# Patient Record
Sex: MALE | Race: WHITE | NOT HISPANIC OR LATINO | Employment: UNEMPLOYED | URBAN - NONMETROPOLITAN AREA
[De-identification: names, ages, dates, MRNs, and addresses within clinical notes are randomized per-mention and may not be internally consistent; named-entity substitution may affect disease eponyms.]

---

## 2017-02-16 ENCOUNTER — HOSPITAL ENCOUNTER (OUTPATIENT)
Dept: RADIOLOGY | Facility: HOSPITAL | Age: 52
Discharge: HOME | End: 2017-02-16
Attending: INTERNAL MEDICINE
Payer: COMMERCIAL

## 2017-02-16 ENCOUNTER — TRANSCRIBE ORDERS (OUTPATIENT)
Dept: ADMINISTRATIVE | Facility: HOSPITAL | Age: 52
End: 2017-02-16

## 2017-02-16 DIAGNOSIS — Q63.8: Primary | ICD-10-CM

## 2017-02-16 DIAGNOSIS — Q63.8: ICD-10-CM

## 2017-02-16 PROCEDURE — 74000 HB X-RAY EXAM OF ABDOMEN (SINGLE ANTEROPOSTERIOR VIEW): CPT

## 2017-03-05 ENCOUNTER — GENERIC CONVERSION - ENCOUNTER (OUTPATIENT)
Dept: OTHER | Facility: OTHER | Age: 52
End: 2017-03-05

## 2017-04-05 ENCOUNTER — GENERIC CONVERSION - ENCOUNTER (OUTPATIENT)
Dept: INTERNAL MEDICINE CLINIC | Facility: CLINIC | Age: 52
End: 2017-04-05

## 2017-04-05 ENCOUNTER — GENERIC CONVERSION - ENCOUNTER (OUTPATIENT)
Dept: OTHER | Facility: OTHER | Age: 52
End: 2017-04-05

## 2017-04-05 DIAGNOSIS — Z12.5 ENCOUNTER FOR SCREENING FOR MALIGNANT NEOPLASM OF PROSTATE: ICD-10-CM

## 2017-04-05 DIAGNOSIS — E55.9 VITAMIN D DEFICIENCY: ICD-10-CM

## 2017-04-05 DIAGNOSIS — E78.2 MIXED HYPERLIPIDEMIA: ICD-10-CM

## 2017-04-05 DIAGNOSIS — I10 ESSENTIAL (PRIMARY) HYPERTENSION: ICD-10-CM

## 2017-07-21 ENCOUNTER — OFFICE VISIT (OUTPATIENT)
Dept: URGENT CARE | Facility: CLINIC | Age: 52
End: 2017-07-21
Payer: COMMERCIAL

## 2017-07-21 PROCEDURE — 99203 OFFICE O/P NEW LOW 30 MIN: CPT

## 2017-07-24 ENCOUNTER — OFFICE VISIT (OUTPATIENT)
Dept: URGENT CARE | Facility: CLINIC | Age: 52
End: 2017-07-24
Payer: COMMERCIAL

## 2017-07-24 ENCOUNTER — TRANSCRIBE ORDERS (OUTPATIENT)
Dept: ADMINISTRATIVE | Facility: HOSPITAL | Age: 52
End: 2017-07-24

## 2017-07-24 DIAGNOSIS — R52 PAIN: Primary | ICD-10-CM

## 2017-07-24 PROCEDURE — 99213 OFFICE O/P EST LOW 20 MIN: CPT

## 2017-07-25 ENCOUNTER — TRANSCRIBE ORDERS (OUTPATIENT)
Dept: LAB | Facility: MEDICAL CENTER | Age: 52
End: 2017-07-25

## 2017-07-25 ENCOUNTER — APPOINTMENT (OUTPATIENT)
Dept: LAB | Facility: MEDICAL CENTER | Age: 52
End: 2017-07-25
Payer: COMMERCIAL

## 2017-07-25 DIAGNOSIS — Z87.442 PERSONAL HISTORY OF URINARY CALCULI: Primary | ICD-10-CM

## 2017-07-25 DIAGNOSIS — Z87.442 PERSONAL HISTORY OF URINARY CALCULI: ICD-10-CM

## 2017-07-25 LAB
BUN SERPL-MCNC: 14 MG/DL (ref 5–25)
CREAT SERPL-MCNC: 1.21 MG/DL (ref 0.6–1.3)
GFR SERPL CREATININE-BSD FRML MDRD: 68 ML/MIN/1.73SQ M

## 2017-07-25 PROCEDURE — 36415 COLL VENOUS BLD VENIPUNCTURE: CPT

## 2017-07-25 PROCEDURE — 84520 ASSAY OF UREA NITROGEN: CPT

## 2017-07-25 PROCEDURE — 82565 ASSAY OF CREATININE: CPT

## 2017-07-29 ENCOUNTER — OFFICE VISIT (OUTPATIENT)
Dept: URGENT CARE | Facility: CLINIC | Age: 52
End: 2017-07-29
Payer: COMMERCIAL

## 2017-07-29 PROCEDURE — 99213 OFFICE O/P EST LOW 20 MIN: CPT

## 2017-07-31 ENCOUNTER — HOSPITAL ENCOUNTER (OUTPATIENT)
Dept: MRI IMAGING | Facility: HOSPITAL | Age: 52
Discharge: HOME | End: 2017-07-31
Attending: PREVENTIVE MEDICINE
Payer: COMMERCIAL

## 2017-07-31 DIAGNOSIS — M62.830 MUSCLE SPASM OF BACK: ICD-10-CM

## 2017-07-31 DIAGNOSIS — R52 PAIN: ICD-10-CM

## 2017-07-31 PROCEDURE — 72148 MRI LUMBAR SPINE W/O DYE: CPT

## 2017-08-02 ENCOUNTER — ALLSCRIPTS OFFICE VISIT (OUTPATIENT)
Dept: OTHER | Facility: OTHER | Age: 52
End: 2017-08-02

## 2017-08-05 ENCOUNTER — GENERIC CONVERSION - ENCOUNTER (OUTPATIENT)
Dept: OTHER | Facility: OTHER | Age: 52
End: 2017-08-05

## 2017-08-14 ENCOUNTER — TELEPHONE (OUTPATIENT)
Dept: RADIOLOGY | Facility: HOSPITAL | Age: 52
End: 2017-08-14

## 2017-08-14 RX ORDER — SODIUM CHLORIDE 9 MG/ML
75 INJECTION, SOLUTION INTRAVENOUS CONTINUOUS
Status: CANCELLED | OUTPATIENT
Start: 2017-08-14

## 2017-08-15 ENCOUNTER — TELEPHONE (OUTPATIENT)
Dept: INPATIENT UNIT | Facility: HOSPITAL | Age: 52
End: 2017-08-15

## 2017-08-16 ENCOUNTER — HOSPITAL ENCOUNTER (OUTPATIENT)
Dept: RADIOLOGY | Facility: HOSPITAL | Age: 52
Discharge: HOME | End: 2017-08-16
Attending: RADIOLOGY | Admitting: RADIOLOGY
Payer: COMMERCIAL

## 2017-08-16 VITALS
HEIGHT: 66 IN | WEIGHT: 165 LBS | OXYGEN SATURATION: 94 % | SYSTOLIC BLOOD PRESSURE: 143 MMHG | BODY MASS INDEX: 26.52 KG/M2 | TEMPERATURE: 97.7 F | RESPIRATION RATE: 16 BRPM | HEART RATE: 67 BPM | DIASTOLIC BLOOD PRESSURE: 91 MMHG

## 2017-08-16 DIAGNOSIS — S32.000A CLOSED COMPRESSION FRACTURE OF LUMBAR VERTEBRA, INITIAL ENCOUNTER (HCC): ICD-10-CM

## 2017-08-16 LAB
ERYTHROCYTE [DISTWIDTH] IN BLOOD BY AUTOMATED COUNT: 13.5 % (ref 11.6–15.1)
HCT VFR BLD AUTO: 51.7 % (ref 36.5–49.3)
HGB BLD-MCNC: 18.6 G/DL (ref 12–17)
MCH RBC QN AUTO: 33.2 PG (ref 26.8–34.3)
MCHC RBC AUTO-ENTMCNC: 36 G/DL (ref 31.4–37.4)
MCV RBC AUTO: 92 FL (ref 82–98)
PLATELET # BLD AUTO: 142 THOUSANDS/UL (ref 149–390)
PMV BLD AUTO: 9.7 FL (ref 8.9–12.7)
RBC # BLD AUTO: 5.6 MILLION/UL (ref 3.88–5.62)
WBC # BLD AUTO: 8.95 THOUSAND/UL (ref 4.31–10.16)

## 2017-08-16 PROCEDURE — 85027 COMPLETE CBC AUTOMATED: CPT | Performed by: RADIOLOGY

## 2017-08-16 PROCEDURE — 99152 MOD SED SAME PHYS/QHP 5/>YRS: CPT

## 2017-08-16 PROCEDURE — 99153 MOD SED SAME PHYS/QHP EA: CPT

## 2017-08-16 PROCEDURE — 22514 PERQ VERTEBRAL AUGMENTATION: CPT

## 2017-08-16 RX ORDER — SODIUM CHLORIDE 9 MG/ML
75 INJECTION, SOLUTION INTRAVENOUS CONTINUOUS
Status: DISCONTINUED | OUTPATIENT
Start: 2017-08-16 | End: 2017-08-16 | Stop reason: HOSPADM

## 2017-08-16 RX ORDER — BACLOFEN 10 MG/1
10 TABLET ORAL 3 TIMES DAILY PRN
COMMUNITY
End: 2020-07-14 | Stop reason: SDUPTHER

## 2017-08-16 RX ORDER — FENTANYL CITRATE 50 UG/ML
INJECTION, SOLUTION INTRAMUSCULAR; INTRAVENOUS CODE/TRAUMA/SEDATION MEDICATION
Status: COMPLETED | OUTPATIENT
Start: 2017-08-16 | End: 2017-08-16

## 2017-08-16 RX ORDER — MORPHINE SULFATE 4 MG/ML
4 INJECTION, SOLUTION INTRAMUSCULAR; INTRAVENOUS EVERY 4 HOURS PRN
Status: DISCONTINUED | OUTPATIENT
Start: 2017-08-16 | End: 2017-08-16 | Stop reason: HOSPADM

## 2017-08-16 RX ORDER — MIDAZOLAM HYDROCHLORIDE 1 MG/ML
INJECTION INTRAMUSCULAR; INTRAVENOUS CODE/TRAUMA/SEDATION MEDICATION
Status: COMPLETED | OUTPATIENT
Start: 2017-08-16 | End: 2017-08-16

## 2017-08-16 RX ORDER — CYCLOBENZAPRINE HCL 10 MG
10 TABLET ORAL 3 TIMES DAILY
COMMUNITY
End: 2020-07-14

## 2017-08-16 RX ORDER — HYDROMORPHONE HYDROCHLORIDE 4 MG/ML
INJECTION, SOLUTION INTRAMUSCULAR; INTRAVENOUS; SUBCUTANEOUS CODE/TRAUMA/SEDATION MEDICATION
Status: COMPLETED | OUTPATIENT
Start: 2017-08-16 | End: 2017-08-16

## 2017-08-16 RX ORDER — CEFAZOLIN SODIUM 1 G/3ML
INJECTION, POWDER, FOR SOLUTION INTRAMUSCULAR; INTRAVENOUS CODE/TRAUMA/SEDATION MEDICATION
Status: COMPLETED | OUTPATIENT
Start: 2017-08-16 | End: 2017-08-16

## 2017-08-16 RX ORDER — OXYCODONE HYDROCHLORIDE AND ACETAMINOPHEN 5; 325 MG/1; MG/1
2 TABLET ORAL EVERY 4 HOURS PRN
Status: DISCONTINUED | OUTPATIENT
Start: 2017-08-16 | End: 2017-08-16 | Stop reason: HOSPADM

## 2017-08-16 RX ADMIN — MIDAZOLAM 2 MG: 1 INJECTION INTRAMUSCULAR; INTRAVENOUS at 13:15

## 2017-08-16 RX ADMIN — FENTANYL CITRATE 50 MCG: 50 INJECTION, SOLUTION INTRAMUSCULAR; INTRAVENOUS at 13:44

## 2017-08-16 RX ADMIN — HYDROMORPHONE HYDROCHLORIDE 1 MG: 4 INJECTION, SOLUTION INTRAMUSCULAR; INTRAVENOUS; SUBCUTANEOUS at 13:31

## 2017-08-16 RX ADMIN — MIDAZOLAM 1 MG: 1 INJECTION INTRAMUSCULAR; INTRAVENOUS at 13:48

## 2017-08-16 RX ADMIN — FENTANYL CITRATE 50 MCG: 50 INJECTION, SOLUTION INTRAMUSCULAR; INTRAVENOUS at 13:15

## 2017-08-16 RX ADMIN — SODIUM CHLORIDE 75 ML/HR: 0.9 INJECTION, SOLUTION INTRAVENOUS at 12:10

## 2017-08-16 RX ADMIN — HYDROMORPHONE HYDROCHLORIDE 1 MG: 4 INJECTION, SOLUTION INTRAMUSCULAR; INTRAVENOUS; SUBCUTANEOUS at 13:23

## 2017-08-16 RX ADMIN — MIDAZOLAM 1 MG: 1 INJECTION INTRAMUSCULAR; INTRAVENOUS at 13:27

## 2017-08-16 RX ADMIN — CEFAZOLIN 1000 MG: 1 INJECTION, POWDER, FOR SOLUTION INTRAVENOUS at 12:56

## 2018-01-10 NOTE — PROGRESS NOTES
Chief Complaint  Pt returns to office for right stent removal with string  Active Problems    1  Abdominal pain, suprapubic (789 09) (R10 2)   2  Anxiety (300 00) (F41 9)   3  Back muscle spasm (724 8) (M62 830)   4  Benign essential hypertension (401 1) (I10)   5  Bladder pain (788 99) (R39 89)   6  Diverticulitis of colon (562 11) (K57 32)   7  Dysuria (788 1) (R30 0)   8  Epididymitis (604 90) (N45 1)   9  Hepatomegaly (789 1) (R16 0)   10  Irritable bowel syndrome (564 1) (K58 9)   11  Microscopic hematuria (599 72) (R31 2)   12  Mixed hyperlipidemia (272 2) (E78 2)   13  Nephrolithiasis (592 0) (N20 0)   14  Penile pain (607 9) (N48 89)   15  Precordial pain (786 51) (R07 2)   16  Rotator cuff tendinitis, left (726 10) (M75 82)   17  Shoulder pain (719 41) (M25 519)   18  Shoulder stiffness (719 51) (M25 619)   19  Testicular pain (608 9) (N50 8)   20  Thoracic back pain, unspecified back pain laterality   21  Tobacco use (305 1) (Z72 0)   22  Upper back pain (724 5) (M54 9)   23  Ureteral Stricture (593 3)   24  Urolithiasis (592 9) (N20 9)    Current Meds   1  ALPRAZolam 0 5 MG Oral Tablet; TAKE 1 TABLET Twice daily PRN; Therapy: 74XIV9973 to (Evaluate:12Jun2016); Last Rx:12May2016 Ordered   2  AmLODIPine Besylate 5 MG Oral Tablet; Take 1 daily; Therapy: 99ZXR3278 to (Evaluate:45Ipt3862)  Requested for: 85DLA6565; Last   Rx:30Mar2016 Ordered   3  CloNIDine HCl - 0 1 MG Oral Tablet (Catapres); TAKE 1 TABLET TWICE DAILY; Therapy: 34PBO0976 to (Evaluate:76Xkh8805)  Requested for: 47ODC5099; Last   Rx:30Mar2016 Ordered   4  Oxycodone-Acetaminophen 7 5-325 MG Oral Tablet; TAKE 1 Q 6 HOURS PRN; Therapy: 19Apr2016 to (Evaluate:08May2016); Last FW:80AVO5189 Ordered   5  Pantoprazole Sodium 40 MG Oral Tablet Delayed Release; Take 1 tablet daily; Therapy: 11Apr2016 to (Evaluate:11May2016)  Requested for: 11Apr2016 Recorded   6   Tobramycin 0 3 % Ophthalmic Solution; INSTILL 1 DROP IN THE AFFECTED EYE(S)   EVERY 2 HOURS WHILE AWAKE FOR 2 DAYS, THEN 1 DROP 4 TIMES DAILY; Therapy: 17Foe7352 to (Last Rx:12Fij1612)  Requested for: 02Azd8003 Ordered    Allergies    1  methylPREDNISolone   2  MetroNIDAZOLE CAPS   3  Penicillins    4  Seasonal    Vitals  Signs [Data Includes: Current Encounter]    Heart Rate: 68  Respiration: 16  Systolic: 891  Diastolic: 78  Height: 5 ft 7 in  Weight: 168 lb   BMI Calculated: 26 31  BSA Calculated: 1 88    Procedure    Procedure: Stent Removal   Pt's right stent removed without difficulty  Stent intact  Pt tolerated procedure well  Assessment    1  Nephrolithiasis (592 0) (N20 0)    Plan  Nephrolithiasis    · Follow-up Visit in 4 Weeks Evaluation and Treatment  Follow-up in 4 weeks with K[facility]  PTV  Status: Hold For - Scheduling,Retrospective By Protocol Authorization  Requested  for: 65YJY3462   Ordered; For: Nephrolithiasis;  Ordered Wojciech Villa Performed:  Due: 43IJE2719    Signatures   Electronically signed by : Vicenta Lugo, ; May 18 2016 12:21PM EST                       (Author)    Electronically signed by : KEZIA Tello ; May 24 2016  6:56PM EST

## 2018-01-12 NOTE — PROGRESS NOTES
Chief Complaint  Pt here for stent pull      Active Problems    1  Abdominal pain, suprapubic (789 09) (R10 2)   2  Anxiety (300 00) (F41 9)   3  Back muscle spasm (724 8) (M62 830)   4  Benign essential hypertension (401 1) (I10)   5  Bladder pain (788 99) (R39 89)   6  Diverticulitis of colon (562 11) (K57 32)   7  Dysuria (788 1) (R30 0)   8  Epididymitis (604 90) (N45 1)   9  Hepatomegaly (789 1) (R16 0)   10  Hydronephrosis, right (591) (N13 30)   11  Irritable bowel syndrome (564 1) (K58 9)   12  Microscopic hematuria (599 72) (R31 2)   13  Mixed hyperlipidemia (272 2) (E78 2)   14  Nephrolithiasis (592 0) (N20 0)   15  Penile pain (607 9) (N48 89)   16  Precordial pain (786 51) (R07 2)   17  Rotator cuff tendinitis, left (726 10) (M75 82)   18  Shoulder pain (719 41) (M25 519)   19  Shoulder stiffness (719 51) (M25 619)   20  Testicular pain (608 9) (N50 8)   21  Thoracic back pain, unspecified back pain laterality   22  Tobacco use (305 1) (Z72 0)   23  Upper back pain (724 5) (M54 9)   24  Ureteral Stricture (593 3)   25  Urolithiasis (592 9) (N20 9)    Current Meds   1  ALPRAZolam 0 5 MG Oral Tablet; TAKE 1 TABLET Twice daily PRN; Therapy: 46FFM3684 to (Evaluate:12Pkz0458); Last Rx:80Hec5414 Ordered   2  AmLODIPine Besylate 5 MG Oral Tablet; Take 1 daily; Therapy: 54VHK0544 to (Evaluate:23Vkr5576)  Requested for: 70EAD7615; Last   Rx:30Mar2016 Ordered   3  CloNIDine HCl - 0 1 MG Oral Tablet; TAKE 1 TABLET TWICE DAILY; Therapy: 50AQC6973 to (Evaluate:99Xod0279)  Requested for: 32XED5508; Last   Rx:30Mar2016 Ordered   4  Ditropan XL 5 MG Oral Tablet Extended Release 24 Hour; Therapy: (Recorded:20Rhm2128) to Recorded    Allergies    1  methylPREDNISolone   2  MetroNIDAZOLE CAPS   3  Penicillins    4  Seasonal    Vitals  Signs    Systolic: 849  Diastolic: 80  Heart Rate: 64  Weight: 170 lb   BMI Calculated: 26 63  BSA Calculated: 1 89    Procedure    Procedure: Stent Removal   w/o difficulty   Pt to have a 24 hr urine and appt after this is completed      Plan  Balanitis    · Clotrimazole-Betamethasone 1-0 05 % External Cream; APPLY  AND RUB  IN A  THIN FILM TO AFFECTED AREAS TWICE DAILY AS NEEDED FOR RASH   Rx By: Justino Connors; Dispense: 0 Days ; #:1 X 15 GM Tube; Refill: 0; For: Balanitis; WU = N; Sent To: [facility]; Last Updated By: Alex Barreto; 7/18/2016 10:23:52 AM  Nephrolithiasis    · (Q) STONERISK(R) DIAGNOSTIC PROFILE; Status:Active - Retrospective By Protocol  Authorization; Requested for:47Ypf6992;    Perform:[facility]; Due:23Lqe2343; Last Updated Alex Plants; 7/18/2016 9:28:27 AM;Ordered;  For:Nephrolithiasis; Ordered By:Gus Contreras Neighbor;    Education  PT has a hx of numerous stones  He knows and admits to knowing he needs to drink lots of water and take Ibuprofen       Signatures   Electronically signed by : Aisha Berry, ; Jul 18 2016 10:27AM EST                       (Author)    Electronically signed by : KEZIA Pienda ; Jul 19 2016  1:37PM EST

## 2018-01-13 VITALS
SYSTOLIC BLOOD PRESSURE: 134 MMHG | WEIGHT: 171.25 LBS | BODY MASS INDEX: 27.52 KG/M2 | DIASTOLIC BLOOD PRESSURE: 74 MMHG | HEIGHT: 66 IN | HEART RATE: 84 BPM | OXYGEN SATURATION: 94 % | TEMPERATURE: 97.2 F

## 2018-01-13 NOTE — PROGRESS NOTES
Preliminary Nursing Report                Patient Information    Initial Encounter Entry Date:   2016 3:37 PM EST (Automated Transmission Automated Transmission)       Last Modified:   {Shama Davila}              Legal Name: Manan Marte Number:        YOB: 1965        Age (years): 45        Gender: M        Body Mass Index (BMI): 26 kg/m2        Height: 67 in  Weight: 168 lbs (76 kgs)           Address:   [address] [place] [address] [place]              Phone: [phone]   (consent to leave messages)        Email:        Ethnicity: Decline to State        Oriental orthodox:        Marital Status:        Preferred Language: English        Race: Other Race                    Patient Insurance Information        Primary Insurance Information Carrier Name: {Primary  CarrierName}           Carrier Address:   {Primary  CarrierAddress}              Carrier Phone: {Primary  CarrierPhone}          Group Number: {Primary  GroupNumber}          Policy Number: {Primary  PolicyNumber}          Insured Name: {Primary  InsuredName}          Insured : {Primary  InsuredDOB}          Relationship to Insured: {Primary  RelationshiptoInsured}           Secondary Insurance Information Carrier Name: {Secondary  CarrierName}           Carrier Address:   {Secondary  CarrierAddress}              Carrier Phone: {Secondary  CarrierPhone}          Group Number: {Secondary  GroupNumber}          Policy Number: {Secondary  PolicyNumber}          Insured Name: {Secondary  InsuredName}          Insured : {Secondary  InsuredDOB}          Relationship to Insured: {Secondary  RelationshiptoInsured}                       Health Profile   Booking #:   Dashawn Jose #: [id number]               DOS: 2016    Surgery : LITHOTRIPSY, EXTRACORPOREAL SHOCK WAVE    Add'l Procedures/Notes:     Surgery Risk: Intermediate          Precautions          Allergies    methylPREDNISolone       MetroNIDAZOLE CAPS       Clinical Comments: Reaction Type: , Reaction: , Severity:        Penicillins       Seasonal       Clinical Comments: Reaction Type: , Reaction: , Severity:              Medications    ALPRAZolam 0 5 MG Oral Tablet       AmLODIPine Besylate 5 MG Oral Tablet       CloNIDine HCl - 0 1 MG Oral Tablet       Oxycodone-Acetaminophen 7 5-325 MG Oral Tablet       Pantoprazole Sodium 40 MG Oral Tablet Delayed Release       Tobramycin 0 3 % Ophthalmic Solution               Conditions    Abdominal pain, suprapubic       Anxiety       Back muscle spasm       Benign essential hypertension       Bladder pain       Diverticulitis of colon       Dysuria       Epididymitis       Hepatomegaly       Irritable bowel syndrome       Microscopic hematuria       Mixed hyperlipidemia       Nephrolithiasis       Penile pain       Precordial pain       Rotator cuff tendinitis, left       Shoulder pain       Shoulder stiffness       Testicular pain       Thoracic back pain, unspecified back pain laterality       Tobacco use       Upper back pain       Ureteral Stricture       Urolithiasis               Family History    None             Surgical History    None             Social History    Caffeine Use       Current Every Day Smoker       Single       Tobacco use       Uses Safety Equipment - Seatbelts                               Patient Instructions       ? NPO Instructions   The day before surgery it is recommended to have a light dinner at your usual time and you are allowed a light snack early in the evening  Do not eat anything heavy or eat a big meal after 7pm  Do not eat or drink anything after midnight prior to your surgery  If you are supposed to take any of your medications, do so with a sip of water  Failure to follow these instructions can lead to an increased risk of lung complications and may result in a delay or cancellation of your procedure   If you have any questions, contact your institution for further instructions  No candy, no gum, no mints, no chewing tobacco   Triggered by: Medical Procedure Risk         ? Alpha 2 Agonist (Blood Pressure Medication) 2, 4, 6, 3, 5  Please continue to take this medication on your normal schedule  If this is an oral medication and you take in the morning, you may do so with a sip of water  Triggered by: CloNIDine HCl - 0 1 MG Oral Tablet         ? Antacids 34, 35  Please continue to take this medication on your normal schedule  If this is an oral medication and you take in the morning, you may do so with a sip of water  Triggered by: Pantoprazole Sodium 40 MG Oral Tablet Delayed Release         ? Benzodiazepine 15  Please continue the following medications, if needed, up to and including the day of surgery (with a sip of water)  Triggered by: ALPRAZolam 0 5 MG Oral Tablet         ? Calcium Blocker (Blood Pressure Medication) 3, 4, 6, 5, 2  Please continue to take this medication on your normal schedule  If this is an oral medication and you take in the morning, you may do so with a sip of water  Triggered by: AmLODIPine Besylate 5 MG Oral Tablet         ? Opioids (Pain Medication) 62  Please continue the following medications, if needed, up to and including the day of surgery (with a sip of water)  Triggered by: Oxycodone-Acetaminophen 7 5-325 MG Oral Tablet         ? Smoking Cessation   Smoking before and after surgery can lead to complications  Patients who quit smoking at least eight weeks before surgery have complication rates almost as low as non-smokers  Smokers who can stop smoking 24 or 48 hours before surgery may also benefit from decreased amounts of nicotine and carbon monoxide in the body  For help quitting smoking, speak with your physician or contact the [facility] [place] or [employer]  Please visit the following web address for assistance with quitting  [facility] be  [email or web address]  aspx  Triggered by: Tobacco use               Testing Considerations       ? Coagulation Tests (PT/PTT/INR) t  Triggered by: Hepatomegaly         ? Complete Blood Count (CBC) t  If test was completed and normal within last six months, repeat test is not necessary  Triggered by: Hepatomegaly         ? Comprehensive Metabolic Panel (CMP) t  If test was completed and normal within last six months, repeat test is not necessary  Triggered by: Hepatomegaly         ? Electrocardiogram (ECG) t  Patient does not need new test if normal ECG is present within the last six months and no change in clinical condition  Triggered by: Benign essential hypertension         ? Urinalysis t  Urinalysis may be appropriate if recent urinary symptoms or implants are being placed in surgical procedure  Triggered by: Dysuria, Urolithiasis               Consultations       ? Primary Care Physician Evaluation   Primary care physician may need to evaluate patient prior to surgery  This is likely NOT necessary if the listed conditions are chronic and stable  Triggered by: Dysuria, Microscopic hematuria, Hepatomegaly, Nephrolithiasis               Miscellaneous Questions         Question: Are you able to walk up a flight of stairs, walk up a hill or do heavy housework WITHOUT having chest pain or shortness of breath? Answer: YES                   Allergies/Conditions/Medications Not Found        The following were not recognized by our system when generating the recommendations  Please consider if this would impact any preoperative protocols  ? Caffeine Use       ? Mixed hyperlipidemia       ? Single       ? Thoracic back pain, unspecified back pain laterality       ? Ureteral Stricture       ?  Uses Safety Equipment - Seatbelts                  Appointment Information         Date:    06/17/2016        Location:    [facility]        Address: Directions:                      Footnotes revision 14      ?? Denotes a free-text entry  Legal Disclaimer: Any and all recommendations and services provided herein are designed to assist in the preoperative care of the patient  Nothing contained herein is designed to replace, eliminate or alleviate the responsibility of the attending physician to supervise and determine the patient?s preoperative care and course of treatment  Failure to provide complete, accurate information may negatively impact the system?s ability to recommend the proper preoperative protocol  THE ATTENDING PHYSICIAN IS RESPONSIBLE TO REVIEW THE SUGGESTED PREOPERATIVE PROTOCOLS/COURSE OF TREATMENT AND PRESCRIBE THE FINAL COURSE OF PREOPERATIVE TREATMENT IN CONSULTATION WITH THE PATIENT  THE ePREOP SYSTEM AND ITS MATERIALS ARE PROVIDED ? AS IS? WITHOUT WARRANTY OF ANY KIND, EXPRESS OR IMPLIED, INCLUDING, BUT NOT LIMITED TO, WARRANTIES OF PERFORMANCE OR MERCHANTABILITY OR FITNESS FOR A PARTICULAR PURPOSE  PATIENT AND PHYSICIANS HEREBY AGREE THAT THEIR USE OF THE MATERIALS AND RESOURCES ACT AS A CONSENT TO RELEASE AND WAIVE ePREOP FROM ANY AND ALL CLAIMS OF WARRANTY, TORT OR CONTRACT LAW OF ANY KIND  Electronically signed Ollie FERGUSON    Jun 8 2016 10:55PM EST

## 2018-01-13 NOTE — RESULT NOTES
Verified Results  * MRI LUMBAR SPINE WO CONTRAST [id number] 01:29PM Syeda Rome     Test Name Result Flag Reference   MRI LUMBAR SPINE [address] (Report)     This is a summary report  The complete report is available in the patient's medical record  If you cannot access the medical record, please contact the sending organization for a detailed fax or copy  MRI LUMBAR SPINE WITHOUT CONTRAST     INDICATION: Severe mid to low back pain radiating into left leg     COMPARISON: X-ray 6/20/2013     TECHNIQUE: Sagittal T1, sagittal T2, sagittal inversion recovery, axial T1 and axial T2, coronal T2       IMAGE QUALITY: Diagnostic     FINDINGS:     ALIGNMENT: Minor straightening of normal lumbar lordosis  MARROW SIGNAL: Marrow edema present in the anterior upper half of the L4 vertebral body where 20% reduction in vertebral body height is noted  Findings consistent with recent compression deformity  DISTAL CORD AND CONUS: Normal size and signal within the distal cord and conus  The conus ends at the L1-L2 level  PARASPINAL SOFT TISSUES: Paraspinal soft tissues are unremarkable  Minor deformity of the upper pole renal calyx on the right, related to previous staghorn calculus  SACRUM: Normal signal within the sacrum  No evidence of insufficiency or stress fracture  LOWER THORACIC DISC SPACES: Normal disc height and signal  No disc herniation, canal stenosis or foraminal narrowing  LUMBAR DISC SPACES:        L1-L2: Minor bulge     L2-L3: Minor circumferential bulging of the discs  L3-L4: Minor bulge  New L4 superior endplate deformity     Y1-I2: Moderate facet arthrosis  L5-S1: Right greater than left facet arthrosis  Extraspinal right synovial facet cyst        IMPRESSION:     Recent anterior superior L4 endplate deformity, estimated 20% loss of vertebral body height  Minor degenerative changes not clearly compressive  Workstation performed: [id number]     Signed by:    [address] Russell Oliva MD   8/1/17

## 2018-01-13 NOTE — PROGRESS NOTES
Plan  Health Maintenance    · Urine Dip Non-Automated- POC; Status:Complete - Retrospective Authorization;   Done:  78CDF6606 11:25AM    Chief Complaint  PT HERE FOR CD[facility] PHYS  VISION AND URINE DONE  Active Problems    1  Abdominal pain, suprapubic (789 09) (R10 2)   2  Acute maxillary sinusitis (461 0) (J01 00)   3  Ankle pain (719 47) (M25 579)   4  Anxiety (300 00) (F41 9)   5  Back muscle spasm (724 8) (M62 830)   6  Benign essential hypertension (401 1) (I10)   7  Bladder pain (788 99) (R39 89)   8  Bright red rectal bleeding (569 3) (K62 5)   9  Diverticulitis of colon (562 11) (K57 32)   10  Dysuria (788 1) (R30 0)   11  Epididymitis (604 90) (N45 1)   12  Hepatomegaly (789 1) (R16 0)   13  Irritable bowel syndrome (564 1) (K58 9)   14  Microscopic hematuria (599 72) (R31 2)   15  Mixed hyperlipidemia (272 2) (E78 2)   16  Neck muscle spasm (728 85) (M62 838)   17  Neck pain (723 1) (M54 2)   18  Nephrolithiasis (592 0) (N20 0)   19  Penile pain (607 9) (N48 89)   20  Periorbital edema (782 3) (R60 0)   21  Precordial pain (786 51) (R07 2)   22  Right rotator cuff tendonitis (726 10) (M75 81)   23  Rotator cuff tendinitis, left (726 10) (M75 82)   24  Shoulder pain (719 41) (M25 519)   25  Shoulder stiffness (719 51) (M25 619)   26  Skin tag of anus (455 9) (K64 4)   27  Stye (373 11) (H00 019)   28  Testicular pain (608 9) (N50 8)   29  Thoracic back pain, unspecified back pain laterality   30  Tobacco use (305 1) (Z72 0)   31  Trigger point of thoracic region (724 5) (M54 6)   32  Upper back pain (724 5) (M54 9)   33  Ureteral Stricture (593 3)   34   Urolithiasis (592 9) (N20 9)    Past Medical History    · History of Acute sinusitis (461 9) (J01 90)   · History of Bronchitis (490) (J40)   · History of Cough (786 2) (R05)   · History of hypertension (V12 59) (Z86 79)   · History of Need for influenza vaccination (V04 81) (Z23)   · History of Need for pneumococcal vaccination (V03 82) (Z23)   · History of Sinusitis (473 9) (J32 9)   · History of Urinary Tract Infection   · History of UTI (urinary tract infection) (599 0) (N39 0)    Surgical History    · History of Cystoscopy With Insertion Of Ureteral Stent Right   · History of Cystoscopy With Removal Of Object   · History of Cystoscopy With Ureteroscopy Right   · History of Cystoscopy With Ureteroscopy With Removal Of Calculus   · History of Hemorrhoidectomy   · History of Lithotripsy - Whole Body (Extracorporeal Shock Wave)   · History of Percutaneous Lithotomy   · History of Percutaneous Lithotomy   · History of Umbilical Hernia Repair   · History of Vesicoureteral Reimplantation    Family History    · Family history of Chronic Obstructive Pulmonary Disease   · Family history of Hypertension (V17 49)    · Family history of Acute Myocardial Infarction (V17 3)    · Denied: FH: colon cancer    Social History    · Caffeine Use   · Current Every Day Smoker (305 1)   · Single   · Tobacco use (305 1) (Z72 0)   · Uses Safety Equipment - Seatbelts    Current Meds   1  ALPRAZolam 0 5 MG Oral Tablet; TAKE 1 TABLET Twice daily PRN; Therapy: 35DRZ7265 to (Evaluate:16Apr2016); Last Rx:17Mar2016 Ordered   2  AmLODIPine Besylate 5 MG Oral Tablet; Take 1 daily; Therapy: 22PGH8232 to (Jose Antonio Martin)  Requested for: 76IHI3939; Last   Rx:01Oct2015 Ordered   3  CloNIDine HCl - 0 1 MG Oral Tablet; TAKE 1 TABLET TWICE DAILY; Therapy: 33ZXK6022 to (Jose Antonio Martin)  Requested for: 48QCT4030; Last   Rx:01Oct2015 Ordered   4  Myrbetriq 25 MG Oral Tablet Extended Release 24 Hour; Take 1 tablet daily; Therapy: 11FWX7547 to (Evaluate:65Lld7388)  Requested for: 85LYS3586; Last   Rx:04Jan2016 Ordered   5  Tamsulosin HCl - 0 4 MG Oral Capsule; TAKE 1 CAPSULE Bedtime; Therapy: 52NFL7935 to (Last Rx:99Dln9015)  Requested for: 57XHD1525 Ordered   6  Voltaren 1 % Transdermal Gel; apply 2 grams to affected area twice daily PRN pain;    Therapy: 27AZQ8826 to (Last Rx:67Rtj2786) Requested for: 99TAP5545 Ordered    Allergies    1  methylPREDNISolone   2  MetroNIDAZOLE CAPS   3  Penicillins    4  Seasonal    Vitals   Recorded: 28Mar2016 11:23AM   Heart Rate 76   Systolic 730, RUE, Sitting   Diastolic 84, RUE, Sitting   Height 5 ft 7 in   Weight 175 lb 8 oz   BMI Calculated 27 49   BSA Calculated 1 91     Results/Data  Urine Dip Non-Automated- POC 22USB0381 11:25AM Lynn Pressman     Test Name Result Flag Reference   Leukocytes NEG     Nitrite NEG     Blood NEG     Bilirubin NEG     Urobilinogen NEG     Protein NEG     Specific Gravity 1 000     Ketone NEG     Glucose NEG         Procedure    Procedure: Visual Acuity Test    Indication: routine screening  Inforrmation supplied by a Snellen chart  Results: 20/20 in both eyes with corrective device, 20/20 in the right eye with corrective device, 20/20 in the left eye with corrective device      Health Management  Health Maintenance   COLONOSCOPY (GI, SURG); every 5 years; Last 28Jul2015; Next Due: 86RJA5512;   Active    Signatures   Electronically signed by : Ignacio Husain DO; Mar 28 2016 11:49AM EST                       (Author)

## 2018-01-22 VITALS — DIASTOLIC BLOOD PRESSURE: 80 MMHG | SYSTOLIC BLOOD PRESSURE: 138 MMHG

## 2018-01-22 VITALS
HEART RATE: 84 BPM | OXYGEN SATURATION: 95 % | TEMPERATURE: 97.1 F | HEIGHT: 67 IN | WEIGHT: 169.5 LBS | BODY MASS INDEX: 26.6 KG/M2

## 2018-12-06 RX ORDER — TOBRAMYCIN 3 MG/ML
SOLUTION/ DROPS OPHTHALMIC
Qty: 5 ML | Refills: 0 | OUTPATIENT
Start: 2018-12-06

## 2020-07-14 ENCOUNTER — OFFICE VISIT (OUTPATIENT)
Dept: FAMILY MEDICINE CLINIC | Facility: CLINIC | Age: 55
End: 2020-07-14
Payer: COMMERCIAL

## 2020-07-14 VITALS
DIASTOLIC BLOOD PRESSURE: 84 MMHG | SYSTOLIC BLOOD PRESSURE: 126 MMHG | HEART RATE: 84 BPM | BODY MASS INDEX: 26.16 KG/M2 | WEIGHT: 157 LBS | HEIGHT: 65 IN

## 2020-07-14 DIAGNOSIS — N52.9 ERECTILE DYSFUNCTION, UNSPECIFIED ERECTILE DYSFUNCTION TYPE: ICD-10-CM

## 2020-07-14 DIAGNOSIS — M54.50 CHRONIC BILATERAL LOW BACK PAIN WITHOUT SCIATICA: ICD-10-CM

## 2020-07-14 DIAGNOSIS — F41.9 ANXIETY: Primary | ICD-10-CM

## 2020-07-14 DIAGNOSIS — I10 ESSENTIAL HYPERTENSION: ICD-10-CM

## 2020-07-14 DIAGNOSIS — Z11.3 SCREEN FOR SEXUALLY TRANSMITTED DISEASES: ICD-10-CM

## 2020-07-14 DIAGNOSIS — G89.29 CHRONIC BILATERAL LOW BACK PAIN WITHOUT SCIATICA: ICD-10-CM

## 2020-07-14 DIAGNOSIS — F17.200 TOBACCO USE DISORDER: ICD-10-CM

## 2020-07-14 PROCEDURE — 3079F DIAST BP 80-89 MM HG: CPT | Performed by: FAMILY MEDICINE

## 2020-07-14 PROCEDURE — 3074F SYST BP LT 130 MM HG: CPT | Performed by: FAMILY MEDICINE

## 2020-07-14 PROCEDURE — 3008F BODY MASS INDEX DOCD: CPT | Performed by: FAMILY MEDICINE

## 2020-07-14 PROCEDURE — 99214 OFFICE O/P EST MOD 30 MIN: CPT | Performed by: FAMILY MEDICINE

## 2020-07-14 PROCEDURE — 99406 BEHAV CHNG SMOKING 3-10 MIN: CPT | Performed by: FAMILY MEDICINE

## 2020-07-14 RX ORDER — AMITRIPTYLINE HYDROCHLORIDE 25 MG/1
25 TABLET, FILM COATED ORAL
Qty: 90 TABLET | Refills: 1 | Status: SHIPPED | OUTPATIENT
Start: 2020-07-14

## 2020-07-14 RX ORDER — ALPRAZOLAM 0.5 MG/1
0.5 TABLET ORAL 3 TIMES DAILY PRN
Qty: 180 TABLET | Refills: 0 | Status: SHIPPED | OUTPATIENT
Start: 2020-07-14 | End: 2020-09-14 | Stop reason: ALTCHOICE

## 2020-07-14 RX ORDER — SILDENAFIL CITRATE 20 MG/1
20 TABLET ORAL DAILY
COMMUNITY
End: 2020-07-14 | Stop reason: SDUPTHER

## 2020-07-14 RX ORDER — AMLODIPINE BESYLATE 5 MG/1
5 TABLET ORAL DAILY
Qty: 90 TABLET | Refills: 1 | Status: SHIPPED | OUTPATIENT
Start: 2020-07-14 | End: 2020-10-28 | Stop reason: SDUPTHER

## 2020-07-14 RX ORDER — BACLOFEN 10 MG/1
10 TABLET ORAL 3 TIMES DAILY PRN
Qty: 90 TABLET | Refills: 2 | Status: SHIPPED | OUTPATIENT
Start: 2020-07-14 | End: 2021-04-14

## 2020-07-14 RX ORDER — SILDENAFIL CITRATE 20 MG/1
20 TABLET ORAL DAILY
Qty: 30 TABLET | Refills: 2 | Status: SHIPPED | OUTPATIENT
Start: 2020-07-14 | End: 2021-01-04 | Stop reason: SDUPTHER

## 2020-07-14 NOTE — PROGRESS NOTES
Assessment/Plan:  Chronic low back pain 5 years post kyphoplasty her fracture of L1  Will be adding amitriptyline 25 mg for the back pain the patient has failed with pain management has failed Cymbalta and failed with injecting therapy  Hypertension controlled with mold pain erectile dysfunction her treated with generic Viagra chronic anxiety treated with Xanax there has been no sign of divergence her abuse PDMP has been tobacco use disorder was discussed at length we spent approximately 3 minutes discussing tobacco use and possible therapy on treating the tobacco use however the patient is not interested cyst reviewed  Laboratory will be ordered  Problem List Items Addressed This Visit     None      Visit Diagnoses     Anxiety    -  Primary    Relevant Medications    ALPRAZolam (XANAX) 0 5 mg tablet    Essential hypertension        Relevant Medications    amLODIPine (NORVASC) 5 mg tablet    Erectile dysfunction, unspecified erectile dysfunction type        Relevant Medications    sildenafil (REVATIO) 20 mg tablet    Chronic bilateral low back pain without sciatica        Relevant Medications    baclofen 10 mg tablet           Diagnoses and all orders for this visit:    Anxiety  -     ALPRAZolam (XANAX) 0 5 mg tablet; Take 1 tablet (0 5 mg total) by mouth 3 (three) times a day as needed for anxiety    Essential hypertension  -     amLODIPine (NORVASC) 5 mg tablet; Take 1 tablet (5 mg total) by mouth daily AmLODIPine Besylate 5 MG Oral Tablet Take 1 daily  Quantity: 30;  Refills: 5    Natalie Laila VENTURA;  Started 23-Oct-2013 Active    Erectile dysfunction, unspecified erectile dysfunction type  -     sildenafil (REVATIO) 20 mg tablet; Take 1 tablet (20 mg total) by mouth daily    Chronic bilateral low back pain without sciatica  -     baclofen 10 mg tablet;  Take 1 tablet (10 mg total) by mouth 3 (three) times a day as needed for muscle spasms    Other orders  -     Discontinue: sildenafil (REVATIO) 20 mg tablet; Take 20 mg by mouth daily        No problem-specific Assessment & Plan notes found for this encounter  PHQ-9 Depression Screening    PHQ-9:    Frequency of the following problems over the past two weeks:       Little interest or pleasure in doing things:  0 - not at all  Feeling down, depressed, or hopeless:  0 - not at all  PHQ-2 Score:  0          Body mass index is 26 13 kg/m²  BMI Counseling: Body mass index is 26 13 kg/m²  The BMI     Subjective:      Patient ID: Maude Pinto is a 53 y o  male  Patient presents for routine checkup      The following portions of the patient's history were reviewed and updated as appropriate:   He has a past medical history of Anxiety, Bright red rectal bleeding, Hypertension, Kidney stones, Periorbital edema, Skin tag of anus, and Trigger point of thoracic region  ,  does not have a problem list on file  ,   has a past surgical history that includes Kidney surgery; Transurethral resection of bladder; Hernia repair (03/11/2013); Hemorrhoid surgery; Lithotripsy; pr cysto/uretero w/lithotripsy &indwell stent insrt (Right, 7/13/2016); pr fragment kidney stone/ eswl (Right, 6/17/2016); pr cysto/uretero w/lithotripsy &indwell stent insrt (Right, 5/9/2016); Cystoscopy w/ ureteral stent placement (03/11/2013); Cystoscopy w/ ureteral stent placement (06/18/2014); Cystoscopy w/ ureteral stent placement (07/16/2014); Cystoscopy w/ ureteral stent removal (04/11/2013); Cystoscopy w/ ureteral stent removal (Right, 08/26/2014); Cystoscopy w/ ureteroscopy w/ lithotripsy (02/26/2013); Cystoscopy w/ ureteroscopy w/ lithotripsy (03/11/2014); Cystoscopy w/ ureteroscopy w/ lithotripsy (Right, 08/04/2014); Cystoscopy w/ ureteroscopy w/ lithotripsy (Right, 05/09/2016); and Ureteral reimplantion (03/11/2013)  ,  family history includes COPD in his mother; Heart attack in his father; Hypertension in his mother  ,   reports that he has been smoking   He has a 70 00 pack-year smoking history  He has never used smokeless tobacco  He reports that he does not drink alcohol or use drugs  ,  is allergic to metronidazole; nsaids; penicillin g; penicillins; pollen extract; and sulfa antibiotics     Current Outpatient Medications   Medication Sig Dispense Refill    ALPRAZolam (XANAX) 0 5 mg tablet Take 1 tablet (0 5 mg total) by mouth 3 (three) times a day as needed for anxiety 180 tablet 0    amLODIPine (NORVASC) 5 mg tablet Take 1 tablet (5 mg total) by mouth daily AmLODIPine Besylate 5 MG Oral Tablet Take 1 daily  Quantity: 30;  Refills: 5    Milagro Claros DO;  Started 23-Oct-2013 Active 90 tablet 1    baclofen 10 mg tablet Take 1 tablet (10 mg total) by mouth 3 (three) times a day as needed for muscle spasms 90 tablet 2    sildenafil (REVATIO) 20 mg tablet Take 1 tablet (20 mg total) by mouth daily 30 tablet 2     No current facility-administered medications for this visit  Review of Systems   Constitutional: Negative  HENT: Negative  Eyes: Negative  Respiratory: Negative  Cardiovascular: Negative  Gastrointestinal: Negative  Endocrine: Negative  Genitourinary: Negative  Musculoskeletal: Positive for back pain  Skin: Negative  Allergic/Immunologic: Negative  Neurological: Negative  Hematological: Negative  Psychiatric/Behavioral: Negative  Objective:    /84   Pulse 84   Ht 5' 5" (1 651 m)   Wt 71 2 kg (157 lb)   BMI 26 13 kg/m²   Body mass index is 26 13 kg/m²  Physical Exam   Constitutional: He is oriented to person, place, and time  He appears well-developed and well-nourished  HENT:   Head: Normocephalic  Eyes: Pupils are equal, round, and reactive to light  Neck: Normal range of motion  Cardiovascular: Normal rate, regular rhythm and normal heart sounds  Pulmonary/Chest: Effort normal and breath sounds normal    Abdominal: Soft  Bowel sounds are normal  There is no tenderness     Musculoskeletal:   There is a kyphoscoliosis   Neurological: He is alert and oriented to person, place, and time  Skin: Skin is warm  Psychiatric: He has a normal mood and affect

## 2020-08-12 ENCOUNTER — HOSPITAL ENCOUNTER (EMERGENCY)
Facility: HOSPITAL | Age: 55
Discharge: HOME | End: 2020-08-12
Attending: EMERGENCY MEDICINE
Payer: COMMERCIAL

## 2020-08-12 VITALS
SYSTOLIC BLOOD PRESSURE: 190 MMHG | WEIGHT: 157 LBS | OXYGEN SATURATION: 97 % | HEART RATE: 85 BPM | RESPIRATION RATE: 19 BRPM | DIASTOLIC BLOOD PRESSURE: 98 MMHG | BODY MASS INDEX: 26.13 KG/M2 | TEMPERATURE: 98 F

## 2020-08-12 DIAGNOSIS — F22 EKBOM'S DELUSIONAL PARASITOSIS (HCC): Primary | ICD-10-CM

## 2020-08-12 PROCEDURE — 99282 EMERGENCY DEPT VISIT SF MDM: CPT | Performed by: EMERGENCY MEDICINE

## 2020-08-12 PROCEDURE — 99282 EMERGENCY DEPT VISIT SF MDM: CPT

## 2020-08-12 NOTE — DISCHARGE INSTRUCTIONS
Please stop picking at your skin  Apply Benadryl cream or use Benadryl tablets for itching  You can also try topical hydrocortisone  If you develop spreading redness around the skin excoriations, fever, chills, or pus draining from the excoriations, return to the ER  There is no evidence of bugs under your skin

## 2020-08-12 NOTE — ED PROVIDER NOTES
History  Chief Complaint   Patient presents with    Wound Check     Patient has had 6 different types of bugs climbing out of skin from arms and legs for the past 4 weeks, patient has a bag of bugs in his truck he would like to have identified  26-year-old male with no pertinent past medical history who is presenting due to concern for bugs crawling out of his skin  He states this has been ongoing for the past several weeks  He reports 6 different species of bugs that have been coming out of his skin  Patient has been picking at his skin and squeezing his skin in order to remove the bugs  He has been applying antibiotic ointment as well  Patient denies any fever chills  No pus draining from the skin excoriations  No other complaints on review of systems  Patient denies any illicit drug use  Prior to Admission Medications   Prescriptions Last Dose Informant Patient Reported? Taking?    ALPRAZolam (XANAX) 0 5 mg tablet   No No   Sig: Take 1 tablet (0 5 mg total) by mouth 3 (three) times a day as needed for anxiety   amLODIPine (NORVASC) 5 mg tablet   No No   Sig: Take 1 tablet (5 mg total) by mouth daily AmLODIPine Besylate 5 MG Oral Tablet Take 1 daily  Quantity: 30;  Refills: 5    Janettee Herminiak DO;  Started 23-Oct-2013 Active   amitriptyline (ELAVIL) 25 mg tablet   No No   Sig: Take 1 tablet (25 mg total) by mouth daily at bedtime   baclofen 10 mg tablet   No No   Sig: Take 1 tablet (10 mg total) by mouth 3 (three) times a day as needed for muscle spasms   sildenafil (REVATIO) 20 mg tablet   No No   Sig: Take 1 tablet (20 mg total) by mouth daily      Facility-Administered Medications: None       Past Medical History:   Diagnosis Date    Anxiety     Bright red rectal bleeding     Last Assessed: 9/9/2015     Hypertension     Last Assessed: 7/9/2014     Kidney stones     Last Assessed: 11/17/2016     Periorbital edema     Last Assessed: 9/4/2015    Skin tag of anus     Last Assessed: 9/9/2015     Trigger point of thoracic region     Last Assessed: 11/6/2015        Past Surgical History:   Procedure Laterality Date    CYSTOSCOPY W/ URETERAL STENT PLACEMENT  03/11/2013    CYSTOSCOPY W/ URETERAL STENT PLACEMENT  06/18/2014    EXTRACORPOREAL SHOCK WAVE LITHOTRIPSY     CYSTOSCOPY W/ URETERAL STENT PLACEMENT  07/16/2014    EXTRACORPOREAL SHOCK WAVE LITHOTRIPSY     CYSTOSCOPY W/ URETERAL STENT REMOVAL  04/11/2013    CYSTOSCOPY W/ URETERAL STENT REMOVAL Right 08/26/2014    CYSTOSCOPY W/ URETEROSCOPY W/ LITHOTRIPSY  02/26/2013    Percutaneous lithotomy With Uretal Stent Plcement     CYSTOSCOPY W/ URETEROSCOPY W/ LITHOTRIPSY  03/11/2014    CYSTOSCOPY W/ URETEROSCOPY W/ LITHOTRIPSY Right 08/04/2014    With Uretal Stent Placement     CYSTOSCOPY W/ URETEROSCOPY W/ LITHOTRIPSY Right 05/09/2016    HEMORRHOID SURGERY      HERNIA REPAIR  03/11/2013    KIDNEY SURGERY      LITHOTRIPSY      TN CYSTO/URETERO W/LITHOTRIPSY &INDWELL STENT INSRT Right 7/13/2016    Procedure: CYSTOSCOPY; URETEROSCOPY WITH HOLMIUM LASER STONE EXTRACTION; RETROGRADE PYELOGRAM; URETERAL STENT INSERTION ;  Surgeon: Bird Harrison MD;  [facility];  Service: Urology    TN CYSTO/URETERO W/LITHOTRIPSY &INDWELL STENT INSRT Right 5/9/2016    Procedure: CYSTOSCOPY,  URETEROSCOPY,  WITH LITHOTRIPSY HOLMIUM LASER, STONE EXTRACTION, AND INSERTION STENT URETERAL;  Surgeon: Bird Harrison MD;  [facility];  Service: Urology    [facility] ESWL Right 6/17/2016    Procedure: Destinee aMhan SHOCKWAVE (ESWL); Surgeon: Bird Harrison MD;  [facility];  Service: Urology    TRANSURETHRAL RESECTION OF BLADDER      URETERAL Jeremy Butt  03/11/2013       Family History   Problem Relation Age of Onset    COPD Mother     Hypertension Mother     Heart attack Father      I have reviewed and agree with the history as documented      E-Cigarette/Vaping    E-Cigarette Use Never User E-Cigarette/Vaping Substances     Social History     Tobacco Use    Smoking status: Current Every Day Smoker     Packs/day: 2 00     Years: 35 00     Pack years: 70 00    Smokeless tobacco: Never Used   Substance Use Topics    Alcohol use: No    Drug use: No       Review of Systems   Constitutional: Negative for diaphoresis, fever and unexpected weight change  HENT: Negative for congestion, rhinorrhea and sore throat  Eyes: Negative for pain, discharge and visual disturbance  Respiratory: Negative for cough, shortness of breath and wheezing  Cardiovascular: Negative for chest pain, palpitations and leg swelling  Gastrointestinal: Negative for abdominal pain, blood in stool, constipation, diarrhea, nausea and vomiting  Genitourinary: Negative for dysuria, flank pain and hematuria  Musculoskeletal: Negative for arthralgias and myalgias  Skin: Positive for wound (multiple skin excoriations)  Negative for rash  Allergic/Immunologic: Negative for environmental allergies and food allergies  Neurological: Negative for dizziness, seizures, weakness and numbness  Hematological: Negative for adenopathy  Psychiatric/Behavioral: Negative for confusion and hallucinations  Physical Exam  Physical Exam  Vitals signs and nursing note reviewed  Constitutional:       Appearance: He is well-developed  He is not diaphoretic  HENT:      Head: Normocephalic and atraumatic  Right Ear: External ear normal       Left Ear: External ear normal       Nose: Nose normal    Eyes:      Pupils: Pupils are equal, round, and reactive to light  Neck:      Musculoskeletal: Normal range of motion and neck supple  Pulmonary:      Effort: Pulmonary effort is normal  No respiratory distress  Musculoskeletal: Normal range of motion  General: No deformity  Skin:     General: Skin is warm and dry  Findings: Erythema present        Comments: Multiple skin excoriations along the bilateral forearms secondary to picking from delusional parasitosis  No evidence of cellulitis  No purulent drainage  Neurological:      Mental Status: He is alert and oriented to person, place, and time  Psychiatric:         Mood and Affect: Mood normal       Comments: Patient repeatedly picking skin on forearms, attempting to show me the bugs that are on his skin  Patient is convinced that there are bugs despite objective evidence to the contrary  Suspect delusional parasitosis  Vital Signs  ED Triage Vitals [08/12/20 1820]   Temperature Pulse Respirations Blood Pressure SpO2   98 °F (36 7 °C) 85 19 (!) 190/98 97 %      Temp Source Heart Rate Source Patient Position - Orthostatic VS BP Location FiO2 (%)   Temporal Monitor Lying Right arm --      Pain Score       8           Vitals:    08/12/20 1820   BP: (!) 190/98   Pulse: 85   Patient Position - Orthostatic VS: Lying         Visual Acuity      ED Medications  Medications - No data to display    Diagnostic Studies  Results Reviewed     None                 No orders to display              Procedures  Procedures         ED Course       US AUDIT      Most Recent Value   Initial Alcohol Screen: US AUDIT-C    1  How often do you have a drink containing alcohol?  0 Filed at: 08/12/2020 1821   2  How many drinks containing alcohol do you have on a typical day you are drinking? 0 Filed at: 08/12/2020 1821   3a  Male UNDER 65: How often do you have five or more drinks on one occasion? 0 Filed at: 08/12/2020 1821   Audit-C Score  0 Filed at: 08/12/2020 1821                  AMANDA/DAST-10      Most Recent Value   How many times in the past year have you    Used an illegal drug or used a prescription medication for non-medical reasons?   Never Filed at: 08/12/2020 1821                                MDM  Number of Diagnoses or Management Options  Colby's delusional parasitosis ([facility] [address] ): new and does not require workup  Diagnosis management comments:     Multiple skin excoriations secondary to delusional parasitosis  Patient convinced that there are bugs coming out of his skin  There was no evidence of any insects on physical examination  No evidence of superinfection of the skin excoriations  I did tell the patient that there was no evidence of insect infestation and that he should refrain from picking at his skin  Recommended that he try Benadryl as needed for itching  Also recommended that he wash the excoriations with soap and water  The patient remained convinced that he had bugs infesting his skin  He repeatedly showed me small pieces of grass, small piece of fabric, and the cream on his arms, claiming that these objects were bugs  Amount and/or Complexity of Data Reviewed  Decide to obtain previous medical records or to obtain history from someone other than the patient: yes  Review and summarize past medical records: yes    Risk of Complications, Morbidity, and/or Mortality  Presenting problems: low  Diagnostic procedures: minimal  Management options: minimal    Patient Progress  Patient progress: stable        Disposition  Final diagnoses:   Ekbom's delusional parasitosis ([facility] [address] )     Time reflects when diagnosis was documented in both MDM as applicable and the Disposition within this note     Time User Action Codes Description Comment    8/12/2020  6:41 PM Nandini Will Add [F22] Ekbom's delusional parasitosis [facility])       ED Disposition     ED Disposition Condition Date/Time Comment    Discharge Good Wed Aug 12, 2020  6:41 PM Eboni Meeks discharge to home/self care  Follow-up Information     Follow up With Specialties Details Why Contact Info Fransico Cortez [address], DO Family Medicine Call in 1 day Please follow-up with your PCP within 1 week for a recheck  [address] [facility] Emergency Department Emergency Medicine Go to  If symptoms worsen  [place] [id number]  [phone] [place] [facility], [address], [place], [place], [address]          Discharge Medication List as of 8/12/2020  6:43 PM      CONTINUE these medications which have NOT CHANGED    Details   ALPRAZolam (XANAX) 0 5 mg tablet Take 1 tablet (0 5 mg total) by mouth 3 (three) times a day as needed for anxiety, Starting Tue 7/14/2020, Normal      amitriptyline (ELAVIL) 25 mg tablet Take 1 tablet (25 mg total) by mouth daily at bedtime, Starting Tue 7/14/2020, Normal      amLODIPine (NORVASC) 5 mg tablet Take 1 tablet (5 mg total) by mouth daily AmLODIPine Besylate 5 MG Oral Tablet Take 1 daily  Quantity: 30;  Refills: 5    Rebekah Boles DO;  Started 23-Oct-2013 Active, Starting Tue 7/14/2020, Normal      baclofen 10 mg tablet Take 1 tablet (10 mg total) by mouth 3 (three) times a day as needed for muscle spasms, Starting Tue 7/14/2020, Normal      sildenafil (REVATIO) 20 mg tablet Take 1 tablet (20 mg total) by mouth daily, Starting Tue 7/14/2020, Normal           No discharge procedures on file      PDMP Review       Value Time User    PDMP Reviewed  Yes 7/14/2020  3:25 PM Jo Ann Squires DO          ED Provider  Electronically Signed by           Coleen Duncan MD  08/12/20 1888

## 2020-08-13 ENCOUNTER — OFFICE VISIT (OUTPATIENT)
Dept: FAMILY MEDICINE CLINIC | Facility: CLINIC | Age: 55
End: 2020-08-13
Payer: COMMERCIAL

## 2020-08-13 VITALS
BODY MASS INDEX: 25.96 KG/M2 | TEMPERATURE: 98.5 F | DIASTOLIC BLOOD PRESSURE: 84 MMHG | WEIGHT: 156 LBS | HEART RATE: 84 BPM | RESPIRATION RATE: 20 BRPM | SYSTOLIC BLOOD PRESSURE: 138 MMHG

## 2020-08-13 DIAGNOSIS — F41.9 ANXIETY: ICD-10-CM

## 2020-08-13 DIAGNOSIS — I10 ESSENTIAL HYPERTENSION: ICD-10-CM

## 2020-08-13 DIAGNOSIS — F22 DELUSIONAL DISORDER (HCC): Primary | ICD-10-CM

## 2020-08-13 PROCEDURE — 3079F DIAST BP 80-89 MM HG: CPT | Performed by: FAMILY MEDICINE

## 2020-08-13 PROCEDURE — 4004F PT TOBACCO SCREEN RCVD TLK: CPT | Performed by: FAMILY MEDICINE

## 2020-08-13 PROCEDURE — 3075F SYST BP GE 130 - 139MM HG: CPT | Performed by: FAMILY MEDICINE

## 2020-08-13 PROCEDURE — 99213 OFFICE O/P EST LOW 20 MIN: CPT | Performed by: FAMILY MEDICINE

## 2020-08-13 RX ORDER — RISPERIDONE 1 MG/1
1 TABLET, FILM COATED ORAL 2 TIMES DAILY
Qty: 60 TABLET | Refills: 1 | Status: SHIPPED | OUTPATIENT
Start: 2020-08-13

## 2020-08-13 NOTE — PROGRESS NOTES
Assessment/Plan:  PRESENTS STATING THAT THERE ARE WARM IS COMING OUT OF HIS SKIN HAS DELUSIONAL PARASITOSIS WILL TREAT WITH RISPERDAL 1 MG B I D  Problem List Items Addressed This Visit     None      Visit Diagnoses     Delusional disorder ([address] )    -  Primary    Relevant Medications    risperiDONE (RisperDAL) 1 mg tablet           Diagnoses and all orders for this visit:    Delusional disorder (HCC)  -     risperiDONE (RisperDAL) 1 mg tablet; Take 1 tablet (1 mg total) by mouth 2 (two) times a day        No problem-specific Assessment & Plan notes found for this encounter  PHQ-9 Depression Screening    PHQ-9:    Frequency of the following problems over the past two weeks: Body mass index is 25 96 kg/m²  BMI Counseling: Body mass index is 25 96 kg/m²  The BMI   Subjective:      Patient ID: Merlene Burton is a 53 y o  male  WAS SEEN IN THE EMERGENCY ROOM YESTERDAY IN OUR OFFICE TODAY WITH A COMPLAINT OF WORMS COMING OUT OF HIS SKIN  The following portions of the patient's history were reviewed and updated as appropriate:   He has a past medical history of Anxiety, Bright red rectal bleeding, Hypertension, Kidney stones, Periorbital edema, Skin tag of anus, and Trigger point of thoracic region  ,  does not have a problem list on file  ,   has a past surgical history that includes Kidney surgery; Transurethral resection of bladder; Hernia repair (03/11/2013); Hemorrhoid surgery; Lithotripsy; pr cysto/uretero w/lithotripsy &indwell stent insrt (Right, 7/13/2016); pr fragment kidney stone/ eswl (Right, 6/17/2016); pr cysto/uretero w/lithotripsy &indwell stent insrt (Right, 5/9/2016); Cystoscopy w/ ureteral stent placement (03/11/2013); Cystoscopy w/ ureteral stent placement (06/18/2014); Cystoscopy w/ ureteral stent placement (07/16/2014); Cystoscopy w/ ureteral stent removal (04/11/2013); Cystoscopy w/ ureteral stent removal (Right, 08/26/2014);  Cystoscopy w/ ureteroscopy w/ lithotripsy (02/26/2013); Cystoscopy w/ ureteroscopy w/ lithotripsy (03/11/2014); Cystoscopy w/ ureteroscopy w/ lithotripsy (Right, 08/04/2014); Cystoscopy w/ ureteroscopy w/ lithotripsy (Right, 05/09/2016); and Ureteral reimplantion (03/11/2013)  ,  family history includes COPD in his mother; Heart attack in his father; Hypertension in his mother  ,   reports that he has been smoking  He has a 70 00 pack-year smoking history  He has never used smokeless tobacco  He reports that he does not drink alcohol or use drugs  ,  is allergic to metronidazole; nsaids; penicillin g; penicillins; pollen extract; and sulfa antibiotics     Current Outpatient Medications   Medication Sig Dispense Refill    ALPRAZolam (XANAX) 0 5 mg tablet Take 1 tablet (0 5 mg total) by mouth 3 (three) times a day as needed for anxiety 180 tablet 0    amitriptyline (ELAVIL) 25 mg tablet Take 1 tablet (25 mg total) by mouth daily at bedtime 90 tablet 1    amLODIPine (NORVASC) 5 mg tablet Take 1 tablet (5 mg total) by mouth daily AmLODIPine Besylate 5 MG Oral Tablet Take 1 daily  Quantity: 30;  Refills: 5    Apollo Hernandez DO;  Started 23-Oct-2013 Active 90 tablet 1    baclofen 10 mg tablet Take 1 tablet (10 mg total) by mouth 3 (three) times a day as needed for muscle spasms 90 tablet 2    risperiDONE (RisperDAL) 1 mg tablet Take 1 tablet (1 mg total) by mouth 2 (two) times a day 60 tablet 1    sildenafil (REVATIO) 20 mg tablet Take 1 tablet (20 mg total) by mouth daily 30 tablet 2     No current facility-administered medications for this visit  Review of Systems   Constitutional: Negative  HENT: Negative  Eyes: Negative  Respiratory: Negative  Cardiovascular: Negative  Gastrointestinal: Negative  Endocrine: Negative  Genitourinary: Negative  Musculoskeletal: Negative  Skin: Negative  Allergic/Immunologic: Negative  Neurological: Negative  Hematological: Negative      Psychiatric/Behavioral: Positive for behavioral problems and hallucinations  Objective:    /84   Pulse 84   Temp 98 5 °F (36 9 °C)   Resp 20   Wt 70 8 kg (156 lb)   BMI 25 96 kg/m²   Body mass index is 25 96 kg/m²  Physical Exam  Constitutional:       Appearance: He is well-developed  HENT:      Head: Normocephalic  Eyes:      Pupils: Pupils are equal, round, and reactive to light  Neck:      Musculoskeletal: Normal range of motion  Cardiovascular:      Rate and Rhythm: Normal rate and regular rhythm  Heart sounds: Normal heart sounds  Pulmonary:      Effort: Pulmonary effort is normal       Breath sounds: Normal breath sounds  Abdominal:      General: Bowel sounds are normal       Palpations: Abdomen is soft  Tenderness: There is no abdominal tenderness  Skin:     General: Skin is warm  Comments: MULTIPLE SELF-INFLICTED PICK MALIK ON BOTH ARMS   Neurological:      Mental Status: He is alert and oriented to person, place, and time

## 2020-09-10 ENCOUNTER — TELEPHONE (OUTPATIENT)
Dept: OTHER | Facility: OTHER | Age: 55
End: 2020-09-10

## 2020-09-10 NOTE — TELEPHONE ENCOUNTER
Patient's mother called to cancel appointment scheduled for today 9/10/2020 at 10:15 AM with Dr Elli Leslie DO due to he was up all night vomiting

## 2020-09-14 ENCOUNTER — OFFICE VISIT (OUTPATIENT)
Dept: FAMILY MEDICINE CLINIC | Facility: CLINIC | Age: 55
End: 2020-09-14
Payer: COMMERCIAL

## 2020-09-14 VITALS
TEMPERATURE: 95.9 F | SYSTOLIC BLOOD PRESSURE: 126 MMHG | RESPIRATION RATE: 20 BRPM | DIASTOLIC BLOOD PRESSURE: 74 MMHG | HEART RATE: 84 BPM | BODY MASS INDEX: 23.8 KG/M2 | WEIGHT: 143 LBS

## 2020-09-14 DIAGNOSIS — F22 DELUSIONAL DISORDER (HCC): Primary | ICD-10-CM

## 2020-09-14 DIAGNOSIS — F17.200 TOBACCO USE DISORDER: ICD-10-CM

## 2020-09-14 DIAGNOSIS — I10 ESSENTIAL HYPERTENSION: ICD-10-CM

## 2020-09-14 DIAGNOSIS — F41.9 ANXIETY: ICD-10-CM

## 2020-09-14 DIAGNOSIS — B82.9 PARASITES IN STOOL: ICD-10-CM

## 2020-09-14 PROCEDURE — 99213 OFFICE O/P EST LOW 20 MIN: CPT | Performed by: FAMILY MEDICINE

## 2020-09-14 RX ORDER — ALPRAZOLAM 0.5 MG/1
0.5 TABLET ORAL 3 TIMES DAILY PRN
Qty: 90 TABLET | Refills: 0 | Status: SHIPPED | OUTPATIENT
Start: 2020-09-14 | End: 2020-10-19

## 2020-09-14 NOTE — PROGRESS NOTES
Assessment/Plan:  Delusional disorder patient states the bugs are coming out of his skin and mouth also states that there have been warm send his stool  Offered to refer her to [facility] patient deferred  Once a stool sample done for ova and parasites  Problem List Items Addressed This Visit     None      Visit Diagnoses     Delusional disorder ([address] )    -  Primary    Relevant Medications    ALPRAZolam (XANAX) 0 5 mg tablet    Anxiety        Relevant Medications    ALPRAZolam (XANAX) 0 5 mg tablet    Tobacco use disorder        Essential hypertension        Parasites in stool        Relevant Orders    Ova and Parasites, Conc/Perm Smear, 3 Spec           Diagnoses and all orders for this visit:    Delusional disorder (HCC)    Anxiety  -     ALPRAZolam (XANAX) 0 5 mg tablet; Take 1 tablet (0 5 mg total) by mouth 3 (three) times a day as needed for anxiety    Tobacco use disorder    Essential hypertension    Parasites in stool  -     Ova and Parasites, Conc/Perm Smear, 3 Spec; Future        No problem-specific Assessment & Plan notes found for this encounter  PHQ-9 Depression Screening    PHQ-9:    Frequency of the following problems over the past two weeks: Body mass index is 23 8 kg/m²  BMI Counseling: Body mass index is 23 8 kg/m²  The BMI     Subjective:      Patient ID: Unique Franklin is a 63 y o  male  Bouts coming out of skin      The following portions of the patient's history were reviewed and updated as appropriate:   He has a past medical history of Anxiety, Bright red rectal bleeding, Hypertension, Kidney stones, Periorbital edema, Skin tag of anus, and Trigger point of thoracic region  ,  does not have a problem list on file  ,   has a past surgical history that includes Kidney surgery; Transurethral resection of bladder; Hernia repair (03/11/2013); Hemorrhoid surgery;  Lithotripsy; pr cysto/uretero w/lithotripsy &indwell stent insrt (Right, 7/13/2016); pr fragment kidney stone/ eswl (Right, 6/17/2016); pr cysto/uretero w/lithotripsy &indwell stent insrt (Right, 5/9/2016); Cystoscopy w/ ureteral stent placement (03/11/2013); Cystoscopy w/ ureteral stent placement (06/18/2014); Cystoscopy w/ ureteral stent placement (07/16/2014); Cystoscopy w/ ureteral stent removal (04/11/2013); Cystoscopy w/ ureteral stent removal (Right, 08/26/2014); Cystoscopy w/ ureteroscopy w/ lithotripsy (02/26/2013); Cystoscopy w/ ureteroscopy w/ lithotripsy (03/11/2014); Cystoscopy w/ ureteroscopy w/ lithotripsy (Right, 08/04/2014); Cystoscopy w/ ureteroscopy w/ lithotripsy (Right, 05/09/2016); and Ureteral reimplantion (03/11/2013)  ,  family history includes COPD in his mother; Heart attack in his father; Hypertension in his mother  ,   reports that he has been smoking  He has a 70 00 pack-year smoking history  He has never used smokeless tobacco  He reports that he does not drink alcohol or use drugs  ,  is allergic to metronidazole; nsaids; penicillin g; penicillins; pollen extract; and sulfa antibiotics     Current Outpatient Medications   Medication Sig Dispense Refill    ALPRAZolam (XANAX) 0 5 mg tablet Take 1 tablet (0 5 mg total) by mouth 3 (three) times a day as needed for anxiety 90 tablet 0    amitriptyline (ELAVIL) 25 mg tablet Take 1 tablet (25 mg total) by mouth daily at bedtime 90 tablet 1    amLODIPine (NORVASC) 5 mg tablet Take 1 tablet (5 mg total) by mouth daily AmLODIPine Besylate 5 MG Oral Tablet Take 1 daily  Quantity: 30;  Refills: 5    [place] Vo DO;  Started 23-Oct-2013 Active 90 tablet 1    baclofen 10 mg tablet Take 1 tablet (10 mg total) by mouth 3 (three) times a day as needed for muscle spasms 90 tablet 2    risperiDONE (RisperDAL) 1 mg tablet Take 1 tablet (1 mg total) by mouth 2 (two) times a day 60 tablet 1    sildenafil (REVATIO) 20 mg tablet Take 1 tablet (20 mg total) by mouth daily 30 tablet 2     No current facility-administered medications for this visit  Review of Systems   Constitutional: Negative  HENT: Negative  Eyes: Negative  Respiratory: Negative  Cardiovascular: Negative  Gastrointestinal: Negative  Endocrine: Negative  Genitourinary: Negative  Musculoskeletal: Negative  Skin: Negative  Allergic/Immunologic: Negative  Neurological: Negative  Hematological: Negative  Psychiatric/Behavioral: Positive for confusion  The patient is nervous/anxious  Delusional about parasites         Objective:    /74   Pulse 84   Temp (!) 95 9 °F (35 5 °C)   Resp 20   Wt 64 9 kg (143 lb)   BMI 23 80 kg/m²   Body mass index is 23 8 kg/m²       Physical Exam  Skin:     Comments: Multiple pick marks of skin

## 2020-09-15 ENCOUNTER — APPOINTMENT (OUTPATIENT)
Dept: LAB | Facility: HOSPITAL | Age: 55
End: 2020-09-15
Attending: FAMILY MEDICINE
Payer: COMMERCIAL

## 2020-09-15 DIAGNOSIS — B82.9 PARASITES IN STOOL: ICD-10-CM

## 2020-09-15 LAB
HEMOCCULT STL QL: NEGATIVE
HEMOCCULT STL QL: NEGATIVE
HEMOCCULT STL QL: POSITIVE

## 2020-09-15 PROCEDURE — 82272 OCCULT BLD FECES 1-3 TESTS: CPT

## 2020-09-15 PROCEDURE — 87209 SMEAR COMPLEX STAIN: CPT

## 2020-09-15 PROCEDURE — 87177 OVA AND PARASITES SMEARS: CPT

## 2020-09-16 ENCOUNTER — TELEPHONE (OUTPATIENT)
Dept: FAMILY MEDICINE CLINIC | Facility: CLINIC | Age: 55
End: 2020-09-16

## 2020-09-18 ENCOUNTER — OFFICE VISIT (OUTPATIENT)
Dept: FAMILY MEDICINE CLINIC | Facility: CLINIC | Age: 55
End: 2020-09-18
Payer: COMMERCIAL

## 2020-09-18 VITALS
DIASTOLIC BLOOD PRESSURE: 74 MMHG | RESPIRATION RATE: 20 BRPM | WEIGHT: 147 LBS | BODY MASS INDEX: 24.46 KG/M2 | HEART RATE: 68 BPM | SYSTOLIC BLOOD PRESSURE: 140 MMHG

## 2020-09-18 DIAGNOSIS — R19.5 HEME POSITIVE STOOL: Primary | ICD-10-CM

## 2020-09-18 DIAGNOSIS — I10 ESSENTIAL HYPERTENSION: ICD-10-CM

## 2020-09-18 DIAGNOSIS — F22 DELUSIONAL DISORDER (HCC): ICD-10-CM

## 2020-09-18 PROCEDURE — 99213 OFFICE O/P EST LOW 20 MIN: CPT | Performed by: FAMILY MEDICINE

## 2020-09-18 PROCEDURE — 3078F DIAST BP <80 MM HG: CPT | Performed by: FAMILY MEDICINE

## 2020-09-18 PROCEDURE — 4004F PT TOBACCO SCREEN RCVD TLK: CPT | Performed by: FAMILY MEDICINE

## 2020-09-18 NOTE — PROGRESS NOTES
Assessment/Plan:  Heme-positive stool suggested that the patient that he undergo colonoscopy are these Cologuard he is not interested in either at this time  The patient is delusional about parasites offered referral to [facility] he deferred prescribed antipsychotic that he is not taking    Problem List Items Addressed This Visit     None           There are no diagnoses linked to this encounter  No problem-specific Assessment & Plan notes found for this encounter  PHQ-9 Depression Screening    PHQ-9:    Frequency of the following problems over the past two weeks: Body mass index is 24 46 kg/m²  BMI Counseling: Body mass index is 24 46 kg/m²  The BMI     Subjective:      Patient ID: Santa Hoskins is a 63 y o  male  Press since to go over laboratory the patient had heme-positive stool      The following portions of the patient's history were reviewed and updated as appropriate:   He has a past medical history of Anxiety, Bright red rectal bleeding, Hypertension, Kidney stones, Periorbital edema, Skin tag of anus, and Trigger point of thoracic region  ,  does not have a problem list on file  ,   has a past surgical history that includes Kidney surgery; Transurethral resection of bladder; Hernia repair (03/11/2013); Hemorrhoid surgery; Lithotripsy; pr cysto/uretero w/lithotripsy &indwell stent insrt (Right, 7/13/2016); pr fragment kidney stone/ eswl (Right, 6/17/2016); pr cysto/uretero w/lithotripsy &indwell stent insrt (Right, 5/9/2016); Cystoscopy w/ ureteral stent placement (03/11/2013); Cystoscopy w/ ureteral stent placement (06/18/2014); Cystoscopy w/ ureteral stent placement (07/16/2014); Cystoscopy w/ ureteral stent removal (04/11/2013); Cystoscopy w/ ureteral stent removal (Right, 08/26/2014); Cystoscopy w/ ureteroscopy w/ lithotripsy (02/26/2013); Cystoscopy w/ ureteroscopy w/ lithotripsy (03/11/2014);  Cystoscopy w/ ureteroscopy w/ lithotripsy (Right, 08/04/2014); Cystoscopy w/ ureteroscopy w/ lithotripsy (Right, 05/09/2016); and Ureteral reimplantion (03/11/2013)  ,  family history includes COPD in his mother; Heart attack in his father; Hypertension in his mother  ,   reports that he has been smoking  He has a 70 00 pack-year smoking history  He has never used smokeless tobacco  He reports that he does not drink alcohol or use drugs  ,  is allergic to metronidazole; nsaids; penicillin g; penicillins; pollen extract; and sulfa antibiotics     Current Outpatient Medications   Medication Sig Dispense Refill    ALPRAZolam (XANAX) 0 5 mg tablet Take 1 tablet (0 5 mg total) by mouth 3 (three) times a day as needed for anxiety 90 tablet 0    amitriptyline (ELAVIL) 25 mg tablet Take 1 tablet (25 mg total) by mouth daily at bedtime 90 tablet 1    amLODIPine (NORVASC) 5 mg tablet Take 1 tablet (5 mg total) by mouth daily AmLODIPine Besylate 5 MG Oral Tablet Take 1 daily  Quantity: 30;  Refills: 5    Namrata Borjas DO;  Started 23-Oct-2013 Active 90 tablet 1    baclofen 10 mg tablet Take 1 tablet (10 mg total) by mouth 3 (three) times a day as needed for muscle spasms 90 tablet 2    risperiDONE (RisperDAL) 1 mg tablet Take 1 tablet (1 mg total) by mouth 2 (two) times a day 60 tablet 1    sildenafil (REVATIO) 20 mg tablet Take 1 tablet (20 mg total) by mouth daily 30 tablet 2     No current facility-administered medications for this visit  Review of Systems   Constitutional: Negative  HENT: Negative  Eyes: Negative  Respiratory: Negative  Cardiovascular: Negative  Gastrointestinal: Negative  Endocrine: Negative  Genitourinary: Negative  Musculoskeletal: Negative  Skin: Negative  Allergic/Immunologic: Negative  Neurological: Negative  Hematological: Negative  Psychiatric/Behavioral: Positive for hallucinations          Patient is delusional about parasitosis states that large parasites are coming out of his skin and also large parasites and stool stated that 1 of the parasites was approximately 3 cm long         Objective:    /74   Pulse 68   Resp 20   Wt 66 7 kg (147 lb)   BMI 24 46 kg/m²   Body mass index is 24 46 kg/m²  Physical Exam  Constitutional:       Appearance: He is well-developed  HENT:      Head: Normocephalic  Eyes:      Pupils: Pupils are equal, round, and reactive to light  Neck:      Musculoskeletal: Normal range of motion  Cardiovascular:      Rate and Rhythm: Normal rate and regular rhythm  Heart sounds: Normal heart sounds  Pulmonary:      Effort: Pulmonary effort is normal       Breath sounds: Normal breath sounds  Abdominal:      General: Bowel sounds are normal       Palpations: Abdomen is soft  Tenderness: There is no abdominal tenderness  Skin:     General: Skin is warm  Comments: Multiple self-inflicted pick marks on his arms and legs   Neurological:      Mental Status: He is alert and oriented to person, place, and time

## 2020-09-21 LAB — O+P STL CONC: NORMAL

## 2020-10-19 DIAGNOSIS — F41.9 ANXIETY: ICD-10-CM

## 2020-10-19 RX ORDER — ALPRAZOLAM 0.5 MG/1
TABLET ORAL
Qty: 90 TABLET | Refills: 0 | Status: SHIPPED | OUTPATIENT
Start: 2020-10-19 | End: 2020-10-28 | Stop reason: SDUPTHER

## 2020-10-28 ENCOUNTER — OFFICE VISIT (OUTPATIENT)
Dept: FAMILY MEDICINE CLINIC | Facility: CLINIC | Age: 55
End: 2020-10-28
Payer: COMMERCIAL

## 2020-10-28 VITALS
RESPIRATION RATE: 20 BRPM | TEMPERATURE: 96.4 F | BODY MASS INDEX: 26.12 KG/M2 | HEIGHT: 64 IN | WEIGHT: 153 LBS | HEART RATE: 68 BPM | DIASTOLIC BLOOD PRESSURE: 84 MMHG | SYSTOLIC BLOOD PRESSURE: 126 MMHG

## 2020-10-28 DIAGNOSIS — F22 DELUSIONAL DISORDER (HCC): ICD-10-CM

## 2020-10-28 DIAGNOSIS — F41.9 ANXIETY: ICD-10-CM

## 2020-10-28 DIAGNOSIS — F17.200 TOBACCO USE DISORDER: Primary | ICD-10-CM

## 2020-10-28 DIAGNOSIS — I10 ESSENTIAL HYPERTENSION: ICD-10-CM

## 2020-10-28 PROCEDURE — 3079F DIAST BP 80-89 MM HG: CPT | Performed by: FAMILY MEDICINE

## 2020-10-28 PROCEDURE — 99213 OFFICE O/P EST LOW 20 MIN: CPT | Performed by: FAMILY MEDICINE

## 2020-10-28 PROCEDURE — 3074F SYST BP LT 130 MM HG: CPT | Performed by: FAMILY MEDICINE

## 2020-10-28 PROCEDURE — 3008F BODY MASS INDEX DOCD: CPT | Performed by: FAMILY MEDICINE

## 2020-10-28 RX ORDER — ALPRAZOLAM 0.5 MG/1
0.5 TABLET ORAL 3 TIMES DAILY PRN
Qty: 90 TABLET | Refills: 2 | Status: SHIPPED | OUTPATIENT
Start: 2020-10-28 | End: 2020-12-30 | Stop reason: SDUPTHER

## 2020-10-28 RX ORDER — AMLODIPINE BESYLATE 5 MG/1
5 TABLET ORAL DAILY
Qty: 90 TABLET | Refills: 1 | Status: SHIPPED | OUTPATIENT
Start: 2020-10-28 | End: 2021-04-14

## 2020-12-15 ENCOUNTER — APPOINTMENT (RX ONLY)
Dept: URBAN - NONMETROPOLITAN AREA CLINIC 4 | Facility: CLINIC | Age: 55
Setting detail: DERMATOLOGY
End: 2020-12-15

## 2020-12-15 DIAGNOSIS — I87.2 VENOUS INSUFFICIENCY (CHRONIC) (PERIPHERAL): ICD-10-CM

## 2020-12-15 DIAGNOSIS — L28.1 PRURIGO NODULARIS: ICD-10-CM

## 2020-12-15 PROCEDURE — ? COUNSELING

## 2020-12-15 PROCEDURE — 99202 OFFICE O/P NEW SF 15 MIN: CPT

## 2020-12-15 PROCEDURE — ? PRESCRIPTION

## 2020-12-15 PROCEDURE — ? TREATMENT REGIMEN

## 2020-12-15 PROCEDURE — ? PRESCRIPTION MEDICATION MANAGEMENT

## 2020-12-15 RX ORDER — CLOBETASOL PROPIONATE 0.5 MG/G
0.05% OINTMENT TOPICAL BID
Qty: 1 | Refills: 3 | Status: ERX | COMMUNITY
Start: 2020-12-15

## 2020-12-15 RX ADMIN — CLOBETASOL PROPIONATE 0.05%: 0.5 OINTMENT TOPICAL at 00:00

## 2020-12-15 ASSESSMENT — LOCATION SIMPLE DESCRIPTION DERM
LOCATION SIMPLE: LEFT PRETIBIAL REGION
LOCATION SIMPLE: LEFT SHOULDER
LOCATION SIMPLE: LEFT FOREARM
LOCATION SIMPLE: RIGHT FOREARM
LOCATION SIMPLE: RIGHT PRETIBIAL REGION
LOCATION SIMPLE: LEFT UPPER BACK
LOCATION SIMPLE: RIGHT SHOULDER

## 2020-12-15 ASSESSMENT — LOCATION DETAILED DESCRIPTION DERM
LOCATION DETAILED: LEFT MEDIAL UPPER BACK
LOCATION DETAILED: LEFT PROXIMAL DORSAL FOREARM
LOCATION DETAILED: LEFT POSTERIOR SHOULDER
LOCATION DETAILED: RIGHT POSTERIOR SHOULDER
LOCATION DETAILED: RIGHT PROXIMAL DORSAL FOREARM
LOCATION DETAILED: RIGHT DISTAL PRETIBIAL REGION
LOCATION DETAILED: LEFT DISTAL PRETIBIAL REGION

## 2020-12-15 ASSESSMENT — LOCATION ZONE DERM
LOCATION ZONE: LEG
LOCATION ZONE: TRUNK
LOCATION ZONE: ARM

## 2020-12-15 NOTE — PROCEDURE: TREATMENT REGIMEN
Initiate Treatment: Clobetasol ointment BID x 1 month
Detail Level: Zone
Plan: If no improvement in 1 month discussed possibility of a biopsy.
Modify Regimen: Do not pick or scratch at scabbing.

## 2020-12-15 NOTE — PROCEDURE: PRESCRIPTION MEDICATION MANAGEMENT
Initiate Treatment: Clobetasol ointment BID x 1 month
Render In Strict Bullet Format?: No
Detail Level: Zone

## 2020-12-15 NOTE — HPI: SKIN LESIONS
How Severe Is Your Skin Lesion?: moderate
Have Your Skin Lesions Been Treated?: not been treated
Is This A New Presentation, Or A Follow-Up?: Skin Lesions
Additional History: Patient states that it started by looking like bugs on his skin but now it feels like hard bumps under the skin.  He scratches and picks and they bleed.  He states that they ooze clear fluid which burns.  He states that rubbing alcohol is the only thing that helps.  He tried peroxide and several over the counter products and used a scabies treatment which did not help.

## 2020-12-28 ENCOUNTER — TELEPHONE (OUTPATIENT)
Dept: OTHER | Facility: OTHER | Age: 55
End: 2020-12-28

## 2020-12-28 DIAGNOSIS — R05.9 COUGH: Primary | ICD-10-CM

## 2020-12-28 DIAGNOSIS — R68.83 CHILLS: ICD-10-CM

## 2020-12-28 DIAGNOSIS — R53.83 FATIGUE, UNSPECIFIED TYPE: ICD-10-CM

## 2020-12-28 DIAGNOSIS — R50.9 FEVER IN ADULT: ICD-10-CM

## 2020-12-29 DIAGNOSIS — R68.83 CHILLS: ICD-10-CM

## 2020-12-29 DIAGNOSIS — R50.9 FEVER IN ADULT: ICD-10-CM

## 2020-12-29 DIAGNOSIS — R05.9 COUGH: ICD-10-CM

## 2020-12-29 DIAGNOSIS — R53.83 FATIGUE, UNSPECIFIED TYPE: ICD-10-CM

## 2020-12-29 PROCEDURE — U0003 INFECTIOUS AGENT DETECTION BY NUCLEIC ACID (DNA OR RNA); SEVERE ACUTE RESPIRATORY SYNDROME CORONAVIRUS 2 (SARS-COV-2) (CORONAVIRUS DISEASE [COVID-19]), AMPLIFIED PROBE TECHNIQUE, MAKING USE OF HIGH THROUGHPUT TECHNOLOGIES AS DESCRIBED BY CMS-2020-01-R: HCPCS | Performed by: FAMILY MEDICINE

## 2020-12-30 DIAGNOSIS — F41.9 ANXIETY: ICD-10-CM

## 2020-12-30 LAB — SARS-COV-2 RNA SPEC QL NAA+PROBE: NOT DETECTED

## 2020-12-30 RX ORDER — ALPRAZOLAM 0.5 MG/1
0.5 TABLET ORAL 3 TIMES DAILY PRN
Qty: 21 TABLET | Refills: 0 | Status: SHIPPED | OUTPATIENT
Start: 2020-12-30 | End: 2021-01-04 | Stop reason: SDUPTHER

## 2021-01-04 ENCOUNTER — OFFICE VISIT (OUTPATIENT)
Dept: FAMILY MEDICINE CLINIC | Facility: CLINIC | Age: 56
End: 2021-01-04
Payer: COMMERCIAL

## 2021-01-04 VITALS
BODY MASS INDEX: 27.49 KG/M2 | TEMPERATURE: 95.9 F | WEIGHT: 161 LBS | DIASTOLIC BLOOD PRESSURE: 88 MMHG | HEIGHT: 64 IN | RESPIRATION RATE: 20 BRPM | HEART RATE: 80 BPM | SYSTOLIC BLOOD PRESSURE: 132 MMHG

## 2021-01-04 DIAGNOSIS — M25.512 ACUTE PAIN OF LEFT SHOULDER: ICD-10-CM

## 2021-01-04 DIAGNOSIS — F17.200 TOBACCO USE DISORDER: ICD-10-CM

## 2021-01-04 DIAGNOSIS — I10 ESSENTIAL HYPERTENSION: Primary | ICD-10-CM

## 2021-01-04 DIAGNOSIS — F41.9 ANXIETY: ICD-10-CM

## 2021-01-04 DIAGNOSIS — N52.9 ERECTILE DYSFUNCTION, UNSPECIFIED ERECTILE DYSFUNCTION TYPE: ICD-10-CM

## 2021-01-04 PROCEDURE — 3008F BODY MASS INDEX DOCD: CPT | Performed by: FAMILY MEDICINE

## 2021-01-04 PROCEDURE — 3075F SYST BP GE 130 - 139MM HG: CPT | Performed by: FAMILY MEDICINE

## 2021-01-04 PROCEDURE — 3079F DIAST BP 80-89 MM HG: CPT | Performed by: FAMILY MEDICINE

## 2021-01-04 PROCEDURE — 99213 OFFICE O/P EST LOW 20 MIN: CPT | Performed by: FAMILY MEDICINE

## 2021-01-04 RX ORDER — METHYLPREDNISOLONE 4 MG/1
TABLET ORAL
Qty: 21 EACH | Refills: 0 | Status: SHIPPED | OUTPATIENT
Start: 2021-01-04 | End: 2021-04-14 | Stop reason: ALTCHOICE

## 2021-01-04 RX ORDER — METHOCARBAMOL 750 MG/1
750 TABLET, FILM COATED ORAL EVERY 8 HOURS SCHEDULED
Qty: 90 TABLET | Refills: 0 | Status: SHIPPED | OUTPATIENT
Start: 2021-01-04

## 2021-01-04 RX ORDER — SILDENAFIL CITRATE 20 MG/1
20 TABLET ORAL DAILY
Qty: 30 TABLET | Refills: 2 | Status: SHIPPED | OUTPATIENT
Start: 2021-01-04

## 2021-01-04 RX ORDER — LIDOCAINE 50 MG/G
1 PATCH TOPICAL DAILY
Qty: 30 PATCH | Refills: 0 | Status: SHIPPED | OUTPATIENT
Start: 2021-01-04 | End: 2021-01-05 | Stop reason: CLARIF

## 2021-01-04 RX ORDER — ALPRAZOLAM 0.5 MG/1
0.5 TABLET ORAL 3 TIMES DAILY PRN
Qty: 90 TABLET | Refills: 2 | Status: SHIPPED | OUTPATIENT
Start: 2021-01-04 | End: 2021-04-14

## 2021-01-04 NOTE — PROGRESS NOTES
Assessment/Plan:  Pain in the left neck and shoulder rated 7/10 a 4 days duration no precipitating event noted  No fall no overuse no shoveling  Plan is a Medrol Dosepak along with Robaxin Lidoderm patch  Patient was cautioned to watch for the rash of shingles  Hypertensive cardiovascular disease with blood pressure well controlled on current regimen  Anxiety Xanax affective PDMP reviewed no issues  Erectile dysfunction sildenafil effective    Tobacco use disorder the patient states that he is not ready to stop smoking due to his anxiety  Problem List Items Addressed This Visit     None      Visit Diagnoses     Anxiety        Erectile dysfunction, unspecified erectile dysfunction type               Diagnoses and all orders for this visit:    Anxiety  -     ALPRAZolam (XANAX) 0 5 mg tablet; Take 1 tablet (0 5 mg total) by mouth 3 (three) times a day as needed for anxiety    Erectile dysfunction, unspecified erectile dysfunction type        No problem-specific Assessment & Plan notes found for this encounter  PHQ-9 Depression Screening    PHQ-9:   Frequency of the following problems over the past two weeks: Body mass index is 27 64 kg/m²  BMI Counseling: Body mass index is 27 64 kg/m²  The BMI     Subjective:      Patient ID: Janette Cogan is a 63 y o  male  Patient's presents for his chronic anxiety disorder and also has a complaint of severe left shoulder pain      The following portions of the patient's history were reviewed and updated as appropriate:   He has a past medical history of Anxiety, Bright red rectal bleeding, Hypertension, Kidney stones, Periorbital edema, Skin tag of anus, and Trigger point of thoracic region  ,  does not have a problem list on file  ,   has a past surgical history that includes Kidney surgery; Transurethral resection of bladder; Hernia repair (03/11/2013); Hemorrhoid surgery;  Lithotripsy; pr cysto/uretero w/lithotripsy &indwell stent insrt (Right, 7/13/2016); pr fragment kidney stone/ eswl (Right, 6/17/2016); pr cysto/uretero w/lithotripsy &indwell stent insrt (Right, 5/9/2016); Cystoscopy w/ ureteral stent placement (03/11/2013); Cystoscopy w/ ureteral stent placement (06/18/2014); Cystoscopy w/ ureteral stent placement (07/16/2014); Cystoscopy w/ ureteral stent removal (04/11/2013); Cystoscopy w/ ureteral stent removal (Right, 08/26/2014); Cystoscopy w/ ureteroscopy w/ lithotripsy (02/26/2013); Cystoscopy w/ ureteroscopy w/ lithotripsy (03/11/2014); Cystoscopy w/ ureteroscopy w/ lithotripsy (Right, 08/04/2014); Cystoscopy w/ ureteroscopy w/ lithotripsy (Right, 05/09/2016); and Ureteral reimplantion (03/11/2013)  ,  family history includes COPD in his mother; Heart attack in his father; Hypertension in his mother  ,   reports that he has been smoking  He has a 70 00 pack-year smoking history  He has never used smokeless tobacco  He reports that he does not drink alcohol or use drugs  ,  is allergic to metronidazole; nsaids; penicillin g; penicillins; pollen extract; and sulfa antibiotics     Current Outpatient Medications   Medication Sig Dispense Refill    ALPRAZolam (XANAX) 0 5 mg tablet Take 1 tablet (0 5 mg total) by mouth 3 (three) times a day as needed for anxiety 21 tablet 0    amitriptyline (ELAVIL) 25 mg tablet Take 1 tablet (25 mg total) by mouth daily at bedtime 90 tablet 1    amLODIPine (NORVASC) 5 mg tablet Take 1 tablet (5 mg total) by mouth daily 90 tablet 1    baclofen 10 mg tablet Take 1 tablet (10 mg total) by mouth 3 (three) times a day as needed for muscle spasms 90 tablet 2    risperiDONE (RisperDAL) 1 mg tablet Take 1 tablet (1 mg total) by mouth 2 (two) times a day 60 tablet 1    sildenafil (REVATIO) 20 mg tablet Take 1 tablet (20 mg total) by mouth daily 30 tablet 2     No current facility-administered medications for this visit  Review of Systems   Constitutional: Negative  HENT: Negative  Eyes: Negative  Respiratory: Negative  Cardiovascular: Negative  Gastrointestinal: Negative  Endocrine: Negative  Genitourinary: Negative  Musculoskeletal: Negative  Severe left shoulder pain   Skin: Negative  Allergic/Immunologic: Negative  Neurological: Negative  Hematological: Negative  Psychiatric/Behavioral: The patient is nervous/anxious  Objective:    /88   Pulse 80   Temp (!) 95 9 °F (35 5 °C)   Resp 20   Ht 5' 4" (1 626 m)   Wt 73 kg (161 lb)   BMI 27 64 kg/m²   Body mass index is 27 64 kg/m²  Physical Exam  Constitutional:       Appearance: He is well-developed  HENT:      Head: Normocephalic  Eyes:      Pupils: Pupils are equal, round, and reactive to light  Neck:      Musculoskeletal: Normal range of motion  Cardiovascular:      Rate and Rhythm: Normal rate and regular rhythm  Heart sounds: Normal heart sounds  Pulmonary:      Effort: Pulmonary effort is normal       Breath sounds: Normal breath sounds  Abdominal:      General: Bowel sounds are normal       Palpations: Abdomen is soft  Tenderness: There is no abdominal tenderness  Musculoskeletal:      Comments: Muscular spasm of the supraspinatus muscle of the left shoulder   Skin:     General: Skin is warm  Neurological:      Mental Status: He is alert and oriented to person, place, and time

## 2021-01-05 ENCOUNTER — TELEPHONE (OUTPATIENT)
Dept: FAMILY MEDICINE CLINIC | Facility: CLINIC | Age: 56
End: 2021-01-05

## 2021-01-05 NOTE — TELEPHONE ENCOUNTER
lidoderm 5% patch needs PA - per [facility] change med to 4% patch - unable to put through system - so called OTC patch to [facility]

## 2021-01-28 ENCOUNTER — APPOINTMENT (RX ONLY)
Dept: URBAN - NONMETROPOLITAN AREA CLINIC 4 | Facility: CLINIC | Age: 56
Setting detail: DERMATOLOGY
End: 2021-01-28

## 2021-01-28 DIAGNOSIS — L28.1 PRURIGO NODULARIS: ICD-10-CM

## 2021-01-28 PROBLEM — L30.9 DERMATITIS, UNSPECIFIED: Status: ACTIVE | Noted: 2021-01-28

## 2021-01-28 PROCEDURE — 11104 PUNCH BX SKIN SINGLE LESION: CPT

## 2021-01-28 PROCEDURE — 11105 PUNCH BX SKIN EA SEP/ADDL: CPT

## 2021-01-28 PROCEDURE — ? BIOPSY BY PUNCH METHOD

## 2021-01-28 PROCEDURE — ? COUNSELING

## 2021-01-28 ASSESSMENT — LOCATION SIMPLE DESCRIPTION DERM
LOCATION SIMPLE: LEFT FOREARM
LOCATION SIMPLE: RIGHT FOREARM
LOCATION SIMPLE: RIGHT HAND

## 2021-01-28 ASSESSMENT — LOCATION ZONE DERM
LOCATION ZONE: ARM
LOCATION ZONE: HAND

## 2021-01-28 ASSESSMENT — LOCATION DETAILED DESCRIPTION DERM
LOCATION DETAILED: RIGHT DISTAL DORSAL FOREARM
LOCATION DETAILED: LEFT DISTAL DORSAL FOREARM
LOCATION DETAILED: RIGHT ULNAR DORSAL HAND

## 2021-02-11 ENCOUNTER — APPOINTMENT (RX ONLY)
Dept: URBAN - NONMETROPOLITAN AREA CLINIC 4 | Facility: CLINIC | Age: 56
Setting detail: DERMATOLOGY
End: 2021-02-11

## 2021-02-11 ENCOUNTER — RX ONLY (OUTPATIENT)
Age: 56
Setting detail: RX ONLY
End: 2021-02-11

## 2021-02-11 DIAGNOSIS — Z48.817 ENCOUNTER FOR SURGICAL AFTERCARE FOLLOWING SURGERY ON THE SKIN AND SUBCUTANEOUS TISSUE: ICD-10-CM

## 2021-02-11 PROCEDURE — ? SUTURE REMOVAL (GLOBAL PERIOD)

## 2021-02-11 PROCEDURE — 99024 POSTOP FOLLOW-UP VISIT: CPT

## 2021-02-11 RX ORDER — CLOBETASOL PROPIONATE 0.5 MG/G
0.05% OINTMENT TOPICAL BID
Qty: 1 | Refills: 3 | Status: ERX

## 2021-02-11 ASSESSMENT — LOCATION SIMPLE DESCRIPTION DERM
LOCATION SIMPLE: LEFT FOREARM
LOCATION SIMPLE: RIGHT HAND

## 2021-02-11 ASSESSMENT — LOCATION DETAILED DESCRIPTION DERM
LOCATION DETAILED: RIGHT RADIAL DORSAL HAND
LOCATION DETAILED: LEFT DISTAL DORSAL FOREARM

## 2021-02-11 ASSESSMENT — LOCATION ZONE DERM
LOCATION ZONE: ARM
LOCATION ZONE: HAND

## 2021-02-11 NOTE — PROCEDURE: SUTURE REMOVAL (GLOBAL PERIOD)
Detail Level: Simple
Add 06013 Cpt? (Important Note: In 2017 The Use Of 06492 Is Being Tracked By Cms To Determine Future Global Period Reimbursement For Global Periods): yes

## 2021-03-24 ENCOUNTER — IMMUNIZATIONS (OUTPATIENT)
Dept: FAMILY MEDICINE CLINIC | Facility: HOSPITAL | Age: 56
End: 2021-03-24

## 2021-03-24 DIAGNOSIS — Z23 ENCOUNTER FOR IMMUNIZATION: Primary | ICD-10-CM

## 2021-03-24 PROCEDURE — 91301 SARS-COV-2 / COVID-19 MRNA VACCINE (MODERNA) 100 MCG: CPT

## 2021-03-24 PROCEDURE — 0011A SARS-COV-2 / COVID-19 MRNA VACCINE (MODERNA) 100 MCG: CPT

## 2021-04-05 ENCOUNTER — TELEPHONE (OUTPATIENT)
Dept: FAMILY MEDICINE CLINIC | Facility: CLINIC | Age: 56
End: 2021-04-05

## 2021-04-05 NOTE — TELEPHONE ENCOUNTER
LM with mother to have patient to call for annual adult well visit - we have [employer] paper work to fill out

## 2021-04-14 ENCOUNTER — OFFICE VISIT (OUTPATIENT)
Dept: FAMILY MEDICINE CLINIC | Facility: CLINIC | Age: 56
End: 2021-04-14
Payer: COMMERCIAL

## 2021-04-14 VITALS
SYSTOLIC BLOOD PRESSURE: 160 MMHG | HEART RATE: 88 BPM | DIASTOLIC BLOOD PRESSURE: 92 MMHG | WEIGHT: 162 LBS | HEIGHT: 64 IN | RESPIRATION RATE: 20 BRPM | BODY MASS INDEX: 27.66 KG/M2 | TEMPERATURE: 96.2 F

## 2021-04-14 DIAGNOSIS — M25.512 ACUTE PAIN OF LEFT SHOULDER: ICD-10-CM

## 2021-04-14 DIAGNOSIS — I10 ESSENTIAL HYPERTENSION: Primary | ICD-10-CM

## 2021-04-14 DIAGNOSIS — F22 DELUSIONAL DISORDER (HCC): ICD-10-CM

## 2021-04-14 DIAGNOSIS — F41.9 ANXIETY: ICD-10-CM

## 2021-04-14 DIAGNOSIS — F19.10 DRUG ABUSE (HCC): ICD-10-CM

## 2021-04-14 DIAGNOSIS — L03.115 CELLULITIS OF RIGHT THIGH: ICD-10-CM

## 2021-04-14 DIAGNOSIS — N52.9 ERECTILE DYSFUNCTION, UNSPECIFIED ERECTILE DYSFUNCTION TYPE: ICD-10-CM

## 2021-04-14 DIAGNOSIS — F17.200 TOBACCO USE DISORDER: ICD-10-CM

## 2021-04-14 PROCEDURE — 4004F PT TOBACCO SCREEN RCVD TLK: CPT | Performed by: FAMILY MEDICINE

## 2021-04-14 PROCEDURE — 3008F BODY MASS INDEX DOCD: CPT | Performed by: FAMILY MEDICINE

## 2021-04-14 PROCEDURE — 3080F DIAST BP >= 90 MM HG: CPT | Performed by: FAMILY MEDICINE

## 2021-04-14 PROCEDURE — 3077F SYST BP >= 140 MM HG: CPT | Performed by: FAMILY MEDICINE

## 2021-04-14 PROCEDURE — 99213 OFFICE O/P EST LOW 20 MIN: CPT | Performed by: FAMILY MEDICINE

## 2021-04-14 RX ORDER — AMLODIPINE BESYLATE 10 MG/1
10 TABLET ORAL DAILY
Qty: 90 TABLET | Refills: 3 | Status: SHIPPED | OUTPATIENT
Start: 2021-04-14

## 2021-04-14 RX ORDER — CLINDAMYCIN HYDROCHLORIDE 300 MG/1
300 CAPSULE ORAL 4 TIMES DAILY
Qty: 40 CAPSULE | Refills: 0 | Status: SHIPPED | OUTPATIENT
Start: 2021-04-14 | End: 2021-04-24

## 2021-04-14 RX ORDER — SILDENAFIL CITRATE 20 MG/1
20 TABLET ORAL DAILY
Qty: 30 TABLET | Refills: 2 | Status: CANCELLED | OUTPATIENT
Start: 2021-04-14

## 2021-04-14 RX ORDER — METHOCARBAMOL 750 MG/1
750 TABLET, FILM COATED ORAL EVERY 8 HOURS SCHEDULED
Qty: 90 TABLET | Refills: 0 | Status: CANCELLED | OUTPATIENT
Start: 2021-04-14

## 2021-04-14 RX ORDER — ALPRAZOLAM 0.5 MG/1
0.5 TABLET ORAL 3 TIMES DAILY PRN
Qty: 90 TABLET | Refills: 2 | Status: CANCELLED | OUTPATIENT
Start: 2021-04-14

## 2021-04-14 NOTE — PROGRESS NOTES
Assessment/Plan: delusional parasitosis    Cellulitis of right thigh will prescribed Cleocin 300 q i d  Drug abuse will discontinue Xanax and Robaxin    Hypertension with a blood pressure 160/92 patient seems agitated was agitated when confronted with the urine drug test   Patient w was toldl would not be prescribing Xanax any longer  Will increase Norvasc to 10 mg    Tobacco use disorder the patient is not interested in stopping at this time        Problem List Items Addressed This Visit     None      Visit Diagnoses     Cellulitis of right thigh    -  Primary    Relevant Medications    clindamycin (CLEOCIN) 300 MG capsule    Anxiety        Erectile dysfunction, unspecified erectile dysfunction type        Acute pain of left shoulder               Diagnoses and all orders for this visit:    Cellulitis of right thigh  -     clindamycin (CLEOCIN) 300 MG capsule; Take 1 capsule (300 mg total) by mouth 4 (four) times a day for 10 days    Anxiety    Erectile dysfunction, unspecified erectile dysfunction type    Acute pain of left shoulder    Other orders  -     Cancel: ALPRAZolam (XANAX) 0 5 mg tablet; Take 1 tablet (0 5 mg total) by mouth 3 (three) times a day as needed for anxiety  -     Cancel: sildenafil (REVATIO) 20 mg tablet; Take 1 tablet (20 mg total) by mouth daily  -     Cancel: methocarbamol (ROBAXIN) 750 mg tablet; Take 1 tablet (750 mg total) by mouth every 8 (eight) hours        No problem-specific Assessment & Plan notes found for this encounter  PHQ-9 Depression Screening    PHQ-9:   Frequency of the following problems over the past two weeks: Body mass index is 27 81 kg/m²  BMI Counseling: Body mass index is 27 81 kg/m²  The BMI     Subjective:      Patient ID: Santa Hoskins is a 63 y o  male  Patient presents with a chief complaint of cellulitis of his right thigh  Had been seen at [facility] 9th with excoriation of indio wound skin    Dermatology has described him as having at [place] delusional parasitosis  And was found to have positive morphine on urine testing not prescribed      The following portions of the patient's history were reviewed and updated as appropriate:   He has a past medical history of Anxiety, Bright red rectal bleeding, Hypertension, Kidney stones, Periorbital edema, Skin tag of anus, and Trigger point of thoracic region  ,  does not have a problem list on file  ,   has a past surgical history that includes Kidney surgery; Transurethral resection of bladder; Hernia repair (03/11/2013); Hemorrhoid surgery; Lithotripsy; pr cysto/uretero w/lithotripsy &indwell stent insrt (Right, 7/13/2016); pr fragment kidney stone/ eswl (Right, 6/17/2016); pr cysto/uretero w/lithotripsy &indwell stent insrt (Right, 5/9/2016); Cystoscopy w/ ureteral stent placement (03/11/2013); Cystoscopy w/ ureteral stent placement (06/18/2014); Cystoscopy w/ ureteral stent placement (07/16/2014); Cystoscopy w/ ureteral stent removal (04/11/2013); Cystoscopy w/ ureteral stent removal (Right, 08/26/2014); Cystoscopy w/ ureteroscopy w/ lithotripsy (02/26/2013); Cystoscopy w/ ureteroscopy w/ lithotripsy (03/11/2014); Cystoscopy w/ ureteroscopy w/ lithotripsy (Right, 08/04/2014); Cystoscopy w/ ureteroscopy w/ lithotripsy (Right, 05/09/2016); and Ureteral reimplantion (03/11/2013)  ,  family history includes COPD in his mother; Heart attack in his father; Hypertension in his mother  ,   reports that he has been smoking  He has a 70 00 pack-year smoking history  He has never used smokeless tobacco  He reports current drug use  Drugs: Methamphetamines and Benzodiazepines  He reports that he does not drink alcohol ,  is allergic to metronidazole; nsaids; penicillin g; penicillins; pollen extract; and sulfa antibiotics     Current Outpatient Medications   Medication Sig Dispense Refill    ALPRAZolam (XANAX) 0 5 mg tablet Take 1 tablet (0 5 mg total) by mouth 3 (three) times a day as needed for anxiety 90 tablet 2    amitriptyline (ELAVIL) 25 mg tablet Take 1 tablet (25 mg total) by mouth daily at bedtime 90 tablet 1    amLODIPine (NORVASC) 5 mg tablet Take 1 tablet (5 mg total) by mouth daily 90 tablet 1    baclofen 10 mg tablet Take 1 tablet (10 mg total) by mouth 3 (three) times a day as needed for muscle spasms 90 tablet 2    clindamycin (CLEOCIN) 300 MG capsule Take 1 capsule (300 mg total) by mouth 4 (four) times a day for 10 days 40 capsule 0    methocarbamol (ROBAXIN) 750 mg tablet Take 1 tablet (750 mg total) by mouth every 8 (eight) hours 90 tablet 0    risperiDONE (RisperDAL) 1 mg tablet Take 1 tablet (1 mg total) by mouth 2 (two) times a day 60 tablet 1    sildenafil (REVATIO) 20 mg tablet Take 1 tablet (20 mg total) by mouth daily 30 tablet 2     No current facility-administered medications for this visit  Review of Systems   Constitutional: Negative for chills and fever  HENT: Negative for ear pain and sore throat  Eyes: Negative for pain and visual disturbance  Respiratory: Negative for cough and shortness of breath  Cardiovascular: Negative for chest pain and palpitations  Gastrointestinal: Negative for abdominal pain and vomiting  Genitourinary: Negative for dysuria and hematuria  Musculoskeletal: Negative for arthralgias and back pain  Skin: Positive for rash and wound  Negative for color change  Neurological: Negative for seizures and syncope  Psychiatric/Behavioral: The patient is nervous/anxious  All other systems reviewed and are negative  Objective:    /92   Pulse 88   Temp (!) 96 2 °F (35 7 °C)   Resp 20   Ht 5' 4" (1 626 m)   Wt 73 5 kg (162 lb)   BMI 27 81 kg/m²   Body mass index is 27 81 kg/m²  Physical Exam  Constitutional:       Appearance: He is well-developed  HENT:      Head: Normocephalic  Eyes:      Pupils: Pupils are equal, round, and reactive to light     Neck:      Musculoskeletal: Normal range of motion  Cardiovascular:      Rate and Rhythm: Normal rate and regular rhythm  Heart sounds: Normal heart sounds  Pulmonary:      Effort: Pulmonary effort is normal       Breath sounds: Normal breath sounds  Abdominal:      General: Bowel sounds are normal       Palpations: Abdomen is soft  Tenderness: There is no abdominal tenderness  Skin:     General: Skin is warm  Comments: Multiple excoriated wound right thigh has wound with surrounding erythema of 2 cm   Neurological:      Mental Status: He is alert and oriented to person, place, and time

## 2021-04-22 ENCOUNTER — IMMUNIZATIONS (OUTPATIENT)
Dept: FAMILY MEDICINE CLINIC | Facility: HOSPITAL | Age: 56
End: 2021-04-22

## 2021-04-22 DIAGNOSIS — Z23 ENCOUNTER FOR IMMUNIZATION: Primary | ICD-10-CM

## 2021-04-22 PROCEDURE — 0012A SARS-COV-2 / COVID-19 MRNA VACCINE (MODERNA) 100 MCG: CPT

## 2021-04-22 PROCEDURE — 91301 SARS-COV-2 / COVID-19 MRNA VACCINE (MODERNA) 100 MCG: CPT

## 2021-06-02 ENCOUNTER — HOSPITAL ENCOUNTER (EMERGENCY)
Facility: HOSPITAL | Age: 56
Discharge: LEFT BEFORE DISCHARGE | End: 2021-06-03
Attending: EMERGENCY MEDICINE | Admitting: EMERGENCY MEDICINE
Payer: COMMERCIAL

## 2021-06-02 DIAGNOSIS — L03.90 CELLULITIS: Primary | ICD-10-CM

## 2021-06-02 LAB
ALBUMIN SERPL BCP-MCNC: 3.8 G/DL (ref 3.5–5)
ALP SERPL-CCNC: 118 U/L (ref 46–116)
ALT SERPL W P-5'-P-CCNC: 18 U/L (ref 12–78)
ANION GAP SERPL CALCULATED.3IONS-SCNC: 8 MMOL/L (ref 4–13)
APTT PPP: 35 SECONDS (ref 23–37)
AST SERPL W P-5'-P-CCNC: 22 U/L (ref 5–45)
BASOPHILS # BLD AUTO: 0.03 THOUSANDS/ΜL (ref 0–0.1)
BASOPHILS NFR BLD AUTO: 1 % (ref 0–1)
BILIRUB SERPL-MCNC: 0.73 MG/DL (ref 0.2–1)
BUN SERPL-MCNC: 11 MG/DL (ref 5–25)
CALCIUM SERPL-MCNC: 9.3 MG/DL (ref 8.3–10.1)
CHLORIDE SERPL-SCNC: 105 MMOL/L (ref 100–108)
CO2 SERPL-SCNC: 24 MMOL/L (ref 21–32)
CREAT SERPL-MCNC: 1 MG/DL (ref 0.6–1.3)
EOSINOPHIL # BLD AUTO: 0.28 THOUSAND/ΜL (ref 0–0.61)
EOSINOPHIL NFR BLD AUTO: 5 % (ref 0–6)
ERYTHROCYTE [DISTWIDTH] IN BLOOD BY AUTOMATED COUNT: 14 % (ref 11.6–15.1)
GFR SERPL CREATININE-BSD FRML MDRD: 84 ML/MIN/1.73SQ M
GLUCOSE SERPL-MCNC: 105 MG/DL (ref 65–140)
HCT VFR BLD AUTO: 46.6 % (ref 36.5–49.3)
HGB BLD-MCNC: 15.8 G/DL (ref 12–17)
IMM GRANULOCYTES # BLD AUTO: 0.03 THOUSAND/UL (ref 0–0.2)
IMM GRANULOCYTES NFR BLD AUTO: 1 % (ref 0–2)
INR PPP: 1.17 (ref 0.84–1.19)
LACTATE SERPL-SCNC: 0.6 MMOL/L (ref 0.5–2)
LYMPHOCYTES # BLD AUTO: 1.13 THOUSANDS/ΜL (ref 0.6–4.47)
LYMPHOCYTES NFR BLD AUTO: 20 % (ref 14–44)
MCH RBC QN AUTO: 29.2 PG (ref 26.8–34.3)
MCHC RBC AUTO-ENTMCNC: 33.9 G/DL (ref 31.4–37.4)
MCV RBC AUTO: 86 FL (ref 82–98)
MONOCYTES # BLD AUTO: 0.56 THOUSAND/ΜL (ref 0.17–1.22)
MONOCYTES NFR BLD AUTO: 10 % (ref 4–12)
NEUTROPHILS # BLD AUTO: 3.68 THOUSANDS/ΜL (ref 1.85–7.62)
NEUTS SEG NFR BLD AUTO: 63 % (ref 43–75)
NRBC BLD AUTO-RTO: 0 /100 WBCS
PLATELET # BLD AUTO: 144 THOUSANDS/UL (ref 149–390)
PMV BLD AUTO: 10.2 FL (ref 8.9–12.7)
POTASSIUM SERPL-SCNC: 4.3 MMOL/L (ref 3.5–5.3)
PROT SERPL-MCNC: 7.9 G/DL (ref 6.4–8.2)
PROTHROMBIN TIME: 14.6 SECONDS (ref 11.6–14.5)
RBC # BLD AUTO: 5.41 MILLION/UL (ref 3.88–5.62)
SODIUM SERPL-SCNC: 137 MMOL/L (ref 136–145)
TROPONIN I SERPL-MCNC: <0.02 NG/ML
WBC # BLD AUTO: 5.71 THOUSAND/UL (ref 4.31–10.16)

## 2021-06-02 PROCEDURE — 84145 PROCALCITONIN (PCT): CPT | Performed by: EMERGENCY MEDICINE

## 2021-06-02 PROCEDURE — 36415 COLL VENOUS BLD VENIPUNCTURE: CPT | Performed by: EMERGENCY MEDICINE

## 2021-06-02 PROCEDURE — 87040 BLOOD CULTURE FOR BACTERIA: CPT | Performed by: EMERGENCY MEDICINE

## 2021-06-02 PROCEDURE — 84484 ASSAY OF TROPONIN QUANT: CPT | Performed by: EMERGENCY MEDICINE

## 2021-06-02 PROCEDURE — 96365 THER/PROPH/DIAG IV INF INIT: CPT

## 2021-06-02 PROCEDURE — 99284 EMERGENCY DEPT VISIT MOD MDM: CPT | Performed by: EMERGENCY MEDICINE

## 2021-06-02 PROCEDURE — 83605 ASSAY OF LACTIC ACID: CPT | Performed by: EMERGENCY MEDICINE

## 2021-06-02 PROCEDURE — 99283 EMERGENCY DEPT VISIT LOW MDM: CPT

## 2021-06-02 PROCEDURE — 85730 THROMBOPLASTIN TIME PARTIAL: CPT | Performed by: EMERGENCY MEDICINE

## 2021-06-02 PROCEDURE — 80053 COMPREHEN METABOLIC PANEL: CPT | Performed by: EMERGENCY MEDICINE

## 2021-06-02 PROCEDURE — 85610 PROTHROMBIN TIME: CPT | Performed by: EMERGENCY MEDICINE

## 2021-06-02 PROCEDURE — 93005 ELECTROCARDIOGRAM TRACING: CPT

## 2021-06-02 PROCEDURE — 85025 COMPLETE CBC W/AUTO DIFF WBC: CPT | Performed by: EMERGENCY MEDICINE

## 2021-06-02 RX ORDER — CEFEPIME HYDROCHLORIDE 2 G/50ML
2000 INJECTION, SOLUTION INTRAVENOUS ONCE
Status: COMPLETED | OUTPATIENT
Start: 2021-06-02 | End: 2021-06-03

## 2021-06-02 RX ADMIN — CEFEPIME HYDROCHLORIDE 2000 MG: 2 INJECTION, SOLUTION INTRAVENOUS at 23:18

## 2021-06-03 VITALS
WEIGHT: 157.85 LBS | RESPIRATION RATE: 18 BRPM | TEMPERATURE: 97.3 F | DIASTOLIC BLOOD PRESSURE: 88 MMHG | HEIGHT: 65 IN | BODY MASS INDEX: 26.3 KG/M2 | HEART RATE: 97 BPM | SYSTOLIC BLOOD PRESSURE: 148 MMHG | OXYGEN SATURATION: 98 %

## 2021-06-03 LAB
ATRIAL RATE: 107 BPM
ATRIAL RATE: 111 BPM
P AXIS: 51 DEGREES
P AXIS: 57 DEGREES
PR INTERVAL: 122 MS
PR INTERVAL: 124 MS
PROCALCITONIN SERPL-MCNC: <0.05 NG/ML
QRS AXIS: 37 DEGREES
QRS AXIS: 40 DEGREES
QRSD INTERVAL: 132 MS
QRSD INTERVAL: 132 MS
QT INTERVAL: 374 MS
QT INTERVAL: 376 MS
QTC INTERVAL: 501 MS
QTC INTERVAL: 508 MS
T WAVE AXIS: 51 DEGREES
T WAVE AXIS: 60 DEGREES
VENTRICULAR RATE: 107 BPM
VENTRICULAR RATE: 111 BPM

## 2021-06-03 PROCEDURE — 96375 TX/PRO/DX INJ NEW DRUG ADDON: CPT

## 2021-06-03 PROCEDURE — 93010 ELECTROCARDIOGRAM REPORT: CPT | Performed by: INTERNAL MEDICINE

## 2021-06-03 RX ORDER — DOXYCYCLINE HYCLATE 100 MG/1
100 CAPSULE ORAL 2 TIMES DAILY
Qty: 14 CAPSULE | Refills: 0 | Status: SHIPPED | OUTPATIENT
Start: 2021-06-03 | End: 2021-06-10

## 2021-06-03 RX ADMIN — Medication 1000 MG: at 00:35

## 2021-06-03 NOTE — ED NOTES
ER physician spoke with patient and explained how to take his antibiotic medication  Again told him why he should not sign out AMA  The patient is against staying and left the ER without any further questions or concerns         Víctor Kiran RN  06/03/21 [id number]

## 2021-06-03 NOTE — ED PROVIDER NOTES
History  Chief Complaint   Patient presents with    Wound Infection     Pt injecting 20-30 bags of heroin a day, numerous necrotic use sites w/ streaking on b/l UE  HPI  53-year-old man with history of IVDU, using multiple bags of heroin per day, comes in with left arm redness and swelling, multiple areas of black scabs verses eschar to his bilateral arms  Patient states that does use intravenously, but states that the areas better black were from missed shots  He has had some chills, he has pain in the left arm pain movement of the left arm  Denies any chest pain shortness of breath, denies any abdominal pain nausea vomiting    Prior to Admission Medications   Prescriptions Last Dose Informant Patient Reported? Taking?    amLODIPine (NORVASC) 10 mg tablet   No No   Sig: Take 1 tablet (10 mg total) by mouth daily   amitriptyline (ELAVIL) 25 mg tablet   No No   Sig: Take 1 tablet (25 mg total) by mouth daily at bedtime   methocarbamol (ROBAXIN) 750 mg tablet   No No   Sig: Take 1 tablet (750 mg total) by mouth every 8 (eight) hours   risperiDONE (RisperDAL) 1 mg tablet   No No   Sig: Take 1 tablet (1 mg total) by mouth 2 (two) times a day   sildenafil (REVATIO) 20 mg tablet   No No   Sig: Take 1 tablet (20 mg total) by mouth daily      Facility-Administered Medications: None       Past Medical History:   Diagnosis Date    Anxiety     Bright red rectal bleeding     Last Assessed: 9/9/2015     Hypertension     Last Assessed: 7/9/2014     Kidney stones     Last Assessed: 11/17/2016     Periorbital edema     Last Assessed: 9/4/2015    Skin tag of anus     Last Assessed: 9/9/2015     Trigger point of thoracic region     Last Assessed: 11/6/2015        Past Surgical History:   Procedure Laterality Date    CYSTOSCOPY W/ URETERAL STENT PLACEMENT  03/11/2013    CYSTOSCOPY W/ URETERAL STENT PLACEMENT  06/18/2014    EXTRACORPOREAL SHOCK WAVE LITHOTRIPSY     CYSTOSCOPY W/ URETERAL STENT PLACEMENT  07/16/2014 EXTRACORPOREAL SHOCK WAVE LITHOTRIPSY     CYSTOSCOPY W/ URETERAL STENT REMOVAL  04/11/2013    CYSTOSCOPY W/ URETERAL STENT REMOVAL Right 08/26/2014    CYSTOSCOPY W/ URETEROSCOPY W/ LITHOTRIPSY  02/26/2013    Percutaneous lithotomy With Uretal Stent Plcement     CYSTOSCOPY W/ URETEROSCOPY W/ LITHOTRIPSY  03/11/2014    CYSTOSCOPY W/ URETEROSCOPY W/ LITHOTRIPSY Right 08/04/2014    With Uretal Stent Placement     CYSTOSCOPY W/ URETEROSCOPY W/ LITHOTRIPSY Right 05/09/2016    HEMORRHOID SURGERY      HERNIA REPAIR  03/11/2013    KIDNEY SURGERY      LITHOTRIPSY      IA CYSTO/URETERO W/LITHOTRIPSY &INDWELL STENT INSRT Right 7/13/2016    Procedure: CYSTOSCOPY; URETEROSCOPY WITH HOLMIUM LASER STONE EXTRACTION; RETROGRADE PYELOGRAM; URETERAL STENT INSERTION ;  Surgeon: Huan Johnson MD;  [facility];  Service: Urology    IA CYSTO/URETERO W/LITHOTRIPSY &INDWELL STENT INSRT Right 5/9/2016    Procedure: CYSTOSCOPY,  URETEROSCOPY,  WITH LITHOTRIPSY HOLMIUM LASER, STONE EXTRACTION, AND INSERTION STENT URETERAL;  Surgeon: Huan Johnson MD;  Location[facility];  Service: Urology    [facility] Right 6/17/2016    Procedure: Ericka Mendez SHOCKWAVE (ESWL); Surgeon: Huan Johnson MD;  [facility];  Service: Urology    TRANSURETHRAL RESECTION OF BLADDER      URETERAL [place]  03/11/2013       Family History   Problem Relation Age of Onset    COPD Mother     Hypertension Mother     Heart attack Father      I have reviewed and agree with the history as documented      E-Cigarette/Vaping    E-Cigarette Use Never User      E-Cigarette/Vaping Substances     Social History     Tobacco Use    Smoking status: Current Every Day Smoker     Packs/day: 2 00     Years: 35 00     Pack years: 70 00    Smokeless tobacco: Never Used   Substance Use Topics    Alcohol use: No    Drug use: Yes     Types: Heroin     Comment: 20-30 bags of heroin daily       Review of Systems   Constitutional: Negative  Negative for chills and fever  HENT: Negative  Negative for ear pain and sore throat  Eyes: Negative  Negative for pain and discharge  Respiratory: Negative  Negative for chest tightness and shortness of breath  Cardiovascular: Negative  Negative for chest pain and palpitations  Gastrointestinal: Negative  Negative for abdominal pain, nausea and vomiting  Endocrine: Negative  Negative for polyphagia and polyuria  Genitourinary: Negative  Negative for dysuria and flank pain  Musculoskeletal: Negative  Negative for arthralgias and back pain  Skin: Positive for color change and wound  Allergic/Immunologic: Negative  Negative for food allergies and immunocompromised state  Neurological: Negative  Negative for weakness and headaches  Hematological: Negative  Negative for adenopathy  Does not bruise/bleed easily  Psychiatric/Behavioral: Negative  Negative for suicidal ideas  The patient is not nervous/anxious  Physical Exam  Physical Exam  Vitals signs and nursing note reviewed  Constitutional:       General: He is not in acute distress  Appearance: Normal appearance  He is not diaphoretic  HENT:      Head: Normocephalic and atraumatic  Right Ear: External ear normal       Left Ear: External ear normal       Nose: Nose normal  No congestion or rhinorrhea  Mouth/Throat:      Mouth: Mucous membranes are moist    Eyes:      General: No scleral icterus  Right eye: No discharge  Left eye: No discharge  Conjunctiva/sclera: Conjunctivae normal       Pupils: Pupils are equal, round, and reactive to light  Neck:      Musculoskeletal: Normal range of motion and neck supple  No neck rigidity  Cardiovascular:      Rate and Rhythm: Regular rhythm  Pulses: Normal pulses  Heart sounds: Normal heart sounds  Pulmonary:      Effort: Pulmonary effort is normal  No respiratory distress        Breath sounds: Normal breath sounds  No stridor  No wheezing, rhonchi or rales  Abdominal:      General: Abdomen is flat  There is no distension  Palpations: Abdomen is soft  Tenderness: There is no abdominal tenderness  There is no guarding  Musculoskeletal: Normal range of motion  General: No swelling, tenderness or deformity  Skin:     General: Skin is warm and dry  Capillary Refill: Capillary refill takes less than 2 seconds  Findings: No lesion or rash  Comments: Patient has multiple areas of black pockets on his skin  His left arm is swollen and red  Neurological:      General: No focal deficit present  Mental Status: He is alert and oriented to person, place, and time  Cranial Nerves: No cranial nerve deficit  Sensory: No sensory deficit  Psychiatric:         Mood and Affect: Mood normal          Behavior: Behavior normal          Thought Content:  Thought content normal                  Vital Signs  ED Triage Vitals [06/02/21 2232]   Temperature Pulse Respirations Blood Pressure SpO2   (!) 97 3 °F (36 3 °C) (!) 108 20 (!) 194/105 99 %      Temp Source Heart Rate Source Patient Position - Orthostatic VS BP Location FiO2 (%)   Temporal Monitor Lying Right arm --      Pain Score       --           Vitals:    06/02/21 2232 06/03/21 0039   BP: (!) 194/105 148/88   Pulse: (!) 108 97   Patient Position - Orthostatic VS: Lying Lying         Visual Acuity      ED Medications  Medications   cefepime (MAXIPIME) IVPB (premix in dextrose) 2,000 mg 50 mL (0 mg Intravenous Stopped 6/3/21 0002)   vancomycin (VANCOCIN) 1000 mg in sodium chloride 0 9% 250 mL IVPB (1,000 mg Intravenous Given 6/3/21 0035)       Diagnostic Studies  Results Reviewed     Procedure Component Value Units Date/Time    Troponin I [[id number]]  (Normal) Resulted: 06/02/21 2336    Lab Status: Final result Specimen: Blood Updated: 06/02/21 2336     Troponin I <0 02 ng/mL     Lactic acid [[id number]]  (Normal) Collected: 06/02/21 2305    Lab Status: Final result Specimen: Blood from Arm, Right Updated: 06/02/21 2334     LACTIC ACID 0 6 mmol/L     Narrative:      Result may be elevated if tourniquet was used during collection      Comprehensive metabolic panel [[id number]]  (Abnormal) Collected: 06/02/21 2305    Lab Status: Final result Specimen: Blood from Arm, Right Updated: 06/02/21 2331     Sodium 137 mmol/L      Potassium 4 3 mmol/L      Chloride 105 mmol/L      CO2 24 mmol/L      ANION GAP 8 mmol/L      BUN 11 mg/dL      Creatinine 1 00 mg/dL      Glucose 105 mg/dL      Calcium 9 3 mg/dL      AST 22 U/L      ALT 18 U/L      Alkaline Phosphatase 118 U/L      Total Protein 7 9 g/dL      Albumin 3 8 g/dL      Total Bilirubin 0 73 mg/dL      eGFR 84 ml/min/1 73sq m     Narrative:      [place] guidelines for Chronic Kidney Disease (CKD):     Stage 1 with normal or high GFR (GFR > 90 mL/min/1 73 square meters)    Stage 2 Mild CKD (GFR = 60-89 mL/min/1 73 square meters)    Stage 3A Moderate CKD (GFR = 45-59 mL/min/1 73 square meters)    Stage 3B Moderate CKD (GFR = 30-44 mL/min/1 73 square meters)    Stage 4 Severe CKD (GFR = 15-29 mL/min/1 73 square meters)    Stage 5 End Stage CKD (GFR <15 mL/min/1 73 square meters)  Note: GFR calculation is accurate only with a steady state creatinine    Protime-INR [[id number]]  (Abnormal) Collected: 06/02/21 2305    Lab Status: Final result Specimen: Blood from Arm, Right Updated: 06/02/21 2326     Protime 14 6 seconds      INR 1 17    APTT [[id number]]  (Normal) Collected: 06/02/21 2305    Lab Status: Final result Specimen: Blood from Arm, Right Updated: 06/02/21 2326     PTT 35 seconds     CBC and differential [[id number]]  (Abnormal) Collected: 06/02/21 2305    Lab Status: Final result Specimen: Blood from Arm, Right Updated: 06/02/21 2320     WBC 5 71 Thousand/uL      RBC 5 41 Million/uL      Hemoglobin 15 8 g/dL      Hematocrit 46 6 %      MCV 86 fL MCH 29 2 pg      MCHC 33 9 g/dL      RDW 14 0 %      MPV 10 2 fL      Platelets 012 Thousands/uL      nRBC 0 /100 WBCs      Neutrophils Relative 63 %      Immat GRANS % 1 %      Lymphocytes Relative 20 %      Monocytes Relative 10 %      Eosinophils Relative 5 %      Basophils Relative 1 %      Neutrophils Absolute 3 68 Thousands/µL      Immature Grans Absolute 0 03 Thousand/uL      Lymphocytes Absolute 1 13 Thousands/µL      Monocytes Absolute 0 56 Thousand/µL      Eosinophils Absolute 0 28 Thousand/µL      Basophils Absolute 0 03 Thousands/µL     Blood culture #1 [[id number]] Collected: 06/02/21 2305    Lab Status: In process Specimen: Blood from Arm, Right Updated: 06/02/21 2312    Blood culture #2 [[id number]] Collected: 06/02/21 2305    Lab Status: In process Specimen: Blood from Arm, Left Updated: 06/02/21 2312    Procalcitonin with AM Reflex [[id number]] Collected: 06/02/21 2305    Lab Status: In process Specimen: Blood from Arm, Right Updated: 06/02/21 2312                 No orders to display              Procedures  Procedures         ED Course  ED Course as of Jun 03 0610   Thu Jun 03, 2021   0131 Went to revaluate the patient  He states that he would like to go home  I did tell him that his entire arm is swollen secondary to cellulitis, and that this could get worse before it gets better  My recommendation was admission hospital   Patient states he does not want to stay  He states that he will come back if he gets worse  Given the multiple infected wounds on his bilateral arms, and how swollen his left arm is, he needs to be admitted  Because of that, will have him sign out against medical advice  MDM  Number of Diagnoses or Management Options  Cellulitis:   Diagnosis management comments: This 58-year-old male comes in with cellulitis secondary to IVDU    Given how extensive the redness is, the multiple areas of eschar, will do full septic evaluation, likely admission hospital       Disposition  Final diagnoses:   Cellulitis     Time reflects when diagnosis was documented in both MDM as applicable and the Disposition within this note     Time User Action Codes Description Comment    6/3/2021  2:05 AM Andrew Mary Add [L03 [id number]] Cellulitis       ED Disposition     ED Disposition Condition Date/Time Comment    LINDA Mendez Douglas 3, 2021  2:05 AM Date: 6/3/2021  Patient: Karen Gill  Admitted: 6/2/2021 10:27 PM  Attending Provider: Lin Boucher MD    Karen Gill or his authorized caregiver has made the decision for the patient to leave the emergency department against the advic e of his attending physician  He or his authorized caregiver has been informed and understands the inherent risks, including death, worsening illness, loss of limb  He or his authorized caregiver has decided to accept the responsibility for this dec damien Gill and all necessary parties have been advised that he may return for further evaluation or treatment  His condition at time of discharge was stable    Karen Gill had current vital signs as follows:  /88 (BP Location : Right arm)   Pulse 97   Temp (!) 97 3 °F (36 3 °C) (Temporal)   Resp 18   Ht 5' 5" (1 651 m)   Wt 71 6 kg (157 lb 13 6 oz)         Follow-up Information     Follow up With Specialties Details Why Contact Info Additional Beckie Cortez 89, DO Family Medicine Call in 1 day follow up being seen in the emergency department [address] [facility] [address]  [place] [address] [address] Emergency Department Emergency Medicine Go to  As needed, If symptoms worsen [address] [phone]  [address] [place] [address] Emergency Department, [address], [facility][note 3]          Discharge Medication List as of 6/3/2021  2:06 AM      START taking these medications    Details   doxycycline hyclate (VIBRAMYCIN) 100 mg capsule Take 1 capsule (100 mg total) by mouth 2 (two) times a day for 7 days, Starting Thu 6/3/2021, Until Thu 6/10/2021, Normal         CONTINUE these medications which have NOT CHANGED    Details   amitriptyline (ELAVIL) 25 mg tablet Take 1 tablet (25 mg total) by mouth daily at bedtime, Starting Tue 7/14/2020, Normal      amLODIPine (NORVASC) 10 mg tablet Take 1 tablet (10 mg total) by mouth daily, Starting Wed 4/14/2021, Normal      methocarbamol (ROBAXIN) 750 mg tablet Take 1 tablet (750 mg total) by mouth every 8 (eight) hours, Starting Mon 1/4/2021, Normal      risperiDONE (RisperDAL) 1 mg tablet Take 1 tablet (1 mg total) by mouth 2 (two) times a day, Starting Thu 8/13/2020, Normal      sildenafil (REVATIO) 20 mg tablet Take 1 tablet (20 mg total) by mouth daily, Starting Mon 1/4/2021, Normal           No discharge procedures on file      PDMP Review       Value Time User    PDMP Reviewed  Yes 1/4/2021  4:12 PM Swetha Gomes DO          ED Provider  Electronically Signed by           Terri Purcell MD  06/03/21 [id number]

## 2021-06-03 NOTE — ED NOTES
Patient also stating that he needs to go [facility] because he will be going through withdrawls soon     Jennifer Paige RN  06/03/21 0131

## 2021-06-03 NOTE — ED NOTES
PATIENT STATED TO THE RN THAT HE WANTED TO SIGN OUT AMA WHEN HE FIRST ARRIVED AT THE ED  STATED THAT HE ONLY WANTS THE IV ANTIBIOTICS AND THEN WILL AMA;   ER PHYSICIAN EXPLAINED THE CONSEQUENCES OF SIGNING OUT  THE PATIENT INSISTED  THE PATIENT WAS ADVISED TO STAY AND CONTINUE WITH THE IV ANTIBIOTIC TREATMENT, HOWEVER THE PATIENT WILL BE SIGNING OUT AMA        Timothy Warren RN  06/03/21 [id number]

## 2021-06-08 LAB
BACTERIA BLD CULT: NORMAL
BACTERIA BLD CULT: NORMAL

## 2021-10-19 ENCOUNTER — OFFICE VISIT (OUTPATIENT)
Dept: URGENT CARE | Facility: CLINIC | Age: 56
End: 2021-10-19
Payer: COMMERCIAL

## 2021-10-19 VITALS
TEMPERATURE: 96.8 F | SYSTOLIC BLOOD PRESSURE: 169 MMHG | HEART RATE: 62 BPM | OXYGEN SATURATION: 99 % | DIASTOLIC BLOOD PRESSURE: 80 MMHG

## 2021-10-19 DIAGNOSIS — F19.20 DRUG DEPENDENCE (HCC): ICD-10-CM

## 2021-10-19 DIAGNOSIS — L08.9 LOCAL INFECTION OF THE SKIN AND SUBCUTANEOUS TISSUE, UNSPECIFIED: ICD-10-CM

## 2021-10-19 DIAGNOSIS — L03.90 CELLULITIS, UNSPECIFIED CELLULITIS SITE: Primary | ICD-10-CM

## 2021-10-19 DIAGNOSIS — F42.4 COMPULSIVE SKIN PICKING: ICD-10-CM

## 2021-10-19 PROCEDURE — S9083 URGENT CARE CENTER GLOBAL: HCPCS | Performed by: PHYSICIAN ASSISTANT

## 2021-10-19 PROCEDURE — G0382 LEV 3 HOSP TYPE B ED VISIT: HCPCS | Performed by: PHYSICIAN ASSISTANT

## 2021-10-19 RX ORDER — CLINDAMYCIN HYDROCHLORIDE 300 MG/1
300 CAPSULE ORAL 3 TIMES DAILY
Qty: 21 CAPSULE | Refills: 0 | Status: SHIPPED | OUTPATIENT
Start: 2021-10-19 | End: 2021-10-26

## 2021-10-20 ENCOUNTER — APPOINTMENT (OUTPATIENT)
Dept: LAB | Facility: MEDICAL CENTER | Age: 56
End: 2021-10-20
Payer: COMMERCIAL

## 2021-10-20 DIAGNOSIS — F11.20 OPIOID TYPE DEPENDENCE, CONTINUOUS (HCC): ICD-10-CM

## 2021-10-20 PROBLEM — F42.4 COMPULSIVE SKIN PICKING: Status: ACTIVE | Noted: 2021-10-20

## 2021-10-20 PROBLEM — F19.20 DRUG DEPENDENCE (HCC): Status: ACTIVE | Noted: 2021-10-20

## 2021-10-20 PROCEDURE — 36415 COLL VENOUS BLD VENIPUNCTURE: CPT

## 2021-10-20 PROCEDURE — 86592 SYPHILIS TEST NON-TREP QUAL: CPT

## 2021-10-21 LAB — RPR SER QL: NORMAL

## 2022-03-04 ENCOUNTER — APPOINTMENT (RX ONLY)
Dept: URBAN - NONMETROPOLITAN AREA CLINIC 4 | Facility: CLINIC | Age: 57
Setting detail: DERMATOLOGY
End: 2022-03-04

## 2022-03-04 DIAGNOSIS — L28.1 PRURIGO NODULARIS: ICD-10-CM

## 2022-03-04 PROBLEM — C44.629 SQUAMOUS CELL CARCINOMA OF SKIN OF LEFT UPPER LIMB, INCLUDING SHOULDER: Status: ACTIVE | Noted: 2022-03-04

## 2022-03-04 PROCEDURE — ? TREATMENT REGIMEN

## 2022-03-04 PROCEDURE — ? DEFER

## 2022-03-04 PROCEDURE — ? PRESCRIPTION

## 2022-03-04 PROCEDURE — ? MEDICATION COUNSELING

## 2022-03-04 PROCEDURE — ? COUNSELING

## 2022-03-04 PROCEDURE — 99213 OFFICE O/P EST LOW 20 MIN: CPT

## 2022-03-04 RX ORDER — CLOBETASOL PROPIONATE 0.5 MG/G
0.05% OINTMENT TOPICAL BID
Qty: 1 | Refills: 3 | Status: ERX | COMMUNITY
Start: 2022-03-04

## 2022-03-04 RX ORDER — DOXYCYCLINE HYCLATE 100 MG/1
100 MG CAPSULE, GELATIN COATED ORAL BID
Qty: 60 | Refills: 2 | Status: ERX | COMMUNITY
Start: 2022-03-04

## 2022-03-04 RX ADMIN — DOXYCYCLINE HYCLATE 100 MG: 100 CAPSULE, GELATIN COATED ORAL at 00:00

## 2022-03-04 RX ADMIN — CLOBETASOL PROPIONATE 0.05%: 0.5 OINTMENT TOPICAL at 00:00

## 2022-03-04 ASSESSMENT — LOCATION DETAILED DESCRIPTION DERM
LOCATION DETAILED: RIGHT PROXIMAL PRETIBIAL REGION
LOCATION DETAILED: RIGHT ULNAR DORSAL HAND
LOCATION DETAILED: LEFT RADIAL DORSAL HAND
LOCATION DETAILED: LEFT PROXIMAL PRETIBIAL REGION

## 2022-03-04 ASSESSMENT — LOCATION SIMPLE DESCRIPTION DERM
LOCATION SIMPLE: LEFT PRETIBIAL REGION
LOCATION SIMPLE: RIGHT PRETIBIAL REGION
LOCATION SIMPLE: LEFT HAND
LOCATION SIMPLE: RIGHT HAND

## 2022-03-04 ASSESSMENT — LOCATION ZONE DERM
LOCATION ZONE: HAND
LOCATION ZONE: LEG

## 2022-03-04 NOTE — PROCEDURE: MEDICATION COUNSELING
Xelquyenz Pregnancy And Lactation Text: This medication is Pregnancy Category D and is not considered safe during pregnancy.  The risk during breast feeding is also uncertain.

## 2022-03-04 NOTE — PROCEDURE: DEFER
Introduction Text (Please End With A Colon): The following procedure was deferred:excision
Procedure To Be Performed At Next Visit: Excision
Detail Level: Zone

## 2022-03-17 ENCOUNTER — APPOINTMENT (RX ONLY)
Dept: URBAN - NONMETROPOLITAN AREA CLINIC 4 | Facility: CLINIC | Age: 57
Setting detail: DERMATOLOGY
End: 2022-03-17

## 2022-03-17 DIAGNOSIS — Z85.828 PERSONAL HISTORY OF OTHER MALIGNANT NEOPLASM OF SKIN: ICD-10-CM

## 2022-03-17 DIAGNOSIS — L20.89 OTHER ATOPIC DERMATITIS: ICD-10-CM | Status: INADEQUATELY CONTROLLED

## 2022-03-17 PROCEDURE — ? TREATMENT REGIMEN

## 2022-03-17 PROCEDURE — ? DUPIXENT INITIATION

## 2022-03-17 PROCEDURE — 99214 OFFICE O/P EST MOD 30 MIN: CPT

## 2022-03-17 PROCEDURE — ? COUNSELING

## 2022-03-17 ASSESSMENT — LOCATION DETAILED DESCRIPTION DERM
LOCATION DETAILED: RIGHT VENTRAL PROXIMAL FOREARM
LOCATION DETAILED: LEFT PROXIMAL DORSAL FOREARM
LOCATION DETAILED: RIGHT PROXIMAL PRETIBIAL REGION
LOCATION DETAILED: LEFT ANTERIOR DISTAL UPPER ARM
LOCATION DETAILED: LEFT PROXIMAL PRETIBIAL REGION
LOCATION DETAILED: RIGHT ANTERIOR DISTAL UPPER ARM
LOCATION DETAILED: RIGHT DISTAL POSTERIOR UPPER ARM
LOCATION DETAILED: RIGHT DISTAL PRETIBIAL REGION
LOCATION DETAILED: LEFT VENTRAL PROXIMAL FOREARM
LOCATION DETAILED: LEFT DISTAL PRETIBIAL REGION
LOCATION DETAILED: RIGHT PROXIMAL RADIAL DORSAL FOREARM
LOCATION DETAILED: LEFT PROXIMAL POSTERIOR UPPER ARM
LOCATION DETAILED: LEFT DISTAL DORSAL FOREARM

## 2022-03-17 ASSESSMENT — LOCATION SIMPLE DESCRIPTION DERM
LOCATION SIMPLE: LEFT UPPER ARM
LOCATION SIMPLE: RIGHT UPPER ARM
LOCATION SIMPLE: RIGHT PRETIBIAL REGION
LOCATION SIMPLE: LEFT FOREARM
LOCATION SIMPLE: LEFT PRETIBIAL REGION
LOCATION SIMPLE: RIGHT FOREARM

## 2022-03-17 ASSESSMENT — SEVERITY ASSESSMENT 2020: SEVERITY 2020: SEVERE

## 2022-03-17 ASSESSMENT — LOCATION ZONE DERM
LOCATION ZONE: LEG
LOCATION ZONE: ARM

## 2022-03-17 ASSESSMENT — BSA ECZEMA: % BODY COVERED IN ECZEMA: 15

## 2022-03-17 NOTE — PROCEDURE: TREATMENT REGIMEN
Initiate Treatment: Resolved, no further tx neccessary per HM.  Daily sunscreen SPF 30+ Neutrogena
Detail Level: Zone
Continue Regimen: Clobetasol ointment and Doxycycline.
Plan: Information on Dupixent given to patient as well as the co-pay card.  Patient signed application.  Process will be started to get prior authorization for Dupixent.

## 2022-03-17 NOTE — PROCEDURE: COUNSELING
Patient Specific Counseling (Will Not Stick From Patient To Patient): Do Not Pick or Scratch!!
Detail Level: Zone

## 2022-03-17 NOTE — PROCEDURE: DUPIXENT INITIATION
Is Methotrexate Contraindicated?: Yes
Dupixent Dosing: 600 mg SC day 0 then 300 mg SC every other week
Diagnosis (Required): Atopic Dermatitis/Eczematous Dermatitis
Detail Level: Zone
Is Phototherapy Contraindicated?: No
Pregnancy And Lactation Warning Text: There have not been adverse fetal risks in women taking Dupixent while pregnant. It is unknown if this medication is excreted in breast milk.
Dupixent Monitoring Guidelines: There is no laboratory monitoring requirement with Dupixent.

## 2022-04-28 ENCOUNTER — APPOINTMENT (RX ONLY)
Dept: URBAN - NONMETROPOLITAN AREA CLINIC 4 | Facility: CLINIC | Age: 57
Setting detail: DERMATOLOGY
End: 2022-04-28

## 2022-04-28 ENCOUNTER — RX ONLY (OUTPATIENT)
Age: 57
Setting detail: RX ONLY
End: 2022-04-28

## 2022-04-28 DIAGNOSIS — L20.89 OTHER ATOPIC DERMATITIS: ICD-10-CM

## 2022-04-28 PROCEDURE — ? DUPIXENT INJECTION

## 2022-04-28 PROCEDURE — 96372 THER/PROPH/DIAG INJ SC/IM: CPT

## 2022-04-28 RX ORDER — CLOBETASOL PROPIONATE 0.5 MG/G
0.05% OINTMENT TOPICAL BID
Qty: 1 | Refills: 3 | Status: ERX

## 2022-04-28 ASSESSMENT — LOCATION DETAILED DESCRIPTION DERM
LOCATION DETAILED: LEFT ANTERIOR PROXIMAL THIGH
LOCATION DETAILED: RIGHT ANTERIOR PROXIMAL THIGH

## 2022-04-28 ASSESSMENT — LOCATION SIMPLE DESCRIPTION DERM
LOCATION SIMPLE: RIGHT THIGH
LOCATION SIMPLE: LEFT THIGH

## 2022-04-28 ASSESSMENT — SEVERITY ASSESSMENT 2020: SEVERITY 2020: MILD

## 2022-04-28 ASSESSMENT — LOCATION ZONE DERM: LOCATION ZONE: LEG

## 2022-05-14 ENCOUNTER — HOSPITAL ENCOUNTER (EMERGENCY)
Facility: HOSPITAL | Age: 57
Discharge: HOME | End: 2022-05-14
Attending: EMERGENCY MEDICINE | Admitting: EMERGENCY MEDICINE
Payer: COMMERCIAL

## 2022-05-14 ENCOUNTER — APPOINTMENT (EMERGENCY)
Dept: RADIOLOGY | Facility: HOSPITAL | Age: 57
End: 2022-05-14
Payer: COMMERCIAL

## 2022-05-14 VITALS
RESPIRATION RATE: 26 BRPM | BODY MASS INDEX: 26.3 KG/M2 | TEMPERATURE: 97.5 F | OXYGEN SATURATION: 96 % | SYSTOLIC BLOOD PRESSURE: 182 MMHG | HEIGHT: 65 IN | WEIGHT: 157.85 LBS | HEART RATE: 70 BPM | DIASTOLIC BLOOD PRESSURE: 95 MMHG

## 2022-05-14 DIAGNOSIS — R11.10 VOMITING: ICD-10-CM

## 2022-05-14 DIAGNOSIS — Z53.29 LEFT AGAINST MEDICAL ADVICE: Primary | ICD-10-CM

## 2022-05-14 LAB
FLUAV RNA RESP QL NAA+PROBE: NEGATIVE
FLUBV RNA RESP QL NAA+PROBE: NEGATIVE
RSV RNA RESP QL NAA+PROBE: NEGATIVE
SARS-COV-2 RNA RESP QL NAA+PROBE: NEGATIVE

## 2022-05-14 PROCEDURE — 93005 ELECTROCARDIOGRAM TRACING: CPT

## 2022-05-14 PROCEDURE — 99285 EMERGENCY DEPT VISIT HI MDM: CPT | Performed by: PHYSICIAN ASSISTANT

## 2022-05-14 PROCEDURE — 99284 EMERGENCY DEPT VISIT MOD MDM: CPT

## 2022-05-14 PROCEDURE — 71045 X-RAY EXAM CHEST 1 VIEW: CPT

## 2022-05-14 PROCEDURE — 0241U HB NFCT DS VIR RESP RNA 4 TRGT: CPT | Performed by: PHYSICIAN ASSISTANT

## 2022-05-14 NOTE — ED PROVIDER NOTES
History  Chief Complaint   Patient presents with    Dehydration     Pt not eating or drinking  Initially refusing treatment  After mom came into room, pt agreeable to treatment     Patient presents to the emergency department today for evaluation of vomiting will weakness dehydration  Patient presents via emergency medical services with a initially saw the patient at bedside he was stating he feels sick but was refusing any treatment  Did not want to further discuss his symptomatology but denied homicidal or suicidal ideations he simply wanted to be discharged home  Thankfully patient is 65-year-old mother arrive day and was able to provide more history  She states the patient lives at home with her  History of kidney stones and renal disease in the past that has resolved  He has history of hypertension that has resolved  Over last few days he has had nausea vomiting and she feels though he is dehydrated she is requesting fluids which is reasonable  Patient is reluctant on having a full workup completed at this point but he is agreeable to some basic labs and fluids  Prior to Admission Medications   Prescriptions Last Dose Informant Patient Reported? Taking?    amLODIPine (NORVASC) 10 mg tablet   No No   Sig: Take 1 tablet (10 mg total) by mouth daily   amitriptyline (ELAVIL) 25 mg tablet   No No   Sig: Take 1 tablet (25 mg total) by mouth daily at bedtime   methocarbamol (ROBAXIN) 750 mg tablet   No No   Sig: Take 1 tablet (750 mg total) by mouth every 8 (eight) hours   risperiDONE (RisperDAL) 1 mg tablet   No No   Sig: Take 1 tablet (1 mg total) by mouth 2 (two) times a day   sildenafil (REVATIO) 20 mg tablet   No No   Sig: Take 1 tablet (20 mg total) by mouth daily      Facility-Administered Medications: None       Past Medical History:   Diagnosis Date    Anxiety     Bright red rectal bleeding     Last Assessed: 9/9/2015     Hypertension     Last Assessed: 7/9/2014     Kidney stones Last Assessed: 11/17/2016     Periorbital edema     Last Assessed: 9/4/2015    Skin tag of anus     Last Assessed: 9/9/2015     Trigger point of thoracic region     Last Assessed: 11/6/2015        Past Surgical History:   Procedure Laterality Date    CYSTOSCOPY W/ URETERAL STENT PLACEMENT  03/11/2013    CYSTOSCOPY W/ URETERAL STENT PLACEMENT  06/18/2014    EXTRACORPOREAL SHOCK WAVE LITHOTRIPSY     CYSTOSCOPY W/ URETERAL STENT PLACEMENT  07/16/2014    EXTRACORPOREAL SHOCK WAVE LITHOTRIPSY     CYSTOSCOPY W/ URETERAL STENT REMOVAL  04/11/2013    CYSTOSCOPY W/ URETERAL STENT REMOVAL Right 08/26/2014    CYSTOSCOPY W/ URETEROSCOPY W/ LITHOTRIPSY  02/26/2013    Percutaneous lithotomy With Uretal Stent Plcement     CYSTOSCOPY W/ URETEROSCOPY W/ LITHOTRIPSY  03/11/2014    CYSTOSCOPY W/ URETEROSCOPY W/ LITHOTRIPSY Right 08/04/2014    With Uretal Stent Placement     CYSTOSCOPY W/ URETEROSCOPY W/ LITHOTRIPSY Right 05/09/2016    HEMORRHOID SURGERY      HERNIA REPAIR  03/11/2013    KIDNEY SURGERY      LITHOTRIPSY      AL CYSTO/URETERO W/LITHOTRIPSY &INDWELL STENT INSRT Right 7/13/2016    Procedure: CYSTOSCOPY; URETEROSCOPY WITH HOLMIUM LASER STONE EXTRACTION; RETROGRADE PYELOGRAM; URETERAL STENT INSERTION ;  Surgeon: Jeffrey Mello MD;  [facility];  Service: Urology    AL CYSTO/URETERO W/LITHOTRIPSY &INDWELL STENT INSRT Right 5/9/2016    Procedure: CYSTOSCOPY,  URETEROSCOPY,  WITH LITHOTRIPSY HOLMIUM LASER, STONE EXTRACTION, AND INSERTION STENT URETERAL;  Surgeon: Jeffrey Mello MD;  Location[facility];  Service: Urology    Bhavik ESWL Right 6/17/2016    Procedure: Marcelo Webster SHOCKWAVE (ESWL);   Surgeon: Jeffrey Mello MD;  Location: [facility];  Service: Urology    TRANSURETHRAL RESECTION OF BLADDER      URETERAL Eli Harder  03/11/2013       Family History   Problem Relation Age of Onset    COPD Mother     Hypertension Mother     Heart attack Father I have reviewed and agree with the history as documented  E-Cigarette/Vaping    E-Cigarette Use Never User      E-Cigarette/Vaping Substances     Social History     Tobacco Use    Smoking status: Current Every Day Smoker     Packs/day: 2 00     Years: 35 00     Pack years: 70 00    Smokeless tobacco: Never Used   Vaping Use    Vaping Use: Never used   Substance Use Topics    Alcohol use: No    Drug use: Yes     Types: Heroin     Comment: 20-30 bags of heroin daily       Review of Systems   Unable to perform ROS: Other (Patient not cooperative)   Gastrointestinal: Positive for nausea and vomiting  Physical Exam  Physical Exam  Vitals reviewed  Constitutional:       General: He is not in acute distress  Appearance: Normal appearance  He is not ill-appearing, toxic-appearing or diaphoretic  Comments: Dry mucous membranes   HENT:      Head: Normocephalic and atraumatic  Right Ear: External ear normal       Left Ear: External ear normal    Eyes:      General: No scleral icterus  Right eye: No discharge  Left eye: No discharge  Extraocular Movements: Extraocular movements intact  Conjunctiva/sclera: Conjunctivae normal       Comments: Right scleral injection noted   Cardiovascular:      Rate and Rhythm: Normal rate  Pulses: Normal pulses  Pulmonary:      Effort: Pulmonary effort is normal  No respiratory distress  Breath sounds: No stridor  Musculoskeletal:         General: No deformity or signs of injury  Cervical back: Normal range of motion  No rigidity  Skin:     General: Skin is warm  Coloration: Skin is not jaundiced  Findings: No lesion or rash  Neurological:      General: No focal deficit present  Mental Status: He is alert and oriented to person, place, and time  Mental status is at baseline  Gait: Gait normal    Psychiatric:         Mood and Affect: Mood normal          Thought Content:  Thought content normal  Judgment: Judgment normal          Vital Signs  ED Triage Vitals [05/14/22 1945]   Temperature Pulse Respirations Blood Pressure SpO2   97 5 °F (36 4 °C) 70 (!) 26 (!) 182/95 96 %      Temp Source Heart Rate Source Patient Position - Orthostatic VS BP Location FiO2 (%)   Temporal -- -- -- --      Pain Score       --           Vitals:    05/14/22 1945   BP: (!) 182/95   Pulse: 70         Visual Acuity      ED Medications  Medications - No data to display    Diagnostic Studies  Results Reviewed     Procedure Component Value Units Date/Time    COVID/FLU/RSV - 2 hour TAT [[id number]] Collected: 05/14/22 1946    Lab Status: In process Specimen: Nares from Nose Updated: 05/14/22 1950                 XR chest 1 view portable   ED Interpretation by Arjun De La Rosa PA-C (05/14 1933)   No acute cardiopulmonary process noted  Procedures  ECG 12 Lead Documentation Only    Date/Time: 5/14/2022 7:56 PM  Performed by: Arjun De La Rosa PA-C  Authorized by: Arjun De La Rosa PA-C     Indications / Diagnosis:  Weakness vomiting  ECG reviewed by me, the ED Provider: yes    Patient location:  ED  Previous ECG:     Previous ECG:  Compared to current    Comparison ECG info:  No change from 2021    Similarity:  No change    Comparison to cardiac monitor: Yes    Interpretation:     Interpretation: non-specific    Rate:     ECG rate:  62    ECG rate assessment: normal    Rhythm:     Rhythm: sinus rhythm    Ectopy:     Ectopy: none    Conduction:     Conduction: abnormal      Abnormal conduction: complete RBBB    ST segments:     ST segments:  Non-specific  T waves:     T waves: inverted      Inverted:  V3             ED Course  ED Course as of 05/14/22 2004   Sat May 14, 2022   1953 Blood Pressure(!): 182/95   1953 Temperature: 97 5 °F (36 4 °C)   1953 Pulse: 70   1953 Respirations(!): 26   1953 SpO2: 96 %   1957 Patient now refusing any intravenous lab work or fluid resuscitation     2002 I had extensive conversation with the patient as well as the patient's mother  Myself and the patient's mother in agreement he should have some basic lab work and fluids  Patient is adamantly refusing  He is alert orient x4 he is not homicidal suicidal or experiencing any delusions therefore I cannot proceed without his consent  He will be discharged against medical advice  MDM    Disposition  Final diagnoses:   Left against medical advice   Vomiting     Time reflects when diagnosis was documented in both MDM as applicable and the Disposition within this note     Time User Action Codes Description Comment    5/14/2022  8:04 PM Debbie Canid Add [Z53 29] Left against medical advice     5/14/2022  8:04 PM Debbie Connors Add [R11 10] Vomiting       ED Disposition     ED Disposition   AMA    Condition   --    Date/Time   Sat May 14, 2022  8:04 PM    Comment   Date: 5/14/2022  Patient: Shaila Rodriguez  Admitted: 5/14/2022  6:46 PM  Attending Provider: Laurita Homans, MD    Shaila Rodriguez or his authorized caregiver has made the decision for the patient to leave the emergency department against  the advice of the emergency department staff  He or his authorized caregiver has been informed and understands the inherent risks, includin death, dehydration, electrolyte abnormalities resulting in cardiac arrhythmia is seizure, coma, death     He or  his authorized caregiver has decided to accept the responsibility for this decision  Shaila Rodriguez and all necessary parties have been advised that he may return for further evaluation or treatment  His condition at time of discharge was stabl e    Shaila Rodriguez had current vital signs as follows:  BP (!) 182/95   Pulse 70   Temp 97 5 °F (36 4 °C) (Temporal)   Resp (!) 26   Ht 5' 5" (1 651 m)   Wt 71 6 kg (157 lb 13 6 oz)            Follow-up Information    None         Patient's Medications   Discharge Prescriptions No medications on file       No discharge procedures on file      PDMP Review       Value Time User    PDMP Reviewed  Yes 1/4/2021  4:12 PM Earnest Haile DO          ED Provider  Electronically Signed by           Shlomo Anaya PA-C  05/14/22 2004

## 2022-05-14 NOTE — ED NOTES
Upon registration entering room and asking for verbal consent for treatment, patient said "no, I don't want to be treated, I Kierraon Lamar go home"   Patient alert and oriented upon arrival       Johny Mejia RN  05/14/22 [id number]

## 2022-05-14 NOTE — ED NOTES
While attempting to start an IV on the patient, patient pulled arm away and attempted to push my hand away  Patient instructed to keep his arm still for an IV  Patient also instructed by his mother to not move and get the IV  Patient continues to refuse  Patient remains A&Ox3  Provider aware that patient refused lab work and an IV        Abe Drummond RN  05/14/22 1956

## 2022-05-15 NOTE — ED NOTES
Patient signed AMA form  Mother at the bedside to take patient home  Patient and mother educated by provider of the risks of signing out against medical advice  Patient A&O x3        Laura Drummond RN  05/14/22 2013

## 2022-05-16 LAB
ATRIAL RATE: 62 BPM
P AXIS: 63 DEGREES
PR INTERVAL: 112 MS
QRS AXIS: 8 DEGREES
QRSD INTERVAL: 148 MS
QT INTERVAL: 454 MS
QTC INTERVAL: 460 MS
T WAVE AXIS: 63 DEGREES
VENTRICULAR RATE: 62 BPM

## 2022-05-16 PROCEDURE — 93010 ELECTROCARDIOGRAM REPORT: CPT | Performed by: INTERNAL MEDICINE

## 2022-07-31 ENCOUNTER — APPOINTMENT (EMERGENCY)
Dept: CT IMAGING | Facility: HOSPITAL | Age: 57
End: 2022-07-31
Payer: COMMERCIAL

## 2022-07-31 ENCOUNTER — HOSPITAL ENCOUNTER (EMERGENCY)
Facility: HOSPITAL | Age: 57
Discharge: HOME | End: 2022-07-31
Attending: EMERGENCY MEDICINE
Payer: COMMERCIAL

## 2022-07-31 ENCOUNTER — APPOINTMENT (EMERGENCY)
Dept: RADIOLOGY | Facility: HOSPITAL | Age: 57
End: 2022-07-31
Payer: COMMERCIAL

## 2022-07-31 VITALS
TEMPERATURE: 98.2 F | SYSTOLIC BLOOD PRESSURE: 143 MMHG | RESPIRATION RATE: 19 BRPM | OXYGEN SATURATION: 97 % | DIASTOLIC BLOOD PRESSURE: 65 MMHG | HEART RATE: 78 BPM

## 2022-07-31 DIAGNOSIS — L98.499: ICD-10-CM

## 2022-07-31 DIAGNOSIS — R41.82 ALTERED MENTAL STATUS: Primary | ICD-10-CM

## 2022-07-31 LAB
2HR DELTA HS TROPONIN: 0 NG/L
ALBUMIN SERPL BCP-MCNC: 3.5 G/DL (ref 3.5–5)
ALP SERPL-CCNC: 105 U/L (ref 46–116)
ALT SERPL W P-5'-P-CCNC: 22 U/L (ref 12–78)
AMPHETAMINES SERPL QL SCN: POSITIVE
ANION GAP SERPL CALCULATED.3IONS-SCNC: 9 MMOL/L (ref 4–13)
APAP SERPL-MCNC: <2 UG/ML (ref 10–20)
AST SERPL W P-5'-P-CCNC: 40 U/L (ref 5–45)
BACTERIA UR QL AUTO: ABNORMAL /HPF
BARBITURATES UR QL: NEGATIVE
BASOPHILS # BLD AUTO: 0.06 THOUSANDS/ΜL (ref 0–0.1)
BASOPHILS NFR BLD AUTO: 1 % (ref 0–1)
BENZODIAZ UR QL: POSITIVE
BILIRUB SERPL-MCNC: 0.55 MG/DL (ref 0.2–1)
BILIRUB UR QL STRIP: NEGATIVE
BUN SERPL-MCNC: 7 MG/DL (ref 5–25)
CALCIUM SERPL-MCNC: 9.7 MG/DL (ref 8.3–10.1)
CARDIAC TROPONIN I PNL SERPL HS: 9 NG/L
CARDIAC TROPONIN I PNL SERPL HS: 9 NG/L
CHLORIDE SERPL-SCNC: 102 MMOL/L (ref 96–108)
CLARITY UR: ABNORMAL
CO2 SERPL-SCNC: 30 MMOL/L (ref 21–32)
COCAINE UR QL: NEGATIVE
COLOR UR: YELLOW
CREAT SERPL-MCNC: 0.82 MG/DL (ref 0.6–1.3)
CRP SERPL QL: 11.8 MG/L
EOSINOPHIL # BLD AUTO: 0.22 THOUSAND/ΜL (ref 0–0.61)
EOSINOPHIL NFR BLD AUTO: 2 % (ref 0–6)
ERYTHROCYTE [DISTWIDTH] IN BLOOD BY AUTOMATED COUNT: 14 % (ref 11.6–15.1)
ERYTHROCYTE [SEDIMENTATION RATE] IN BLOOD: 34 MM/HOUR (ref 0–19)
ETHANOL SERPL-MCNC: <3 MG/DL (ref 0–3)
FLUAV RNA RESP QL NAA+PROBE: NEGATIVE
FLUBV RNA RESP QL NAA+PROBE: NEGATIVE
GFR SERPL CREATININE-BSD FRML MDRD: 98 ML/MIN/1.73SQ M
GLUCOSE SERPL-MCNC: 100 MG/DL (ref 65–140)
GLUCOSE UR STRIP-MCNC: NEGATIVE MG/DL
HCT VFR BLD AUTO: 44.2 % (ref 36.5–49.3)
HGB BLD-MCNC: 14.4 G/DL (ref 12–17)
HGB UR QL STRIP.AUTO: NEGATIVE
IMM GRANULOCYTES # BLD AUTO: 0.14 THOUSAND/UL (ref 0–0.2)
IMM GRANULOCYTES NFR BLD AUTO: 1 % (ref 0–2)
KETONES UR STRIP-MCNC: NEGATIVE MG/DL
LEUKOCYTE ESTERASE UR QL STRIP: ABNORMAL
LYMPHOCYTES # BLD AUTO: 1.33 THOUSANDS/ΜL (ref 0.6–4.47)
LYMPHOCYTES NFR BLD AUTO: 13 % (ref 14–44)
MCH RBC QN AUTO: 28.9 PG (ref 26.8–34.3)
MCHC RBC AUTO-ENTMCNC: 32.6 G/DL (ref 31.4–37.4)
MCV RBC AUTO: 89 FL (ref 82–98)
METHADONE UR QL: NEGATIVE
MONOCYTES # BLD AUTO: 0.57 THOUSAND/ΜL (ref 0.17–1.22)
MONOCYTES NFR BLD AUTO: 6 % (ref 4–12)
NEUTROPHILS # BLD AUTO: 7.85 THOUSANDS/ΜL (ref 1.85–7.62)
NEUTS SEG NFR BLD AUTO: 77 % (ref 43–75)
NITRITE UR QL STRIP: NEGATIVE
NON-SQ EPI CELLS URNS QL MICRO: ABNORMAL /HPF
NRBC BLD AUTO-RTO: 0 /100 WBCS
OPIATES UR QL SCN: NEGATIVE
OXYCODONE+OXYMORPHONE UR QL SCN: NEGATIVE
PCP UR QL: NEGATIVE
PH UR STRIP.AUTO: 7.5 [PH]
PLATELET # BLD AUTO: 270 THOUSANDS/UL (ref 149–390)
PMV BLD AUTO: 9.7 FL (ref 8.9–12.7)
POTASSIUM SERPL-SCNC: 4.7 MMOL/L (ref 3.5–5.3)
PROT SERPL-MCNC: 7.9 G/DL (ref 6.4–8.4)
PROT UR STRIP-MCNC: NEGATIVE MG/DL
RBC # BLD AUTO: 4.99 MILLION/UL (ref 3.88–5.62)
RBC #/AREA URNS AUTO: ABNORMAL /HPF
RSV RNA RESP QL NAA+PROBE: NEGATIVE
SALICYLATES SERPL-MCNC: <3 MG/DL (ref 3–20)
SARS-COV-2 RNA RESP QL NAA+PROBE: NEGATIVE
SODIUM SERPL-SCNC: 141 MMOL/L (ref 135–147)
SP GR UR STRIP.AUTO: 1.01 (ref 1–1.03)
THC UR QL: NEGATIVE
TSH SERPL DL<=0.05 MIU/L-ACNC: 0.49 UIU/ML (ref 0.45–4.5)
UROBILINOGEN UR QL STRIP.AUTO: 0.2 E.U./DL
WBC # BLD AUTO: 10.17 THOUSAND/UL (ref 4.31–10.16)
WBC #/AREA URNS AUTO: ABNORMAL /HPF

## 2022-07-31 PROCEDURE — 80179 DRUG ASSAY SALICYLATE: CPT | Performed by: EMERGENCY MEDICINE

## 2022-07-31 PROCEDURE — 73130 X-RAY EXAM OF HAND: CPT

## 2022-07-31 PROCEDURE — 84484 ASSAY OF TROPONIN QUANT: CPT | Performed by: EMERGENCY MEDICINE

## 2022-07-31 PROCEDURE — 80307 DRUG TEST PRSMV CHEM ANLYZR: CPT | Performed by: EMERGENCY MEDICINE

## 2022-07-31 PROCEDURE — 81001 URINALYSIS AUTO W/SCOPE: CPT | Performed by: EMERGENCY MEDICINE

## 2022-07-31 PROCEDURE — 70450 CT HEAD/BRAIN W/O DYE: CPT

## 2022-07-31 PROCEDURE — 86140 C-REACTIVE PROTEIN: CPT | Performed by: EMERGENCY MEDICINE

## 2022-07-31 PROCEDURE — 85652 RBC SED RATE AUTOMATED: CPT | Performed by: EMERGENCY MEDICINE

## 2022-07-31 PROCEDURE — 99285 EMERGENCY DEPT VISIT HI MDM: CPT | Performed by: EMERGENCY MEDICINE

## 2022-07-31 PROCEDURE — 80053 COMPREHEN METABOLIC PANEL: CPT | Performed by: EMERGENCY MEDICINE

## 2022-07-31 PROCEDURE — G1004 CDSM NDSC: HCPCS

## 2022-07-31 PROCEDURE — 99285 EMERGENCY DEPT VISIT HI MDM: CPT

## 2022-07-31 PROCEDURE — 82077 ASSAY SPEC XCP UR&BREATH IA: CPT | Performed by: EMERGENCY MEDICINE

## 2022-07-31 PROCEDURE — 0241U HB NFCT DS VIR RESP RNA 4 TRGT: CPT | Performed by: EMERGENCY MEDICINE

## 2022-07-31 PROCEDURE — 84443 ASSAY THYROID STIM HORMONE: CPT | Performed by: EMERGENCY MEDICINE

## 2022-07-31 PROCEDURE — 36415 COLL VENOUS BLD VENIPUNCTURE: CPT | Performed by: EMERGENCY MEDICINE

## 2022-07-31 PROCEDURE — 80143 DRUG ASSAY ACETAMINOPHEN: CPT | Performed by: EMERGENCY MEDICINE

## 2022-07-31 PROCEDURE — 71045 X-RAY EXAM CHEST 1 VIEW: CPT

## 2022-07-31 PROCEDURE — 96360 HYDRATION IV INFUSION INIT: CPT

## 2022-07-31 PROCEDURE — 85025 COMPLETE CBC W/AUTO DIFF WBC: CPT | Performed by: EMERGENCY MEDICINE

## 2022-07-31 PROCEDURE — 96361 HYDRATE IV INFUSION ADD-ON: CPT

## 2022-07-31 RX ORDER — CEPHALEXIN 250 MG/1
500 CAPSULE ORAL ONCE
Status: COMPLETED | OUTPATIENT
Start: 2022-07-31 | End: 2022-07-31

## 2022-07-31 RX ORDER — DOXYCYCLINE HYCLATE 100 MG/1
100 CAPSULE ORAL ONCE
Status: COMPLETED | OUTPATIENT
Start: 2022-07-31 | End: 2022-07-31

## 2022-07-31 RX ORDER — DOXYCYCLINE HYCLATE 100 MG/1
100 CAPSULE ORAL 2 TIMES DAILY
Qty: 14 CAPSULE | Refills: 0 | Status: SHIPPED | OUTPATIENT
Start: 2022-07-31 | End: 2022-08-07

## 2022-07-31 RX ORDER — CEPHALEXIN 500 MG/1
500 CAPSULE ORAL EVERY 6 HOURS SCHEDULED
Qty: 28 CAPSULE | Refills: 0 | Status: SHIPPED | OUTPATIENT
Start: 2022-07-31 | End: 2022-08-07

## 2022-07-31 RX ADMIN — CEPHALEXIN 500 MG: 250 CAPSULE ORAL at 16:43

## 2022-07-31 RX ADMIN — DOXYCYCLINE HYCLATE 100 MG: 100 CAPSULE ORAL at 16:43

## 2022-07-31 RX ADMIN — SODIUM CHLORIDE 1000 ML: 0.9 INJECTION, SOLUTION INTRAVENOUS at 12:58

## 2022-07-31 NOTE — ED NOTES
Pt requests for his mother to be called because he is "leaving whether you like it or not "     Juan Cochran  07/31/22 [id number]
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
## 2022-07-31 NOTE — DISCHARGE INSTRUCTIONS
Please follow up with wound care for your chronic skin ulcers  I did not identify osteomyelitis on your X-rays, I did offer you IV antibiotics for your wounds and admission  At this time you have declined  Return if your symptoms change or worsen  If you have any thoughts of hurting yourself or anyone else, please return immediately  Thank you

## 2022-08-11 ENCOUNTER — OFFICE VISIT (OUTPATIENT)
Dept: URGENT CARE | Facility: MEDICAL CENTER | Age: 57
End: 2022-08-11
Payer: COMMERCIAL

## 2022-08-11 VITALS
OXYGEN SATURATION: 99 % | RESPIRATION RATE: 18 BRPM | TEMPERATURE: 98.3 F | HEART RATE: 89 BPM | BODY MASS INDEX: 26.16 KG/M2 | HEIGHT: 65 IN | WEIGHT: 157 LBS

## 2022-08-11 DIAGNOSIS — L98.9 SKIN LESIONS: Primary | ICD-10-CM

## 2022-08-11 PROCEDURE — 99214 OFFICE O/P EST MOD 30 MIN: CPT | Performed by: PHYSICIAN ASSISTANT

## 2022-08-11 RX ORDER — CLINDAMYCIN HYDROCHLORIDE 300 MG/1
300 CAPSULE ORAL 3 TIMES DAILY
Qty: 21 CAPSULE | Refills: 0 | Status: SHIPPED | OUTPATIENT
Start: 2022-08-11 | End: 2022-08-18

## 2022-08-11 NOTE — PATIENT INSTRUCTIONS
Clindamycin as prescribed  Keep clean with soap and water, apply topical antibiotic ointment and change bandage twice per day  Follow up with PCP in 3-5 days  Proceed to  ER if symptoms worsen  Eat yogurt with live and active cultures and/or take a probiotic at least 3 hours before or after antibiotic dose  Monitor stool for diarrhea and/or blood  If this occurs, contact primary care doctor ASAP

## 2022-08-11 NOTE — PROGRESS NOTES
[address] [facility]        NAME: Sabina Stafford is a 61 y o  male  : 1965    MRN: [id number]  DATE: 2022  TIME: 3:17 PM    Assessment and Plan   Skin lesions [L98 9]  1  Skin lesions  Ambulatory Referral to Dermatology    clindamycin (CLEOCIN) 300 MG capsule         Patient Instructions     Clindamycin as prescribed  Keep clean with soap and water, apply topical antibiotic ointment and change bandage twice per day  Follow up with PCP in 3-5 days  Proceed to  ER if symptoms worsen  Eat yogurt with live and active cultures and/or take a probiotic at least 3 hours before or after antibiotic dose  Monitor stool for diarrhea and/or blood  If this occurs, contact primary care doctor ASAP  Chief Complaint     Chief Complaint   Patient presents with    wound on hands      To b/l hands has had them on his hands x 7months  and kept picking at them , was put on cephalexin 500mg  on  and doxycycline on          History of Present Illness       Patient has never seen a dermatologist      Rash  This is a chronic problem  The current episode started more than 1 year ago  The problem has been waxing and waning since onset  Location: UE  The rash is characterized by blistering and redness  It is unknown if there was an exposure to a precipitant  Pertinent negatives include no fever  Treatments tried: Keflex and doxycycline on ; abd ointment, bandage  Review of Systems   Review of Systems   Constitutional: Negative for chills and fever  Skin: Positive for color change           Current Medications       Current Outpatient Medications:     clindamycin (CLEOCIN) 300 MG capsule, Take 1 capsule (300 mg total) by mouth 3 (three) times a day for 7 days, Disp: 21 capsule, Rfl: 0    amitriptyline (ELAVIL) 25 mg tablet, Take 1 tablet (25 mg total) by mouth daily at bedtime (Patient not taking: Reported on 2022), Disp: 90 tablet, Rfl: 1    amLODIPine (NORVASC) 10 mg tablet, Take 1 tablet (10 mg total) by mouth daily (Patient not taking: Reported on 8/11/2022), Disp: 90 tablet, Rfl: 3    methocarbamol (ROBAXIN) 750 mg tablet, Take 1 tablet (750 mg total) by mouth every 8 (eight) hours (Patient not taking: Reported on 8/11/2022), Disp: 90 tablet, Rfl: 0    risperiDONE (RisperDAL) 1 mg tablet, Take 1 tablet (1 mg total) by mouth 2 (two) times a day (Patient not taking: Reported on 8/11/2022), Disp: 60 tablet, Rfl: 1    sildenafil (REVATIO) 20 mg tablet, Take 1 tablet (20 mg total) by mouth daily (Patient not taking: Reported on 8/11/2022), Disp: 30 tablet, Rfl: 2    Current Allergies     Allergies as of 08/11/2022 - Reviewed 08/11/2022   Allergen Reaction Noted    Metronidazole Itching and Swelling 04/02/2016    Nsaids GI Intolerance 04/02/2016    Penicillin g  07/14/2020    Penicillins GI Intolerance 04/02/2016    Pollen extract  07/24/2013    Sulfa antibiotics  07/14/2020            The following portions of the patient's history were reviewed and updated as appropriate: allergies, current medications, past family history, past medical history, past social history, past surgical history and problem list      Past Medical History:   Diagnosis Date    Anxiety     Bright red rectal bleeding     Last Assessed: 9/9/2015     Hypertension     Last Assessed: 7/9/2014     Kidney stones     Last Assessed: 11/17/2016     Periorbital edema     Last Assessed: 9/4/2015    Skin tag of anus     Last Assessed: 9/9/2015     Trigger point of thoracic region     Last Assessed: 11/6/2015        Past Surgical History:   Procedure Laterality Date    CYSTOSCOPY W/ URETERAL STENT PLACEMENT  03/11/2013    CYSTOSCOPY W/ URETERAL STENT PLACEMENT  06/18/2014    EXTRACORPOREAL SHOCK WAVE LITHOTRIPSY     CYSTOSCOPY W/ URETERAL STENT PLACEMENT  07/16/2014    EXTRACORPOREAL SHOCK WAVE LITHOTRIPSY     CYSTOSCOPY W/ URETERAL STENT REMOVAL  04/11/2013    CYSTOSCOPY W/ URETERAL STENT REMOVAL Right 08/26/2014  CYSTOSCOPY W/ URETEROSCOPY W/ LITHOTRIPSY  02/26/2013    Percutaneous lithotomy With Uretal Stent Plcement     CYSTOSCOPY W/ URETEROSCOPY W/ LITHOTRIPSY  03/11/2014    CYSTOSCOPY W/ URETEROSCOPY W/ LITHOTRIPSY Right 08/04/2014    With Uretal Stent Placement     CYSTOSCOPY W/ URETEROSCOPY W/ LITHOTRIPSY Right 05/09/2016    HEMORRHOID SURGERY      HERNIA REPAIR  03/11/2013    KIDNEY SURGERY      LITHOTRIPSY      ME CYSTO/URETERO W/LITHOTRIPSY &INDWELL STENT INSRT Right 7/13/2016    Procedure: CYSTOSCOPY; URETEROSCOPY WITH HOLMIUM LASER STONE EXTRACTION; RETROGRADE PYELOGRAM; URETERAL STENT INSERTION ;  Surgeon: Joshua Hernandez MD;  [facility];  Service: Urology    ME CYSTO/URETERO W/LITHOTRIPSY &INDWELL STENT INSRT Right 5/9/2016    Procedure: CYSTOSCOPY,  URETEROSCOPY,  WITH LITHOTRIPSY HOLMIUM LASER, STONE EXTRACTION, AND INSERTION STENT URETERAL;  Surgeon: Joshua Hernandez MD;  [facility];  Service: Urology    [place] ESWL Right 6/17/2016    Procedure: Christiano Farleyas SHOCKWAVE (ESWL); Surgeon: Joshua Hernandez MD;  [facility];  Service: Urology    TRANSURETHRAL RESECTION OF BLADDER      URETERAL [place] [facility]  03/11/2013       Family History   Problem Relation Age of Onset    COPD Mother     Hypertension Mother     Heart attack Father          Medications have been verified  Objective   Pulse 89   Temp 98 3 °F (36 8 °C)   Resp 18   Ht 5' 5" (1 651 m)   Wt 71 2 kg (157 lb)   SpO2 99%   BMI 26 13 kg/m²   No LMP for male patient  Physical Exam     Physical Exam  Constitutional:       General: He is not in acute distress  Appearance: He is well-developed  He is not diaphoretic  Cardiovascular:      Rate and Rhythm: Normal rate and regular rhythm  Heart sounds: Normal heart sounds  No murmur heard  No friction rub  No gallop  Pulmonary:      Effort: Pulmonary effort is normal  No respiratory distress  Breath sounds: Normal breath sounds  No wheezing or rales  Chest:      Chest wall: No tenderness  Lymphadenopathy:      Cervical: No cervical adenopathy  Skin:     General: Skin is warm  Findings: Erythema present  Neurological:      Mental Status: He is alert

## 2022-08-20 ENCOUNTER — APPOINTMENT (EMERGENCY)
Dept: CT IMAGING | Facility: HOSPITAL | Age: 57
End: 2022-08-20
Payer: COMMERCIAL

## 2022-08-20 ENCOUNTER — APPOINTMENT (OUTPATIENT)
Dept: RADIOLOGY | Facility: HOSPITAL | Age: 57
End: 2022-08-20
Payer: COMMERCIAL

## 2022-08-20 ENCOUNTER — HOSPITAL ENCOUNTER (INPATIENT)
Facility: HOSPITAL | Age: 57
LOS: 5 days | Discharge: HOME HEALTH | DRG: 871 | End: 2022-08-25
Attending: INTERNAL MEDICINE | Admitting: INTERNAL MEDICINE
Payer: COMMERCIAL

## 2022-08-20 ENCOUNTER — APPOINTMENT (OUTPATIENT)
Dept: RADIOLOGY | Facility: HOSPITAL | Age: 57
DRG: 871 | End: 2022-08-20
Payer: COMMERCIAL

## 2022-08-20 ENCOUNTER — APPOINTMENT (OUTPATIENT)
Dept: RADIOLOGY | Facility: HOSPITAL | Age: 57
DRG: 871 | End: 2022-08-20
Attending: RADIOLOGY
Payer: COMMERCIAL

## 2022-08-20 ENCOUNTER — ANESTHESIA EVENT (INPATIENT)
Dept: RADIOLOGY | Facility: HOSPITAL | Age: 57
DRG: 871 | End: 2022-08-20
Payer: COMMERCIAL

## 2022-08-20 ENCOUNTER — ANESTHESIA (INPATIENT)
Dept: RADIOLOGY | Facility: HOSPITAL | Age: 57
DRG: 871 | End: 2022-08-20
Payer: COMMERCIAL

## 2022-08-20 ENCOUNTER — HOSPITAL ENCOUNTER (EMERGENCY)
Facility: HOSPITAL | Age: 57
End: 2022-08-20
Attending: EMERGENCY MEDICINE
Payer: COMMERCIAL

## 2022-08-20 VITALS
TEMPERATURE: 97.4 F | RESPIRATION RATE: 14 BRPM | SYSTOLIC BLOOD PRESSURE: 139 MMHG | OXYGEN SATURATION: 97 % | HEART RATE: 102 BPM | DIASTOLIC BLOOD PRESSURE: 62 MMHG

## 2022-08-20 DIAGNOSIS — M62.82 RHABDOMYOLYSIS: ICD-10-CM

## 2022-08-20 DIAGNOSIS — J96.02 ACUTE RESPIRATORY FAILURE WITH HYPOXIA AND HYPERCAPNIA (HCC): Primary | ICD-10-CM

## 2022-08-20 DIAGNOSIS — N17.9 AKI (ACUTE KIDNEY INJURY) (HCC): ICD-10-CM

## 2022-08-20 DIAGNOSIS — N20.0 KIDNEY STONES: ICD-10-CM

## 2022-08-20 DIAGNOSIS — Q62.11 HYDRONEPHROSIS WITH URETEROPELVIC JUNCTION (UPJ) OBSTRUCTION: ICD-10-CM

## 2022-08-20 DIAGNOSIS — F41.9 ANXIETY: ICD-10-CM

## 2022-08-20 DIAGNOSIS — N17.9 ACUTE RENAL FAILURE (ARF) (HCC): ICD-10-CM

## 2022-08-20 DIAGNOSIS — J96.01 ACUTE RESPIRATORY FAILURE WITH HYPOXIA AND HYPERCAPNIA (HCC): Primary | ICD-10-CM

## 2022-08-20 DIAGNOSIS — R65.21 SEPTIC SHOCK (HCC): ICD-10-CM

## 2022-08-20 DIAGNOSIS — N13.30 HYDRONEPHROSIS: Primary | ICD-10-CM

## 2022-08-20 DIAGNOSIS — G92.8 TOXIC METABOLIC ENCEPHALOPATHY: ICD-10-CM

## 2022-08-20 DIAGNOSIS — G93.41 ACUTE METABOLIC ENCEPHALOPATHY: ICD-10-CM

## 2022-08-20 DIAGNOSIS — J96.01 ACUTE RESPIRATORY FAILURE WITH HYPOXIA (HCC): ICD-10-CM

## 2022-08-20 DIAGNOSIS — A41.9 SEPTIC SHOCK (HCC): ICD-10-CM

## 2022-08-20 PROBLEM — E87.29 INCREASED ANION GAP METABOLIC ACIDOSIS: Status: ACTIVE | Noted: 2022-08-20

## 2022-08-20 PROBLEM — R77.8 ELEVATED TROPONIN: Status: ACTIVE | Noted: 2022-08-20

## 2022-08-20 PROBLEM — R79.89 ELEVATED BRAIN NATRIURETIC PEPTIDE (BNP) LEVEL: Status: ACTIVE | Noted: 2022-08-20

## 2022-08-20 PROBLEM — R79.89 ELEVATED TROPONIN: Status: ACTIVE | Noted: 2022-08-20

## 2022-08-20 PROBLEM — R74.01 TRANSAMINITIS: Status: ACTIVE | Noted: 2022-08-20

## 2022-08-20 PROBLEM — R74.8 ELEVATED LIPASE: Status: ACTIVE | Noted: 2022-08-20

## 2022-08-20 PROBLEM — R79.89 ELEVATED D-DIMER: Status: ACTIVE | Noted: 2022-08-20

## 2022-08-20 PROBLEM — I10 ESSENTIAL HYPERTENSION: Status: ACTIVE | Noted: 2022-08-20

## 2022-08-20 PROBLEM — E87.0 HYPERNATREMIA: Status: ACTIVE | Noted: 2022-08-20

## 2022-08-20 PROBLEM — E87.2 INCREASED ANION GAP METABOLIC ACIDOSIS: Status: ACTIVE | Noted: 2022-08-20

## 2022-08-20 LAB
2HR DELTA HS TROPONIN: -47 NG/L
4HR DELTA HS TROPONIN: -99 NG/L
ALBUMIN SERPL BCP-MCNC: 2.7 G/DL (ref 3.5–5)
ALBUMIN SERPL BCP-MCNC: 4.3 G/DL (ref 3.5–5)
ALP SERPL-CCNC: 60 U/L (ref 46–116)
ALP SERPL-CCNC: 76 U/L (ref 46–116)
ALT SERPL W P-5'-P-CCNC: 43 U/L (ref 12–78)
ALT SERPL W P-5'-P-CCNC: 66 U/L (ref 12–78)
AMPHETAMINES SERPL QL SCN: NEGATIVE
ANION GAP SERPL CALCULATED.3IONS-SCNC: 26 MMOL/L (ref 4–13)
ANION GAP SERPL CALCULATED.3IONS-SCNC: 7 MMOL/L (ref 4–13)
APAP SERPL-MCNC: <2 UG/ML (ref 10–20)
APTT PPP: 34 SECONDS (ref 23–37)
ARTERIAL PATENCY WRIST A: YES
ARTERIAL PATENCY WRIST A: YES
AST SERPL W P-5'-P-CCNC: 132 U/L (ref 5–45)
AST SERPL W P-5'-P-CCNC: 78 U/L (ref 5–45)
BACTERIA UR QL AUTO: ABNORMAL /HPF
BARBITURATES UR QL: NEGATIVE
BASE EX.OXY STD BLDV CALC-SCNC: 59.1 % (ref 60–80)
BASE EXCESS BLDA CALC-SCNC: -7 MMOL/L
BASE EXCESS BLDA CALC-SCNC: -8.3 MMOL/L
BASE EXCESS BLDV CALC-SCNC: -5 MMOL/L
BASOPHILS # BLD AUTO: 0.04 THOUSANDS/ΜL (ref 0–0.1)
BASOPHILS # BLD AUTO: 0.1 THOUSANDS/ÂΜL (ref 0–0.1)
BASOPHILS NFR BLD AUTO: 0 % (ref 0–1)
BASOPHILS NFR BLD AUTO: 0 % (ref 0–1)
BENZODIAZ UR QL: NEGATIVE
BILIRUB SERPL-MCNC: 0.83 MG/DL (ref 0.2–1)
BILIRUB SERPL-MCNC: 1.93 MG/DL (ref 0.2–1)
BILIRUB UR QL STRIP: ABNORMAL
BUN SERPL-MCNC: 119 MG/DL (ref 5–25)
BUN SERPL-MCNC: 121 MG/DL (ref 5–25)
CA-I BLD-SCNC: 1.14 MMOL/L (ref 1.12–1.32)
CALCIUM ALBUM COR SERPL-MCNC: 9.4 MG/DL (ref 8.3–10.1)
CALCIUM SERPL-MCNC: 10.2 MG/DL (ref 8.3–10.1)
CALCIUM SERPL-MCNC: 8.4 MG/DL (ref 8.3–10.1)
CARDIAC TROPONIN I PNL SERPL HS: 448 NG/L
CARDIAC TROPONIN I PNL SERPL HS: 500 NG/L
CARDIAC TROPONIN I PNL SERPL HS: 547 NG/L
CHLORIDE SERPL-SCNC: 112 MMOL/L (ref 96–108)
CHLORIDE SERPL-SCNC: 127 MMOL/L (ref 96–108)
CK MB SERPL-MCNC: 13.2 NG/ML (ref 0–5)
CK MB SERPL-MCNC: <1 % (ref 0–2.5)
CK SERPL-CCNC: 3236 U/L (ref 39–308)
CLARITY UR: ABNORMAL
CO2 SERPL-SCNC: 22 MMOL/L (ref 21–32)
CO2 SERPL-SCNC: 22 MMOL/L (ref 21–32)
COCAINE UR QL: NEGATIVE
COLOR UR: ABNORMAL
CREAT SERPL-MCNC: 4.48 MG/DL (ref 0.6–1.3)
CREAT SERPL-MCNC: 6.02 MG/DL (ref 0.6–1.3)
D DIMER PPP FEU-MCNC: 1.72 UG/ML FEU
EOSINOPHIL # BLD AUTO: 0 THOUSAND/ΜL (ref 0–0.61)
EOSINOPHIL # BLD AUTO: 0.05 THOUSAND/ÂΜL (ref 0–0.61)
EOSINOPHIL NFR BLD AUTO: 0 % (ref 0–6)
EOSINOPHIL NFR BLD AUTO: 0 % (ref 0–6)
ERYTHROCYTE [DISTWIDTH] IN BLOOD BY AUTOMATED COUNT: 14.9 % (ref 11.6–15.1)
ERYTHROCYTE [DISTWIDTH] IN BLOOD BY AUTOMATED COUNT: 15.7 % (ref 11.6–15.1)
ETHANOL SERPL-MCNC: <3 MG/DL (ref 0–3)
FLUAV RNA RESP QL NAA+PROBE: NEGATIVE
FLUBV RNA RESP QL NAA+PROBE: NEGATIVE
GFR SERPL CREATININE-BSD FRML MDRD: 13 ML/MIN/1.73SQ M
GFR SERPL CREATININE-BSD FRML MDRD: 9 ML/MIN/1.73SQ M
GLUCOSE SERPL-MCNC: 137 MG/DL (ref 65–140)
GLUCOSE SERPL-MCNC: 139 MG/DL (ref 65–140)
GLUCOSE UR STRIP-MCNC: NEGATIVE MG/DL
HCO3 BLDA-SCNC: 13.2 MMOL/L (ref 22–28)
HCO3 BLDA-SCNC: 17.6 MMOL/L (ref 22–28)
HCO3 BLDV-SCNC: 21.6 MMOL/L (ref 24–30)
HCT VFR BLD AUTO: 50 % (ref 36.5–49.3)
HCT VFR BLD AUTO: 61.9 % (ref 36.5–49.3)
HGB BLD-MCNC: 16.2 G/DL (ref 12–17)
HGB BLD-MCNC: 20.3 G/DL (ref 12–17)
HGB UR QL STRIP.AUTO: ABNORMAL
HOROWITZ INDEX BLDA+IHG-RTO: 70 MM[HG]
IMM GRANULOCYTES # BLD AUTO: 0.21 THOUSAND/UL (ref 0–0.2)
IMM GRANULOCYTES # BLD AUTO: 0.23 THOUSAND/UL (ref 0–0.2)
IMM GRANULOCYTES NFR BLD AUTO: 1 % (ref 0–2)
IMM GRANULOCYTES NFR BLD AUTO: 1 % (ref 0–2)
INR PPP: 1.62 (ref 0.84–1.19)
IPAP: 12
KETONES UR STRIP-MCNC: ABNORMAL MG/DL
LACTATE SERPL-SCNC: 2 MMOL/L (ref 0.5–2)
LACTATE SERPL-SCNC: 2.4 MMOL/L (ref 0.5–2)
LACTATE SERPL-SCNC: 4.8 MMOL/L (ref 0.5–2)
LEUKOCYTE ESTERASE UR QL STRIP: ABNORMAL
LIPASE SERPL-CCNC: 1015 U/L (ref 73–393)
LYMPHOCYTES # BLD AUTO: 1.26 THOUSANDS/ΜL (ref 0.6–4.47)
LYMPHOCYTES # BLD AUTO: 1.46 THOUSANDS/ÂΜL (ref 0.6–4.47)
LYMPHOCYTES NFR BLD AUTO: 5 % (ref 14–44)
LYMPHOCYTES NFR BLD AUTO: 5 % (ref 14–44)
MAGNESIUM SERPL-MCNC: 2.8 MG/DL (ref 1.6–2.6)
MAGNESIUM SERPL-MCNC: 3.5 MG/DL (ref 1.6–2.6)
MCH RBC QN AUTO: 29.7 PG (ref 26.8–34.3)
MCH RBC QN AUTO: 29.7 PG (ref 26.8–34.3)
MCHC RBC AUTO-ENTMCNC: 32.4 G/DL (ref 31.4–37.4)
MCHC RBC AUTO-ENTMCNC: 32.8 G/DL (ref 31.4–37.4)
MCV RBC AUTO: 91 FL (ref 82–98)
MCV RBC AUTO: 92 FL (ref 82–98)
METHADONE UR QL: NEGATIVE
MONOCYTES # BLD AUTO: 1.45 THOUSAND/ΜL (ref 0.17–1.22)
MONOCYTES # BLD AUTO: 2.68 THOUSAND/ÂΜL (ref 0.17–1.22)
MONOCYTES NFR BLD AUTO: 10 % (ref 4–12)
MONOCYTES NFR BLD AUTO: 6 % (ref 4–12)
NEUTROPHILS # BLD AUTO: 22.01 THOUSANDS/ΜL (ref 1.85–7.62)
NEUTROPHILS # BLD AUTO: 23.79 THOUSANDS/ÂΜL (ref 1.85–7.62)
NEUTS SEG NFR BLD AUTO: 84 % (ref 43–75)
NEUTS SEG NFR BLD AUTO: 88 % (ref 43–75)
NITRITE UR QL STRIP: NEGATIVE
NON VENT- BIPAP: ABNORMAL
NON-SQ EPI CELLS URNS QL MICRO: ABNORMAL /HPF
NRBC BLD AUTO-RTO: 0 /100 WBCS
NRBC BLD AUTO-RTO: 0 /100 WBCS
NT-PROBNP SERPL-MCNC: ABNORMAL PG/ML
O2 CT BLDA-SCNC: 20.8 ML/DL (ref 16–23)
O2 CT BLDA-SCNC: 29.6 ML/DL (ref 16–23)
O2 CT BLDV-SCNC: 14 ML/DL
OPIATES UR QL SCN: NEGATIVE
OXYCODONE+OXYMORPHONE UR QL SCN: NEGATIVE
OXYHGB MFR BLDA: 90.9 % (ref 94–97)
OXYHGB MFR BLDA: 98.4 % (ref 94–97)
PCO2 BLDA: 22.1 MM HG (ref 36–44)
PCO2 BLDA: 33.3 MM HG (ref 36–44)
PCO2 BLDV: 45.7 MM HG (ref 42–50)
PCP UR QL: NEGATIVE
PEEP MAX SETTING VENT: 7 CM[H2O]
PEEP RESPIRATORY: 8 CM[H2O]
PH BLDA: 7.34 [PH] (ref 7.35–7.45)
PH BLDA: 7.39 [PH] (ref 7.35–7.45)
PH BLDV: 7.29 [PH] (ref 7.3–7.4)
PH UR STRIP.AUTO: 5.5 [PH]
PHOSPHATE SERPL-MCNC: 5.2 MG/DL (ref 2.7–4.5)
PLATELET # BLD AUTO: 166 THOUSANDS/UL (ref 149–390)
PLATELET # BLD AUTO: 225 THOUSANDS/UL (ref 149–390)
PMV BLD AUTO: 11.7 FL (ref 8.9–12.7)
PMV BLD AUTO: 12 FL (ref 8.9–12.7)
PO2 BLDA: 178.6 MM HG (ref 75–129)
PO2 BLDA: 65.3 MM HG (ref 75–129)
PO2 BLDV: 33.4 MM HG (ref 35–45)
POTASSIUM SERPL-SCNC: 3.7 MMOL/L (ref 3.5–5.3)
POTASSIUM SERPL-SCNC: 3.7 MMOL/L (ref 3.5–5.3)
PROCALCITONIN SERPL-MCNC: 5.49 NG/ML
PROT SERPL-MCNC: 6.3 G/DL (ref 6.4–8.4)
PROT SERPL-MCNC: 8.7 G/DL (ref 6.4–8.4)
PROT UR STRIP-MCNC: ABNORMAL MG/DL
PROTHROMBIN TIME: 19.5 SECONDS (ref 11.6–14.5)
RBC # BLD AUTO: 5.45 MILLION/UL (ref 3.88–5.62)
RBC # BLD AUTO: 6.84 MILLION/UL (ref 3.88–5.62)
RBC #/AREA URNS AUTO: ABNORMAL /HPF
RSV RNA RESP QL NAA+PROBE: NEGATIVE
SALICYLATES SERPL-MCNC: 3 MG/DL (ref 3–20)
SARS-COV-2 RNA RESP QL NAA+PROBE: NEGATIVE
SODIUM SERPL-SCNC: 156 MMOL/L (ref 135–147)
SODIUM SERPL-SCNC: 160 MMOL/L (ref 135–147)
SP GR UR STRIP.AUTO: 1.02 (ref 1–1.03)
SPECIMEN SOURCE: ABNORMAL
SPECIMEN SOURCE: ABNORMAL
THC UR QL: NEGATIVE
UROBILINOGEN UR QL STRIP.AUTO: 1 E.U./DL
VENT AC: 14
VENT BIPAP FIO2: 100 %
VENT- AC: AC
VT SETTING VENT: 420 ML
WBC # BLD AUTO: 24.99 THOUSAND/UL (ref 4.31–10.16)
WBC # BLD AUTO: 28.29 THOUSAND/UL (ref 4.31–10.16)
WBC #/AREA URNS AUTO: ABNORMAL /HPF

## 2022-08-20 PROCEDURE — 85379 FIBRIN DEGRADATION QUANT: CPT | Performed by: EMERGENCY MEDICINE

## 2022-08-20 PROCEDURE — 83735 ASSAY OF MAGNESIUM: CPT | Performed by: EMERGENCY MEDICINE

## 2022-08-20 PROCEDURE — 71045 X-RAY EXAM CHEST 1 VIEW: CPT

## 2022-08-20 PROCEDURE — C1894 INTRO/SHEATH, NON-LASER: HCPCS

## 2022-08-20 PROCEDURE — 31500 INSERT EMERGENCY AIRWAY: CPT | Performed by: EMERGENCY MEDICINE

## 2022-08-20 PROCEDURE — 84484 ASSAY OF TROPONIN QUANT: CPT | Performed by: EMERGENCY MEDICINE

## 2022-08-20 PROCEDURE — 36415 COLL VENOUS BLD VENIPUNCTURE: CPT | Performed by: EMERGENCY MEDICINE

## 2022-08-20 PROCEDURE — 82947 ASSAY GLUCOSE BLOOD QUANT: CPT

## 2022-08-20 PROCEDURE — 83690 ASSAY OF LIPASE: CPT | Performed by: EMERGENCY MEDICINE

## 2022-08-20 PROCEDURE — 80307 DRUG TEST PRSMV CHEM ANLYZR: CPT | Performed by: EMERGENCY MEDICINE

## 2022-08-20 PROCEDURE — 80143 DRUG ASSAY ACETAMINOPHEN: CPT | Performed by: EMERGENCY MEDICINE

## 2022-08-20 PROCEDURE — 80053 COMPREHEN METABOLIC PANEL: CPT

## 2022-08-20 PROCEDURE — 3E043XZ INTRODUCTION OF VASOPRESSOR INTO CENTRAL VEIN, PERCUTANEOUS APPROACH: ICD-10-PCS | Performed by: STUDENT IN AN ORGANIZED HEALTH CARE EDUCATION/TRAINING PROGRAM

## 2022-08-20 PROCEDURE — 0T9330Z DRAINAGE OF RIGHT KIDNEY PELVIS WITH DRAINAGE DEVICE, PERCUTANEOUS APPROACH: ICD-10-PCS | Performed by: RADIOLOGY

## 2022-08-20 PROCEDURE — 84295 ASSAY OF SERUM SODIUM: CPT

## 2022-08-20 PROCEDURE — 81001 URINALYSIS AUTO W/SCOPE: CPT | Performed by: EMERGENCY MEDICINE

## 2022-08-20 PROCEDURE — 82330 ASSAY OF CALCIUM: CPT

## 2022-08-20 PROCEDURE — 96365 THER/PROPH/DIAG IV INF INIT: CPT

## 2022-08-20 PROCEDURE — C1729 CATH, DRAINAGE: HCPCS

## 2022-08-20 PROCEDURE — 50432 PLMT NEPHROSTOMY CATHETER: CPT | Performed by: RADIOLOGY

## 2022-08-20 PROCEDURE — 50432 PLMT NEPHROSTOMY CATHETER: CPT

## 2022-08-20 PROCEDURE — 82553 CREATINE MB FRACTION: CPT | Performed by: EMERGENCY MEDICINE

## 2022-08-20 PROCEDURE — 99291 CRITICAL CARE FIRST HOUR: CPT | Performed by: STUDENT IN AN ORGANIZED HEALTH CARE EDUCATION/TRAINING PROGRAM

## 2022-08-20 PROCEDURE — 85610 PROTHROMBIN TIME: CPT | Performed by: EMERGENCY MEDICINE

## 2022-08-20 PROCEDURE — 99291 CRITICAL CARE FIRST HOUR: CPT | Performed by: EMERGENCY MEDICINE

## 2022-08-20 PROCEDURE — 87086 URINE CULTURE/COLONY COUNT: CPT | Performed by: EMERGENCY MEDICINE

## 2022-08-20 PROCEDURE — 84100 ASSAY OF PHOSPHORUS: CPT

## 2022-08-20 PROCEDURE — 82805 BLOOD GASES W/O2 SATURATION: CPT | Performed by: EMERGENCY MEDICINE

## 2022-08-20 PROCEDURE — 82077 ASSAY SPEC XCP UR&BREATH IA: CPT | Performed by: EMERGENCY MEDICINE

## 2022-08-20 PROCEDURE — NC001 PR NO CHARGE: Performed by: RADIOLOGY

## 2022-08-20 PROCEDURE — 94760 N-INVAS EAR/PLS OXIMETRY 1: CPT

## 2022-08-20 PROCEDURE — 96366 THER/PROPH/DIAG IV INF ADDON: CPT

## 2022-08-20 PROCEDURE — 99291 CRITICAL CARE FIRST HOUR: CPT

## 2022-08-20 PROCEDURE — 0241U HB NFCT DS VIR RESP RNA 4 TRGT: CPT | Performed by: EMERGENCY MEDICINE

## 2022-08-20 PROCEDURE — 87040 BLOOD CULTURE FOR BACTERIA: CPT | Performed by: EMERGENCY MEDICINE

## 2022-08-20 PROCEDURE — G1004 CDSM NDSC: HCPCS

## 2022-08-20 PROCEDURE — 85730 THROMBOPLASTIN TIME PARTIAL: CPT | Performed by: EMERGENCY MEDICINE

## 2022-08-20 PROCEDURE — 82805 BLOOD GASES W/O2 SATURATION: CPT

## 2022-08-20 PROCEDURE — 96374 THER/PROPH/DIAG INJ IV PUSH: CPT

## 2022-08-20 PROCEDURE — 96361 HYDRATE IV INFUSION ADD-ON: CPT

## 2022-08-20 PROCEDURE — 82803 BLOOD GASES ANY COMBINATION: CPT

## 2022-08-20 PROCEDURE — 87086 URINE CULTURE/COLONY COUNT: CPT | Performed by: RADIOLOGY

## 2022-08-20 PROCEDURE — 87040 BLOOD CULTURE FOR BACTERIA: CPT

## 2022-08-20 PROCEDURE — 84132 ASSAY OF SERUM POTASSIUM: CPT

## 2022-08-20 PROCEDURE — 82948 REAGENT STRIP/BLOOD GLUCOSE: CPT

## 2022-08-20 PROCEDURE — 96367 TX/PROPH/DG ADDL SEQ IV INF: CPT

## 2022-08-20 PROCEDURE — 96360 HYDRATION IV INFUSION INIT: CPT

## 2022-08-20 PROCEDURE — 85025 COMPLETE CBC W/AUTO DIFF WBC: CPT | Performed by: EMERGENCY MEDICINE

## 2022-08-20 PROCEDURE — 94002 VENT MGMT INPAT INIT DAY: CPT

## 2022-08-20 PROCEDURE — 85014 HEMATOCRIT: CPT

## 2022-08-20 PROCEDURE — 36556 INSERT NON-TUNNEL CV CATH: CPT | Performed by: EMERGENCY MEDICINE

## 2022-08-20 PROCEDURE — 83605 ASSAY OF LACTIC ACID: CPT

## 2022-08-20 PROCEDURE — 80053 COMPREHEN METABOLIC PANEL: CPT | Performed by: EMERGENCY MEDICINE

## 2022-08-20 PROCEDURE — 83605 ASSAY OF LACTIC ACID: CPT | Performed by: EMERGENCY MEDICINE

## 2022-08-20 PROCEDURE — 71250 CT THORAX DX C-: CPT

## 2022-08-20 PROCEDURE — 83880 ASSAY OF NATRIURETIC PEPTIDE: CPT | Performed by: EMERGENCY MEDICINE

## 2022-08-20 PROCEDURE — 93005 ELECTROCARDIOGRAM TRACING: CPT

## 2022-08-20 PROCEDURE — 5A1945Z RESPIRATORY VENTILATION, 24-96 CONSECUTIVE HOURS: ICD-10-PCS | Performed by: STUDENT IN AN ORGANIZED HEALTH CARE EDUCATION/TRAINING PROGRAM

## 2022-08-20 PROCEDURE — 82550 ASSAY OF CK (CPK): CPT | Performed by: EMERGENCY MEDICINE

## 2022-08-20 PROCEDURE — 36600 WITHDRAWAL OF ARTERIAL BLOOD: CPT

## 2022-08-20 PROCEDURE — 80179 DRUG ASSAY SALICYLATE: CPT | Performed by: EMERGENCY MEDICINE

## 2022-08-20 PROCEDURE — 70450 CT HEAD/BRAIN W/O DYE: CPT

## 2022-08-20 PROCEDURE — 83735 ASSAY OF MAGNESIUM: CPT

## 2022-08-20 PROCEDURE — 74176 CT ABD & PELVIS W/O CONTRAST: CPT

## 2022-08-20 PROCEDURE — 84145 PROCALCITONIN (PCT): CPT | Performed by: EMERGENCY MEDICINE

## 2022-08-20 PROCEDURE — 85025 COMPLETE CBC W/AUTO DIFF WBC: CPT

## 2022-08-20 RX ORDER — PROPOFOL 10 MG/ML
5-50 INJECTION, EMULSION INTRAVENOUS
Status: DISCONTINUED | OUTPATIENT
Start: 2022-08-20 | End: 2022-08-20

## 2022-08-20 RX ORDER — CEFEPIME HYDROCHLORIDE 2 G/50ML
2000 INJECTION, SOLUTION INTRAVENOUS ONCE
Status: COMPLETED | OUTPATIENT
Start: 2022-08-20 | End: 2022-08-20

## 2022-08-20 RX ORDER — KETAMINE HYDROCHLORIDE 50 MG/ML
150 INJECTION, SOLUTION, CONCENTRATE INTRAMUSCULAR; INTRAVENOUS ONCE
Status: COMPLETED | OUTPATIENT
Start: 2022-08-20 | End: 2022-08-20

## 2022-08-20 RX ORDER — ROCURONIUM BROMIDE 10 MG/ML
1 INJECTION, SOLUTION INTRAVENOUS ONCE
Status: COMPLETED | OUTPATIENT
Start: 2022-08-20 | End: 2022-08-20

## 2022-08-20 RX ORDER — PROPOFOL 10 MG/ML
50 INJECTION, EMULSION INTRAVENOUS ONCE
Status: COMPLETED | OUTPATIENT
Start: 2022-08-20 | End: 2022-08-20

## 2022-08-20 RX ORDER — HEPARIN SODIUM 5000 [USP'U]/ML
5000 INJECTION, SOLUTION INTRAVENOUS; SUBCUTANEOUS EVERY 8 HOURS SCHEDULED
Status: DISCONTINUED | OUTPATIENT
Start: 2022-08-21 | End: 2022-08-25 | Stop reason: HOSPADM

## 2022-08-20 RX ORDER — SODIUM CHLORIDE, SODIUM GLUCONATE, SODIUM ACETATE, POTASSIUM CHLORIDE, MAGNESIUM CHLORIDE, SODIUM PHOSPHATE, DIBASIC, AND POTASSIUM PHOSPHATE .53; .5; .37; .037; .03; .012; .00082 G/100ML; G/100ML; G/100ML; G/100ML; G/100ML; G/100ML; G/100ML
125 INJECTION, SOLUTION INTRAVENOUS CONTINUOUS
Status: DISCONTINUED | OUTPATIENT
Start: 2022-08-20 | End: 2022-08-20 | Stop reason: HOSPADM

## 2022-08-20 RX ORDER — KETAMINE HYDROCHLORIDE 50 MG/ML
100 INJECTION, SOLUTION, CONCENTRATE INTRAMUSCULAR; INTRAVENOUS ONCE
Status: COMPLETED | OUTPATIENT
Start: 2022-08-20 | End: 2022-08-20

## 2022-08-20 RX ORDER — PROPOFOL 10 MG/ML
5-50 INJECTION, EMULSION INTRAVENOUS
Status: DISCONTINUED | OUTPATIENT
Start: 2022-08-20 | End: 2022-08-20 | Stop reason: HOSPADM

## 2022-08-20 RX ORDER — LIDOCAINE HYDROCHLORIDE 10 MG/ML
INJECTION, SOLUTION EPIDURAL; INFILTRATION; INTRACAUDAL; PERINEURAL CODE/TRAUMA/SEDATION MEDICATION
Status: COMPLETED | OUTPATIENT
Start: 2022-08-20 | End: 2022-08-20

## 2022-08-20 RX ORDER — PHENYLEPHRINE HYDROCHLORIDE 10 MG/ML
INJECTION INTRAVENOUS AS NEEDED
Status: DISCONTINUED | OUTPATIENT
Start: 2022-08-20 | End: 2022-08-20

## 2022-08-20 RX ORDER — VANCOMYCIN HYDROCHLORIDE 1 G/200ML
15 INJECTION, SOLUTION INTRAVENOUS EVERY 12 HOURS
Status: CANCELLED | OUTPATIENT
Start: 2022-08-20

## 2022-08-20 RX ORDER — VANCOMYCIN HYDROCHLORIDE 1 G/200ML
15 INJECTION, SOLUTION INTRAVENOUS DAILY PRN
Status: DISCONTINUED | OUTPATIENT
Start: 2022-08-21 | End: 2022-08-21

## 2022-08-20 RX ORDER — ROCURONIUM BROMIDE 10 MG/ML
INJECTION, SOLUTION INTRAVENOUS AS NEEDED
Status: DISCONTINUED | OUTPATIENT
Start: 2022-08-20 | End: 2022-08-20

## 2022-08-20 RX ORDER — HEPARIN SODIUM 5000 [USP'U]/ML
5000 INJECTION, SOLUTION INTRAVENOUS; SUBCUTANEOUS EVERY 8 HOURS SCHEDULED
Status: DISCONTINUED | OUTPATIENT
Start: 2022-08-20 | End: 2022-08-20

## 2022-08-20 RX ORDER — PROPOFOL 10 MG/ML
5-50 INJECTION, EMULSION INTRAVENOUS
Status: DISCONTINUED | OUTPATIENT
Start: 2022-08-20 | End: 2022-08-21

## 2022-08-20 RX ORDER — FENTANYL CITRATE 50 UG/ML
50 INJECTION, SOLUTION INTRAMUSCULAR; INTRAVENOUS EVERY 2 HOUR PRN
Status: DISCONTINUED | OUTPATIENT
Start: 2022-08-20 | End: 2022-08-21

## 2022-08-20 RX ORDER — SODIUM CHLORIDE, SODIUM GLUCONATE, SODIUM ACETATE, POTASSIUM CHLORIDE, MAGNESIUM CHLORIDE, SODIUM PHOSPHATE, DIBASIC, AND POTASSIUM PHOSPHATE .53; .5; .37; .037; .03; .012; .00082 G/100ML; G/100ML; G/100ML; G/100ML; G/100ML; G/100ML; G/100ML
100 INJECTION, SOLUTION INTRAVENOUS CONTINUOUS
Status: DISCONTINUED | OUTPATIENT
Start: 2022-08-20 | End: 2022-08-21

## 2022-08-20 RX ORDER — LORAZEPAM 2 MG/ML
1 INJECTION INTRAMUSCULAR ONCE
Status: COMPLETED | OUTPATIENT
Start: 2022-08-20 | End: 2022-08-20

## 2022-08-20 RX ORDER — SODIUM CHLORIDE, SODIUM LACTATE, POTASSIUM CHLORIDE, CALCIUM CHLORIDE 600; 310; 30; 20 MG/100ML; MG/100ML; MG/100ML; MG/100ML
INJECTION, SOLUTION INTRAVENOUS CONTINUOUS PRN
Status: DISCONTINUED | OUTPATIENT
Start: 2022-08-20 | End: 2022-08-20

## 2022-08-20 RX ORDER — CHLORHEXIDINE GLUCONATE 0.12 MG/ML
15 RINSE ORAL EVERY 12 HOURS SCHEDULED
Status: DISCONTINUED | OUTPATIENT
Start: 2022-08-20 | End: 2022-08-25 | Stop reason: HOSPADM

## 2022-08-20 RX ORDER — SODIUM CHLORIDE, SODIUM LACTATE, POTASSIUM CHLORIDE, CALCIUM CHLORIDE 600; 310; 30; 20 MG/100ML; MG/100ML; MG/100ML; MG/100ML
250 INJECTION, SOLUTION INTRAVENOUS CONTINUOUS
Status: DISCONTINUED | OUTPATIENT
Start: 2022-08-20 | End: 2022-08-20

## 2022-08-20 RX ADMIN — PHENYLEPHRINE HYDROCHLORIDE 200 MCG: 10 INJECTION INTRAVENOUS at 21:19

## 2022-08-20 RX ADMIN — CEFEPIME HYDROCHLORIDE 2000 MG: 2 INJECTION, SOLUTION INTRAVENOUS at 12:11

## 2022-08-20 RX ADMIN — SODIUM CHLORIDE 1000 ML: 0.9 INJECTION, SOLUTION INTRAVENOUS at 12:08

## 2022-08-20 RX ADMIN — ROCURONIUM BROMIDE 71 MG: 10 INJECTION, SOLUTION INTRAVENOUS at 14:18

## 2022-08-20 RX ADMIN — LORAZEPAM 1 MG: 2 INJECTION, SOLUTION INTRAMUSCULAR; INTRAVENOUS at 13:08

## 2022-08-20 RX ADMIN — IOHEXOL 15 ML: 300 INJECTION, SOLUTION INTRAVENOUS at 22:27

## 2022-08-20 RX ADMIN — SODIUM CHLORIDE, SODIUM GLUCONATE, SODIUM ACETATE, POTASSIUM CHLORIDE, MAGNESIUM CHLORIDE, SODIUM PHOSPHATE, DIBASIC, AND POTASSIUM PHOSPHATE 125 ML/HR: .53; .5; .37; .037; .03; .012; .00082 INJECTION, SOLUTION INTRAVENOUS at 18:26

## 2022-08-20 RX ADMIN — SODIUM CHLORIDE, SODIUM LACTATE, POTASSIUM CHLORIDE, AND CALCIUM CHLORIDE: .6; .31; .03; .02 INJECTION, SOLUTION INTRAVENOUS at 22:13

## 2022-08-20 RX ADMIN — KETAMINE HYDROCHLORIDE 150 MG: 50 INJECTION INTRAMUSCULAR; INTRAVENOUS at 14:16

## 2022-08-20 RX ADMIN — PROPOFOL 5 MCG/KG/MIN: 10 INJECTION, EMULSION INTRAVENOUS at 17:07

## 2022-08-20 RX ADMIN — SODIUM CHLORIDE, SODIUM LACTATE, POTASSIUM CHLORIDE, AND CALCIUM CHLORIDE 2000 ML: .6; .31; .03; .02 INJECTION, SOLUTION INTRAVENOUS at 15:22

## 2022-08-20 RX ADMIN — SODIUM CHLORIDE, SODIUM GLUCONATE, SODIUM ACETATE, POTASSIUM CHLORIDE, MAGNESIUM CHLORIDE, SODIUM PHOSPHATE, DIBASIC, AND POTASSIUM PHOSPHATE 100 ML/HR: .53; .5; .37; .037; .03; .012; .00082 INJECTION, SOLUTION INTRAVENOUS at 20:55

## 2022-08-20 RX ADMIN — ROCURONIUM BROMIDE 50 MG: 50 INJECTION, SOLUTION INTRAVENOUS at 21:14

## 2022-08-20 RX ADMIN — SODIUM CHLORIDE 1000 ML: 0.9 INJECTION, SOLUTION INTRAVENOUS at 12:04

## 2022-08-20 RX ADMIN — KETAMINE HYDROCHLORIDE 100 MG: 50 INJECTION INTRAMUSCULAR; INTRAVENOUS at 16:14

## 2022-08-20 RX ADMIN — PHENYLEPHRINE HYDROCHLORIDE 200 MCG: 10 INJECTION INTRAVENOUS at 21:35

## 2022-08-20 RX ADMIN — PROPOFOL 25 MCG/KG/MIN: 10 INJECTION, EMULSION INTRAVENOUS at 23:24

## 2022-08-20 RX ADMIN — PHENYLEPHRINE HYDROCHLORIDE 200 MCG: 10 INJECTION INTRAVENOUS at 21:14

## 2022-08-20 RX ADMIN — VANCOMYCIN HYDROCHLORIDE 1000 MG: 5 INJECTION, POWDER, LYOPHILIZED, FOR SOLUTION INTRAVENOUS at 13:04

## 2022-08-20 RX ADMIN — PROPOFOL 50 MG: 10 INJECTION, EMULSION INTRAVENOUS at 17:06

## 2022-08-20 RX ADMIN — LIDOCAINE HYDROCHLORIDE 5 ML: 10 INJECTION, SOLUTION EPIDURAL; INFILTRATION; INTRACAUDAL; PERINEURAL at 22:11

## 2022-08-20 RX ADMIN — PROPOFOL 25 MCG/KG/MIN: 10 INJECTION, EMULSION INTRAVENOUS at 20:56

## 2022-08-20 RX ADMIN — PROPOFOL 50 MG: 10 INJECTION, EMULSION INTRAVENOUS at 17:49

## 2022-08-20 NOTE — RESPIRATORY THERAPY NOTE
Pt was transported to CT scan on portable transport vent, pulse ox remained 14%, no complications  Pt placed back on 840 vent upon arrival back in room trauma 5

## 2022-08-20 NOTE — RESPIRATORY THERAPY NOTE
08/20/22 1430   Respiratory Assessment   General Appearance Sedated   Respiratory Pattern Assisted   Bilateral Breath Sounds Diminished  (equal)   Resp Comments pt intubated and placed on vent after central line inserted   Vent Information   Vent type     Vent Mode AC/VC   $ Vent Charge-INITIAL Yes   Ventilator Start Yes   $ Pulse Oximetry Spot Check Charge Completed   SpO2 96 %   AC/VC Settings   Resp Rate (BPM) 14 BPM   Vt (mL) 420 mL   FIO2 (%) 70 %   PEEP (cmH2O) 8 cmH2O   Flow Pattern (LPM) 40 L/min   Trigger Sensitivity Flow (lpm) 3 %   Humidification Heat and moisture exchanger   AC/VC Actuals   Resp Rate (BPM) 14 BPM   VT (mL) 401   MV 5 5   MAP (cmH2O) 12 cmH2O   Peak Pressure (cmH2O) 20 cmH2O   I/E Ratio (Obs) 1:2 8   ETCO2 (mmHg) 41 mmHg   AC/VC Alarms   High Peak Pressure (cmH2O) 40   High Resp Rate (BPM) 45 BPM   High MV (L/min) 20 L/min   Low MV (L/min) 3 5 L/min   Vt High (mL) 650 mL   Vt Low (mL) 255 mL   AC/VC Apnea Settings   Resp Rate (BPM) 14 BPM   VT (mL) 420 mL   FIO2 (%) 100 %   Apnea Time (s) 20 S   Apnea Flow (L/min) 40 L/min   Maintenance   Resuscitation bag with peep valve at bedside Yes   IHI Ventilator Associated Pneumonia Bundle   Daily Assessment of Readiness to Extubate Not applicable (Comment)   Head of Bed Elevated HOB 30   Oral Care   (mouth wiped of white film)   ETT  7 5 mm   Placement Date/Time: 08/20/22 1423   Tube Size: 7 5 mm  Insertion attempts: 1  Placement Verification: Auscultation  Secured at (cm): 25 cm at the lip  Placed By: Physician  Placed by (Name): Kevin Goode DO   Secured at (cm) 25   Measured from [address] by [employer] tube [employer]   Site Condition Dry   Cuff Pressure (cm H2O)   (MLT)   HI-LO Suction  Intermittent suction   HI-LO Secretions Other (Comment)  (none)   HI-LO Intervention Patent

## 2022-08-20 NOTE — EMTALA/ACUTE CARE TRANSFER
[facility] EMERGENCY DEPARTMENT  [address] [place] [id number]  Dept: [phone]      EMTALA TRANSFER CONSENT    NAME Payton Oliveira                                         1965                              MRN [id number]    I have been informed of my rights regarding examination, treatment, and transfer   by Dr Sherita Valadez DO    Benefits:   benefits of interventions and treatments not available at this facility    Risks:   risks of injury during transport delays in care due to transport times decompensated illness during transport  Consent for Transfer:  I acknowledge that my medical condition has been evaluated and explained to me by the emergency department physician or other qualified medical person and/or my attending physician, who has recommended that I be transferred to the service of    at    The above potential benefits of such transfer, the potential risks associated with such transfer, and the probable risks of not being transferred have been explained to me, and I fully understand them  The doctor has explained that, in my case, the benefits of transfer outweigh the risks  I agree to be transferred  I authorize the performance of emergency medical procedures and treatments upon me in both transit and upon arrival at the receiving facility  Additionally, I authorize the release of any and all medical records to the receiving facility and request they be transported with me, if possible  I understand that the safest mode of transportation during a medical emergency is an ambulance and that the Hospital advocates the use of this mode of transport  Risks of traveling to the receiving facility by car, including absence of medical control, life sustaining equipment, such as oxygen, and medical personnel has been explained to me and I fully understand them      (RAMANDEEP CORRECT BOX BELOW)  [x  ]  I consent to the stated transfer and to be transported by ambulance/helicopter  [  ]  I consent to the stated transfer, but refuse transportation by ambulance and accept full responsibility for my transportation by car  I understand the risks of non-ambulance transfers and I exonerate the Hospital and its staff from any deterioration in my condition that results from this refusal     X___________________________________________    DATE  22  TIME________  Signature of patient or legally responsible individual signing on patient behalf           RELATIONSHIP TO PATIENT_________________________          Provider Certification    NAME Nohemy Girard                                         1965                              MRN [id number]    A medical screening exam was performed on the above named patient  Based on the examination:    Condition Necessitating Transfer The primary encounter diagnosis was Acute respiratory failure with hypoxia and hypercapnia ([address] )  Diagnoses of Acute renal failure (ARF) (HCC), Rhabdomyolysis, Hydronephrosis with ureteropelvic junction (UPJ) obstruction, and Toxic metabolic encephalopathy were also pertinent to this visit  Patient Condition:   stable    Reason for Transfer:   acute renal failure, acute encephalopathy, rhabdomyolysis, right UPJ kidney stone with moderate right hydronephrosis, intubation requiring mechanical ventilation  Needs to go to IR for percutaneous nephrostomy, urology consultation, Nephrology consultation, ICU admission      Transfer Requirements: Facility     · Space available and qualified personnel available for treatment as acknowledged by   PACS  · Agreed to accept transfer and to provide appropriate medical treatment as acknowledged [facility] [address] [facility] [place]          · Appropriate medical records of the examination and treatment of the patient are provided at the time of transfer   [address], [address] _______  · Transfer will be performed by qualified personnel from    and appropriate transfer equipment as required, including the use of necessary and appropriate life support measures  Provider Certification: I have examined the patient and explained the following risks and benefits of being transferred/refusing transfer to the patient/family:         Based on these reasonable risks and benefits to the patient and/or the unborn child(david), and based upon the information available at the time of the patients examination, I certify that the medical benefits reasonably to be expected from the provision of appropriate medical treatments at another medical facility outweigh the increasing risks, if any, to the individuals medical condition, and in the case of labor to the unborn child, from effecting the transfer      X____________________________________________ DATE 08/20/22        TIME_______      ORIGINAL - SEND TO MEDICAL RECORDS   COPY - SEND WITH PATIENT DURING TRANSFER

## 2022-08-20 NOTE — RESPIRATORY THERAPY NOTE
Latest Reference Range & Units 08/20/22 18:12   SANDRA TEST  Yes   pH, Arterial 7 350 - 7 450  7 340 (L)   pCO2, Arterial 36 0 - 44 0 mm Hg 33 3 (L)   pO2, Arterial 75 0 - 129 0 mm Hg 65 3 (L)   HCO3, Arterial 22 0 - 28 0 mmol/L 17 6 (L)   Base Excess, Arterial mmol/L -7 0   O2 Content, Arterial 16 0 - 23 0 mL/dL 20 8   O2 HGB,Arterial 94 0 - 97 0 % 90 9 (L)   ABG SOURCE  Radial, Left   AC Rate  14   Tidal Volume ml 420   Inspired Air (FIO2)  70   PEEP  8   Vent Type- AC  AC   (L): Data is abnormally low

## 2022-08-20 NOTE — QUICK NOTE
I was asked to review this patient's films  He has multiple ongoing issues  His urologic issues include a right ureteral reimplantation and a large stone burden with possible concomitant complicated UTI  In terms of renal unit decompression right nephrostomy versus percutaneous nephroureteral catheter placement is recommended  Should the patient require a higher level of care this would be best achieved via transfer to the [place] [address]

## 2022-08-20 NOTE — RESPIRATORY THERAPY NOTE
Latest Reference Range & Units 08/20/22 12:43   SANDRA TEST  Yes   pH, Arterial 7 350 - 7 450  7 394   pCO2, Arterial 36 0 - 44 0 mm Hg 22 1 (LL)   pO2, Arterial 75 0 - 129 0 mm Hg 178 6 (H)   HCO3, Arterial 22 0 - 28 0 mmol/L 13 2 (L)   Base Excess, Arterial mmol/L -8 3   O2 Content, Arterial 16 0 - 23 0 mL/dL 29 6 (H)   O2 HGB,Arterial 94 0 - 97 0 % 98 4 (H)   ABG SOURCE  Radial, Left   Non Vent type BIPAP  BIPAP   IPAP  12   EPAP  7   BIPAP fio2 % 100   (H): Data is abnormally high  (L): Data is abnormally low  (LL): Data is critically low

## 2022-08-20 NOTE — ED NOTES
Dr Jose Ro made aware of sepsis time zero     Aric Salmeron, RN  08/20/22 100[address], RN  08/20/22 3623

## 2022-08-20 NOTE — EMTALA/ACUTE CARE TRANSFER
[facility] EMERGENCY DEPARTMENT  [address] [place] [id number]  Dept: [phone]      EMTALA TRANSFER CONSENT    NAME Reyes Conception DOB 1965                              MRN [id number]    I have been informed of my rights regarding examination, treatment, and transfer   by Dr Juan Roque DO    Benefits:   benefits of treatment interventions not available at this facility (critical care, ICU bed, nephrology/availability of CRRT)    Risks:   risks of decompensated illness in route, injury sustained during transport, delays in care due to transport times  Consent for Transfer:  I acknowledge that my medical condition has been evaluated and explained to me by the emergency department physician or other qualified medical person and/or my attending physician, who has recommended that I be transferred to the service of    at    The above potential benefits of such transfer, the potential risks associated with such transfer, and the probable risks of not being transferred have been explained to me, and I fully understand them  The doctor has explained that, in my case, the benefits of transfer outweigh the risks  I agree to be transferred  I authorize the performance of emergency medical procedures and treatments upon me in both transit and upon arrival at the receiving facility  Additionally, I authorize the release of any and all medical records to the receiving facility and request they be transported with me, if possible  I understand that the safest mode of transportation during a medical emergency is an ambulance and that the Hospital advocates the use of this mode of transport  Risks of traveling to the receiving facility by car, including absence of medical control, life sustaining equipment, such as oxygen, and medical personnel has been explained to me and I fully understand them      (RAMANDEEP CORRECT BOX BELOW)  [ x ] I consent to the stated transfer and to be transported by ambulance/helicopter  [  ]  I consent to the stated transfer, but refuse transportation by ambulance and accept full responsibility for my transportation by car  I understand the risks of non-ambulance transfers and I exonerate the Hospital and its staff from any deterioration in my condition that results from this refusal     X___________________________________________    DATE  22  TIME________  Signature of patient or legally responsible individual signing on patient behalf           RELATIONSHIP TO PATIENT_________________________          Provider Certification    NAME Radha Wilkins                                         1965                              MRN [id number]    A medical screening exam was performed on the above named patient  Based on the examination:    Condition Necessitating Transfer The primary encounter diagnosis was Altered mental status  Diagnoses of Acute respiratory failure with hypoxia and hypercapnia ([address] ), Acute renal failure (ARF) ([address] ), and Rhabdomyolysis were also pertinent to this visit  Patient Condition:   stable    Reason for Transfer:   acute renal failure, acute encephalopathy, rhabdomyolysis, acute respiratory failure, intubation requiring mechanical ventilation,    Transfer Requirements: [address] [place] [address]  · Space available and qualified personnel available for treatment as acknowledged by  PACS   · Agreed to accept transfer and to provide appropriate medical treatment as acknowledged by DR CHRISTOPHER          · Appropriate medical records of the examination and treatment of the patient are provided at the time of transfer   [address] [facility], [address] _______  · Transfer will be performed by qualified personnel from    and appropriate transfer equipment as required, including the use of necessary and appropriate life support measures      Provider Certification: I have examined the patient and explained the following risks and benefits of being transferred/refusing transfer to the patient/family:         Based on these reasonable risks and benefits to the patient and/or the unborn child(david), and based upon the information available at the time of the patients examination, I certify that the medical benefits reasonably to be expected from the provision of appropriate medical treatments at another medical facility outweigh the increasing risks, if any, to the individuals medical condition, and in the case of labor to the unborn child, from effecting the transfer      X____________________________________________ DATE 08/20/22        TIME_______      ORIGINAL - SEND TO MEDICAL RECORDS   COPY - SEND WITH PATIENT DURING TRANSFER

## 2022-08-20 NOTE — SEPSIS NOTE
Sepsis Note   Myra Sandoval 61 y o  male MRN: [id number]  Unit/Bed#: TR05 Encounter: [id number]       qSOFA     [address] Name 08/20/22 1415 08/20/22 1330 08/20/22 1315 08/20/22 1215 08/20/22 1205    Altered mental status GCS < 15 -- -- -- -- --    Respiratory Rate > / =22 1 1 1 1 --    Systolic BP < / =514 0 0 0 0 0    Q Sofa Score 2 2 2 2 2    Row Name 08/20/22 1202 08/20/22 1158             Altered mental status GCS < 15 1 --       Respiratory Rate > / =22 -- 1       Systolic BP < / =699 -- --       Q Sofa Score -- --                  Initial Sepsis Screening     Row Name 08/20/22 1440                Is the patient's history suggestive of a new or worsening infection? Yes (Proceed)  -DT        Suspected source of infection suspect infection, source unknown  -DT        Are two or more of the following signs & symptoms of infection both present and new to the patient? Yes (Proceed)  -DT        Indicate SIRS criteria Tachycardia > 90 bpm;Leukocytosis (WBC > 37056 IJL)  -DT        If the answer is yes to both questions, suspicion of sepsis is present --        If severe sepsis is present AND tissue hypoperfusion perists in the hour after fluid resuscitation or lactate > 4, the patient meets criteria for SEPTIC SHOCK --        Are any of the following organ dysfunction criteria present within 6 hours of suspected infection and SIRS criteria that are NOT considered to be chronic conditions? Yes  -DT        Organ dysfunction Acute respiratory failure (new need for invasive or non-invasive mechanical ventilation); Urine output < 0 5 mL/kg/hour for 2 hours; Lactate > 2 0 mmol/L;Lactate >/equal 4 0 mmol/L (MEETS CRITERIA FOR SEPTIC SHOCK); Creatinine > 2 0 mg/dL; Creatinine > 2 0 mg/dl AND > 0 5 mg/dl above baseline  -DT        Date of presentation of severe sepsis 08/20/22  -DT        Time of presentation of severe sepsis 1240  -DT        Tissue hypoperfusion persists in the hour after crystalloid fluid administration, evidenced, by either: --        Was hypotension present within one hour of the conclusion of crystalloid fluid administration?  No  -DT        Date of presentation of septic shock --        Time of presentation of septic shock --              User Key  (r) = Recorded By, (t) = Taken By, (c) = Cosigned By    Initials Name Provider Type    DT Jimmie Haney DO Physician                  Default Flowsheet Data (last 720 hours)     Sepsis Reassess     Row Name 08/20/22 1441                   Repeat Volume Status and Tissue Perfusion Assessment Performed    Repeat Volume Status and Tissue Perfusion Assessment Performed Yes  -DT                  Volume Status and Tissue Perfusion Post Fluid Resuscitation * Must Document All *    Vital Signs Reviewed (HR, RR, BP, T) Yes  -DT        Shock Index Reviewed Yes  -DT        Arterial Oxygen Saturation Reviewed (POx, SaO2 or SpO2) Yes (comment %)  -DT        Cardio Tachycardia  -DT        Pulmonary Respiratory distress  -DT        Capillary Refill Brisk  -DT        Peripheral Pulses Radial  -DT        Peripheral Pulse +1  -DT        Skin Dry  -DT        Urine output assessed None  -DT                  *OR*   Intensive Monitoring- Must Document One of the Following Four *:    Vital Signs Reviewed --        * Central Venous Pressure (CVP or RAP) --        * Central Venous Oxygen (SVO2, ScvO2 or Oxygen saturation via central catheter) --        * Bedside Cardiovascular US in IVC diameter and % collapse --        * Passive Leg Raise OR Crystalloid Challenge --              User Key  (r) = Recorded By, (t) = Taken By, (c) = Cosigned By    Initials Name Provider Type    DT Jimmie Haney DO Physician

## 2022-08-20 NOTE — RESPIRATORY THERAPY NOTE
1220 placed pt on bipap for resp distress in ER 5    08/20/22 1308   Respiratory Assessment   General Appearance Eyes open/responds to stimulus   Respiratory Pattern Tachypneic;Symmetrical;Assisted   Resp Comments pt placed on bipap for resp distress   Non-Invasive Information   O2 Interface Device Full face mask   Non-Invasive Ventilation Mode BiPAP   $ Continous NIV Initial   $ Pulse Oximetry Spot Check Charge Completed   Non-Invasive Settings   IPAP (cm) 12 cm   EPAP (cm) 7 cm   Rate (Set) 10   FiO2 (%) 100   Pressure Support (cm H2O) 5   Rise Time 2   Inspiratory Time (Set) 1 1   Non-Invasive Readings   Skin Intervention Skin intact   Total Rate 50   MV (Mech) 57 9   Spontaneous Vt (mL) 1126   I/E Ratio (Obs) 1:4 5   Leak (lpm) 42   Non-Invasive Alarms   Insp Pressure High (cm H20) 26   Insp Pressure Low (cm H20) 4   Low Insp Pressure Time (sec) 20 sec   MV Low (L/min) 4   Vt High (mL) 1300   Vt Low (mL) 250   High Resp Rate (BPM) 65 BPM   Low Resp Rate (BPM) 8 BPM   Apnea Interval (sec) 20  (alarm volume level 10)   Apnea Rate 8

## 2022-08-20 NOTE — ED PROVIDER NOTES
History  Chief Complaint   Patient presents with    Altered Mental Status     Patient's family called EMS due to finding patient unresponsive  EMS stated unknown downtime  Drugs/needles/syringes were found next to patient/ He denies using or knows when the last time he did use  Refusing to say what drugs he takes  Patient awake at this time  History provided by:  EMS personnel  History limited by:  Acuity of condition, mental status change and patient unresponsive  Altered Mental Status  Presenting symptoms: combativeness and unresponsiveness    Severity:  Severe  Most recent episode:  More than 2 days ago  Episode history:  Continuous  Timing:  Constant  Progression:  Unchanged  Chronicity:  New  Context: drug use and recent infection    Context: not head injury and not recent illness      This is a 79-year-old male with a past medical history significant for polysubstance abuse, hypertension, tobacco use, psychiatric disorder on Risperdal, history of kidney stones who presents to the emergency department via EMS for unresponsive/found down  Unclear history of preceding events  Patient was seen in the emergency department here on 07/31/2022  He was seen subsequently on 08/11/2022  In prescribed clindamycin for on a skin infection reportedly  He was last seen by family members present at the house 6 days ago  He was found by family today when they came by to check on him he was found down on the floor there was no obvious evidence of trauma or incontinence  No seizure-like activity was noted  The patient was in that state when found by EMS he is minimally responsive found to be hypoxic in the low 80s placed on non-rebreather blood sugar 115 multiple substances suspicious for illicit drugs found in the home around the patient with concerns for polysubstance/drug overdose  Patient unable to provide meaningful history at this time      Family per EMS did mention a history of seizures there is no seizure history documented in the chart and no antiepileptic medications in his med list   Prior to Admission Medications   Prescriptions Last Dose Informant Patient Reported? Taking?    amLODIPine (NORVASC) 10 mg tablet   No No   Sig: Take 1 tablet (10 mg total) by mouth daily   Patient not taking: Reported on 8/11/2022   amitriptyline (ELAVIL) 25 mg tablet   No No   Sig: Take 1 tablet (25 mg total) by mouth daily at bedtime   Patient not taking: Reported on 8/11/2022   methocarbamol (ROBAXIN) 750 mg tablet   No No   Sig: Take 1 tablet (750 mg total) by mouth every 8 (eight) hours   Patient not taking: Reported on 8/11/2022   risperiDONE (RisperDAL) 1 mg tablet   No No   Sig: Take 1 tablet (1 mg total) by mouth 2 (two) times a day   Patient not taking: Reported on 8/11/2022   sildenafil (REVATIO) 20 mg tablet   No No   Sig: Take 1 tablet (20 mg total) by mouth daily   Patient not taking: Reported on 8/11/2022      Facility-Administered Medications: None       Past Medical History:   Diagnosis Date    Anxiety     Bright red rectal bleeding     Last Assessed: 9/9/2015     Hypertension     Last Assessed: 7/9/2014     Kidney stones     Last Assessed: 11/17/2016     Periorbital edema     Last Assessed: 9/4/2015    Skin tag of anus     Last Assessed: 9/9/2015     Trigger point of thoracic region     Last Assessed: 11/6/2015        Past Surgical History:   Procedure Laterality Date    CYSTOSCOPY W/ URETERAL STENT PLACEMENT  03/11/2013    CYSTOSCOPY W/ URETERAL STENT PLACEMENT  06/18/2014    EXTRACORPOREAL SHOCK WAVE LITHOTRIPSY     CYSTOSCOPY W/ URETERAL STENT PLACEMENT  07/16/2014    EXTRACORPOREAL SHOCK WAVE LITHOTRIPSY     CYSTOSCOPY W/ URETERAL STENT REMOVAL  04/11/2013    CYSTOSCOPY W/ URETERAL STENT REMOVAL Right 08/26/2014    CYSTOSCOPY W/ URETEROSCOPY W/ LITHOTRIPSY  02/26/2013    Percutaneous lithotomy With Uretal Stent Plcement     CYSTOSCOPY W/ URETEROSCOPY W/ LITHOTRIPSY  03/11/2014    CYSTOSCOPY W/ URETEROSCOPY W/ LITHOTRIPSY Right 08/04/2014    With Uretal Stent Placement     CYSTOSCOPY W/ URETEROSCOPY W/ LITHOTRIPSY Right 05/09/2016    HEMORRHOID SURGERY      HERNIA REPAIR  03/11/2013    KIDNEY SURGERY      LITHOTRIPSY      DC CYSTO/URETERO W/LITHOTRIPSY &INDWELL STENT INSRT Right 7/13/2016    Procedure: CYSTOSCOPY; URETEROSCOPY WITH HOLMIUM LASER STONE EXTRACTION; RETROGRADE PYELOGRAM; URETERAL STENT INSERTION ;  Surgeon: Lissette James MD;  Location[facility];  Service: Urology    DC CYSTO/URETERO W/LITHOTRIPSY &INDWELL STENT INSRT Right 5/9/2016    Procedure: CYSTOSCOPY,  URETEROSCOPY,  WITH LITHOTRIPSY HOLMIUM LASER, STONE EXTRACTION, AND INSERTION STENT URETERAL;  Surgeon: Lissette James MD;  Location: [facility];  Service: Urology    Bhavik ESWL Right 6/17/2016    Procedure: Tony Maynard SHOCKWAVE (ESWL); Surgeon: Lissette James MD;  Location: [facility];  Service: Urology    TRANSURETHRAL RESECTION OF BLADDER      URETERAL Stephane Diss  03/11/2013       Family History   Problem Relation Age of Onset    COPD Mother     Hypertension Mother     Heart attack Father      I have reviewed and agree with the history as documented  E-Cigarette/Vaping    E-Cigarette Use Never User      E-Cigarette/Vaping Substances     Social History     Tobacco Use    Smoking status: Current Every Day Smoker     Packs/day: 2 00     Years: 35 00     Pack years: 70 00    Smokeless tobacco: Never Used   Vaping Use    Vaping Use: Never used   Substance Use Topics    Alcohol use: No    Drug use: Yes     Types: Heroin, Marijuana, Methamphetamines, Fentanyl     Comment: 20-30 bags of heroin daily       Review of Systems   Unable to perform ROS: Patient unresponsive       Physical Exam  Physical Exam  Vitals and nursing note reviewed  Constitutional:       General: He is in acute distress  Appearance: He is ill-appearing and toxic-appearing   He is not diaphoretic  Comments: Awake, not alert   HENT:      Head: Normocephalic and atraumatic  Comments: No external evidence of head trauma     Mouth/Throat:      Comments: Clear, dry, no evidence of tongue laceration  Eyes:      General: No scleral icterus  Pupils: Pupils are unequal       Comments: Pupils round reactive to light  Extraocular movements intact  The right pupils 2 mm the left pupil is 3mm  Cardiovascular:      Rate and Rhythm: Regular rhythm  Tachycardia present  Heart sounds: No murmur heard  Pulmonary:      Effort: Respiratory distress present  Breath sounds: Rhonchi and rales present  Abdominal:      General: There is no distension  Palpations: Abdomen is soft  Tenderness: There is no abdominal tenderness  Musculoskeletal:         General: No swelling  Normal range of motion  Cervical back: Neck supple  No rigidity  Skin:     General: Skin is warm and dry  Coloration: Skin is pale  Comments: Patient has innumerable scattered sores across his upper and lower extremities bilaterally  Unclear etiology  Most pronounced over 1st and 2nd digits and dorsal aspect of the hands bilaterally  No surrounding cellulitic changes or drainage  No involvement of the oral mucosa torso the head or neck or the groin  Neurological:      Mental Status: He is lethargic and disoriented  GCS: GCS eye subscore is 4  GCS verbal subscore is 2  GCS motor subscore is 5           Vital Signs  ED Triage Vitals   Temperature Pulse Respirations Blood Pressure SpO2   08/20/22 1158 08/20/22 1158 08/20/22 1158 08/20/22 1205 08/20/22 1158   (!) 97 4 °F (36 3 °C) (!) 130 (!) 24 115/71 (!) 84 %      Temp Source Heart Rate Source Patient Position - Orthostatic VS BP Location FiO2 (%)   08/20/22 1158 08/20/22 1158 08/20/22 1158 08/20/22 1158 --   Temporal Monitor Lying Left arm       Pain Score       --                  Vitals:    08/20/22 1545 08/20/22 1600 08/20/22 1615 08/20/22 1630   BP: (!) 179/75 (!) 193/79 (!) 186/81 155/57   Pulse: (!) 116 (!) 116 (!) 116 (!) 110   Patient Position - Orthostatic VS: Lying Lying Lying Lying         Visual Acuity  Visual Acuity    Flowsheet Row Most Recent Value   L Pupil Size (mm) 3   R Pupil Size (mm) 2          ED Medications  Medications   multi-electrolyte (PLASMALYTE-A/ISOLYTE-S PH 7 4) IV solution (has no administration in time range)   propofol (DIPRIVAN) 1000 mg in 100 mL infusion (premix) (10 mcg/kg/min × 71 2 kg Intravenous Rate/Dose Change 8/20/22 1725)   sodium chloride 0 9 % bolus 1,000 mL (0 mL Intravenous Stopped 8/20/22 1523)     Followed by   sodium chloride 0 9 % bolus 1,000 mL (0 mL Intravenous Stopped 8/20/22 1523)   cefepime (MAXIPIME) IVPB (premix in dextrose) 2,000 mg 50 mL (0 mg Intravenous Stopped 8/20/22 1235)   vancomycin (VANCOCIN) 1000 mg in sodium chloride 0 9% 250 mL IVPB (1,000 mg Intravenous Given 8/20/22 1304)   LORazepam (ATIVAN) injection 1 mg (1 mg Intravenous Given 8/20/22 1308)   ROCuronium (ZEMURON) injection 71 mg (71 mg Intravenous Given 8/20/22 1418)   ketamine (KETALAR) 150 mg (150 mg Intravenous Given 8/20/22 1416)   lactated ringers bolus 2,000 mL (2,000 mL Intravenous New Bag 8/20/22 1522)   ketamine (KETALAR) 100 mg (100 mg Intravenous Given 8/20/22 1614)   propofol (DIPRIVAN) 200 MG/20ML bolus injection 50 mg (50 mg Intravenous Given by Other 8/20/22 1706)       Diagnostic Studies  Results Reviewed     Procedure Component Value Units Date/Time    HS Troponin I 4hr [[id number]]  (Abnormal) Collected: 08/20/22 1637    Lab Status: Final result Specimen: Blood from Central Venous Line Updated: 08/20/22 1704     hs TnI 4hr 448 ng/L      Delta 4hr hsTnI -99 ng/L     Lactic acid 2 Hours [[id number]]  (Abnormal) Collected: 08/20/22 1436    Lab Status: Final result Specimen: Blood from Central Venous Line Updated: 08/20/22 1509     LACTIC ACID 2 4 mmol/L     Narrative:      Result may be elevated if tourniquet was used during collection  HS Troponin I 2hr [[id number]]  (Abnormal) Collected: 08/20/22 1436    Lab Status: Final result Specimen: Blood from Central Venous Line Updated: 08/20/22 1504     hs TnI 2hr 500 ng/L      Delta 2hr hsTnI -47 ng/L     Rapid drug screen, urine [[id number]]  (Normal) Collected: 08/20/22 1307    Lab Status: Final result Specimen: Urine, Catheter Updated: 08/20/22 1330     Amph/Meth UR Negative     Barbiturate Ur Negative     Benzodiazepine Urine Negative     Cocaine Urine Negative     Methadone Urine Negative     Opiate Urine Negative     PCP Ur Negative     THC Urine Negative     Oxycodone Urine Negative    Narrative:      FOR MEDICAL PURPOSES ONLY  IF CONFIRMATION NEEDED PLEASE CONTACT THE LAB WITHIN 5 DAYS  Drug Screen Cutoff Levels:  AMPHETAMINE/METHAMPHETAMINES  1000 ng/mL  BARBITURATES     200 ng/mL  BENZODIAZEPINES     200 ng/mL  COCAINE      300 ng/mL  METHADONE      300 ng/mL  OPIATES      300 ng/mL  PHENCYCLIDINE     25 ng/mL  THC       50 ng/mL  OXYCODONE      100 ng/mL    Urine Microscopic [[id number]]  (Abnormal) Collected: 08/20/22 1307    Lab Status: Final result Specimen: Urine Updated: 08/20/22 1326     RBC, UA 10-20 /hpf      WBC, UA 10-20 /hpf      Epithelial Cells None Seen /hpf      Bacteria, UA Occasional /hpf     Urine culture [[id number]] Collected: 08/20/22 1307    Lab Status:  In process Specimen: Urine Updated: 08/20/22 1326    UA w Reflex to Microscopic w Reflex to Culture [[id number]]  (Abnormal) Collected: 08/20/22 1307    Lab Status: Final result Specimen: Urine Updated: 08/20/22 1319     Color, UA Elayne     Clarity, UA Slightly Cloudy     Specific Gravity, UA 1 025     pH, UA 5 5     Leukocytes, UA Small     Nitrite, UA Negative     Protein,  (2+) mg/dl      Glucose, UA Negative mg/dl      Ketones, UA Trace mg/dl      Urobilinogen, UA 1 0 E U /dl      Bilirubin, UA Interference- unable to analyze     Occult Blood, UA Large    CKMB [[id number]]  (Abnormal) Collected: 08/20/22 1210    Lab Status: Final result Specimen: Blood from Arm, Right Updated: 08/20/22 1319     CK-MB Index <1 0 %      CK-MB 13 2 ng/mL     D-dimer, quantitative [[id number]]  (Abnormal) Collected: 08/20/22 1210    Lab Status: Final result Specimen: Blood from Arm, Right Updated: 08/20/22 1319     D-Dimer, Quant 1 72 ug/ml FEU     Narrative: In the evaluation for possible pulmonary embolism, in the appropriate (Well's Score of 4 or less) patient, the age adjusted d-dimer cutoff for this patient can be calculated as:    Age x 0 01 (in ug/mL) for Age-adjusted D-dimer exclusion threshold for a patient over 50 years      Comprehensive metabolic panel [[id number]]  (Abnormal) Collected: 08/20/22 1210    Lab Status: Final result Specimen: Blood from Arm, Right Updated: 08/20/22 1309     Sodium 160 mmol/L      Potassium 3 7 mmol/L      Chloride 112 mmol/L      CO2 22 mmol/L      ANION GAP 26 mmol/L       mg/dL      Creatinine 6 02 mg/dL      Glucose 139 mg/dL      Calcium 10 2 mg/dL       U/L      ALT 66 U/L      Alkaline Phosphatase 76 U/L      Total Protein 8 7 g/dL      Albumin 4 3 g/dL      Total Bilirubin 1 93 mg/dL      eGFR 9 ml/min/1 73sq m     Narrative:      [place] guidelines for Chronic Kidney Disease (CKD):     Stage 1 with normal or high GFR (GFR > 90 mL/min/1 73 square meters)    Stage 2 Mild CKD (GFR = 60-89 mL/min/1 73 square meters)    Stage 3A Moderate CKD (GFR = 45-59 mL/min/1 73 square meters)    Stage 3B Moderate CKD (GFR = 30-44 mL/min/1 73 square meters)    Stage 4 Severe CKD (GFR = 15-29 mL/min/1 73 square meters)    Stage 5 End Stage CKD (GFR <15 mL/min/1 73 square meters)  Note: GFR calculation is accurate only with a steady state creatinine    NT-BNP PRO [[id number]]  (Abnormal) Collected: 08/20/22 1210    Lab Status: Final result Specimen: Blood from Arm, Right Updated: 08/20/22 1309     NT-proBNP 11,339 pg/mL     Lipase [[id number]]  (Abnormal) Collected: 08/20/22 1210    Lab Status: Final result Specimen: Blood from Arm, Right Updated: 08/20/22 1309     Lipase 1,015 u/L     Magnesium [[id number]]  (Abnormal) Collected: 08/20/22 1210    Lab Status: Final result Specimen: Blood from Arm, Right Updated: 08/20/22 1309     Magnesium 3 5 mg/dL     CK Total with Reflex CKMB [[id number]]  (Abnormal) Collected: 08/20/22 1210    Lab Status: Final result Specimen: Blood from Arm, Right Updated: 08/20/22 1309     Total CK 0,677 U/L     Salicylate level [[id number]]  (Normal) Collected: 08/20/22 1210    Lab Status: Final result Specimen: Blood from Arm, Right Updated: 39/36/32 4203     Salicylate Lvl 3 mg/dL     Acetaminophen level-If concentration is detectable, please discuss with medical  on call  [[id number]]  (Abnormal) Collected: 08/20/22 1210    Lab Status: Final result Specimen: Blood from Arm, Right Updated: 08/20/22 1309     Acetaminophen Level <2 0 ug/mL     Ethanol [[id number]]  (Normal) Collected: 08/20/22 1210    Lab Status: Final result Specimen: Blood from Arm, Right Updated: 08/20/22 1259     Ethanol Lvl <3 mg/dL     FLU/RSV/COVID - if FLU/RSV clinically relevant [[id number]]  (Normal) Collected: 08/20/22 1210    Lab Status: Final result Specimen: Nasopharyngeal Swab Updated: 08/20/22 1259     SARS-CoV-2 Negative     INFLUENZA A PCR Negative     INFLUENZA B PCR Negative     RSV PCR Negative    Narrative:      FOR PEDIATRIC PATIENTS - copy/paste COVID Guidelines URL to browser: [email or web address]    SARS-CoV-2 assay is a Nucleic Acid Amplification assay intended for the  qualitative detection of nucleic acid from SARS-CoV-2 in nasopharyngeal  swabs  Results are for the presumptive identification of SARS-CoV-2 RNA      Positive results are indicative of infection with SARS-CoV-2, the virus  causing COVID-19, but do not rule out bacterial infection or co-infection  with other viruses  Laboratories within the [place] and its  territories are required to report all positive results to the appropriate  public health authorities  Negative results do not preclude SARS-CoV-2  infection and should not be used as the sole basis for treatment or other  patient management decisions  Negative results must be combined with  clinical observations, patient history, and epidemiological information  This test has not been FDA cleared or approved  This test has been authorized by FDA under an Emergency Use Authorization  (EUA)  This test is only authorized for the duration of time the  declaration that circumstances exist justifying the authorization of the  emergency use of an in vitro diagnostic tests for detection of SARS-CoV-2  virus and/or diagnosis of COVID-19 infection under section 564(b)(1) of  the Act, 21 U  S C  086BTE-8(O)(9), unless the authorization is terminated  or revoked sooner  The test has been validated but independent review by FDA  and CLIA is pending  Test performed using [employer] GeneXpert: This RT-PCR assay targets N2,  a region unique to SARS-CoV-2  A conserved region in the E-gene was chosen  for pan-Sarbecovirus detection which includes SARS-CoV-2  Lactic acid [[id number]]  (Abnormal) Collected: 08/20/22 1210    Lab Status: Final result Specimen: Blood from Arm, Right Updated: 08/20/22 1258     LACTIC ACID 4 8 mmol/L     Narrative:      Result may be elevated if tourniquet was used during collection      Blood gas, Arterial [[id number]]  (Abnormal) Collected: 08/20/22 1243    Lab Status: Final result Specimen: Blood, Arterial from Radial, Left Updated: 08/20/22 1257     pH, Arterial 7 394     pCO2, Arterial 22 1 mm Hg      pO2, Arterial 178 6 mm Hg      HCO3, Arterial 13 2 mmol/L      Base Excess, Arterial -8 3 mmol/L      O2 Content, Arterial 29 6 mL/dL      O2 HGB,Arterial  98 4 %      SOURCE Radial, Left     SANDRA TEST Yes     Non Vent type BIPAP BIPAP     IPAP 12     EPAP 7     BIPAP fio2 100 %     CBC and differential [[id number]]  (Abnormal) Collected: 08/20/22 1210    Lab Status: Final result Specimen: Blood from Arm, Right Updated: 08/20/22 1257     WBC 28 29 Thousand/uL      RBC 6 84 Million/uL      Hemoglobin 20 3 g/dL      Hematocrit 61 9 %      MCV 91 fL      MCH 29 7 pg      MCHC 32 8 g/dL      RDW 15 7 %      MPV 12 0 fL      Platelets 007 Thousands/uL      nRBC 0 /100 WBCs      Neutrophils Relative 84 %      Immat GRANS % 1 %      Lymphocytes Relative 5 %      Monocytes Relative 10 %      Eosinophils Relative 0 %      Basophils Relative 0 %      Neutrophils Absolute 23 79 Thousands/µL      Immature Grans Absolute 0 21 Thousand/uL      Lymphocytes Absolute 1 46 Thousands/µL      Monocytes Absolute 2 68 Thousand/µL      Eosinophils Absolute 0 05 Thousand/µL      Basophils Absolute 0 10 Thousands/µL     Procalcitonin [[id number]]  (Abnormal) Collected: 08/20/22 1210    Lab Status: Final result Specimen: Blood from Arm, Right Updated: 08/20/22 1250     Procalcitonin 5 49 ng/ml     HS Troponin 0hr (reflex protocol) [[id number]]  (Abnormal) Collected: 08/20/22 1210    Lab Status: Final result Specimen: Blood from Arm, Right Updated: 08/20/22 1247     hs TnI 0hr 547 ng/L     Protime-INR [[id number]]  (Abnormal) Collected: 08/20/22 1210    Lab Status: Final result Specimen: Blood from Arm, Right Updated: 08/20/22 1242     Protime 19 5 seconds      INR 1 62    APTT [[id number]]  (Normal) Collected: 08/20/22 1210    Lab Status: Final result Specimen: Blood from Arm, Right Updated: 08/20/22 1242     PTT 34 seconds     Blood culture #2 [[id number]] Collected: 08/20/22 1210    Lab Status: In process Specimen: Blood from Arm, Right Updated: 08/20/22 1217    Blood culture #1 [[id number]] Collected: 08/20/22 1210    Lab Status:  In process Specimen: Blood from Arm, Right Updated: 08/20/22 1217                 CT chest abdomen pelvis wo contrast   Final Result by Kishan Carter DO (08/20 1626)      1  Obscuration of the left lower lobe bronchus with associated moderate left lower lobe atelectasis  Cannot exclude component of aspiration pneumonia  Although this could be secondary to secretions, bronchoscopy is recommended to exclude underlying mass  Assessment limited without IV contrast         2  Tree-in-bud nodular infiltrates right upper, middle and lower lobes, suspicious for aspiration or other infectious/inflammatory bronchiolitis  Some additional atelectatic change or consolidation posterior right lower lobe with probable mild mucus    plugging  ET tube above the cholo  3   Liver configuration suspicious for cirrhosis  No ascites  4  Moderate right hydronephrosis secondary to a 1 3 x 2 7 cm UPJ calculus  Right mid to upper pole staghorn calculus measures 3 8 x 1 7 cm       5   Cholelithiasis  6  Sigmoid diverticulosis  Limited study without IV or oral contrast       Workstation performed: [id number]         CT head without contrast   Final Result by Kishan Carter DO (08/20 1600)      No acute intracranial abnormality  Stable left middle cranial fossa arachnoid cyst             Workstation performed: [id number]         XR chest 1 view portable   ED Interpretation by Marguerite Rose DO (08/20 1447)   One-view x-ray chest reviewed by myself pending official Radiology report  Status post intubation ET tube appears in adequate position above the cholo no pneumothorax  XR chest 1 view portable   ED Interpretation by Marguerite Rose DO (08/20 1446)   One-view x-ray of the chest reviewed by myself pending official Radiology report  Interval placement of a central venous catheter terminating near the right atrium  Catheter appears in adequate position  No pneumothorax        XR chest portable - 1 view   ED Interpretation by Marguerite Rose DO (08/20 1228)   No acute cardiopulmonary process when compared to prior x-ray from 07/31/2022  Procedures  Intubation    Date/Time: 8/20/2022 1:55 PM  Performed by: Edu Mendez DO  Authorized by: Edu Mendez DO     Patient location:  ED  Other Assisting Provider: No    Consent:     Consent obtained:  Emergent situation  Universal protocol:     Patient identity confirmed: Anonymous protocol, patient vented/unresponsive  Pre-procedure details:     Patient status:  Altered mental status    Pretreatment medications:  Ketamine    Paralytics:  Rocuronium  Indications:     Indications for intubation: airway protection    Procedure details:     Preoxygenation:  BiPAP    CPR in progress: no      Intubation method:  Oral    Oral intubation technique: Video-assisted laryngoscope (Salhe)    Laryngoscope size: saleh Mac 4  Tube size (mm):  7 5    Tube type:  Cuffed    Number of attempts:  1    Ventilation between attempts: no      Cricoid pressure: no      Tube visualized through cords: yes    Placement assessment:     ETT to lip:  25cm    Tube secured with:  ETT solis    Breath sounds:  Equal    Placement verification: chest rise, condensation, colorimetric ETCO2 device, CXR verification, direct visualization, equal breath sounds and ETCO2 detector      CXR findings:  ETT in proper place  Post-procedure details:     Patient tolerance of procedure: Tolerated well, no immediate complications  Central Line    Date/Time: 8/20/2022 2:47 PM  Performed by: Edu Mendez DO  Authorized by: Edu Mendez DO     Patient location:  ED  Consent:     Consent obtained:  Emergent situation    Alternatives discussed:  No treatment  Universal protocol:     Patient identity confirmed: Anonymous protocol, patient vented/unresponsive  Pre-procedure details:     Hand hygiene: Hand hygiene performed prior to insertion      Sterile barrier technique:  All elements of maximal sterile technique followed      Skin preparation:  2% chlorhexidine    Skin preparation agent: Skin preparation agent completely dried prior to procedure    Indications:     Central line indications: no peripheral vascular access    Anesthesia (see MAR for exact dosages): Anesthesia method:  Local infiltration    Local anesthetic:  Bupivacaine 0 25% w/o epi  Procedure details:     Location:  Right internal jugular    Vessel type: vein      Laterality:  Right    Approach: percutaneous technique used      Patient position:  Flat    Catheter type:  Triple lumen    Catheter size:  7 Fr    Landmarks identified: yes      Ultrasound guidance: yes      Ultrasound image availability:  Not obtained due to urgency    Sterile ultrasound techniques: Sterile gel and sterile probe covers were used      Number of attempts:  1    Successful placement: yes    Post-procedure details:     Post-procedure:  Dressing applied    Assessment:  Blood return through all ports, free fluid flow, no pneumothorax on x-ray and placement verified by x-ray    Post-procedure complications: none      Patient tolerance of procedure: Tolerated well, no immediate complications  Comments:      4cm of exposed catheter due to 20cm CVC available only and RIJ site  Line sutures, cleaned and dressed    CriticalCare Time  Performed by: Bailee Fernandez DO  Authorized by: Bailee Fernandez DO     Critical care provider statement:     Critical care time (minutes):  60    Critical care time was exclusive of:  Separately billable procedures and treating other patients and teaching time    Critical care was necessary to treat or prevent imminent or life-threatening deterioration of the following conditions:  Renal failure, respiratory failure, sepsis, metabolic crisis, dehydration, CNS failure or compromise and circulatory failure    Critical care was time spent personally by me on the following activities:  Blood draw for specimens, obtaining history from patient or surrogate, development of treatment plan with patient or surrogate, discussions with consultants, discussions with primary provider, evaluation of patient's response to treatment, examination of patient, ventilator management, review of old charts, re-evaluation of patient's condition, ordering and review of radiographic studies, ordering and review of laboratory studies, ordering and performing treatments and interventions and interpretation of cardiac output measurements    I assumed direction of critical care for this patient from another provider in my specialty: no               ED Course  ED Course as of 08/20/22 1733   Sat Aug 20, 2022   1228 X-ray does not reveal large infiltrate or effusion  Appears grossly unremarkable from previous  1228 Patient with persistent hypoxia despite non-rebreather will place on BiPAP and obtain ABG  1228 EKG interpreted by myself  EKG dated 08/20/2022 at 12:02 p m  Demonstrates sinus tachycardia 133 beats per minute, normal DC interval, normal QRS interval, prolonged QTC interval   Incomplete right bundle-branch block pattern  Nonspecific ST and T-wave abnormality  1251 hs TnI 0hr(!): 547   1251 Procalcitonin(!): 5 49   1251 Mutiple infiltrated IVS, continue to attempt peripheral sticks  I was able to place a 20 gauge peripheral IV in the left antecubital fossa  Will continue fluid resuscitation through this line   0430 [id number] Patient has had a variable pulse oximetry reading throughout ED stay up until this time  Multiple attempts to replace the pulse ox lead in different locations  We really do it a good pleth with sats in the high 90s while on non-rebreather  Does seem to drift into the 80s and 70s with inaccurate pulse ox he does seem to have some peripheral vaso constriction    In light of no change in his clinical status with waxing and waning pulse ox while on BiPAP we were able to obtain an ABG I will await the results of this to inform whether not we need escalator respiratory therapy see still remains with significant work of breathing he may need to be intubated  1259 O2 HGB,Arterial(!): 98 4   1259 Labs demonstrate significant hemoconcentration  He is not significantly acidotic he has a respiratory alkalosis unclear if this is from the patient hyperventilating or some other primary cause  He does not appear to be hypoxic on the ABG   1301 Soft limb restraints due to patient interfere with medical treatment  He appears clinically stable on BiPAP  He was unable urinate we did straight cath him for a small amount of very dark urine given the cluster labs clinical hypovolemia tachycardia air suspect profound dehydration and hypovolemia he should continue to improve with IV fluid resuscitation additionally have a 18 gauge in the left upper arm which we are giving fluids through as well    1308 Attempted beside echo  Very limited at this time  Interrupting additional treatments will reassess as situation allows  Could not definitively rule in or rule out pericardial effusion, right heart strain/PE    1309 BUN(!): 121   1309 Creatinine(!): 6 02   1357 Right IJ central line placed at this time  I did discuss the case with Nephrology they think given the amount of fluid the patient will need for resuscitation his critical illness his degree of renal failure and my concerns about patient potentially needing intubation for combativeness during transportation and the need to proceed with CT scans he is recommending a proceed with intubation  I agree with sent imminent we did displace central line we will administer IV fluids in the prepare for intubation  1400 Intubation performed successfully  Will obtain CXR   1433 Dr Marissa Mishra, nephro updating  Will go to CT next, assuming transfer to [facility] [place] will confirm  Transfer center notified  [phone] Patient can go anywhere with critical care and CRRT capabilities  Emery, terrell Lopez  Pacs notified     6719 Will place KIMMY rodriguez   151 NT-BNP PRO(!)  Spoke with  Juliana, CC at [facility], accepts for transfer  Wants 2L LR or plasmalyte additionally for total of 4L prior to transport  Per PACS will fly  1524 I do not appreciate intracranial hemorrhage or large territory infarction or any new peritoneum or acute abdominal processes on CTs by my review  1525 In error, CT cervical spine was not ordered with Pan-scan  In retrospect, not likely to be of utility will not take back to CT at this time  Patient was encephalopathic but no evidence of trauma was able to move his neck spontaneously and was not in any apparent pain  1555 I did have the opportunity to speak with the patient's mother in detail at this time several hours after patient arrived here  It would appear that the patient lives in the mother's home  She has 2 homes that her attach she stays in 1 he states in the other  She describes a story of progressive mental status changes/encephalopathy with decreased p o  Intake decreased activity decreased ambulation not getting out the house progressing from Tuesday through Wednesday through Thursday  She thinks he had several small falls here off the couch or out of a chair but did not seem to sustain any injuries  At times he was able to get up on his own at other times he did did not seem to respond much  There was no obvious seizure reported  I updated her on the patient's situation the plan for transfer anticipate course  She is doubtful that he was actively using drugs in the context of this current episode actually thinks he might of been trying to quit may be that cause symptoms withdrawal that induced vomiting/BRAULIO  1638   IMPRESSION:     1  Obscuration of the left lower lobe bronchus with associated moderate left lower lobe atelectasis  Cannot exclude component of aspiration pneumonia  Although this could be secondary to secretions, bronchoscopy is recommended to exclude underlying mass  Assessment limited without IV contrast        2  Tree-in-bud nodular infiltrates right upper, middle and lower lobes, suspicious for aspiration or other infectious/inflammatory bronchiolitis  Some additional atelectatic change or consolidation posterior right lower lobe with probable mild mucus   plugging  ET tube above the cholo      3   Liver configuration suspicious for cirrhosis  No ascites      4  Moderate right hydronephrosis secondary to a 1 3 x 2 7 cm UPJ calculus  Right mid to upper pole staghorn calculus measures 3 8 x 1 7 cm      5  Cholelithiasis      6  Sigmoid diverticulosis  1638 I updated critical care on the finding of right hydronephrosis and right UPJ stone  Unclear chronicity  This may change disposition we will discuss with Urology in conference  1646 Cancelled transfer at this time pending urology input   1710 Patient becoming more awake, will start propofol infusion  50mg pushed IV by myself at bedside  0 Spoke with urology, needs to go to [facility] for IR PCN  Urology will contact IR  PACS will notify CC at [facility]  Will d/w them   [phone] I updated the patient;s Mother, Raul العراقي, by phone on the change in plan, disposition  Initial Sepsis Screening     Row Name 08/20/22 1440                Is the patient's history suggestive of a new or worsening infection? Yes (Proceed)  -DT        Suspected source of infection suspect infection, source unknown  -DT        Are two or more of the following signs & symptoms of infection both present and new to the patient?  Yes (Proceed)  -DT        Indicate SIRS criteria Tachycardia > 90 bpm;Leukocytosis (WBC > 08042 IJL)  -DT        If the answer is yes to both questions, suspicion of sepsis is present --        If severe sepsis is present AND tissue hypoperfusion perists in the hour after fluid resuscitation or lactate > 4, the patient meets criteria for SEPTIC SHOCK --        Are any of the following organ dysfunction criteria present within 6 hours of suspected infection and SIRS criteria that are NOT considered to be chronic conditions? Yes  -DT        Organ dysfunction Acute respiratory failure (new need for invasive or non-invasive mechanical ventilation); Urine output < 0 5 mL/kg/hour for 2 hours; Lactate > 2 0 mmol/L;Lactate >/equal 4 0 mmol/L (MEETS CRITERIA FOR SEPTIC SHOCK); Creatinine > 2 0 mg/dL; Creatinine > 2 0 mg/dl AND > 0 5 mg/dl above baseline  -DT        Date of presentation of severe sepsis 08/20/22  -DT        Time of presentation of severe sepsis 1240  -DT        Tissue hypoperfusion persists in the hour after crystalloid fluid administration, evidenced, by either: --        Was hypotension present within one hour of the conclusion of crystalloid fluid administration?  No  -DT        Date of presentation of septic shock --        Time of presentation of septic shock --              User Key  (r) = Recorded By, (t) = Taken By, (c) = Cosigned By    Initials Name Provider Type    DT Aisha Rollins DO Physician              Default Flowsheet Data (last 720 hours)     Sepsis Reassess     Row Name 08/20/22 1441                   Repeat Volume Status and Tissue Perfusion Assessment Performed    Repeat Volume Status and Tissue Perfusion Assessment Performed Yes  -DT                  Volume Status and Tissue Perfusion Post Fluid Resuscitation * Must Document All *    Vital Signs Reviewed (HR, RR, BP, T) Yes  -DT        Shock Index Reviewed Yes  -DT        Arterial Oxygen Saturation Reviewed (POx, SaO2 or SpO2) Yes (comment %)  -DT        Cardio Tachycardia  -DT        Pulmonary Respiratory distress  -DT        Capillary Refill Brisk  -DT        Peripheral Pulses Radial  -DT        Peripheral Pulse +1  -DT        Skin Dry  -DT        Urine output assessed None  -DT                  *OR*   Intensive Monitoring- Must Document One of the Following Four *:    Vital Signs Reviewed --        * Central Venous Pressure (CVP or RAP) --        * Central Venous Oxygen (SVO2, ScvO2 or Oxygen saturation via central catheter) --        * Bedside Cardiovascular US in IVC diameter and % collapse --        * Passive Leg Raise OR Crystalloid Challenge --              User Key  (r) = Recorded By, (t) = Taken By, (c) = Cosigned By    Initials Name Provider Type    KIMBERLY Leonard , DO Physician                            MDM  Number of Diagnoses or Management Options  Acute renal failure (ARF) ([facility] [address] ): new and requires workup  Acute respiratory failure with hypoxia and hypercapnia ([facility] [address] ): new and requires workup  Altered mental status: new and requires workup  Rhabdomyolysis: new and requires workup     Amount and/or Complexity of Data Reviewed  Clinical lab tests: ordered and reviewed  Tests in the radiology section of CPT®: ordered and reviewed  Tests in the medicine section of CPT®: reviewed and ordered  Discussion of test results with the performing providers: yes  Decide to obtain previous medical records or to obtain history from someone other than the patient: yes  Obtain history from someone other than the patient: yes  Review and summarize past medical records: yes  Discuss the patient with other providers: yes  Independent visualization of images, tracings, or specimens: yes    Risk of Complications, Morbidity, and/or Mortality  Presenting problems: high  Diagnostic procedures: high  Management options: high    Patient Progress  Patient progress: stable  50-year-old male who presented as unresponsive episode found down at home last seen 6 days ago with a history of drug abuse  Apparently [facility] appointment on 08/11/2022  For skin lesions  Was on clindamycin at that time  Very little information on arrival the clinical picture would suggest the patient had some sort of collapse partially due to drug use or possibly a seizure or some other unknown causes less likely felt to recent traumatic injury    At that point he likely developed metabolic derangements including rhabdomyolysis acute renal failure profound dehydration hypovolemia  We will proceed with resuscitation the patient had significant respiratory distress on non-rebreather with tachypnea however was not found to be significantly hypoxic on ABG  However despite being placed on BiPAP for work of breathing the decision was made to intubate him in conjunction with Nephrology as this patient is going to require a significant amount of fluid resuscitation, is combative interrupting further treatment including CT scans and transportation to another facility which is required as he needs to go somewhere with critical care capabilities and the ability to perform CRRT if needed  IV access was also difficulty the patient had at least half a dozen peripherally placed ultrasound of guidelines infiltrated  Decision was made to proceed with central line this was performed prior to the intubation in order to ensure adequate meds in fluid resuscitation  Central line placement and intubation proceeded uneventfully  The case was again discussed with Nephrology, Dr Lane Madden, who agreed with the plan for transfer  Pacs was notified      Disposition  Final diagnoses:   Acute respiratory failure with hypoxia and hypercapnia (HCC)   Acute renal failure (ARF) (HCC)   Rhabdomyolysis   Hydronephrosis with ureteropelvic junction (UPJ) obstruction   Toxic metabolic encephalopathy     Time reflects when diagnosis was documented in both MDM as applicable and the Disposition within this note     Time User Action Codes Description Comment    8/20/2022 12:24 PM Cheri Lewis Add [R41 82] Altered mental status     8/20/2022 12:24 PM Cheri Lewis Add [R55] Syncope and collapse determined by examination     8/20/2022 12:24 PM Cheri Lewis Remove [R55] Syncope and collapse determined by examination     8/20/2022 12:25 PM Willma Batters [J96 01,  J96 02] Acute respiratory failure with hypoxia and hypercapnia ([address] )     8/20/2022  2:31 PM [place] Add [N17 9] Acute renal failure (ARF) ([facility] [address] )     8/20/2022  2:31 PM Nathalia Breeding Add [M62 82] Rhabdomyolysis     8/20/2022  5:20 PM Nathalia Breeding Add [Q62 11] Hydronephrosis with ureteropelvic junction (UPJ) obstruction     8/20/2022  5:21 PM [facility] [J96 01,  J96 02] Acute respiratory failure with hypoxia and hypercapnia ([facility] [address] )     8/20/2022  5:21 PM Nathalia Breeding Remove [R41 82] Altered mental status     8/20/2022  5:21 PM [place] Add [Z83 4] Toxic metabolic encephalopathy       ED Disposition     ED Disposition   Transfer to Another Facility-In Network    Condition   --    Date/Time   Sat Aug 20, 2022  3:00 PM    Comment   Sabina Stafford should be transferred out to [facility]           Follow-up Information    None         Patient's Medications   Discharge Prescriptions    No medications on file       No discharge procedures on file      PDMP Review       Value Time User    PDMP Reviewed  Yes 1/4/2021  4:12 PM Renea Watson DO          ED Provider  Electronically Signed by    Bailee Fernandez DO  08/20/22 [address] [facility],[address], DO  08/20/22 3482

## 2022-08-21 ENCOUNTER — APPOINTMENT (OUTPATIENT)
Dept: NEUROLOGY | Facility: CLINIC | Age: 57
DRG: 871 | End: 2022-08-21
Payer: COMMERCIAL

## 2022-08-21 ENCOUNTER — APPOINTMENT (OUTPATIENT)
Dept: RADIOLOGY | Facility: HOSPITAL | Age: 57
DRG: 871 | End: 2022-08-21
Payer: COMMERCIAL

## 2022-08-21 ENCOUNTER — APPOINTMENT (INPATIENT)
Dept: NON INVASIVE DIAGNOSTICS | Facility: HOSPITAL | Age: 57
DRG: 871 | End: 2022-08-21
Payer: COMMERCIAL

## 2022-08-21 ENCOUNTER — APPOINTMENT (INPATIENT)
Dept: RADIOLOGY | Facility: HOSPITAL | Age: 57
DRG: 871 | End: 2022-08-21
Payer: COMMERCIAL

## 2022-08-21 ENCOUNTER — APPOINTMENT (OUTPATIENT)
Dept: GASTROENTEROLOGY | Facility: HOSPITAL | Age: 57
DRG: 871 | End: 2022-08-21
Payer: COMMERCIAL

## 2022-08-21 LAB
AMMONIA PLAS-SCNC: 42 UMOL/L (ref 11–35)
ANION GAP SERPL CALCULATED.3IONS-SCNC: 5 MMOL/L (ref 4–13)
ANION GAP SERPL CALCULATED.3IONS-SCNC: 6 MMOL/L (ref 4–13)
ANION GAP SERPL CALCULATED.3IONS-SCNC: 7 MMOL/L (ref 4–13)
ANION GAP SERPL CALCULATED.3IONS-SCNC: 9 MMOL/L (ref 4–13)
AORTIC ROOT: 3.8 CM
AORTIC VALVE MEAN VELOCITY: 9.1 M/S
APICAL FOUR CHAMBER EJECTION FRACTION: 64 %
AV LVOT MEAN GRADIENT: 3 MMHG
AV LVOT PEAK GRADIENT: 7 MMHG
AV MEAN GRADIENT: 4 MMHG
AV PEAK GRADIENT: 9 MMHG
AV VELOCITY RATIO: 0.85
BASE EXCESS BLDA CALC-SCNC: -4.2 MMOL/L
BASE EXCESS BLDA CALC-SCNC: -6 MMOL/L (ref -2–3)
BASOPHILS # BLD AUTO: 0.09 THOUSANDS/ΜL (ref 0–0.1)
BASOPHILS NFR BLD AUTO: 0 % (ref 0–1)
BUN SERPL-MCNC: 73 MG/DL (ref 5–25)
BUN SERPL-MCNC: 80 MG/DL (ref 5–25)
BUN SERPL-MCNC: 94 MG/DL (ref 5–25)
BUN SERPL-MCNC: 98 MG/DL (ref 5–25)
CA-I BLD-SCNC: 1.13 MMOL/L (ref 1.12–1.32)
CALCIUM SERPL-MCNC: 7.7 MG/DL (ref 8.3–10.1)
CALCIUM SERPL-MCNC: 7.9 MG/DL (ref 8.3–10.1)
CALCIUM SERPL-MCNC: 8.3 MG/DL (ref 8.3–10.1)
CALCIUM SERPL-MCNC: 8.3 MG/DL (ref 8.3–10.1)
CHLORIDE SERPL-SCNC: 128 MMOL/L (ref 96–108)
CHLORIDE SERPL-SCNC: 128 MMOL/L (ref 96–108)
CHLORIDE SERPL-SCNC: 131 MMOL/L (ref 96–108)
CHLORIDE SERPL-SCNC: 134 MMOL/L (ref 96–108)
CO2 SERPL-SCNC: 21 MMOL/L (ref 21–32)
CO2 SERPL-SCNC: 21 MMOL/L (ref 21–32)
CO2 SERPL-SCNC: 22 MMOL/L (ref 21–32)
CO2 SERPL-SCNC: 22 MMOL/L (ref 21–32)
CREAT SERPL-MCNC: 2.22 MG/DL (ref 0.6–1.3)
CREAT SERPL-MCNC: 2.6 MG/DL (ref 0.6–1.3)
CREAT SERPL-MCNC: 3.18 MG/DL (ref 0.6–1.3)
CREAT SERPL-MCNC: 3.38 MG/DL (ref 0.6–1.3)
DOP CALC AO PEAK VEL: 1.5 M/S
DOP CALC AO VTI: 27.86 CM
DOP CALC LVOT PEAK VEL VTI: 29.83 CM
DOP CALC LVOT PEAK VEL: 1.28 M/S
E WAVE DECELERATION TIME: 266 MS
EOSINOPHIL # BLD AUTO: 0.18 THOUSAND/ΜL (ref 0–0.61)
EOSINOPHIL NFR BLD AUTO: 1 % (ref 0–6)
ERYTHROCYTE [DISTWIDTH] IN BLOOD BY AUTOMATED COUNT: 14.9 % (ref 11.6–15.1)
FIO2 GAS DIL.REBREATH: 100 L
FRACTIONAL SHORTENING: 25 (ref 28–44)
GFR SERPL CREATININE-BSD FRML MDRD: 18 ML/MIN/1.73SQ M
GFR SERPL CREATININE-BSD FRML MDRD: 20 ML/MIN/1.73SQ M
GFR SERPL CREATININE-BSD FRML MDRD: 26 ML/MIN/1.73SQ M
GFR SERPL CREATININE-BSD FRML MDRD: 31 ML/MIN/1.73SQ M
GLUCOSE SERPL-MCNC: 121 MG/DL (ref 65–140)
GLUCOSE SERPL-MCNC: 123 MG/DL (ref 65–140)
GLUCOSE SERPL-MCNC: 138 MG/DL (ref 65–140)
GLUCOSE SERPL-MCNC: 148 MG/DL (ref 65–140)
GLUCOSE SERPL-MCNC: 162 MG/DL (ref 65–140)
HCO3 BLDA-SCNC: 20.6 MMOL/L (ref 22–28)
HCO3 BLDA-SCNC: 22 MMOL/L (ref 22–28)
HCT VFR BLD AUTO: 46.3 % (ref 36.5–49.3)
HCT VFR BLD CALC: 41 % (ref 36.5–49.3)
HGB BLD-MCNC: 14.7 G/DL (ref 12–17)
HGB BLDA-MCNC: 13.9 G/DL (ref 12–17)
HOROWITZ INDEX BLDA+IHG-RTO: 80 MM[HG]
IMM GRANULOCYTES # BLD AUTO: 0.48 THOUSAND/UL (ref 0–0.2)
IMM GRANULOCYTES NFR BLD AUTO: 2 % (ref 0–2)
INTERVENTRICULAR SEPTUM IN DIASTOLE (PARASTERNAL SHORT AXIS VIEW): 1.2 CM
INTERVENTRICULAR SEPTUM: 1.2 CM (ref 0.6–1.1)
LAAS-AP4: 12.8 CM2
LEFT ATRIUM SIZE: 3 CM
LEFT INTERNAL DIMENSION IN SYSTOLE: 2.4 CM (ref 2.1–4)
LEFT VENTRICULAR INTERNAL DIMENSION IN DIASTOLE: 3.2 CM (ref 3.5–6)
LEFT VENTRICULAR POSTERIOR WALL IN END DIASTOLE: 0.9 CM
LEFT VENTRICULAR STROKE VOLUME: 20 ML
LVSV (TEICH): 20 ML
LYMPHOCYTES # BLD AUTO: 0.66 THOUSANDS/ΜL (ref 0.6–4.47)
LYMPHOCYTES NFR BLD AUTO: 3 % (ref 14–44)
MAGNESIUM SERPL-MCNC: 3 MG/DL (ref 1.6–2.6)
MCH RBC QN AUTO: 29.3 PG (ref 26.8–34.3)
MCHC RBC AUTO-ENTMCNC: 31.7 G/DL (ref 31.4–37.4)
MCV RBC AUTO: 92 FL (ref 82–98)
MONOCYTES # BLD AUTO: 1.44 THOUSAND/ΜL (ref 0.17–1.22)
MONOCYTES NFR BLD AUTO: 6 % (ref 4–12)
MV E'TISSUE VEL-LAT: 10 CM/S
MV PEAK A VEL: 0.55 M/S
MV PEAK E VEL: 65 CM/S
MV STENOSIS PRESSURE HALF TIME: 77 MS
MV VALVE AREA P 1/2 METHOD: 2.86
NEUTROPHILS # BLD AUTO: 20.83 THOUSANDS/ΜL (ref 1.85–7.62)
NEUTS SEG NFR BLD AUTO: 88 % (ref 43–75)
NRBC BLD AUTO-RTO: 0 /100 WBCS
O2 CT BLDA-SCNC: 20.8 ML/DL (ref 16–23)
OXYHGB MFR BLDA: 95.2 % (ref 94–97)
PCO2 BLD: 24 MMOL/L (ref 21–32)
PCO2 BLD: 53 MM HG (ref 36–44)
PCO2 BLDA: 37 MM HG (ref 36–44)
PEEP RESPIRATORY: 8 CM[H2O]
PH BLD: 7.23 [PH] (ref 7.35–7.45)
PH BLDA: 7.36 [PH] (ref 7.35–7.45)
PHOSPHATE SERPL-MCNC: 4.5 MG/DL (ref 2.7–4.5)
PLATELET # BLD AUTO: 186 THOUSANDS/UL (ref 149–390)
PMV BLD AUTO: 11.9 FL (ref 8.9–12.7)
PO2 BLD: 191 MM HG (ref 75–129)
PO2 BLDA: 83.5 MM HG (ref 75–129)
POTASSIUM BLD-SCNC: 3.4 MMOL/L (ref 3.5–5.3)
POTASSIUM SERPL-SCNC: 3.6 MMOL/L (ref 3.5–5.3)
POTASSIUM SERPL-SCNC: 4.1 MMOL/L (ref 3.5–5.3)
RBC # BLD AUTO: 5.01 MILLION/UL (ref 3.88–5.62)
RIGHT ATRIAL 2D VOLUME: 20 ML
RIGHT ATRIUM AREA SYSTOLE A4C: 10.4 CM2
RIGHT VENTRICLE ID DIMENSION: 4 CM
SAO2 % BLD FROM PO2: 99 % (ref 60–85)
SL CV LV EF: 55
SL CV PED ECHO LEFT VENTRICLE DIASTOLIC VOLUME (MOD BIPLANE) 2D: 40 ML
SL CV PED ECHO LEFT VENTRICLE SYSTOLIC VOLUME (MOD BIPLANE) 2D: 20 ML
SODIUM BLD-SCNC: 156 MMOL/L (ref 136–145)
SODIUM SERPL-SCNC: 157 MMOL/L (ref 135–147)
SODIUM SERPL-SCNC: 158 MMOL/L (ref 135–147)
SODIUM SERPL-SCNC: 158 MMOL/L (ref 135–147)
SODIUM SERPL-SCNC: 161 MMOL/L (ref 135–147)
SPECIMEN SOURCE: ABNORMAL
SPECIMEN SOURCE: ABNORMAL
TR MAX PG: 20 MMHG
TR PEAK VELOCITY: 2.2 M/S
TRICUSPID VALVE PEAK REGURGITATION VELOCITY: 2.22 M/S
VENT AC: 14
VENT- AC: AC
VT SETTING VENT: 420 ML
WBC # BLD AUTO: 23.68 THOUSAND/UL (ref 4.31–10.16)

## 2022-08-21 PROCEDURE — 94760 N-INVAS EAR/PLS OXIMETRY 1: CPT

## 2022-08-21 PROCEDURE — 87081 CULTURE SCREEN ONLY: CPT | Performed by: STUDENT IN AN ORGANIZED HEALTH CARE EDUCATION/TRAINING PROGRAM

## 2022-08-21 PROCEDURE — 31622 DX BRONCHOSCOPE/WASH: CPT | Performed by: INTERNAL MEDICINE

## 2022-08-21 PROCEDURE — 85025 COMPLETE CBC W/AUTO DIFF WBC: CPT | Performed by: STUDENT IN AN ORGANIZED HEALTH CARE EDUCATION/TRAINING PROGRAM

## 2022-08-21 PROCEDURE — 0BC48ZZ EXTIRPATION OF MATTER FROM RIGHT UPPER LOBE BRONCHUS, VIA NATURAL OR ARTIFICIAL OPENING ENDOSCOPIC: ICD-10-PCS | Performed by: INTERNAL MEDICINE

## 2022-08-21 PROCEDURE — 87185 SC STD ENZYME DETCJ PER NZM: CPT | Performed by: STUDENT IN AN ORGANIZED HEALTH CARE EDUCATION/TRAINING PROGRAM

## 2022-08-21 PROCEDURE — 0BC38ZZ EXTIRPATION OF MATTER FROM RIGHT MAIN BRONCHUS, VIA NATURAL OR ARTIFICIAL OPENING ENDOSCOPIC: ICD-10-PCS | Performed by: INTERNAL MEDICINE

## 2022-08-21 PROCEDURE — 93308 TTE F-UP OR LMTD: CPT | Performed by: STUDENT IN AN ORGANIZED HEALTH CARE EDUCATION/TRAINING PROGRAM

## 2022-08-21 PROCEDURE — 82805 BLOOD GASES W/O2 SATURATION: CPT | Performed by: STUDENT IN AN ORGANIZED HEALTH CARE EDUCATION/TRAINING PROGRAM

## 2022-08-21 PROCEDURE — 87077 CULTURE AEROBIC IDENTIFY: CPT | Performed by: STUDENT IN AN ORGANIZED HEALTH CARE EDUCATION/TRAINING PROGRAM

## 2022-08-21 PROCEDURE — G1004 CDSM NDSC: HCPCS

## 2022-08-21 PROCEDURE — 93005 ELECTROCARDIOGRAM TRACING: CPT

## 2022-08-21 PROCEDURE — 0BC68ZZ EXTIRPATION OF MATTER FROM RIGHT LOWER LOBE BRONCHUS, VIA NATURAL OR ARTIFICIAL OPENING ENDOSCOPIC: ICD-10-PCS | Performed by: INTERNAL MEDICINE

## 2022-08-21 PROCEDURE — 94003 VENT MGMT INPAT SUBQ DAY: CPT

## 2022-08-21 PROCEDURE — 80048 BASIC METABOLIC PNL TOTAL CA: CPT

## 2022-08-21 PROCEDURE — 71045 X-RAY EXAM CHEST 1 VIEW: CPT

## 2022-08-21 PROCEDURE — 99255 IP/OBS CONSLTJ NEW/EST HI 80: CPT | Performed by: INTERNAL MEDICINE

## 2022-08-21 PROCEDURE — 87184 SC STD DISK METHOD PER PLATE: CPT | Performed by: STUDENT IN AN ORGANIZED HEALTH CARE EDUCATION/TRAINING PROGRAM

## 2022-08-21 PROCEDURE — 84100 ASSAY OF PHOSPHORUS: CPT | Performed by: STUDENT IN AN ORGANIZED HEALTH CARE EDUCATION/TRAINING PROGRAM

## 2022-08-21 PROCEDURE — 83735 ASSAY OF MAGNESIUM: CPT | Performed by: STUDENT IN AN ORGANIZED HEALTH CARE EDUCATION/TRAINING PROGRAM

## 2022-08-21 PROCEDURE — 87070 CULTURE OTHR SPECIMN AEROBIC: CPT | Performed by: STUDENT IN AN ORGANIZED HEALTH CARE EDUCATION/TRAINING PROGRAM

## 2022-08-21 PROCEDURE — 93306 TTE W/DOPPLER COMPLETE: CPT | Performed by: INTERNAL MEDICINE

## 2022-08-21 PROCEDURE — 70450 CT HEAD/BRAIN W/O DYE: CPT

## 2022-08-21 PROCEDURE — 82140 ASSAY OF AMMONIA: CPT

## 2022-08-21 PROCEDURE — 95816 EEG AWAKE AND DROWSY: CPT

## 2022-08-21 PROCEDURE — 95819 EEG AWAKE AND ASLEEP: CPT | Performed by: PSYCHIATRY & NEUROLOGY

## 2022-08-21 PROCEDURE — 0B9M8ZZ DRAINAGE OF BILATERAL LUNGS, VIA NATURAL OR ARTIFICIAL OPENING ENDOSCOPIC: ICD-10-PCS | Performed by: INTERNAL MEDICINE

## 2022-08-21 PROCEDURE — 0BC78ZZ EXTIRPATION OF MATTER FROM LEFT MAIN BRONCHUS, VIA NATURAL OR ARTIFICIAL OPENING ENDOSCOPIC: ICD-10-PCS | Performed by: INTERNAL MEDICINE

## 2022-08-21 PROCEDURE — 87106 FUNGI IDENTIFICATION YEAST: CPT | Performed by: STUDENT IN AN ORGANIZED HEALTH CARE EDUCATION/TRAINING PROGRAM

## 2022-08-21 PROCEDURE — 99291 CRITICAL CARE FIRST HOUR: CPT | Performed by: INTERNAL MEDICINE

## 2022-08-21 PROCEDURE — 93306 TTE W/DOPPLER COMPLETE: CPT

## 2022-08-21 RX ORDER — VANCOMYCIN HYDROCHLORIDE 1 G/200ML
15 INJECTION, SOLUTION INTRAVENOUS DAILY PRN
Status: DISCONTINUED | OUTPATIENT
Start: 2022-08-22 | End: 2022-08-22

## 2022-08-21 RX ORDER — DIAZEPAM 5 MG/ML
10 INJECTION, SOLUTION INTRAMUSCULAR; INTRAVENOUS ONCE
Status: COMPLETED | OUTPATIENT
Start: 2022-08-21 | End: 2022-08-21

## 2022-08-21 RX ORDER — NOREPINEPHRINE BITARTRATE 1 MG/ML
INJECTION, SOLUTION INTRAVENOUS
Status: COMPLETED
Start: 2022-08-21 | End: 2022-08-21

## 2022-08-21 RX ORDER — POTASSIUM CHLORIDE 20MEQ/15ML
40 LIQUID (ML) ORAL ONCE
Status: COMPLETED | OUTPATIENT
Start: 2022-08-21 | End: 2022-08-21

## 2022-08-21 RX ORDER — POTASSIUM CHLORIDE 29.8 MG/ML
40 INJECTION INTRAVENOUS ONCE
Status: DISCONTINUED | OUTPATIENT
Start: 2022-08-21 | End: 2022-08-21

## 2022-08-21 RX ORDER — SODIUM CHLORIDE, SODIUM GLUCONATE, SODIUM ACETATE, POTASSIUM CHLORIDE, MAGNESIUM CHLORIDE, SODIUM PHOSPHATE, DIBASIC, AND POTASSIUM PHOSPHATE .53; .5; .37; .037; .03; .012; .00082 G/100ML; G/100ML; G/100ML; G/100ML; G/100ML; G/100ML; G/100ML
100 INJECTION, SOLUTION INTRAVENOUS CONTINUOUS
Status: DISCONTINUED | OUTPATIENT
Start: 2022-08-21 | End: 2022-08-21

## 2022-08-21 RX ORDER — DEXTROSE MONOHYDRATE 50 MG/ML
50 INJECTION, SOLUTION INTRAVENOUS CONTINUOUS
Status: DISCONTINUED | OUTPATIENT
Start: 2022-08-21 | End: 2022-08-22

## 2022-08-21 RX ORDER — DIAZEPAM 5 MG/ML
INJECTION, SOLUTION INTRAMUSCULAR; INTRAVENOUS
Status: COMPLETED
Start: 2022-08-21 | End: 2022-08-21

## 2022-08-21 RX ORDER — RISPERIDONE 1 MG/1
1 TABLET, FILM COATED ORAL 2 TIMES DAILY
Status: DISCONTINUED | OUTPATIENT
Start: 2022-08-21 | End: 2022-08-25 | Stop reason: HOSPADM

## 2022-08-21 RX ORDER — LANOLIN ALCOHOL/MO/W.PET/CERES
6 CREAM (GRAM) TOPICAL
Status: DISCONTINUED | OUTPATIENT
Start: 2022-08-21 | End: 2022-08-23

## 2022-08-21 RX ORDER — FENTANYL CITRATE 50 UG/ML
100 INJECTION, SOLUTION INTRAMUSCULAR; INTRAVENOUS ONCE
Status: DISCONTINUED | OUTPATIENT
Start: 2022-08-21 | End: 2022-08-21

## 2022-08-21 RX ORDER — SODIUM CHLORIDE, SODIUM GLUCONATE, SODIUM ACETATE, POTASSIUM CHLORIDE, MAGNESIUM CHLORIDE, SODIUM PHOSPHATE, DIBASIC, AND POTASSIUM PHOSPHATE .53; .5; .37; .037; .03; .012; .00082 G/100ML; G/100ML; G/100ML; G/100ML; G/100ML; G/100ML; G/100ML
1000 INJECTION, SOLUTION INTRAVENOUS ONCE
Status: COMPLETED | OUTPATIENT
Start: 2022-08-21 | End: 2022-08-21

## 2022-08-21 RX ORDER — DIAZEPAM 5 MG/ML
5 INJECTION, SOLUTION INTRAMUSCULAR; INTRAVENOUS EVERY 2 HOUR PRN
Status: COMPLETED | OUTPATIENT
Start: 2022-08-21 | End: 2022-08-21

## 2022-08-21 RX ORDER — MIDAZOLAM HYDROCHLORIDE 2 MG/2ML
2 INJECTION, SOLUTION INTRAMUSCULAR; INTRAVENOUS ONCE
Status: COMPLETED | OUTPATIENT
Start: 2022-08-21 | End: 2022-08-21

## 2022-08-21 RX ORDER — DEXMEDETOMIDINE HYDROCHLORIDE 4 UG/ML
.1-.7 INJECTION, SOLUTION INTRAVENOUS
Status: DISCONTINUED | OUTPATIENT
Start: 2022-08-21 | End: 2022-08-22

## 2022-08-21 RX ORDER — LIDOCAINE HYDROCHLORIDE 10 MG/ML
6 INJECTION, SOLUTION EPIDURAL; INFILTRATION; INTRACAUDAL; PERINEURAL ONCE
Status: COMPLETED | OUTPATIENT
Start: 2022-08-21 | End: 2022-08-21

## 2022-08-21 RX ORDER — RISPERIDONE 1 MG/1
1 TABLET, ORALLY DISINTEGRATING ORAL 2 TIMES DAILY
Status: DISCONTINUED | OUTPATIENT
Start: 2022-08-21 | End: 2022-08-21

## 2022-08-21 RX ORDER — MIDAZOLAM HYDROCHLORIDE 2 MG/2ML
2 INJECTION, SOLUTION INTRAMUSCULAR; INTRAVENOUS ONCE
Status: DISCONTINUED | OUTPATIENT
Start: 2022-08-21 | End: 2022-08-21

## 2022-08-21 RX ORDER — LIDOCAINE HYDROCHLORIDE 10 MG/ML
INJECTION, SOLUTION EPIDURAL; INFILTRATION; INTRACAUDAL; PERINEURAL
Status: COMPLETED
Start: 2022-08-21 | End: 2022-08-21

## 2022-08-21 RX ADMIN — RISPERIDONE 1 MG: 1 TABLET ORAL at 23:43

## 2022-08-21 RX ADMIN — FENTANYL CITRATE 50 MCG: 50 INJECTION INTRAMUSCULAR; INTRAVENOUS at 14:31

## 2022-08-21 RX ADMIN — HEPARIN SODIUM 5000 UNITS: 5000 INJECTION INTRAVENOUS; SUBCUTANEOUS at 05:26

## 2022-08-21 RX ADMIN — DIAZEPAM 5 MG: 5 INJECTION, SOLUTION INTRAMUSCULAR; INTRAVENOUS at 07:06

## 2022-08-21 RX ADMIN — Medication 6 MG: at 22:40

## 2022-08-21 RX ADMIN — CHLORHEXIDINE GLUCONATE 15 ML: 1.2 SOLUTION ORAL at 09:22

## 2022-08-21 RX ADMIN — CHLORHEXIDINE GLUCONATE 15 ML: 1.2 SOLUTION ORAL at 00:00

## 2022-08-21 RX ADMIN — DEXMEDETOMIDINE HYDROCHLORIDE 0.3 MCG/KG/HR: 400 INJECTION INTRAVENOUS at 01:18

## 2022-08-21 RX ADMIN — DEXMEDETOMIDINE HYDROCHLORIDE 0.7 MCG/KG/HR: 400 INJECTION INTRAVENOUS at 14:30

## 2022-08-21 RX ADMIN — NOREPINEPHRINE BITARTRATE 3 MCG/MIN: 1 INJECTION, SOLUTION, CONCENTRATE INTRAVENOUS at 20:34

## 2022-08-21 RX ADMIN — HEPARIN SODIUM 5000 UNITS: 5000 INJECTION INTRAVENOUS; SUBCUTANEOUS at 13:54

## 2022-08-21 RX ADMIN — CHLORHEXIDINE GLUCONATE 15 ML: 1.2 SOLUTION ORAL at 21:55

## 2022-08-21 RX ADMIN — POTASSIUM CHLORIDE 40 MEQ: 20 SOLUTION ORAL at 09:35

## 2022-08-21 RX ADMIN — VANCOMYCIN HYDROCHLORIDE 1500 MG: 10 INJECTION, POWDER, LYOPHILIZED, FOR SOLUTION INTRAVENOUS at 07:41

## 2022-08-21 RX ADMIN — NOREPINEPHRINE BITARTRATE: 1 INJECTION INTRAVENOUS at 02:45

## 2022-08-21 RX ADMIN — SODIUM CHLORIDE, SODIUM GLUCONATE, SODIUM ACETATE, POTASSIUM CHLORIDE, MAGNESIUM CHLORIDE, SODIUM PHOSPHATE, DIBASIC, AND POTASSIUM PHOSPHATE 1000 ML: .53; .5; .37; .037; .03; .012; .00082 INJECTION, SOLUTION INTRAVENOUS at 06:08

## 2022-08-21 RX ADMIN — DEXMEDETOMIDINE HYDROCHLORIDE 0.7 MCG/KG/HR: 400 INJECTION INTRAVENOUS at 05:46

## 2022-08-21 RX ADMIN — HEPARIN SODIUM 5000 UNITS: 5000 INJECTION INTRAVENOUS; SUBCUTANEOUS at 21:55

## 2022-08-21 RX ADMIN — NOREPINEPHRINE BITARTRATE 2 MCG/MIN: 1 INJECTION, SOLUTION, CONCENTRATE INTRAVENOUS at 02:45

## 2022-08-21 RX ADMIN — SODIUM CHLORIDE, SODIUM GLUCONATE, SODIUM ACETATE, POTASSIUM CHLORIDE, MAGNESIUM CHLORIDE, SODIUM PHOSPHATE, DIBASIC, AND POTASSIUM PHOSPHATE 1000 ML: .53; .5; .37; .037; .03; .012; .00082 INJECTION, SOLUTION INTRAVENOUS at 01:08

## 2022-08-21 RX ADMIN — DIAZEPAM 10 MG: 10 INJECTION, SOLUTION INTRAMUSCULAR; INTRAVENOUS at 07:39

## 2022-08-21 RX ADMIN — MIDAZOLAM 2 MG: 1 INJECTION INTRAMUSCULAR; INTRAVENOUS at 14:26

## 2022-08-21 RX ADMIN — CEFTRIAXONE 1000 MG: 10 INJECTION, POWDER, FOR SOLUTION INTRAVENOUS at 10:45

## 2022-08-21 RX ADMIN — DEXTROSE 50 ML/HR: 5 SOLUTION INTRAVENOUS at 18:36

## 2022-08-21 RX ADMIN — DIAZEPAM 5 MG: 10 INJECTION, SOLUTION INTRAMUSCULAR; INTRAVENOUS at 07:06

## 2022-08-21 RX ADMIN — SODIUM CHLORIDE, SODIUM GLUCONATE, SODIUM ACETATE, POTASSIUM CHLORIDE, MAGNESIUM CHLORIDE, SODIUM PHOSPHATE, DIBASIC, AND POTASSIUM PHOSPHATE 100 ML/HR: .53; .5; .37; .037; .03; .012; .00082 INJECTION, SOLUTION INTRAVENOUS at 13:27

## 2022-08-21 NOTE — RESPIRATORY THERAPY NOTE
RT Ventilator Management Note  Lu Soliman 61 y o  male MRN: [id number]  Unit/Bed#: [facility] 12 Encounter: [id number]      Daily Screen         8/21/2022  1621 8/21/2022  1827          Patient safety screen outcome[de-identified] Passed Passed (P)       Spont breathing trial % for 30 min: -- Yes (P)       Spont breathing trial outcome[de-identified] -- Passed (P)       Name of Medical Team Notified[de-identified] -- MD (P)       Preparing to extubate/ Notify Nurse: -- Yes (P)       Extubation order obtained: -- Yes (P)       Consider Cuff Test: -- No (comment) (P)       Patient extubated: -- Yes (P)       RSBI: -- 53 (P)                 Physical Exam:   Assessment Type: Assess only  General Appearance: Sedated  Respiratory Pattern: Assisted  Chest Assessment: Chest expansion symmetrical  Bilateral Breath Sounds: (P) Diminished  Suction: ET Tube  O2 Device: (P) NC      Resp Comments: (P) Pt extubated to 6L NC, Spo2 99%  Immediately after ETT removal patient lously stated "wow that feels great" No post extubation stridor  RN at bedside

## 2022-08-21 NOTE — PLAN OF CARE
Problem: MOBILITY - ADULT  Goal: Maintain or return to baseline ADL function  Description: INTERVENTIONS:  -  Assess patient's ability to carry out ADLs; assess patient's baseline for ADL function and identify physical deficits which impact ability to perform ADLs (bathing, care of mouth/teeth, toileting, grooming, dressing, etc )  - Assess/evaluate cause of self-care deficits   - Assess range of motion  - Assess patient's mobility; develop plan if impaired  - Assess patient's need for assistive devices and provide as appropriate  - Encourage maximum independence but intervene and supervise when necessary  - Involve family in performance of ADLs  - Assess for home care needs following discharge   - Consider OT consult to assist with ADL evaluation and planning for discharge  - Provide patient education as appropriate  Outcome: Progressing  Goal: Maintains/Returns to pre admission functional level  Description: INTERVENTIONS:  - Perform BMAT or MOVE assessment daily    - Set and communicate daily mobility goal to care team and patient/family/caregiver  - Collaborate with rehabilitation services on mobility goals if consulted  - Perform Range of Motion 3 times a day  - Reposition patient every 2 hours    - Dangle patient 2 times a day  - Stand patient 2 times a day  - Ambulate patient 2 times a day  - Out of bed to chair 2 times a day   - Out of bed for meals 3 times a day  - Out of bed for toileting  - Record patient progress and toleration of activity level   Outcome: Progressing     Problem: PAIN - ADULT  Goal: Verbalizes/displays adequate comfort level or baseline comfort level  Description: Interventions:  - Encourage patient to monitor pain and request assistance  - Assess pain using appropriate pain scale  - Administer analgesics based on type and severity of pain and evaluate response  - Implement non-pharmacological measures as appropriate and evaluate response  - Consider cultural and social influences on pain and pain management  - Notify physician/advanced practitioner if interventions unsuccessful or patient reports new pain  Outcome: Progressing     Problem: INFECTION - ADULT  Goal: Absence or prevention of progression during hospitalization  Description: INTERVENTIONS:  - Assess and monitor for signs and symptoms of infection  - Monitor lab/diagnostic results  - Monitor all insertion sites, i e  indwelling lines, tubes, and drains  - Monitor endotracheal if appropriate and nasal secretions for changes in amount and color  - [place] appropriate cooling/warming therapies per order  - Administer medications as ordered  - Instruct and encourage patient and family to use good hand hygiene technique  - Identify and instruct in appropriate isolation precautions for identified infection/condition  Outcome: Progressing  Goal: Absence of fever/infection during neutropenic period  Description: INTERVENTIONS:  - Monitor WBC    Outcome: Progressing     Problem: SAFETY ADULT  Goal: Maintain or return to baseline ADL function  Description: INTERVENTIONS:  -  Assess patient's ability to carry out ADLs; assess patient's baseline for ADL function and identify physical deficits which impact ability to perform ADLs (bathing, care of mouth/teeth, toileting, grooming, dressing, etc )  - Assess/evaluate cause of self-care deficits   - Assess range of motion  - Assess patient's mobility; develop plan if impaired  - Assess patient's need for assistive devices and provide as appropriate  - Encourage maximum independence but intervene and supervise when necessary  - Involve family in performance of ADLs  - Assess for home care needs following discharge   - Consider OT consult to assist with ADL evaluation and planning for discharge  - Provide patient education as appropriate  Outcome: Progressing  Goal: Maintains/Returns to pre admission functional level  Description: INTERVENTIONS:  - Perform BMAT or MOVE assessment daily    - Set and communicate daily mobility goal to care team and patient/family/caregiver  - Collaborate with rehabilitation services on mobility goals if consulted  - Perform Range of Motion 3 times a day  - Reposition patient every 2 hours    - Dangle patient 2 times a day  - Stand patient 2 times a day  - Ambulate patient 2 times a day  - Out of bed to chair 2 times a day   - Out of bed for meals 3 times a day  - Out of bed for toileting  - Record patient progress and toleration of activity level   Outcome: Progressing  Goal: Patient will remain free of falls  Description: INTERVENTIONS:  - Educate patient/family on patient safety including physical limitations  - Instruct patient to call for assistance with activity   - Consult OT/PT to assist with strengthening/mobility   - Keep Call bell within reach  - Keep bed low and locked with side rails adjusted as appropriate  - Keep care items and personal belongings within reach  - Initiate and maintain comfort rounds  - Make Fall Risk Sign visible to staff  - Offer Toileting every 2 Hours, in advance of need  - Initiate/Maintain bed alarm  - Obtain necessary fall risk management equipment:    - Apply yellow socks and bracelet for high fall risk patients  - Consider moving patient to room near nurses station  Outcome: Progressing     Problem: DISCHARGE PLANNING  Goal: Discharge to home or other facility with appropriate resources  Description: INTERVENTIONS:  - Identify barriers to discharge w/patient and caregiver  - Arrange for needed discharge resources and transportation as appropriate  - Identify discharge learning needs (meds, wound care, etc )  - Arrange for interpretive services to assist at discharge as needed  - Refer to Case Management Department for coordinating discharge planning if the patient needs post-hospital services based on physician/advanced practitioner order or complex needs related to functional status, cognitive ability, or social support system  Outcome: Progressing     Problem: Knowledge Deficit  Goal: Patient/family/caregiver demonstrates understanding of disease process, treatment plan, medications, and discharge instructions  Description: Complete learning assessment and assess knowledge base    Interventions:  - Provide teaching at level of understanding  - Provide teaching via preferred learning methods  Outcome: Progressing     Problem: Prexisting or High Potential for Compromised Skin Integrity  Goal: Skin integrity is maintained or improved  Description: INTERVENTIONS:  - Identify patients at risk for skin breakdown  - Assess and monitor skin integrity  - Assess and monitor nutrition and hydration status  - Monitor labs   - Assess for incontinence   - Turn and reposition patient  - Assist with mobility/ambulation  - Relieve pressure over bony prominences  - Avoid friction and shearing  - Provide appropriate hygiene as needed including keeping skin clean and dry  - Evaluate need for skin moisturizer/barrier cream  - Collaborate with interdisciplinary team   - Patient/family teaching  - Consider wound care consult   Outcome: Progressing

## 2022-08-21 NOTE — TELEMEDICINE
e-Consult (IPC)  - Interventional Radiology  Danita Sanchez 61 y o  male MRN: [id number]  Unit/Bed#: MICU 12 Encounter: [id number]          Interventional Radiology has been consulted to evaluate Danita Sanchez    We were consulted by critical care concerning this patient with urosepsis  IP Consult to IR  Consult performed by: Umair Kelly MD  Consult ordered by: Marisabel Hernandez MD        08/20/22    Assessment/Recommendation:   59-year-old male who was found by family and presented to the minor is a emergency department with toxic metabolic encephalopathy and urosepsis with BRAULIO  Patient has a history of kidney stones with multiple procedures in the past   By CT the patient currently has a right-sided staghorn calculus and a stone at the UP junction which is causing obstruction  A right nephrostomy tube is recommended  Informed consent was obtained from the patient's mother by telephone  Will perform the procedure as soon as the patient can be brought to interventional radiology from MICU     11-20 minutes, >50% of the total time devoted to medical consultative verbal/EMR discussion between providers  Written report will be generated in the EMR  Thank you for allowing Interventional Radiology to participate in the care of Danita Sanchez  Please don't hesitate to call or TigerText us with any questions       Umair Kelly MD

## 2022-08-21 NOTE — UTILIZATION REVIEW
Initial Clinical Review    Admission: Date/Time/Statement:   Admission Orders (From admission, onward)     Ordered        08/20/22 2034  Inpatient Admission  Once                      Orders Placed This Encounter   Procedures    Inpatient Admission     Standing Status:   Standing     Number of Occurrences:   1     Order Specific Question:   Level of Care     Answer:   Critical Care [15]     Order Specific Question:   Estimated length of stay     Answer:   More than 2 Midnights     Order Specific Question:   Certification     Answer:   I certify that inpatient services are medically necessary for this patient for a duration of greater than two midnights  See H&P and MD Progress Notes for additional information about the patient's course of treatment  Initial Presentation: 61 y o  male with PMH of polysubstance abuse, kidney stones, and HTN  who initially presented to [facility] ED on 8/20 after being found down by family and unresponsive  Patient had initial elevated troponin, procal, lactic acidosis, leukocytosis, and elevated creatinine of 6 02  Central line placed for fluid resuscitation  Patient was intubated after speaking with nephrology  CT Head was negative  Ct C/A/P showed obscuration of left lower bronchus, aspiration, moderate right hydronephrosis with 1 3x 2 7 cm UPJ calculus, right mid to upper pole staghorn calculus measuring 3 8x1 7 cm  Patient will be transferred to [facility] MICU for PCN by IR and possible CRRT  Admit Inpatient med surg, MICU: Pt with c/o acute encephalopathy requiring intubation for airway protection and found to have hypoxia, concern for sepsis, and acute kidney injury with hydronephrosis due to obstructing renal stone  On exam, he is intubated and sedated  Tachypnic, coarse mechanical breath sounds, no wheezing  Tachycardic, regular rhythm  Abdomen soft  Multiple track marks noted on skin in various stages of healing    Repeat STAT labs to determine need for urgent RRT, nephrology consult, maintain Yee catheter, IVF, trend CPK  Continue intubation and sedation  F/u on VBG, monitor for potential start of bicarb drip or potential RRT  Trend lactic acid, continue IV ABX, f/u on blood and urine cxs, trend temp and WBC  TTE ordered dt elevated BNP  Date: 8/21   Day 2:   Went to IR and had a right sided percutaneous nephrostomy tube placed  Tolerated well  After the procedure, started on Norepinephrine  Overnight, noted to have downward gaze off sedation  CT head this AM  Gaze preference improved  Wakes up and follows commands  Sedated on ventilator  Febrile, Tm 101 1; hypotensive; ETT in place; neck supple; cardiac regular; lungs assisted breathing with clear b/l bs; ext no edema; skin no visible rash, multiple scars and scabbed eschar on b/l l/e and u/e; neuro downward gaze, arouses and follows commands and tracks  Severe sepsis w/ septic shock dt obstructing renal stone w/ possible pneumonia and at risk of developing endocarditis given IVDA  Continue norepinephrine, precedex and propofol drips  Maintain NGT, continue IVF resuscitation, check echo, keep NPO, hold home amlodipine, trend BMP, VBG, lactic acid, BUN/Cr, f/u on blood cxs, pt/ot when able  8/21 Nephrology Consult:  BRAULIO secondary to obstructive staghorn calculi and rhabdomyolysis now improving  His baseline creatinine is 0 8  Presented with a creatinine of 6 02 and severely azotemic with a BUN of 120  Percutaneous nephrostomy tube placed and creatinine is now trending down  1 7 L so far of urine output from urethral catheter and nephrostomy tube combined  Urinalysis was slightly cloudy with a high specific gravity trace leukocyte, trace ketones large blood 2+ protein  CK mildly elevated at 3236  Continue to keep map above 65 remains on low-dose norepinephrine  Continue volume expansion as patient will have a postobstructive diuresis likely    No indication for renal replacement therapy at this point ED Triage Vitals   Temperature Pulse Respirations Blood Pressure SpO2   08/20/22 2100 08/20/22 2100 08/20/22 2100 08/20/22 2100 08/20/22 2044   99 5 °F (37 5 °C) 96 14 92/60 95 %      Temp Source Heart Rate Source Patient Position - Orthostatic VS BP Location FiO2 (%)   08/20/22 2104 08/20/22 2100 -- 08/20/22 2100 --   Axillary Monitor  Left arm       Pain Score       --                 Wt Readings from Last 1 Encounters:   08/21/22 60 8 kg (134 lb)     Additional Vital Signs:   Date/Time Temp Pulse Resp BP MAP (mmHg) Arterial Line BP MAP SpO2 O2 Device   08/21/22 0900 98 96 °F (37 2 °C) 56 -- -- -- 102/48 66 mmHg 92 % --   08/21/22 0800 99 32 °F (37 4 °C) 58 -- -- -- 104/46 66 mmHg 100 % --   08/21/22 0705 -- -- -- 89/50 Abnormal  -- -- -- -- --   08/21/22 0600 101 12 °F (38 4 °C) Abnormal  66 -- 89/50 Abnormal  59 Abnormal  106/48 64 mmHg Abnormal  100 % --   08/21/22 0500 100 76 °F (38 2 °C) Abnormal  68 -- -- -- 106/44 62 mmHg Abnormal  99 % --   08/21/22 0415 -- -- -- -- -- -- -- 100 % --   08/21/22 0400 100 76 °F (38 2 °C) Abnormal  74 -- -- -- 106/46 62 mmHg Abnormal  100 % Ventilator   08/21/22 0300 100 76 °F (38 2 °C) Abnormal  78 -- 83/45 Abnormal  62 Abnormal  96/42 58 mmHg Abnormal  100 % --   08/21/22 0200 100 76 °F (38 2 °C) Abnormal  80 -- -- -- 104/46 64 mmHg Abnormal  100 % --   08/21/22 0100 101 12 °F (38 4 °C) Abnormal  88 -- -- -- 100/46 60 mmHg Abnormal  100 % --   08/21/22 0000 100 4 °F (38 °C) 94 14 -- -- 112/48 64 mmHg Abnormal  100 % Ventilator   08/20/22 2300 -- 100 -- 115/59 83 136/60 80 mmHg 97 % --   08/20/22 2104 99 4 °F (37 4 °C) -- -- -- -- -- -- -- --   08/20/22 2100 99 5 °F (37 5 °C) 96 14 92/60 71 -- -- 93 % Ventilator   08/20/22 2044 -- -- -- -- -- -- -- 95 % --     Pertinent Labs/Diagnostic Test Results:   CT head wo contrast   Final Result by Jose Antonio Blake DO (08/21 5471)      No acute intracranial abnormality is seen  Other findings as above     XR chest portable ICU   Final Result by Deangelo Young MD (08/21 0932)      Left greater than right pneumonia, better shown on CT     IR nephrostomy tube placement    (Results Pending)     Results from last 7 days   Lab Units 08/20/22  1210   SARS-COV-2  Negative     Results from last 7 days   Lab Units 08/21/22  0449 08/20/22  2220 08/20/22 2041 08/20/22  1210   WBC Thousand/uL 23 68*  --  24 99* 28 29*   HEMOGLOBIN g/dL 14 7  --  16 2 20 3*   I STAT HEMOGLOBIN g/dl  --  13 9  --   --    HEMATOCRIT % 46 3  --  50 0* 61 9*   HEMATOCRIT, ISTAT %  --  41  --   --    PLATELETS Thousands/uL 186  --  166 225   NEUTROS ABS Thousands/µL 20 83*  --  22 01* 23 79*     Results from last 7 days   Lab Units 08/21/22  0449 08/21/22  0255 08/20/22 2220 08/20/22 2041 08/20/22  1210   SODIUM mmol/L 158* 157*  --  156* 160*   POTASSIUM mmol/L 3 6 3 6  --  3 7 3 7   CHLORIDE mmol/L 128* 128*  --  127* 112*   CO2 mmol/L 21 22  --  22 22   CO2, I-STAT mmol/L  --   --  24  --   --    ANION GAP mmol/L 9 7  --  7 26*   BUN mg/dL 94* 98*  --  119* 121*   CREATININE mg/dL 3 18* 3 38*  --  4 48* 6 02*   EGFR ml/min/1 73sq m 20 18  --  13 9   CALCIUM mg/dL 7 9* 7 7*  --  8 4 10 2*   CALCIUM, IONIZED mmol/L  --   --   --  1 14  --    CALCIUM, IONIZED, ISTAT mmol/L  --   --  1 13  --   --    MAGNESIUM mg/dL 3 0*  --   --  2 8* 3 5*   PHOSPHORUS mg/dL 4 5  --   --  5 2*  --      Results from last 7 days   Lab Units 08/21/22  0034 08/20/22 2041 08/20/22  1210   AST U/L  --  78* 132*   ALT U/L  --  43 66   ALK PHOS U/L  --  60 76   TOTAL PROTEIN g/dL  --  6 3* 8 7*   ALBUMIN g/dL  --  2 7* 4 3   TOTAL BILIRUBIN mg/dL  --  0 83 1 93*   AMMONIA umol/L 42*  --   --      Results from last 7 days   Lab Units 08/21/22  0449 08/21/22  0255 08/20/22  2041 08/20/22  1210   GLUCOSE RANDOM mg/dL 138 121 137 139      Results from last 7 days   Lab Units 08/21/22  0453 08/20/22  1812 08/20/22  1243   PH ART  7 363 7 340* 7 394   PCO2 ART mm Hg 37 0 33 3* 22 1* PO2 ART mm Hg 83 5 65 3* 178 6*   HCO3 ART mmol/L 20 6* 17 6* 13 2*   BASE EXC ART mmol/L -4 2 -7 0 -8 3   O2 CONTENT ART mL/dL 20 8 20 8 29 6*   O2 HGB, ARTERIAL % 95 2 90 9* 98 4*   ABG SOURCE  Line, Arterial Radial, Left Radial, Left     Results from last 7 days   Lab Units 08/20/22  2052   PH JOSH  7 293*   PCO2 JOSH mm Hg 45 7   PO2 JOSH mm Hg 33 4*   HCO3 JOSH mmol/L 21 6*   BASE EXC JOSH mmol/L -5 0   O2 CONTENT JOSH ml/dL 14 0   O2 HGB, VENOUS % 59 1*     Results from last 7 days   Lab Units 08/20/22  2220   I STAT BASE EXC mmol/L -6*   I STAT O2 SAT % 99*   ISTAT PH ART  7 227*   I STAT ART PCO2 mm HG 53 0*   I STAT ART PO2 mm  0*   I STAT ART HCO3 mmol/L 22 0     Results from last 7 days   Lab Units 08/20/22  1210   CK TOTAL U/L 3,236*   CK MB INDEX % <1 0   CK MB ng/mL 13 2*     Results from last 7 days   Lab Units 08/20/22  1637 08/20/22  1436 08/20/22  1210   HS TNI 0HR ng/L  --   --  547*   HS TNI 2HR ng/L  --  500*  --    HSTNI D2 ng/L  --  -47  --    HS TNI 4HR ng/L 448*  --   --    HSTNI D4 ng/L -99  --   --      Results from last 7 days   Lab Units 08/20/22  1210   D-DIMER QUANTITATIVE ug/ml FEU 1 72*     Results from last 7 days   Lab Units 08/20/22  1210   PROTIME seconds 19 5*   INR  1 62*   PTT seconds 34     Results from last 7 days   Lab Units 08/20/22  1210   PROCALCITONIN ng/ml 5 49*     Results from last 7 days   Lab Units 08/20/22  2041 08/20/22  1436 08/20/22  1210   LACTIC ACID mmol/L 2 0 2 4* 4 8*     Results from last 7 days   Lab Units 08/20/22  1210   NT-PRO BNP pg/mL 11,339*     Results from last 7 days   Lab Units 08/20/22  1210   LIPASE u/L 1,015*       Results from last 7 days   Lab Units 08/20/22  1307   CLARITY UA  Slightly Cloudy   COLOR UA  Elayne   SPEC GRAV UA  1 025   PH UA  5 5   GLUCOSE UA mg/dl Negative   KETONES UA mg/dl Trace*   BLOOD UA  Large*   PROTEIN UA mg/dl 100 (2+)*   NITRITE UA  Negative   BILIRUBIN UA  Interference- unable to analyze*   UROBILINOGEN UA E U /dl 1 0   LEUKOCYTES UA  Small*   WBC UA /hpf 10-20*   RBC UA /hpf 10-20*   BACTERIA UA /hpf Occasional   EPITHELIAL CELLS WET PREP /hpf None Seen     Results from last 7 days   Lab Units 08/20/22  1210   INFLUENZA A PCR  Negative   INFLUENZA B PCR  Negative   RSV PCR  Negative     Results from last 7 days   Lab Units 08/20/22  1307   AMPH/METH  Negative   BARBITURATE UR  Negative   BENZODIAZEPINE UR  Negative   COCAINE UR  Negative   METHADONE URINE  Negative   OPIATE UR  Negative   PCP UR  Negative   THC UR  Negative     Results from last 7 days   Lab Units 08/20/22  1210   ETHANOL LVL mg/dL <3   ACETAMINOPHEN LVL ug/mL <8 9*   SALICYLATE LVL mg/dL 3     Results from last 7 days   Lab Units 08/20/22  2324 08/20/22  2057 08/20/22  1210   BLOOD CULTURE  Received in Microbiology Lab  Culture in Progress  Received in Microbiology Lab  Culture in Progress  Received in Microbiology Lab  Culture in Progress  Received in Microbiology Lab  Culture in Progress         Past Medical History:   Diagnosis Date    Anxiety     Bright red rectal bleeding     Last Assessed: 9/9/2015     Hypertension     Last Assessed: 7/9/2014     Kidney stones     Last Assessed: 11/17/2016     Periorbital edema     Last Assessed: 9/4/2015    Skin tag of anus     Last Assessed: 9/9/2015     Trigger point of thoracic region     Last Assessed: 11/6/2015      Present on Admission:   BRAULIO (acute kidney injury) ([facility] [address] )   Transaminitis   Elevated lipase   Hypernatremia   Elevated brain natriuretic peptide (BNP) level   Acute respiratory failure with hypoxia (HCC)   Acute metabolic encephalopathy   Drug dependence (HCC)   Kidney stones      Admitting Diagnosis: Renal failure [N19]  Age/Sex: 61 y o  male  Admission Orders:  Scheduled Medications:  cefTRIAXone, 1,000 mg, Intravenous, Q24H  chlorhexidine, 15 mL, Mouth/Throat, Q12H [address]  heparin (porcine), 5,000 Units, Subcutaneous, Q8H [address]      Continuous IV Infusions:  dexmedetomidine, 0 1-0 7 mcg/kg/hr, Intravenous, Titrated  multi-electrolyte, 100 mL/hr, Intravenous, Continuous  norepinephrine, 1-30 mcg/min, Intravenous, Titrated      PRN Meds:  fentanyl citrate (PF), 50 mcg, Intravenous, Q2H PRN  [START ON 8/22/2022] vancomycin, 15 mg/kg, Intravenous, Daily PRN      scd  Neuro checks  IO, daily wts  NGT  Urinary catheter  ET tube suction  Restraints non-violent    INPATIENT CONSULT TO IR  IP CONSULT TO CASE MANAGEMENT  IP CONSULT TO NEPHROLOGY  IP CONSULT TO PHARMACY    Network Utilization Review Department  ATTENTION: Please call with any questions or concerns to [phone] and carefully listen to the prompts so that you are directed to the right person  All voicemails are confidential   Jann Weiss all requests for admission clinical reviews, approved or denied determinations and any other requests to dedicated fax number below belonging to the campus where the patient is receiving treatment   List of dedicated fax numbers for the Facilities:  [address] DENIALS (Administrative/Medical Necessity) [phone]   [address] (Maternity/NICU/Pediatrics) [phone]   [address]  [address] [facility] [place] [address] [address] [place] [address] [address] [facility] [place] [address] [phone]

## 2022-08-21 NOTE — RESPIRATORY THERAPY NOTE
RT Ventilator Management Note  Shahnaz Ojeda 61 y o  male MRN: [id number]  Unit/Bed#: [facility] 12 Encounter: [id number]      Daily Screen    No data found in the last 10 encounters  Physical Exam:   Assessment Type: Assess only  General Appearance: Sedated  Respiratory Pattern: (P) Assisted (Simultaneous filing  User may not have seen previous data )  Chest Assessment: (P) Chest expansion symmetrical  Bilateral Breath Sounds: Diminished      Resp Comments: Pt evlauted per respiratory protocol  Pt is admitted for BRAULIO second to obstructing kidney stone  No home respiratory medications used  No pulmonary history  Breathe sounds are diminished  No udns ordered at this time   Will continue to moniter pt per protocol

## 2022-08-21 NOTE — PROGRESS NOTES
Daily Progress Note - Critical Care   Reyes Conception 61 y o  male MRN: [id number]  [facility] Encounter: [id number]        ----------------------------------------------------------------------------------------  HPI/24hr events:     Reyes Conception is a 61 y o  male who presents with AMS  Patient has hx of kidney stones, polysubstance abuse, hypertension, psychiatric disorder, brought into minor see ED found unresponsive  Patient was found down by family today with no evidence of trauma or incontinence  In the ED, patient was hypoxic, altered mental status  Patient was placed initially on BiPAP  Patient had initial elevated troponin, procal, lactic acidosis, leukocytosis, and elevated creatinine of 6 02  Patient had central line placed for fluid resuscitation as patient had poor IV access  Patient was also subsequently intubated after speaking with nephrology  CT Head was negative  Ct C/A/P showed obscuration of left lower bronchus, aspiration, moderate right hydronephrosis with 1 3x 2 7 cm UPJ calculus, right mid to upper pole staghorn calculus measuring 3 8x1 7 cm  Patient will be transferred to [facility] AND [facility] MICU for PCN by IR and possible CRRT      ---------------------------------------------------------------------------------------  SUBJECTIVE  OVN around 1am, pt developed downward gaze preference  Not stable for CTH OVN  Given 1L bolus, taken this morning  Pt evaluated at bedside after STAT CTH  Pt has intermittent downward gaze preference  Able to wake to command and shouting his name, follows commands after multiple attempts      Review of systems was unable to be performed secondary to intubated  ---------------------------------------------------------------------------------------  Assessment and Plan:    Neuro:   Diagnosis: Toxic metabolic encephalopathy  · Patient came to ED altered, unresponsive  · Patient was found down for unknown periods of time  · Multifactorial: sepsis, acute renal failure & uremia, polysubstance abuse  · UDS negative  · BRAULIO Cr 6 02, baseline 1 0  GFR 9  · Sepsis suspected 2/2 obstructing kidney stone vs aspiration  · Plan: sedation with propofol, now off  · On precidex, now 0 1 mcg/kg/hr  · Neuro checks  · CAM-ICU  · Pain regimen  · Holding home risperidone  Diagnosis: hx of polysubstance abuse  · Heroin, tobacco  Prior h/o UDS+ for THC, amphetamines, morphine, benzos  · 8/20/22 UDS negative  · Plan:  · Monitor         CV:   Diagnosis: Hypotension  · Of note, sedated with propofol and dehydrated  · On levo, now 7  · Receiving IVF  · Plan:   · Monitor hemodynamics  · MAP >65  · Continue fluid repletion  Diagnosis: Hx of HTN   · Plan: Holding home amlodpine        Pulm:  Diagnosis: Acute hypoxic respiratory failure  · Initially was on BIPAP  · Was intubated prior to transfer from [facility] ED  · Plan: continue mechanical ventilation  · Wean as tolerated  · Mechanical ventilation protocol  Diagnosis: Aspiration   · Possible aspiration from CT  · On abx - ceftriaxone, vancomycin  · Monitor respiratory status        GI:   Diagnosis: No active issues  · Had elevated lipase, likely secondary to ARF  · NPO for now        :   Diagnosis: Acute renal failure and obstruction  · Na 160, K 3 7, Bicarb 22, , Creat 7 02  CK 3236, lactic acid 4 8, 2 6  · CT shows moderate right hydronephrosis w/ 1 3x2 7 cm UPJ calculus, right mid to upper pole staghorn calculus measuring 3 8 x 1 7 cm  · S/p IR right nephrostomy tube placement 8/20/22  · Nephrology following, appreciate recs  · Plan:  · Continue fluid resuscitation  · Continue abx  · Trend BMP, VBG, lactic acid  · BUN/Creat  Diagnosis: BRAULIO  · Initial Cr 6/02, baseline 1 0  · Plan:  · Continue fluid resuscitation  · Avoid nephrotoxic drugs  · Trend Cr  Diagnosis: Rhabdomyolysis  · Found down for unknown period of time  · 8/20/22 CK elevated 3236, CKMB 13 2        F/E/N:   F: continue isolyte maintenance fluid  E: Replete electrolytes as needed, possible CRRT pending labs and IR  N: NPO        Heme/Onc:   Diagnosis: Leukocytosis  · Trend CBC/fevers  · DVT prophylaxis  · Plan: Holding s/p IR        Endo:   Diagnosis: No active issues        ID:   Diagnosis: Sepsis  · Source obstructing R kidney stone vs aspiration PNA  · Patient started on cefepime and vancomycin  · Switched to ceftriaxone d/t encephalopathy, possible cause  · Plan: trend fevers WBC  · BCX pending  · MRSA swab pending        MSK/Skin:   Diagnosis: No active issues  · Plan: PT/OT when able  · Frequent repositioning    Patient appropriate for transfer out of the ICU today?: No  Disposition: Continue Critical Care   Code Status: Level 1 - Full Code  ---------------------------------------------------------------------------------------  ICU CORE MEASURES    Prophylaxis   VTE Pharmacologic Prophylaxis: held d/t IR procedure  VTE Mechanical Prophylaxis: sequential compression device  Stress Ulcer Prophylaxis: not started, will start if not extubated today    ABCDE Protocol (if indicated)  Plan to perform spontaneous awakening trial today? Yes  Plan to perform spontaneous breathing trial today? Yes  Obvious barriers to extubation? Yes  CAM-ICU: Negative    Invasive Devices Review  Invasive Devices  Report    Central Venous Catheter Line  Duration           CVC Central Lines 08/20/22 Triple Right Internal jugular <1 day          Arterial Line  Duration           Arterial Line 08/20/22 Right Brachial <1 day          Drain  Duration           NG/OG/Enteral Tube Orogastric 16 Fr Center mouth <1 day    Nephrostomy Right 8 Fr  <1 day    Urethral Catheter Temperature probe 16 Fr  <1 day          Airway  Duration           ETT  7 5 mm <1 day              Can any invasive devices be discontinued today?  No  ---------------------------------------------------------------------------------------  OBJECTIVE    Vitals   Vitals:    08/21/22 0545 08/21/22 0600 08/21/22 0705 08/21/22 0800   BP:  (!) 89/50 (!) 89/50    BP Location:       Pulse:  66  58   Resp:       Temp:  (!) 101 12 °F (38 4 °C)  99 32 °F (37 4 °C)   TempSrc:       SpO2:  100%  100%   Weight: 61 kg (134 lb 7 7 oz)  60 8 kg (134 lb)    Height:   5' 6" (1 676 m)        Respiratory:  SpO2 Device: O2 Device: Ventilator       Invasive/non-invasive ventilation settings   Respiratory  Report   Lab Data (Last 4 hours)      08/21 0453            pH, Arterial       7 363             pCO2, Arterial       37 0             pO2, Arterial       83 5             HCO3, Arterial       20 6             Base Excess, Arterial       -4 2                  O2/Vent Data       08/21 0415   Most Recent         Vent Mode AC/VC        Resp Rate (BPM) (BPM) 14        Vt (mL) (mL) 420        FIO2 (%) (%) 80        PEEP (cmH2O) (cmH2O) 8        MV 16 1                    Physical Exam  Constitutional:       General: He is not in acute distress  Appearance: He is ill-appearing  HENT:      Head: Normocephalic and atraumatic  Cardiovascular:      Rate and Rhythm: Normal rate and regular rhythm  Heart sounds: Normal heart sounds  Pulmonary:      Effort: Pulmonary effort is normal    Abdominal:      Palpations: Abdomen is soft  Tenderness: There is no abdominal tenderness  There is no guarding  Musculoskeletal:      Right lower leg: No edema  Left lower leg: No edema  Skin:     General: Skin is warm and dry  Findings: Lesion present  Comments: Scattered healing puncture wounds, no erythema or purulent discharge   Neurological:      Motor: No weakness        Comments: GCS10T  Sleepy, wakes to command  Follows commands   Psychiatric:      Comments: Pt nonverbal, not agitated             Laboratory and Diagnostics:  Results from last 7 days   Lab Units 08/21/22  0449 08/20/22  2220 08/20/22  2041 08/20/22  1210   WBC Thousand/uL 23 68*  --  24 99* 28 29*   HEMOGLOBIN g/dL 14 7  --  16 2 20 3*   I STAT HEMOGLOBIN g/dl  --  13 9  --   -- HEMATOCRIT % 46 3  --  50 0* 61 9*   HEMATOCRIT, ISTAT %  --  41  --   --    PLATELETS Thousands/uL 186  --  166 225   NEUTROS PCT % 88*  --  88* 84*   MONOS PCT % 6  --  6 10     Results from last 7 days   Lab Units 08/21/22  0449 08/21/22  0255 08/20/22 2220 08/20/22 2041 08/20/22  1210   SODIUM mmol/L 158* 157*  --  156* 160*   POTASSIUM mmol/L 3 6 3 6  --  3 7 3 7   CHLORIDE mmol/L 128* 128*  --  127* 112*   CO2 mmol/L 21 22  --  22 22   CO2, I-STAT mmol/L  --   --  24  --   --    ANION GAP mmol/L 9 7  --  7 26*   BUN mg/dL 94* 98*  --  119* 121*   CREATININE mg/dL 3 18* 3 38*  --  4 48* 6 02*   CALCIUM mg/dL 7 9* 7 7*  --  8 4 10 2*   GLUCOSE RANDOM mg/dL 138 121  --  137 139   ALT U/L  --   --   --  43 66   AST U/L  --   --   --  78* 132*   ALK PHOS U/L  --   --   --  60 76   ALBUMIN g/dL  --   --   --  2 7* 4 3   TOTAL BILIRUBIN mg/dL  --   --   --  0 83 1 93*     Results from last 7 days   Lab Units 08/21/22 0449 08/20/22 2041 08/20/22  1210   MAGNESIUM mg/dL 3 0* 2 8* 3 5*   PHOSPHORUS mg/dL 4 5 5 2*  --       Results from last 7 days   Lab Units 08/20/22  1210   INR  1 62*   PTT seconds 34          Results from last 7 days   Lab Units 08/20/22  2041 08/20/22  1436 08/20/22  1210   LACTIC ACID mmol/L 2 0 2 4* 4 8*     ABG:  Results from last 7 days   Lab Units 08/21/22 0453   PH ART  7 363   PCO2 ART mm Hg 37 0   PO2 ART mm Hg 83 5   HCO3 ART mmol/L 20 6*   BASE EXC ART mmol/L -4 2   ABG SOURCE  Line, Arterial     VBG:  Results from last 7 days   Lab Units 08/21/22 0453 08/20/22 2052   PH JOSH   --  7 293*   PCO2 JOSH mm Hg  --  45 7   PO2 JOSH mm Hg  --  33 4*   HCO3 JOSH mmol/L  --  21 6*   BASE EXC JOSH mmol/L  --  -5 0   ABG SOURCE  Line, Arterial  --      Results from last 7 days   Lab Units 08/20/22  1210   PROCALCITONIN ng/ml 5 49*       Micro  Results from last 7 days   Lab Units 08/20/22 2057 08/20/22  1210   BLOOD CULTURE  Received in Microbiology Lab  Culture in Progress   Received in Microbiology Lab  Culture in Progress  Received in Microbiology Lab  Culture in Progress  EKG: reviewed  Imaging:  I have personally reviewed pertinent reports  Intake and Output  I/O       08/19 0701 08/20 0700 08/20 0701 08/21 0700    I V  (mL/kg)  1879 7 (30 8)    Other  200    Total Intake(mL/kg)  2079 7 (34 1)    Urine (mL/kg/hr)  1600    Emesis/NG output  152    Blood  2    Total Output  1754    Net  +325 7                Height and Weights   Height: 5' 6" (167 6 cm)  IBW (Ideal Body Weight): 63 8 kg  Body mass index is 21 63 kg/m²  Weight (last 2 days)     Date/Time Weight    08/21/22 0705 60 8 (134)    08/21/22 0545 61 (134 48)    08/20/22 2104 59 2 (130 51)            Nutrition       Diet Orders   (From admission, onward)             Start     Ordered    08/20/22 2032  Diet NPO  Diet effective now        References:    Nutrtion Support Algorithm Enteral vs  Parenteral   Question Answer Comment   Diet Type NPO    RD to adjust diet per protocol?  Yes        08/20/22 2034                    Active Medications  Scheduled Meds:  Current Facility-Administered Medications   Medication Dose Route Frequency Provider Last Rate    cefTRIAXone  1,000 mg Intravenous Q24H Magdalena Alamo DO      chlorhexidine  15 mL Mouth/Throat Q12H [address] Min Tiffany He MD      dexmedetomidine  0 1-0 7 mcg/kg/hr Intravenous Titrated Miguelina Brock MD 0 7 mcg/kg/hr (08/21/22 0546)    fentanyl citrate (PF)  50 mcg Intravenous Q2H PRN Miguelina Brock MD      heparin (porcine)  5,000 Units Subcutaneous [facility] Ernestine Tidwell MD      multi-electrolyte  100 mL/hr Intravenous Continuous Min Tiffany He  mL/hr (08/21/22 0754)    norepinephrine  1-30 mcg/min Intravenous Titrated Miguelina Brock MD 7 mcg/min (08/21/22 0645)    vancomycin  25 mg/kg Intravenous Once Aubrie Cid DO      [START ON 8/22/2022] vancomycin  15 mg/kg Intravenous Daily PRN Aubrie Cid DO       Continuous Infusions:  dexmedetomidine, 0 1-0 7 mcg/kg/hr, Last Rate: 0 7 mcg/kg/hr (08/21/22 0546)  multi-electrolyte, 100 mL/hr, Last Rate: 100 mL/hr (08/21/22 0754)  norepinephrine, 1-30 mcg/min, Last Rate: 7 mcg/min (08/21/22 0645)      PRN Meds:   fentanyl citrate (PF), 50 mcg, Q2H PRN  [START ON 8/22/2022] vancomycin, 15 mg/kg, Daily PRN        Allergies   Allergies   Allergen Reactions    Metronidazole Itching and Swelling    Nsaids GI Intolerance     Stomach irritant    Penicillin G     Penicillins GI Intolerance     Sick to stomach    Pollen Extract     Sulfa Antibiotics      ---------------------------------------------------------------------------------------  Advance Directive and Living Will:      Power of :    POLST:    ---------------------------------------------------------------------------------------  Care Time Delivered:   30 minutes    Magdalena Alamo DO      Portions of the record may have been created with voice recognition software  Occasional wrong word or "sound a like" substitutions may have occurred due to the inherent limitations of voice recognition software    Read the chart carefully and recognize, using context, where substitutions have occurred

## 2022-08-21 NOTE — ANESTHESIA PREPROCEDURE EVALUATION
Procedure:  IR NEPHROSTOMY TUBE PLACEMENT    Relevant Problems   CARDIO   (+) Essential hypertension      /RENAL   (+) BRAULIO (acute kidney injury) ([facility] [address] )   (+) Kidney stones      MUSCULOSKELETAL   (+) Rhabdomyolysis      PULMONARY   (+) Acute respiratory failure with hypoxia (HCC)      Other   (+) Elevated brain natriuretic peptide (BNP) level   (+) Elevated d-dimer   (+) Elevated troponin        Physical Exam    Airway       Dental       Cardiovascular      Pulmonary      Other Findings  Intubated      Anesthesia Plan  ASA Score- 4 Emergent    Anesthesia Type- general with ASA Monitors  Additional Monitors: arterial line  Airway Plan: ETT  Plan Factors-Exercise tolerance (METS): >4 METS  Chart reviewed  EKG reviewed  Existing labs reviewed  Patient summary reviewed  Patient is a current smoker  Induction- intravenous  Postoperative Plan- Plan for postoperative opioid use  Informed Consent- Anesthetic plan and risks discussed with mother  I personally reviewed this patient with the CRNA  Discussed and agreed on the Anesthesia Plan with the CRNA  Alia Verma Patient intubated, unable to obtain history directly from patient  History gathered from chart  Patient transferred from to [place] for PCN placement by IR  Unknown if patient has been using illicit drugs recently

## 2022-08-21 NOTE — SEDATION DOCUMENTATION
Right PCN placed by Dr aVnessa Fatima without complications  Anesthesia present throughout case  Pt transported back to MICU with ICU RN and RT

## 2022-08-21 NOTE — RESPIRATORY THERAPY NOTE
RT Ventilator Management Note  Tatyana Garsia 61 y o  male MRN: [id number]  Unit/Bed#: [facility] 12 Encounter: [id number]      Daily Screen         8/21/2022  0810             Patient safety screen outcome[de-identified] Failed (P)     Not Ready for Weaning due to[de-identified] Underline problem not resolved (P)     Spont breathing trial outcome[de-identified] Failed (P)               Physical Exam:   Assessment Type: (P) Assess only  General Appearance: (P) Eyes open/responds to voice, Sedated  Respiratory Pattern: (P) Assisted  Chest Assessment: (P) Chest expansion symmetrical  Bilateral Breath Sounds: (P) Diminished, Clear  Suction: (P) ET Tube      Resp Comments: (P) Pt found on ACVC settings, fio2 titrated down to 60  Sx for thick yellow secretions

## 2022-08-21 NOTE — SEPSIS NOTE
Sepsis Note   Thelma Rowell 61 y o  male MRN: [id number]  Unit/Bed#: MICU 12 Encounter: [id number]       qSOFA     [address] Name 08/20/22 2100                Altered mental status GCS < 15 --        Respiratory Rate > / =68 0        Systolic BP < / =271 1        Q Sofa Score 1                   Initial Sepsis Screening     Row Name 08/20/22 5306                Is the patient's history suggestive of a new or worsening infection? Yes (Proceed)  -MC        Suspected source of infection suspect infection, source unknown  -MC        Are two or more of the following signs & symptoms of infection both present and new to the patient? Yes (Proceed)  -MC        Indicate SIRS criteria Leukocytosis (WBC > 60465 IJL); Tachycardia > 90 bpm  -MC        If the answer is yes to both questions, suspicion of sepsis is present --        If severe sepsis is present AND tissue hypoperfusion perists in the hour after fluid resuscitation or lactate > 4, the patient meets criteria for SEPTIC SHOCK --        Are any of the following organ dysfunction criteria present within 6 hours of suspected infection and SIRS criteria that are NOT considered to be chronic conditions? Yes  -MC        Organ dysfunction Creatinine > 2 0 mg/dl AND > 0 5 mg/dl above baseline; Lactate > 2 0 mmol/L  -MC        Date of presentation of severe sepsis --        Time of presentation of severe sepsis --        Tissue hypoperfusion persists in the hour after crystalloid fluid administration, evidenced, by either: --        Was hypotension present within one hour of the conclusion of crystalloid fluid administration?  No  -MC        Date of presentation of septic shock --        Time of presentation of septic shock --              User Key  (r) = Recorded By, (t) = Taken By, (c) = Cosigned By    [address] Name Provider Chanel De Leon MD Resident

## 2022-08-21 NOTE — DISCHARGE INSTRUCTIONS
Nephrostomy Tube Care     WHAT YOU NEED TO KNOW:   A nephrostomy tube is a catheter (thin plastic tube) that is inserted through your skin and into your kidney  The nephrostomy tube drains urine from your kidney into a collecting bag outside your body  You may need a nephrostomy tube when something is blocking the normal flow of urine  A nephrostomy tube may be used for a short or a long period of time  The nephrostomy tube comes out of your back, so you will need someone to help care for your nephrostomy tube  DISCHARGE INSTRUCTIONS:      How to clean the skin around the nephrostomy tube and change the bandage:  Since the nephrostomy tube comes out of your back, you will not be able to care for it by yourself  Ask someone to follow the general directions below to check and care for your nephrostomy tube  Gather the items you will need  Disposable (single use) under-pad, and a clean washcloth  Plain soap, warm water, and new medical gloves  Sterile gauze bandages  Clear adhesive dressing or medical tape  Skin barrier  Protective skin film  Trash bag  Remove the old bandage, and check the tube entry site  Have the patient lie on his side with the nephrostomy tube entry site facing up  Place the under-pad where it will catch drainage as you are working with the nephrostomy tube  Wash your hands with soap and water  Put on new medical gloves  Gently remove the old bandage, without pulling on the tube  Do this by holding the skin beside the tube with one hand  With the other hand, gently remove sticky tape and the skin barrier by pulling in the same direction as hair growth  Do not touch the side of the bandage that is placed over or around the tube  Throw the bandage and skin barrier away in a trash bag  Look for signs of infection, such as skin redness and swelling  Report any skin changes to healthcare providers  Clean the tube entry site      Hold the tube in place to keep it from being pulled out while you are cleaning around it  You will need to clean the area twice  For the first cleaning, wet a new gauze bandage with soap and water  Begin at the entry site of the tube  Wipe the skin in circles, moving away from the entry site  Remove blood and any other material with the gauze  Do this as often as needed  Use a new gauze bandage each time you clean the area, moving away from the entry site  For the second cleaning, wet a new gauze bandage with water  Begin at the entry site of the tube  Wipe the skin in circles, moving away from the entry site  Use a new gauze bandage each time you clean the area, moving away from the entry site  Gently pat the skin with a clean washcloth to dry it  Apply the skin barrier and bandages  Roll up a bandage to make it thick, and place it under  the place where the tube enters the skin  Place it to support the tube, and stop it from kinking or bending  Tape the bandage in place, and apply more bandages if directed by a healthcare provider  Bring the tubing forward to the front and tape it to the skin  Do not stretch the tube tight, because this may pull the nephrostomy tube out  How often to change the bandage  Change the bandage around the tube, every other day  If your bandages  get dirty or wet, change them right away, and as often as needed  If your nephrostomy tube is to be used for a long period of time, the tube needs to be changed every 2 to 3 months  Healthcare providers will tell you when you need to make an appointment to have your tube changed  How to care for the urine drainage bag:   Ask if you need to measure and write down how much urine is in the bag before you empty it  Drain urine out of the drainage bag when it is ½ to ? full  Open the spout at the bottom of the bag to empty the urine into the toilet  You may need to detach the drainage bag from the nephrostomy tube to change it    If so, attach a new drainage bag tightly to the nephrostomy tube  How to prevent problems with your nephrostomy tube:   Change bandages, directed  This helps to prevent infection  Throw away or clean your drainage bag as directed by your healthcare provider  Wipe the connecting ends of the drainage bag with alcohol before you reconnect the bag to the tube  This helps prevent infection  Keep the tube taped to your skin and connected to a drainage bag placed below the level of your kidneys  This helps prevent urine from backing up into your kidneys  You may wear a small drainage bag strapped to your leg to let you move around more easily  Check the catheter to be sure it is in place after you change your clothes or do other activities  Do not wear tight clothing over the tube  Place the tubing over your thigh rather than under it when you are sitting down  Be sure that nothing is pulling on the nephrostomy tube when you move around  Change positions if you see little or no urine in your drainage bag  Check to see if the urine tube is twisted or bent  Be sure that you are not sitting or lying on the tube  If there are no kinks and there is little or no urine in the drainage bag, tell your healthcare provider  Flush out the tube as directed  Some tubes get flushed one time a day with 10 mls of NSS You will be given a prescription for the flushes  To flush the nephrostomy tube, clean both connections with alcohol swap  Twist off the drainage bag tube and twist the saline syringe into the nephrostomy tube and flush briskly  Remove the syringe and twist the drainage bag tube back into the nephrostomy tube  Keep the site covered while you shower  Tape a piece of clear adhesive plastic over the dressing to keep it dry while you shower  Do not take tub baths      Contact Interventional Radiology at [phone] Peña PATIENTS: Contact Interventional Radiology at [phone]) Yariel Mast PATIENTS: Contact Interventional Radiology at [phone]) if:  The skin around the nephrostomy tube is red, swollen, itches, or has a rash  You have a fever greater than 101 or chills  You have lower back or hip pain  There are changes in how your urine looks or smells  You have little or no urine draining from the nephrostomy tube  You have nausea and are vomiting  The black beata on your tube has moved, or the tube is longer than when it was put in  You have questions or concerns about your condition or care  The nephrostomy tube comes out completely  There is blood, pus, or a bad smell coming from the place where the tube enters your skin  Urine is leaking around the tube  The following pharmacies carry the flush syringes  [facility]                     [facility] in [place]       [address]  Phone [phone]            Phone [phone]  [address] & [facility]                      [address]                                 [phone]  Phone [phone]            Phone [phone]    [facility]                                                                         [facility] [phone]  [address]   Phone [phone]

## 2022-08-21 NOTE — CONSULTS
Consultation - Nephrology   [place] 61 y o  male MRN: [id number]  Unit/Bed#: MICU 12 Encounter: [id number]      ASSESSMENT & PLAN    76-year-old male with a history of hypertension history of previous nephrolithiasis presents with encephalopathy, septic shock complicated by acute kidney injury    1  BRAULIO secondary to obstructive staghorn calculi and rhabdomyolysis now improving  o His baseline creatinine is 0 8 from July of this year  o Presented with a creatinine of 6 02 and severely azotemic with a BUN of 120  o Percutaneous nephrostomy tube placed and creatinine is now trending down  o 1 7 L so far of urine output from urethral catheter and nephrostomy tube combined  o Urinalysis was slightly cloudy with a high specific gravity trace leukocyte, trace ketones large blood 2+ protein  o CK mildly elevated at 3236  o Continue to keep map above 65 remains on low-dose norepinephrine  o Continue volume expansion as patient will have a postobstructive diuresis likely  o No indication for renal replacement therapy at this point     2  Lactic acidosis  o Patient's lactic acid has improved and anion gap is improved monitor bicarb using pH neutral solutions at this point    3  Septic shock  o Remains on Rocephin for antibiotic S as well as vancomycin  o Toxic encephalopathy thought to be related to septic shock  o On going ICU care  o Urine culture pending     4  Hypernatremia  o Secondary to decreased free access to water  o Given resuscitation efforts on isotonic fluid  o Once adequately resuscitated switch to hypotonic as directed by care this could be contributing to encephalopathy    5   Acute hypoxic respiratory failure  o Has a high PEEP and high FiO2  o Discussed with Pulmonary fellow may have some ARDS  o If any signs of volume overload, as he does have an elevated BNP could consider intermittent intravenous Lasix  o In the setting of septic shock and postobstructive acute kidney injury volumes expansion at this point is appropriate       HISTORY OF PRESENT ILLNESS:  Requesting Physician: Allen Gottron, DO  Reason for Consult:  Acute kidney injury    Olive Kumari is a 61y o  year old male who was admitted for acute kidney injury  He has a history of polysubstance abuse and kidney stones also has hypertension [facility] a which point he required intubation as he was found to be hypoxic he had a CT scan of the chest abdomen pelvis which showed moderate hydronephrosis due to obstructing renal stone and significant acute kidney injury  He had a percutaneous nephrostomy tube placed when he was transferred down to [place]    His urine output has improved, his creatinine is improving as well    PAST MEDICAL HISTORY:  Past Medical History:   Diagnosis Date    Anxiety     Bright red rectal bleeding     Last Assessed: 9/9/2015     Hypertension     Last Assessed: 7/9/2014     Kidney stones     Last Assessed: 11/17/2016     Periorbital edema     Last Assessed: 9/4/2015    Skin tag of anus     Last Assessed: 9/9/2015     Trigger point of thoracic region     Last Assessed: 11/6/2015        PAST SURGICAL HISTORY:  Past Surgical History:   Procedure Laterality Date    CYSTOSCOPY W/ URETERAL STENT PLACEMENT  03/11/2013    CYSTOSCOPY W/ URETERAL STENT PLACEMENT  06/18/2014    EXTRACORPOREAL SHOCK WAVE LITHOTRIPSY     CYSTOSCOPY W/ URETERAL STENT PLACEMENT  07/16/2014    EXTRACORPOREAL SHOCK WAVE LITHOTRIPSY     CYSTOSCOPY W/ URETERAL STENT REMOVAL  04/11/2013    CYSTOSCOPY W/ URETERAL STENT REMOVAL Right 08/26/2014    CYSTOSCOPY W/ URETEROSCOPY W/ LITHOTRIPSY  02/26/2013    Percutaneous lithotomy With Uretal Stent Plcement     CYSTOSCOPY W/ URETEROSCOPY W/ LITHOTRIPSY  03/11/2014    CYSTOSCOPY W/ URETEROSCOPY W/ LITHOTRIPSY Right 08/04/2014    With Uretal Stent Placement     CYSTOSCOPY W/ URETEROSCOPY W/ LITHOTRIPSY Right 05/09/2016    HEMORRHOID SURGERY  HERNIA REPAIR  03/11/2013    KIDNEY SURGERY      LITHOTRIPSY      IL CYSTO/URETERO W/LITHOTRIPSY &INDWELL STENT INSRT Right 7/13/2016    Procedure: CYSTOSCOPY; URETEROSCOPY WITH HOLMIUM LASER STONE EXTRACTION; RETROGRADE PYELOGRAM; URETERAL STENT INSERTION ;  Surgeon: Alex Edwards MD;  [facility];  Service: Urology    IL CYSTO/URETERO W/LITHOTRIPSY &INDWELL STENT INSRT Right 5/9/2016    Procedure: CYSTOSCOPY,  URETEROSCOPY,  WITH LITHOTRIPSY HOLMIUM LASER, STONE EXTRACTION, AND INSERTION STENT URETERAL;  Surgeon: Alex Edwards MD;  [facility];  Service: Urology    [place] ESWL Right 6/17/2016    Procedure: Nae Enrique SHOCKWAVE (ESWL);   Surgeon: Alex Edwards MD;  [facility];  Service: Urology    TRANSURETHRAL RESECTION OF BLADDER      URETERAL REIMPLANTION  03/11/2013       ALLERGIES:  Allergies   Allergen Reactions    Metronidazole Itching and Swelling    Nsaids GI Intolerance     Stomach irritant    Penicillin G     Penicillins GI Intolerance     Sick to stomach    Pollen Extract     Sulfa Antibiotics        SOCIAL HISTORY:  Social History     Substance and Sexual Activity   Alcohol Use No     Social History     Substance and Sexual Activity   Drug Use Yes    Types: Heroin, Marijuana, Methamphetamines, Fentanyl    Comment: 20-30 bags of heroin daily     Social History     Tobacco Use   Smoking Status Current Every Day Smoker    Packs/day: 2 00    Years: 35 00    Pack years: 70 00   Smokeless Tobacco Never Used       FAMILY HISTORY:  Family History   Problem Relation Age of Onset    COPD Mother     Hypertension Mother     Heart attack Father        MEDICATIONS:    Current Facility-Administered Medications:     cefTRIAXone (ROCEPHIN) 1,000 mg in dextrose 5 % 50 mL IVPB, 1,000 mg, Intravenous, Q24H, Magdalena Alamo DO    chlorhexidine (PERIDEX) 0 12 % oral rinse 15 mL, 15 mL, Mouth/Throat, Q12H [address], Romaine Arora MD, 15 mL at 08/21/22 0000    dexmedeTOMIDine (Precedex) 400 mcg in sodium chloride 0 9% 100 mL, 0 1-0 7 mcg/kg/hr, Intravenous, Titrated, Romaine Arora MD, Last Rate: 10 4 mL/hr at 08/21/22 0546, 0 7 mcg/kg/hr at 08/21/22 0546    fentanyl citrate (PF) 100 MCG/2ML 50 mcg, 50 mcg, Intravenous, Q2H PRN, Romaine Arora MD    heparin (porcine) subcutaneous injection 5,000 Units, 5,000 Units, Subcutaneous, Q8H [address], Chris Addison MD, 5,000 Units at 08/21/22 0526    multi-electrolyte (PLASMALYTE-A/ISOLYTE-S PH 7 4) IV solution, 100 mL/hr, Intravenous, Continuous, Min Milvia Mensah MD, Held at 08/21/22 0608    norepinephrine (LEVOPHED) 4 mg (STANDARD CONCENTRATION) IV in sodium chloride 0 9% 250 mL, 1-30 mcg/min, Intravenous, Titrated, Romaine Arora MD, Last Rate: 26 3 mL/hr at 08/21/22 0645, 7 mcg/min at 08/21/22 0645    vancomycin (VANCOCIN) 1500 mg in sodium chloride 0 9% 250 mL IVPB, 25 mg/kg, Intravenous, Once, Aubrie Cid, DO    [START ON 8/22/2022] vancomycin (VANCOCIN) IVPB (premix in dextrose) 1,000 mg 200 mL, 15 mg/kg, Intravenous, Daily PRN, Aubrie Corrigantal, DO    REVIEW OF SYSTEMS:    A 12 point review of systems was performed and was negative besides what is mentioned in HPI    OBJECTIVE:    Vitals:    08/21/22 0415 08/21/22 0500 08/21/22 0545 08/21/22 0600   BP:    (!) 89/50   BP Location:       Pulse:  68  66   Resp:       Temp:  (!) 100 76 °F (38 2 °C)  (!) 101 12 °F (38 4 °C)   TempSrc:       SpO2: 100% 99%  100%   Weight:   61 kg (134 lb 7 7 oz)    Height:            Temp:  [97 4 °F (36 3 °C)-101 12 °F (38 4 °C)] 101 12 °F (38 4 °C)  HR:  [] 66  Resp:  [14-50] 14  BP: ()/(45-97) 89/50  SpO2:  [84 %-100 %] 100 %   Body mass index is 21 71 kg/m²  I/O last 3 completed shifts: In: 2471 5 [I V :2146 5; Other:325]  Out: 1954 [Urine:1700; Emesis/NG output:252; Blood:2]  No intake/output data recorded      Weight (last 2 days)     Date/Time Weight    08/21/22 6903 13 (134 48)    08/20/22 2104 59 2 (130 51)           Physical exam:    General:  Encephalopathic back intubated critically ill  Eyes: conjunctivae pink, anicteric sclerae  ENT:  ET tube in place oral mucosa moist  Neck: ROM intact, no JVD  Chest:  On the ventilator  CVS: normal rate, non pericardial friction rub  Abdomen: soft, non-tender, non-distended, normoactive bowel sounds  Extremities: no edema of both legs  Skin: no rash  Neuro: awake, alert, oriented, grossly intact  Psych:  Pleasant affect  Percutaneous nephrostomy tube draining    Invasive Devices:   Urethral Catheter Temperature probe 16 Fr   (Active)   Amt returned on insertion(mL) 120 mL 08/20/22 1524   Reasons to continue Urinary Catheter  Accurate I&O assessment in critically ill patients (48 hr  max) 08/20/22 2100   Goal for Removal Will consult urology 08/20/22 2100   Site Assessment Clean;Skin intact 08/20/22 2100   Yee Care Done 08/20/22 2100   Collection Container Standard drainage bag 08/20/22 2100   Securement Method Securing device (Describe) 08/20/22 2100   Output (mL) 100 mL 08/21/22 0600     Lab Results:   Results from last 7 days   Lab Units 08/21/22  0449 08/21/22  0255 08/20/22  2220 08/20/22  2057 08/20/22  2041 08/20/22  1307 08/20/22  1210   WBC Thousand/uL 23 68*  --   --   --  24 99*  --  28 29*   HEMOGLOBIN g/dL 14 7  --   --   --  16 2  --  20 3*   I STAT HEMOGLOBIN g/dl  --   --  13 9  --   --   --   --    HEMATOCRIT % 46 3  --   --   --  50 0*  --  61 9*   HEMATOCRIT, ISTAT %  --   --  41  --   --   --   --    PLATELETS Thousands/uL 186  --   --   --  166  --  225   POTASSIUM mmol/L 3 6 3 6  --   --  3 7  --  3 7   CHLORIDE mmol/L 128* 128*  --   --  127*  --  112*   CO2 mmol/L 21 22  --   --  22  --  22   CO2, I-STAT mmol/L  --   --  24  --   --   --   --    BUN mg/dL 94* 98*  --   --  119*  --  121*   CREATININE mg/dL 3 18* 3 38*  --   --  4 48*  --  6 02*   CALCIUM mg/dL 7 9* 7 7*  --   --  8 4  --  10 2*   MAGNESIUM mg/dL 3 0*  --   --   --  2 8*  --  3 5*   PHOSPHORUS mg/dL 4 5  --   --   --  5 2*  --   --    ALK PHOS U/L  --   --   --   --  60  --  76   ALT U/L  --   --   --   --  43  --  66   AST U/L  --   --   --   --  78*  --  132*   GLUCOSE, ISTAT mg/dl  --   --  123  --   --   --   --    BLOOD CULTURE   --   --   --  Received in Microbiology Lab  Culture in Progress  --   --  Received in Microbiology Lab  Culture in Progress  Received in Microbiology Lab  Culture in Progress  NITRITE UA   --   --   --   --   --  Negative  --    BLOOD UA   --   --   --   --   --  Large*  --    LEUKOCYTES UA   --   --   --   --   --  Small*  --          Portions of the record may have been created with voice recognition software  Occasional wrong word or "sound a like" substitutions may have occurred due to the inherent limitations of voice recognition software  Read the chart carefully and recognize, using context, where substitutions have occurred  If you have any questions, please contact the dictating provider

## 2022-08-21 NOTE — PROGRESS NOTES
Vancomycin IV Pharmacy-to-Dose Consultation    Gokul Mcduffie is a 61 y o  male who is currently receiving Vancomycin IV with management by the Pharmacy Consult service  Assessment/Plan:  The patient was reviewed  Renal function is improving, yet remains quite poor (SCr 6 02 --> 4 48 --> 3 38 --> 3 18)  No infusion reactions were documented in the chart  Patient never received loading dose yesterday, which would still be indicated despite BRAULIO  Will administer 25 mg/kg dose now (1500 mg)  And obtain random level tomorrow, 8/22 with AM labs  Patient should be redosed when level is < 20 mcg/mL  We will continue to follow the patients culture results and clinical progress daily      Uma Rivera, PharmD, [facility]  Infectious Diseases Clinical Pharmacy Specialist  [phone]

## 2022-08-21 NOTE — PROGRESS NOTES
Vancomycin Assessment    Olive Kumari is a 61 y o  male who is currently receiving vancomycin 1000MG DAILY PRN when trough is less than 20 for       Relevant clinical data and objective history reviewed:  Creatinine   Date Value Ref Range Status   08/20/2022 4 48 (H) 0 60 - 1 30 mg/dL Final     Comment:     Standardized to IDMS reference method   08/20/2022 6 02 (H) 0 60 - 1 30 mg/dL Final     Comment:     Standardized to IDMS reference method   07/31/2022 0 82 0 60 - 1 30 mg/dL Final     Comment:     Standardized to IDMS reference method   12/09/2015 1 02 0 60 - 1 30 mg/dL Final     Comment:     Standardized to IDMS reference method   11/20/2015 1 09 0 60 - 1 30 mg/dL Final     Comment:     Standardized to IDMS reference method   06/01/2015 1 27 0 60 - 1 30 mg/dL Final     Comment:     Standardized to IDMS reference method     BP 92/60 (BP Location: Left arm)   Pulse 96   Temp 99 4 °F (37 4 °C) (Axillary)   Resp 14   Ht 5' 6" (1 676 m)   Wt 59 2 kg (130 lb 8 2 oz)   SpO2 93%   BMI 21 07 kg/m²   No intake/output data recorded  Lab Results   Component Value Date/Time     (H) 08/20/2022 08:41 PM    BUN 13 12/09/2015 12:31 PM    WBC 24 99 (H) 08/20/2022 08:41 PM    WBC 9 47 12/09/2015 12:31 PM    HGB 16 2 08/20/2022 08:41 PM    HGB 15 3 12/09/2015 12:31 PM    HCT 50 0 (H) 08/20/2022 08:41 PM    HCT 44 2 12/09/2015 12:31 PM    MCV 92 08/20/2022 08:41 PM    MCV 86 12/09/2015 12:31 PM     08/20/2022 08:41 PM     12/09/2015 12:31 PM     Temp Readings from Last 3 Encounters:   08/20/22 99 4 °F (37 4 °C) (Axillary)   08/20/22 (!) 97 4 °F (36 3 °C) (Temporal)   08/11/22 98 3 °F (36 8 °C)     Vancomycin Days of Therapy: 1    Assessment/Plan  The patient is currently on vancomycin utilizing pulse dosing based on actual body weight  Baseline risks associated with therapy include: pre-existing renal impairment    The patient is currently receiving 1000MG DAILY PRN when trough is less than 20 and is clinically appropriate and dose will be continued  Pharmacy will also follow closely for s/sx of nephrotoxicity, infusion reactions, and appropriateness of therapy  BMP and CBC will be ordered per protocol  Plan for trough as patient approaches steady state, prior to the other  dose at approximately 0821/22 at 12 30  Due to infection severity, will target a trough of 15-20 (appropriate for most indications)   Pharmacy will continue to follow the patients culture results and clinical progress daily      Fabio Grant, Pharmacist

## 2022-08-21 NOTE — PROCEDURES
POC Cardiac US    Date/Time: 8/21/2022 1:21 AM  Performed by: Iwona Ashton MD  Authorized by: Iwona Ashton MD     Patient location:  ICU  Other Assisting Provider: Yes (comment) (Dr Dontae Bolanos)    Procedure details:     Exam Type:  Diagnostic    Indications: respiratory distress      Assessment / Evaluation for: cardiac function and pericardial effusion      Exam Type: initial exam      Image quality: non-diagnostic      Image availability:  Images available in PACS  Patient Details:     Cardiac Rhythm:  Regular  Cardiac findings:     Echo technique: limited 2D      Views obtained: subcostal      Pericardial effusion: absent      Tamponade physiology: absent      Wall motion: normal

## 2022-08-21 NOTE — BRIEF OP NOTE (RAD/CATH)
INTERVENTIONAL RADIOLOGY PROCEDURE NOTE    Date: 8/20/2022    Procedure:      Preoperative diagnosis:   1  Hydronephrosis    2  Acute metabolic encephalopathy    3  BRAULIO (acute kidney injury) ([address] )         Postoperative diagnosis: Same  Surgeon: Ponce Davey MD     Assistant: None  No qualified resident was available  Blood loss:  Trace    Specimens:  Urine sent for culture    Findings:  Successful image guided placement of an 8 [place] nephrostomy tube into posterior lower pole calyx of right kidney  Partially obstructing stone present at the ureteropelvic junction  Upper pole staghorn calculus  Complications: None immediate      Anesthesia: general anesthesia

## 2022-08-21 NOTE — ANESTHESIA PROCEDURE NOTES
Arterial Line Insertion  Performed by: John Cordova DO  Authorized by: John Cordova DO   Consent: The procedure was performed in an emergent situation  Time out: Immediately prior to procedure a "time out" was called to verify the correct patient, procedure, equipment, support staff and site/side marked as required  Indications: respiratory failure and hemodynamic monitoring  Orientation:  Right  Location: brachial artery  Sedation:  Patient sedated: yes  Sedation: general anesthesia      Procedure Details:  Needle gauge: 20  Number of attempts: 3    Post-procedure:  Post-procedure: dressing applied  Waveform: good waveform and waveform confirmed

## 2022-08-21 NOTE — RESPIRATORY THERAPY NOTE
RT Protocol Note  Sebas Leaver 61 y o  male MRN: [id number]  [facility] En[facility]: [id number]    Assessment    Principal Problem:    BRA[facility]acute [facility]) ([facility] )  Active Problems:    Drug dependence ([facility] )    Acute metabolic encephalopathy    Acute respiratory failure with hypoxia (HCC)    Elevated brain natriuretic peptide (BNP) level    Transaminitis    Elevated lipase    Hypernatremia    Kidney stones      Home Pulmonary Medications:  NA       Past Medical History:   Diagnosis Date    Anxiety     Bright red rectal bleeding     Last Assessed: 9/9/2015     Hypertension     Last Assessed: 7/9/2014     Kidney stones     Last Assessed: 11/17/2016     Periorbital edema     Last Assessed: 9/4/2015    Skin tag of anus     Last Assessed: 9/9/2015     Trigger point of thoracic region     Last Assessed: 11/6/2015      Social History     Socioeconomic History    Marital status:      Spouse name: None    Number of children: None    Years of education: None    Highest education level: None   Occupational History    None   Tobacco Use    Smoking status: Current Every Day Smoker     Packs/day: 2 00     Years: 35 00     Pack years: 70 00    Smokeless tobacco: Never Used   Vaping Use    Vaping Use: Never used   Substance and Sexual Activity    Alcohol use: No    Drug use: Yes     Types: Heroin, Marijuana, Methamphetamines, Fentanyl     Comment: 20-30 bags of heroin daily    Sexual activity: None   Other Topics Concern    None   Social History Narrative    Caffeine Use     Uses safety Equipment- Seatbelts      Social Determinants of Health     Financial Resource Strain: Not on file   Food Insecurity: Not on file   Transportation Needs: Not on file   Physical Activity: Not on file   Stress: Not on file   Social Connections: Not on file   Intimate Partner Violence: Not on file   Housing Stability: Not on file       Subjective         Objective    Physical Exam:   Assessment Type: (P) Assess only  General Appearance: (P) Sedated  Respiratory Pattern: Assisted  Chest Assessment: Chest expansion symmetrical  Bilateral Breath Sounds: (P) Diminished    Vitals:  Blood pressure (!) 83/45, pulse 68, temperature (!) 100 76 °F (38 2 °C), resp  rate 14, height 5' 6" (1 676 m), weight 59 2 kg (130 lb 8 2 oz), SpO2 99 %  Results from last 7 days   Lab Units 08/20/22  1812   PH ART  7 340*   PCO2 ART mm Hg 33 3*   PO2 ART mm Hg 65 3*   HCO3 ART mmol/L 17 6*   BASE EXC ART mmol/L -7 0   O2 CONTENT ART mL/dL 20 8   O2 HGB, ARTERIAL % 90 9*   ABG SOURCE  Radial, Left   SANRDA TEST  Yes       Imaging and other studies: I have personally reviewed pertinent reports  Plan    Respiratory Plan: (P) Vent/NIV/HFNC        Resp Comments: (P) Pt evlauted per respiratory protocol  Pt is admitted for BRAULIO second to obstructing kidney stone  No home respiratory medications used  No pulmonary history  Breathe sounds are diminished  No udns ordered at this time   Will continue to moniter pt per protocol

## 2022-08-21 NOTE — RESPIRATORY THERAPY NOTE
Respiratory Care  Bedside bronch performed by Dr Fredeom Mckenzie  Pt was preoxygenated with 100% prior to procedure  Pt tolerated well  After procedure pt placed back on previous settings  Bilateral washing sample walked to the lab  Plan to wean to extubate

## 2022-08-21 NOTE — RESPIRATORY THERAPY NOTE
RT Ventilator Management Note  Shahnaz Ojeda 61 y o  male MRN: [id number]  Unit/Bed#: [facility] 12 Encounter: [id number]      Daily Screen         8/21/2022  1452 8/21/2022  1621          Patient safety screen outcome[de-identified] Passed Passed (P)                 Physical Exam:   Assessment Type: Assess only  General Appearance: Sedated  Respiratory Pattern: Assisted  Chest Assessment: Chest expansion symmetrical  Bilateral Breath Sounds: Diminished, Clear  Suction: ET Tube      Resp Comments: (P) Placed pt pn SBT 6/6 40%

## 2022-08-21 NOTE — ANESTHESIA POSTPROCEDURE EVALUATION
Post-Op Assessment Note    CV Status:  Stable  Pain Score: 0    Pain management: adequate     Post-procedure mental status: sedated  Hydration Status:  Euvolemic   PONV Controlled:  Controlled   Airway Patency:  Patent (intubated)  Airway: intubated      Post Op Vitals Reviewed: Yes      Staff: Anesthesiologist, CRNA         No complications documented      BP   133/78   Temp 99   Pulse 88   Resp 14   SpO2 100

## 2022-08-21 NOTE — PLAN OF CARE
Problem: MOBILITY - ADULT  Goal: Maintain or return to baseline ADL function  Description: INTERVENTIONS:  -  Assess patient's ability to carry out ADLs; assess patient's baseline for ADL function and identify physical deficits which impact ability to perform ADLs (bathing, care of mouth/teeth, toileting, grooming, dressing, etc )  - Assess/evaluate cause of self-care deficits   - Assess range of motion  - Assess patient's mobility; develop plan if impaired  - Assess patient's need for assistive devices and provide as appropriate  - Encourage maximum independence but intervene and supervise when necessary  - Involve family in performance of ADLs  - Assess for home care needs following discharge   - Consider OT consult to assist with ADL evaluation and planning for discharge  - Provide patient education as appropriate  Outcome: Progressing  Goal: Maintains/Returns to pre admission functional level  Description: INTERVENTIONS:  - Perform BMAT or MOVE assessment daily    - Set and communicate daily mobility goal to care team and patient/family/caregiver  - Collaborate with rehabilitation services on mobility goals if consulted  - Perform Range of Motion 3 times a day  - Reposition patient every 2 hours    - Record patient progress and toleration of activity level   Outcome: Progressing     Problem: PAIN - ADULT  Goal: Verbalizes/displays adequate comfort level or baseline comfort level  Description: Interventions:  - Encourage patient to monitor pain and request assistance  - Assess pain using appropriate pain scale  - Administer analgesics based on type and severity of pain and evaluate response  - Implement non-pharmacological measures as appropriate and evaluate response  - Consider cultural and social influences on pain and pain management  - Notify physician/advanced practitioner if interventions unsuccessful or patient reports new pain  Outcome: Progressing     Problem: INFECTION - ADULT  Goal: Absence or prevention of progression during hospitalization  Description: INTERVENTIONS:  - Assess and monitor for signs and symptoms of infection  - Monitor lab/diagnostic results  - Monitor all insertion sites, i e  indwelling lines, tubes, and drains  - Monitor endotracheal if appropriate and nasal secretions for changes in amount and color  - Lawrence appropriate cooling/warming therapies per order  - Administer medications as ordered  - Instruct and encourage patient and family to use good hand hygiene technique  - Identify and instruct in appropriate isolation precautions for identified infection/condition  Outcome: Progressing  Goal: Absence of fever/infection during neutropenic period  Description: INTERVENTIONS:  - Monitor WBC    Outcome: Progressing     Problem: SAFETY ADULT  Goal: Maintain or return to baseline ADL function  Description: INTERVENTIONS:  -  Assess patient's ability to carry out ADLs; assess patient's baseline for ADL function and identify physical deficits which impact ability to perform ADLs (bathing, care of mouth/teeth, toileting, grooming, dressing, etc )  - Assess/evaluate cause of self-care deficits   - Assess range of motion  - Assess patient's mobility; develop plan if impaired  - Assess patient's need for assistive devices and provide as appropriate  - Encourage maximum independence but intervene and supervise when necessary  - Involve family in performance of ADLs  - Assess for home care needs following discharge   - Consider OT consult to assist with ADL evaluation and planning for discharge  - Provide patient education as appropriate  Outcome: Progressing  Goal: Maintains/Returns to pre admission functional level  Description: INTERVENTIONS:  - Perform BMAT or MOVE assessment daily    - Set and communicate daily mobility goal to care team and patient/family/caregiver  - Collaborate with rehabilitation services on mobility goals if consulted  - Perform Range of Motion 3 times a day    - Reposition patient every 2 hours  - Record patient progress and toleration of activity level   Outcome: Progressing  Goal: Patient will remain free of falls  Description: INTERVENTIONS:  - Educate patient/family on patient safety including physical limitations  - Instruct patient to call for assistance with activity   - Consult OT/PT to assist with strengthening/mobility   - Keep Call bell within reach  - Keep bed low and locked with side rails adjusted as appropriate  - Keep care items and personal belongings within reach  - Initiate and maintain comfort rounds  - Make Fall Risk Sign visible to staff  - Offer Toileting every 2 Hours, in advance of need  - Initiate/Maintain bed alarm  - Obtain necessary fall risk management equipment:  - Apply yellow socks and bracelet for high fall risk patients  - Consider moving patient to room near nurses station  Outcome: Progressing     Problem: DISCHARGE PLANNING  Goal: Discharge to home or other facility with appropriate resources  Description: INTERVENTIONS:  - Identify barriers to discharge w/patient and caregiver  - Arrange for needed discharge resources and transportation as appropriate  - Identify discharge learning needs (meds, wound care, etc )  - Arrange for interpretive services to assist at discharge as needed  - Refer to Case Management Department for coordinating discharge planning if the patient needs post-hospital services based on physician/advanced practitioner order or complex needs related to functional status, cognitive ability, or social support system  Outcome: Progressing     Problem: Knowledge Deficit  Goal: Patient/family/caregiver demonstrates understanding of disease process, treatment plan, medications, and discharge instructions  Description: Complete learning assessment and assess knowledge base    Interventions:  - Provide teaching at level of understanding  - Provide teaching via preferred learning methods  Outcome: Progressing

## 2022-08-22 PROBLEM — F22 DELUSIONAL DISORDER (HCC): Status: ACTIVE | Noted: 2022-08-22

## 2022-08-22 PROBLEM — E83.39 HYPOPHOSPHATEMIA: Status: ACTIVE | Noted: 2022-08-22

## 2022-08-22 LAB
AMMONIA PLAS-SCNC: 32 UMOL/L (ref 11–35)
ANION GAP SERPL CALCULATED.3IONS-SCNC: 6 MMOL/L (ref 4–13)
ANION GAP SERPL CALCULATED.3IONS-SCNC: 6 MMOL/L (ref 4–13)
ANION GAP SERPL CALCULATED.3IONS-SCNC: 7 MMOL/L (ref 4–13)
ATRIAL RATE: 133 BPM
ATRIAL RATE: 64 BPM
BACTERIA UR CULT: NORMAL
BACTERIA UR CULT: NORMAL
BUN SERPL-MCNC: 31 MG/DL (ref 5–25)
BUN SERPL-MCNC: 47 MG/DL (ref 5–25)
BUN SERPL-MCNC: 61 MG/DL (ref 5–25)
CALCIUM SERPL-MCNC: 7.8 MG/DL (ref 8.3–10.1)
CALCIUM SERPL-MCNC: 7.9 MG/DL (ref 8.3–10.1)
CALCIUM SERPL-MCNC: 8 MG/DL (ref 8.3–10.1)
CHLORIDE SERPL-SCNC: 120 MMOL/L (ref 96–108)
CHLORIDE SERPL-SCNC: 123 MMOL/L (ref 96–108)
CHLORIDE SERPL-SCNC: 126 MMOL/L (ref 96–108)
CK MB SERPL-MCNC: 2.6 NG/ML (ref 0–5)
CK MB SERPL-MCNC: <1 % (ref 0–2.5)
CK SERPL-CCNC: 874 U/L (ref 39–308)
CO2 SERPL-SCNC: 21 MMOL/L (ref 21–32)
CO2 SERPL-SCNC: 21 MMOL/L (ref 21–32)
CO2 SERPL-SCNC: 22 MMOL/L (ref 21–32)
CREAT SERPL-MCNC: 1.36 MG/DL (ref 0.6–1.3)
CREAT SERPL-MCNC: 1.61 MG/DL (ref 0.6–1.3)
CREAT SERPL-MCNC: 2.07 MG/DL (ref 0.6–1.3)
ERYTHROCYTE [DISTWIDTH] IN BLOOD BY AUTOMATED COUNT: 14.5 % (ref 11.6–15.1)
GFR SERPL CREATININE-BSD FRML MDRD: 34 ML/MIN/1.73SQ M
GFR SERPL CREATININE-BSD FRML MDRD: 46 ML/MIN/1.73SQ M
GFR SERPL CREATININE-BSD FRML MDRD: 56 ML/MIN/1.73SQ M
GLUCOSE SERPL-MCNC: 117 MG/DL (ref 65–140)
GLUCOSE SERPL-MCNC: 142 MG/DL (ref 65–140)
GLUCOSE SERPL-MCNC: 149 MG/DL (ref 65–140)
GLUCOSE SERPL-MCNC: 249 MG/DL (ref 65–140)
HCT VFR BLD AUTO: 36.6 % (ref 36.5–49.3)
HGB BLD-MCNC: 12.1 G/DL (ref 12–17)
MCH RBC QN AUTO: 30 PG (ref 26.8–34.3)
MCHC RBC AUTO-ENTMCNC: 33.1 G/DL (ref 31.4–37.4)
MCV RBC AUTO: 91 FL (ref 82–98)
MRSA NOSE QL CULT: NORMAL
P AXIS: 65 DEGREES
P AXIS: 67 DEGREES
PHOSPHATE SERPL-MCNC: 0.9 MG/DL (ref 2.7–4.5)
PLATELET # BLD AUTO: 100 THOUSANDS/UL (ref 149–390)
PMV BLD AUTO: 11.8 FL (ref 8.9–12.7)
POTASSIUM SERPL-SCNC: 2.8 MMOL/L (ref 3.5–5.3)
POTASSIUM SERPL-SCNC: 3.2 MMOL/L (ref 3.5–5.3)
POTASSIUM SERPL-SCNC: 3.5 MMOL/L (ref 3.5–5.3)
PR INTERVAL: 100 MS
PR INTERVAL: 122 MS
QRS AXIS: 68 DEGREES
QRS AXIS: 7 DEGREES
QRSD INTERVAL: 110 MS
QRSD INTERVAL: 129 MS
QT INTERVAL: 346 MS
QT INTERVAL: 558 MS
QTC INTERVAL: 514 MS
QTC INTERVAL: 576 MS
RBC # BLD AUTO: 4.04 MILLION/UL (ref 3.88–5.62)
SODIUM SERPL-SCNC: 148 MMOL/L (ref 135–147)
SODIUM SERPL-SCNC: 151 MMOL/L (ref 135–147)
SODIUM SERPL-SCNC: 153 MMOL/L (ref 135–147)
T WAVE AXIS: 82 DEGREES
T WAVE AXIS: 85 DEGREES
VANCOMYCIN SERPL-MCNC: 12.9 UG/ML (ref 10–20)
VENTRICULAR RATE: 133 BPM
VENTRICULAR RATE: 64 BPM
WBC # BLD AUTO: 10.81 THOUSAND/UL (ref 4.31–10.16)

## 2022-08-22 PROCEDURE — 80202 ASSAY OF VANCOMYCIN: CPT | Performed by: INTERNAL MEDICINE

## 2022-08-22 PROCEDURE — 82550 ASSAY OF CK (CPK): CPT

## 2022-08-22 PROCEDURE — 82140 ASSAY OF AMMONIA: CPT

## 2022-08-22 PROCEDURE — 85027 COMPLETE CBC AUTOMATED: CPT

## 2022-08-22 PROCEDURE — 99024 POSTOP FOLLOW-UP VISIT: CPT | Performed by: PHYSICIAN ASSISTANT

## 2022-08-22 PROCEDURE — 82553 CREATINE MB FRACTION: CPT

## 2022-08-22 PROCEDURE — 80048 BASIC METABOLIC PNL TOTAL CA: CPT

## 2022-08-22 PROCEDURE — NC001 PR NO CHARGE: Performed by: INTERNAL MEDICINE

## 2022-08-22 PROCEDURE — 99232 SBSQ HOSP IP/OBS MODERATE 35: CPT | Performed by: INTERNAL MEDICINE

## 2022-08-22 PROCEDURE — 94760 N-INVAS EAR/PLS OXIMETRY 1: CPT

## 2022-08-22 PROCEDURE — 84100 ASSAY OF PHOSPHORUS: CPT | Performed by: INTERNAL MEDICINE

## 2022-08-22 PROCEDURE — 93010 ELECTROCARDIOGRAM REPORT: CPT | Performed by: INTERNAL MEDICINE

## 2022-08-22 PROCEDURE — 99233 SBSQ HOSP IP/OBS HIGH 50: CPT | Performed by: INTERNAL MEDICINE

## 2022-08-22 RX ORDER — POTASSIUM CHLORIDE 20 MEQ/1
40 TABLET, EXTENDED RELEASE ORAL ONCE
Status: COMPLETED | OUTPATIENT
Start: 2022-08-22 | End: 2022-08-22

## 2022-08-22 RX ORDER — POTASSIUM CHLORIDE 29.8 MG/ML
40 INJECTION INTRAVENOUS ONCE
Status: COMPLETED | OUTPATIENT
Start: 2022-08-22 | End: 2022-08-22

## 2022-08-22 RX ORDER — VANCOMYCIN HYDROCHLORIDE 1 G/200ML
15 INJECTION, SOLUTION INTRAVENOUS ONCE
Status: DISCONTINUED | OUTPATIENT
Start: 2022-08-22 | End: 2022-08-22

## 2022-08-22 RX ORDER — SODIUM CHLORIDE 9 MG/ML
10 INJECTION INTRAVENOUS DAILY
Qty: 300 ML | Refills: 3 | Status: SHIPPED | OUTPATIENT
Start: 2022-08-22 | End: 2022-11-20

## 2022-08-22 RX ADMIN — POTASSIUM PHOSPHATE, MONOBASIC AND POTASSIUM PHOSPHATE, DIBASIC 30 MMOL: 224; 236 INJECTION, SOLUTION, CONCENTRATE INTRAVENOUS at 20:11

## 2022-08-22 RX ADMIN — RISPERIDONE 1 MG: 1 TABLET ORAL at 17:43

## 2022-08-22 RX ADMIN — VANCOMYCIN HYDROCHLORIDE 1000 MG: 1 INJECTION, SOLUTION INTRAVENOUS at 09:47

## 2022-08-22 RX ADMIN — POTASSIUM CHLORIDE 40 MEQ: 29.8 INJECTION, SOLUTION INTRAVENOUS at 00:54

## 2022-08-22 RX ADMIN — CEFTRIAXONE 1000 MG: 10 INJECTION, POWDER, FOR SOLUTION INTRAVENOUS at 12:48

## 2022-08-22 RX ADMIN — RISPERIDONE 1 MG: 1 TABLET ORAL at 09:48

## 2022-08-22 RX ADMIN — HEPARIN SODIUM 5000 UNITS: 5000 INJECTION INTRAVENOUS; SUBCUTANEOUS at 05:28

## 2022-08-22 RX ADMIN — DIBASIC SODIUM PHOSPHATE, MONOBASIC POTASSIUM PHOSPHATE AND MONOBASIC SODIUM PHOSPHATE 1 TABLET: 852; 155; 130 TABLET ORAL at 12:48

## 2022-08-22 RX ADMIN — HEPARIN SODIUM 5000 UNITS: 5000 INJECTION INTRAVENOUS; SUBCUTANEOUS at 21:51

## 2022-08-22 RX ADMIN — CHLORHEXIDINE GLUCONATE 15 ML: 1.2 SOLUTION ORAL at 20:31

## 2022-08-22 RX ADMIN — POTASSIUM CHLORIDE 40 MEQ: 1500 TABLET, EXTENDED RELEASE ORAL at 00:54

## 2022-08-22 NOTE — ASSESSMENT & PLAN NOTE
- 8/21 CT CAP: Moderate R hydronephrosis 2/2 1 3x2 7cm UPJ calculus  - s/p R nephrostomy tube 8/22  - On IV rocephin  - BCX Edy@[facility], UCX pending    Plan:  - Continue IV abx  - Urology following

## 2022-08-22 NOTE — ASSESSMENT & PLAN NOTE
- Patient came to ED altered, unresponsive  - Patient was found down for unknown periods of time  - Multifactorial: sepsis, acute renal failure & uremia, polysubstance abuse  - UDS negative  - BRAULIO Cr 6 02, baseline 1 0  GFR 9  - Sepsis suspected 2/2 obstructing kidney stone vs aspiration    Plan:  - Neuro checks

## 2022-08-22 NOTE — ASSESSMENT & PLAN NOTE
- Resolved  - Off sedation, pressors  - Receiving IVF    Plan:   - Monitor hemodynamics  - MAP >65  - Continue fluid repletion & PO diet

## 2022-08-22 NOTE — ASSESSMENT & PLAN NOTE
- Initial Cr 6 02  - Likely postrenal 2/2 obstructing stone  - Trending downwards    Plan:  - Trend Cr  - Avoid nephrotoxic drugs

## 2022-08-22 NOTE — RESTORATIVE TECHNICIAN NOTE
Restorative Technician Note      Patient Name: Delmy Lujan     Restorative Tech Visit Date: 8/22/2022  Note Type: Mobility  Patient Position Upon Consult: Supine  Activity Performed: Repositioned  Patient Position at End of Consult: Supine; All needs within reach;  Other (comment) (Left in the care of PCA)    Della Vega  DPT, Restorative Technician

## 2022-08-22 NOTE — CASE MANAGEMENT
Case Management Assessment & Discharge Planning Note    Patient name Brandy Montez  Location [address] [place] [address] [address]/PPHP [id number] MRN [id number]  : 1965 Date 2022       Current Admission Date: 2022  Current Admission Diagnosis:BRAULIO (acute kidney injury) [facility])   Patient Active Problem List    Diagnosis Date Noted    Delusional disorder ([facility] ) 2022    Acute metabolic encephalopathy     Acute respiratory failure with hypoxia ([facility] ) 2022    Elevated troponin 2022    Elevated brain natriuretic peptide (BNP) level 2022    Essential hypertension 2022    Transaminitis 2022    Elevated lipase 2022    BRAULIO (acute kidney injury) ([facility] ) 2022    Hypernatremia 2022    [facility]    Increased anion gap metabolic acidosis     Rhabdomyolysis 2022    Elevated d-dimer 2022    Kidney stones     Compulsive skin picking 10/20/2021    Drug dependence ([facility] ) 10/20/2021      LOS (days): 2  Geometric Mean LOS (GMLOS) (days):   Days to GMLOS:     OBJECTIVE:    Risk of Unplanned Readmission Score: 31 42         Current admission status: Inpatient       Preferred Pharmacy:   [facility] [place] [address]  [address] [address]  Phone: [phone] Fax: [phone]    [address] [place] [address] 38 [address] [facility]  Phone: [phone] Fax: [phone]    Primary Care Provider: Moni Matias DO    Primary Insurance: BLUE CROSS  Secondary Insurance:     ASSESSMENT:  Joe 26 Proxies    There are no active Health Care Proxies on file                   Readmission Root Cause  30 Day Readmission: No    Patient Information  Admitted from[de-identified] Home  Mental Status: Alert  During Assessment patient was accompanied by: Not accompanied during assessment  Assessment information provided by[de-identified] Patient, Parent  Primary Caregiver: Self  Support Systems: Parent  [place] of Residence: [address] do you live in?: [place]  In the last 12 months, was there a time when you were not able to pay the mortgage or rent on time?: No  In the last 12 months, was there a time when you did not have a steady place to sleep or slept in a shelter (including now)?: No  Homeless/housing insecurity resource given?: N/A  Living Arrangements: Lives w/ Parent(s)    Activities of Daily Living Prior to Admission  Functional Status: Independent  Completes ADLs independently?: Yes  Ambulates independently?: Yes  Does patient use assisted devices?: No  Does patient currently own DME?: No  Does patient have a history of Outpatient Therapy (PT/OT)?: No  Does the patient have a history of Short-Term Rehab?: No  Does patient have a history of HHC?: No  Does patient currently have [facility]?: No         Patient Information Continued  Income Source: Unemployed  Within the past 12 months, you worried that your food would run out before you got the money to buy more : Never true  Within the past 12 months, the food you bought just didn't last and you didn't have money to get more : Never true  Food insecurity resource given?: N/A  Does patient receive dialysis treatments?: No  Does patient have a history of substance abuse?: Yes  Historical substance use preference: Fentanyl  History of Withdrawal Symptoms: Denies past symptoms  Is patient currently in treatment for substance abuse?: No  Patient declined treatment information    Does patient have a history of Mental Health Diagnosis?: No (denies)         Means of Transportation  Means of Transport to Appts[de-identified] Drives Self  In the past 12 months, has lack of transportation kept you from medical appointments or from getting medications?: No  In the past 12 months, has lack of transportation kept you from meetings, work, or from getting things needed for daily living?: No  Was application for public transport provided?: N/A        DISCHARGE DETAILS:    Discharge planning discussed with[de-identified] Patient, mother Milton Melvin via phone  Freedom of Choice: Yes     CM contacted family/caregiver?: Yes  Were Treatment Team discharge recommendations reviewed with patient/caregiver?: Yes  Did patient/caregiver verbalize understanding of patient care needs?: Yes  Were patient/caregiver advised of the risks associated with not following Treatment Team discharge recommendations?: Yes    Contacts  Patient Contacts: Horace Trammell, mother  Relationship to Patient[de-identified] Family  Contact Method: Phone  Phone Number: [phone]  Reason/Outcome: Continuity of Care, Emergency Contact, Discharge Planning                        Treatment Team Recommendation: Home  Discharge Destination Plan[de-identified] Home  Transport at Discharge : Family                Patient/caregiver received discharge checklist   Content reviewed  Patient/caregiver encouraged to participate in discharge plan of care prior to discharge home  CM reviewed d/c planning process including the following: identifying help at home, patient preference for d/c planning needs, Discharge Lounge, [facility] Meds to Bed program, availability of treatment team to discuss questions or concerns patient and/or family may have regarding understanding medications and recognizing signs and symptoms once discharged  CM also encouraged patient to follow up with all recommended appointments after discharge  Patient advised of importance for patient and family to participate in managing patients medical well being  Additional Comments: Patient independent at baseline, drives, lives with mother  Mother indicates that patient has "been in a lot of pain" lately and it has been more difficult for him to get around  Patient does have history of fentanyl use, and mother stated that patient had gone back to use due to the pain  Patient denies recent drug use    Patient's mother will be able to provide transportation at discharge  Patient's mother indicated that patient is unemployed, and that she does not know how much longer she would be able to afford caring for patient, and is concerned about his upcoming medical bills  Assigned CM to follow

## 2022-08-22 NOTE — PROGRESS NOTES
Progress Note -Interventional Radiology PA  No Nolanbury 61 y o  male MRN: [id number]  Unit/Bed#: [facility] 809-01 Encounter: [id number]    Assessment:    61year old male with pmh of kidney stones, presenting with right-sided hydronephrosis due to partially obstructing stone the ureteropelvic junction status post right-sided 8 Italian nephrostomy tube placement 08/20/2022    Output PCN last 24 hours: 875cc clear yellow urine    Plan:    - appreciate urology input for future management stone burden  -will schedule patient for routine 3 month PCN exchange  Please reach out to IR if urology would like attempt at internalization at later time  -please flush tube once daily with 10 cc normal saline  -discuss 2 management with patient  Patient will need to flush tube with daily once he is discharged  Patient will most likely need VNA support  -prescription for flushes sent to [facility]    Patient Active Problem List   Diagnosis    Compulsive skin picking    Drug dependence ([facility] [address]    Acute metabolic encephalopathy    Acute respiratory failure with hypoxia (HCC)    Elevated troponin    Elevated brain natriuretic peptide (BNP) level    Essential hypertension    Transaminitis    Elevated lipase    BRAULIO (acute kidney injury) ([facility] [address]    Hypernatremia    Septic shock (HCC)    Increased anion gap metabolic acidosis    Rhabdomyolysis    Elevated d-dimer    Kidney stones    Delusional disorder ([facility] [address] [facility]    Hypophosphatemia          Subjective:  Patient fatigued at time of exam   Patient with minor discomfort at right tube insertion site  Clear yellow urine  WBC 10 8 from 24    Objective:    Vitals:  /72   Pulse 68   Temp 97 9 °F (36 6 °C)   Resp 16   Ht 5' 4" (1 626 m)   Wt 67 1 kg (147 lb 14 9 oz)   SpO2 93%   BMI 25 39 kg/m²   Body mass index is 25 39 kg/m²    Weight (last 2 days)     Date/Time Weight    08/22/22 1416 67 1 (147 93)    08/22/22 0539 65 7 (144 84)    08/21/22 0705 60 8 (134)    08/21/22 0545 61 (134 48)    08/20/22 2104 59 2 (130 51)          I/Os:    Intake/Output Summary (Last 24 hours) at 8/22/2022 1633  Last data filed at 8/22/2022 1200  Gross per 24 hour   Intake 3708  59 ml   Output 2450 ml   Net 1258 59 ml       Invasive Devices  Report    Peripheral Intravenous Line  Duration           Peripheral IV 08/22/22 Right;Ventral (anterior) Forearm <1 day          Drain  Duration           Urethral Catheter Temperature probe 16 Fr  2 days    Nephrostomy Right 8 Fr  1 day                Physical Exam:  General appearance: alert and fatigued  Lungs: clear to auscultation bilaterally  Heart: regular rate and rhythm, S1, S2 normal, no murmur, click, rub or gallop  Abdomen: soft, non-tender; bowel sounds normal; no masses,  no organomegaly  Skin: right pcn insertion site c/d/i, clear yellow urine output                Lab Results and Cultures:   CBC with diff:   Lab Results   Component Value Date    WBC 10 81 (H) 08/22/2022    HGB 12 1 08/22/2022    HCT 36 6 08/22/2022    MCV 91 08/22/2022     (L) 08/22/2022    MCH 30 0 08/22/2022    MCHC 33 1 08/22/2022    RDW 14 5 08/22/2022    MPV 11 8 08/22/2022    NRBC 0 08/21/2022      BMP/CMP:  Lab Results   Component Value Date     12/09/2015    K 3 2 (L) 08/22/2022    K 4 5 12/09/2015     (H) 08/22/2022     12/09/2015    CO2 21 08/22/2022    CO2 24 08/20/2022    ANIONGAP 8 12/09/2015    BUN 31 (H) 08/22/2022    BUN 13 12/09/2015    CREATININE 1 36 (H) 08/22/2022    CREATININE 1 02 12/09/2015    GLUCOSE 123 08/20/2022    GLUCOSE 95 12/09/2015    CALCIUM 8 0 (L) 08/22/2022    CALCIUM 9 1 12/09/2015    AST 78 (H) 08/20/2022    AST 32 12/09/2015    ALT 43 08/20/2022    ALT 36 12/09/2015    ALKPHOS 60 08/20/2022    ALKPHOS 75 12/09/2015    PROT 7 5 12/09/2015    BILITOT 0 39 12/09/2015    EGFR 56 08/22/2022   ,     Coags:   Lab Results   Component Value Date    PTT 34 08/20/2022    PTT 31 12/09/2015    INR 1 62 (H) 08/20/2022    INR 1 08 12/09/2015   ,   Results from last 7 days   Lab Units 08/20/22  1210   PTT seconds 34   INR  1 62*        Lipid Panel:   Lab Results   Component Value Date    CHOL 258 10/14/2015     Lab Results   Component Value Date    HDL 50 10/14/2015     Lab Results   Component Value Date    HDL 50 10/14/2015     Lab Results   Component Value Date    LDLCALC LDL- unable to calculate when Triglyceride > 400 10/14/2015     Lab Results   Component Value Date    TRIG 474 10/14/2015       HgbA1c:   Lab Results   Component Value Date    HGBA1C 5 3 11/22/2021       Blood Culture:   Lab Results   Component Value Date    BLOODCX No Growth at 24 hrs  08/20/2022   ,   Urinalysis:   Lab Results   Component Value Date    COLORU Elayne 08/20/2022    COLORU Yellow 07/06/2016    CLARITYU Slightly Cloudy 08/20/2022    CLARITYU Transparent 07/06/2016    SPECGRAV 1 025 08/20/2022    SPECGRAV 1 025 07/06/2016    PHUR 5 5 08/20/2022    PHUR 6 0 10/20/2016    PHUR 6 0 07/06/2016    LEUKOCYTESUR Small (A) 08/20/2022    LEUKOCYTESUR - 07/06/2016    NITRITE Negative 08/20/2022    NITRITE - 07/06/2016    PROTEINUA trace 07/06/2016    GLUCOSEU Negative 08/20/2022    GLUCOSEU Negative 01/07/2016    KETONESU Trace (A) 08/20/2022    KETONESU - 07/06/2016    BILIRUBINUR Interference- unable to analyze (A) 08/20/2022    BILIRUBINUR - 07/06/2016    BLOODU Large (A) 08/20/2022    BLOODU trace 07/06/2016   ,   Urine Culture:   Lab Results   Component Value Date    URINECX No Growth <1000 cfu/mL 08/20/2022   ,   Wound Culure:  No results found for: [facility]         Thank you for allowing me to participate in the care of BROOKE Rodriguez  Please don't hesitate to call, text, email, or TigerText with any questions  This text is generated with voice recognition software  There may be translation, syntax,  or grammatical errors  If you have any questions, please contact the dictating provider      Whitley Eubanks PA-C

## 2022-08-22 NOTE — RESPIRATORY THERAPY NOTE
08/22/22 0739   Respiratory Assessment   Assessment Type Assess only   General Appearance Awake   Respiratory Pattern Normal   Chest Assessment Chest expansion symmetrical   Bilateral Breath Sounds Diminished;Clear   Resp Comments No distress noted  BIlateral BS clear/diminshed  No needs at this time   Will continue to monitor   O2 Device NC   Additional Assessments   Pulse 70   Respirations 16   SpO2 98 %

## 2022-08-22 NOTE — ASSESSMENT & PLAN NOTE
- Improving, initially 161, now 151  - Initially on D5W, held at this time to avoid rapid repletion    Plan:  - Continue po hydration

## 2022-08-22 NOTE — PROGRESS NOTES
Vancomycin IV Pharmacy-to-Dose Consultation    Myra Sandoval is a 61 y o  male who is currently receiving vancomycin IV with management by the Pharmacy Consult service  Relevant clinical data and objective / subjective history reviewed  Vancomycin Assessment:  Indication: bacteremia  Status: critically ill  Renal Function: BRAULIO improved - UOP 1 9 mL/kg/hr, serum creatinine trending down peak 6 02 on admission, td=idat   Days of Therapy: 3  Current Dose: 1000 mg (15 mg/kg) IV daily PRN when random vancomycin level is less than 20  Goal Trough: 15-20 (appropriate for most indications)   Goal AUC(24h): 400-600  Last Level: 12 9      Vancomycin Plan:  New Dosing: change to 1000 mg IV x1 followed by 750 mg IV q12h  Next Level: trough 8/23 at 0930  Renal Function Monitoring: daily BMP and UOP assessment      Pharmacy will continue to follow closely for s/sx of nephrotoxicity, infusion reactions and appropriateness of therapy  BMP and CBC will be ordered per protocol  We will continue to follow the patient's culture results and clinical progress daily         Thank you,  Dalila Brown, PharmD, [address] Pharmacist  [phone]

## 2022-08-22 NOTE — PROGRESS NOTES
Transfer Note - Critical Care   Марина Valdez 61 y o  male MRN: [id number]  Unit/Bed#: MICU 12 Encounter: [id number]      * BRAULIO (acute kidney injury) [facility])  Assessment & Plan  - Initial Cr 6 02  - Likely postrenal 2/2 obstructing stone  - Trending downwards    Plan:  - Trend Cr  - Avoid nephrotoxic drugs    Kidney stones  Assessment & Plan  - 8/21 CT CAP: Moderate R hydronephrosis 2/2 1 3x2 7cm UPJ calculus  - s/p R nephrostomy tube 8/22  - On IV rocephin  - [email or web address] [email or web address], UCX pending    Plan:  - Continue IV abx  - Urology following    Septic shock ([facility] [address] )  Assessment & Plan  - Resolved  - Off sedation, pressors  - Receiving IVF    Plan:   - Monitor hemodynamics  - MAP >65  - Continue fluid repletion & PO diet      Delusional disorder (HCC)  Assessment & Plan  - Mood stable  - Home regimen: Risperdal    Plan:  - Continue while inpatient    Hypernatremia  Assessment & Plan  - Improving, initially 161, now 151  - Initially on D5W, held at this time to avoid rapid repletion    Plan:  - Continue po hydration    Acute respiratory failure with hypoxia (HCC)  Assessment & Plan  - Resolved, now extubated on RA  - Intially requiring BiPAP, subsequently intubated    Acute metabolic encephalopathy  Assessment & Plan  - Patient came to ED altered, unresponsive  - Patient was found down for unknown periods of time  - Multifactorial: sepsis, acute renal failure & uremia, polysubstance abuse  - UDS negative  - BRAULIO Cr 6 02, baseline 1 0  GFR 9  - Sepsis suspected 2/2 obstructing kidney stone vs aspiration    Plan:  - Neuro checks    Drug dependence ([facility] [address] )  Assessment & Plan  - Heroin, tobacco  Prior h/o UDS+ for THC, amphetamines, morphine, benzos  - 8/20/22 UDS negative    Plan:  - Monitor           Code Status: Level 1 - Full Code  POA:    POLST:      Reason for ICU admission:       Active problems:   Principal Problem:    BRAULIO (acute kidney injury) ([facility] [address] )  Active Problems:    Kidney stones    Septic shock ([facility] [address] )    Drug dependence ([facility] [address] )    Acute metabolic encephalopathy    Acute respiratory failure with hypoxia (HCC)    Hypernatremia    Delusional disorder ([facility] [address] )  Resolved Problems:    * No resolved hospital problems  *      Consultants:   Urology    History of Present Illness:   Kenneth Bains is a 61 y o  male who presents with AMS  Patient has hx of kidney stones, polysubstance abuse, hypertension, psychiatric disorder, brought into minor see ED found unresponsive  Patient was found down by family today with no evidence of trauma or incontinence  In the ED, patient was hypoxic, altered mental status  Patient was placed initially on BiPAP  Patient had initial elevated troponin, procal, lactic acidosis, leukocytosis, and elevated creatinine of 6 02  Patient had central line placed for fluid resuscitation as patient had poor IV access  Patient was also subsequently intubated after speaking with nephrology  CT Head was negative  Ct C/A/P showed obscuration of left lower bronchus, aspiration, moderate right hydronephrosis with 1 3x 2 7 cm UPJ calculus, right mid to upper pole staghorn calculus measuring 3 8x1 7 cm  Patient will be transferred to [facility] MICU for PCN by IR and possible CRRT  Summary of clinical course:   Patient to OR for IR right nephrostomy tube  Patient underwent bronchoscopy 8/21  Patient was weaned off of all sedation pressors, extubated 8/21  Patient was monitored, and deemed medically stable to be transferred to medical floor      Recent or scheduled procedures:   IR nephrostomy tube placement    Outstanding/pending diagnostics:   None    Cultures:   BCX  UCX  Bronchial culture  MRSA culture  Legionella culture       Mobilization Plan:   PT/OT    Nutrition Plan:   Regular diet    Invasive Devices Review  Invasive Devices  Report    Central Venous Catheter Line  Duration           CVC Central Lines 08/20/22 Triple Right Internal jugular 1 day          Arterial Line  Duration           Arterial Line 08/20/22 Right Brachial 1 day          Drain  Duration           Nephrostomy Right 8 Fr  1 day    Urethral Catheter Temperature probe 16 Fr  1 day                  VTE Pharmacologic Prophylaxis: Heparin  VTE Mechanical Prophylaxis: sequential compression device      Spoke with [facility]  regarding transfer  Please contact critical care via [employer] with any questions or concerns  Portions of the record may have been created with voice recognition software  Occasional wrong word or "sound a like" substitutions may have occurred due to the inherent limitations of voice recognition software  Read the chart carefully and recognize, using context, where substitutions have occurred

## 2022-08-22 NOTE — PROGRESS NOTES
Daily Progress Note - Critical Care   Barrera Tsang 61 y o  male MRN: [id number]  Unit/Bed#: MICU 12 Encounter: [id number]        ----------------------------------------------------------------------------------------  HPI/24hr events:     Barrera Tsang is a 61 y o  male who presents with AMS  Patient has hx of kidney stones, polysubstance abuse, hypertension, psychiatric disorder, brought into minor see ED found unresponsive  Patient was found down by family today with no evidence of trauma or incontinence  In the ED, patient was hypoxic, altered mental status  Patient was placed initially on BiPAP  Patient had initial elevated troponin, procal, lactic acidosis, leukocytosis, and elevated creatinine of 6 02  Patient had central line placed for fluid resuscitation as patient had poor IV access  Patient was also subsequently intubated after speaking with nephrology  CT Head was negative  Ct C/A/P showed obscuration of left lower bronchus, aspiration, moderate right hydronephrosis with 1 3x 2 7 cm UPJ calculus, right mid to upper pole staghorn calculus measuring 3 8x1 7 cm  Patient will be transferred to [facility] AND [facility] MICU for PCN by IR and possible CRRT     ---------------------------------------------------------------------------------------  SUBJECTIVE  Pt evaluated at bedside  Reports he's doing well, would like to go home      Review of systems was reviewed and negative unless stated above in HPI/24-hour events   ---------------------------------------------------------------------------------------  Assessment and Plan:    Neuro:   Diagnosis: Toxic metabolic encephalopathy  · Patient came to ED altered, unresponsive  · Patient was found down for unknown periods of time  · Multifactorial: sepsis, acute renal failure & uremia, polysubstance abuse  · UDS negative  · BRAULIO Cr 6 02, baseline 1 0  GFR 9  · Sepsis suspected 2/2 obstructing kidney stone vs aspiration  · Plan: sedation with propofol, now off  · Neuro checks  · Pain regimen  Diagnosis: Delusional disorder  · Home regimen: risperidone  · Plan:  · Continue while inpatient  Diagnosis: hx of polysubstance abuse  · Heroin, tobacco  Prior h/o UDS+ for THC, amphetamines, morphine, benzos  · 8/20/22 UDS negative  · Plan:  · Monitor         CV:   Diagnosis: Hypotension  · Resolved  · Off sedation, pressors  · Receiving IVF  · Plan:   · Monitor hemodynamics  · MAP >65  · Continue fluid repletion & PO diet  Diagnosis: Hx of HTN   · Plan: Holding home amlodpine        Pulm:  Diagnosis: Acute hypoxic respiratory failure  · Initially was on BIPAP  · Was intubated prior to transfer from [address] [place] [address] ED  · Extubated 8/21, on RA  Diagnosis: Aspiration   · Possible aspiration from CT  · On abx - ceftriaxone, vancomycin  · Monitor respiratory status        GI:   Diagnosis: No active issues  · Had elevated lipase, likely secondary to ARF  · NPO for now        :   Diagnosis: Acute renal failure and obstruction  · Na 160, K 3 7, Bicarb 22, , Creat 7 02  CK 3236, lactic acid 4 8, 2 6  · CT shows moderate right hydronephrosis w/ 1 3x2 7 cm UPJ calculus, right mid to upper pole staghorn calculus measuring 3 8 x 1 7 cm  · S/p IR right nephrostomy tube placement 8/20/22  · Nephrology following, appreciate recs  · Plan:  · Continue fluid resuscitation  · Continue abx  · Trend BMP, VBG, lactic acid  · BUN/Creat  Diagnosis: BRAULIO  · Initial Cr 6 02, baseline 1 0  · Improving  · Plan:  · Continue fluid resuscitation  · Avoid nephrotoxic drugs  · Trend Cr  Diagnosis: Rhabdomyolysis  · Found down for unknown period of time  · 8/20/22 CK elevated 3236, CKMB 13 2        F/E/N:   F: continue isolyte maintenance fluid  E: Replete electrolytes as needed, possible CRRT pending labs and IR  N: NPO        Heme/Onc:   Diagnosis: Leukocytosis  · Trend CBC/fevers  · DVT prophylaxis: SQH        Endo:   Diagnosis: No active issues        ID:   Diagnosis: Sepsis  · Source obstructing R kidney stone vs aspiration PNA  · Patient on ceftriaxone and vancomycin  · 8/21/22 Echo shows no vegetations  · Plan: trend fevers WBC  · BCX pending  · MRSA swab pending        MSK/Skin:   Diagnosis: No active issues  · Plan:  · PT/OT when able  · Frequent repositioning    Patient appropriate for transfer out of the ICU today?: Yes  Disposition: Transfer to Med-Surg   Code Status: Level 1 - Full Code  ---------------------------------------------------------------------------------------  ICU CORE MEASURES    Prophylaxis   VTE Pharmacologic Prophylaxis: Heparin  VTE Mechanical Prophylaxis: sequential compression device  Stress Ulcer Prophylaxis: Prophylaxis Not Indicated     Invasive Devices Review  Invasive Devices  Report    Central Venous Catheter Line  Duration           CVC Central Lines 08/20/22 Triple Right Internal jugular 1 day          Arterial Line  Duration           Arterial Line 08/20/22 Right Brachial 1 day          Drain  Duration           Nephrostomy Right 8 Fr  1 day    Urethral Catheter Temperature probe 16 Fr  1 day              Can any invasive devices be discontinued today? Yes  ---------------------------------------------------------------------------------------  OBJECTIVE    Vitals   Vitals:    08/22/22 0200 08/22/22 0300 08/22/22 0500 08/22/22 0539   BP:       BP Location:       Pulse: 64 72 72    Resp:       Temp: 100 04 °F (37 8 °C) 99 68 °F (37 6 °C) 99 68 °F (37 6 °C)    TempSrc:       SpO2: 97% 95% 98%    Weight:    65 7 kg (144 lb 13 5 oz)   Height:           Respiratory:  SpO2: SpO2: 98 % RA       Invasive/non-invasive ventilation settings   Respiratory  Report   Lab Data (Last 4 hours)    None         O2/Vent Data (Last 4 hours)    None                Physical Exam  Constitutional:       General: He is not in acute distress  Appearance: He is ill-appearing  HENT:      Head: Normocephalic and atraumatic  Cardiovascular:      Rate and Rhythm: Normal rate and regular rhythm        Heart sounds: Normal heart sounds  Pulmonary:      Effort: Pulmonary effort is normal       Breath sounds: Normal breath sounds  Abdominal:      Palpations: Abdomen is soft  Musculoskeletal:      Right lower leg: No edema  Left lower leg: No edema  Skin:     General: Skin is warm and dry  Neurological:      Mental Status: He is oriented to person, place, and time     Psychiatric:         Mood and Affect: Mood normal          Behavior: Behavior normal              Laboratory and Diagnostics:  Results from last 7 days   Lab Units 08/22/22  0530 08/21/22  0449 08/20/22  2220 08/20/22  2041 08/20/22  1210   WBC Thousand/uL 10 81* 23 68*  --  24 99* 28 29*   HEMOGLOBIN g/dL 12 1 14 7  --  16 2 20 3*   I STAT HEMOGLOBIN g/dl  --   --  13 9  --   --    HEMATOCRIT % 36 6 46 3  --  50 0* 61 9*   HEMATOCRIT, ISTAT %  --   --  41  --   --    PLATELETS Thousands/uL 100* 186  --  166 225   NEUTROS PCT %  --  88*  --  88* 84*   MONOS PCT %  --  6  --  6 10     Results from last 7 days   Lab Units 08/22/22  0530 08/21/22  2343 08/21/22  1736 08/21/22  1125 08/21/22  0449 08/21/22  0255 08/20/22  2220 08/20/22  2041 08/20/22  1210   SODIUM mmol/L 151* 153* 161* 158* 158* 157*  --  156* 160*   POTASSIUM mmol/L 3 5 2 8* 3 6 4 1 3 6 3 6  --  3 7 3 7   CHLORIDE mmol/L 123* 126* 134* 131* 128* 128*  --  127* 112*   CO2 mmol/L 22 21 22 21 21 22  --  22 22   CO2, I-STAT mmol/L  --   --   --   --   --   --  24  --   --    ANION GAP mmol/L 6 6 5 6 9 7  --  7 26*   BUN mg/dL 47* 61* 73* 80* 94* 98*  --  119* 121*   CREATININE mg/dL 1 61* 2 07* 2 22* 2 60* 3 18* 3 38*  --  4 48* 6 02*   CALCIUM mg/dL 7 8* 7 9* 8 3 8 3 7 9* 7 7*  --  8 4 10 2*   GLUCOSE RANDOM mg/dL 149* 249* 148* 162* 138 121  --  137 139   ALT U/L  --   --   --   --   --   --   --  43 66   AST U/L  --   --   --   --   --   --   --  78* 132*   ALK PHOS U/L  --   --   --   --   --   --   --  60 76   ALBUMIN g/dL  --   --   --   --   --   --   --  2 7* 4 3   TOTAL BILIRUBIN mg/dL  -- --   --   --   --   --   --  0 83 1 93*     Results from last 7 days   Lab Units 08/21/22  0449 08/20/22 2041 08/20/22  1210   MAGNESIUM mg/dL 3 0* 2 8* 3 5*   PHOSPHORUS mg/dL 4 5 5 2*  --       Results from last 7 days   Lab Units 08/20/22  1210   INR  1 62*   PTT seconds 34          Results from last 7 days   Lab Units 08/20/22  2041 08/20/22  1436 08/20/22  1210   LACTIC ACID mmol/L 2 0 2 4* 4 8*     ABG:  Results from last 7 days   Lab Units 08/21/22  0453   PH ART  7 363   PCO2 ART mm Hg 37 0   PO2 ART mm Hg 83 5   HCO3 ART mmol/L 20 6*   BASE EXC ART mmol/L -4 2   ABG SOURCE  Line, Arterial     VBG:  Results from last 7 days   Lab Units 08/21/22  0453 08/20/22 2052   PH JOSH   --  7 293*   PCO2 JOSH mm Hg  --  45 7   PO2 JOSH mm Hg  --  33 4*   HCO3 JOSH mmol/L  --  21 6*   BASE EXC JOSH mmol/L  --  -5 0   ABG SOURCE  Line, Arterial  --      Results from last 7 days   Lab Units 08/20/22  1210   PROCALCITONIN ng/ml 5 49*       Micro  Results from last 7 days   Lab Units 08/21/22  1436 08/20/22  2324 08/20/22 2057 08/20/22  1210   BLOOD CULTURE   --  Received in Microbiology Lab  Culture in Progress  No Growth at 24 hrs  No Growth at 24 hrs  No Growth at 24 hrs  GRAM STAIN RESULT  3+ Polys  No bacteria seen  --   --   --        EKG: no new EKG  Imaging: I have personally reviewed pertinent reports  Intake and Output  I/O       08/20 0701 08/21 0700 08/21 0701 08/22 0700    P  O   2650    I V  (mL/kg) 2146 5 (35 2) 924 4 (14 1)    Other 325 0    NG/GT  100    IV Piggyback  400    Total Intake(mL/kg) 2471 5 (40 5) 4074 4 (62)    Urine (mL/kg/hr) 1700 2940 (1 9)    Emesis/NG output 252     Blood 2     Total Output 1954 2940    Net +517 5 +1134 4                Height and Weights   Height: 5' 6" (167 6 cm)  IBW (Ideal Body Weight): 63 8 kg  Body mass index is 23 38 kg/m²    Weight (last 2 days)     Date/Time Weight    08/22/22 0539 65 7 (144 84)    08/21/22 0705 60 8 (134)    08/21/22 0545 61 (134 48) 08/20/22 6226 59 2 (130 51)            Nutrition       Diet Orders   (From admission, onward)             Start     Ordered    08/22/22 0647  Diet Regular; Regular House  Diet effective now        References:    Nutrtion Support Algorithm Enteral vs  Parenteral   Question Answer Comment   Diet Type Regular    Regular Regular House    RD to adjust diet per protocol? Yes        08/22/22 0646                    Active Medications  Scheduled Meds:  Current Facility-Administered Medications   Medication Dose Route Frequency Provider Last Rate    cefTRIAXone  1,000 mg Intravenous Q24H Magdalena Alamo DO Stopped (08/21/22 1058)    chlorhexidine  15 mL Mouth/Throat Q12H [address] Min Riana Bravo MD      heparin (porcine)  5,000 Units Subcutaneous [facility] Colene Lennox, MD      melatonin  6 mg Oral HS Betty Hayes MD      risperiDONE  1 mg Oral BID Min Riana Bravo MD      vancomycin  15 mg/kg Intravenous Daily PRN Aubrie Cid DO       Continuous Infusions:     PRN Meds:   vancomycin, 15 mg/kg, Daily PRN        Allergies   Allergies   Allergen Reactions    Metronidazole Itching and Swelling    Nsaids GI Intolerance     Stomach irritant    Penicillin G     Penicillins GI Intolerance     Sick to stomach    Pollen Extract     Sulfa Antibiotics      ---------------------------------------------------------------------------------------  Advance Directive and Living Will:      Power of :    POLST:    ---------------------------------------------------------------------------------------  Care Time Delivered:   30 minutes    Magdalena Alamo DO      Portions of the record may have been created with voice recognition software  Occasional wrong word or "sound a like" substitutions may have occurred due to the inherent limitations of voice recognition software    Read the chart carefully and recognize, using context, where substitutions have occurred

## 2022-08-22 NOTE — ASSESSMENT & PLAN NOTE
- Heroin, tobacco  Prior h/o UDS+ for THC, amphetamines, morphine, benzos  - 8/20/22 UDS negative    Plan:  - Monitor

## 2022-08-22 NOTE — PROGRESS NOTES
Progress Note - Nephrology   Gokul Mcduffie 61 y o  male MRN: [id number]  Unit/Bed#: [facility] 809-01 Encounter: [id number]      Assessment / Plan:  1  BRAULIO likely d/t obstructive staghorn calculi and rhabdomyolysis, improving  -baseline serum creatinine 0 8, admit creatinine 6 with a BUN of 120  -serum creatinine has improved status post percutaneous nephrostomy placement as well as Yee catheter placement   -creatinine down to 1 6  -monitor BMP  -monitor off IV fluids this patient with hypernatremia    2  Hypernatremia-likely iatrogenic due to IV fluid resuscitation and lack of free water  -patient does have thirst intact, although patient drink to thirst, monitor BMP     3  Hypophosphatemia-phosphorus 0 9, monitor phos status post repletion this morning     4  Septic shock-status post Rocephin and vancomycin per ICU, now on general medical floor        Subjective:   He complains he is thirsty  He denies chest pain, shortness breath or difficulty with urination  Objective:     Vitals: Blood pressure 104/51, pulse 70, temperature 99 68 °F (37 6 °C), resp  rate 20, height 5' 4" (1 626 m), weight 67 1 kg (147 lb 14 9 oz), SpO2 95 %  ,Body mass index is 25 39 kg/m²  Temp (24hrs), Av 8 °F (37 7 °C), Min:99 32 °F (37 4 °C), Max:100 4 °F (38 °C)      Weight (last 2 days)     Date/Time Weight    22 1416 67 1 (147 93)    22 0539 65 7 (144 84)    22 0705 60 8 (134)    22 0545 61 (134 48)    22 2104 59 2 (130 51)            Intake/Output Summary (Last 24 hours) at 2022 1421  Last data filed at 2022 1200  Gross per 24 hour   Intake 3751 99 ml   Output 2675 ml   Net 1076 99 ml     I/O last 24 hours: In: 4408 8 [P O :2650; I V :1028 8; NG/GT:100; IV Piggyback:630]  Out: 3501 [Urine:3740]        Physical Exam:   Physical Exam  Vitals reviewed  Constitutional:       General: He is not in acute distress  Appearance: He is well-developed  He is ill-appearing   He is not diaphoretic  Comments: disheveled   HENT:      Head: Normocephalic and atraumatic  Nose: Nose normal       Mouth/Throat:      Mouth: Mucous membranes are dry  Pharynx: No oropharyngeal exudate  Eyes:      General: No scleral icterus  Right eye: No discharge  Left eye: No discharge  Neck:      Thyroid: No thyromegaly  Cardiovascular:      Rate and Rhythm: Normal rate and regular rhythm  Heart sounds: Normal heart sounds  Pulmonary:      Effort: Pulmonary effort is normal       Breath sounds: Normal breath sounds  No wheezing or rales  Abdominal:      General: Bowel sounds are normal  There is no distension  Palpations: Abdomen is soft  Tenderness: There is no abdominal tenderness  Genitourinary:     Comments: BROOKE Yee  Musculoskeletal:         General: No swelling  Normal range of motion  Cervical back: Neck supple  Lymphadenopathy:      Cervical: No cervical adenopathy  Skin:     General: Skin is warm and dry  Findings: No rash  Neurological:      General: No focal deficit present  Mental Status: He is alert        Comments: awake   Psychiatric:         Mood and Affect: Mood normal          Behavior: Behavior normal          Invasive Devices  Report    Peripheral Intravenous Line  Duration           Peripheral IV 08/22/22 Right;Ventral (anterior) Forearm <1 day          Drain  Duration           Nephrostomy Right 8 Fr  1 day    Urethral Catheter Temperature probe 16 Fr  1 day                Medications:    Scheduled Meds:  Current Facility-Administered Medications   Medication Dose Route Frequency Provider Last Rate    [MAR Hold] cefTRIAXone  1,000 mg Intravenous Q24H Magdalena Alamo DO 1,000 mg (08/22/22 1248)    [MAR Hold] chlorhexidine  15 mL Mouth/Throat Q12H Zara Pallas, MD      [facility] Hold] heparin (porcine)  5,000 Units Subcutaneous [facility] Zoe Monson MD      [facility] Hold] melatonin  6 mg Oral HS Malia Shepard MD      [facility] Hold] potassium-sodium phosphateS  1 tablet Oral 4x Daily (with meals and at bedtime) MD Hoda Hughes [facility] Hold] risperiDONE  1 mg Oral BID Min Leny Pandya MD         PRN Meds:     Continuous Infusions:         LAB RESULTS:      Results from last 7 days   Lab Units 08/22/22  0530 08/21/22  2343 08/21/22  1736 08/21/22  1125 08/21/22  0449 08/21/22  0255 08/20/22  2220 08/20/22  2041 08/20/22  1210   WBC Thousand/uL 10 81*  --   --   --  23 68*  --   --  24 99* 28 29*   HEMOGLOBIN g/dL 12 1  --   --   --  14 7  --   --  16 2 20 3*   I STAT HEMOGLOBIN g/dl  --   --   --   --   --   --  13 9  --   --    HEMATOCRIT % 36 6  --   --   --  46 3  --   --  50 0* 61 9*   HEMATOCRIT, ISTAT %  --   --   --   --   --   --  41  --   --    PLATELETS Thousands/uL 100*  --   --   --  186  --   --  166 225   NEUTROS PCT %  --   --   --   --  88*  --   --  88* 84*   LYMPHS PCT %  --   --   --   --  3*  --   --  5* 5*   MONOS PCT %  --   --   --   --  6  --   --  6 10   EOS PCT %  --   --   --   --  1  --   --  0 0   POTASSIUM mmol/L 3 5 2 8* 3 6 4 1 3 6 3 6  --  3 7 3 7   CHLORIDE mmol/L 123* 126* 134* 131* 128* 128*  --  127* 112*   CO2 mmol/L 22 21 22 21 21 22  --  22 22   CO2, I-STAT mmol/L  --   --   --   --   --   --  24  --   --    BUN mg/dL 47* 61* 73* 80* 94* 98*  --  119* 121*   CREATININE mg/dL 1 61* 2 07* 2 22* 2 60* 3 18* 3 38*  --  4 48* 6 02*   CALCIUM mg/dL 7 8* 7 9* 8 3 8 3 7 9* 7 7*  --  8 4 10 2*   ALK PHOS U/L  --   --   --   --   --   --   --  60 76   ALT U/L  --   --   --   --   --   --   --  43 66   AST U/L  --   --   --   --   --   --   --  78* 132*   GLUCOSE, ISTAT mg/dl  --   --   --   --   --   --  123  --   --    MAGNESIUM mg/dL  --   --   --   --  3 0*  --   --  2 8* 3 5*   PHOSPHORUS mg/dL 0 9*  --   --   --  4 5  --   --  5 2*  --        CUTURES:  Lab Results   Component Value Date    BLOODCX No Growth at 24 hrs  08/20/2022    BLOODCX No Growth at 24 hrs  08/20/2022 BLOODCX No Growth at 24 hrs  08/20/2022    BLOODCX No Growth at 24 hrs  08/20/2022    BLOODCX No Growth After 5 Days  06/02/2021    BLOODCX No Growth After 5 Days  06/02/2021    URINECX No Growth <1000 cfu/mL 08/20/2022    URINECX No Growth <1000 cfu/mL 08/20/2022    URINECX No Growth <1000 cfu/mL 08/15/2016    URINECX No Growth <1000 cfu/mL 07/25/2016                 Portions of the record may have been created with voice recognition software  Occasional wrong word or "sound a like" substitutions may have occurred due to the inherent limitations of voice recognition software  Read the chart carefully and recognize, using context, where substitutions have occurred  If you have any questions, please contact the dictating provider

## 2022-08-23 ENCOUNTER — TELEPHONE (OUTPATIENT)
Dept: OTHER | Facility: HOSPITAL | Age: 57
End: 2022-08-23

## 2022-08-23 LAB
ANION GAP SERPL CALCULATED.3IONS-SCNC: 5 MMOL/L (ref 4–13)
BASE EX.OXY STD BLDV CALC-SCNC: 88.6 % (ref 60–80)
BASE EXCESS BLDV CALC-SCNC: -0.8 MMOL/L
BASOPHILS # BLD AUTO: 0.02 THOUSANDS/ΜL (ref 0–0.1)
BASOPHILS NFR BLD AUTO: 0 % (ref 0–1)
BUN SERPL-MCNC: 22 MG/DL (ref 5–25)
CALCIUM SERPL-MCNC: 7.6 MG/DL (ref 8.3–10.1)
CHLORIDE SERPL-SCNC: 119 MMOL/L (ref 96–108)
CO2 SERPL-SCNC: 23 MMOL/L (ref 21–32)
CREAT SERPL-MCNC: 1.08 MG/DL (ref 0.6–1.3)
EOSINOPHIL # BLD AUTO: 0.09 THOUSAND/ΜL (ref 0–0.61)
EOSINOPHIL NFR BLD AUTO: 1 % (ref 0–6)
ERYTHROCYTE [DISTWIDTH] IN BLOOD BY AUTOMATED COUNT: 14.2 % (ref 11.6–15.1)
GFR SERPL CREATININE-BSD FRML MDRD: 75 ML/MIN/1.73SQ M
GLUCOSE SERPL-MCNC: 128 MG/DL (ref 65–140)
HCO3 BLDV-SCNC: 21.6 MMOL/L (ref 24–30)
HCT VFR BLD AUTO: 36.7 % (ref 36.5–49.3)
HGB BLD-MCNC: 12.3 G/DL (ref 12–17)
IMM GRANULOCYTES # BLD AUTO: 0.19 THOUSAND/UL (ref 0–0.2)
IMM GRANULOCYTES NFR BLD AUTO: 2 % (ref 0–2)
LACTATE SERPL-SCNC: 2 MMOL/L (ref 0.5–2)
LYMPHOCYTES # BLD AUTO: 0.72 THOUSANDS/ΜL (ref 0.6–4.47)
LYMPHOCYTES NFR BLD AUTO: 7 % (ref 14–44)
MCH RBC QN AUTO: 29.9 PG (ref 26.8–34.3)
MCHC RBC AUTO-ENTMCNC: 33.5 G/DL (ref 31.4–37.4)
MCV RBC AUTO: 89 FL (ref 82–98)
MONOCYTES # BLD AUTO: 0.61 THOUSAND/ΜL (ref 0.17–1.22)
MONOCYTES NFR BLD AUTO: 6 % (ref 4–12)
NEUTROPHILS # BLD AUTO: 8.51 THOUSANDS/ΜL (ref 1.85–7.62)
NEUTS SEG NFR BLD AUTO: 84 % (ref 43–75)
NRBC BLD AUTO-RTO: 0 /100 WBCS
O2 CT BLDV-SCNC: 16.4 ML/DL
PCO2 BLDV: 29.6 MM HG (ref 42–50)
PH BLDV: 7.48 [PH] (ref 7.3–7.4)
PHOSPHATE SERPL-MCNC: 1.9 MG/DL (ref 2.7–4.5)
PLATELET # BLD AUTO: 100 THOUSANDS/UL (ref 149–390)
PMV BLD AUTO: 12 FL (ref 8.9–12.7)
PO2 BLDV: 53.8 MM HG (ref 35–45)
POTASSIUM SERPL-SCNC: 3.2 MMOL/L (ref 3.5–5.3)
RBC # BLD AUTO: 4.11 MILLION/UL (ref 3.88–5.62)
SODIUM SERPL-SCNC: 147 MMOL/L (ref 135–147)
VANCOMYCIN TROUGH SERPL-MCNC: 7.1 UG/ML (ref 10–20)
WBC # BLD AUTO: 10.14 THOUSAND/UL (ref 4.31–10.16)

## 2022-08-23 PROCEDURE — 99232 SBSQ HOSP IP/OBS MODERATE 35: CPT | Performed by: INTERNAL MEDICINE

## 2022-08-23 PROCEDURE — 80048 BASIC METABOLIC PNL TOTAL CA: CPT | Performed by: STUDENT IN AN ORGANIZED HEALTH CARE EDUCATION/TRAINING PROGRAM

## 2022-08-23 PROCEDURE — 97163 PT EVAL HIGH COMPLEX 45 MIN: CPT

## 2022-08-23 PROCEDURE — 83605 ASSAY OF LACTIC ACID: CPT | Performed by: STUDENT IN AN ORGANIZED HEALTH CARE EDUCATION/TRAINING PROGRAM

## 2022-08-23 PROCEDURE — 84100 ASSAY OF PHOSPHORUS: CPT | Performed by: FAMILY MEDICINE

## 2022-08-23 PROCEDURE — 82805 BLOOD GASES W/O2 SATURATION: CPT | Performed by: STUDENT IN AN ORGANIZED HEALTH CARE EDUCATION/TRAINING PROGRAM

## 2022-08-23 PROCEDURE — 99232 SBSQ HOSP IP/OBS MODERATE 35: CPT | Performed by: FAMILY MEDICINE

## 2022-08-23 PROCEDURE — 97167 OT EVAL HIGH COMPLEX 60 MIN: CPT

## 2022-08-23 PROCEDURE — 85025 COMPLETE CBC W/AUTO DIFF WBC: CPT | Performed by: STUDENT IN AN ORGANIZED HEALTH CARE EDUCATION/TRAINING PROGRAM

## 2022-08-23 PROCEDURE — 80202 ASSAY OF VANCOMYCIN: CPT

## 2022-08-23 PROCEDURE — 99254 IP/OBS CNSLTJ NEW/EST MOD 60: CPT | Performed by: PHYSICIAN ASSISTANT

## 2022-08-23 RX ORDER — LANOLIN ALCOHOL/MO/W.PET/CERES
3 CREAM (GRAM) TOPICAL
Status: DISCONTINUED | OUTPATIENT
Start: 2022-08-23 | End: 2022-08-25 | Stop reason: HOSPADM

## 2022-08-23 RX ORDER — POTASSIUM CHLORIDE 20 MEQ/1
40 TABLET, EXTENDED RELEASE ORAL ONCE
Status: DISCONTINUED | OUTPATIENT
Start: 2022-08-23 | End: 2022-08-25 | Stop reason: HOSPADM

## 2022-08-23 RX ADMIN — RISPERIDONE 1 MG: 1 TABLET ORAL at 17:19

## 2022-08-23 RX ADMIN — MELATONIN 3 MG: at 22:14

## 2022-08-23 RX ADMIN — CHLORHEXIDINE GLUCONATE 15 ML: 1.2 SOLUTION ORAL at 09:47

## 2022-08-23 RX ADMIN — RISPERIDONE 1 MG: 1 TABLET ORAL at 09:47

## 2022-08-23 RX ADMIN — CEFTRIAXONE 1000 MG: 10 INJECTION, POWDER, FOR SOLUTION INTRAVENOUS at 13:51

## 2022-08-23 NOTE — ASSESSMENT & PLAN NOTE
· Likely multifactorial  · Patient still encephalopathy    Will obtain capacity evaluation since the patient is insisting on leaving AMA if you do not discharge him by tomorrow

## 2022-08-23 NOTE — ASSESSMENT & PLAN NOTE
· Patient was found unresponsive at home and was brought to the hospital   Was found to be in septic shock at the time of presentation Likely secondary to UTI/aspiration pneumonia  · Resolved    Continue with the current antibiotics and follow-up on the cultures

## 2022-08-23 NOTE — ASSESSMENT & PLAN NOTE
· Appears to be resolved    Secondary to obstructing nephrolithiasis   · Nephrology evaluation appreciated

## 2022-08-23 NOTE — PLAN OF CARE
Problem: OCCUPATIONAL THERAPY ADULT  Goal: Performs self-care activities at highest level of function for planned discharge setting  See evaluation for individualized goals  Description: Treatment Interventions: ADL retraining, Functional transfer training, UE strengthening/ROM, Endurance training, Cognitive reorientation, Equipment evaluation/education, Patient/family training, Fine motor coordination activities, Energy conservation, Activityengagement          See flowsheet documentation for full assessment, interventions and recommendations  Note: Limitation: Decreased ADL status, Decreased UE strength, Decreased UE ROM, Decreased Safe judgement during ADL, Decreased cognition, Decreased endurance, Decreased self-care trans, Decreased high-level ADLs, Decreased fine motor control  Prognosis: Fair  Assessment: Pt is a 62 yo Male who presented to hospitals on 8/20/2022 (transfer from [facility]) s/p family finding Pt unresponsive  Pt with diagnosis of BRAULIO, hypoxia, and toxic metabolic encephalopathy  Pt intubated on 8/20 and extubated on 8/21  Pt s/p IR on 8/20 for nephrostomy tube placement  Pt  has a past medical history of Anxiety, Bright red rectal bleeding, Hypertension, Kidney stones, Periorbital edema, Skin tag of anus, and Trigger point of thoracic region  Pt greeted bedside OT evaluation on 8/23/2022  Pt poor historian and reports he does not remember where he was living PTA  Per CM note, Pt resides with mother  Pt reports he does not use an AD for functional mobility and reports being I with ADLs  Per CM note, Pt's mother reports Pt having a difficult time getting around  Pt also a  +  Pt demonstrating the following occupational deficits: mod A with UB ADLs, max A with LB ADLs, min A with bed mobility, min A with functional transfers and min A with functional mobility with HHA   Limitations that impact functional performance include decreased ADL status, decreased UE ROM, decreased UE strength, decreased safe judgement during ADLs, decreased cognition, decreased endurance, decreased fine motor coordination, decreased self care transfers and pain  Occupational performance areas to address ADL retraining, functional transfer training, UE strengthening/ROM, endurance training, cognitive reorientation, Pt/caregiver education, equipment evaluation/education, compensatory technique education, energy conservation and activity engagement   Pt would benefit from continued skilled OT services while in hospital to maximize independence with ADLs  Will continue to follow Pt's progress  Pt would benefit from post acute rehabilitation services upon DC to maximize safety and independence with ADLs and functional tasks of choice       OT Discharge Recommendation: Post acute rehabilitation services

## 2022-08-23 NOTE — CONSULTS
Inpatient consult to Urology  Consult performed by: Olaf Macias PA-C  Consult ordered by: Destin Robert MD    Inpatient consult to Urology  Consult performed by: Olaf Macias PA-C  Consult ordered by: Kenisha Alamo DO      : Ba Pierce 61 y o  male [phone]   Unit/Bed #: [facility] 809-01  Encounter: [id number]        Assessment  & Plan  :  Nephrolithiasis:  -CT scan on admission revealed moderate right-sided hydronephrosis secondary to 1 x 3 x 2 7 C cm UPJ calculus along with right mid upper pole staghorn calculus measuring 3 8 x 1 7 cm     -status post right PCN placement with Interventional Radiology  -maintain PCN to drainage  -Discussed with patient in depth that he will require outpatient definitive stone treatment with PCNL  Discussed that a nephrostomy tube is required  And this is required due to his large stone burden   -patient is currently agreeable to plan at this time   -creatinine now at baseline 1 08  -leukocytosis resolved  -hemoglobin 12 3  -nephrostomy tube in place draining clear yellow urine  -continue with medical optimization at this time   -Yee catheter currently place, may be removed once patient is ambulatory or prior to discharge  No further  intervention required  Urology will sign off  Urology will schedule outpatient PCNL for definitive stone treatment once patient is discharged  Subjective :    Kenneth Bains  is a 61 y o  male initially presenting for altered mental status and BRAULIO  With a past medical history of polysubstance abuse, hypertension, tobacco use, psychiatric disorder  Patient has a past urologic history of nephrolithiasis requiring surgical intervention back in 2016 along with reimplanted right ureter  CT scan on admission revealed moderate right-sided hydronephrosis secondary to 1 x 3 x 2 7 C cm UPJ calculus along with right mid upper pole staghorn calculus measuring 3 8 x 1 7 cm    For  attending based off of his history and concomitant complicated UTI recommended decompression of renal unit with right nephrostomy tube versus percutaneous nephro ureteral catheter  Patient transferred to [facility] for higher level of care s/p right PCN placement with Interventional Radiology  Patient evaluated at bedside  Currently reporting that he is doing well  Denies any pain or discomfort with nephrostomy tube  Denies any nausea, vomiting, fevers, chills, flank pain or abdominal pain  He is lying comfortably in bed no acute distress  He does appear to be somnolent and mildly confused but does agree and recall past events  He does not recall what happened to him when he initially came into the hospital           Allergies   Allergen Reactions    Metronidazole Itching and Swelling    Nsaids GI Intolerance     Stomach irritant    Penicillin G     Penicillins GI Intolerance     Sick to stomach    Pollen Extract     Sulfa Antibiotics       Current Outpatient Medications   Medication Instructions    amitriptyline (ELAVIL) 25 mg, Oral, Daily at bedtime    amLODIPine (NORVASC) 10 mg, Oral, Daily    methocarbamol (ROBAXIN) 750 mg, Oral, Every 8 hours scheduled    risperiDONE (RISPERDAL) 1 mg, Oral, 2 times daily    sildenafil (REVATIO) 20 mg, Oral, Daily    sodium chloride, PF, 0 9 % 10 mL, Intracatheter, Daily, Intracatheter flushing daily   May substitute prefilled syringe with normal saline 10 mL vials, 10 mL syringes, and 18 g blunt needles      Past Medical History:   Diagnosis Date    Anxiety     Bright red rectal bleeding     Last Assessed: 9/9/2015     Hypertension     Last Assessed: 7/9/2014     Kidney stones     Last Assessed: 11/17/2016     Periorbital edema     Last Assessed: 9/4/2015    Skin tag of anus     Last Assessed: 9/9/2015     Trigger point of thoracic region     Last Assessed: 11/6/2015      Past Surgical History:   Procedure Laterality Date    CYSTOSCOPY W/ URETERAL STENT PLACEMENT  03/11/2013    CYSTOSCOPY W/ URETERAL STENT PLACEMENT  06/18/2014    EXTRACORPOREAL SHOCK WAVE LITHOTRIPSY     CYSTOSCOPY W/ URETERAL STENT PLACEMENT  07/16/2014    EXTRACORPOREAL SHOCK WAVE LITHOTRIPSY     CYSTOSCOPY W/ URETERAL STENT REMOVAL  04/11/2013    CYSTOSCOPY W/ URETERAL STENT REMOVAL Right 08/26/2014    CYSTOSCOPY W/ URETEROSCOPY W/ LITHOTRIPSY  02/26/2013    Percutaneous lithotomy With Uretal Stent Plcement     CYSTOSCOPY W/ URETEROSCOPY W/ LITHOTRIPSY  03/11/2014    CYSTOSCOPY W/ URETEROSCOPY W/ LITHOTRIPSY Right 08/04/2014    With Uretal Stent Placement     CYSTOSCOPY W/ URETEROSCOPY W/ LITHOTRIPSY Right 05/09/2016    HEMORRHOID SURGERY      HERNIA REPAIR  03/11/2013    IR NEPHROSTOMY TUBE PLACEMENT  8/20/2022    KIDNEY SURGERY      LITHOTRIPSY      [place] CYSTO/URETERO W/LITHOTRIPSY &INDWELL STENT INSRT Right 7/13/2016    Procedure: CYSTOSCOPY; URETEROSCOPY WITH HOLMIUM LASER STONE EXTRACTION; RETROGRADE PYELOGRAM; URETERAL STENT INSERTION ;  Surgeon: Melanie Banks MD;  [facility];  Service: Urology    [place] CYSTO/URETERO W/LITHOTRIPSY &INDWELL STENT INSRT Right 5/9/2016    Procedure: CYSTOSCOPY,  URETEROSCOPY,  WITH LITHOTRIPSY HOLMIUM LASER, STONE EXTRACTION, AND INSERTION STENT URETERAL;  Surgeon: Melanie Banks MD;  Location: [facility];  Service: Urology    [facility]SWL Right 6/17/2016    Procedure: Sarah Godfrey SHOCKWAVE (ESWL);   Surgeon: Melanie Banks MD;  [facility];  Service: Urology    TRANSURETHRAL RESECTION OF BLADDER      URETERAL [place]  03/11/2013     Family History   Problem Relation Age of Onset    COPD Mother     Hypertension Mother     Heart attack Father      Social History     Socioeconomic History    Marital status:      Spouse name: None    Number of children: None    Years of education: None    Highest education level: None   Occupational History    None   Tobacco Use  Smoking status: Current Every Day Smoker     Packs/day: 2 00     Years: 35 00     Pack years: 70 00    Smokeless tobacco: Never Used   Vaping Use    Vaping Use: Never used   Substance and Sexual Activity    Alcohol use: No    Drug use: Yes     Types: Heroin, Marijuana, Methamphetamines, Fentanyl     Comment: 20-30 bags of heroin daily    Sexual activity: None   Other Topics Concern    None   Social History Narrative    Caffeine Use     Uses safety Equipment- Seatbelts      Social Determinants of Health     Financial Resource Strain: Not on file   Food Insecurity: No Food Insecurity    Worried About Running Out of Food in the Last Year: Never true    Sarika of Food in the Last Year: Never true   Transportation Needs: No Transportation Needs    Lack of Transportation (Medical): No    Lack of Transportation (Non-Medical): No   Physical Activity: Not on file   Stress: Not on file   Social Connections: Not on file   Intimate Partner Violence: Not on file   Housing Stability: Unknown    Unable to Pay for Housing in the Last Year: No    Number of Places Lived in the Last Year: Not on file    Unstable Housing in the Last Year: No        Review of Systems   Constitutional: Negative  Negative for chills and fever  HENT: Negative  Eyes: Negative  Respiratory: Negative  Cardiovascular: Negative  Gastrointestinal: Negative  Negative for abdominal pain, diarrhea, nausea and vomiting  Endocrine: Negative  Genitourinary: Negative  Negative for difficulty urinating, dysuria, flank pain, hematuria and urgency  Musculoskeletal: Negative  Skin: Negative  Allergic/Immunologic: Negative  Neurological: Negative  Hematological: Negative  Psychiatric/Behavioral: Negative  Objective     Physical Exam  Constitutional:       General: He is not in acute distress  Appearance: He is normal weight  He is not ill-appearing, toxic-appearing or diaphoretic     HENT:      Head: Normocephalic and atraumatic  Right Ear: External ear normal       Left Ear: External ear normal       Nose: Nose normal       Mouth/Throat:      Pharynx: Oropharynx is clear  Eyes:      General: No scleral icterus  Conjunctiva/sclera: Conjunctivae normal    Cardiovascular:      Rate and Rhythm: Normal rate and regular rhythm  Pulses: Normal pulses  Heart sounds: No murmur heard  No friction rub  No gallop  Pulmonary:      Effort: Pulmonary effort is normal  No respiratory distress  Breath sounds: No wheezing, rhonchi or rales  Abdominal:      General: Bowel sounds are normal  There is no distension  Tenderness: There is no abdominal tenderness  Comments: Right PCN in place draining clear yellow urine   Musculoskeletal:         General: Normal range of motion  Cervical back: Normal range of motion  Skin:     General: Skin is warm and dry  Neurological:      General: No focal deficit present  Mental Status: He is alert and oriented to person, place, and time  Psychiatric:         Mood and Affect: Mood normal          Behavior: Behavior normal          Thought Content: Thought content normal          Judgment: Judgment normal                 Imaging:  CT CHEST, ABDOMEN AND PELVIS WITHOUT IV CONTRAST     INDICATION:  Altered mental status, found down, history of drug abuse, respiratory distress      COMPARISON:  CT abdomen pelvis 12/17/2012, chest radiograph 7/31/2022 and earlier this day     TECHNIQUE: CT examination of the chest, abdomen and pelvis was performed without intravenous contrast  Axial, sagittal, and coronal 2D reformatted images were created from the source data and submitted for interpretation      Radiation dose length product (DLP) for this visit:  891 69 mGy-cm    This examination, like all CT scans performed in the [facility], was performed utilizing techniques to minimize radiation dose exposure, including the use of iterative   reconstruction and automated exposure control       Enteric contrast was not administered       FINDINGS: The study is limited without IV contrast      CHEST     LUNGS:  ET tube with tip above the cholo      There is obscuration of the left lower lobe bronchus  Although this could be secondary to secretions, bronchoscopy is recommended to exclude underlying mass  Assessment limited without IV contrast   There is associated moderate left lower lobe   atelectasis  Cannot exclude component of aspiration pneumonia        Tree-in-bud nodular infiltrates in the posterior right upper lobe, right middle lobe and throughout the right lower lobe, suspicious for aspiration  Infectious or inflammatory bronchiolitis of other etiology could also be considered  Some additional   atelectatic change or consolidation in the posterior right lower lobe with probable mucus plugging      PLEURA:  No pneumothorax      HEART/GREAT VESSELS: Heart is unremarkable for patient's age  Coronary artery calcification  Trace pericardial effusion  No thoracic aortic aneurysm  Right IJ central line terminates in the SVC      MEDIASTINUM AND KINGSTON:  Unremarkable      CHEST WALL AND LOWER NECK:  Very mild left-sided gynecomastia      ABDOMEN  Evaluation of the solid organs is limited without IV contrast   Evaluation of the upper abdomen is also degraded by artifact from patient's arms      LIVER/BILIARY TREE:  Lobulated liver contour suspicious for cirrhosis      GALLBLADDER:  Uncomplicated cholelithiasis suggested      SPLEEN:  Unremarkable      PANCREAS:  Unremarkable      ADRENAL GLANDS:  Unremarkable      KIDNEYS/URETERS:  There is moderate right hydronephrosis secondary to a 1 3 x 2 7 cm UPJ calculus  Right mid to upper pole staghorn calculus measures 3 8 x 1 7 cm  Additional small calculi in the right lower pole    There is periureteric edema      The left renal collecting system is mildly prominent, without obstructing etiology      STOMACH AND BOWEL:  Enteric tube within the stomach  Dilation of the bowel is limited without oral or IV contrast   The stomach is largely collapsed, assessment limited  The small bowel is normal caliber  Sigmoid diverticulosis without evidence of diverticulitis      APPENDIX:  No findings to suggest appendicitis      ABDOMINOPELVIC CAVITY:  No ascites  No pneumoperitoneum  Evaluation of lymphadenopathy is limited in the absence of intravenous contrast  No bulky adenopathy detected on this noncontrast study      VESSELS: Atherosclerotic changes abdominal aorta without evidence of aneurysm       PELVIS     REPRODUCTIVE ORGANS:  Unremarkable for patient's age  Coarse prostate calcifications noted      URINARY BLADDER:  Unremarkable      ABDOMINAL WALL/INGUINAL REGIONS:  Tiny fat-containing omental hernia      OSSEOUS STRUCTURES:  No acute fracture or destructive osseous lesion  Vertebral augmentation L4 vertebra with mild superior endplate compression deformity  Degenerative changes of the spine  Mild chronic anterior wedging deformities of T12 and L1         IMPRESSION:     1  Obscuration of the left lower lobe bronchus with associated moderate left lower lobe atelectasis  Cannot exclude component of aspiration pneumonia  Although this could be secondary to secretions, bronchoscopy is recommended to exclude underlying mass  Assessment limited without IV contrast        2  Tree-in-bud nodular infiltrates right upper, middle and lower lobes, suspicious for aspiration or other infectious/inflammatory bronchiolitis  Some additional atelectatic change or consolidation posterior right lower lobe with probable mild mucus   plugging  ET tube above the cholo      3   Liver configuration suspicious for cirrhosis  No ascites      4  Moderate right hydronephrosis secondary to a 1 3 x 2 7 cm UPJ calculus  Right mid to upper pole staghorn calculus measures 3 8 x 1 7 cm      5  Cholelithiasis      6  Sigmoid diverticulosis      Limited study without IV or oral contrast     Labs:  Lab Results   Component Value Date    SODIUM 147 08/23/2022    K 3 2 (L) 08/23/2022     (H) 08/23/2022    CO2 23 08/23/2022    BUN 22 08/23/2022    CREATININE 1 08 08/23/2022    GLUC 128 08/23/2022    CALCIUM 7 6 (L) 08/23/2022         Lab Results   Component Value Date    WBC 10 14 08/23/2022    HGB 12 3 08/23/2022    HCT 36 7 08/23/2022    MCV 89 08/23/2022     (L) 08/23/2022         VTE Pharmacologic Prophylaxis: Heparin  VTE Mechanical Prophylaxis: sequential compression device     Sai Mckinney PA-C

## 2022-08-23 NOTE — OCCUPATIONAL THERAPY NOTE
Occupational Therapy Evaluation     Patient Name: Beronica GRUBBSO Date: 8/23/2022  Problem List  Principal Problem:    BRAULIO (acute kidney injury) ([facility] [address] )  Active Problems:    Drug dependence ([facility] [address] )    Acute metabolic encephalopathy    Acute respiratory failure with hypoxia (HCC)    Hypernatremia    Septic shock ([facility] [address] )    Kidney stones    Delusional disorder ([facility] [address] )    Hypophosphatemia    Past Medical History  Past Medical History:   Diagnosis Date    Anxiety     Bright red rectal bleeding     Last Assessed: 9/9/2015     Hypertension     Last Assessed: 7/9/2014     Kidney stones     Last Assessed: 11/17/2016     Periorbital edema     Last Assessed: 9/4/2015    Skin tag of anus     Last Assessed: 9/9/2015     Trigger point of thoracic region     Last Assessed: 11/6/2015      Past Surgical History  Past Surgical History:   Procedure Laterality Date    CYSTOSCOPY W/ URETERAL STENT PLACEMENT  03/11/2013    CYSTOSCOPY W/ URETERAL STENT PLACEMENT  06/18/2014    EXTRACORPOREAL SHOCK WAVE LITHOTRIPSY     CYSTOSCOPY W/ URETERAL STENT PLACEMENT  07/16/2014    EXTRACORPOREAL SHOCK WAVE LITHOTRIPSY     CYSTOSCOPY W/ URETERAL STENT REMOVAL  04/11/2013    CYSTOSCOPY W/ URETERAL STENT REMOVAL Right 08/26/2014    CYSTOSCOPY W/ URETEROSCOPY W/ LITHOTRIPSY  02/26/2013    Percutaneous lithotomy With Uretal Stent Plcement     CYSTOSCOPY W/ URETEROSCOPY W/ LITHOTRIPSY  03/11/2014    CYSTOSCOPY W/ URETEROSCOPY W/ LITHOTRIPSY Right 08/04/2014    With Uretal Stent Placement     CYSTOSCOPY W/ URETEROSCOPY W/ LITHOTRIPSY Right 05/09/2016    HEMORRHOID SURGERY      HERNIA REPAIR  03/11/2013    IR NEPHROSTOMY TUBE PLACEMENT  8/20/2022    KIDNEY SURGERY      LITHOTRIPSY      NE CYSTO/URETERO W/LITHOTRIPSY &INDWELL STENT INSRT Right 7/13/2016    Procedure: CYSTOSCOPY; URETEROSCOPY WITH HOLMIUM LASER STONE EXTRACTION; RETROGRADE PYELOGRAM; URETERAL STENT INSERTION ;  Surgeon: Huan Johnson MD;  Location: [facility];  Service: Urology TX CYSTO/URETERO W/LITHOTRIPSY &INDWELL STENT INSRT Right 5/9/2016    Procedure: CYSTOSCOPY,  URETEROSCOPY,  WITH LITHOTRIPSY HOLMIUM LASER, STONE EXTRACTION, AND INSERTION STENT URETERAL;  Surgeon: Vandana Rothman MD;  Location: [facility];  Service: Urology    TX FRAGMENT KIDNEY STONE/ ESWL Right 6/17/2016    Procedure: Charles Eliud SHOCKWAVE (ESWL); Surgeon: Vandana Rothman MD;  Location: [facility];  Service: Urology    TRANSURETHRAL RESECTION OF BLADDER      URETERAL Tyree Abraham  03/11/2013 08/23/22 0840   OT Last Visit   OT Visit Date 08/23/22   Note Type   Note type Evaluation   Restrictions/Precautions   Weight Bearing Precautions Per Order No   Other Precautions Cognitive; Chair Alarm; Bed Alarm;Multiple lines;Pain; Fall Risk  (R nephrostomy tube, rodriguez)   Pain Assessment   Pain Assessment Tool 0-10   Home Living   Type of Home House   Additional Comments Pt poor historian and reports he does not remember where he was living PTA  Per CM note, Pt resides with mother  Prior Function   Level of [place] Independent with ADLs and functional mobility   Lives With Family  (mother)   Receives Help From [facility] in the last 6 months 0  (Pt states "Not that I know of ")   Vocational Unemployed   Comments Pt reports he does not use an AD for functional mobility and reports being I with ADLs  Per CM note, Pt's mother reports Pt having a difficult time getting around  Pt also a  +  Lifestyle   Autonomy I with ADLs and no AD at baseline  Reciprocal Relationships Supportive mother   Service to Others Unemployed   Intrinsic Gratification Will continue to assess   Psychosocial   Psychosocial (WDL) X   Patient Behaviors/Mood Withdrawn; Anxious;Calm   Subjective   Subjective "I am cold "   ADL   Where Assessed Edge of bed   Eating Assistance 5  Supervision/Setup   Grooming Assistance 5  Supervision/Setup   UB Bathing Assistance 3  Moderate Assistance   LB Bathing Assistance 2  Maximal Assistance   UB Dressing Assistance 3  Moderate Assistance   UB Dressing Deficit Setup;Supervision/safety; Increased time to complete; Thread LUE; Thread RUE;Verbal cueing   LB Dressing Assistance 2  Maximal Assistance   LB Dressing Deficit Setup;Don/doff L sock; Don/doff R sock; Increased time to complete;Supervision/safety  (Pt requires encouragement to engaged in task  x1 LOB posteriorly sitting on EOB requiring min A to correct )   Toileting Assistance  3  Moderate Assistance   Functional Assistance 4  Minimal Assistance   Functional Deficit Increased time to complete;Verbal cueing;Supervision/safety;Setup   Additional Comments Pt limited in ADL participation 2* to decreased strength, endurance, and cognition  Bed Mobility   Supine to Sit 4  Minimal assistance   Additional items Assist x 1; Increased time required;Verbal cues;LE management   Sit to Supine Unable to assess   Additional Comments Pt greeted supine in bed and left OOB in recliner chair at end of session  ALl needs met, call bell nearby, and chair alarm engaged  Pt requires S-min A with truck control on EOB  Transfers   Sit to Stand 4  Minimal assistance   Additional items Assist x 1; Increased time required;Verbal cues   Stand to Sit 4  Minimal assistance   Additional items Assist x 1; Increased time required;Verbal cues   Additional Comments with HHA   Functional Mobility   Functional Mobility 4  Minimal assistance   Additional Comments Min Ax1 with HHA  Few steps from EOB to recliner chair  Additional items Hand hold assistance   Balance   Static Sitting Fair -   Dynamic Sitting Poor +   Static Standing Poor +   Dynamic Standing Poor +   Ambulatory Poor +   Activity Tolerance   Activity Tolerance Patient limited by fatigue   Medical Staff Made Aware Co-eval with MARY Henry 2* to Pt's medical complexity and decreased endurance  Nurse Made Aware RN cleared/updated     RUE Assessment   RUE Assessment WFL  (generalized weakness) LUE Assessment   LUE Assessment WFL  (generalized weakness)   Hand Function   Gross Motor Coordination Functional   Fine Motor Coordination Functional   Sensation   Light Touch No apparent deficits   Vision-Basic Assessment   Current Vision Wears glasses only for reading   Vision - Complex Assessment   Acuity Able to read employee name badge without difficulty  (unable to read clock/tell accurate time)   Cognition   Overall Cognitive Status (S)  Impaired   Arousal/Participation Responsive   Attention Difficulty attending to directions   Orientation Level Oriented to person;Oriented to place;Oriented to time;Disoriented to situation  (grossly to place)   Memory Decreased recall of recent events;Decreased recall of precautions;Decreased short term memory;Decreased long term memory   Following Commands Follows one step commands with increased time or repetition   Comments Pt requires increased time for processing/response and requires one-step simple commands in order to increase independence during OT session  Pt with a lack of insight into functional deficits and requires cues for safety/technique  Pt with significant deficits in memory  Assessment   Limitation Decreased ADL status; Decreased UE strength;Decreased UE ROM; Decreased Safe judgement during ADL;Decreased cognition;Decreased endurance;Decreased self-care trans;Decreased high-level ADLs; Decreased fine motor control   Prognosis Fair   Assessment Pt is a 62 yo Male who presented to [facility] on 8/20/2022 (transfer from [facility]) s/p family finding Pt unresponsive  Pt with diagnosis of BRAULIO, hypoxia, and toxic metabolic encephalopathy  Pt intubated on 8/20 and extubated on 8/21  Pt s/p IR on 8/20 for nephrostomy tube placement  Pt  has a past medical history of Anxiety, Bright red rectal bleeding, Hypertension, Kidney stones, Periorbital edema, Skin tag of anus, and Trigger point of thoracic region  Pt greeted bedside OT evaluation on 8/23/2022   Pt poor historian and reports he does not remember where he was living PTA  Per CM note, Pt resides with mother  Pt reports he does not use an AD for functional mobility and reports being I with ADLs  Per CM note, Pt's mother reports Pt having a difficult time getting around  Pt also a  +  Pt demonstrating the following occupational deficits: mod A with UB ADLs, max A with LB ADLs, min A with bed mobility, min A with functional transfers and min A with functional mobility with HHA  Limitations that impact functional performance include decreased ADL status, decreased UE ROM, decreased UE strength, decreased safe judgement during ADLs, decreased cognition, decreased endurance, decreased fine motor coordination, decreased self care transfers and pain  Occupational performance areas to address ADL retraining, functional transfer training, UE strengthening/ROM, endurance training, cognitive reorientation, Pt/caregiver education, equipment evaluation/education, compensatory technique education, energy conservation and activity engagement   Pt would benefit from continued skilled OT services while in hospital to maximize independence with ADLs  Will continue to follow Pt's progress  Pt would benefit from post acute rehabilitation services upon DC to maximize safety and independence with ADLs and functional tasks of choice  Goals   Patient Goals To go home today  LTG Time Frame 10-14   Long Term Goal #1 See goals listed below  Plan   Treatment Interventions ADL retraining;Functional transfer training;UE strengthening/ROM; Endurance training;Cognitive reorientation;Equipment evaluation/education;Patient/family training;Fine motor coordination activities; Energy conservation; Activityengagement   Goal Expiration Date 09/06/22   OT Frequency 3-5x/wk   Recommendation   OT Discharge Recommendation Post acute rehabilitation services   Additional Comments  The patient's raw score on the AM-PAC Daily Activity inpatient short form is 14, standardized score is 33 39, less than 39 4  Patients at this level are likely to benefit from discharge to post-acute rehabilitation services  Please refer to the recommendation of the Occupational Therapist for safe discharge planning  AM-PAC Daily Activity Inpatient   Lower Body Dressing 2   Bathing 2   Toileting 2   Upper Body Dressing 2   Grooming 3   Eating 3   Daily Activity Raw Score 14   Daily Activity Standardized Score (Calc for Raw Score >=11) 33 39   AM-PAC Applied Cognition Inpatient   Following a Speech/Presentation 2   Understanding Ordinary Conversation 3   Taking Medications 2   Remembering Where Things Are Placed or Put Away 2   Remembering List of 4-5 Errands 2   Taking Care of Complicated Tasks 1   Applied Cognition Raw Score 12   Applied Cognition Standardized Score 28 82     Goals:  Pt will complete UB ADLs with I in order to maximize participation with ADLs  Pt will complete LB ADLs with I in order to maximize safety with ADLs  Pt will complete toileting routine (transfer, hygiene, and clothing management) with I in order to return to prior level of function  Pt will complete bed mobility with I in order to maximize participation with ADLs  Pt will complete functional transfers at I level in order to increase participation with ADLs  Pt will increase dynamic standing balance to F+ in order to increase safety with ADLs  Pt will increase standing tolerance x10 min in order to increase participation with ADLs  Pt will complete functional mobility with AD PRN for item retrieval task at [place] level in order to increase participation with ADLs  Pt will complete IADL tasks/simulation of IADLs tasks with I in order to return to PLOF  Pt will demonstrate G energy conservation techniques with ADLs/IADLs in order to reduce the risk of falls  Pt will be attentive 100% of the time for ongoing functional/formal cognitive assessment to assist with safe dc planning melissa Greco MS, OTR/L

## 2022-08-23 NOTE — PROGRESS NOTES
[facility]  Progress Note - Nallely Boogie 1965, 61 y o  male MRN: [id number]  Unit/Bed#: [facility] 809-01 Encounter: [id number]  Primary Care Provider: Jordan Walker DO   Date and time admitted to hospital: 8/20/2022  8:36 PM    * BRAULIO (acute kidney injury) [facility])  Assessment & Plan    · Appears to be resolved  Secondary to obstructing nephrolithiasis   · Nephrology evaluation appreciated    Hypophosphatemia  Assessment & Plan  · Patient refuses supplementation    Kidney stones  Assessment & Plan  · Patient with hydronephrosis requiring right-sided nephrostomy tube placement  · Urology evaluation  · Follow-up with Urology as outpatient    Septic shock [facility])  Assessment & Plan  · Patient was found unresponsive at home and was brought to the hospital   Was found to be in septic shock at the time of presentation Likely secondary to UTI/aspiration pneumonia  · Resolved  Continue with the current antibiotics and follow-up on the cultures    Acute respiratory failure with hypoxia ([address] )  Assessment & Plan  · Resolved  Currently on room air  · Patient was admitted to intensive care    Acute metabolic encephalopathy  Assessment & Plan  · Likely multifactorial  · Patient still encephalopathy  Will obtain capacity evaluation since the patient is insisting on leaving AMA if you do not discharge him by tomorrow    Drug dependence [facility])  Assessment & Plan  · Patient with history of drug abuse  Rapid drug screen was negative at the time of admission        VTE Pharmacologic Prophylaxis: VTE Score: 1 Moderate Risk (Score 3-4) - Pharmacological DVT Prophylaxis Ordered: heparin  Patient Centered Rounds: I performed bedside rounds with nursing staff today  Discussions with Specialists or Other Care Team Provider:     Education and Discussions with Family / Patient: Updated  (mother) via phone  Time Spent for Care: 30 minutes   More than 50% of total time spent on counseling and coordination of care as described above  Current Length of Stay: 3 day(s)  Current Patient Status: Inpatient   Certification Statement: The patient will continue to require additional inpatient hospital stay due to Pending rehab  Discharge Plan: Anticipate discharge in 48-72 hrs to rehab facility  Code Status: Level 1 - Full Code    Subjective:   Patient seen and examined  Patient is transferred out of the intensive care unit  Was insisting on going home  Patient is very sleepy and unable to answer questions appropriately  Discussed with patient's mother who reports that she is coming to the hospital tomorrow  Will obtain a capacity evaluation    Objective:     Vitals:   Temp (24hrs), Av 9 °F (37 2 °C), Min:97 9 °F (36 6 °C), Max:100 6 °F (38 1 °C)    Temp:  [97 9 °F (36 6 °C)-100 6 °F (38 1 °C)] 98 5 °F (36 9 °C)  HR:  [60-76] 71  Resp:  [16-41] 16  BP: (127-176)/(65-96) 142/72  SpO2:  [93 %-97 %] 93 %  Body mass index is 25 39 kg/m²  Input and Output Summary (last 24 hours): Intake/Output Summary (Last 24 hours) at 2022 1621  Last data filed at 2022 1401  Gross per 24 hour   Intake 140 ml   Output 1925 ml   Net -1785 ml       Physical Exam:   Physical Exam  Constitutional:       General: He is not in acute distress  HENT:      Head: Normocephalic and atraumatic  Nose: Nose normal    Eyes:      General: No scleral icterus  Cardiovascular:      Rate and Rhythm: Normal rate  Heart sounds: Normal heart sounds  Pulmonary:      Comments: Decreased breath sounds bilateral  Abdominal:      General: Abdomen is flat  There is no distension  Musculoskeletal:         General: Normal range of motion  Skin:     General: Skin is warm  Coloration: Skin is not jaundiced  Neurological:      General: No focal deficit present  Mental Status: He is alert           Additional Data:     Labs:  Results from last 7 days   Lab Units 22  0248   WBC Thousand/uL 10 14   HEMOGLOBIN g/dL 12 3   HEMATOCRIT % 36 7   PLATELETS Thousands/uL 100*   NEUTROS PCT % 84*   LYMPHS PCT % 7*   MONOS PCT % 6   EOS PCT % 1     Results from last 7 days   Lab Units 08/23/22  0248 08/20/22  2220 08/20/22  2041   SODIUM mmol/L 147   < > 156*   POTASSIUM mmol/L 3 2*   < > 3 7   CHLORIDE mmol/L 119*   < > 127*   CO2 mmol/L 23   < > 22   CO2, I-STAT   --    < >  --    BUN mg/dL 22   < > 119*   CREATININE mg/dL 1 08   < > 4 48*   ANION GAP mmol/L 5   < > 7   CALCIUM mg/dL 7 6*   < > 8 4   ALBUMIN g/dL  --   --  2 7*   TOTAL BILIRUBIN mg/dL  --   --  0 83   ALK PHOS U/L  --   --  60   ALT U/L  --   --  43   AST U/L  --   --  78*   GLUCOSE RANDOM mg/dL 128   < > 137    < > = values in this interval not displayed  Results from last 7 days   Lab Units 08/20/22  1210   INR  1 62*     Results from last 7 days   Lab Units 08/20/22  1154   POC GLUCOSE mg/dl 117         Results from last 7 days   Lab Units 08/23/22  0248 08/20/22  2041 08/20/22  1436 08/20/22  1210   LACTIC ACID mmol/L 2 0 2 0 2 4* 4 8*   PROCALCITONIN ng/ml  --   --   --  5 49*       Lines/Drains:  Invasive Devices  Report    Peripheral Intravenous Line  Duration           Peripheral IV 08/22/22 Right;Ventral (anterior) Forearm 1 day          Drain  Duration           Urethral Catheter Temperature probe 16 Fr  3 days    Nephrostomy Right 8 Fr  2 days              Urinary Catheter:  Goal for removal: N/A- Discharging with Yee               Imaging: Reviewed radiology reports from this admission including: CT head    Recent Cultures (last 7 days):   Results from last 7 days   Lab Units 08/21/22  1436 08/20/22  2324 08/20/22  2236 08/20/22  2057 08/20/22  1307 08/20/22  1210   BLOOD CULTURE   --  No Growth at 48 hrs  --  No Growth at 48 hrs  --  No Growth at 48 hrs  No Growth at 48 hrs     GRAM STAIN RESULT  3+ Polys  No bacteria seen  --   --   --   --   --    URINE CULTURE   --   --  No Growth <1000 cfu/mL  --  No Growth <1000 cfu/mL  --        Last 24 Hours Medication List:   Current Facility-Administered Medications   Medication Dose Route Frequency Provider Last Rate    cefTRIAXone  1,000 mg Intravenous Q24H Magdalena Alamo DO 1,000 mg (08/23/22 4551)    chlorhexidine  15 mL Mouth/Throat Q12H [address] Magdalenayessica Alamo, DO      heparin (porcine)  5,000 Units Subcutaneous Q8H [address] Magdalenacami Alamo,       potassium chloride  40 mEq Oral Once Sylvia Burton MD      potassium-sodium phosphateS  1 tablet Oral 4x Daily (with meals and at bedtime) Gerardo Alamo DO      risperiDONE  1 mg Oral BID Gerardo Alamo DO          Today, Patient Was Seen By: Issac Ortiz MD    **Please Note: This note may have been constructed using a voice recognition system  **

## 2022-08-23 NOTE — PROGRESS NOTES
NEPHROLOGY PROGRESS NOTE    Brandy Montez 61 y o  male MRN: [id number]  Unit/Bed#: [facility] 809-01 Encounter: [id number]  Reason for Consult:  Acute kidney injury and hypernatremia    Patient is awake and alert states that he feels okay except he is having some diarrhea related 1 of the medications  Says he is eating a little bit  Other than that no acute changes or complaints for me  ASSESSMENT/PLAN:  1  Renal    Patient had acute renal failure multifactorial due to some rhabdomyolysis as well as urinary obstruction  He has right-sided nephrostomy and Yee catheter in place  This morning creatinine is normal at 1  Has mild hyponatremia will give 40 mEq KCL p o  [place] Phosphorus is improving he is on oral supplementation  Urology in interventional Radiology to see patient deal with stone disease as well as nephrostomy tube presents  This will be arranged as an outpatient  2  Hypernatremia    Water deficit is improving at the present time no IV fluids are ordered he is eating and drinking so as long as he has access to water this should maintain in normal concentration  We will sign off please call if we can be of further assistance  SUBJECTIVE:  Review of Systems   Constitutional: Positive for malaise/fatigue  Negative for chills, diaphoresis and fever  HENT: Negative  Eyes: Negative  Cardiovascular: Negative for chest pain, dyspnea on exertion, leg swelling and orthopnea  Respiratory: Negative  Negative for cough, shortness of breath, sputum production and wheezing  Gastrointestinal: Positive for diarrhea  Negative for abdominal pain, nausea and vomiting  Genitourinary:        Right-sided nephrostomy and Yee catheter present  Neurological: Negative for dizziness, focal weakness, headaches and weakness  Psychiatric/Behavioral: Negative for altered mental status, depression, hallucinations and hypervigilance         OBJECTIVE:  Current Weight: Weight - Scale: 67 1 kg (147 lb 14 9 oz)  Vitals:Temp (24hrs), Av °F (37 2 °C), Min:97 9 °F (36 6 °C), Max:100 6 °F (38 1 °C)  Current: Temperature: 98 °F (36 7 °C)   Blood pressure (!) 176/96, pulse 60, temperature 98 °F (36 7 °C), resp  rate 17, height 5' 4" (1 626 m), weight 67 1 kg (147 lb 14 9 oz), SpO2 97 %  , Body mass index is 25 39 kg/m²  Intake/Output Summary (Last 24 hours) at 2022 3936  Last data filed at 2022 0233  Gross per 24 hour   Intake 240 ml   Output 1850 ml   Net -1610 ml       Physical Exam: BP (!) 176/96   Pulse 60   Temp 98 °F (36 7 °C)   Resp 17   Ht 5' 4" (1 626 m)   Wt 67 1 kg (147 lb 14 9 oz)   SpO2 97%   BMI 25 39 kg/m²   Physical Exam  Constitutional:       General: He is not in acute distress  Appearance: He is not toxic-appearing or diaphoretic  HENT:      Head: Normocephalic and atraumatic  Nose: Nose normal       Mouth/Throat:      Mouth: Mucous membranes are dry  Eyes:      General: No scleral icterus  Extraocular Movements: Extraocular movements intact  Cardiovascular:      Rate and Rhythm: Normal rate and regular rhythm  Heart sounds: No friction rub  No gallop  Comments: No significant edema  Pulmonary:      Effort: Pulmonary effort is normal  No respiratory distress  Breath sounds: No wheezing, rhonchi or rales  Abdominal:      General: Bowel sounds are normal  There is no distension  Palpations: Abdomen is soft  Tenderness: There is no abdominal tenderness  There is no rebound  Musculoskeletal:      Cervical back: Normal range of motion and neck supple  Neurological:      Mental Status: He is alert and oriented to person, place, and time  Psychiatric:         Mood and Affect: Mood normal          Behavior: Behavior normal          Thought Content:  Thought content normal          Judgment: Judgment normal          Medications:    Current Facility-Administered Medications:     cefTRIAXone (ROCEPHIN) 1,000 mg in dextrose 5 % 50 mL IVPB, 1,000 mg, Intravenous, Q24H, Magdalena Ross Derricotte, DO, Last Rate: 100 mL/hr at 08/22/22 1248, 1,000 mg at 08/22/22 1248    chlorhexidine (PERIDEX) 0 12 % oral rinse 15 mL, 15 mL, Mouth/Throat, Q12H [address], Magdalena Vandana Derricotte, DO, 15 mL at 08/22/22 2031    heparin (porcine) subcutaneous injection 5,000 Units, 5,000 Units, Subcutaneous, Q8H [address], Magdalena Vandana Derricotte, DO, 5,000 Units at 08/22/22 2151    melatonin tablet 6 mg, 6 mg, Oral, HS, Magdalenamauricio Ross Derricotte, DO, 6 mg at 08/21/22 2240    potassium-sodium phosphateS (K-PHOS,PHOSPHA 250) -250 mg tablet 1 tablet, 1 tablet, Oral, 4x Daily (with meals and at bedtime), Ollie Dallas Derricotte, DO, 1 tablet at 08/22/22 1248    risperiDONE (RisperDAL) tablet 1 mg, 1 mg, Oral, BID, Magdalena Ross Derricotte, DO, 1 mg at 08/22/22 1743    Laboratory Results:  Lab Results   Component Value Date    WBC 10 14 08/23/2022    HGB 12 3 08/23/2022    HCT 36 7 08/23/2022    MCV 89 08/23/2022     (L) 08/23/2022     Lab Results   Component Value Date    SODIUM 147 08/23/2022    K 3 2 (L) 08/23/2022     (H) 08/23/2022    CO2 23 08/23/2022    BUN 22 08/23/2022    CREATININE 1 08 08/23/2022    GLUC 128 08/23/2022    CALCIUM 7 6 (L) 08/23/2022     Lab Results   Component Value Date    CALCIUM 7 6 (L) 08/23/2022    PHOS 1 9 (L) 08/23/2022     No results found for: LABPROT

## 2022-08-23 NOTE — PHYSICAL THERAPY NOTE
Physical Therapy Evaluation    Patient's Name: Reyes Conception    Admitting Diagnosis  Renal failure [N19]    Problem List  Patient Active Problem List   Diagnosis    Compulsive skin picking    Drug dependence ([facility] [address] )    Acute metabolic encephalopathy    Acute respiratory failure with hypoxia (HCC)    Elevated troponin    Elevated brain natriuretic peptide (BNP) level    Essential hypertension    Transaminitis    Elevated lipase    BRAULIO (acute kidney injury) ([facility] )    Hypernatremia    Septic shock (HCC)    Increased anion gap metabolic acidosis    Rhabdomyolysis    Elevated d-dimer    Kidney stones    Delusional disorder ([address] )    Hypophosphatemia       Past Medical History  Past Medical History:   Diagnosis Date    Anxiety     Bright red rectal bleeding     Last Assessed: 9/9/2015     Hypertension     Last Assessed: 7/9/2014     Kidney stones     Last Assessed: 11/17/2016     Periorbital edema     Last Assessed: 9/4/2015    Skin tag of anus     Last Assessed: 9/9/2015     Trigger point of thoracic region     Last Assessed: 11/6/2015        Past Surgical History  Past Surgical History:   Procedure Laterality Date    CYSTOSCOPY W/ URETERAL STENT PLACEMENT  03/11/2013    CYSTOSCOPY W/ URETERAL STENT PLACEMENT  06/18/2014    EXTRACORPOREAL SHOCK WAVE LITHOTRIPSY     CYSTOSCOPY W/ URETERAL STENT PLACEMENT  07/16/2014    EXTRACORPOREAL SHOCK WAVE LITHOTRIPSY     CYSTOSCOPY W/ URETERAL STENT REMOVAL  04/11/2013    CYSTOSCOPY W/ URETERAL STENT REMOVAL Right 08/26/2014    CYSTOSCOPY W/ URETEROSCOPY W/ LITHOTRIPSY  02/26/2013    Percutaneous lithotomy With Uretal Stent Plcement     CYSTOSCOPY W/ URETEROSCOPY W/ LITHOTRIPSY  03/11/2014    CYSTOSCOPY W/ URETEROSCOPY W/ LITHOTRIPSY Right 08/04/2014    With Uretal Stent Placement     CYSTOSCOPY W/ URETEROSCOPY W/ LITHOTRIPSY Right 05/09/2016    HEMORRHOID SURGERY      HERNIA REPAIR  03/11/2013    IR NEPHROSTOMY TUBE PLACEMENT  8/20/2022    KIDNEY SURGERY      LITHOTRIPSY      LA CYSTO/URETERO W/LITHOTRIPSY &INDWELL STENT INSRT Right 7/13/2016    Procedure: CYSTOSCOPY; URETEROSCOPY WITH HOLMIUM LASER STONE EXTRACTION; RETROGRADE PYELOGRAM; URETERAL STENT INSERTION ;  Surgeon: Prescott Leyden, MD;  Location: [facility];  Service: Urology    ND CYSTO/URETERO W/LITHOTRIPSY &INDWELL STENT INSRT Right 5/9/2016    Procedure: CYSTOSCOPY,  URETEROSCOPY,  WITH LITHOTRIPSY HOLMIUM LASER, STONE EXTRACTION, AND INSERTION STENT URETERAL;  Surgeon: Prescott Leyden, MD;  Location: [facility];  Service: Urology    ND FRAGMENT KIDNEY STONE/ ESWL Right 6/17/2016    Procedure: Madhavi Brash SHOCKWAVE (ESWL); Surgeon: Prescott Leyden, MD;  Location: [facility];  Service: Urology    TRANSURETHRAL RESECTION OF BLADDER      URETERAL Haresh [place]  03/11/2013 08/23/22 0839   PT Last Visit   PT Visit Date 08/23/22   Note Type   Note type Evaluation   Pain Assessment   Pain Assessment Tool 0-10   Pain Score No Pain   Restrictions/Precautions   Weight Bearing Precautions Per Order No   Other Precautions Cognitive; Chair Alarm; Bed Alarm;Multiple lines; Fall Risk  (R nephrostomy tube)   Home Living   Type of Home House   Additional Comments Pt is a poor historian, states he is unsure where he was living prior to admission    Per case management, pt was residing with mother   Prior Function   Level of [place] Independent with ADLs and functional mobility   Lives With Family  (mother)   Falls in the last 6 months 0  (Pt states "not that I know of")   Vocational Unemployed   Comments Pt reports no AD at baseline   General   Family/Caregiver Present No   Cognition   Overall Cognitive Status Impaired   Arousal/Participation Alert   Orientation Level Oriented X4  (Grossly to place)   Following Commands Follows one step commands with increased time or repetition   Comments Pt requires increased time for processing, limited insight to impairments   RLE Assessment   RLE Assessment WFL  (Grossly 4/5) LLE Assessment   LLE Assessment WFL  (Grossly 4/5)   Bed Mobility   Supine to Sit 4  Minimal assistance   Additional items Assist x 1;HOB elevated; Bedrails; Increased time required   Additional Comments CGA for sitting balance, LOB with attempting to nancy socks with OT   Transfers   Sit to Stand 4  Minimal assistance   Additional items Assist x 1; Increased time required;Verbal cues   Stand to Sit 4  Minimal assistance   Additional items Assist x 1; Increased time required;Verbal cues   Additional Comments with HHA   Ambulation/Elevation   Gait pattern Decreased foot clearance; Wide JAYLA; Short stride; Shuffling   Gait Assistance 4  Minimal assist   Additional items Assist x 1   Assistive Device Other (Comment)  (HHA)   Distance 3 ft to chair, limited by fatigue   Balance   Static Sitting Fair -   Dynamic Sitting Poor +   Static Standing Poor +   Dynamic Standing Poor +   Ambulatory Poor +   Activity Tolerance   Activity Tolerance Patient limited by fatigue   Medical Staff Made Aware Co-evaluation with OT given medical complexity and limited activity tolerance   Nurse Made Aware RN updated  Chair alarm engaged at end of session   Assessment   Prognosis Fair   Problem List Decreased strength;Decreased endurance; Impaired balance;Decreased mobility; Decreased safety awareness;Decreased cognition   Assessment Pt is a 61 y o  male seen for PT evaluation s/p admit to [facility] on 8/20/2022  Pt was admitted with a primary dx of: BRAULIO  Pt s/p R nephrostomy placed 8/20  PT now consulted for assessment of mobility and d/c needs  Pt with Up with assistance orders  Pts current comorbidities and personal factors effecting treatment include: Drug dependence, Kidney stones, Delusional disorder, Acute metabolic encephalopathy, primary caregiver is elderly mother   Pts current clinical presentation is Unstable/Unpredictable (high complexity) due to Ongoing medical management for primary dx, Decreased activity tolerance compared to baseline, Fall risk, Increased assistance needed from caregiver at current time, Cog status, Trending lab values  Prior to admission, pt was independent without AD  Upon evaluation, pt currently is requiring Susanne for bed mobility; Susanne for transfers and Susanne for ambulation 3 ft w/ HHA  Pt presents at PT eval functioning below baseline and currently w/ overall mobility deficits 2* to: BLE weakness, decreased endurance, gait deviations, decreased activity tolerance compared to baseline, decreased functional mobility tolerance compared to baseline, decreased safety awareness, fall risk, decreased cognition  Pt currently at a fall risk 2* to impairments listed above  Pt will continue to benefit from skilled acute PT interventions to address stated impairments; to maximize functional mobility; for ongoing pt/ family training; and DME needs  At conclusion of PT session pt returned back in chair, bed alarm engaged and chair alarm engaged with phone and call bell within reach  Pt denies any further questions at this time  Recommend IP rehab upon hospital D/C  Goals   Patient Goals to get out of bed   STG Expiration Date 09/06/22   Short Term Goal #1 In 14 days pt will be able to: 1  Demonstrate ability to perform all aspects of bed mobility independently to improve functional safety  2  Perform functional transfers with LRAD independently to facilitate safe return to previous living environment  3   Ambulate 150 ft with LRAD independently with stable vitals to improve safety with household distances and reduce fall risk  4  Improve LE strength grades by 1 to increase ease of functional mobility with transfers and gait  5  Pt will demonstrate improved balance by one grade in order to decrease risk of falls  6  Climb 2-3 steps with 1 HR and supervision to simulate entrance to home pending clarification of home set-up     PT Treatment Day 0   Plan   Treatment/Interventions Functional transfer training;LE strengthening/ROM; Elevations; Therapeutic exercise; Endurance training;Patient/family training;Equipment eval/education; Bed mobility;Gait training;Cognitive reorientation   PT Frequency 3-5x/wk   Recommendation   PT Discharge Recommendation Post acute rehabilitation services   AM-PAC Basic Mobility Inpatient   Turning in Bed Without Bedrails 3   Lying on Back to Sitting on Edge of Flat Bed 3   Moving Bed to Chair 3   Standing Up From Chair 3   Walk in Room 3   Climb 3-5 Stairs 2   Basic Mobility Inpatient Raw Score 17   Basic Mobility Standardized Score 39 67   Highest Level Of Mobility   -HLM Goal 5: Stand one or more mins   JH-HLM Achieved 5: Stand (1 or more minutes)       Billie Franklin, PT, DPT, GCS

## 2022-08-23 NOTE — ASSESSMENT & PLAN NOTE
· Patient with hydronephrosis requiring right-sided nephrostomy tube placement  · Urology evaluation  · Follow-up with Urology as outpatient

## 2022-08-23 NOTE — PLAN OF CARE
Problem: PAIN - ADULT  Goal: Verbalizes/displays adequate comfort level or baseline comfort level  Description: Interventions:  - Encourage patient to monitor pain and request assistance  - Assess pain using appropriate pain scale  - Administer analgesics based on type and severity of pain and evaluate response  - Implement non-pharmacological measures as appropriate and evaluate response  - Consider cultural and social influences on pain and pain management  - Notify physician/advanced practitioner if interventions unsuccessful or patient reports new pain  Outcome: Progressing     Problem: INFECTION - ADULT  Goal: Absence or prevention of progression during hospitalization  Description: INTERVENTIONS:  - Assess and monitor for signs and symptoms of infection  - Monitor lab/diagnostic results  - Monitor all insertion sites, i e  indwelling lines, tubes, and drains  - Monitor endotracheal if appropriate and nasal secretions for changes in amount and color  - [place] appropriate cooling/warming therapies per order  - Administer medications as ordered  - Instruct and encourage patient and family to use good hand hygiene technique  - Identify and instruct in appropriate isolation precautions for identified infection/condition  Outcome: Progressing  Goal: Absence of fever/infection during neutropenic period  Description: INTERVENTIONS:  - Monitor WBC    Outcome: Progressing

## 2022-08-23 NOTE — UTILIZATION REVIEW
Continued Stay Review    Date: 08/23/22                          Current Patient Class: IP  Current Level of Care: Med/Surg    HPI:57 y o  male initially admitted on 8/20 for BRAULIO  Assessment/Plan: Pt reports some diarrhea in relation to one of his meds  On exam, R nephrostomy and rodriguez  Crt 1 today  Plan: 3 month PCN exchange, flush tube daily w/ 10 mL NSS  Continue supportive care  DW  I&O  Continue current meds       Vital Signs:   Date/Time Temp Pulse Resp BP MAP (mmHg) Arterial Line BP MAP SpO2 O2 Flow Rate  O2 Device   08/23/22 0845 -- -- -- -- -- -- -- -- -- None (Room air)   08/23/22 07:25:47 98 °F (36 7 °C) 60 17 176/96 Abnormal  123 -- -- 97 % -- --   08/23/22 02:03:34 100 6 °F (38 1 °C) 74 -- 127/65 86 -- -- 93 % -- --   08/23/22 0200 -- -- 41 Abnormal  -- -- -- -- -- -- --   08/22/22 22:08:51 99 5 °F (37 5 °C) 76 36 Abnormal  127/65 86 -- -- 95 % -- --   08/22/22 16:57:28 97 9 °F (36 6 °C) 69 33 Abnormal  145/71 96 -- -- 95 % -- None (Room air)   08/22/22 1600 -- -- -- -- -- -- -- -- -- None (Room air)   08/22/22 14:23:59 97 9 °F (36 6 °C) 68 -- 129/72 91 -- -- 93 % -- --   08/22/22 1000 99 68 °F (37 6 °C) 70 20 104/51 68 -- -- 95 % -- --   08/22/22 0900 99 68 °F (37 6 °C) 70 16 -- -- 122/46 70 mmHg 95 % -- --   08/22/22 0800 99 32 °F (37 4 °C) 62 18 -- -- 126/50 76 mmHg 96 % -- None (Room air)   08/22/22 0739 -- 70 16 -- -- -- -- 98 % 2 L/min Nasal cannula   08/22/22 0700 99 32 °F (37 4 °C) 68 -- -- -- 124/50 74 mmHg 97 % -- --   08/22/22 0500 99 68 °F (37 6 °C) 72 -- -- -- 104/44 66 mmHg 98 % -- --   08/22/22 0300 99 68 °F (37 6 °C) 72 -- -- -- 112/44 66 mmHg 95 % -- --   08/22/22 0200 100 04 °F (37 8 °C) 64 -- -- -- 100/40 60 mmHg    Abnormal  97 % -- --   08/22/22 0100 99 32 °F (37 4 °C) 70 -- -- -- 130/54 74 mmHg 94 % -- --   08/22/22 0000 99 68 °F (37 6 °C) 66 -- -- -- 150/56 84 mmHg 99 % -- --         Pertinent Labs/Diagnostic Results:   Results from last 7 days   Lab Units 08/20/22  1219 SARS-COV-2  Negative     Results from last 7 days   Lab Units 08/23/22  0248 08/22/22  0530 08/21/22  0449 08/20/22  2220 08/20/22  2041   WBC Thousand/uL 10 14 10 81* 23 68*  --  24 99*   HEMOGLOBIN g/dL 12 3 12 1 14 7  --  16 2   I STAT HEMOGLOBIN g/dl  --   --   --  13 9  --    HEMATOCRIT % 36 7 36 6 46 3  --  50 0*   HEMATOCRIT, ISTAT %  --   --   --  41  --    PLATELETS Thousands/uL 100* 100* 186  --  166   NEUTROS ABS Thousands/µL 8 51*  --  20 83*  --  22 01*         Results from last 7 days   Lab Units 08/23/22  0251 08/23/22  0248 08/22/22  1455 08/22/22  0530 08/21/22  2343 08/21/22  1736 08/21/22  1125 08/21/22  0449 08/21/22  0255 08/20/22  2220 08/20/22  2041 08/20/22  1210   SODIUM mmol/L  --  147 148* 151* 153* 161*   < > 158*   < >  --  156* 160*   POTASSIUM mmol/L  --  3 2* 3 2* 3 5 2 8* 3 6   < > 3 6   < >  --  3 7 3 7   CHLORIDE mmol/L  --  119* 120* 123* 126* 134*   < > 128*   < >  --  127* 112*   CO2 mmol/L  --  23 21 22 21 22   < > 21   < >  --  22 22   CO2, I-STAT mmol/L  --   --   --   --   --   --   --   --   --  24  --   --    ANION GAP mmol/L  --  5 7 6 6 5   < > 9   < >  --  7 26*   BUN mg/dL  --  22 31* 47* 61* 73*   < > 94*   < >  --  119* 121*   CREATININE mg/dL  --  1 08 1 36* 1 61* 2 07* 2 22*   < > 3 18*   < >  --  4 48* 6 02*   EGFR ml/min/1 73sq m  --  75 56 46 34 31   < > 20   < >  --  13 9   CALCIUM mg/dL  --  7 6* 8 0* 7 8* 7 9* 8 3   < > 7 9*   < >  --  8 4 10 2*   CALCIUM, IONIZED mmol/L  --   --   --   --   --   --   --   --   --   --  1 14  --    CALCIUM, IONIZED, ISTAT mmol/L  --   --   --   --   --   --   --   --   --  1 13  --   --    MAGNESIUM mg/dL  --   --   --   --   --   --   --  3 0*  --   --  2 8* 3 5*   PHOSPHORUS mg/dL 1 9*  --   --  0 9*  --   --   --  4 5  --   --  5 2*  --     < > = values in this interval not displayed       Results from last 7 days   Lab Units 08/22/22  0530 08/21/22  0034 08/20/22 2041 08/20/22  1210   AST U/L  --   --  78* 132*   ALT U/L  --   --  43 66   ALK PHOS U/L  --   --  60 76   TOTAL PROTEIN g/dL  --   --  6 3* 8 7*   ALBUMIN g/dL  --   --  2 7* 4 3   TOTAL BILIRUBIN mg/dL  --   --  0 83 1 93*   AMMONIA umol/L 32 42*  --   --      Results from last 7 days   Lab Units 08/20/22  1154   POC GLUCOSE mg/dl 117     Results from last 7 days   Lab Units 08/23/22  0248 08/22/22  1455 08/22/22  0530 08/21/22  2343 08/21/22  1736 08/21/22  1125 08/21/22  0449 08/21/22  0255 08/20/22  2041 08/20/22  1210   GLUCOSE RANDOM mg/dL 128 142* 149* 249* 148* 162* 138 121 137 139     Results from last 7 days   Lab Units 08/21/22  0453 08/20/22  1812 08/20/22  1243   PH ART  7 363 7 340* 7 394   PCO2 ART mm Hg 37 0 33 3* 22 1*   PO2 ART mm Hg 83 5 65 3* 178 6*   HCO3 ART mmol/L 20 6* 17 6* 13 2*   BASE EXC ART mmol/L -4 2 -7 0 -8 3   O2 CONTENT ART mL/dL 20 8 20 8 29 6*   O2 HGB, ARTERIAL % 95 2 90 9* 98 4*   ABG SOURCE  Line, Arterial Radial, Left Radial, Left     Results from last 7 days   Lab Units 08/23/22  0412 08/20/22  2052   PH JOSH  7 482* 7 293*   PCO2 JOSH mm Hg 29 6* 45 7   PO2 JOSH mm Hg 53 8* 33 4*   HCO3 JOSH mmol/L 21 6* 21 6*   BASE EXC JOSH mmol/L -0 8 -5 0   O2 CONTENT JOSH ml/dL 16 4 14 0   O2 HGB, VENOUS % 88 6* 59 1*     Results from last 7 days   Lab Units 08/20/22  2220   I STAT BASE EXC mmol/L -6*   I STAT O2 SAT % 99*   ISTAT PH ART  7 227*   I STAT ART PCO2 mm HG 53 0*   I STAT ART PO2 mm  0*   I STAT ART HCO3 mmol/L 22 0     Results from last 7 days   Lab Units 08/22/22  0530 08/20/22  1210   CK TOTAL U/L 874* 3,236*   CK MB INDEX % <1 0 <1 0   CK MB ng/mL 2 6 13 2*     Results from last 7 days   Lab Units 08/20/22  1637 08/20/22  1436 08/20/22  1210   HS TNI 0HR ng/L  --   --  547*   HS TNI 2HR ng/L  --  500*  --    HSTNI D2 ng/L  --  -47  --    HS TNI 4HR ng/L 448*  --   --    HSTNI D4 ng/L -99  --   --      Results from last 7 days   Lab Units 08/20/22  1210   D-DIMER QUANTITATIVE ug/ml FEU 1 72*     Results from last 7 days Lab Units 08/20/22  1210   PROTIME seconds 19 5*   INR  1 62*   PTT seconds 34         Results from last 7 days   Lab Units 08/20/22  1210   PROCALCITONIN ng/ml 5 49*     Results from last 7 days   Lab Units 08/23/22  0248 08/20/22  2041 08/20/22  1436 08/20/22  1210   LACTIC ACID mmol/L 2 0 2 0 2 4* 4 8*             Results from last 7 days   Lab Units 08/20/22  1210   NT-PRO BNP pg/mL 11,339*     Results from last 7 days   Lab Units 08/20/22  1210   LIPASE u/L 1,015*     Results from last 7 days   Lab Units 08/20/22  1307   CLARITY UA  Slightly Cloudy   COLOR UA  Elayne   SPEC GRAV UA  1 025   PH UA  5 5   GLUCOSE UA mg/dl Negative   KETONES UA mg/dl Trace*   BLOOD UA  Large*   PROTEIN UA mg/dl 100 (2+)*   NITRITE UA  Negative   BILIRUBIN UA  Interference- unable to analyze*   UROBILINOGEN UA E U /dl 1 0   LEUKOCYTES UA  Small*   WBC UA /hpf 10-20*   RBC UA /hpf 10-20*   BACTERIA UA /hpf Occasional   EPITHELIAL CELLS WET PREP /hpf None Seen     Results from last 7 days   Lab Units 08/20/22  1210   INFLUENZA A PCR  Negative   INFLUENZA B PCR  Negative   RSV PCR  Negative     Results from last 7 days   Lab Units 08/20/22  1307   AMPH/METH  Negative   BARBITURATE UR  Negative   BENZODIAZEPINE UR  Negative   COCAINE UR  Negative   METHADONE URINE  Negative   OPIATE UR  Negative   PCP UR  Negative   THC UR  Negative     Results from last 7 days   Lab Units 08/20/22  1210   ETHANOL LVL mg/dL <3   ACETAMINOPHEN LVL ug/mL <0 5*   SALICYLATE LVL mg/dL 3     Results from last 7 days   Lab Units 08/21/22  1436 08/20/22  2324 08/20/22  2236 08/20/22  2057 08/20/22  1307 08/20/22  1210   BLOOD CULTURE   --  No Growth at 48 hrs  --  No Growth at 48 hrs  --  No Growth at 48 hrs  No Growth at 48 hrs     GRAM STAIN RESULT  3+ Polys  No bacteria seen  --   --   --   --   --    URINE CULTURE   --   --  No Growth <1000 cfu/mL  --  No Growth <1000 cfu/mL  --        Medications:   Scheduled Medications:  cefTRIAXone, 1,000 mg, Intravenous, Q24H  chlorhexidine, 15 mL, Mouth/Throat, Q12H IVY  heparin (porcine), 5,000 Units, Subcutaneous, Q8H [address]  melatonin, 6 mg, Oral, HS  potassium chloride, 40 mEq, Oral, Once  potassium-sodium phosphateS, 1 tablet, Oral, 4x Daily (with meals and at bedtime)  risperiDONE, 1 mg, Oral, BID    Continuous IV Infusions:    none    PRN Meds:    none      Discharge Plan: D    Network Utilization Review Department  ATTENTION: Please call with any questions or concerns to [phone] and carefully listen to the prompts so that you are directed to the right person  All voicemails are confidential   [facility] Doing all requests for admission clinical reviews, approved or denied determinations and any other requests to dedicated fax number below belonging to the campus where the patient is receiving treatment   List of dedicated fax numbers for the Facilities:  [address] DENIALS (Administrative/Medical Necessity) [phone]   [address] (Maternity/NICU/Pediatrics) [phone]   [address]  [address] [facility] [place] [address] [address] [place] [address] [address] [facility] [phone]

## 2022-08-23 NOTE — TELEPHONE ENCOUNTER
61year-old male with a past urologic history of nephrolithiasis previously known to our practice back in 2016  Presenting with altered mental status and BRAULIO, CT scan that time revealed obstructing UPJ stone along with staghorn on the right  Status post PCN placement on the right  Patient will require definitive stone treatment with PCNL on an outpatient basis  Please assist with scheduling this once he has been discharged

## 2022-08-23 NOTE — PROGRESS NOTES
Patient refuses to take his potassium tablet and also his potassium phosphate tablet  I have communicated and educated the patient regarding the need to take the medication

## 2022-08-23 NOTE — PLAN OF CARE
Problem: PHYSICAL THERAPY ADULT  Goal: Performs mobility at highest level of function for planned discharge setting  See evaluation for individualized goals  Description: Treatment/Interventions: Functional transfer training, LE strengthening/ROM, Elevations, Therapeutic exercise, Endurance training, Patient/family training, Equipment eval/education, Bed mobility, Gait training, Cognitive reorientation          See flowsheet documentation for full assessment, interventions and recommendations  Note: Prognosis: Fair  Problem List: Decreased strength, Decreased endurance, Impaired balance, Decreased mobility, Decreased safety awareness, Decreased cognition  Assessment: Pt is a 61 y o  male seen for PT evaluation s/p admit to [facility] on 8/20/2022  Pt was admitted with a primary dx of: BRAULIO  Pt s/p R nephrostomy placed 8/20  PT now consulted for assessment of mobility and d/c needs  Pt with Up with assistance orders  Pts current comorbidities and personal factors effecting treatment include: Drug dependence, Kidney stones, Delusional disorder, Acute metabolic encephalopathy, primary caregiver is elderly mother  Pts current clinical presentation is Unstable/Unpredictable (high complexity) due to Ongoing medical management for primary dx, Decreased activity tolerance compared to baseline, Fall risk, Increased assistance needed from caregiver at current time, Cog status, Trending lab values  Prior to admission, pt was independent without AD  Upon evaluation, pt currently is requiring Susanne for bed mobility; Susanne for transfers and Susanne for ambulation 3 ft w/ HHA  Pt presents at PT eval functioning below baseline and currently w/ overall mobility deficits 2* to: BLE weakness, decreased endurance, gait deviations, decreased activity tolerance compared to baseline, decreased functional mobility tolerance compared to baseline, decreased safety awareness, fall risk, decreased cognition   Pt currently at a fall risk 2* to impairments listed above  Pt will continue to benefit from skilled acute PT interventions to address stated impairments; to maximize functional mobility; for ongoing pt/ family training; and DME needs  At conclusion of PT session pt returned back in chair, bed alarm engaged and chair alarm engaged with phone and call bell within reach  Pt denies any further questions at this time  Recommend IP rehab upon hospital D/C  PT Discharge Recommendation: Post acute rehabilitation services    See flowsheet documentation for full assessment

## 2022-08-23 NOTE — PLAN OF CARE
Problem: MOBILITY - ADULT  Goal: Maintain or return to baseline ADL function  Description: INTERVENTIONS:  -  Assess patient's ability to carry out ADLs; assess patient's baseline for ADL function and identify physical deficits which impact ability to perform ADLs (bathing, care of mouth/teeth, toileting, grooming, dressing, etc )  - Assess/evaluate cause of self-care deficits   - Assess range of motion  - Assess patient's mobility; develop plan if impaired  - Assess patient's need for assistive devices and provide as appropriate  - Encourage maximum independence but intervene and supervise when necessary  - Involve family in performance of ADLs  - Assess for home care needs following discharge   - Consider OT consult to assist with ADL evaluation and planning for discharge  - Provide patient education as appropriate  Outcome: Progressing  Goal: Maintains/Returns to pre admission functional level  Description: INTERVENTIONS:  - Perform BMAT or MOVE assessment daily    - Set and communicate daily mobility goal to care team and patient/family/caregiver  - Collaborate with rehabilitation services on mobility goals if consulted  - Perform Range of Motion 6 times a day  - Reposition patient every 2 hours    - Dangle patient 6 times a day  - Stand patient 6 times a day  - Ambulate patient 6 times a day  - Out of bed to chair 6 times a day   - Out of bed for meals 6 times a day  - Out of bed for toileting  - Record patient progress and toleration of activity level   Outcome: Progressing     Problem: PAIN - ADULT  Goal: Verbalizes/displays adequate comfort level or baseline comfort level  Description: Interventions:  - Encourage patient to monitor pain and request assistance  - Assess pain using appropriate pain scale  - Administer analgesics based on type and severity of pain and evaluate response  - Implement non-pharmacological measures as appropriate and evaluate response  - Consider cultural and social influences on pain and pain management  - Notify physician/advanced practitioner if interventions unsuccessful or patient reports new pain  Outcome: Progressing     Problem: INFECTION - ADULT  Goal: Absence or prevention of progression during hospitalization  Description: INTERVENTIONS:  - Assess and monitor for signs and symptoms of infection  - Monitor lab/diagnostic results  - Monitor all insertion sites, i e  indwelling lines, tubes, and drains  - Monitor endotracheal if appropriate and nasal secretions for changes in amount and color  - [place] appropriate cooling/warming therapies per order  - Administer medications as ordered  - Instruct and encourage patient and family to use good hand hygiene technique  - Identify and instruct in appropriate isolation precautions for identified infection/condition  Outcome: Progressing  Goal: Absence of fever/infection during neutropenic period  Description: INTERVENTIONS:  - Monitor WBC    Outcome: Progressing     Problem: SAFETY ADULT  Goal: Maintain or return to baseline ADL function  Description: INTERVENTIONS:  -  Assess patient's ability to carry out ADLs; assess patient's baseline for ADL function and identify physical deficits which impact ability to perform ADLs (bathing, care of mouth/teeth, toileting, grooming, dressing, etc )  - Assess/evaluate cause of self-care deficits   - Assess range of motion  - Assess patient's mobility; develop plan if impaired  - Assess patient's need for assistive devices and provide as appropriate  - Encourage maximum independence but intervene and supervise when necessary  - Involve family in performance of ADLs  - Assess for home care needs following discharge   - Consider OT consult to assist with ADL evaluation and planning for discharge  - Provide patient education as appropriate  Outcome: Progressing  Goal: Maintains/Returns to pre admission functional level  Description: INTERVENTIONS:  - Perform BMAT or MOVE assessment daily    - Set and communicate daily mobility goal to care team and patient/family/caregiver  - Collaborate with rehabilitation services on mobility goals if consulted  - Perform Range of Motion 6 times a day  - Reposition patient every 2 hours    - Dangle patient 6 times a day  - Stand patient 6 times a day  - Ambulate patient 6 times a day  - Out of bed to chair 6 times a day   - Out of bed for meals 6 times a day  - Out of bed for toileting  - Record patient progress and toleration of activity level   Outcome: Progressing  Goal: Patient will remain free of falls  Description: INTERVENTIONS:  - Educate patient/family on patient safety including physical limitations  - Instruct patient to call for assistance with activity   - Consult OT/PT to assist with strengthening/mobility   - Keep Call bell within reach  - Keep bed low and locked with side rails adjusted as appropriate  - Keep care items and personal belongings within reach  - Initiate and maintain comfort rounds  - Make Fall Risk Sign visible to staff  - Offer Toileting every 2 Hours, in advance of need  - Initiate/Maintain bed alarm  - Obtain necessary fall risk management equipment: bed/chair alarm  - Apply yellow socks and bracelet for high fall risk patients  - Consider moving patient to room near nurses station  Outcome: Progressing     Problem: DISCHARGE PLANNING  Goal: Discharge to home or other facility with appropriate resources  Description: INTERVENTIONS:  - Identify barriers to discharge w/patient and caregiver  - Arrange for needed discharge resources and transportation as appropriate  - Identify discharge learning needs (meds, wound care, etc )  - Arrange for interpretive services to assist at discharge as needed  - Refer to Case Management Department for coordinating discharge planning if the patient needs post-hospital services based on physician/advanced practitioner order or complex needs related to functional status, cognitive ability, or social support system  Outcome: Progressing     Problem: Knowledge Deficit  Goal: Patient/family/caregiver demonstrates understanding of disease process, treatment plan, medications, and discharge instructions  Description: Complete learning assessment and assess knowledge base    Interventions:  - Provide teaching at level of understanding  - Provide teaching via preferred learning methods  Outcome: Progressing     Problem: Prexisting or High Potential for Compromised Skin Integrity  Goal: Skin integrity is maintained or improved  Description: INTERVENTIONS:  - Identify patients at risk for skin breakdown  - Assess and monitor skin integrity  - Assess and monitor nutrition and hydration status  - Monitor labs   - Assess for incontinence   - Turn and reposition patient  - Assist with mobility/ambulation  - Relieve pressure over bony prominences  - Avoid friction and shearing  - Provide appropriate hygiene as needed including keeping skin clean and dry  - Evaluate need for skin moisturizer/barrier cream  - Collaborate with interdisciplinary team   - Patient/family teaching  - Consider wound care consult   Outcome: Progressing

## 2022-08-24 PROBLEM — R41.89 COGNITIVE DECLINE: Status: ACTIVE | Noted: 2022-08-24

## 2022-08-24 PROCEDURE — 99232 SBSQ HOSP IP/OBS MODERATE 35: CPT | Performed by: FAMILY MEDICINE

## 2022-08-24 RX ORDER — LORAZEPAM 0.5 MG/1
0.5 TABLET ORAL EVERY 8 HOURS PRN
Status: DISCONTINUED | OUTPATIENT
Start: 2022-08-24 | End: 2022-08-25 | Stop reason: HOSPADM

## 2022-08-24 RX ADMIN — RISPERIDONE 1 MG: 1 TABLET ORAL at 17:28

## 2022-08-24 RX ADMIN — MELATONIN 3 MG: at 21:08

## 2022-08-24 RX ADMIN — CEFTRIAXONE 1000 MG: 10 INJECTION, POWDER, FOR SOLUTION INTRAVENOUS at 10:53

## 2022-08-24 RX ADMIN — HEPARIN SODIUM 5000 UNITS: 5000 INJECTION INTRAVENOUS; SUBCUTANEOUS at 17:28

## 2022-08-24 RX ADMIN — LORAZEPAM 0.5 MG: 0.5 TABLET ORAL at 17:28

## 2022-08-24 RX ADMIN — RISPERIDONE 1 MG: 1 TABLET ORAL at 10:43

## 2022-08-24 RX ADMIN — CHLORHEXIDINE GLUCONATE 15 ML: 1.2 SOLUTION ORAL at 10:43

## 2022-08-24 NOTE — CASE MANAGEMENT
Case Management Discharge Planning Note    Patient name Nallely Boogie  Location [address] 8[address] 276-70 MRN [id number]  : 1965 Date 2022       Current Admission Date: 2022  Current Admission Diagnosis:BRAULIO (acute kidney injury) [facility])   Patient Active Problem List    Diagnosis Date Noted    Delusional disorder ([facility] ) 2022    Hypophosphatemia 2022    Acute metabolic encephalopathy     Acute respiratory failure with hypoxia ([facility] ) 2022    Elevated troponin 2022    Elevated brain natriuretic peptide (BNP) level 2022    Essential hypertension 2022    Transaminitis 2022    Elevated lipase 2022    BRAULIO (acute kidney injury) ([facility] ) 2022    Hypernatremia 2022    [facility] 20[facility]    Increased anion gap metabolic acidosis     Rhabdomyolysis 2022    Elevated d-dimer 2022    Kidney stones     Compulsive skin picking 10/20/2021    Drug dependence ([facility] ) 10/20/2021      LOS (days): 4  Geometric Mean LOS (GMLOS) (days):   Days to GMLOS:     OBJECTIVE:  Risk of Unplanned Readmission Score: 25 23         Current admission status: Inpatient   Preferred Pharmacy:   [facility] [address] [address]  Phone: [phone] Fax: [phone]    [address] [place] [address],9D, [address] [facility] 94 B[address] [facility] 38 [address] [facility]  Phone: [phone] Fax: [phone]    Primary Care Provider: Jordan Walker DO    Primary Insurance: BLUE CROSS  Secondary Insurance:     DISCHARGE DETAILS:                   Additional Comments: CM attempted to call pt's mother to discuss DC planning  No answer received and a VM was left

## 2022-08-24 NOTE — ASSESSMENT & PLAN NOTE
· Patient was found unresponsive at home and was brought to the hospital   Was found to be in septic shock at the time of presentation Likely secondary to UTI/aspiration pneumonia  · Resolved    Continue with the current antibiotics and follow-up on the cultures-so far remained negative  · Antibiotics day 4  · Finish a total of 5-7 day course

## 2022-08-24 NOTE — PLAN OF CARE
Problem: MOBILITY - ADULT  Goal: Maintain or return to baseline ADL function  Description: INTERVENTIONS:  -  Assess patient's ability to carry out ADLs; assess patient's baseline for ADL function and identify physical deficits which impact ability to perform ADLs (bathing, care of mouth/teeth, toileting, grooming, dressing, etc )  - Assess/evaluate cause of self-care deficits   - Assess range of motion  - Assess patient's mobility; develop plan if impaired  - Assess patient's need for assistive devices and provide as appropriate  - Encourage maximum independence but intervene and supervise when necessary  - Involve family in performance of ADLs  - Assess for home care needs following discharge   - Consider OT consult to assist with ADL evaluation and planning for discharge  - Provide patient education as appropriate  Outcome: Progressing  Goal: Maintains/Returns to pre admission functional level  Description: INTERVENTIONS:  - Perform BMAT or MOVE assessment daily    - Set and communicate daily mobility goal to care team and patient/family/caregiver  - Collaborate with rehabilitation services on mobility goals if consulted  - Perform Range of Motion 6 times a day  - Reposition patient every 2 hours    - Dangle patient 6 times a day  - Stand patient 6 times a day  - Ambulate patient 6 times a day  - Out of bed to chair 6 times a day   - Out of bed for meals 6 times a day  - Out of bed for toileting  - Record patient progress and toleration of activity level   Outcome: Progressing     Problem: PAIN - ADULT  Goal: Verbalizes/displays adequate comfort level or baseline comfort level  Description: Interventions:  - Encourage patient to monitor pain and request assistance  - Assess pain using appropriate pain scale  - Administer analgesics based on type and severity of pain and evaluate response  - Implement non-pharmacological measures as appropriate and evaluate response  - Consider cultural and social influences on pain and pain management  - Notify physician/advanced practitioner if interventions unsuccessful or patient reports new pain  Outcome: Progressing     Problem: INFECTION - ADULT  Goal: Absence or prevention of progression during hospitalization  Description: INTERVENTIONS:  - Assess and monitor for signs and symptoms of infection  - Monitor lab/diagnostic results  - Monitor all insertion sites, i e  indwelling lines, tubes, and drains  - Monitor endotracheal if appropriate and nasal secretions for changes in amount and color  - [place] appropriate cooling/warming therapies per order  - Administer medications as ordered  - Instruct and encourage patient and family to use good hand hygiene technique  - Identify and instruct in appropriate isolation precautions for identified infection/condition  Outcome: Progressing  Goal: Absence of fever/infection during neutropenic period  Description: INTERVENTIONS:  - Monitor WBC    Outcome: Progressing     Problem: SAFETY ADULT  Goal: Maintain or return to baseline ADL function  Description: INTERVENTIONS:  -  Assess patient's ability to carry out ADLs; assess patient's baseline for ADL function and identify physical deficits which impact ability to perform ADLs (bathing, care of mouth/teeth, toileting, grooming, dressing, etc )  - Assess/evaluate cause of self-care deficits   - Assess range of motion  - Assess patient's mobility; develop plan if impaired  - Assess patient's need for assistive devices and provide as appropriate  - Encourage maximum independence but intervene and supervise when necessary  - Involve family in performance of ADLs  - Assess for home care needs following discharge   - Consider OT consult to assist with ADL evaluation and planning for discharge  - Provide patient education as appropriate  Outcome: Progressing  Goal: Maintains/Returns to pre admission functional level  Description: INTERVENTIONS:  - Perform BMAT or MOVE assessment daily    - Set and communicate daily mobility goal to care team and patient/family/caregiver  - Collaborate with rehabilitation services on mobility goals if consulted  - Perform Range of Motion 6 times a day  - Reposition patient every 2 hours    - Dangle patient 6 times a day  - Stand patient 6 times a day  - Ambulate patient 6 times a day  - Out of bed to chair 6 times a day   - Out of bed for meals 6 times a day  - Out of bed for toileting  - Record patient progress and toleration of activity level   Outcome: Progressing  Goal: Patient will remain free of falls  Description: INTERVENTIONS:  - Educate patient/family on patient safety including physical limitations  - Instruct patient to call for assistance with activity   - Consult OT/PT to assist with strengthening/mobility   - Keep Call bell within reach  - Keep bed low and locked with side rails adjusted as appropriate  - Keep care items and personal belongings within reach  - Initiate and maintain comfort rounds  - Make Fall Risk Sign visible to staff  - Offer Toileting every 2 Hours, in advance of need  - Initiate/Maintain bed alarm  - Obtain necessary fall risk management equipment: bed/chair alarm  - Apply yellow socks and bracelet for high fall risk patients  - Consider moving patient to room near nurses station  Outcome: Progressing     Problem: DISCHARGE PLANNING  Goal: Discharge to home or other facility with appropriate resources  Description: INTERVENTIONS:  - Identify barriers to discharge w/patient and caregiver  - Arrange for needed discharge resources and transportation as appropriate  - Identify discharge learning needs (meds, wound care, etc )  - Arrange for interpretive services to assist at discharge as needed  - Refer to Case Management Department for coordinating discharge planning if the patient needs post-hospital services based on physician/advanced practitioner order or complex needs related to functional status, cognitive ability, or social support system  Outcome: Progressing     Problem: Knowledge Deficit  Goal: Patient/family/caregiver demonstrates understanding of disease process, treatment plan, medications, and discharge instructions  Description: Complete learning assessment and assess knowledge base    Interventions:  - Provide teaching at level of understanding  - Provide teaching via preferred learning methods  Outcome: Progressing     Problem: Prexisting or High Potential for Compromised Skin Integrity  Goal: Skin integrity is maintained or improved  Description: INTERVENTIONS:  - Identify patients at risk for skin breakdown  - Assess and monitor skin integrity  - Assess and monitor nutrition and hydration status  - Monitor labs   - Assess for incontinence   - Turn and reposition patient  - Assist with mobility/ambulation  - Relieve pressure over bony prominences  - Avoid friction and shearing  - Provide appropriate hygiene as needed including keeping skin clean and dry  - Evaluate need for skin moisturizer/barrier cream  - Collaborate with interdisciplinary team   - Patient/family teaching  - Consider wound care consult   Outcome: Progressing

## 2022-08-24 NOTE — PROGRESS NOTES
Patient refuses blood draws after educating and communicating with the patient the need for the lab draws  Dr Padmini Walton is aware

## 2022-08-24 NOTE — NURSING NOTE
Patient refusing AM labs  Stuck once, but unable to obtain  Patient refusing subsequent sticks  Patient also stating, "I'm leaving today, my stepson is coming to take me home " Glen Orozco PA-C with [facility] made aware of patient refusing AM labs

## 2022-08-24 NOTE — PROGRESS NOTES
[address] [facility]  Progress Note - Lillian David 1965, 61 y o  male MRN: [id number]  Unit/Bed#: [facility] 809-01 Encounter: [id number]  Primary Care Provider: Mae Luong DO   Date and time admitted to hospital: 8/20/2022  8:36 PM  Patient's mother came to the hospital   Discussed with the mother about current medical condition  Plan is for patient's mother to take patient home likely by tomorrow  Will arrange for VNA  Mother understands that patient does not have capacity and our recommendation is rehab  * BRAULIO (acute kidney injury) [facility])  Assessment & Plan    · Appears to be resolved  Secondary to obstructing nephrolithiasis   · Nephrology evaluation appreciated  · Patient refused blood work today    Cognitive decline  Assessment & Plan  · Patient do not have capacity to make informed medical decision  · Case Management on board    Hypophosphatemia  Assessment & Plan  · Patient refuses supplementation    Kidney stones  Assessment & Plan  · Patient with hydronephrosis requiring right-sided nephrostomy tube placement  · Urology evaluation  · Follow-up with Urology as outpatient    Septic shock [facility])  Assessment & Plan  · Patient was found unresponsive at home and was brought to the hospital   Was found to be in septic shock at the time of presentation Likely secondary to UTI/aspiration pneumonia  · Resolved  Continue with the current antibiotics and follow-up on the cultures-so far remained negative  · Antibiotics day 4  · Finish a total of 5-7 day course    Acute respiratory failure with hypoxia (HCC)  Assessment & Plan  · Resolved  Currently on room air  · Patient was admitted to intensive care    Acute metabolic encephalopathy  Assessment & Plan  · Likely multifactorial  · Patient still encephalopathy    Will obtain capacity evaluation since the patient is insisting on leaving AMA if you do not discharge him by tomorrow    Drug dependence [facility])  Assessment & Plan  · Patient with history of drug abuse  Rapid drug screen was negative at the time of admission          VTE Pharmacologic Prophylaxis: VTE Score: 1 Moderate Risk (Score 3-4) - Pharmacological DVT Prophylaxis Ordered: heparin  Patient Centered Rounds: I performed bedside rounds with nursing staff today  Discussions with Specialists or Other Care Team Provider:     Education and Discussions with Family / Patient: Attempted to update  (mother) via phone  Unable to contact  Time Spent for Care: 30 minutes  More than 50% of total time spent on counseling and coordination of care as described above  Current Length of Stay: 4 day(s)  Current Patient Status: Inpatient   Certification Statement: The patient will continue to require additional inpatient hospital stay due to Pending safe discharge plan  Discharge Plan: Anticipate discharge in 24-48 hrs to Once safe discharge plan is determined    Code Status: Level 1 - Full Code    Subjective:   Patient seen and examined  More awake alert today  Patient answers questions more appropriately  Neuropsych evaluation appreciated patient do not have capacity  Attempted to contact patient's mother left voicemail-mother and patient steps and is going to come to the hospital today    Objective:     Vitals:   Temp (24hrs), Av 4 °F (36 9 °C), Min:98 °F (36 7 °C), Max:98 7 °F (37 1 °C)    Temp:  [98 °F (36 7 °C)-98 7 °F (37 1 °C)] 98 7 °F (37 1 °C)  HR:  [67-76] 67  Resp:  [17-18] 17  BP: (137-155)/(71-73) 144/73  SpO2:  [91 %-95 %] 91 %  Body mass index is 25 39 kg/m²  Input and Output Summary (last 24 hours): Intake/Output Summary (Last 24 hours) at 2022 1624  Last data filed at 2022 1043  Gross per 24 hour   Intake 20 ml   Output 3450 ml   Net -3430 ml       Physical Exam:   Physical Exam  Constitutional:       General: He is not in acute distress  HENT:      Head: Normocephalic and atraumatic        Nose: Nose normal    Eyes: General: No scleral icterus  Cardiovascular:      Rate and Rhythm: Normal rate and regular rhythm  Pulses: Normal pulses  Pulmonary:      Effort: Pulmonary effort is normal       Breath sounds: Normal breath sounds  Abdominal:      General: Abdomen is flat  Musculoskeletal:      Cervical back: Normal range of motion  Skin:     Findings: Lesion present  Neurological:      General: No focal deficit present  Mental Status: He is alert  Cranial Nerves: No cranial nerve deficit  Additional Data:     Labs:  Results from last 7 days   Lab Units 08/23/22  0248   WBC Thousand/uL 10 14   HEMOGLOBIN g/dL 12 3   HEMATOCRIT % 36 7   PLATELETS Thousands/uL 100*   NEUTROS PCT % 84*   LYMPHS PCT % 7*   MONOS PCT % 6   EOS PCT % 1     Results from last 7 days   Lab Units 08/23/22  0248 08/20/22  2220 08/20/22  2041   SODIUM mmol/L 147   < > 156*   POTASSIUM mmol/L 3 2*   < > 3 7   CHLORIDE mmol/L 119*   < > 127*   CO2 mmol/L 23   < > 22   CO2, I-STAT   --    < >  --    BUN mg/dL 22   < > 119*   CREATININE mg/dL 1 08   < > 4 48*   ANION GAP mmol/L 5   < > 7   CALCIUM mg/dL 7 6*   < > 8 4   ALBUMIN g/dL  --   --  2 7*   TOTAL BILIRUBIN mg/dL  --   --  0 83   ALK PHOS U/L  --   --  60   ALT U/L  --   --  43   AST U/L  --   --  78*   GLUCOSE RANDOM mg/dL 128   < > 137    < > = values in this interval not displayed       Results from last 7 days   Lab Units 08/20/22  1210   INR  1 62*     Results from last 7 days   Lab Units 08/20/22  1154   POC GLUCOSE mg/dl 117         Results from last 7 days   Lab Units 08/23/22  0248 08/20/22  2041 08/20/22  1436 08/20/22  1210   LACTIC ACID mmol/L 2 0 2 0 2 4* 4 8*   PROCALCITONIN ng/ml  --   --   --  5 49*       Lines/Drains:  Invasive Devices  Report    Peripheral Intravenous Line  Duration           Peripheral IV 08/22/22 Right;Ventral (anterior) Forearm 2 days          Drain  Duration           Urethral Catheter Temperature probe 16 Fr  4 days    Nephrostomy Right 8 Fr  3 days              Urinary Catheter:   Goal for removal: No longer needed  Will place order to discontinue               Imaging:  No new image    Recent Cultures (last 7 days):   Results from last 7 days   Lab Units 08/21/22  1436 08/20/22  2324 08/20/22  2236 08/20/22  2057 08/20/22  1307 08/20/22  1210   BLOOD CULTURE   --  No Growth at 72 hrs   --  No Growth at 72 hrs   --  No Growth at 72 hrs  No Growth at 72 hrs  GRAM STAIN RESULT  3+ Polys  No bacteria seen  --   --   --   --   --    URINE CULTURE   --   --  No Growth <1000 cfu/mL  --  No Growth <1000 cfu/mL  --        Last 24 Hours Medication List:   Current Facility-Administered Medications   Medication Dose Route Frequency Provider Last Rate    cefTRIAXone  1,000 mg Intravenous Q24H Magdalena Alamo DO 1,000 mg (08/24/22 1053)    chlorhexidine  15 mL Mouth/Throat Q12H [facility] Magdalena Alamo DO      heparin (porcine)  5,000 Units Subcutaneous Q8H [facility] Magdalena Alamo DO      melatonin  3 mg Oral HS Sharon Rausch PA-C      potassium chloride  40 mEq Oral Once Bobby Birch MD      risperiDONE  1 mg Oral BID Tad Ewa Alamo DO          Today, Patient Was Seen By: Raad Camacho MD    **Please Note: This note may have been constructed using a voice recognition system  **

## 2022-08-24 NOTE — CONSULTS
Consultation - Neuropsychology/Psychology Department  Sebas Delgado 61 y o  male MRN: [id number]  Unit/Bed#: [facility] 809-01 Encounter: [id number]        Reason for Consultation:  Sebas Delgado is a 61y o  year old male who was referred for a Neuropsychological Exam to assess cognitive functioning and comment on capacity to make medical decisions        History of Present Illness  Acute metabolic encephalopathy  Physician Requesting Consult: Vanda Parish MD    PROBLEM LIST:  Patient Active Problem List   Diagnosis    Compulsive skin picking    Drug dependence ([facility]    Acute metabolic encephalopathy    Acute respiratory failure with hypoxia ([facility]    Elevated troponin    Elevated brain natriuretic peptide (BNP) level    Essential hypertension    Transaminitis    Elevated lipase    BRAULIO (acute kidney injury) ([facility]    Hypernatremia    Septic shock (HCC)    Increased anion gap metabolic acidosis    Rhabdomyolysis    Elevated d-dimer    Kidney stones    Delusional disorder ([facility]    Hypophosphatemia         Historical Information   Past Medical History:   Diagnosis Date    Anxiety     Bright red rectal bleeding     Last Assessed: 9/9/2015     Hypertension     Last Assessed: 7/9/2014     Kidney stones     Last Assessed: 11/17/2016     Periorbital edema     Last Assessed: 9/4/2015    Skin tag of anus     Last Assessed: 9/9/2015     Trigger point of thoracic region     Last Assessed: 11/6/2015      Past Surgical History:   Procedure Laterality Date    CYSTOSCOPY W/ URETERAL STENT PLACEMENT  03/11/2013    CYSTOSCOPY W/ URETERAL STENT PLACEMENT  06/18/2014    EXTRACORPOREAL SHOCK WAVE LITHOTRIPSY     CYSTOSCOPY W/ URETERAL STENT PLACEMENT  07/16/2014    EXTRACORPOREAL SHOCK WAVE LITHOTRIPSY     CYSTOSCOPY W/ URETERAL STENT REMOVAL  04/11/2013    CYSTOSCOPY W/ URETERAL STENT REMOVAL Right 08/26/2014    CYSTOSCOPY W/ URETEROSCOPY W/ LITHOTRIPSY  02/26/2013    Percutaneous lithotomy With Uretal Stent Plcement     CYSTOSCOPY W/ URETEROSCOPY W/ LITHOTRIPSY  03/11/2014    CYSTOSCOPY W/ URETEROSCOPY W/ LITHOTRIPSY Right 08/04/2014    With Uretal Stent Placement     CYSTOSCOPY W/ URETEROSCOPY W/ LITHOTRIPSY Right 05/09/2016    HEMORRHOID SURGERY      HERNIA REPAIR  03/11/2013    IR NEPHROSTOMY TUBE PLACEMENT  8/20/2022    KIDNEY SURGERY      LITHOTRIPSY      W[place] CYSTO/URETERO W/LITHOTRIPSY &INDWELL STENT INSRT Right 7/13/2016    Procedure: CYSTOSCOPY; URETEROSCOPY WITH HOLMIUM LASER STONE EXTRACTION; RETROGRADE PYELOGRAM; URETERAL STENT INSERTION ;  Surgeon: James Goodson MD;  [facility];  Service: Urology    [place] CYSTO/URETERO W/LITHOTRIPSY &INDWELL STENT INSRT Right 5/9/2016    Procedure: CYSTOSCOPY,  URETEROSCOPY,  WITH LITHOTRIPSY HOLMIUM LASER, STONE EXTRACTION, AND INSERTION STENT URETERAL;  Surgeon: James Goodson MD;  [facility];  Service: Urology    [place] ESWL Right 6/17/2016    Procedure: Shahzad Saucedo SHOCKWAVE (ESWL);   Surgeon: James Goodson MD;  [facility];  Service: Urology    TRANSURETHRAL RESECTION OF BLADDER      URETERAL [address]  03/11/2013     Social History   Social History     Substance and Sexual Activity   Alcohol Use No     Social History     Substance and Sexual Activity   Drug Use Yes    Types: Heroin, Marijuana, Methamphetamines, Fentanyl    Comment: 20-30 bags of heroin daily     Social History     Tobacco Use   Smoking Status Current Every Day Smoker    Packs/day: 2 00    Years: 35 00    Pack years: 70 00   Smokeless Tobacco Never Used     Family History:   Family History   Problem Relation Age of Onset    COPD Mother     Hypertension Mother     Heart attack Father        Meds/Allergies   current meds:   Current Facility-Administered Medications   Medication Dose Route Frequency    cefTRIAXone (ROCEPHIN) 1,000 mg in dextrose 5 % 50 mL IVPB  1,000 mg Intravenous Q24H    chlorhexidine (PERIDEX) 0 12 % oral rinse 15 mL  15 mL Mouth/Throat Q12H [address]    heparin (porcine) subcutaneous injection 5,000 Units  5,000 Units Subcutaneous Q8H [address]    melatonin tablet 3 mg  3 mg Oral HS    potassium chloride (K-DUR,KLOR-CON) CR tablet 40 mEq  40 mEq Oral Once    potassium-sodium phosphateS (K-PHOS,PHOSPHA 250) -250 mg tablet 1 tablet  1 tablet Oral 4x Daily (with meals and at bedtime)    risperiDONE (RisperDAL) tablet 1 mg  1 mg Oral BID       Allergies   Allergen Reactions    Metronidazole Itching and Swelling    Nsaids GI Intolerance     Stomach irritant    Penicillin G     Penicillins GI Intolerance     Sick to stomach    Pollen Extract     Sulfa Antibiotics          Family and Social Support:   No data recorded    Behavioral Observations: Alert, UNABLE to accurately state the Name of [facility]), day/week and day/month; affect appeared appropriate to content and patient admitted to depressed mood and anxiety; noted long history of both; no overt evidence of psychotic process; patient denied medical history except for having kidney stones; patient reported he is currently feeling good and believes he is able to return home today;    Cognitive Examination    General Cognitive Functioning MMSE = Impaired 23/28; Attention/Concentration Auditory Selective Attention = Average; Auditory Vigilance = Within Normal Limits; Information Processing Speed = Within Normal Limits    Frontal Systems/Executive Functioning Mental Flexibility/Cognitive Control = Impaired; Working Memory = Within Normal Limits Abstract Reasoning = Impaired;Generative Ability = Impaired,  Commonsense Reasoning and Judgement = Impaired    Language Functioning Confrontation naming = Within Normal Limits, Phonemic Fluency = Impaired; Semantic Retrieval = Impaired;  Comprehension of Complex Ideational Material = Impaired;  Praxis = Within Normal Limits; Repetition = Within Normal Limits; Basic Reading = Within Normal Limits; Following Commands = Within Normal Limits    Memory Functioning Narrative Recall - Short Delay = Moderately Impaired; Long Delay Narrative Recall = Moderately Impaired; Three word recall = Impaired    Visuo-Spatial Abilities Not Assessed    Functional Knowledge  Health & Safety Knowledge = Mildly Impaired;     Summary/Impression:  Results of Neuropsychological Exam revealed cognitive deficits in declarative recall, aspects of orientation, abstract reasoning ability, commonsense reasoning and judgment, generative ability, semantic retrieval, comprehension of complex ideational material and on a measure assessing awareness of personal health status and ability to evaluate health problems, handle medical emergencies and take safety precautions, patient performed in the mildly impaired range of functioning  During this encounter, patient does not appear to have capacity to make fully informed medical decisions  Unspecified Neurocognitive Disorder    Recommendations:

## 2022-08-24 NOTE — PLAN OF CARE
Problem: MOBILITY - ADULT  Goal: Maintain or return to baseline ADL function  Description: INTERVENTIONS:  -  Assess patient's ability to carry out ADLs; assess patient's baseline for ADL function and identify physical deficits which impact ability to perform ADLs (bathing, care of mouth/teeth, toileting, grooming, dressing, etc )  - Assess/evaluate cause of self-care deficits   - Assess range of motion  - Assess patient's mobility; develop plan if impaired  - Assess patient's need for assistive devices and provide as appropriate  - Encourage maximum independence but intervene and supervise when necessary  - Involve family in performance of ADLs  - Assess for home care needs following discharge   - Consider OT consult to assist with ADL evaluation and planning for discharge  - Provide patient education as appropriate  Outcome: Progressing  Goal: Maintains/Returns to pre admission functional level  Description: INTERVENTIONS:  - Perform BMAT or MOVE assessment daily    - Set and communicate daily mobility goal to care team and patient/family/caregiver  - Collaborate with rehabilitation services on mobility goals if consulted  - Perform Range of Motion 3-4 times a day  - Reposition patient every 2 hours    - Dangle patient 3-4 times a day  - Stand patient 3-4 times a day  - Ambulate patient 3-4 times a day  - Out of bed to chair 3-4 times a day   - Out of bed for meals 3-4 times a day  - Out of bed for toileting  - Record patient progress and toleration of activity level   Outcome: Progressing     Problem: PAIN - ADULT  Goal: Verbalizes/displays adequate comfort level or baseline comfort level  Description: Interventions:  - Encourage patient to monitor pain and request assistance  - Assess pain using appropriate pain scale  - Administer analgesics based on type and severity of pain and evaluate response  - Implement non-pharmacological measures as appropriate and evaluate response  - Consider cultural and social influences on pain and pain management  - Notify physician/advanced practitioner if interventions unsuccessful or patient reports new pain  Outcome: Progressing     Problem: INFECTION - ADULT  Goal: Absence or prevention of progression during hospitalization  Description: INTERVENTIONS:  - Assess and monitor for signs and symptoms of infection  - Monitor lab/diagnostic results  - Monitor all insertion sites, i e  indwelling lines, tubes, and drains  - Monitor endotracheal if appropriate and nasal secretions for changes in amount and color  - Stamps appropriate cooling/warming therapies per order  - Administer medications as ordered  - Instruct and encourage patient and family to use good hand hygiene technique  - Identify and instruct in appropriate isolation precautions for identified infection/condition  Outcome: Progressing  Goal: Absence of fever/infection during neutropenic period  Description: INTERVENTIONS:  - Monitor WBC    Outcome: Progressing     Problem: SAFETY ADULT  Goal: Maintain or return to baseline ADL function  Description: INTERVENTIONS:  -  Assess patient's ability to carry out ADLs; assess patient's baseline for ADL function and identify physical deficits which impact ability to perform ADLs (bathing, care of mouth/teeth, toileting, grooming, dressing, etc )  - Assess/evaluate cause of self-care deficits   - Assess range of motion  - Assess patient's mobility; develop plan if impaired  - Assess patient's need for assistive devices and provide as appropriate  - Encourage maximum independence but intervene and supervise when necessary  - Involve family in performance of ADLs  - Assess for home care needs following discharge   - Consider OT consult to assist with ADL evaluation and planning for discharge  - Provide patient education as appropriate  Outcome: Progressing  Goal: Maintains/Returns to pre admission functional level  Description: INTERVENTIONS:  - Perform BMAT or MOVE assessment daily    - Set and communicate daily mobility goal to care team and patient/family/caregiver  - Collaborate with rehabilitation services on mobility goals if consulted  - Out of bed for toileting  - Record patient progress and toleration of activity level   Outcome: Progressing  Goal: Patient will remain free of falls  Description: INTERVENTIONS:  - Educate patient/family on patient safety including physical limitations  - Instruct patient to call for assistance with activity   - Consult OT/PT to assist with strengthening/mobility   - Keep Call bell within reach  - Keep bed low and locked with side rails adjusted as appropriate  - Keep care items and personal belongings within reach  - Initiate and maintain comfort rounds  - Make Fall Risk Sign visible to staff  - Apply yellow socks and bracelet for high fall risk patients  - Consider moving patient to room near nurses station  Outcome: Progressing     Problem: DISCHARGE PLANNING  Goal: Discharge to home or other facility with appropriate resources  Description: INTERVENTIONS:  - Identify barriers to discharge w/patient and caregiver  - Arrange for needed discharge resources and transportation as appropriate  - Identify discharge learning needs (meds, wound care, etc )  - Arrange for interpretive services to assist at discharge as needed  - Refer to Case Management Department for coordinating discharge planning if the patient needs post-hospital services based on physician/advanced practitioner order or complex needs related to functional status, cognitive ability, or social support system  Outcome: Progressing     Problem: Knowledge Deficit  Goal: Patient/family/caregiver demonstrates understanding of disease process, treatment plan, medications, and discharge instructions  Description: Complete learning assessment and assess knowledge base    Interventions:  - Provide teaching at level of understanding  - Provide teaching via preferred learning methods  Outcome: Progressing     Problem: Prexisting or High Potential for Compromised Skin Integrity  Goal: Skin integrity is maintained or improved  Description: INTERVENTIONS:  - Identify patients at risk for skin breakdown  - Assess and monitor skin integrity  - Assess and monitor nutrition and hydration status  - Monitor labs   - Assess for incontinence   - Turn and reposition patient  - Assist with mobility/ambulation  - Relieve pressure over bony prominences  - Avoid friction and shearing  - Provide appropriate hygiene as needed including keeping skin clean and dry  - Evaluate need for skin moisturizer/barrier cream  - Collaborate with interdisciplinary team   - Patient/family teaching  - Consider wound care consult   Outcome: Progressing

## 2022-08-24 NOTE — ASSESSMENT & PLAN NOTE
· Appears to be resolved    Secondary to obstructing nephrolithiasis   · Nephrology evaluation appreciated  · Patient refused blood work today

## 2022-08-25 ENCOUNTER — HOME HEALTH ADMISSION (OUTPATIENT)
Dept: HOME HEALTH SERVICES | Facility: HOME HEALTHCARE | Age: 57
End: 2022-08-25
Payer: COMMERCIAL

## 2022-08-25 VITALS
HEART RATE: 83 BPM | DIASTOLIC BLOOD PRESSURE: 69 MMHG | WEIGHT: 147.93 LBS | SYSTOLIC BLOOD PRESSURE: 132 MMHG | TEMPERATURE: 97.8 F | HEIGHT: 64 IN | RESPIRATION RATE: 20 BRPM | BODY MASS INDEX: 25.25 KG/M2 | OXYGEN SATURATION: 93 %

## 2022-08-25 LAB
ALBUMIN SERPL BCP-MCNC: 2.2 G/DL (ref 3.5–5)
ALP SERPL-CCNC: 67 U/L (ref 46–116)
ALT SERPL W P-5'-P-CCNC: 42 U/L (ref 12–78)
ANION GAP SERPL CALCULATED.3IONS-SCNC: 5 MMOL/L (ref 4–13)
AST SERPL W P-5'-P-CCNC: 35 U/L (ref 5–45)
BACTERIA BLD CULT: NORMAL
BILIRUB SERPL-MCNC: 0.44 MG/DL (ref 0.2–1)
BUN SERPL-MCNC: 12 MG/DL (ref 5–25)
CALCIUM ALBUM COR SERPL-MCNC: 9.7 MG/DL (ref 8.3–10.1)
CALCIUM SERPL-MCNC: 8.3 MG/DL (ref 8.3–10.1)
CHLORIDE SERPL-SCNC: 113 MMOL/L (ref 96–108)
CO2 SERPL-SCNC: 24 MMOL/L (ref 21–32)
CREAT SERPL-MCNC: 0.94 MG/DL (ref 0.6–1.3)
ERYTHROCYTE [DISTWIDTH] IN BLOOD BY AUTOMATED COUNT: 13.9 % (ref 11.6–15.1)
GFR SERPL CREATININE-BSD FRML MDRD: 89 ML/MIN/1.73SQ M
GLUCOSE SERPL-MCNC: 114 MG/DL (ref 65–140)
HCT VFR BLD AUTO: 39.3 % (ref 36.5–49.3)
HGB BLD-MCNC: 13.3 G/DL (ref 12–17)
MCH RBC QN AUTO: 29.8 PG (ref 26.8–34.3)
MCHC RBC AUTO-ENTMCNC: 33.8 G/DL (ref 31.4–37.4)
MCV RBC AUTO: 88 FL (ref 82–98)
PHOSPHATE SERPL-MCNC: 1.8 MG/DL (ref 2.7–4.5)
PLATELET # BLD AUTO: 125 THOUSANDS/UL (ref 149–390)
PMV BLD AUTO: 11.6 FL (ref 8.9–12.7)
POTASSIUM SERPL-SCNC: 3.2 MMOL/L (ref 3.5–5.3)
PROT SERPL-MCNC: 6.1 G/DL (ref 6.4–8.4)
RBC # BLD AUTO: 4.46 MILLION/UL (ref 3.88–5.62)
SODIUM SERPL-SCNC: 142 MMOL/L (ref 135–147)
WBC # BLD AUTO: 13.48 THOUSAND/UL (ref 4.31–10.16)

## 2022-08-25 PROCEDURE — 99239 HOSP IP/OBS DSCHRG MGMT >30: CPT | Performed by: FAMILY MEDICINE

## 2022-08-25 PROCEDURE — 80053 COMPREHEN METABOLIC PANEL: CPT | Performed by: FAMILY MEDICINE

## 2022-08-25 PROCEDURE — 97535 SELF CARE MNGMENT TRAINING: CPT

## 2022-08-25 PROCEDURE — 85027 COMPLETE CBC AUTOMATED: CPT | Performed by: FAMILY MEDICINE

## 2022-08-25 PROCEDURE — 84100 ASSAY OF PHOSPHORUS: CPT | Performed by: FAMILY MEDICINE

## 2022-08-25 RX ORDER — CEFUROXIME AXETIL 500 MG/1
500 TABLET ORAL EVERY 12 HOURS SCHEDULED
Qty: 4 TABLET | Refills: 0 | Status: SHIPPED | OUTPATIENT
Start: 2022-08-26 | End: 2022-08-29

## 2022-08-25 RX ORDER — HYDROXYZINE HYDROCHLORIDE 25 MG/1
25 TABLET, FILM COATED ORAL EVERY 6 HOURS PRN
Qty: 60 TABLET | Refills: 0 | Status: SHIPPED | OUTPATIENT
Start: 2022-08-25

## 2022-08-25 RX ADMIN — CHLORHEXIDINE GLUCONATE 15 ML: 1.2 SOLUTION ORAL at 10:34

## 2022-08-25 RX ADMIN — CEFTRIAXONE 1000 MG: 10 INJECTION, POWDER, FOR SOLUTION INTRAVENOUS at 10:34

## 2022-08-25 RX ADMIN — RISPERIDONE 1 MG: 1 TABLET ORAL at 10:34

## 2022-08-25 NOTE — OCCUPATIONAL THERAPY NOTE
Occupational Therapy Progress Note     Patient Name: Beronica Pierce  NWDIR'C Date: 8/25/2022  Problem List  Principal Problem:    BRAULIO (acute kidney injury) ([facility] )  Active Problems:    Drug dependence ([facility] )    Acute metabolic encephalopathy    Acute respiratory failure with hypoxia (HCC)    Hypernatremia    Septic shock ([facility] )    Kidney stones    Delusional disorder ([facility] )    Hypophosphatemia    Cognitive decline              08/25/22 0857   OT Last Visit   OT Visit Date 08/25/22   Note Type   Note Type Treatment   Restrictions/Precautions   Weight Bearing Precautions Per Order No   Other Precautions Cognitive; Chair Alarm; Bed Alarm; Fall Risk;Pain;Multiple lines  (R nephrostomy tube)   Lifestyle   Autonomy I with ADLs and no AD at baseline  Reciprocal Relationships Supportive mother   Service to Others Unemployed   Intrinsic Gratification Being outside   Pain Assessment   Pain Assessment Tool 0-10   Pain Score No Pain   ADL   Where Assessed Chair   UB Bathing Assistance 4  Minimal Assistance   UB Bathing Deficit Setup; Increased time to complete;Verbal cueing;Supervision/safety   LB Bathing Assistance 3  Moderate Assistance   LB Bathing Deficit Setup;Supervision/safety; Increased time to complete;Right upper leg;Left upper leg   UB Dressing Assistance 4  Minimal Assistance   UB Dressing Deficit Setup;Supervision/safety; Increased time to complete; Thread RUE; Thread LUE   LB Dressing Assistance 3  Moderate Assistance   LB Dressing Deficit Setup;Don/doff R sock; Don/doff L sock; Thread RLE into pants; Thread LLE into pants; Thread RLE into underwear; Thread LLE into underwear   LB Dressing Comments Pt educated on compensatory techniques with LB dressing, however, limited by decreased ROM in B/L LE and decreased foward functional reach  Bed Mobility   Supine to Sit 5  Supervision   Additional items Verbal cues; Bedrails   Additional Comments Pt greeted supine in bed and left OOB in recliner chair at end of session   All needs met, call bell nearby, and chair alarm engaged  Transfers   Sit to Stand 5  Supervision   Additional items Verbal cues   Stand to Sit 5  Supervision   Additional items Verbal cues   Functional Mobility   Functional Mobility 4  Minimal assistance   Additional Comments Min Ax1 with HHA- few steps from EOB to recliner chair  Additional items Hand hold assistance   Cognition   Overall Cognitive Status Impaired   Arousal/Participation Alert; Responsive   Attention Difficulty attending to directions   Orientation Level Oriented to person;Oriented to place;Oriented to time;Disoriented to situation   Memory Decreased recall of precautions;Decreased recall of recent events;Decreased short term memory   Following Commands Follows one step commands with increased time or repetition   Comments Pt lethargic, however, pleasant and cooperative during OT session  Pt with decreased insight into functional deficits and decreased safety awareness  Pt with difficulty sequencing activities and with task initiation  Activity Tolerance   Activity Tolerance Patient limited by fatigue; Other (Comment)  (cognition)   Medical Staff Made Aware RN cleared/udpated  JERALD Clements  Assessment   Assessment Pt greeted bedside for OT treatment on 8/25/2022 focusing on maximizing independence with ADLs  Pt completes bed mobility at S level, transfers with S, and functional mobility with min Ax1 with HHA  Pt engaged in ADL routine seated in chair: min A with UB ADLs, and mod A with LB ADLs  Pt limited in ADL status due to decreased cognition, decreased flexibility in B/L LE, and decreased forward functional reach  Limitations that impact functional performance include decreased ADL status, decreased safe judgement during ADLs, decreased cognition, decreased endurance, decreased sensation, decreased self care transfers, decreased high level ADLs and pain   Occupational performance areas to address ADL retraining, functional transfer training, UE strengthening/ROM, endurance training, cognitive reorientation, Pt/caregiver education, equipment evaluation/education, compensatory technique education, energy conservation and activity engagement   Pt would benefit from continued skilled OT services while in hospital to maximize independence with ADLs  Will continue to follow Pt's goals and progress  Pt would benefit from post acute rehabilitation services upon DC to maximize safety and independence with ADLs and functional tasks of choice  Plan   Treatment Interventions ADL retraining;Functional transfer training;UE strengthening/ROM; Endurance training;Cognitive reorientation;Patient/family training;Equipment evaluation/education; Compensatory technique education; Energy conservation; Activityengagement   Goal Expiration Date 09/06/22   OT Treatment Day 1   OT Frequency 3-5x/wk   Recommendation   OT Discharge Recommendation Post acute rehabilitation services   Additional Comments  The patient's raw score on the AM-PAC Daily Activity inpatient short form is 16, standardized score is 35 96, less than 39 4  Patients at this level are likely to benefit from discharge to post-acute rehabilitation services  Please refer to the recommendation of the Occupational Therapist for safe discharge planning     AM-PAC Daily Activity Inpatient   Lower Body Dressing 2   Bathing 2   Toileting 3   Upper Body Dressing 3   Grooming 3   Eating 3   Daily Activity Raw Score 16   Daily Activity Standardized Score (Calc for Raw Score >=11) 35 96   AM-PAC Applied Cognition Inpatient   Following a Speech/Presentation 3   Understanding Ordinary Conversation 4   Taking Medications 2   Remembering Where Things Are Placed or Put Away 3   Remembering List of 4-5 Errands 2   Taking Care of Complicated Tasks 1   Applied Cognition Raw Score 15   Applied Cognition Standardized Score 33 54       Maria Luisa Cazares MS, OTR/L

## 2022-08-25 NOTE — PLAN OF CARE
Problem: MOBILITY - ADULT  Goal: Maintain or return to baseline ADL function  Description: INTERVENTIONS:  -  Assess patient's ability to carry out ADLs; assess patient's baseline for ADL function and identify physical deficits which impact ability to perform ADLs (bathing, care of mouth/teeth, toileting, grooming, dressing, etc )  - Assess/evaluate cause of self-care deficits   - Assess range of motion  - Assess patient's mobility; develop plan if impaired  - Assess patient's need for assistive devices and provide as appropriate  - Encourage maximum independence but intervene and supervise when necessary  - Involve family in performance of ADLs  - Assess for home care needs following discharge   - Consider OT consult to assist with ADL evaluation and planning for discharge  - Provide patient education as appropriate  Outcome: Progressing  Goal: Maintains/Returns to pre admission functional level  Description: INTERVENTIONS:  - Perform BMAT or MOVE assessment daily    - Set and communicate daily mobility goal to care team and patient/family/caregiver     - Collaborate with rehabilitation services on mobility goals if consulted  - Out of bed for toileting  - Record patient progress and toleration of activity level   Outcome: Progressing     Problem: PAIN - ADULT  Goal: Verbalizes/displays adequate comfort level or baseline comfort level  Description: Interventions:  - Encourage patient to monitor pain and request assistance  - Assess pain using appropriate pain scale  - Administer analgesics based on type and severity of pain and evaluate response  - Implement non-pharmacological measures as appropriate and evaluate response  - Consider cultural and social influences on pain and pain management  - Notify physician/advanced practitioner if interventions unsuccessful or patient reports new pain  Outcome: Progressing     Problem: INFECTION - ADULT  Goal: Absence or prevention of progression during hospitalization  Description: INTERVENTIONS:  - Assess and monitor for signs and symptoms of infection  - Monitor lab/diagnostic results  - Monitor all insertion sites, i e  indwelling lines, tubes, and drains  - Monitor endotracheal if appropriate and nasal secretions for changes in amount and color  - [place] appropriate cooling/warming therapies per order  - Administer medications as ordered  - Instruct and encourage patient and family to use good hand hygiene technique  - Identify and instruct in appropriate isolation precautions for identified infection/condition  Outcome: Progressing  Goal: Absence of fever/infection during neutropenic period  Description: INTERVENTIONS:  - Monitor WBC    Outcome: Progressing     Problem: SAFETY ADULT  Goal: Maintain or return to baseline ADL function  Description: INTERVENTIONS:  -  Assess patient's ability to carry out ADLs; assess patient's baseline for ADL function and identify physical deficits which impact ability to perform ADLs (bathing, care of mouth/teeth, toileting, grooming, dressing, etc )  - Assess/evaluate cause of self-care deficits   - Assess range of motion  - Assess patient's mobility; develop plan if impaired  - Assess patient's need for assistive devices and provide as appropriate  - Encourage maximum independence but intervene and supervise when necessary  - Involve family in performance of ADLs  - Assess for home care needs following discharge   - Consider OT consult to assist with ADL evaluation and planning for discharge  - Provide patient education as appropriate  Outcome: Progressing  Goal: Maintains/Returns to pre admission functional level  Description: INTERVENTIONS:  - Perform BMAT or MOVE assessment daily    - Set and communicate daily mobility goal to care team and patient/family/caregiver     - Collaborate with rehabilitation services on mobility goals if consulted  - Out of bed for toileting  - Record patient progress and toleration of activity level   Outcome: Progressing  Goal: Patient will remain free of falls  Description: INTERVENTIONS:  - Educate patient/family on patient safety including physical limitations  - Instruct patient to call for assistance with activity   - Consult OT/PT to assist with strengthening/mobility   - Keep Call bell within reach  - Keep bed low and locked with side rails adjusted as appropriate  - Keep care items and personal belongings within reach  - Initiate and maintain comfort rounds  - Make Fall Risk Sign visible to staff  - Apply yellow socks and bracelet for high fall risk patients  - Consider moving patient to room near nurses station  Outcome: Progressing     Problem: DISCHARGE PLANNING  Goal: Discharge to home or other facility with appropriate resources  Description: INTERVENTIONS:  - Identify barriers to discharge w/patient and caregiver  - Arrange for needed discharge resources and transportation as appropriate  - Identify discharge learning needs (meds, wound care, etc )  - Arrange for interpretive services to assist at discharge as needed  - Refer to Case Management Department for coordinating discharge planning if the patient needs post-hospital services based on physician/advanced practitioner order or complex needs related to functional status, cognitive ability, or social support system  Outcome: Progressing     Problem: Knowledge Deficit  Goal: Patient/family/caregiver demonstrates understanding of disease process, treatment plan, medications, and discharge instructions  Description: Complete learning assessment and assess knowledge base    Interventions:  - Provide teaching at level of understanding  - Provide teaching via preferred learning methods  Outcome: Progressing     Problem: Prexisting or High Potential for Compromised Skin Integrity  Goal: Skin integrity is maintained or improved  Description: INTERVENTIONS:  - Identify patients at risk for skin breakdown  - Assess and monitor skin integrity  - Assess and monitor nutrition and hydration status  - Monitor labs   - Assess for incontinence   - Turn and reposition patient  - Assist with mobility/ambulation  - Relieve pressure over bony prominences  - Avoid friction and shearing  - Provide appropriate hygiene as needed including keeping skin clean and dry  - Evaluate need for skin moisturizer/barrier cream  - Collaborate with interdisciplinary team   - Patient/family teaching  - Consider wound care consult   Outcome: Progressing     Problem: Potential for Falls  Goal: Patient will remain free of falls  Description: INTERVENTIONS:  - Educate patient/family on patient safety including physical limitations  - Instruct patient to call for assistance with activity   - Consult OT/PT to assist with strengthening/mobility   - Keep Call bell within reach  - Keep bed low and locked with side rails adjusted as appropriate  - Keep care items and personal belongings within reach  - Initiate and maintain comfort rounds  - Make Fall Risk Sign visible to staff  - Apply yellow socks and bracelet for high fall risk patients  - Consider moving patient to room near nurses station  Outcome: Progressing

## 2022-08-25 NOTE — ASSESSMENT & PLAN NOTE
· Patient was found unresponsive at home and was brought to the hospital   Was found to be in septic shock at the time of presentation Likely secondary to UTI/aspiration pneumonia  · Resolved    Continue with the current antibiotics and follow-up on the cultures-so far remained negative  · Finish the course of antibiotics

## 2022-08-25 NOTE — ASSESSMENT & PLAN NOTE
· Patient with history of drug abuse    Rapid drug screen was negative at the time of admission  · Follow-up with PCP

## 2022-08-25 NOTE — PLAN OF CARE
Problem: OCCUPATIONAL THERAPY ADULT  Goal: Performs self-care activities at highest level of function for planned discharge setting  See evaluation for individualized goals  Description: Treatment Interventions: ADL retraining, Functional transfer training, UE strengthening/ROM, Endurance training, Cognitive reorientation, Equipment evaluation/education, Patient/family training, Fine motor coordination activities, Energy conservation, Activityengagement          See flowsheet documentation for full assessment, interventions and recommendations  Outcome: Progressing  Note: Limitation: Decreased ADL status, Decreased UE strength, Decreased UE ROM, Decreased Safe judgement during ADL, Decreased cognition, Decreased endurance, Decreased self-care trans, Decreased high-level ADLs, Decreased fine motor control  Prognosis: Fair  Assessment: Pt greeted bedside for OT treatment on 8/25/2022 focusing on maximizing independence with ADLs  Pt completes bed mobility at S level, transfers with S, and functional mobility with min Ax1 with HHA  Pt engaged in ADL routine seated in chair: min A with UB ADLs, and mod A with LB ADLs  Pt limited in ADL status due to decreased cognition, decreased flexibility in B/L LE, and decreased forward functional reach  Limitations that impact functional performance include decreased ADL status, decreased safe judgement during ADLs, decreased cognition, decreased endurance, decreased sensation, decreased self care transfers, decreased high level ADLs and pain  Occupational performance areas to address ADL retraining, functional transfer training, UE strengthening/ROM, endurance training, cognitive reorientation, Pt/caregiver education, equipment evaluation/education, compensatory technique education, energy conservation and activity engagement   Pt would benefit from continued skilled OT services while in hospital to maximize independence with ADLs   Will continue to follow Pt's goals and progress  Pt would benefit from post acute rehabilitation services upon DC to maximize safety and independence with ADLs and functional tasks of choice       OT Discharge Recommendation: Post acute rehabilitation services

## 2022-08-25 NOTE — ASSESSMENT & PLAN NOTE
· Likely multifactorial  · Encephalopathy is gradually improving  · Neuropsych evaluation appreciated    Patient do not have capacity but patient's mother is willing to take the patient home an act as his guardian

## 2022-08-25 NOTE — PLAN OF CARE
Problem: MOBILITY - ADULT  Goal: Maintain or return to baseline ADL function  Description: INTERVENTIONS:  -  Assess patient's ability to carry out ADLs; assess patient's baseline for ADL function and identify physical deficits which impact ability to perform ADLs (bathing, care of mouth/teeth, toileting, grooming, dressing, etc )  - Assess/evaluate cause of self-care deficits   - Assess range of motion  - Assess patient's mobility; develop plan if impaired  - Assess patient's need for assistive devices and provide as appropriate  - Encourage maximum independence but intervene and supervise when necessary  - Involve family in performance of ADLs  - Assess for home care needs following discharge   - Consider OT consult to assist with ADL evaluation and planning for discharge  - Provide patient education as appropriate  Outcome: Progressing  Goal: Maintains/Returns to pre admission functional level  Description: INTERVENTIONS:  - Perform BMAT or MOVE assessment daily    - Set and communicate daily mobility goal to care team and patient/family/caregiver  - Collaborate with rehabilitation services on mobility goals if consulted  - Perform Range of Motion 6 times a day  - Reposition patient every 2 hours    - Dangle patient 6 times a day  - Stand patient 6 times a day  - Ambulate patient 6 times a day  - Out of bed to chair 6 times a day   - Out of bed for meals 6 times a day  - Out of bed for toileting  - Record patient progress and toleration of activity level   Outcome: Progressing     Problem: PAIN - ADULT  Goal: Verbalizes/displays adequate comfort level or baseline comfort level  Description: Interventions:  - Encourage patient to monitor pain and request assistance  - Assess pain using appropriate pain scale  - Administer analgesics based on type and severity of pain and evaluate response  - Implement non-pharmacological measures as appropriate and evaluate response  - Consider cultural and social influences on pain and pain management  - Notify physician/advanced practitioner if interventions unsuccessful or patient reports new pain  Outcome: Progressing     Problem: INFECTION - ADULT  Goal: Absence or prevention of progression during hospitalization  Description: INTERVENTIONS:  - Assess and monitor for signs and symptoms of infection  - Monitor lab/diagnostic results  - Monitor all insertion sites, i e  indwelling lines, tubes, and drains  - Monitor endotracheal if appropriate and nasal secretions for changes in amount and color  - [place] appropriate cooling/warming therapies per order  - Administer medications as ordered  - Instruct and encourage patient and family to use good hand hygiene technique  - Identify and instruct in appropriate isolation precautions for identified infection/condition  Outcome: Progressing  Goal: Absence of fever/infection during neutropenic period  Description: INTERVENTIONS:  - Monitor WBC    Outcome: Progressing     Problem: SAFETY ADULT  Goal: Maintain or return to baseline ADL function  Description: INTERVENTIONS:  -  Assess patient's ability to carry out ADLs; assess patient's baseline for ADL function and identify physical deficits which impact ability to perform ADLs (bathing, care of mouth/teeth, toileting, grooming, dressing, etc )  - Assess/evaluate cause of self-care deficits   - Assess range of motion  - Assess patient's mobility; develop plan if impaired  - Assess patient's need for assistive devices and provide as appropriate  - Encourage maximum independence but intervene and supervise when necessary  - Involve family in performance of ADLs  - Assess for home care needs following discharge   - Consider OT consult to assist with ADL evaluation and planning for discharge  - Provide patient education as appropriate  Outcome: Progressing  Goal: Maintains/Returns to pre admission functional level  Description: INTERVENTIONS:  - Perform BMAT or MOVE assessment daily    - Set and communicate daily mobility goal to care team and patient/family/caregiver  - Collaborate with rehabilitation services on mobility goals if consulted  - Perform Range of Motion 6 times a day  - Reposition patient every 2 hours    - Dangle patient 6 times a day  - Stand patient 6 times a day  - Ambulate patient 6 times a day  - Out of bed to chair 6 times a day   - Out of bed for meals 6 times a day  - Out of bed for toileting  - Record patient progress and toleration of activity level   Outcome: Progressing  Goal: Patient will remain free of falls  Description: INTERVENTIONS:  - Educate patient/family on patient safety including physical limitations  - Instruct patient to call for assistance with activity   - Consult OT/PT to assist with strengthening/mobility   - Keep Call bell within reach  - Keep bed low and locked with side rails adjusted as appropriate  - Keep care items and personal belongings within reach  - Initiate and maintain comfort rounds  - Make Fall Risk Sign visible to staff  - Offer Toileting every 2 Hours, in advance of need  - Initiate/Maintain bed alarm  - Obtain necessary fall risk management equipment: bed/chair alarm  - Apply yellow socks and bracelet for high fall risk patients  - Consider moving patient to room near nurses station  Outcome: Progressing     Problem: DISCHARGE PLANNING  Goal: Discharge to home or other facility with appropriate resources  Description: INTERVENTIONS:  - Identify barriers to discharge w/patient and caregiver  - Arrange for needed discharge resources and transportation as appropriate  - Identify discharge learning needs (meds, wound care, etc )  - Arrange for interpretive services to assist at discharge as needed  - Refer to Case Management Department for coordinating discharge planning if the patient needs post-hospital services based on physician/advanced practitioner order or complex needs related to functional status, cognitive ability, or social support system  Outcome: Progressing     Problem: Knowledge Deficit  Goal: Patient/family/caregiver demonstrates understanding of disease process, treatment plan, medications, and discharge instructions  Description: Complete learning assessment and assess knowledge base    Interventions:  - Provide teaching at level of understanding  - Provide teaching via preferred learning methods  Outcome: Progressing     Problem: Prexisting or High Potential for Compromised Skin Integrity  Goal: Skin integrity is maintained or improved  Description: INTERVENTIONS:  - Identify patients at risk for skin breakdown  - Assess and monitor skin integrity  - Assess and monitor nutrition and hydration status  - Monitor labs   - Assess for incontinence   - Turn and reposition patient  - Assist with mobility/ambulation  - Relieve pressure over bony prominences  - Avoid friction and shearing  - Provide appropriate hygiene as needed including keeping skin clean and dry  - Evaluate need for skin moisturizer/barrier cream  - Collaborate with interdisciplinary team   - Patient/family teaching  - Consider wound care consult   Outcome: Progressing     Problem: Potential for Falls  Goal: Patient will remain free of falls  Description: INTERVENTIONS:  - Educate patient/family on patient safety including physical limitations  - Instruct patient to call for assistance with activity   - Consult OT/PT to assist with strengthening/mobility   - Keep Call bell within reach  - Keep bed low and locked with side rails adjusted as appropriate  - Keep care items and personal belongings within reach  - Initiate and maintain comfort rounds  - Make Fall Risk Sign visible to staff  - Offer Toileting every 2 Hours, in advance of need  - Initiate/Maintain bed bed/chair alarm  - Obtain necessary fall risk management equipment: bed /chair alarm  - Apply yellow socks and bracelet for high fall risk patients  - Consider moving patient to room near nurses station  Outcome: Progressing

## 2022-08-25 NOTE — ASSESSMENT & PLAN NOTE
· Patient do not have capacity to make informed medical decision  · Case Management on board  · Patient is discharged under the care of his mother

## 2022-08-25 NOTE — DISCHARGE SUMMARY
[facility]  Discharge- Lu Soliman 1965, 61 y o  male MRN: [id number]  Unit/Bed#: [facility] 809-01 Encounter: [id number]  Primary Care Provider: Anupam Stearns DO   Date and time admitted to hospital: 8/20/2022  8:36 PM    * BRAULIO (acute kidney injury) [facility])  Assessment & Plan  Creatinine elevated at the time of presentation creatinine was above 6 at the time of presentation    Secondary to obstructing nephrolithiasis   · Nephrology evaluation appreciated  · Acute kidney injury resolved    Cognitive decline  Assessment & Plan  · Patient do not have capacity to make informed medical decision  · Case Management on board  · Patient is discharged under the care of his mother    Kidney stones  Assessment & Plan  · Patient with hydronephrosis requiring right-sided nephrostomy tube placement  · Urology evaluation  · Follow-up with Urology as outpatient    Septic shock [facility])  Assessment & Plan  · Patient was found unresponsive at home and was brought to the hospital   Was found to be in septic shock at the time of presentation Likely secondary to UTI/aspiration pneumonia  · Resolved  Continue with the current antibiotics and follow-up on the cultures-so far remained negative  · Finish the course of antibiotics    Acute respiratory failure with hypoxia ([address] )  Assessment & Plan  · Resolved  Currently on room air  · Patient was admitted to intensive care    Acute metabolic encephalopathy  Assessment & Plan  · Likely multifactorial  · Encephalopathy is gradually improving  · Neuropsych evaluation appreciated  Patient do not have capacity but patient's mother is willing to take the patient home an act as his guardian    Drug dependence [facility])  Assessment & Plan  · Patient with history of drug abuse    Rapid drug screen was negative at the time of admission  · Follow-up with PCP        Medical Problems             Resolved Problems  Date Reviewed: 8/25/2022   None Discharging Physician / Practitioner: Payton Wu MD  PCP: Long Navarrete DO  Admission Date:   Admission Orders (From admission, onward)     Ordered        08/20/22 2034  Inpatient Admission  Once                      Discharge Date: 08/25/22    Consultations During Hospital Stay:  · Critical care  · Urology  · Interventional Radiology    Procedures Performed:   · Nephrostomy tube placement on the right    Significant Findings / Test Results: Incidental Findings:   · CT head-no acute intracranial abnormality  · CT chest abdomen and pelvis-obstruction of the left lower lobe bronchus with associated moderate left lobe atelectasis  Cannot exclude aspiration pneumonia  Tree-in-bud nodular infiltrates in the right upper middle and lower lobe suspicious for aspiration  Liver configuration is suspicious for cirrhosis  Moderate right hydronephrosis secondary to a 1 3 x 2 7 cm UPJ calculus  Right mid to upper pole staghorn calculus measures 3 8-1 7 cm  Cholelithiasis  Sigmoid diverticulosis    Test Results Pending at Discharge (will require follow up):   ·      Outpatient Tests Requested:  ·     Complications:   none    Reason for Admission:  Altered mental status    Hospital Course:   Jenelle Walton is a 61 y o  male patient who originally presented to the hospital on 8/20/2022 due to septic shock, patient was found unresponsive by his mother  Patient noted to be in septic shock was admitted to intensive care unit  Patient also noted to have acute kidney injury  Felt to be secondary to obstructive uropathy  Creatinine was above 6 at the time of admission  Patient had a percutaneous nephrostomy tube placed by Interventional Radiology for obstructive nephrolithiasis  Creatinine improved  Once the blood pressure remained stable patient was transferred out of the intensive care unit  Patient was very encephalopathic at the time of presentation  Patient with previous history of drug use  Patient was on ceftriaxone during the hospital stay and will be finishing the antibiotics course with Ceftin  There was also concern for pneumonia likely aspiration  Urine culture was negative  Patient also noted to have skin lesions on bilateral lower extremities  History of chronic skin lesion with skin picking per history  No signs of infection  PT T OT evaluated the patient and recommended rehabilitation  Also neuropsych evaluated the patient and patient do not have capacity to make informed medical decision  This was discussed with patient's mother  Mother is agreeable to make the decision for the patient  Patient will be discharged home under the care of his mother with outpatient follow-up  Urology will make arrangement for outpatient follow-up  For details refer to the chart  IR instructions were provided by Interventional Radiology  For details refer to the chart        Please see above list of diagnoses and related plan for additional information  Condition at Discharge: good    Discharge Day Visit / Exam:   Subjective:  Patient seen and examined  No events overnight  Mother in the room  Patient will be discharged home under the care of his mother  Vitals: Blood Pressure: 132/69 (08/25/22 0740)  Pulse: 83 (08/25/22 0740)  Temperature: 97 8 °F (36 6 °C) (08/25/22 0740)  Temp Source: Oral (08/22/22 1657)  Respirations: 20 (08/25/22 0740)  Height: 5' 4" (162 6 cm) (08/22/22 1416)  Weight - Scale: 67 1 kg (147 lb 14 9 oz) (08/22/22 1416)  SpO2: 93 % (08/25/22 0740)  Exam:   Physical Exam  Constitutional:       Appearance: Normal appearance  HENT:      Head: Normocephalic and atraumatic  Nose: Nose normal    Eyes:      Pupils: Pupils are equal, round, and reactive to light  Cardiovascular:      Rate and Rhythm: Normal rate  Pulses: Normal pulses  Heart sounds: Normal heart sounds     Pulmonary:      Effort: Pulmonary effort is normal       Breath sounds: Normal breath sounds  Abdominal:      General: Bowel sounds are normal  There is no distension  Musculoskeletal:      Cervical back: Normal range of motion and neck supple  Skin:     Coloration: Skin is not jaundiced  Comments: Skin lesions noted  No signs of infection  Neurological:      General: No focal deficit present  Mental Status: He is alert  Discussion with Family:  Updated mother in the room    Discharge instructions/Information to patient and family:   See after visit summary for information provided to patient and family  Provisions for Follow-Up Care:  See after visit summary for information related to follow-up care and any pertinent home health orders  Disposition:   Home    Planned Readmission:      Discharge Statement:  I spent  45 minutes discharging the patient  This time was spent on the day of discharge  I had direct contact with the patient on the day of discharge  Greater than 50% of the total time was spent examining patient, answering all patient questions, arranging and discussing plan of care with patient as well as directly providing post-discharge instructions  Additional time then spent on discharge activities  Discharge Medications:  See after visit summary for reconciled discharge medications provided to patient and/or family        **Please Note: This note may have been constructed using a voice recognition system**

## 2022-08-25 NOTE — ASSESSMENT & PLAN NOTE
Creatinine elevated at the time of presentation creatinine was above 6 at the time of presentation    Secondary to obstructing nephrolithiasis   · Nephrology evaluation appreciated  · Acute kidney injury resolved

## 2022-08-26 ENCOUNTER — HOME CARE VISIT (OUTPATIENT)
Dept: HOME HEALTH SERVICES | Facility: HOME HEALTHCARE | Age: 57
End: 2022-08-26
Payer: COMMERCIAL

## 2022-08-26 LAB
BACTERIA BLD CULT: NORMAL
BACTERIA BRONCH AEROBE CULT: ABNORMAL
BACTERIA BRONCH AEROBE CULT: ABNORMAL
GRAM STN SPEC: ABNORMAL
GRAM STN SPEC: ABNORMAL

## 2022-08-26 PROCEDURE — 400013 VN SOC

## 2022-08-26 PROCEDURE — G0299 HHS/HOSPICE OF RN EA 15 MIN: HCPCS

## 2022-08-26 NOTE — CASE COMMUNICATION
Ship to   Home  Insurance [facility]    Wound 1 nephrostomy tube     All items are ordered by the each unless otherwise noted    PLEASE DO NOT ORDER BY THE BOX  Request specific quantity needed  For Private Insurances order should be for a 2 week period    Dry Dressings   (Nonsterile gauze not covered by private insurance)  Drain sponges 4x4 split [phone] [id number]    Tape  Transpore white  2in [id number]

## 2022-08-26 NOTE — TELEPHONE ENCOUNTER
Called the patient and spoke with his mother Catrina Blizzard to assist with scheduling of an appointment for surgical discussion of PCNL  Patient scheduled 9/14/2022 at 1130 with Xavier Matson at the [place] office  Mother given office address

## 2022-08-26 NOTE — CASE COMMUNICATION
[facility] has admitted your patient to [facility] service with the following disciplines:      SN, PT and OT  This report is informational only, no responses is needed  Primary focus of home health care  with Nephrostomy tube  Patient stated goals of care to get rid of the kidney stone and be comfortable again  Anticipated visit pattern and next visit date 1w1 2w2 1w3 next visit 8/30/22  See medication list   Significant clinica l findings patient in pain and no pain meds ordered  Potential barriers to goal achievement pain management and weakness  Other pertinent information patient needs to make follow ups for urologist and PCP    Thank you for allowing us to participate in the care of your patient        Tadeo Chan RN

## 2022-08-26 NOTE — UTILIZATION REVIEW
Notification of Discharge   This is a Notification of Discharge from our facility [address]  Please be advised that this patient has been discharge from our facility  Below you will find the admission and discharge date and time including the patients disposition  UTILIZATION REVIEW CONTACT:  Raheem Kee  Utilization   Network Utilization Review Department  Phone: [phone] carefully listen to the prompts  All voicemails are confidential   Email: [email or web address]     PHYSICIAN ADVISORY SERVICES:  FOR UVKI-OD-LXBI REVIEW - MEDICAL NECESSITY DENIAL  Phone: [phone]  Fax: [phone]  Email: [email or web address]     PRESENTATION DATE: 8/20/2022  8:36 PM  OBERVATION ADMISSION DATE:   INPATIENT ADMISSION DATE: 8/20/22  8:36 PM   DISCHARGE DATE: 8/25/2022  3:00 PM  DISPOSITION: Home/Self Care Home/Self Care      IMPORTANT INFORMATION:  Send all requests for admission clinical reviews, approved or denied determinations and any other requests to dedicated fax number below belonging to the campus where the patient is receiving treatment   List of dedicated fax numbers:  [address] DENIALS (Administrative/Medical Necessity) [phone]   [address] (Maternity/NICU/Pediatrics) [phone]   [place] [facility] [phone]   130 [facility] [place] [phone]   [address] [place] [address] [phone]   2000 [facility] [address] [place] [address],[address] [phone]   [facility]  [phone]   [address] [facility]  [address] [facility] 1000 [facility] [phone]

## 2022-08-28 ENCOUNTER — HOSPITAL ENCOUNTER (EMERGENCY)
Facility: HOSPITAL | Age: 57
Discharge: HOME | End: 2022-08-28
Attending: EMERGENCY MEDICINE
Payer: COMMERCIAL

## 2022-08-28 ENCOUNTER — APPOINTMENT (EMERGENCY)
Dept: CT IMAGING | Facility: HOSPITAL | Age: 57
End: 2022-08-28
Payer: COMMERCIAL

## 2022-08-28 VITALS
SYSTOLIC BLOOD PRESSURE: 115 MMHG | HEART RATE: 78 BPM | DIASTOLIC BLOOD PRESSURE: 72 MMHG | RESPIRATION RATE: 18 BRPM | OXYGEN SATURATION: 94 % | TEMPERATURE: 97.1 F

## 2022-08-28 DIAGNOSIS — R10.9 RIGHT FLANK PAIN: Primary | ICD-10-CM

## 2022-08-28 LAB
2HR DELTA HS TROPONIN: 0 NG/L
ALBUMIN SERPL BCP-MCNC: 2.6 G/DL (ref 3.5–5)
ALP SERPL-CCNC: 78 U/L (ref 46–116)
ALT SERPL W P-5'-P-CCNC: 38 U/L (ref 12–78)
ANION GAP SERPL CALCULATED.3IONS-SCNC: 10 MMOL/L (ref 4–13)
ANISOCYTOSIS BLD QL SMEAR: PRESENT
AST SERPL W P-5'-P-CCNC: 26 U/L (ref 5–45)
BASOPHILS # BLD MANUAL: 0 THOUSAND/UL (ref 0–0.1)
BASOPHILS NFR MAR MANUAL: 0 % (ref 0–1)
BILIRUB SERPL-MCNC: 0.23 MG/DL (ref 0.2–1)
BUN SERPL-MCNC: 16 MG/DL (ref 5–25)
CALCIUM ALBUM COR SERPL-MCNC: 10.1 MG/DL (ref 8.3–10.1)
CALCIUM SERPL-MCNC: 9 MG/DL (ref 8.3–10.1)
CARDIAC TROPONIN I PNL SERPL HS: 14 NG/L
CARDIAC TROPONIN I PNL SERPL HS: 14 NG/L
CHLORIDE SERPL-SCNC: 107 MMOL/L (ref 96–108)
CO2 SERPL-SCNC: 25 MMOL/L (ref 21–32)
CREAT SERPL-MCNC: 0.86 MG/DL (ref 0.6–1.3)
EOSINOPHIL # BLD MANUAL: 0.63 THOUSAND/UL (ref 0–0.4)
EOSINOPHIL NFR BLD MANUAL: 5 % (ref 0–6)
ERYTHROCYTE [DISTWIDTH] IN BLOOD BY AUTOMATED COUNT: 14.4 % (ref 11.6–15.1)
GFR SERPL CREATININE-BSD FRML MDRD: 95 ML/MIN/1.73SQ M
GLUCOSE SERPL-MCNC: 126 MG/DL (ref 65–140)
HCT VFR BLD AUTO: 40.2 % (ref 36.5–49.3)
HGB BLD-MCNC: 13.6 G/DL (ref 12–17)
LYMPHOCYTES # BLD AUTO: 0.25 THOUSAND/UL (ref 0.6–4.47)
LYMPHOCYTES # BLD AUTO: 2 % (ref 14–44)
MACROCYTES BLD QL AUTO: PRESENT
MCH RBC QN AUTO: 29.6 PG (ref 26.8–34.3)
MCHC RBC AUTO-ENTMCNC: 33.8 G/DL (ref 31.4–37.4)
MCV RBC AUTO: 88 FL (ref 82–98)
MONOCYTES # BLD AUTO: 1.01 THOUSAND/UL (ref 0–1.22)
MONOCYTES NFR BLD: 8 % (ref 4–12)
NEUTROPHILS # BLD MANUAL: 10.74 THOUSAND/UL (ref 1.85–7.62)
NEUTS BAND NFR BLD MANUAL: 7 % (ref 0–8)
NEUTS SEG NFR BLD AUTO: 78 % (ref 43–75)
PLATELET # BLD AUTO: 224 THOUSANDS/UL (ref 149–390)
PLATELET BLD QL SMEAR: ADEQUATE
PMV BLD AUTO: 10.7 FL (ref 8.9–12.7)
POTASSIUM SERPL-SCNC: 3.6 MMOL/L (ref 3.5–5.3)
PROT SERPL-MCNC: 6.6 G/DL (ref 6.4–8.4)
RBC # BLD AUTO: 4.59 MILLION/UL (ref 3.88–5.62)
SODIUM SERPL-SCNC: 142 MMOL/L (ref 135–147)
WBC # BLD AUTO: 12.63 THOUSAND/UL (ref 4.31–10.16)

## 2022-08-28 PROCEDURE — 96375 TX/PRO/DX INJ NEW DRUG ADDON: CPT

## 2022-08-28 PROCEDURE — 85007 BL SMEAR W/DIFF WBC COUNT: CPT | Performed by: EMERGENCY MEDICINE

## 2022-08-28 PROCEDURE — 84484 ASSAY OF TROPONIN QUANT: CPT | Performed by: EMERGENCY MEDICINE

## 2022-08-28 PROCEDURE — 80053 COMPREHEN METABOLIC PANEL: CPT | Performed by: EMERGENCY MEDICINE

## 2022-08-28 PROCEDURE — G1004 CDSM NDSC: HCPCS

## 2022-08-28 PROCEDURE — 99285 EMERGENCY DEPT VISIT HI MDM: CPT | Performed by: EMERGENCY MEDICINE

## 2022-08-28 PROCEDURE — 99284 EMERGENCY DEPT VISIT MOD MDM: CPT

## 2022-08-28 PROCEDURE — 74176 CT ABD & PELVIS W/O CONTRAST: CPT

## 2022-08-28 PROCEDURE — 85027 COMPLETE CBC AUTOMATED: CPT | Performed by: EMERGENCY MEDICINE

## 2022-08-28 PROCEDURE — 36415 COLL VENOUS BLD VENIPUNCTURE: CPT | Performed by: EMERGENCY MEDICINE

## 2022-08-28 PROCEDURE — 96374 THER/PROPH/DIAG INJ IV PUSH: CPT

## 2022-08-28 RX ORDER — MAGNESIUM HYDROXIDE/ALUMINUM HYDROXICE/SIMETHICONE 120; 1200; 1200 MG/30ML; MG/30ML; MG/30ML
30 SUSPENSION ORAL ONCE
Status: COMPLETED | OUTPATIENT
Start: 2022-08-28 | End: 2022-08-28

## 2022-08-28 RX ORDER — ONDANSETRON 4 MG/1
4 TABLET, ORALLY DISINTEGRATING ORAL EVERY 6 HOURS PRN
Qty: 20 TABLET | Refills: 0 | Status: SHIPPED | OUTPATIENT
Start: 2022-08-28 | End: 2022-09-16

## 2022-08-28 RX ORDER — MORPHINE SULFATE 4 MG/ML
4 INJECTION, SOLUTION INTRAMUSCULAR; INTRAVENOUS ONCE
Status: COMPLETED | OUTPATIENT
Start: 2022-08-28 | End: 2022-08-28

## 2022-08-28 RX ORDER — KETOROLAC TROMETHAMINE 30 MG/ML
15 INJECTION, SOLUTION INTRAMUSCULAR; INTRAVENOUS ONCE
Status: COMPLETED | OUTPATIENT
Start: 2022-08-28 | End: 2022-08-28

## 2022-08-28 RX ORDER — HYDROMORPHONE HCL/PF 1 MG/ML
0.5 SYRINGE (ML) INJECTION ONCE
Status: COMPLETED | OUTPATIENT
Start: 2022-08-28 | End: 2022-08-28

## 2022-08-28 RX ORDER — OXYCODONE HYDROCHLORIDE AND ACETAMINOPHEN 5; 325 MG/1; MG/1
1 TABLET ORAL EVERY 6 HOURS PRN
Qty: 20 TABLET | Refills: 0 | Status: SHIPPED | OUTPATIENT
Start: 2022-08-28 | End: 2022-09-02

## 2022-08-28 RX ADMIN — HYDROMORPHONE HYDROCHLORIDE 0.5 MG: 1 INJECTION, SOLUTION INTRAMUSCULAR; INTRAVENOUS; SUBCUTANEOUS at 17:36

## 2022-08-28 RX ADMIN — KETOROLAC TROMETHAMINE 15 MG: 30 INJECTION, SOLUTION INTRAMUSCULAR at 14:36

## 2022-08-28 RX ADMIN — MORPHINE SULFATE 4 MG: 4 INJECTION, SOLUTION INTRAMUSCULAR; INTRAVENOUS at 14:37

## 2022-08-28 RX ADMIN — ALUMINUM HYDROXIDE, MAGNESIUM HYDROXIDE, AND SIMETHICONE 30 ML: 200; 200; 20 SUSPENSION ORAL at 17:36

## 2022-08-28 NOTE — ED PROVIDER NOTES
History  Chief Complaint   Patient presents with    Flank Pain     Pt recently d/c on Thursday from inpatient stay  He returns with increased flank pain        HPI  70-year-old male known to me from recent ER visit presenting at that time for encephalopathy, acute kidney injury, found to have obstructing right-sided kidney stone was transferred [facility] and did undergo IR placed nephrostomy tube at that time who was discharged on 08/25/2022 to home under the care of his mother who his primary medical decision maker at this time who presents here today with her for evaluation of ongoing right flank pain  Per discussion with patient and mother and review of the chart, the patient improved during his hospital stay the nephrostomy tube is in operating appropriately  They were discharged with outpatient follow-up with Urology  There is no definitive plan at this time to schedule removal of the nephrostomy tube or to schedule intervention on the known obstructing stones  They present today with 1) concerns for the acute on chronic pain, and 2) concerns for lack of clear plan on removal of the stone or nephrostomy tube  Patient was discharged on 0 pain medications  Aside from the pain, the patient has no new acute complaints  He reports no nausea vomiting no fevers chills no return of his altered mental status or unresponsiveness no recent falls or trauma no issues with his nephrostomy tube lack of drainage or hematuria  Prior to Admission Medications   Prescriptions Last Dose Informant Patient Reported? Taking? cefuroxime (CEFTIN) 500 mg tablet   No No   Sig: Take 1 tablet (500 mg total) by mouth every 12 (twelve) hours for 3 days   hydrOXYzine HCL (ATARAX) 25 mg tablet   No No   Sig: Take 1 tablet (25 mg total) by mouth every 6 (six) hours as needed for anxiety or itching   sodium chloride, PF, 0 9 %   No No   Sig: 10 mL by Intracatheter route daily Intracatheter flushing daily   May substitute prefilled syringe with normal saline 10 mL vials, 10 mL syringes, and 18 g blunt needles      Facility-Administered Medications: None       Past Medical History:   Diagnosis Date    Anxiety     Bright red rectal bleeding     Last Assessed: 9/9/2015     Hypertension     Last Assessed: 7/9/2014     Kidney stones     Last Assessed: 11/17/2016     Periorbital edema     Last Assessed: 9/4/2015    Skin tag of anus     Last Assessed: 9/9/2015     Trigger point of thoracic region     Last Assessed: 11/6/2015        Past Surgical History:   Procedure Laterality Date    CYSTOSCOPY W/ URETERAL STENT PLACEMENT  03/11/2013    CYSTOSCOPY W/ URETERAL STENT PLACEMENT  06/18/2014    EXTRACORPOREAL SHOCK WAVE LITHOTRIPSY     CYSTOSCOPY W/ URETERAL STENT PLACEMENT  07/16/2014    EXTRACORPOREAL SHOCK WAVE LITHOTRIPSY     CYSTOSCOPY W/ URETERAL STENT REMOVAL  04/11/2013    CYSTOSCOPY W/ URETERAL STENT REMOVAL Right 08/26/2014    CYSTOSCOPY W/ URETEROSCOPY W/ LITHOTRIPSY  02/26/2013    Percutaneous lithotomy With Uretal Stent Plcement     CYSTOSCOPY W/ URETEROSCOPY W/ LITHOTRIPSY  03/11/2014    CYSTOSCOPY W/ URETEROSCOPY W/ LITHOTRIPSY Right 08/04/2014    With Uretal Stent Placement     CYSTOSCOPY W/ URETEROSCOPY W/ LITHOTRIPSY Right 05/09/2016    HEMORRHOID SURGERY      HERNIA REPAIR  03/11/2013    IR NEPHROSTOMY TUBE PLACEMENT  8/20/2022    KIDNEY SURGERY      LITHOTRIPSY      TX CYSTO/URETERO W/LITHOTRIPSY &INDWELL STENT INSRT Right 7/13/2016    Procedure: CYSTOSCOPY; URETEROSCOPY WITH HOLMIUM LASER STONE EXTRACTION; RETROGRADE PYELOGRAM; URETERAL STENT INSERTION ;  Surgeon: Leeann Spencer MD;  [facility];  Service: Urology    TX CYSTO/URETERO W/LITHOTRIPSY &INDWELL STENT INSRT Right 5/9/2016    Procedure: CYSTOSCOPY,  URETEROSCOPY,  WITH LITHOTRIPSY HOLMIUM LASER, STONE EXTRACTION, AND INSERTION STENT URETERAL;  Surgeon: Leeann Spencer MD;  Location[facility];  Service: Urology    [address] [place] [address]  KIDNEY STONE/ ESWL Right 6/17/2016    Procedure: LITHROTRIPSY EXTRACORPORAL SHOCKWAVE (ESWL); Surgeon: Farheen Khan MD;  [facility];  Service: Urology    TRANSURETHRAL RESECTION OF BLADDER      URETERAL Tejinder Woodward  03/11/2013       Family History   Problem Relation Age of Onset    COPD Mother     Hypertension Mother     Heart attack Father      I have reviewed and agree with the history as documented  E-Cigarette/Vaping    E-Cigarette Use Never User      E-Cigarette/Vaping Substances     Social History     Tobacco Use    Smoking status: Current Every Day Smoker     Packs/day: 2 00     Years: 35 00     Pack years: 70 00    Smokeless tobacco: Never Used   Vaping Use    Vaping Use: Never used   Substance Use Topics    Alcohol use: No    Drug use: Yes     Types: Heroin, Marijuana, Methamphetamines, Fentanyl     Comment: 20-30 bags of heroin daily       Review of Systems   Constitutional: Negative for chills and fever  HENT: Negative for ear pain and sore throat  Eyes: Negative for pain and visual disturbance  Respiratory: Negative for cough and shortness of breath  Cardiovascular: Negative for chest pain and palpitations  Gastrointestinal: Negative for abdominal pain and vomiting  Genitourinary: Positive for flank pain  Negative for dysuria and hematuria  Musculoskeletal: Negative for arthralgias and back pain  Skin: Negative for color change and rash  Neurological: Negative for seizures and syncope  All other systems reviewed and are negative  Physical Exam  Physical Exam  Vitals and nursing note reviewed  Exam conducted with a chaperone present  Constitutional:       Comments: Chronically ill-appearing awake alert no acute distress   HENT:      Head: Normocephalic and atraumatic        Right Ear: External ear normal       Left Ear: External ear normal       Nose: Nose normal       Mouth/Throat:      Mouth: Mucous membranes are moist       Pharynx: Oropharynx is clear  Eyes:      Conjunctiva/sclera: Conjunctivae normal    Cardiovascular:      Rate and Rhythm: Normal rate and regular rhythm  Pulses: Normal pulses  Heart sounds: Normal heart sounds  Pulmonary:      Effort: Pulmonary effort is normal  No respiratory distress  Abdominal:      General: Abdomen is flat  There is no distension  Tenderness: There is no abdominal tenderness  There is right CVA tenderness  There is no guarding  Comments: Nephrostomy tube in place does not appear malposition and no surrounding cellulitic changes or evidence of trauma   Musculoskeletal:      Right lower leg: No edema  Left lower leg: No edema  Skin:     General: Skin is warm and dry  Capillary Refill: Capillary refill takes less than 2 seconds  Coloration: Skin is pale  Neurological:      General: No focal deficit present  Mental Status: He is alert  Mental status is at baseline           Vital Signs  ED Triage Vitals [08/28/22 1422]   Temperature Pulse Respirations Blood Pressure SpO2   (!) 97 1 °F (36 2 °C) 80 18 132/71 93 %      Temp Source Heart Rate Source Patient Position - Orthostatic VS BP Location FiO2 (%)   Temporal Monitor Sitting Right arm --      Pain Score       8           Vitals:    08/28/22 1422   BP: 132/71   Pulse: 80   Patient Position - Orthostatic VS: Sitting         Visual Acuity      ED Medications  Medications   morphine injection 4 mg (has no administration in time range)   ketorolac (TORADOL) injection 15 mg (has no administration in time range)       Diagnostic Studies  Results Reviewed     Procedure Component Value Units Date/Time    CBC and differential [[id number]] Collected: 08/28/22 1435    Lab Status: No result Specimen: Blood from Hand, Left     Comprehensive metabolic panel [[id number]] Collected: 08/28/22 1435    Lab Status: No result Specimen: Blood from Hand, Left     HS Troponin 0hr (reflex protocol) [[id number]] Collected: 08/28/22 1435    Lab Status: No result Specimen: Blood from Hand, Left     UA w Reflex to Microscopic w Reflex to Culture [[id number]]     Lab Status: No result Specimen: Urine, Clean Catch                  CT abdomen pelvis wo contrast    (Results Pending)              Procedures  Procedures         ED Course                                             MDM  Number of Diagnoses or Management Options  Right flank pain: established and worsening     Amount and/or Complexity of Data Reviewed  Clinical lab tests: ordered and reviewed  Tests in the radiology section of CPT®: ordered and reviewed  Tests in the medicine section of CPT®: ordered and reviewed  Decide to obtain previous medical records or to obtain history from someone other than the patient: yes  Obtain history from someone other than the patient: yes  Review and summarize past medical records: yes  Discuss the patient with other providers: yes  Independent visualization of images, tracings, or specimens: yes    Risk of Complications, Morbidity, and/or Mortality  Presenting problems: moderate  Diagnostic procedures: moderate  Management options: moderate    Patient Progress  Patient progress: stable    79-year-old male extensive past medical history most significant for recently placed PCN to right kidney after being found with BRAULIO metabolic acidosis, encephalopathy and obstructing right kidney stone  He presents here for continued pain he is not on any pain medication since discharge  Per telephone encounter appears they called the PCP office and they were not comfortable prescribing pain medication  Will treat the patient's pain in the emergency department while about proceeding with a brief workup including repeating the CT scan to evaluate for any interval change after the recent procedure to explain his symptoms as well as checking urine and basic labs    If his pain is improved and the CT is reassuring will likely discharge on pain medication with further follow-up with urology  If CT or symptoms are not reassuring or improved will touch base with Urology to see if there could be an expedited intervention for this patient  It was seem this is a primary pain control/follow-up issue and less related to a new acute complaints today  His workup was unremarkable  I discussed the case with Urology the reviewed his chart in detail and conveyed to me that they think that the patient has a very good follow-up plan is making marked improvement which is faster than typical for someone with his history  They discussed at the delays in coordinating in operative intervention stems from the fact that he is stone burden is extensive he will require multi-specialty coordination prolonged postoperative stay in the hospital specialized anesthesia and long-acting blocks to proceed with this surgery successfully  When I explained this to the patient and his mother they were quite grateful to hear and explanation as to why these things are taking time  We discussed that we will bridge him with a short-term prescription for pain medication  We discussed PCP follow-up he indicates that they wanted him to see a doctor he has not seen for many years this was the doctor's office they called to prescribed pain medication and they refused  I did say that they likely wanted the patient to see someone he had followed with in past for continuity of care however it would not be unreasonable to seek care with a new provider and a referral and contact information was provided  Given his of follow-up and possible interventions are quite far away from today and he has no follow-up plan and we are only writing a short-term prescription for opioid medications I did try to contact pain management on successfully as there is no open office at this time on Sunday evening and there is no inpatient provider stuck in be reached    I did order referral and discussed that they should try to contact the office for an appointment  I advised to return here should they have difficulty in following up and they run out of the medication  Disposition  Final diagnoses:   None     ED Disposition     None      Follow-up Information    None         Patient's Medications   Discharge Prescriptions    No medications on file       No discharge procedures on file      PDMP Review       Value Time User    PDMP Reviewed  Yes 1/4/2021  4:12 PM Shanti Nath DO          ED Provider  Electronically Signed by           Kristy Barrera DO  08/28/22 8355

## 2022-08-28 NOTE — DISCHARGE INSTRUCTIONS
Thank you for visiting the Emergency Department today  Prescription for pain medications sent to the pharmacy   tomorrow  Do not drive or operate machinery while taking this medication  Additional nausea medication also provided if he develops nausea may take this for symptoms  We referring you to a new primary provider please contact the provider listed above a referral has been ordered electronically  We are referring her to pain management please contact the office above to schedule follow-up appointment  A referral has been placed  If you are unable to secure a follow-up appointment, your medication runs out, if symptoms change or worsen or you have other concerns please return to the emergency department for further evaluation  We will best be able to help you during week day hours when social, case management, pain management services are available to discuss your case with  Maintain your follow-up appointment on 09/14/2022

## 2022-08-29 VITALS
OXYGEN SATURATION: 98 % | RESPIRATION RATE: 18 BRPM | SYSTOLIC BLOOD PRESSURE: 108 MMHG | DIASTOLIC BLOOD PRESSURE: 58 MMHG | TEMPERATURE: 98 F | HEART RATE: 88 BPM

## 2022-08-30 ENCOUNTER — HOME CARE VISIT (OUTPATIENT)
Dept: HOME HEALTH SERVICES | Facility: HOME HEALTHCARE | Age: 57
End: 2022-08-30
Payer: COMMERCIAL

## 2022-08-30 VITALS
TEMPERATURE: 97.1 F | DIASTOLIC BLOOD PRESSURE: 64 MMHG | SYSTOLIC BLOOD PRESSURE: 110 MMHG | WEIGHT: 145 LBS | BODY MASS INDEX: 24.89 KG/M2 | RESPIRATION RATE: 18 BRPM | OXYGEN SATURATION: 97 % | HEART RATE: 82 BPM

## 2022-08-30 PROCEDURE — G0300 HHS/HOSPICE OF LPN EA 15 MIN: HCPCS

## 2022-08-30 PROCEDURE — 10330064

## 2022-08-30 PROCEDURE — 10330064 SPONGE, EXCILON SPLIT DRN STR 4"X4" (2/P

## 2022-08-30 NOTE — CASE COMMUNICATION
Faxed Dr Lexx Cole regarding the delay in initiating PT within 7 days  Anticipated date to begin is 9/7/22 or sooner  PT to contact the patient to schedule the visit

## 2022-09-01 ENCOUNTER — HOME CARE VISIT (OUTPATIENT)
Dept: HOME HEALTH SERVICES | Facility: HOME HEALTHCARE | Age: 57
End: 2022-09-01
Payer: COMMERCIAL

## 2022-09-01 ENCOUNTER — TELEPHONE (OUTPATIENT)
Dept: OBGYN CLINIC | Facility: HOSPITAL | Age: 57
End: 2022-09-01

## 2022-09-01 VITALS — SYSTOLIC BLOOD PRESSURE: 138 MMHG | DIASTOLIC BLOOD PRESSURE: 72 MMHG | OXYGEN SATURATION: 94 % | HEART RATE: 74 BPM

## 2022-09-01 PROCEDURE — G0152 HHCP-SERV OF OT,EA 15 MIN: HCPCS

## 2022-09-01 NOTE — TELEPHONE ENCOUNTER
Patient called asking to speak to [address]  Provided number and warm transfer to [address] [place], [place] [address]y [address]

## 2022-09-02 ENCOUNTER — HOME CARE VISIT (OUTPATIENT)
Dept: HOME HEALTH SERVICES | Facility: HOME HEALTHCARE | Age: 57
End: 2022-09-02
Payer: COMMERCIAL

## 2022-09-02 VITALS
TEMPERATURE: 97 F | OXYGEN SATURATION: 98 % | HEART RATE: 88 BPM | RESPIRATION RATE: 16 BRPM | DIASTOLIC BLOOD PRESSURE: 66 MMHG | SYSTOLIC BLOOD PRESSURE: 128 MMHG

## 2022-09-02 VITALS — HEART RATE: 88 BPM | SYSTOLIC BLOOD PRESSURE: 128 MMHG | OXYGEN SATURATION: 97 % | DIASTOLIC BLOOD PRESSURE: 66 MMHG

## 2022-09-02 PROCEDURE — G0300 HHS/HOSPICE OF LPN EA 15 MIN: HCPCS

## 2022-09-02 PROCEDURE — G0151 HHCP-SERV OF PT,EA 15 MIN: HCPCS

## 2022-09-02 NOTE — CASE COMMUNICATION
Home health physical therapy initial eval completed  No further therapy needed at this time  Patient instructed in gait and stair training, bed mobility and transfers, along with home safety and fall prevention techniques

## 2022-09-06 ENCOUNTER — HOME CARE VISIT (OUTPATIENT)
Dept: HOME HEALTH SERVICES | Facility: HOME HEALTHCARE | Age: 57
End: 2022-09-06
Payer: COMMERCIAL

## 2022-09-06 PROCEDURE — G0180 MD CERTIFICATION HHA PATIENT: HCPCS | Performed by: FAMILY MEDICINE

## 2022-09-06 NOTE — CASE COMMUNICATION
Occupational Therapy evaluation and discharge 9 1 22   OT included caregiver safety training while assisting patient with bathing self care  Instructed patient and caregiver on OT discharge with good understanding demonstrated by patient and caregiver

## 2022-09-08 ENCOUNTER — HOME CARE VISIT (OUTPATIENT)
Dept: HOME HEALTH SERVICES | Facility: HOME HEALTHCARE | Age: 57
End: 2022-09-08
Payer: COMMERCIAL

## 2022-09-08 PROCEDURE — G0299 HHS/HOSPICE OF RN EA 15 MIN: HCPCS

## 2022-09-12 VITALS
DIASTOLIC BLOOD PRESSURE: 80 MMHG | OXYGEN SATURATION: 98 % | SYSTOLIC BLOOD PRESSURE: 140 MMHG | HEART RATE: 88 BPM | RESPIRATION RATE: 20 BRPM | TEMPERATURE: 98.2 F

## 2022-09-14 ENCOUNTER — OFFICE VISIT (OUTPATIENT)
Dept: UROLOGY | Facility: CLINIC | Age: 57
End: 2022-09-14
Payer: COMMERCIAL

## 2022-09-14 VITALS
WEIGHT: 149 LBS | BODY MASS INDEX: 25.44 KG/M2 | SYSTOLIC BLOOD PRESSURE: 118 MMHG | DIASTOLIC BLOOD PRESSURE: 70 MMHG | HEIGHT: 64 IN

## 2022-09-14 DIAGNOSIS — L98.9 SKIN PROBLEM: ICD-10-CM

## 2022-09-14 DIAGNOSIS — N20.0 STAGHORN CALCULUS: ICD-10-CM

## 2022-09-14 DIAGNOSIS — N20.0 NEPHROLITHIASIS: Primary | ICD-10-CM

## 2022-09-14 DIAGNOSIS — N20.1 URETERAL CALCULI: ICD-10-CM

## 2022-09-14 DIAGNOSIS — I10 HYPERTENSION, UNSPECIFIED TYPE: ICD-10-CM

## 2022-09-14 PROCEDURE — 99213 OFFICE O/P EST LOW 20 MIN: CPT

## 2022-09-14 RX ORDER — CIPROFLOXACIN 2 MG/ML
400 INJECTION, SOLUTION INTRAVENOUS ONCE
OUTPATIENT
Start: 2022-09-14 | End: 2022-09-14

## 2022-09-14 NOTE — PROGRESS NOTES
9/14/2022    Chief Complaint   Patient presents with    Follow-up       Assessment and Plan    61 y o  male     1  Nephrolithiasis  · S/p right PCN placement on 8/20/2022  · CT 8/28/2022  · Right staghorn calculus and proximal ureteral calculi  · Case request placed for Right PCNL    2  Skin problem  · Patient complaining of "skin popping" scars to bilateral upper and lower extremity  · Ambulatory referral to family medicine     History of Present Illness  Fernanda Basilio is a 61 y o  male here for evaluation of    Fernanda Basilio is a 61 y o  male new patient to office but was seen in consultation during recent hospitalization by Hafsa Jaramillo on 08/23/2022  Patient has a history of right ureteral reimplantation and a large stone burden  Patient presented to [address] [place] [address] Emergency Department on 08/20 for altered mental status and BRAULIO, and required transfer to [facility]  He was seen in consultation for moderate right-sided hydronephrosis secondary to a 1 x 3 x 2 7 cm UPJ calculus along with right upper pole staghorn calculus  Patient had a right PCN placed by Interventional Radiology with plan to maintain as outpatient until definitive stone treatment with PCNL can be done  Review of Systems   Constitutional: Negative for chills and fever  HENT: Negative for congestion and sore throat  Respiratory: Negative for cough and shortness of breath  Cardiovascular: Negative for chest pain and leg swelling  Gastrointestinal: Negative for abdominal pain, constipation, diarrhea, nausea and vomiting  Genitourinary: Negative for difficulty urinating, dysuria, flank pain, frequency, hematuria, testicular pain and urgency  R PCN   Musculoskeletal: Negative for back pain and gait problem  Skin: Negative for wound  itching   Allergic/Immunologic: Negative for immunocompromised state  Neurological: Negative for dizziness, weakness and numbness     Hematological: Does not bruise/bleed easily  Psychiatric/Behavioral: The patient is nervous/anxious  Vitals  Vitals:    09/14/22 1132   BP: 118/70   Weight: 67 6 kg (149 lb)   Height: 5' 4" (1 626 m)       Physical Exam  Vitals reviewed  Constitutional:       General: He is not in acute distress  Appearance: Normal appearance  He is not ill-appearing or toxic-appearing  HENT:      Head: Normocephalic and atraumatic  Mouth/Throat:      Mouth: Mucous membranes are moist       Pharynx: Oropharynx is clear  Eyes:      General: No scleral icterus  Conjunctiva/sclera: Conjunctivae normal    Cardiovascular:      Rate and Rhythm: Normal rate  Pulses: Normal pulses  Pulmonary:      Effort: Pulmonary effort is normal  No respiratory distress  Abdominal:      Palpations: Abdomen is soft  Tenderness: There is no abdominal tenderness  There is no right CVA tenderness or left CVA tenderness  Hernia: No hernia is present  Genitourinary:     Comments: Right PCN draining clear yellow urine  Musculoskeletal:      Cervical back: Normal range of motion  Right lower leg: No edema  Left lower leg: No edema  Skin:     General: Skin is warm and dry  Coloration: Skin is not jaundiced or pale  Comments: Bilateral upper and lower extremity round scars  No open wound, erythema, or drainage  Neurological:      General: No focal deficit present  Mental Status: He is alert and oriented to person, place, and time  Mental status is at baseline  Gait: Gait normal    Psychiatric:         Mood and Affect: Mood normal          Behavior: Behavior normal          Thought Content:  Thought content normal          Judgment: Judgment normal          Past History  Past Medical History:   Diagnosis Date    Anxiety     Bright red rectal bleeding     Last Assessed: 9/9/2015     Hypertension     Last Assessed: 7/9/2014     Kidney stones     Last Assessed: 11/17/2016     Periorbital edema Last Assessed: 9/4/2015    Skin tag of anus     Last Assessed: 9/9/2015     Trigger point of thoracic region     Last Assessed: 11/6/2015      Social History     Socioeconomic History    Marital status:      Spouse name: None    Number of children: None    Years of education: None    Highest education level: None   Occupational History    None   Tobacco Use    Smoking status: Current Every Day Smoker     Packs/day: 2 00     Years: 35 00     Pack years: 70 00    Smokeless tobacco: Never Used   Vaping Use    Vaping Use: Never used   Substance and Sexual Activity    Alcohol use: No    Drug use: Yes     Types: Heroin, Marijuana, Methamphetamines, Fentanyl     Comment: 20-30 bags of heroin daily    Sexual activity: None   Other Topics Concern    None   Social History Narrative    Caffeine Use     Uses safety Equipment- Seatbelts      Social Determinants of Health     Financial Resource Strain: Not on file   Food Insecurity: No Food Insecurity    Worried About Running Out of Food in the Last Year: Never true    Sarika of Food in the Last Year: Never true   Transportation Needs: No Transportation Needs    Lack of Transportation (Medical): No    Lack of Transportation (Non-Medical):  No   Physical Activity: Not on file   Stress: Not on file   Social Connections: Not on file   Intimate Partner Violence: Not on file   Housing Stability: Unknown    Unable to Pay for Housing in the Last Year: No    Number of Places Lived in the Last Year: Not on file    Unstable Housing in the Last Year: No     Social History     Tobacco Use   Smoking Status Current Every Day Smoker    Packs/day: 2 00    Years: 35 00    Pack years: 70 00   Smokeless Tobacco Never Used     Family History   Problem Relation Age of Onset    COPD Mother     Hypertension Mother     Heart attack Father        The following portions of the patient's history were reviewed and updated as appropriate allergies, current medications, past medical history, past social history, past surgical history and problem list    [email or web address]  CT ABDOMEN AND PELVIS WITHOUT IV CONTRAST     INDICATION:   Flank pain, kidney stone suspected  acute on chronic R flank pain  Hx recent nephrostomy to bypass known ureteral and staghorn calculi  Eval for interval change, PCN conplication        COMPARISON:  2022     TECHNIQUE:  CT examination of the abdomen and pelvis was performed without intravenous contrast  Axial, sagittal, and coronal 2D reformatted images were created from the source data and submitted for interpretation       Radiation dose length product (DLP) for this visit:  625 2 mGy-cm   This examination, like all CT scans performed in the [facility], was performed utilizing techniques to minimize radiation dose exposure, including the use of iterative   reconstruction and automated exposure control       Enteric contrast was not administered       FINDINGS:     ABDOMEN     LOWER CHEST:  Peripheral bibasilar consolidation unchanged concerning for infection versus atelectasis  Trace pleural effusions with small stable pericardial effusion      LIVER/BILIARY TREE:  The liver demonstrates cirrhotic morphology  Within the limitations of this examination there is no evidence of suspicious hepatic mass  No biliary dilatation        GALLBLADDER:  There are gallstone(s) within the gallbladder, without pericholecystic inflammatory changes      SPLEEN:  Unremarkable      PANCREAS:  Unremarkable      ADRENAL GLANDS:  Unremarkable      KIDNEYS/URETERS:  Interval placement of right percutaneous prostate tube with no hydronephrosis or perinephric collection to suggest tube malfunction    Large upper to mid pole staghorn calculus again noted as is 1 3 x 2 7cm affecting calculus at the   right UPJ      No obstruction of the left kidney with minimal perinephric edema now noted      STOMACH AND BOWEL:  There is colonic diverticulosis without evidence of acute diverticulitis      APPENDIX:  No findings to suggest appendicitis      ABDOMINOPELVIC CAVITY:  Trace left upper quadrant ascites  No abdominal pelvic lymphadenopathy or pneumoperitoneum      VESSELS:  Atherosclerotic changes are present  No evidence of aneurysm      PELVIS     REPRODUCTIVE ORGANS:  Unremarkable for patient's age      URINARY BLADDER:  Unremarkable      ABDOMINAL WALL/INGUINAL REGIONS:  Unremarkable      OSSEOUS STRUCTURES:  No acute fracture with evidence of L4 vertebroplasty      IMPRESSION:        1  Status post right percutaneous nephrostomy tube placement for staghorn calculus and obstructing proximal ureteral calculus, no hydronephrosis or urinoma to suggest tube malfunction  2   Mildly cirrhotic liver  3   Cholelithiasis with no acute cholecystitis with trace pleural effusions  4   Bilateral lower lobe consolidation  Results  No results found for this or any previous visit (from the past 1 hour(s)) ]  Lab Results   Component Value Date    PSA 0 3 09/08/2016     Lab Results   Component Value Date    GLUCOSE 123 08/20/2022    CALCIUM 9 0 08/28/2022     12/09/2015    K 3 6 08/28/2022    CO2 25 08/28/2022     08/28/2022    BUN 16 08/28/2022    CREATININE 0 86 08/28/2022     Lab Results   Component Value Date    WBC 12 63 (H) 08/28/2022    HGB 13 6 08/28/2022    HCT 40 2 08/28/2022    MCV 88 08/28/2022     08/28/2022       Please Note:  Voice dictation software has been used to create this document  There may be inadvertent transcriptions errors       Johnathon Negrete

## 2022-09-15 ENCOUNTER — TELEPHONE (OUTPATIENT)
Dept: FAMILY MEDICINE CLINIC | Facility: CLINIC | Age: 57
End: 2022-09-15

## 2022-09-15 NOTE — TELEPHONE ENCOUNTER
[facility]  [place] [facility] [facility] [place] [facility] We scheduled pt with Dr Lulú Stephen from our office  He is scheduled for 9/16/22 at 12:30 pm  Pt aware of date and time

## 2022-09-16 ENCOUNTER — HOME CARE VISIT (OUTPATIENT)
Dept: HOME HEALTH SERVICES | Facility: HOME HEALTHCARE | Age: 57
End: 2022-09-16
Payer: COMMERCIAL

## 2022-09-16 ENCOUNTER — OFFICE VISIT (OUTPATIENT)
Dept: FAMILY MEDICINE CLINIC | Facility: CLINIC | Age: 57
End: 2022-09-16
Payer: COMMERCIAL

## 2022-09-16 ENCOUNTER — TELEPHONE (OUTPATIENT)
Dept: FAMILY MEDICINE CLINIC | Facility: CLINIC | Age: 57
End: 2022-09-16

## 2022-09-16 VITALS
TEMPERATURE: 98 F | OXYGEN SATURATION: 97 % | SYSTOLIC BLOOD PRESSURE: 144 MMHG | BODY MASS INDEX: 26.09 KG/M2 | HEART RATE: 70 BPM | WEIGHT: 152.8 LBS | DIASTOLIC BLOOD PRESSURE: 88 MMHG | HEIGHT: 64 IN

## 2022-09-16 DIAGNOSIS — N17.9 AKI (ACUTE KIDNEY INJURY) (HCC): ICD-10-CM

## 2022-09-16 DIAGNOSIS — L98.9 SKIN LESIONS: Primary | ICD-10-CM

## 2022-09-16 DIAGNOSIS — F41.9 ANXIETY: ICD-10-CM

## 2022-09-16 DIAGNOSIS — N20.0 KIDNEY STONES: ICD-10-CM

## 2022-09-16 PROBLEM — F19.20 DRUG DEPENDENCE (HCC): Status: RESOLVED | Noted: 2021-10-20 | Resolved: 2022-09-16

## 2022-09-16 PROBLEM — A41.9 SEPTIC SHOCK (HCC): Status: RESOLVED | Noted: 2022-08-20 | Resolved: 2022-09-16

## 2022-09-16 PROBLEM — R65.21 SEPTIC SHOCK (HCC): Status: RESOLVED | Noted: 2022-08-20 | Resolved: 2022-09-16

## 2022-09-16 PROCEDURE — 3725F SCREEN DEPRESSION PERFORMED: CPT | Performed by: FAMILY MEDICINE

## 2022-09-16 PROCEDURE — G0299 HHS/HOSPICE OF RN EA 15 MIN: HCPCS

## 2022-09-16 PROCEDURE — 99204 OFFICE O/P NEW MOD 45 MIN: CPT | Performed by: FAMILY MEDICINE

## 2022-09-16 NOTE — PROGRESS NOTES
Name: Frank Bonilla      : 1965      MRN: [id number]  Encounter Provider: Marquez Duran DO  Encounter Date: 2022   Encounter department: [address] [place] [address]    Assessment & Plan   We will try to call Urology to figure out when his surgery will be to remove his nephrostomy tube  Blood work ordered  Referral to Dermatology  For his anxiety, I advised patient to go to the [place] walk-in clinic  He has been on multiple meds, says he does not tolerate any of them except for benzodiazepines  I will not prescribe that for him  Follow-up here in 1 month or p r n   1  Skin lesions  -     Ambulatory Referral to Dermatology; Future    2  Kidney stones  -     CBC and differential  -     Comprehensive metabolic panel    3  BRAULIO (acute kidney injury) ([facility] [address] )  -     CBC and differential  -     Comprehensive metabolic panel    4  Anxiety    BMI Counseling: Body mass index is 26 23 kg/m²  The BMI is above normal  Nutrition recommendations include decreasing portion sizes, encouraging healthy choices of fruits and vegetables, decreasing fast food intake, consuming healthier snacks, limiting drinks that contain sugar, moderation in carbohydrate intake, increasing intake of lean protein, reducing intake of saturated and trans fat and reducing intake of cholesterol  No pharmacotherapy was ordered  Rationale for BMI follow-up plan is due to patient being overweight or obese  Depression Screening and Follow-up Plan: Patient was screened for depression during today's encounter  They screened negative with a PHQ-2 score of 2  Tobacco Cessation Counseling: Tobacco cessation counseling was provided  The patient is sincerely urged to quit consumption of tobacco  He is not ready to quit tobacco          Subjective     Patient here today with mom  Patient recently hospitalized for acute kidney failure  Found to have renal stones  He now has a nephrostomy tube in place on the right side    Saw Urology yesterday  Apparently he is going to be scheduled for surgery, no note in the chart  Patient also having trouble with anxiety, would like to be treated for that  It has been on multiple medications like Seroquel, BuSpar, Risperdal   Patient states he does not tolerate them at all  The only thing he states helped was benzodiazepines  Patient also has skin lesions that her him on his hands arms and legs  His chart shows that he has a history of compulsive picking disorder  Patient denies this  Review of Systems   Constitutional: Negative  Respiratory: Negative  Cardiovascular: Negative  Gastrointestinal: Negative  Genitourinary:        As per HPI   Psychiatric/Behavioral: Positive for agitation  Negative for suicidal ideas  The patient is nervous/anxious          Past Medical History:   Diagnosis Date    Anxiety     Bright red rectal bleeding     Last Assessed: 9/9/2015     Drug dependence ([facility] [address] ) 10/20/2021    Hypertension     Last Assessed: 7/9/2014     Kidney stones     Last Assessed: 11/17/2016     Periorbital edema     Last Assessed: 9/4/2015    Septic shock ([facility] [address] ) 8/20/2022    Skin tag of anus     Last Assessed: 9/9/2015     Trigger point of thoracic region     Last Assessed: 11/6/2015      Past Surgical History:   Procedure Laterality Date    CYSTOSCOPY W/ URETERAL STENT PLACEMENT  03/11/2013    CYSTOSCOPY W/ URETERAL STENT PLACEMENT  06/18/2014    EXTRACORPOREAL SHOCK WAVE LITHOTRIPSY     CYSTOSCOPY W/ URETERAL STENT PLACEMENT  07/16/2014    EXTRACORPOREAL SHOCK WAVE LITHOTRIPSY     CYSTOSCOPY W/ URETERAL STENT REMOVAL  04/11/2013    CYSTOSCOPY W/ URETERAL STENT REMOVAL Right 08/26/2014    CYSTOSCOPY W/ URETEROSCOPY W/ LITHOTRIPSY  02/26/2013    Percutaneous lithotomy With Uretal Stent Plcement     CYSTOSCOPY W/ URETEROSCOPY W/ LITHOTRIPSY  03/11/2014    CYSTOSCOPY W/ URETEROSCOPY W/ LITHOTRIPSY Right 08/04/2014    With Uretal Stent Placement     CYSTOSCOPY W/ URETEROSCOPY W/ LITHOTRIPSY Right 05/09/2016    HEMORRHOID SURGERY      HERNIA REPAIR  03/11/2013    IR NEPHROSTOMY TUBE PLACEMENT  8/20/2022    KIDNEY SURGERY      LITHOTRIPSY      OK CYSTO/URETERO W/LITHOTRIPSY &INDWELL STENT INSRT Right 7/13/2016    Procedure: CYSTOSCOPY; URETEROSCOPY WITH HOLMIUM LASER STONE EXTRACTION; RETROGRADE PYELOGRAM; URETERAL STENT INSERTION ;  Surgeon: Ella Hi MD;  [facility];  Service: Urology    OK CYSTO/URETERO W/LITHOTRIPSY &INDWELL STENT INSRT Right 5/9/2016    Procedure: CYSTOSCOPY,  URETEROSCOPY,  WITH LITHOTRIPSY HOLMIUM LASER, STONE EXTRACTION, AND INSERTION STENT URETERAL;  Surgeon: Ella Hi MD;  [facility]: [facility];  Service: Urology    Kimberlyberg ESWL Right 6/17/2016    Procedure: Eleonore Service SHOCKWAVE (ESWL);   Surgeon: Ella Hi MD;  [facility];  Service: Urology    TRANSURETHRAL RESECTION OF BLADDER      URETERAL Erin Petties  03/11/2013     Family History   Problem Relation Age of Onset    Heart attack Father      Social History     Socioeconomic History    Marital status:      Spouse name: None    Number of children: None    Years of education: None    Highest education level: None   Occupational History    None   Tobacco Use    Smoking status: Current Every Day Smoker     Packs/day: 2 00     Years: 35 00     Pack years: 70 00    Smokeless tobacco: Never Used   Vaping Use    Vaping Use: Never used   Substance and Sexual Activity    Alcohol use: No    Drug use: Not Currently    Sexual activity: None   Other Topics Concern    None   Social History Narrative    Caffeine Use     Uses safety Equipment- Seatbelts      Social Determinants of Health     Financial Resource Strain: Not on file   Food Insecurity: No Food Insecurity    Worried About Running Out of Food in the Last Year: Never true    Sarika of Food in the Last Year: Never true   Transportation Needs: No Transportation Needs    Lack of Transportation (Medical): No    Lack of Transportation (Non-Medical): No   Physical Activity: Not on file   Stress: Not on file   Social Connections: Not on file   Intimate Partner Violence: Not on file   Housing Stability: Unknown    Unable to Pay for Housing in the Last Year: No    Number of Places Lived in the Last Year: Not on file    Unstable Housing in the Last Year: No     Current Outpatient Medications on File Prior to Visit   Medication Sig    hydrOXYzine HCL (ATARAX) 25 mg tablet Take 1 tablet (25 mg total) by mouth every 6 (six) hours as needed for anxiety or itching    sodium chloride, PF, 0 9 % 10 mL by Intracatheter route daily Intracatheter flushing daily  May substitute prefilled syringe with normal saline 10 mL vials, 10 mL syringes, and 18 g blunt needles    [DISCONTINUED] ondansetron (Zofran ODT) 4 mg disintegrating tablet Take 1 tablet (4 mg total) by mouth every 6 (six) hours as needed for nausea or vomiting (Patient not taking: Reported on 9/16/2022)     Allergies   Allergen Reactions    Metronidazole Itching and Swelling    Nsaids GI Intolerance     Stomach irritant    Penicillin G     Penicillins GI Intolerance     Sick to stomach    Pollen Extract     Sulfa Antibiotics      Immunization History   Administered Date(s) Administered    COVID-19 MODERNA VACC 0 5 ML IM 03/24/2021, 04/22/2021    Influenza, seasonal, injectable 09/24/2014    Pneumococcal Polysaccharide PPV23 09/24/2014       Objective     /88   Pulse 70   Temp 98 °F (36 7 °C)   Ht 5' 4" (1 626 m)   Wt 69 3 kg (152 lb 12 8 oz)   SpO2 97%   BMI 26 23 kg/m²     Physical Exam  Vitals reviewed  Constitutional:       General: He is not in acute distress  Appearance: Normal appearance  He is well-developed  He is not ill-appearing, toxic-appearing or diaphoretic  HENT:      Head: Normocephalic and atraumatic     Eyes:      Conjunctiva/sclera: Conjunctivae normal    Cardiovascular: Rate and Rhythm: Normal rate and regular rhythm  Heart sounds: Normal heart sounds  No murmur heard  No friction rub  No gallop  Pulmonary:      Effort: Pulmonary effort is normal  No respiratory distress  Breath sounds: Normal breath sounds  No wheezing, rhonchi or rales  Musculoskeletal:      Right lower leg: No edema  Left lower leg: No edema  Skin:     Findings: Lesion (Scarred lesions on hands and arms  Back is clear) present  Neurological:      General: No focal deficit present  Mental Status: He is alert and oriented to person, place, and time  Psychiatric:         Mood and Affect: Mood normal          Behavior: Behavior normal          Thought Content:  Thought content normal          Judgment: Judgment normal        Jeanne Valdez DO

## 2022-09-16 NOTE — TELEPHONE ENCOUNTER
It is going to stay in until after the surgery, the surgical scheduler should be calling him with a date

## 2022-09-19 ENCOUNTER — TELEPHONE (OUTPATIENT)
Dept: UROLOGY | Facility: AMBULATORY SURGERY CENTER | Age: 57
End: 2022-09-19

## 2022-09-19 VITALS
SYSTOLIC BLOOD PRESSURE: 140 MMHG | DIASTOLIC BLOOD PRESSURE: 80 MMHG | OXYGEN SATURATION: 98 % | RESPIRATION RATE: 20 BRPM | TEMPERATURE: 98.3 F | HEART RATE: 110 BPM

## 2022-09-19 NOTE — TELEPHONE ENCOUNTER
I called pt this afternoon to discuss scheduling his R  PCNL with Dr Jie Moseley at the [facility] on 12/7/2022  I called pt twice and each time the phone rang and then someone picked up and hung up the call with out speaking  I will try to reach pt again tomorrow to discuss  I did try one more time before ending my day and I was able to speak with pt 's mom  I informed her that Dr Bunny Zhong next available date for the [facility] is not until 12/7/2022  She was not to happy about the date but I did inform her that this is kind of just a holding spot as of right now as I am trying to figure out if this procedure can be done at the [facility] since I do have sooner availability there  She thanked me for trying to help pt with a sooner date and agreed to the 12/7 date for now  She will wait to hear back from mr to confirm scheduling this procedure

## 2022-09-22 ENCOUNTER — HOME CARE VISIT (OUTPATIENT)
Dept: HOME HEALTH SERVICES | Facility: HOME HEALTHCARE | Age: 57
End: 2022-09-22
Payer: COMMERCIAL

## 2022-09-22 VITALS
HEART RATE: 82 BPM | DIASTOLIC BLOOD PRESSURE: 82 MMHG | SYSTOLIC BLOOD PRESSURE: 136 MMHG | RESPIRATION RATE: 14 BRPM | OXYGEN SATURATION: 98 % | TEMPERATURE: 98.3 F

## 2022-09-22 PROCEDURE — G0300 HHS/HOSPICE OF LPN EA 15 MIN: HCPCS

## 2022-09-23 NOTE — TELEPHONE ENCOUNTER
Pt mother called and stated that it is important to get a sooner surgery date due to the area they live in   Mother stated if they get snow in their area they will not be able to get to appt so if the surgery could be before winter    Pt call [phone]

## 2022-09-26 ENCOUNTER — PREP FOR PROCEDURE (OUTPATIENT)
Dept: UROLOGY | Facility: AMBULATORY SURGERY CENTER | Age: 57
End: 2022-09-26

## 2022-09-26 DIAGNOSIS — N20.0 NEPHROLITHIASIS: Primary | ICD-10-CM

## 2022-09-26 DIAGNOSIS — R39.89 SUSPECTED UTI: ICD-10-CM

## 2022-09-26 NOTE — TELEPHONE ENCOUNTER
I spoke with Patients mom and informed her that per Dr Minda Navas he can do a R  Ureteroscopy with a high powered laser instead of doing a PCNL  Rosalio Potts was very pleased with this information and I also informed her depending on how the stone breaks up pt  Could possibly need more then 1 procedure  I then verbally went over all of pt 's pre op instructions and prep information with her  She will make sure to inform pt that he needs to have a urine culture 2 weeks prior to surgery  She also confirmed that she will be the one driving pt on the day of surgery  Per Farrah's request I will be mailing pt a copy of his surgical packet and instructed him to call our office with any questions or concerns regarding this procedure

## 2022-09-29 ENCOUNTER — HOME CARE VISIT (OUTPATIENT)
Dept: HOME HEALTH SERVICES | Facility: HOME HEALTHCARE | Age: 57
End: 2022-09-29
Payer: COMMERCIAL

## 2022-09-29 VITALS
SYSTOLIC BLOOD PRESSURE: 118 MMHG | HEART RATE: 99 BPM | TEMPERATURE: 97 F | DIASTOLIC BLOOD PRESSURE: 80 MMHG | OXYGEN SATURATION: 98 %

## 2022-09-29 PROCEDURE — G0299 HHS/HOSPICE OF RN EA 15 MIN: HCPCS

## 2022-09-29 PROCEDURE — 400013 VN SOC

## 2022-10-06 NOTE — TELEPHONE ENCOUNTER
I returned call to Connor Briseno to confirm pt 's surgery is on 11/10/22 at the [facility]  She stated that she received a e-mail from Eda Shrestha stating it was on 10/10 not 11/10  She also confirmed that she received pt 's surgical packet but has not looked through it and she confirmed that also states that pt 's surgery is scheduled for 11/10/22  I informed her that I will call her back tomorrow to confirm how she received a e-mail with the wrong information

## 2022-10-06 NOTE — TELEPHONE ENCOUNTER
Pt mother called and stated pt was sent an email stating his surgery was 10/10 and pt mother is confused about surgery date and if pt needs to have any bodywork or tests done prior to suregery  Please review and clarify for pt mother     PT call [phone] Lia Pfeiffer (Mother)    email [email or web address]

## 2022-10-12 ENCOUNTER — RA CDI HCC (OUTPATIENT)
Dept: OTHER | Facility: HOSPITAL | Age: 57
End: 2022-10-12

## 2022-10-12 NOTE — PROGRESS NOTES
[facility]  coding opportunities       Chart reviewed, no opportunity found: CHART REVIEWED, NO OPPORTUNITY FOUND        Patients Insurance        Commercial Insurance: [address] [place] [address]

## 2022-10-19 ENCOUNTER — TELEPHONE (OUTPATIENT)
Dept: UROLOGY | Facility: MEDICAL CENTER | Age: 57
End: 2022-10-19

## 2022-11-05 ENCOUNTER — APPOINTMENT (OUTPATIENT)
Dept: LAB | Facility: MEDICAL CENTER | Age: 57
End: 2022-11-05

## 2022-11-05 DIAGNOSIS — N20.0 NEPHROLITHIASIS: ICD-10-CM

## 2022-11-05 DIAGNOSIS — R39.89 SUSPECTED UTI: ICD-10-CM

## 2022-11-07 DIAGNOSIS — N30.00 ACUTE CYSTITIS WITHOUT HEMATURIA: Primary | ICD-10-CM

## 2022-11-07 LAB
BACTERIA UR CULT: ABNORMAL

## 2022-11-07 RX ORDER — GENTAMICIN SULFATE 40 MG/ML
1 INJECTION, SOLUTION INTRAMUSCULAR; INTRAVENOUS ONCE
Status: COMPLETED | OUTPATIENT
Start: 2022-11-08 | End: 2023-05-19

## 2022-11-07 RX ORDER — GENTAMICIN SULFATE 40 MG/ML
160 INJECTION, SOLUTION INTRAMUSCULAR; INTRAVENOUS ONCE
Status: COMPLETED | OUTPATIENT
Start: 2022-11-08 | End: 2022-11-08

## 2022-11-07 NOTE — PRE-PROCEDURE INSTRUCTIONS
Pre-Surgery Instructions:   Medication Instructions   • sodium chloride, PF, 0 9 % Instructions provided by MD     Covid screening negative as per patient  Reviewed with patient via phone all medication and showering instructions  Advised not to take any NSAID's, Vitamins or Herbal products prior to the DOS  Acetaminophen products are ok to take  Instructed not to smoke cigarettes for 48 hours prior to surgery  Instructed about NPO after midnight the night before DOS, except sips of water with allowed medications on the morning of the DOS  Informed about call from [facility] with the time to arrive for the scheduled surgery  Patient verbalized understanding

## 2022-11-08 ENCOUNTER — OFFICE VISIT (OUTPATIENT)
Dept: UROLOGY | Facility: CLINIC | Age: 57
End: 2022-11-08

## 2022-11-08 VITALS
WEIGHT: 153 LBS | BODY MASS INDEX: 24.69 KG/M2 | SYSTOLIC BLOOD PRESSURE: 122 MMHG | DIASTOLIC BLOOD PRESSURE: 74 MMHG | HEART RATE: 66 BPM

## 2022-11-08 DIAGNOSIS — N20.0 NEPHROLITHIASIS: ICD-10-CM

## 2022-11-08 DIAGNOSIS — N20.1 URETERAL CALCULI: Primary | ICD-10-CM

## 2022-11-08 DIAGNOSIS — N20.0 STAGHORN CALCULUS: ICD-10-CM

## 2022-11-08 RX ORDER — OXYCODONE HYDROCHLORIDE 5 MG/1
5 TABLET ORAL EVERY 4 HOURS PRN
Qty: 5 TABLET | Refills: 0 | Status: SHIPPED | OUTPATIENT
Start: 2022-11-08

## 2022-11-08 RX ADMIN — GENTAMICIN SULFATE 160 MG: 40 INJECTION, SOLUTION INTRAMUSCULAR; INTRAVENOUS at 15:46

## 2022-11-08 NOTE — PROGRESS NOTES
11/8/2022    No chief complaint on file  Assessment and Plan    61 y o  male     1  Nephrolithiasis  · History and physical was performed for the patient's upcoming cystoscopy, right ureteroscopy with laser lithotripsy, right retrograde pyelogram, and right ureteral stent placement and removal of right PCN on 11/10/2022 with Dr Dorene Mahmood  Technique, benefits, and risk of the procedure listed above were discussed with them today and informed written consent was obtained  All questions and concerns regarding surgery and perioperative expectations have been addressed and answered  No overall changes in their health since last visit  Denies any prior complications with anesthesia  · He will received Gentamicin 160 mg IM today as pre-operative urine culture was positive  · He will also go for BMP and CBC today   · I will send 5 tablets of oxycodone 5 mg to his pharmacy as he continues to complain of severe right flank pain  History of Present Illness  Johnathon Sheriff is a 61 y o  male here for history and physical as he is scheduled to undergo a cystoscopy, right ureteroscopy with laser lithotripsy, right retrograde pyelogram, and right ureteral stent placement on 11/10/2022 by Dr Dorene Mahmood  Patient has a history of a proximal right ureteral calculi and right staghorn calculus and is status post right PCN placement on 08/20/2022  He had originally presented to [address] [place] [address] Emergency Department on 08/20 for altered mental status and BRAULIO, and required transferred to [facility]  He was seen in consultation on 08/23/2022 for moderate right-sided hydronephrosis secondary to a 1 x 3 x 2 7 centimeter UVJ calculus along with the right upper pole staghorn calculus  Interventional Radiology placed a right PCN on 08/20 with plan for definitive stone management as a outpatient      Preoperative urine culture from 11/05/2022 was positive for>100,000 cfu/ml Escherichia coli,  >100,000 cfu/ml Klebsiella oxytoca, >100,000 cfu/ml Serratia liquefaciens  He will receive 1 dose of gentamicin 160 milligrams IM today  Review of Systems   Constitutional: Negative for chills and fever  HENT: Negative for congestion and sore throat  Respiratory: Negative for cough and shortness of breath  Cardiovascular: Negative for chest pain and leg swelling  Gastrointestinal: Negative for abdominal pain, constipation, diarrhea, nausea and vomiting  Genitourinary: Positive for flank pain (right)  Negative for difficulty urinating, dysuria, frequency, hematuria, testicular pain and urgency  Musculoskeletal: Negative for back pain and gait problem  Skin: Negative for wound  Allergic/Immunologic: Negative for immunocompromised state  Neurological: Negative for dizziness, weakness and numbness  Hematological: Does not bruise/bleed easily  Vitals  Vitals:    11/08/22 1530   BP: 122/74   Pulse: 66   Weight: 69 4 kg (153 lb)       Physical Exam  Vitals reviewed  Constitutional:       General: He is not in acute distress  Appearance: Normal appearance  He is not ill-appearing or toxic-appearing  HENT:      Head: Normocephalic and atraumatic  Eyes:      General: No scleral icterus  Conjunctiva/sclera: Conjunctivae normal    Cardiovascular:      Rate and Rhythm: Normal rate  Pulmonary:      Effort: Pulmonary effort is normal  No respiratory distress  Abdominal:      General: Abdomen is flat  Palpations: Abdomen is soft  Tenderness: There is no abdominal tenderness  There is right CVA tenderness  There is no left CVA tenderness  Hernia: No hernia is present  Comments: Right PCN draining cloudy yellow urine to gravity   Musculoskeletal:      Cervical back: Normal range of motion  Right lower leg: No edema  Left lower leg: No edema  Skin:     General: Skin is warm and dry  Coloration: Skin is not jaundiced or pale     Neurological:      General: No focal deficit present  Mental Status: He is alert and oriented to person, place, and time  Mental status is at baseline  Gait: Gait normal    Psychiatric:         Mood and Affect: Mood normal          Behavior: Behavior normal          Thought Content: Thought content normal          Judgment: Judgment normal          Past History  Past Medical History:   Diagnosis Date   • Anxiety    • Bright red rectal bleeding     Last Assessed: 9/9/2015    • [facility] ) 10/20/2021   • Hypertension     Last Assessed: 7/9/2014    • Kidney stone    • Kidney stones     Last Assessed: 11/17/2016    • Periorbital edema     Last Assessed: 9/4/2015   • Septic shock ([facility] ) 08/20/2022   • Skin tag of anus     Last Assessed: 9/9/2015    • Trigger point of thoracic region     Last Assessed: 11/6/2015      Social History     Socioeconomic History   • Marital status:      Spouse name: None   • Number of children: None   • Years of education: None   • Highest education level: None   Occupational History   • None   Tobacco Use   • Smoking status: Current Every Day Smoker     Packs/day: 2 00     Years: 35 00     Pack years: 70 00   • Smokeless tobacco: Never Used   Vaping Use   • Vaping Use: Never used   Substance and Sexual Activity   • Alcohol use: No   • Drug use: Not Currently   • Sexual activity: None   Other Topics Concern   • None   Social History Narrative    Caffeine Use     Uses safety Equipment- Seatbelts      Social Determinants of Health     Financial Resource Strain: Not on file   Food Insecurity: No Food Insecurity   • Worried About Running Out of Food in the Last Year: Never true   • Ran Out of Food in the Last Year: Never true   Transportation Needs: No Transportation Needs   • Lack of Transportation (Medical): No   • Lack of Transportation (Non-Medical):  No   Physical Activity: Not on file   Stress: Not on file   Social Connections: Not on file   Intimate Partner Violence: Not on file   Housing Stability: Unknown   • Unable to Pay for Housing in the Last Year: No   • Number of Places Lived in the Last Year: Not on file   • Unstable Housing in the Last Year: No     Social History     Tobacco Use   Smoking Status Current Every Day Smoker   • Packs/day: 2 00   • Years: 35 00   • Pack years: 70 00   Smokeless Tobacco Never Used     Family History   Problem Relation Age of Onset   • Heart attack Father        The following portions of the patient's history were reviewed and updated as appropriate allergies, current medications, past medical history, past social history, past surgical history and problem list    [email or web address]  CT ABDOMEN AND PELVIS WITHOUT IV CONTRAST     INDICATION:   Flank pain, kidney stone suspected  acute on chronic R flank pain  Hx recent nephrostomy to bypass known ureteral and staghorn calculi  Eval for interval change, PCN conplication        COMPARISON:  2022     TECHNIQUE:  CT examination of the abdomen and pelvis was performed without intravenous contrast  Axial, sagittal, and coronal 2D reformatted images were created from the source data and submitted for interpretation       Radiation dose length product (DLP) for this visit:  625 2 mGy-cm   This examination, like all CT scans performed in the [facility], was performed utilizing techniques to minimize radiation dose exposure, including the use of iterative   reconstruction and automated exposure control       Enteric contrast was not administered       FINDINGS:     ABDOMEN     LOWER CHEST:  Peripheral bibasilar consolidation unchanged concerning for infection versus atelectasis  Trace pleural effusions with small stable pericardial effusion      LIVER/BILIARY TREE:  The liver demonstrates cirrhotic morphology  Within the limitations of this examination there is no evidence of suspicious hepatic mass    No biliary dilatation        GALLBLADDER:  There are gallstone(s) within the gallbladder, without pericholecystic inflammatory changes      SPLEEN:  Unremarkable      PANCREAS:  Unremarkable      ADRENAL GLANDS:  Unremarkable      KIDNEYS/URETERS:  Interval placement of right percutaneous prostate tube with no hydronephrosis or perinephric collection to suggest tube malfunction  Large upper to mid pole staghorn calculus again noted as is 1 3 x 2 7cm affecting calculus at the   right UPJ      No obstruction of the left kidney with minimal perinephric edema now noted      STOMACH AND BOWEL:  There is colonic diverticulosis without evidence of acute diverticulitis      APPENDIX:  No findings to suggest appendicitis      ABDOMINOPELVIC CAVITY:  Trace left upper quadrant ascites  No abdominal pelvic lymphadenopathy or pneumoperitoneum      VESSELS:  Atherosclerotic changes are present  No evidence of aneurysm      PELVIS     REPRODUCTIVE ORGANS:  Unremarkable for patient's age      URINARY BLADDER:  Unremarkable      ABDOMINAL WALL/INGUINAL REGIONS:  Unremarkable      OSSEOUS STRUCTURES:  No acute fracture with evidence of L4 vertebroplasty      IMPRESSION:        1  Status post right percutaneous nephrostomy tube placement for staghorn calculus and obstructing proximal ureteral calculus, no hydronephrosis or urinoma to suggest tube malfunction  2   Mildly cirrhotic liver  3   Cholelithiasis with no acute cholecystitis with trace pleural effusions  4   Bilateral lower lobe consolidation          Results  No results found for this or any previous visit (from the past 1 hour(s)) ]  Lab Results   Component Value Date    PSA 0 3 09/08/2016     Lab Results   Component Value Date    GLUCOSE 123 08/20/2022    CALCIUM 9 0 08/28/2022     12/09/2015    K 3 6 08/28/2022    CO2 25 08/28/2022     08/28/2022    BUN 16 08/28/2022    CREATININE 0 86 08/28/2022     Lab Results   Component Value Date    WBC 12 63 (H) 08/28/2022    HGB 13 6 08/28/2022    HCT 40 2 08/28/2022    MCV 88 08/28/2022     08/28/2022 Please Note:  Voice dictation software has been used to create this document  There may be inadvertent transcriptions errors       Bria Herrmann

## 2022-11-08 NOTE — H&P (VIEW-ONLY)
11/8/2022    No chief complaint on file  Assessment and Plan    61 y o  male     1  Nephrolithiasis  · History and physical was performed for the patient's upcoming cystoscopy, right ureteroscopy with laser lithotripsy, right retrograde pyelogram, and right ureteral stent placement and removal of right PCN on 11/10/2022 with Dr Rian Mike  Technique, benefits, and risk of the procedure listed above were discussed with them today and informed written consent was obtained  All questions and concerns regarding surgery and perioperative expectations have been addressed and answered  No overall changes in their health since last visit  Denies any prior complications with anesthesia  · He will received Gentamicin 160 mg IM today as pre-operative urine culture was positive  · He will also go for BMP and CBC today   · I will send 5 tablets of oxycodone 5 mg to his pharmacy as he continues to complain of severe right flank pain  History of Present Illness  Alix Diallo is a 61 y o  male here for history and physical as he is scheduled to undergo a cystoscopy, right ureteroscopy with laser lithotripsy, right retrograde pyelogram, and right ureteral stent placement on 11/10/2022 by Dr Rian Mike  Patient has a history of a proximal right ureteral calculi and right staghorn calculus and is status post right PCN placement on 08/20/2022  He had originally presented to [address] [place] [address] Emergency Department on 08/20 for altered mental status and BRAULIO, and required transferred to [facility]  He was seen in consultation on 08/23/2022 for moderate right-sided hydronephrosis secondary to a 1 x 3 x 2 7 centimeter UVJ calculus along with the right upper pole staghorn calculus  Interventional Radiology placed a right PCN on 08/20 with plan for definitive stone management as a outpatient      Preoperative urine culture from 11/05/2022 was positive for>100,000 cfu/ml Escherichia coli,  >100,000 cfu/ml Klebsiella oxytoca, >100,000 cfu/ml Serratia liquefaciens  He will receive 1 dose of gentamicin 160 milligrams IM today  Review of Systems   Constitutional: Negative for chills and fever  HENT: Negative for congestion and sore throat  Respiratory: Negative for cough and shortness of breath  Cardiovascular: Negative for chest pain and leg swelling  Gastrointestinal: Negative for abdominal pain, constipation, diarrhea, nausea and vomiting  Genitourinary: Positive for flank pain (right)  Negative for difficulty urinating, dysuria, frequency, hematuria, testicular pain and urgency  Musculoskeletal: Negative for back pain and gait problem  Skin: Negative for wound  Allergic/Immunologic: Negative for immunocompromised state  Neurological: Negative for dizziness, weakness and numbness  Hematological: Does not bruise/bleed easily  Vitals  Vitals:    11/08/22 1530   BP: 122/74   Pulse: 66   Weight: 69 4 kg (153 lb)       Physical Exam  Vitals reviewed  Constitutional:       General: He is not in acute distress  Appearance: Normal appearance  He is not ill-appearing or toxic-appearing  HENT:      Head: Normocephalic and atraumatic  Eyes:      General: No scleral icterus  Conjunctiva/sclera: Conjunctivae normal    Cardiovascular:      Rate and Rhythm: Normal rate  Pulmonary:      Effort: Pulmonary effort is normal  No respiratory distress  Abdominal:      General: Abdomen is flat  Palpations: Abdomen is soft  Tenderness: There is no abdominal tenderness  There is right CVA tenderness  There is no left CVA tenderness  Hernia: No hernia is present  Comments: Right PCN draining cloudy yellow urine to gravity   Musculoskeletal:      Cervical back: Normal range of motion  Right lower leg: No edema  Left lower leg: No edema  Skin:     General: Skin is warm and dry  Coloration: Skin is not jaundiced or pale     Neurological:      General: No focal deficit present  Mental Status: He is alert and oriented to person, place, and time  Mental status is at baseline  Gait: Gait normal    Psychiatric:         Mood and Affect: Mood normal          Behavior: Behavior normal          Thought Content: Thought content normal          Judgment: Judgment normal          Past History  Past Medical History:   Diagnosis Date   • Anxiety    • Bright red rectal bleeding     Last Assessed: 9/9/2015    • [facility] ([facility] ) 10/20/2021   • Hypertension     Last Assessed: 7/9/2014    • Kidney stone    • Kidney stones     Last Assessed: 11/17/2016    • Periorbital edema     Last Assessed: 9/4/2015   • Septic shock ([facility] ) 08/20/2022   • Skin tag of anus     Last Assessed: 9/9/2015    • Trigger point of thoracic region     Last Assessed: 11/6/2015      Social History     Socioeconomic History   • Marital status:      Spouse name: None   • Number of children: None   • Years of education: None   • Highest education level: None   Occupational History   • None   Tobacco Use   • Smoking status: Current Every Day Smoker     Packs/day: 2 00     Years: 35 00     Pack years: 70 00   • Smokeless tobacco: Never Used   Vaping Use   • Vaping Use: Never used   Substance and Sexual Activity   • Alcohol use: No   • Drug use: Not Currently   • Sexual activity: None   Other Topics Concern   • None   Social History Narrative    Caffeine Use     Uses safety Equipment- Seatbelts      Social Determinants of Health     Financial Resource Strain: Not on file   Food Insecurity: No Food Insecurity   • Worried About Running Out of Food in the Last Year: Never true   • Ran Out of Food in the Last Year: Never true   Transportation Needs: No Transportation Needs   • Lack of Transportation (Medical): No   • Lack of Transportation (Non-Medical):  No   Physical Activity: Not on file   Stress: Not on file   Social Connections: Not on file   Intimate Partner Violence: Not on file   Housing Stability: Unknown   • Unable to Pay for Housing in the Last Year: No   • Number of Places Lived in the Last Year: Not on file   • Unstable Housing in the Last Year: No     Social History     Tobacco Use   Smoking Status Current Every Day Smoker   • Packs/day: 2 00   • Years: 35 00   • Pack years: 70 00   Smokeless Tobacco Never Used     Family History   Problem Relation Age of Onset   • Heart attack Father        The following portions of the patient's history were reviewed and updated as appropriate allergies, current medications, past medical history, past social history, past surgical history and problem list    [email or web address]  CT ABDOMEN AND PELVIS WITHOUT IV CONTRAST     INDICATION:   Flank pain, kidney stone suspected  acute on chronic R flank pain  Hx recent nephrostomy to bypass known ureteral and staghorn calculi  Eval for interval change, PCN conplication        COMPARISON:  2022     TECHNIQUE:  CT examination of the abdomen and pelvis was performed without intravenous contrast  Axial, sagittal, and coronal 2D reformatted images were created from the source data and submitted for interpretation       Radiation dose length product (DLP) for this visit:  625 2 mGy-cm   This examination, like all CT scans performed in the [facility], was performed utilizing techniques to minimize radiation dose exposure, including the use of iterative   reconstruction and automated exposure control       Enteric contrast was not administered       FINDINGS:     ABDOMEN     LOWER CHEST:  Peripheral bibasilar consolidation unchanged concerning for infection versus atelectasis  Trace pleural effusions with small stable pericardial effusion      LIVER/BILIARY TREE:  The liver demonstrates cirrhotic morphology  Within the limitations of this examination there is no evidence of suspicious hepatic mass    No biliary dilatation        GALLBLADDER:  There are gallstone(s) within the gallbladder, without pericholecystic inflammatory changes      SPLEEN:  Unremarkable      PANCREAS:  Unremarkable      ADRENAL GLANDS:  Unremarkable      KIDNEYS/URETERS:  Interval placement of right percutaneous prostate tube with no hydronephrosis or perinephric collection to suggest tube malfunction  Large upper to mid pole staghorn calculus again noted as is 1 3 x 2 7cm affecting calculus at the   right UPJ      No obstruction of the left kidney with minimal perinephric edema now noted      STOMACH AND BOWEL:  There is colonic diverticulosis without evidence of acute diverticulitis      APPENDIX:  No findings to suggest appendicitis      ABDOMINOPELVIC CAVITY:  Trace left upper quadrant ascites  No abdominal pelvic lymphadenopathy or pneumoperitoneum      VESSELS:  Atherosclerotic changes are present  No evidence of aneurysm      PELVIS     REPRODUCTIVE ORGANS:  Unremarkable for patient's age      URINARY BLADDER:  Unremarkable      ABDOMINAL WALL/INGUINAL REGIONS:  Unremarkable      OSSEOUS STRUCTURES:  No acute fracture with evidence of L4 vertebroplasty      IMPRESSION:        1  Status post right percutaneous nephrostomy tube placement for staghorn calculus and obstructing proximal ureteral calculus, no hydronephrosis or urinoma to suggest tube malfunction  2   Mildly cirrhotic liver  3   Cholelithiasis with no acute cholecystitis with trace pleural effusions  4   Bilateral lower lobe consolidation          Results  No results found for this or any previous visit (from the past 1 hour(s)) ]  Lab Results   Component Value Date    PSA 0 3 09/08/2016     Lab Results   Component Value Date    GLUCOSE 123 08/20/2022    CALCIUM 9 0 08/28/2022     12/09/2015    K 3 6 08/28/2022    CO2 25 08/28/2022     08/28/2022    BUN 16 08/28/2022    CREATININE 0 86 08/28/2022     Lab Results   Component Value Date    WBC 12 63 (H) 08/28/2022    HGB 13 6 08/28/2022    HCT 40 2 08/28/2022    MCV 88 08/28/2022     08/28/2022 Please Note:  Voice dictation software has been used to create this document  There may be inadvertent transcriptions errors       Malgorzata Ramirez

## 2022-11-08 NOTE — H&P
11/8/2022    No chief complaint on file  Assessment and Plan    61 y o  male     1  Nephrolithiasis  · History and physical was performed for the patient's upcoming cystoscopy, right ureteroscopy with laser lithotripsy, right retrograde pyelogram, and right ureteral stent placement and removal of right PCN on 11/10/2022 with Dr Kleber Alejo  Technique, benefits, and risk of the procedure listed above were discussed with them today and informed written consent was obtained  All questions and concerns regarding surgery and perioperative expectations have been addressed and answered  No overall changes in their health since last visit  Denies any prior complications with anesthesia  · He will received Gentamicin 160 mg IM today as pre-operative urine culture was positive  · He will also go for BMP and CBC today   · I will send 5 tablets of oxycodone 5 mg to his pharmacy as he continues to complain of severe right flank pain  History of Present Illness  Rizwan Ramirez is a 61 y o  male here for history and physical as he is scheduled to undergo a cystoscopy, right ureteroscopy with laser lithotripsy, right retrograde pyelogram, and right ureteral stent placement on 11/10/2022 by Dr Kleber Alejo  Patient has a history of a proximal right ureteral calculi and right staghorn calculus and is status post right PCN placement on 08/20/2022  He had originally presented to [address] [place] [address] Emergency Department on 08/20 for altered mental status and BRAULIO, and required transferred to [facility]  He was seen in consultation on 08/23/2022 for moderate right-sided hydronephrosis secondary to a 1 x 3 x 2 7 centimeter UVJ calculus along with the right upper pole staghorn calculus  Interventional Radiology placed a right PCN on 08/20 with plan for definitive stone management as a outpatient      Preoperative urine culture from 11/05/2022 was positive for>100,000 cfu/ml Escherichia coli,  >100,000 cfu/ml Klebsiella oxytoca, >100,000 cfu/ml Serratia liquefaciens  He will receive 1 dose of gentamicin 160 milligrams IM today  Review of Systems   Constitutional: Negative for chills and fever  HENT: Negative for congestion and sore throat  Respiratory: Negative for cough and shortness of breath  Cardiovascular: Negative for chest pain and leg swelling  Gastrointestinal: Negative for abdominal pain, constipation, diarrhea, nausea and vomiting  Genitourinary: Positive for flank pain (right)  Negative for difficulty urinating, dysuria, frequency, hematuria, testicular pain and urgency  Musculoskeletal: Negative for back pain and gait problem  Skin: Negative for wound  Allergic/Immunologic: Negative for immunocompromised state  Neurological: Negative for dizziness, weakness and numbness  Hematological: Does not bruise/bleed easily  Vitals  Vitals:    11/08/22 1530   BP: 122/74   Pulse: 66   Weight: 69 4 kg (153 lb)       Physical Exam  Vitals reviewed  Constitutional:       General: He is not in acute distress  Appearance: Normal appearance  He is not ill-appearing or toxic-appearing  HENT:      Head: Normocephalic and atraumatic  Eyes:      General: No scleral icterus  Conjunctiva/sclera: Conjunctivae normal    Cardiovascular:      Rate and Rhythm: Normal rate  Pulmonary:      Effort: Pulmonary effort is normal  No respiratory distress  Abdominal:      General: Abdomen is flat  Palpations: Abdomen is soft  Tenderness: There is no abdominal tenderness  There is right CVA tenderness  There is no left CVA tenderness  Hernia: No hernia is present  Comments: Right PCN draining cloudy yellow urine to gravity   Musculoskeletal:      Cervical back: Normal range of motion  Right lower leg: No edema  Left lower leg: No edema  Skin:     General: Skin is warm and dry  Coloration: Skin is not jaundiced or pale     Neurological:      General: No focal deficit present  Mental Status: He is alert and oriented to person, place, and time  Mental status is at baseline  Gait: Gait normal    Psychiatric:         Mood and Affect: Mood normal          Behavior: Behavior normal          Thought Content: Thought content normal          Judgment: Judgment normal          Past History  Past Medical History:   Diagnosis Date   • Anxiety    • Bright red rectal bleeding     Last Assessed: 9/9/2015    • Drug dependence ([facility] ) 10/20/2021   • Hypertension     Last Assessed: 7/9/2014    • Kidney stone    • Kidney stones     Last Assessed: 11/17/2016    • Periorbital edema     Last Assessed: 9/4/2015   • Septic shock ([facility] ) 08/20/2022   • Skin tag of anus     Last Assessed: 9/9/2015    • Trigger point of thoracic region     Last Assessed: 11/6/2015      Social History     Socioeconomic History   • Marital status:      Spouse name: None   • Number of children: None   • Years of education: None   • Highest education level: None   Occupational History   • None   Tobacco Use   • Smoking status: Current Every Day Smoker     Packs/day: 2 00     Years: 35 00     Pack years: 70 00   • Smokeless tobacco: Never Used   Vaping Use   • Vaping Use: Never used   Substance and Sexual Activity   • Alcohol use: No   • Drug use: Not Currently   • Sexual activity: None   Other Topics Concern   • None   Social History Narrative    Caffeine Use     Uses safety Equipment- Seatbelts      Social Determinants of Health     Financial Resource Strain: Not on file   Food Insecurity: No Food Insecurity   • Worried About Running Out of Food in the Last Year: Never true   • Ran Out of Food in the Last Year: Never true   Transportation Needs: No Transportation Needs   • Lack of Transportation (Medical): No   • Lack of Transportation (Non-Medical):  No   Physical Activity: Not on file   Stress: Not on file   Social Connections: Not on file   Intimate Partner Violence: Not on file   Housing Stability: Unknown   • Unable to Pay for Housing in the Last Year: No   • Number of Places Lived in the Last Year: Not on file   • Unstable Housing in the Last Year: No     Social History     Tobacco Use   Smoking Status Current Every Day Smoker   • Packs/day: 2 00   • Years: 35 00   • Pack years: 70 00   Smokeless Tobacco Never Used     Family History   Problem Relation Age of Onset   • Heart attack Father        The following portions of the patient's history were reviewed and updated as appropriate allergies, current medications, past medical history, past social history, past surgical history and problem list    [email or web address]  CT ABDOMEN AND PELVIS WITHOUT IV CONTRAST     INDICATION:   Flank pain, kidney stone suspected  acute on chronic R flank pain  Hx recent nephrostomy to bypass known ureteral and staghorn calculi  Eval for interval change, PCN conplication        COMPARISON:  2022     TECHNIQUE:  CT examination of the abdomen and pelvis was performed without intravenous contrast  Axial, sagittal, and coronal 2D reformatted images were created from the source data and submitted for interpretation       Radiation dose length product (DLP) for this visit:  625 2 mGy-cm   This examination, like all CT scans performed in the [facility], was performed utilizing techniques to minimize radiation dose exposure, including the use of iterative   reconstruction and automated exposure control       Enteric contrast was not administered       FINDINGS:     ABDOMEN     LOWER CHEST:  Peripheral bibasilar consolidation unchanged concerning for infection versus atelectasis  Trace pleural effusions with small stable pericardial effusion      LIVER/BILIARY TREE:  The liver demonstrates cirrhotic morphology  Within the limitations of this examination there is no evidence of suspicious hepatic mass    No biliary dilatation        GALLBLADDER:  There are gallstone(s) within the gallbladder, without pericholecystic inflammatory changes      SPLEEN:  Unremarkable      PANCREAS:  Unremarkable      ADRENAL GLANDS:  Unremarkable      KIDNEYS/URETERS:  Interval placement of right percutaneous prostate tube with no hydronephrosis or perinephric collection to suggest tube malfunction  Large upper to mid pole staghorn calculus again noted as is 1 3 x 2 7cm affecting calculus at the   right UPJ      No obstruction of the left kidney with minimal perinephric edema now noted      STOMACH AND BOWEL:  There is colonic diverticulosis without evidence of acute diverticulitis      APPENDIX:  No findings to suggest appendicitis      ABDOMINOPELVIC CAVITY:  Trace left upper quadrant ascites  No abdominal pelvic lymphadenopathy or pneumoperitoneum      VESSELS:  Atherosclerotic changes are present  No evidence of aneurysm      PELVIS     REPRODUCTIVE ORGANS:  Unremarkable for patient's age      URINARY BLADDER:  Unremarkable      ABDOMINAL WALL/INGUINAL REGIONS:  Unremarkable      OSSEOUS STRUCTURES:  No acute fracture with evidence of L4 vertebroplasty      IMPRESSION:        1  Status post right percutaneous nephrostomy tube placement for staghorn calculus and obstructing proximal ureteral calculus, no hydronephrosis or urinoma to suggest tube malfunction  2   Mildly cirrhotic liver  3   Cholelithiasis with no acute cholecystitis with trace pleural effusions  4   Bilateral lower lobe consolidation          Results  No results found for this or any previous visit (from the past 1 hour(s)) ]  Lab Results   Component Value Date    PSA 0 3 09/08/2016     Lab Results   Component Value Date    GLUCOSE 123 08/20/2022    CALCIUM 9 0 08/28/2022     12/09/2015    K 3 6 08/28/2022    CO2 25 08/28/2022     08/28/2022    BUN 16 08/28/2022    CREATININE 0 86 08/28/2022     Lab Results   Component Value Date    WBC 12 63 (H) 08/28/2022    HGB 13 6 08/28/2022    HCT 40 2 08/28/2022    MCV 88 08/28/2022     08/28/2022 Please Note:  Voice dictation software has been used to create this document  There may be inadvertent transcriptions errors       [place] Duque

## 2022-11-09 ENCOUNTER — ANESTHESIA EVENT (OUTPATIENT)
Dept: PERIOP | Facility: HOSPITAL | Age: 57
End: 2022-11-09

## 2022-11-09 ENCOUNTER — APPOINTMENT (OUTPATIENT)
Dept: LAB | Facility: MEDICAL CENTER | Age: 57
End: 2022-11-09

## 2022-11-09 ENCOUNTER — TELEPHONE (OUTPATIENT)
Dept: UROLOGY | Facility: CLINIC | Age: 57
End: 2022-11-09

## 2022-11-09 DIAGNOSIS — N20.0 NEPHROLITHIASIS: ICD-10-CM

## 2022-11-09 DIAGNOSIS — N20.0 STAGHORN CALCULUS: ICD-10-CM

## 2022-11-09 DIAGNOSIS — N20.1 URETERAL CALCULI: ICD-10-CM

## 2022-11-09 LAB
ALBUMIN SERPL BCP-MCNC: 3.1 G/DL (ref 3.5–5)
ALP SERPL-CCNC: 73 U/L (ref 46–116)
ALT SERPL W P-5'-P-CCNC: 14 U/L (ref 12–78)
ANION GAP SERPL CALCULATED.3IONS-SCNC: 6 MMOL/L (ref 4–13)
AST SERPL W P-5'-P-CCNC: 20 U/L (ref 5–45)
BASOPHILS # BLD AUTO: 0.06 THOUSANDS/ÂΜL (ref 0–0.1)
BASOPHILS NFR BLD AUTO: 1 % (ref 0–1)
BILIRUB SERPL-MCNC: 0.6 MG/DL (ref 0.2–1)
BUN SERPL-MCNC: 13 MG/DL (ref 5–25)
CALCIUM ALBUM COR SERPL-MCNC: 9.9 MG/DL (ref 8.3–10.1)
CALCIUM SERPL-MCNC: 9.2 MG/DL (ref 8.3–10.1)
CHLORIDE SERPL-SCNC: 109 MMOL/L (ref 96–108)
CO2 SERPL-SCNC: 25 MMOL/L (ref 21–32)
CREAT SERPL-MCNC: 1.15 MG/DL (ref 0.6–1.3)
EOSINOPHIL # BLD AUTO: 0.5 THOUSAND/ÂΜL (ref 0–0.61)
EOSINOPHIL NFR BLD AUTO: 6 % (ref 0–6)
ERYTHROCYTE [DISTWIDTH] IN BLOOD BY AUTOMATED COUNT: 14.6 % (ref 11.6–15.1)
GFR SERPL CREATININE-BSD FRML MDRD: 69 ML/MIN/1.73SQ M
GLUCOSE P FAST SERPL-MCNC: 88 MG/DL (ref 65–99)
HCT VFR BLD AUTO: 43.6 % (ref 36.5–49.3)
HGB BLD-MCNC: 14.4 G/DL (ref 12–17)
IMM GRANULOCYTES # BLD AUTO: 0.03 THOUSAND/UL (ref 0–0.2)
IMM GRANULOCYTES NFR BLD AUTO: 0 % (ref 0–2)
LYMPHOCYTES # BLD AUTO: 1.72 THOUSANDS/ÂΜL (ref 0.6–4.47)
LYMPHOCYTES NFR BLD AUTO: 22 % (ref 14–44)
MCH RBC QN AUTO: 29.1 PG (ref 26.8–34.3)
MCHC RBC AUTO-ENTMCNC: 33 G/DL (ref 31.4–37.4)
MCV RBC AUTO: 88 FL (ref 82–98)
MONOCYTES # BLD AUTO: 0.71 THOUSAND/ÂΜL (ref 0.17–1.22)
MONOCYTES NFR BLD AUTO: 9 % (ref 4–12)
NEUTROPHILS # BLD AUTO: 4.97 THOUSANDS/ÂΜL (ref 1.85–7.62)
NEUTS SEG NFR BLD AUTO: 62 % (ref 43–75)
NRBC BLD AUTO-RTO: 0 /100 WBCS
PLATELET # BLD AUTO: 188 THOUSANDS/UL (ref 149–390)
PMV BLD AUTO: 10.7 FL (ref 8.9–12.7)
POTASSIUM SERPL-SCNC: 4 MMOL/L (ref 3.5–5.3)
PROT SERPL-MCNC: 6.7 G/DL (ref 6.4–8.4)
RBC # BLD AUTO: 4.95 MILLION/UL (ref 3.88–5.62)
SODIUM SERPL-SCNC: 140 MMOL/L (ref 135–147)
WBC # BLD AUTO: 7.99 THOUSAND/UL (ref 4.31–10.16)

## 2022-11-09 NOTE — TELEPHONE ENCOUNTER
I contacted patient over the phone in regards to follow-up after he presented to the office this afternoon complaining of flank pain  Maya Espinal He continues to complain of severe right flank pain and did not wish to talk with me in detail  He did however give verbal consent for me to talk about his symptoms with his mother and make further recommendations  I spoke with patient's mother Genny Rodriguez who reports that Joe Zimmerman has been experiencing severe right flank pain with new onset of left flank pain for several days  This is relatively unchanged from his office exam yesterday  He has been using oxycodone as prescribed by me yesterday with little relief of his pain  She denies any fevers, chills, nausea, or vomiting  He has been experiencing similar pain for several weeks  I explained to her that I feel his symptoms are more related to his pain rather than sepsis, but advised to continue to monitor for any fevers, chills, nausea, vomiting, or changes in mental status  I reinforced the importance of Joe Zimmerman showing up tomorrow for his scheduled surgery with Dr Song Amaya and reviewed ER precautions in the meantime  She verbalized understanding and was agreeable to recommendations      Ana Chung

## 2022-11-09 NOTE — TELEPHONE ENCOUNTER
Pt stopped in office today stating he does not feel his infection is going away due to level of pain he is experiencing  He stated he does not want to waste time going to his surgery tomorrow as he feels he still has an infection and does not feel well  Pt was then advised per NP's recommendations to go to the emergency department for evaluation and potential IV treatment for infection  If treated he may still have surgery  Pt stated he wanted more medication and will not go to the hospital  Pt was also advised he should set up an appt with IR to have his tube changed  Pt was informed that it is against medical advice to go home and not be evaluated for possible sepsis  Pt expressed understanding and stated he is "just going to go home and die"      Sending to Dev Garcia NP for any additional documentation if needed

## 2022-11-09 NOTE — TELEPHONE ENCOUNTER
I did not personally evaluate the patient today however I did see him in the office yesterday for his history and physical as he is scheduled to undergo surgery on 11/10  During his office visit yesterday he complained of severe right flank pain secondary to the nephrostomy tube and ureteral calculi  He denied experiencing any fevers or chills  He received gentamicin 160 mg IM as his pre-operative urine culture was positive for multiple drug-resistant organisms  I advised the patient go to the emergency department for further evaluation and potential need for IV antibiotics for the treatment of his urinary tract infection  I was informed that the patient refused recommendations for further evaluation at the emergency department and was rather going to go home and "die"  I will attempt to contact the patient personally to review recommendations and reassess his symptoms

## 2022-11-10 ENCOUNTER — APPOINTMENT (OUTPATIENT)
Dept: RADIOLOGY | Facility: HOSPITAL | Age: 57
End: 2022-11-10

## 2022-11-10 ENCOUNTER — TELEPHONE (OUTPATIENT)
Dept: UROLOGY | Facility: CLINIC | Age: 57
End: 2022-11-10

## 2022-11-10 ENCOUNTER — HOSPITAL ENCOUNTER (OUTPATIENT)
Facility: HOSPITAL | Age: 57
Setting detail: OUTPATIENT SURGERY
Discharge: HOME | End: 2022-11-10
Attending: UROLOGY | Admitting: UROLOGY

## 2022-11-10 ENCOUNTER — TELEPHONE (OUTPATIENT)
Dept: OTHER | Facility: OTHER | Age: 57
End: 2022-11-10

## 2022-11-10 ENCOUNTER — ANESTHESIA (OUTPATIENT)
Dept: PERIOP | Facility: HOSPITAL | Age: 57
End: 2022-11-10

## 2022-11-10 VITALS
TEMPERATURE: 97.6 F | BODY MASS INDEX: 24.91 KG/M2 | DIASTOLIC BLOOD PRESSURE: 62 MMHG | WEIGHT: 155 LBS | RESPIRATION RATE: 16 BRPM | SYSTOLIC BLOOD PRESSURE: 109 MMHG | HEART RATE: 62 BPM | OXYGEN SATURATION: 99 % | HEIGHT: 66 IN

## 2022-11-10 DIAGNOSIS — N13.5 URETERAL OBSTRUCTION, RIGHT: Primary | ICD-10-CM

## 2022-11-10 DIAGNOSIS — N20.0 STAGHORN CALCULUS: ICD-10-CM

## 2022-11-10 PROBLEM — F19.10 DRUG ABUSE, EPISODIC USE (HCC): Status: ACTIVE | Noted: 2022-11-10

## 2022-11-10 PROBLEM — F19.11 DRUG ABUSE IN REMISSION (HCC): Status: ACTIVE | Noted: 2022-11-10

## 2022-11-10 PROBLEM — F17.200 SMOKING: Status: ACTIVE | Noted: 2022-11-10

## 2022-11-10 RX ORDER — ONDANSETRON 2 MG/ML
INJECTION INTRAMUSCULAR; INTRAVENOUS AS NEEDED
Status: DISCONTINUED | OUTPATIENT
Start: 2022-11-10 | End: 2022-11-10

## 2022-11-10 RX ORDER — PHENAZOPYRIDINE HYDROCHLORIDE 200 MG/1
200 TABLET, FILM COATED ORAL 3 TIMES DAILY PRN
Qty: 30 TABLET | Refills: 0 | Status: SHIPPED | OUTPATIENT
Start: 2022-11-10

## 2022-11-10 RX ORDER — NITROFURANTOIN 25; 75 MG/1; MG/1
100 CAPSULE ORAL 2 TIMES DAILY
Qty: 14 CAPSULE | Refills: 0 | Status: SHIPPED | OUTPATIENT
Start: 2022-11-10

## 2022-11-10 RX ORDER — MIDAZOLAM HYDROCHLORIDE 2 MG/2ML
INJECTION, SOLUTION INTRAMUSCULAR; INTRAVENOUS AS NEEDED
Status: DISCONTINUED | OUTPATIENT
Start: 2022-11-10 | End: 2022-11-10

## 2022-11-10 RX ORDER — CIPROFLOXACIN 2 MG/ML
400 INJECTION, SOLUTION INTRAVENOUS ONCE
Status: COMPLETED | OUTPATIENT
Start: 2022-11-10 | End: 2022-11-10

## 2022-11-10 RX ORDER — SODIUM CHLORIDE, SODIUM LACTATE, POTASSIUM CHLORIDE, CALCIUM CHLORIDE 600; 310; 30; 20 MG/100ML; MG/100ML; MG/100ML; MG/100ML
125 INJECTION, SOLUTION INTRAVENOUS CONTINUOUS
Status: DISCONTINUED | OUTPATIENT
Start: 2022-11-10 | End: 2022-11-10 | Stop reason: HOSPADM

## 2022-11-10 RX ORDER — DEXAMETHASONE SODIUM PHOSPHATE 10 MG/ML
INJECTION, SOLUTION INTRAMUSCULAR; INTRAVENOUS AS NEEDED
Status: DISCONTINUED | OUTPATIENT
Start: 2022-11-10 | End: 2022-11-10

## 2022-11-10 RX ORDER — HYDROMORPHONE HYDROCHLORIDE 2 MG/1
2 TABLET ORAL EVERY 4 HOURS PRN
Status: DISCONTINUED | OUTPATIENT
Start: 2022-11-10 | End: 2022-11-10 | Stop reason: HOSPADM

## 2022-11-10 RX ORDER — LIDOCAINE HYDROCHLORIDE 10 MG/ML
0.5 INJECTION, SOLUTION EPIDURAL; INFILTRATION; INTRACAUDAL; PERINEURAL ONCE AS NEEDED
Status: DISCONTINUED | OUTPATIENT
Start: 2022-11-10 | End: 2022-11-10 | Stop reason: HOSPADM

## 2022-11-10 RX ORDER — LIDOCAINE HYDROCHLORIDE 10 MG/ML
INJECTION, SOLUTION EPIDURAL; INFILTRATION; INTRACAUDAL; PERINEURAL AS NEEDED
Status: DISCONTINUED | OUTPATIENT
Start: 2022-11-10 | End: 2022-11-10

## 2022-11-10 RX ORDER — PHENAZOPYRIDINE HYDROCHLORIDE 100 MG/1
200 TABLET, FILM COATED ORAL ONCE
Status: COMPLETED | OUTPATIENT
Start: 2022-11-10 | End: 2022-11-10

## 2022-11-10 RX ORDER — MAGNESIUM HYDROXIDE 1200 MG/15ML
LIQUID ORAL AS NEEDED
Status: DISCONTINUED | OUTPATIENT
Start: 2022-11-10 | End: 2022-11-10 | Stop reason: HOSPADM

## 2022-11-10 RX ORDER — METOCLOPRAMIDE HYDROCHLORIDE 5 MG/ML
10 INJECTION INTRAMUSCULAR; INTRAVENOUS ONCE AS NEEDED
Status: DISCONTINUED | OUTPATIENT
Start: 2022-11-10 | End: 2022-11-10 | Stop reason: HOSPADM

## 2022-11-10 RX ORDER — PROPOFOL 10 MG/ML
INJECTION, EMULSION INTRAVENOUS AS NEEDED
Status: DISCONTINUED | OUTPATIENT
Start: 2022-11-10 | End: 2022-11-10

## 2022-11-10 RX ORDER — DEXMEDETOMIDINE HYDROCHLORIDE 100 UG/ML
INJECTION, SOLUTION INTRAVENOUS AS NEEDED
Status: DISCONTINUED | OUTPATIENT
Start: 2022-11-10 | End: 2022-11-10

## 2022-11-10 RX ORDER — FENTANYL CITRATE 50 UG/ML
INJECTION, SOLUTION INTRAMUSCULAR; INTRAVENOUS AS NEEDED
Status: DISCONTINUED | OUTPATIENT
Start: 2022-11-10 | End: 2022-11-10

## 2022-11-10 RX ORDER — FENTANYL CITRATE/PF 50 MCG/ML
25 SYRINGE (ML) INJECTION
Status: DISCONTINUED | OUTPATIENT
Start: 2022-11-10 | End: 2022-11-10 | Stop reason: HOSPADM

## 2022-11-10 RX ORDER — HYDROMORPHONE HYDROCHLORIDE 2 MG/1
2 TABLET ORAL EVERY 4 HOURS PRN
Qty: 10 TABLET | Refills: 0 | Status: SHIPPED | OUTPATIENT
Start: 2022-11-10

## 2022-11-10 RX ADMIN — DEXMEDETOMIDINE 12 MCG: 100 INJECTION, SOLUTION, CONCENTRATE INTRAVENOUS at 09:20

## 2022-11-10 RX ADMIN — FENTANYL CITRATE 25 MCG: 50 INJECTION, SOLUTION INTRAMUSCULAR; INTRAVENOUS at 09:15

## 2022-11-10 RX ADMIN — FENTANYL CITRATE 50 MCG: 50 INJECTION, SOLUTION INTRAMUSCULAR; INTRAVENOUS at 09:31

## 2022-11-10 RX ADMIN — FENTANYL CITRATE 25 MCG: 50 INJECTION, SOLUTION INTRAMUSCULAR; INTRAVENOUS at 09:09

## 2022-11-10 RX ADMIN — TOBRAMYCIN SULFATE 320 MG: 40 INJECTION, SOLUTION INTRAMUSCULAR; INTRAVENOUS at 09:05

## 2022-11-10 RX ADMIN — DEXAMETHASONE SODIUM PHOSPHATE 8 MG: 10 INJECTION INTRAMUSCULAR; INTRAVENOUS at 09:15

## 2022-11-10 RX ADMIN — PHENYLEPHRINE HYDROCHLORIDE 25 MCG/MIN: 10 INJECTION, SOLUTION INTRAVENOUS at 09:16

## 2022-11-10 RX ADMIN — PROPOFOL 200 MG: 10 INJECTION, EMULSION INTRAVENOUS at 09:03

## 2022-11-10 RX ADMIN — FENTANYL CITRATE 50 MCG: 50 INJECTION, SOLUTION INTRAMUSCULAR; INTRAVENOUS at 09:03

## 2022-11-10 RX ADMIN — ONDANSETRON 4 MG: 2 INJECTION INTRAMUSCULAR; INTRAVENOUS at 09:29

## 2022-11-10 RX ADMIN — PHENAZOPYRIDINE 200 MG: 100 TABLET ORAL at 10:14

## 2022-11-10 RX ADMIN — CIPROFLOXACIN: 2 INJECTION, SOLUTION INTRAVENOUS at 08:59

## 2022-11-10 RX ADMIN — SODIUM CHLORIDE, SODIUM LACTATE, POTASSIUM CHLORIDE, AND CALCIUM CHLORIDE 125 ML/HR: .6; .31; .03; .02 INJECTION, SOLUTION INTRAVENOUS at 08:37

## 2022-11-10 RX ADMIN — DEXMEDETOMIDINE 12 MCG: 100 INJECTION, SOLUTION, CONCENTRATE INTRAVENOUS at 09:24

## 2022-11-10 RX ADMIN — DEXMEDETOMIDINE 16 MCG: 100 INJECTION, SOLUTION, CONCENTRATE INTRAVENOUS at 09:28

## 2022-11-10 RX ADMIN — LIDOCAINE HYDROCHLORIDE 50 MG: 10 INJECTION, SOLUTION EPIDURAL; INFILTRATION; INTRACAUDAL; PERINEURAL at 09:03

## 2022-11-10 RX ADMIN — MIDAZOLAM HYDROCHLORIDE 2 MG: 1 INJECTION, SOLUTION INTRAMUSCULAR; INTRAVENOUS at 08:59

## 2022-11-10 NOTE — TELEPHONE ENCOUNTER
Called and spoke with patients mother Mari Spencer  Post Op Note    Anna Marie Monsalve is a 61 y o  male s/p CYSTOSCOPY URETEROSCOPY  right RETROGRADE PYELOGRAM (Right Ureter) performed 11/10/2022 by Dr Christos Hwang  He is managed in the [place] office    How would you rate your pain on a scale from 1 to 10, 10 being the worst pain ever? Mother reports patient not available right now, however he is having some flank pain and burning in the urethra  She reports she spoke with him and he is tolerating pain right now  Has antibiotic and pyridium on hand and will  Dilaudid at the pharmacy tomorrow for patient  Will also have him hydrate well with water  Have you had a fever? No    Do you have any difficulty urinating? No    Do you have any other questions or concerns that I can address at this time? Mother reports patient has no other concerns at this time  He is scheduled for upcoming appointments with nephrology and interventional radiology  Aware to have CT abdomen/pelvis completed and mother was provided with central scheduling phone number  Patient scheduled for 1 month follow up with Dr Christos Hwang in the [place] office

## 2022-11-10 NOTE — DISCHARGE INSTRUCTIONS
Expect to see blood in the urine, and have burning urgency and frequency of urination  Unfortunately I was unable to get to the stone  I was only able to get MCFP up the ureter and then it was completely blocked  It appears to be scar tissue along the way as well  It would not accommodate my instruments  You do not have any stent  Interventional Radiology has been notified to contact you to set up right nephrostomy tube exchange as well as inject dye down the tube to see where the obstruction is  We will also order another CT scan  We will call you to set up an appointment for further discussion with us regarding management of this  Call for fever greater than 101 5°, severe pain not relieved by pain medications  No driving or operating machinery if taking pain medications

## 2022-11-10 NOTE — TELEPHONE ENCOUNTER
Spoke with patients mother  [facility] only has 4 mg of Dilaudid in stock, however they can get the 2 mg in tomorrow   She spoke with patient and he is ok to wait until tomorrow to  correct dose

## 2022-11-10 NOTE — TELEPHONE ENCOUNTER
Pharmacy, [facility] called and they wanted to let Dr Teto Ford know that they do not have any of the 2 mg hydromorphone in stock    Paged on call Abbey Goldmann via TC , message was read

## 2022-11-10 NOTE — INTERVAL H&P NOTE
H&P reviewed  After examining the patient I find no changes in the patients condition since the H&P had been written      Vitals:    11/10/22 0813   BP: (!) 185/80   Pulse: 62   Resp: 18   Temp: 98 6 °F (37 °C)   SpO2: 99%

## 2022-11-10 NOTE — TELEPHONE ENCOUNTER
----- Message from Quinton Alcala MD sent at 11/10/2022 10:12 AM EST -----  Please range interventional radiology referral which I ordered-also the patient needs a CT scan scheduled and then follow up with me in 3-4 weeks

## 2022-11-10 NOTE — ANESTHESIA POSTPROCEDURE EVALUATION
Post-Op Assessment Note    CV Status:  Stable    Pain management: adequate     Mental Status:  Alert and awake   Hydration Status:  Euvolemic   PONV Controlled:  Controlled   Airway Patency:  Patent      Post Op Vitals Reviewed: Yes      Staff: CRNA         No complications documented      BP  126/78   Temp      Pulse  68   Resp   18   SpO2   98%

## 2022-11-10 NOTE — ANESTHESIA PREPROCEDURE EVALUATION
Procedure:  CYSTOSCOPY URETEROSCOPY WITH LITHOTRIPSY HOLMIUM LASER, RETROGRADE PYELOGRAM AND INSERTION STENT URETERAL (Right Ureter)    Relevant Problems   ANESTHESIA (within normal limits)      CARDIO  Echo: 55%EF, valves OK   (+) Essential hypertension      ENDO (within normal limits)      /RENAL   (+) BRAULIO (acute kidney injury) ([facility] [address] )   (+) Kidney stones      MUSCULOSKELETAL   (+) Rhabdomyolysis      NEURO/PSYCH  Occasional narcotic abuse  Last time was a few days jessica  He is not using daily  (+) Anxiety      PULMONARY   (+) Smoking      Other   (+) Cognitive decline   (+) Compulsive skin picking   (+) Delusional disorder (HCC)   (+) Drug abuse, episodic use (HCC)        Physical Exam    Airway    Mallampati score: II  TM Distance: >3 FB  Neck ROM: full     Dental   lower dentures and upper dentures,     Cardiovascular      Pulmonary      Other Findings        Anesthesia Plan  ASA Score- 2     Anesthesia Type- general with ASA Monitors  Additional Monitors:   Airway Plan: LMA  Plan Factors-Exercise tolerance (METS): >4 METS  Chart reviewed  EKG reviewed  Existing labs reviewed  Patient summary reviewed  Patient is a current smoker  Patient smoked on day of surgery  Induction- intravenous  Postoperative Plan-     Informed Consent- Anesthetic plan and risks discussed with patient  I personally reviewed this patient with the CRNA  Discussed and agreed on the Anesthesia Plan with the CRNA  Pio Tracy

## 2022-11-10 NOTE — OP NOTE
OPERATIVE REPORT  PATIENT NAME: Lyndy Goldmann    :  1965  MRN: [id number]  Pt Location: [facility] OR ROOM 02    SURGERY DATE: 11/10/2022    Surgeon(s) and Role:     * Sonja Mckeon MD - Primary    Preop Diagnosis:  Nephrolithiasis [N20 0]  Staghorn calculus [N20 0]  Ureteral calculi [N20 1]    Post-Op Diagnosis Codes:     * Nephrolithiasis [N20 0]     * Staghorn calculus [N20 0]     * Ureteral calculi [N20 1]  Right ureteral obstruction, impassable  Procedure(s) (LRB):  CYSTOSCOPY URETEROSCOPY  right RETROGRADE PYELOGRAM (Right)    Specimen(s):  * No specimens in log *    Estimated Blood Loss:   Minimal    Drains:  Nephrostomy Right 8 Fr  (Active)   Number of days: 82       Anesthesia Type:   General    Operative Indications:  Nephrolithiasis [N20 0]  Staghorn calculus [N20 0]  Ureteral calculi [N20 1]      Operative Findings:  Could not place wire above pelvic brim-retrograde showed extravasation of contrast and no continuation of the ureter, complete obstruction  Radiopaque staghorn calculus  He will need Interventional Radiology referral for nephrostomy tube change and antegrade nephrostogram   CT scan ordered as well for restaging of stone  He will possibly need renogram to assess differential function as well  This was explained in detail to the patient and his mother and son  Complications:   None    Procedure and Technique:  59-year-old man with extensive history of nephrolithiasis has right staghorn calculus along with a large right proximal ureteral stone  He has been under many procedures in the past and has passed many stones  He is here for definitive treatment with cystoscopy right ureteroscopy laser lithotripsy and we will not place a stent per his request because he cannot tolerate them well  He has an indwelling nephrostomy tube    The risks bleeding infection damage urinary tract need for additional procedures and inability to access stones have been explained he gives informed consent  Patient was brought to the operating identified properly  LMA was induced patient was prepped and draped in dorsal lithotomy position usual fashion  A time-out was performed  Cystoscopy was carried out with a 22 [place]ian cystoscopy sheath and 30 degree lens  Urethra was normal without stricture  Prostate was nonobstructive  Bladder was smooth not trabeculated and there was mild cystitis cystica  Ureteral orifices were orthotopic intact  No tumors or stones  I placed a 0 035 in guidewire up the right ureter and this came to a stop at the pelvic brim  I then advanced the tiger tail catheter over the wire and shot a retrograde and this extravasated promptly into the retroperitoneum and there was no communication whatsoever proximally above the pelvic brim  I then placed a rigid ureteral scope alongside the wire and tried to get up to the pelvic brim but this was unsuccessful due to stricture ring in narrowing of the ureter  This point I abandoned my efforts and removed all my equipment and drained the bladder     I was present for the entire procedure and A qualified resident physician was not available    Patient Disposition:  PACU  and extubated and stable        SIGNATURE: Sheri Chin MD  DATE: November 10, 2022  TIME: 10:07 AM

## 2022-11-17 ENCOUNTER — HOSPITAL ENCOUNTER (OUTPATIENT)
Dept: CT IMAGING | Facility: HOSPITAL | Age: 57
Discharge: HOME | End: 2022-11-17
Attending: UROLOGY

## 2022-11-17 DIAGNOSIS — N20.0 STAGHORN CALCULUS: ICD-10-CM

## 2022-11-17 DIAGNOSIS — N13.5 URETERAL OBSTRUCTION, RIGHT: ICD-10-CM

## 2022-11-17 NOTE — TELEPHONE ENCOUNTER
Radiologist called that they can't get contrast in patient for CT   I called [place] office spoke with Talib Sanchez who checked with KATHY who okayed the CT to be done without contrast

## 2022-11-18 ENCOUNTER — TELEPHONE (OUTPATIENT)
Dept: NEPHROLOGY | Facility: CLINIC | Age: 57
End: 2022-11-18

## 2022-11-18 ENCOUNTER — CONSULT (OUTPATIENT)
Dept: NEPHROLOGY | Facility: CLINIC | Age: 57
End: 2022-11-18

## 2022-11-18 VITALS
BODY MASS INDEX: 23.78 KG/M2 | WEIGHT: 148 LBS | OXYGEN SATURATION: 98 % | DIASTOLIC BLOOD PRESSURE: 78 MMHG | HEART RATE: 72 BPM | HEIGHT: 66 IN | SYSTOLIC BLOOD PRESSURE: 130 MMHG

## 2022-11-18 DIAGNOSIS — N20.0 NEPHROLITHIASIS: ICD-10-CM

## 2022-11-18 DIAGNOSIS — N20.0 STAGHORN CALCULUS: ICD-10-CM

## 2022-11-18 DIAGNOSIS — N17.9 ACUTE KIDNEY INJURY (HCC): Primary | ICD-10-CM

## 2022-11-18 NOTE — PATIENT INSTRUCTIONS
Continue to hydrate to produce 2 5 L of urine per day  In 2 L of tap water, add 4 ounces of lemon juice  Your kidney function is stable, most recent kidney function is 69%  Repeat stone risk profile ASAP  Other blood and urine tests done before next visit  Follow up in 2 months

## 2022-11-18 NOTE — PROGRESS NOTES
[facility]  PROGRESS NOTE - Nephrology    Reyes Conception 61 y o  male MRN: [id number]      A/P:  1  Staghorn calculi  Typically struvite, but increasingly calcium phos  PCN placed by IR in August  Definitive treatment for these stones is surgical      Antibiotic treatment per urology  Evidence limited on choice and duration of therapy (3-6 months)  AHA therapy-urease inhibitor use is under debate and has significant side effects  Can be associated with renal failure which patient had in August    AHA therapy does not reduce existing stone burden, can slow stone growth  20-60% get side effects  Would need to monitor LFT and for bone marrow suppression if pursued  Can continue to discuss with patient about such therapy but not pursuing now  Family and patient confused about direction of care with IR/Urology and what the next steps are  I tigertext the urology PA to notify him to discuss with patient further  2  Nephrolithiasis     Metabolic evaluation necessary  Last stone risk profile in 2016  Should be repeated to understand risks that predispose to stone formation  Ordered repeat one  Encourage fluid intake to produce urine volume of 2 5 L  Previously had low urine citrate and high urine calcium  Reviewed low sodium intake  He has allergy to sulfa unspecified  Depending on results of 24 hour risk profile, could consider thiazide diuretic  Need to assess if he could tolerate  Amiloride can be used in sulfa allergic  Previously on alkali therapy with K citrate  Stone mixed calcium oxalate and phos  Typically staghorn struvite, but can be calcium phos  Avoid empiric therapy, last UA from August  Repeat to understand urine pH  Can add lemon juice to 2 L of water to help with urine citrate  3  BRAULIO in August due to obstructive/septic physiology/prerenal/rhabdo  Most recent Cr 1 1     Cr baseline 1 0    Peaked at 6 0 from nephrology noted  Repeat chem and CK prior to next visit  Check ua,quantify protein  The patient and any family members present were counseled today on risks benefits and alternatives of any medications, and were agreeable to the treatment plan  They were counseled on diagnostic testing if necessary, and projected outcomes as are available and medically indicated  All questions were asked and answered during the encounter  If more questions are to arise the patient will call the office  Alarm and return precautions discussed and accepted  Thank you for allowing us to participate in the care of your patient  History of Present Illness   Physician Requesting Consult: No att  providers found    HPI: Payton Oliveira is a 61y o  year old male who presents for nephrolithiasis evaluation  He has a hx of proximal right ureteral calculi and right staghorn calculi  He is sp right PCN on 8/20/22  He required admission to [facility] on 8/23 for acute renal failure and obstructive uropathy  On 8/23 he had moderate right sided hydronephrosis with 1x 3 x 2 7 cm UVJ cacluli with right upper pole staghorn  IR placed PN on 8/20  He was schedule to undergo cystoscopy , right ureteroscopy with laser lithotripsy , pyelogram and ureteral stent placement on 11/10  Procedure could not be performed and patient referred to IR and nephrology  Patient and mother are confused about why nephrology referred  He had CT abd/pelvis on 11/17 showed right sided percutaneous nephrostomy tube in renal pelis, staghorn calculi 3 0 seen in upper pole of right kidney with additional 1 1 cm caclui in proximal right ureter  Unchanged findings seen since August 2022  No left sided cacluli  Twenty year ago first had stones  Reported blockages at that time and required cystoscopy with stent lacement,lithotripsy in 2013,2014    iN 2016 he had recurrence that required lithotriopsy, right stent placement    Most recently procedures attempted  He went into [facility] due to rhabdo + obstruction in August  Seen by nephrology services and required rodriguez placement and right sided nephrostomy  Cr as high as 6 0 on admission and improved to 1 0 on 8/23  He had a stone risk profile in 2016  Urine volume 3 3 L  Urine calcium was high, urine citrate low  Sample had lower creatinine  Previously on potassium citrate in the past      In 2016 had a mixed stone with calcium oxalate, phos, ca hydrogen phos  Drinks coffee, tea and soda > 60 ounces       Denies recent hematuria  Denies LUTS  Seen by dermatologist for skin condition unclear etiology  Started on dupixent 8 months ago but only had 2 doses  Reports hx HTN, /78  HR 72  Not on any medications  Does not take any medicine  Reports occasional alcohol use  Constitutional ROS- + chills,   HEENT ROS- + blurry vision   Endocrine ROS- No history diabetes mellitus or thyroid disease  Cardiovascular ROS- Denies chest pain, palpitation, dyspnea exertion, orthopnea, claudication  Pulmonary ROS-  Denies cough, hemoptysis, shortness of breath  GI ROS- + nausea   Hematological ROS- Denies history of easy bruising, blood clots, bleeding or blood transfusions  Genitourinary ROS-    Lymphatic ROS- Denies lymphadenopathy  Musculoskeletal ROS-+ joint back  Dermatological ROS- + skin changes  Psychiatric ROS- Denies hallucinations, disorientation  Neurological ROS- No stroke or TIA symptoms       Historical Information   Past Medical History:   Diagnosis Date   • Anxiety    • Bright red rectal bleeding     Last Assessed: 9/9/2015    • Drug dependence ([facility] [address] ) 10/20/2021   • Hypertension     Last Assessed: 7/9/2014    • Kidney stone    • Kidney stones     Last Assessed: 11/17/2016    • Periorbital edema     Last Assessed: 9/4/2015   • Septic shock ([facility] [address] ) 08/20/2022   • Skin tag of anus     Last Assessed: 9/9/2015    • Trigger point of thoracic region     Last Assessed: 11/6/2015 Past Surgical History:   Procedure Laterality Date   • CYSTOSCOPY W/ URETERAL STENT PLACEMENT  03/11/2013   • CYSTOSCOPY W/ URETERAL STENT PLACEMENT  06/18/2014    EXTRACORPOREAL SHOCK WAVE LITHOTRIPSY    • CYSTOSCOPY W/ URETERAL STENT PLACEMENT  07/16/2014    EXTRACORPOREAL SHOCK WAVE LITHOTRIPSY    • CYSTOSCOPY W/ URETERAL STENT REMOVAL  04/11/2013   • CYSTOSCOPY W/ URETERAL STENT REMOVAL Right 08/26/2014   • CYSTOSCOPY W/ URETEROSCOPY W/ LITHOTRIPSY  02/26/2013    Percutaneous lithotomy With Uretal Stent Plcement    • CYSTOSCOPY W/ URETEROSCOPY W/ LITHOTRIPSY  03/11/2014   • CYSTOSCOPY W/ URETEROSCOPY W/ LITHOTRIPSY Right 08/04/2014    With Uretal Stent Placement    • CYSTOSCOPY W/ URETEROSCOPY W/ LITHOTRIPSY Right 05/09/2016   • HEMORRHOID SURGERY     • HERNIA REPAIR  03/11/2013   • IR NEPHROSTOMY TUBE PLACEMENT  8/20/2022   • KIDNEY SURGERY     • LITHOTRIPSY     • AR CYSTO/URETERO W/LITHOTRIPSY &INDWELL STENT INSRT Right 7/13/2016    Procedure: CYSTOSCOPY; URETEROSCOPY WITH HOLMIUM LASER STONE EXTRACTION; RETROGRADE PYELOGRAM; URETERAL STENT INSERTION ;  Surgeon: Leeann Spencer MD;  Location[facility];  Service: Urology   • AR CYSTO/URETERO W/LITHOTRIPSY &INDWELL STENT INSRT Right 5/9/2016    Procedure: CYSTOSCOPY,  URETEROSCOPY,  WITH LITHOTRIPSY HOLMIUM LASER, STONE EXTRACTION, AND INSERTION STENT URETERAL;  Surgeon: Leeann Spencer MD;  Location: [facility];  Service: Urology   • AR CYSTO/URETERO W/LITHOTRIPSY &INDWELL STENT INSRT Right 11/10/2022    Procedure: CYSTOSCOPY URETEROSCOPY  right RETROGRADE PYELOGRAM;  Surgeon: Emy Don MD;  Location: [facility];  Service: Urology   • AR FRAGMENT KIDNEY STONE/ ESWL Right 6/17/2016    Procedure: Ronnie Weller SHOCKWAVE (ESWL);   Surgeon: Leeann Spencer MD;  Location[facility];  Service: Urology   • TRANSURETHRAL RESECTION OF BLADDER     • URETERAL REIMPLANTION  03/11/2013     Social History   Social History     Substance and Sexual Activity   Alcohol Use No     Social History     Substance and Sexual Activity   Drug Use Yes   • Types: Amphetamines    Comment: last used 3 days ago     Social History     Tobacco Use   Smoking Status Every Day   • Packs/day: 2 00   • Years: 35 00   • Pack years: 70 00   • Types: Cigarettes   Smokeless Tobacco Never     Family History   Problem Relation Age of Onset   • Heart attack Father        Meds/Allergies   all current active meds have been reviewed, current meds:     Current Outpatient Medications:   •  nitrofurantoin (MACROBID) 100 mg capsule, Take 1 capsule (100 mg total) by mouth 2 (two) times a day, Disp: 14 capsule, Rfl: 0  •  HYDROmorphone (DILAUDID) 2 mg tablet, Take 1 tablet (2 mg total) by mouth every 4 (four) hours as needed for moderate pain for up to 10 doses Max Daily Amount: 12 mg (Patient not taking: Reported on 11/18/2022), Disp: 10 tablet, Rfl: 0  •  hydrOXYzine HCL (ATARAX) 25 mg tablet, Take 1 tablet (25 mg total) by mouth every 6 (six) hours as needed for anxiety or itching (Patient not taking: Reported on 11/7/2022), Disp: 60 tablet, Rfl: 0  •  oxyCODONE (Roxicodone) 5 immediate release tablet, Take 1 tablet (5 mg total) by mouth every 4 (four) hours as needed for moderate pain Max Daily Amount: 30 mg (Patient not taking: Reported on 11/18/2022), Disp: 5 tablet, Rfl: 0  •  phenazopyridine (PYRIDIUM) 200 mg tablet, Take 1 tablet (200 mg total) by mouth 3 (three) times a day as needed for bladder spasms (Patient not taking: Reported on 11/18/2022), Disp: 30 tablet, Rfl: 0  •  sodium chloride, PF, 0 9 %, 10 mL by Intracatheter route daily Intracatheter flushing daily   May substitute prefilled syringe with normal saline 10 mL vials, 10 mL syringes, and 18 g blunt needles (Patient not taking: Reported on 11/18/2022), Disp: 300 mL, Rfl: 3    Current Facility-Administered Medications:   •  gentamicin (GARAMYCIN) 40 mg/mL injection 60 mg, 1 mg/kg (Ideal), Intramuscular, Once, Ceasar Leach MD Allergies   Allergen Reactions   • Metronidazole Itching and Swelling   • Nsaids GI Intolerance     Stomach irritant   • Penicillin G    • Penicillins GI Intolerance     Sick to stomach   • Pollen Extract    • Sulfa Antibiotics        Objective     Vitals:    11/18/22 1337   BP: 130/78   Pulse: 72   SpO2: 98%       General Appearance:    No acute distress  Cooperative  Appears stated age  Head:    Normocephalic  Atraumatic  Eyes:    Lids, conjunctiva normal  No scleral icterus  Ears:    Normal external ears  Nose:   Nares normal  No drainage  Mouth:   Lips, tongue normal  Mucosa normal    Neck:   Supple  Symmetrical    Back:     Symmetric  No CVA tenderness  Lungs:     Normal respiratory effort  Clear to auscultation bilaterally  Chest wall:    No tenderness or deformity  Heart:    Regular rate and rhythm  Normal S1 and S2  No murmur  No JVD  No edema  Abdomen:     Soft  Non-tender  Bowel sounds active  Genitourinary:   Right pcn, clear yellow urine    Extremities:   Extremities normal  Atraumatic  No cyanosis  Skin:   Warm and dry  No pallor, jaundice, rash, ecchymoses  Neurologic:   Alert and oriented to person, place, time  No focal deficit  Current Weight: Weight - Scale: 67 1 kg (148 lb)    First Weight:    Wt Readings from Last 3 Encounters:   11/18/22 67 1 kg (148 lb)   11/07/22 70 3 kg (155 lb)   11/08/22 69 4 kg (153 lb)        Lab Results:  I have personally reviewed pertinent labs  11/9/22  Na 140 K 4 0 Cl 109 TCO2 25 BUN 13 Cr 1 15 Ca 9 2 ,e GFR 62ml/min       Radiology review:  Procedure: CT abdomen pelvis wo contrast    Result Date: 11/17/2022  Narrative: CT ABDOMEN AND PELVIS WITHOUT IV CONTRAST INDICATION:   N13 5: Crossing vessel and stricture of ureter without hydronephrosis N20 0: Calculus of kidney  Right ureteral obstruction, staghorn calculus and hematuria  COMPARISON:  Several prior CTs the abdomen and pelvis, the most recent from 8/28/2022  TECHNIQUE:  CT examination of the abdomen and pelvis was performed without intravenous contrast  Axial, sagittal, and coronal 2D reformatted images were created from the source data and submitted for interpretation  Radiation dose length product (DLP) for this visit:  405 74 mGy-cm   This examination, like all CT scans performed in the [facility], was performed utilizing techniques to minimize radiation dose exposure, including the use of iterative  reconstruction and automated exposure control  Enteric contrast was not administered  FINDINGS: Evaluation of visceral structures and vasculature is limited by lack of intravenous contrast  ABDOMEN LOWER CHEST: Interval improvement of previously seen opacities at the lung bases  A tubular opacity remains at the right lung base, likely an area of atelectasis or scarring  No pleural effusions  LIVER/BILIARY TREE: The liver is enlarged, measuring 20 1 cm in greatest craniocaudal dimension  It demonstrates a nodular contour compatible with cirrhosis  The unenhanced appearance of the liver is otherwise unremarkable  No biliary ductal dilation  GALLBLADDER:  A small gallstone is seen within the gallbladder  No gallbladder wall thickening or pericholecystic fluid to suggest acute cholecystitis    SPLEEN: Unremarkable unenhanced appearance  PANCREAS:  Unremarkable unenhanced appearance  No dilation of the main pancreatic duct  ADRENAL GLANDS:  Unremarkable unenhanced appearance  KIDNEYS/URETERS:  A right-sided percutaneous nephrostomy tube is seen with the tip in the inferior portion of the renal pelvis  A staghorn calculus measuring approximately 3 0 x 1 5 cm is seen in the upper pole the right kidney with an additional 1 cm right upper pole stone  There is a 1 1 x 0 7 cm calculus in the proximal right ureter  These are essentially unchanged from August 2022  No additional ureteral calculi  No left-sided intrarenal calculi  No hydronephrosis   No definite focal renal lesions  STOMACH AND BOWEL:  Evaluation of the gastrointestinal tract is somewhat limited by underdistention and lack of oral contrast  No bowel obstruction or convincing inflammation  Scattered colonic diverticuli, however no evidence of diverticulitis  APPENDIX:  Normal appendix  ABDOMINOPELVIC CAVITY:  No ascites or pneumoperitoneum  LYMPH NODES: No abdominal or pelvic lymphadenopathy by size criteria  VESSELS: Normal caliber aorta  PELVIS REPRODUCTIVE ORGANS:  The prostate gland is not enlarged  URINARY BLADDER:  The right wall of the urinary bladder is elevated at the level of the right ureterovesicular junction as on prior studies, possibly related to scarring/tethering  ABDOMINAL WALL/INGUINAL REGIONS: No ventral abdominal wall hernia  MUSCULOSKELETAL:  No focal aggressive osseous lesions  The patient is status post kyphoplasty of the L4 vertebral body  Impression: Right upper pole staghorn calculus with an additional smaller calculus and a 1 1 x 0 7 cm calculus in the proximal right ureter, essentially unchanged from August 2022  No additional ureteral calculi  Right-sided percutaneous tuberostomy tube in place  No hydronephrosis  No urinary bladder calculi  No left intrarenal or ureteral calculi  Cirrhotic liver morphology as on prior study  Additional findings as above  Workstation performed: [id number]       Jnenifer Jackson DO      This consultation note was produced in part using a dictation device which may document imprecise wording from author's original intent

## 2022-11-22 ENCOUNTER — HOSPITAL ENCOUNTER (OUTPATIENT)
Dept: INTERVENTIONAL RADIOLOGY/VASCULAR | Facility: HOSPITAL | Age: 57
Discharge: HOME | End: 2022-11-22

## 2022-11-22 DIAGNOSIS — N20.0 KIDNEY STONES: ICD-10-CM

## 2022-11-22 RX ORDER — LIDOCAINE HYDROCHLORIDE 10 MG/ML
INJECTION, SOLUTION EPIDURAL; INFILTRATION; INTRACAUDAL; PERINEURAL AS NEEDED
Status: COMPLETED | OUTPATIENT
Start: 2022-11-22 | End: 2022-11-22

## 2022-11-22 RX ORDER — LIDOCAINE HYDROCHLORIDE 20 MG/ML
JELLY TOPICAL AS NEEDED
Status: COMPLETED | OUTPATIENT
Start: 2022-11-22 | End: 2022-11-22

## 2022-11-22 RX ADMIN — IOHEXOL 20 ML: 300 INJECTION, SOLUTION INTRAVENOUS at 10:52

## 2022-11-22 RX ADMIN — LIDOCAINE HYDROCHLORIDE 1 APPLICATION: 20 JELLY TOPICAL at 10:23

## 2022-11-22 RX ADMIN — LIDOCAINE HYDROCHLORIDE 5 ML: 10 INJECTION, SOLUTION EPIDURAL; INFILTRATION; INTRACAUDAL; PERINEURAL at 10:22

## 2022-11-22 NOTE — PROCEDURES
Interventional Radiology Procedure Note    PATIENT NAME: Mely Alcantara  : 1965  MRN: [id number]     Pre-op Diagnosis:   1  Kidney stones      2  Unable to traverse ureter from retrograde approach  Post-op Diagnosis:   1  Kidney stones      2  Same    Procedure:  Tube check, change, upsize    Surgeon:   Nathanael Wang MD  Assistants:     No qualified resident was available, Resident is only observing    Estimated Blood Loss: Minimal     Findings:  2 part staghorn calculus is again seen  Large stone at the ureteropelvic junction is again seen  Upon initial injection of contrast, there was no flow down the ureter  After injecting several syringes, there was some flow around the UPJ stone, and into the ureter  Not enough to evaluate the ureter  When I advanced a wire into the PCN tube, the coil would not release  When I move the tube, there was a “ghost “filling defect consistent with incrustation of the nephrostomy  When I removed the tube there was visible “sandpaper” on the tube  I advanced a diagnostic catheter past the stone and into the ureter  Contrast was injected  The ureter has a fixed narrowing where it crosses the pelvic brim  There is no evidence of extravasation  There is no stone in the ureter  The Re-plantation site appears patulous  I upsized to 8 [place] 10 [place] tube to make it slightly easier for antegrade approach  The tube was secured in place with 2 sutures      Specimens: None     Complications:  None     Anesthesia: local    Nathanael Wang MD     Date: 2022  Time: 11:07 AM

## 2022-11-22 NOTE — DISCHARGE INSTRUCTIONS
[address]  Interventional Radiology  [phone] after your procedure:    Resume your normal diet  Small sips of flat soda will help with nausea  1  The properly functioning catheter should be forward flushed once (1x) daily with 10ml of normal saline using clean technique  You will be given a prescription for flushes  To flush the tube, clean both connections with alcohol swab  Twist off the drainage bag/ bulb  tubing and twist the saline syringe into the drainage tube and flush  Remove the syringe and twist the drainage bag / bulb tubing tubing back on     2  The drainage bag/bulb may be emptied as necessary  Keep a record of the amount of fluid you drain from your tube  This should be done with clean technique as well  3  A fresh dressing should be applied daily over the tube insertion site  4  As the tube is secured to the skin with only a suture,try not to pull on your tube  Tub baths are not permitted  Showers are permitted if the patient's skin entry site is prevented from getting wet  Similarly, washcloth "baths" are acceptable  Contact Interventional Radiology at [phone] Peña PATIENTS: Contact Interventional Radiology at [phone]) Jill Cardoza PATIENTS: Contact Interventional Radiology at [phone]) if:    1  Leakage or large amounts of liquid around the catheter  2  Fever of 101 degrees lasting several hours without other obvious cause (such as sore throat, flu, etc)  3  Persistent nausea or vomiting  4  Diminished drainage, which may be associated with pressure or pain  Or when the     drainage from your tube is less than 10mls for 48 hours  5  Catheter pulled back or falls out  The following pharmacies carry the flush syringes         [address] [place] [address] [facility]  Phone [phone]            Phone [phone] [facility]                                [phone]  [address] [facility] [address]                      [address] [facility]  Phone [phone]            Phone [phone]                      Ivette Mcdowell                                                                                                          [phone]  [facility]  [facility] [address]    [address]  Phone [phone]

## 2022-12-05 ENCOUNTER — TELEPHONE (OUTPATIENT)
Dept: FAMILY MEDICINE CLINIC | Facility: CLINIC | Age: 57
End: 2022-12-05

## 2022-12-06 NOTE — TELEPHONE ENCOUNTER
Patient's mother called stating patient appointment was cancelled for 12/16/22  She wants to know when its going to be his next appointment  Patient has a nephrostomy tube and needs to come out or change  She would like a call back as soon as possible       She can be reached at [phone]

## 2022-12-06 NOTE — TELEPHONE ENCOUNTER
Returned call to patient's mother Kanchan Salinas and re-scheduled patient to 12/28 @ 11:15 with Dr Dwayne Wells in [place]

## 2022-12-27 NOTE — PROGRESS NOTES
[address] [facility]  [facility]  [address]  [phone]  www  [facility]  org      NAME: Malka Ferrer  AGE: 61 y o  SEX: male  : 1965   MRN: [id number]    DATE: 2022  TIME: 5:24 PM    Assessment and Plan:    Recurrent nephrolithiasis in the right kidney only, and has staghorn calculi  Has been through multiple procedures and has an indwelling nephrostomy tube  He currently does not have through and through access to the bladder  My preference would be nephrectomy  However first I would like to get a function study to assess differential function  After this I will let him know the results and we will probably get consultation with one of our robotic surgeons-he has seen Dr Celso Hughes extensively in the past-for nephrectomy unless they wish to pursue PCNL  If he decides to go PCNL, he will need through and through access prior to doing this  Also this will be done in [facility] or [facility] by one of our PCNL surgeons  Await the results of the nuclear medicine scan  Chief Complaint   No chief complaint on file  History of Present Illness   79-year-old man status post cystoscopy right ureteroscopy and right retrograde pyelography by me November 10, 2022 for staghorn calculus-he had a right ureteral obstruction at the pelvic brim that was impassable  There was extravasation of contrast and no continuation of the ureter  There was a radiopaque staghorn calculus  He already had a nephrostomy tube and this was changed 2022 which showed a large stone again in the kidney and wedged in the UPJ  Only a small amount of contrast with the antegrade nephrostogram went distal to the stone and was not enough to evaluate the ureter  They found a fixed segment of ureter in the true pelvis, just below the sacroiliac joint which was patent but did not show peristalsis    Therefore it was a stricture of that portion of the ureter  No more recent imaging  As of yet we do not have through and through access through the ureter  Also we do not have differential function studies  I had an extensive discussion with Epifanio Pace and his mother regarding the situation  The right kidney is the only kidney that has been affected  He has been having stones surgeries on this kidney since 2000, over 22 years  He has undergone PCNL's and ureteroscopy's with laser lithotripsies  We reviewed the CT scan images ourselves personally  This shows an excellent left kidney, with no stones and the left side has never been a problem  He does have decent renal cortex on the right  I explained to them that the right kidney is basically a toxic kidney that forms these recurrent stones and this has greatly affected his quality of life for quite some time now  It would not be unreasonable to consider nephrectomy especially given now that he has a ureteral stricture and this kidney has basically given him nothing by problems  He has been out of work for the past several months because he is unable to work with a nephrostomy tube and the pain that he always has      The following portions of the patient's history were reviewed and updated as appropriate: allergies, current medications, past family history, past medical history, past social history, past surgical history and problem list   Past Medical History:   Diagnosis Date   • Anxiety    • Bright red rectal bleeding     Last Assessed: 9/9/2015    • Drug dependence ([address] ) 10/20/2021   • Hypertension     Last Assessed: 7/9/2014    • Kidney stone    • Kidney stones     Last Assessed: 11/17/2016    • Periorbital edema     Last Assessed: 9/4/2015   • Septic shock ([address] ) 08/20/2022   • Skin tag of anus     Last Assessed: 9/9/2015    • Trigger point of thoracic region     Last Assessed: 11/6/2015      Past Surgical History:   Procedure Laterality Date   • CYSTOSCOPY W/ URETERAL STENT PLACEMENT  03/11/2013   • CYSTOSCOPY W/ URETERAL STENT PLACEMENT  06/18/2014    EXTRACORPOREAL SHOCK WAVE LITHOTRIPSY    • CYSTOSCOPY W/ URETERAL STENT PLACEMENT  07/16/2014    EXTRACORPOREAL SHOCK WAVE LITHOTRIPSY    • CYSTOSCOPY W/ URETERAL STENT REMOVAL  04/11/2013   • CYSTOSCOPY W/ URETERAL STENT REMOVAL Right 08/26/2014   • CYSTOSCOPY W/ URETEROSCOPY W/ LITHOTRIPSY  02/26/2013    Percutaneous lithotomy With Uretal Stent Plcement    • CYSTOSCOPY W/ URETEROSCOPY W/ LITHOTRIPSY  03/11/2014   • CYSTOSCOPY W/ URETEROSCOPY W/ LITHOTRIPSY Right 08/04/2014    With Uretal Stent Placement    • CYSTOSCOPY W/ URETEROSCOPY W/ LITHOTRIPSY Right 05/09/2016   • HEMORRHOID SURGERY     • HERNIA REPAIR  03/11/2013   • IR NEPHROSTOMY TUBE CHECK/CHANGE/REPOSITION/REINSERTION/UPSIZE  11/22/2022   • IR NEPHROSTOMY TUBE PLACEMENT  8/20/2022   • KIDNEY SURGERY     • LITHOTRIPSY     • NJ CYSTO/URETERO W/LITHOTRIPSY &INDWELL STENT INSRT Right 7/13/2016    Procedure: CYSTOSCOPY; URETEROSCOPY WITH HOLMIUM LASER STONE EXTRACTION; RETROGRADE PYELOGRAM; URETERAL STENT INSERTION ;  Surgeon: June Heart MD;  Location: [facility];  Service: Urology   • NJ CYSTO/URETERO W/LITHOTRIPSY &INDWELL STENT INSRT Right 5/9/2016    Procedure: CYSTOSCOPY,  URETEROSCOPY,  WITH LITHOTRIPSY HOLMIUM LASER, STONE EXTRACTION, AND INSERTION STENT URETERAL;  Surgeon: June Heart MD;  Location: [facility];  Service: Urology   • NJ CYSTO/URETERO W/LITHOTRIPSY &INDWELL STENT INSRT Right 11/10/2022    Procedure: CYSTOSCOPY URETEROSCOPY  right RETROGRADE PYELOGRAM;  Surgeon: Alan Garsia MD;  Location: [facility];  Service: Urology   • NJ FRAGMENT KIDNEY STONE/ ESWL Right 6/17/2016    Procedure: Jaquelin Johnson SHOCKWAVE (ESWL);   Surgeon: June Heart MD;  [facility]: [facility];  Service: Urology   • TRANSURETHRAL RESECTION OF BLADDER     • URETERAL REIMPLANTION  03/11/2013     shoulder  Review of Systems   Review of Systems    Active Problem List     Patient Active Problem List   Diagnosis   • Compulsive skin picking   • Acute metabolic encephalopathy   • Acute respiratory failure with hypoxia (HCC)   • Elevated troponin   • Elevated brain natriuretic peptide (BNP) level   • Essential hypertension   • Transaminitis   • Elevated lipase   • BRAULIO (acute kidney injury) ([facility] )   • Hypernatremia   • Increased anion gap metabolic acidosis   • Rhabdomyolysis   • Elevated d-dimer   • Kidney stones   • Delusional disorder ([facility] )   • Hypophosphatemia   • Cognitive decline   • Anxiety   • Smoking   • Drug abuse, episodic use ([facility])       Objective   There were no vitals taken for this visit  Physical Exam        Current Medications     Current Outpatient Medications:   •  HYDROmorphone (DILAUDID) 2 mg tablet, Take 1 tablet (2 mg total) by mouth every 4 (four) hours as needed for moderate pain for up to 10 doses Max Daily Amount: 12 mg (Patient not taking: Reported on 11/18/2022), Disp: 10 tablet, Rfl: 0  •  hydrOXYzine HCL (ATARAX) 25 mg tablet, Take 1 tablet (25 mg total) by mouth every 6 (six) hours as needed for anxiety or itching (Patient not taking: Reported on 11/7/2022), Disp: 60 tablet, Rfl: 0  •  nitrofurantoin (MACROBID) 100 mg capsule, Take 1 capsule (100 mg total) by mouth 2 (two) times a day, Disp: 14 capsule, Rfl: 0  •  oxyCODONE (Roxicodone) 5 immediate release tablet, Take 1 tablet (5 mg total) by mouth every 4 (four) hours as needed for moderate pain Max Daily Amount: 30 mg (Patient not taking: Reported on 11/18/2022), Disp: 5 tablet, Rfl: 0  •  phenazopyridine (PYRIDIUM) 200 mg tablet, Take 1 tablet (200 mg total) by mouth 3 (three) times a day as needed for bladder spasms (Patient not taking: Reported on 11/18/2022), Disp: 30 tablet, Rfl: 0  •  sodium chloride, PF, 0 9 %, 10 mL by Intracatheter route daily Intracatheter flushing daily   May substitute prefilled syringe with normal saline 10 mL vials, 10 mL syringes, and 18 g blunt needles (Patient not taking: Reported on 11/18/2022), Disp: 300 mL, Rfl: 3    Current Facility-Administered Medications:   •  gentamicin (GARAMYCIN) 40 mg/mL injection 60 mg, 1 mg/kg (Ideal), Intramuscular, Once, MD Cindy Antunez MD

## 2022-12-28 ENCOUNTER — OFFICE VISIT (OUTPATIENT)
Dept: UROLOGY | Facility: CLINIC | Age: 57
End: 2022-12-28

## 2022-12-28 VITALS — SYSTOLIC BLOOD PRESSURE: 118 MMHG | DIASTOLIC BLOOD PRESSURE: 70 MMHG | HEIGHT: 66 IN | BODY MASS INDEX: 23.89 KG/M2

## 2022-12-28 DIAGNOSIS — N20.0 STAGHORN CALCULUS: ICD-10-CM

## 2022-12-28 DIAGNOSIS — N13.5 URETERAL OBSTRUCTION, RIGHT: ICD-10-CM

## 2022-12-28 DIAGNOSIS — N20.0 NEPHROLITHIASIS: ICD-10-CM

## 2022-12-28 DIAGNOSIS — N20.1 URETERAL CALCULI: Primary | ICD-10-CM

## 2022-12-28 RX ORDER — HYDROMORPHONE HYDROCHLORIDE 2 MG/1
2 TABLET ORAL EVERY 4 HOURS PRN
Qty: 10 TABLET | Refills: 0 | Status: SHIPPED | OUTPATIENT
Start: 2022-12-28

## 2022-12-29 ENCOUNTER — VBI (OUTPATIENT)
Dept: ADMINISTRATIVE | Facility: OTHER | Age: 57
End: 2022-12-29

## 2023-01-16 ENCOUNTER — HOSPITAL ENCOUNTER (OUTPATIENT)
Dept: NUCLEAR MEDICINE | Facility: HOSPITAL | Age: 58
Discharge: HOME | End: 2023-01-16
Attending: UROLOGY

## 2023-01-16 DIAGNOSIS — N20.0 STAGHORN CALCULUS: ICD-10-CM

## 2023-01-16 DIAGNOSIS — N20.1 URETERAL CALCULI: ICD-10-CM

## 2023-01-16 DIAGNOSIS — N20.0 NEPHROLITHIASIS: ICD-10-CM

## 2023-01-19 NOTE — RESULT ENCOUNTER NOTE
I called the patient's mother with the results  he has a 21 yr hx of recurrent staghorn calculi of the right kidney ONLY  Has right neph tube in place  Has current right staghorn calculi  Has never had a problem with the left  Renal function scan with Lasix was done with the nephrostomy tube unclamped  Obviously no obstruction seen and the right kidney is 42%  I explained this to his mother and I still think that as per our previous conversation that right nephrectomy is the answer to this problem  He has been through multiple extended PCNL's, and he also has a right ureteral stricture  Given that the left kidney has never had any problems in the right kidney has caused nothing but problems, nephrectomy would take care of all of these problems  This is being sent to Dr Michele Beckett as an FYI-they have a good relationship with him and he performed a large PCNL on them in the past and if they wish to pursue nephrectomy I will be asking him to do it if he agrees

## 2023-01-23 ENCOUNTER — TELEPHONE (OUTPATIENT)
Dept: UROLOGY | Facility: CLINIC | Age: 58
End: 2023-01-23

## 2023-01-23 NOTE — TELEPHONE ENCOUNTER
Rec'd telephone call from pt stating that he needed to speak to Dr Michael Arita  Pt stated that needs to discuss having his kidney removed and he is having pain in his kidney, but also having itching and a full body rash  Pt stated this has been going on since having the tube placed in August  He stated that it's been getting worse  Pt treated with OTC and the pain medication Dr Michael Arita gave him       Please call pt back at [phone]

## 2023-01-27 ENCOUNTER — TELEPHONE (OUTPATIENT)
Dept: OTHER | Facility: OTHER | Age: 58
End: 2023-01-27

## 2023-01-27 ENCOUNTER — NURSE TRIAGE (OUTPATIENT)
Dept: OTHER | Facility: OTHER | Age: 58
End: 2023-01-27

## 2023-01-27 DIAGNOSIS — R39.9 UTI SYMPTOMS: Primary | ICD-10-CM

## 2023-01-27 NOTE — TELEPHONE ENCOUNTER
Regarding: nephrostomy bag is leaking  ----- Message from oDnnell Melara sent at 1/27/2023 11:52 AM EST -----  "My sons nephrostomy bag is leaking   I don't know where to get a new bag "

## 2023-01-27 NOTE — TELEPHONE ENCOUNTER
Spoke with patient's mother, she is aware  Mother stated patient had to make a choice to have his kidney removed, mother felt it's unnecessary for appointment   1/27/23

## 2023-01-27 NOTE — TELEPHONE ENCOUNTER
Initially calling about nephrostomy bag leaking from a hole near the top of bag  Pt called 1/23 about pain surrounding his nephrostomy tube insertion site  Pt has been experiencing n/v and decreased appetite since Wednesday  Notes pain is still present and per mom pt has been acting "delusional occasionally " Denies fever but admits to having chills  Please contact to discuss recommendations  Mom also notes pt had his NM study done and is requesting to speak with Dr Magda Mendez about next steps

## 2023-01-27 NOTE — TELEPHONE ENCOUNTER
Requesting to cancel upcoming appointment 1/30  Mom states appointment is unnecessary at this time  Please call to verify

## 2023-01-27 NOTE — TELEPHONE ENCOUNTER
Reason for Disposition  • Side (flank) or lower back pain present    Answer Assessment - Initial Assessment Questions  1  SYMPTOM: "What's the main symptom you're concerned about?" (e g , frequency, incontinence)      Nephrostomy bag leak    2  ONSET: "When did the leak start?"      Last night    3  PAIN: "Is there any pain?" If Yes, ask: "How bad is it?" (Scale: 1-10; mild, moderate, severe)      Pain around the tube insertion site     4  OTHER SYMPTOMS: "Do you have any other symptoms?" (e g , fever, flank pain, blood in urine, pain with urination)      N/v started Wednesday  Noted he has been delirious intermittently  Denies fever, chills on and off      Protocols used: URINARY SYMPTOMS-ADULT-OH

## 2023-01-27 NOTE — TELEPHONE ENCOUNTER
Returned call to patients mother Dina Ulrich  Advised orders were placed for urine testing per Jagdeep Greenberg  Again reviewed ER precautions with patients mother

## 2023-01-27 NOTE — TELEPHONE ENCOUNTER
Nephrostomy bag is leaking no leakage reported at the site  Drainage clear to yellow urine  Patient is also voiding via urethral as well  Drinking a fair amount of water  Patient did have a flu bug which started a few days ago, not as confused as he was previously was vomiting a lot over the past couple of days  , today is better decreases appetite , + nausea or vomiting  No fever , + chills  Number for IR given for patient to get new drainage bag  Reviewed result note with providers recommendation   Patient is agreeable to the procedure   Appt held for 2/15/23 in our [facility]

## 2023-01-28 ENCOUNTER — HOSPITAL ENCOUNTER (INPATIENT)
Facility: HOSPITAL | Age: 58
LOS: 4 days | Discharge: LEFT BEFORE DISCHARGE | End: 2023-02-01
Attending: INTERNAL MEDICINE | Admitting: INTERNAL MEDICINE

## 2023-01-28 ENCOUNTER — APPOINTMENT (EMERGENCY)
Dept: CT IMAGING | Facility: HOSPITAL | Age: 58
End: 2023-01-28

## 2023-01-28 ENCOUNTER — APPOINTMENT (EMERGENCY)
Dept: RADIOLOGY | Facility: HOSPITAL | Age: 58
End: 2023-01-28

## 2023-01-28 ENCOUNTER — APPOINTMENT (OUTPATIENT)
Dept: RADIOLOGY | Facility: HOSPITAL | Age: 58
End: 2023-01-28

## 2023-01-28 ENCOUNTER — HOSPITAL ENCOUNTER (EMERGENCY)
Facility: HOSPITAL | Age: 58
End: 2023-01-28
Attending: EMERGENCY MEDICINE

## 2023-01-28 VITALS
OXYGEN SATURATION: 91 % | HEART RATE: 121 BPM | SYSTOLIC BLOOD PRESSURE: 122 MMHG | TEMPERATURE: 100 F | DIASTOLIC BLOOD PRESSURE: 75 MMHG | RESPIRATION RATE: 24 BRPM

## 2023-01-28 DIAGNOSIS — R79.89 LOW TSH LEVEL: ICD-10-CM

## 2023-01-28 DIAGNOSIS — F19.10 DRUG ABUSE, EPISODIC USE (HCC): ICD-10-CM

## 2023-01-28 DIAGNOSIS — N39.0 UTI (URINARY TRACT INFECTION): ICD-10-CM

## 2023-01-28 DIAGNOSIS — Z93.6 NEPHROSTOMY STATUS (HCC): ICD-10-CM

## 2023-01-28 DIAGNOSIS — J96.01 ACUTE RESPIRATORY FAILURE WITH HYPOXIA (HCC): Primary | ICD-10-CM

## 2023-01-28 DIAGNOSIS — R93.89 ABNORMAL CT OF THE CHEST: ICD-10-CM

## 2023-01-28 DIAGNOSIS — R41.82 AMS (ALTERED MENTAL STATUS): ICD-10-CM

## 2023-01-28 DIAGNOSIS — E87.6 HYPOKALEMIA: ICD-10-CM

## 2023-01-28 DIAGNOSIS — F22 DELUSIONAL DISORDER (HCC): ICD-10-CM

## 2023-01-28 DIAGNOSIS — F42.4 COMPULSIVE SKIN PICKING: ICD-10-CM

## 2023-01-28 DIAGNOSIS — F41.9 ANXIETY: ICD-10-CM

## 2023-01-28 DIAGNOSIS — G93.41 ACUTE METABOLIC ENCEPHALOPATHY: ICD-10-CM

## 2023-01-28 DIAGNOSIS — J69.0 ASPIRATION PNEUMONIA (HCC): ICD-10-CM

## 2023-01-28 PROBLEM — R93.2 ABNORMAL CT SCAN, LIVER: Status: ACTIVE | Noted: 2023-01-28

## 2023-01-28 PROBLEM — I95.9 HYPOTENSION: Status: ACTIVE | Noted: 2023-01-28

## 2023-01-28 LAB
2HR DELTA HS TROPONIN: 1 NG/L
4HR DELTA HS TROPONIN: 3 NG/L
ALBUMIN SERPL BCP-MCNC: 4.9 G/DL (ref 3.5–5)
ALP SERPL-CCNC: 97 U/L (ref 34–104)
ALT SERPL W P-5'-P-CCNC: 10 U/L (ref 7–52)
ANION GAP SERPL CALCULATED.3IONS-SCNC: 15 MMOL/L (ref 4–13)
ANION GAP SERPL CALCULATED.3IONS-SCNC: 6 MMOL/L (ref 4–13)
APAP SERPL-MCNC: <10 UG/ML (ref 10–20)
ARTERIAL PATENCY WRIST A: YES
ARTERIAL PATENCY WRIST A: YES
AST SERPL W P-5'-P-CCNC: 11 U/L (ref 13–39)
ATRIAL RATE: 54 BPM
BACTERIA UR QL AUTO: ABNORMAL /HPF
BASE EXCESS BLDA CALC-SCNC: -0.3 MMOL/L
BASE EXCESS BLDA CALC-SCNC: -3.7 MMOL/L
BASOPHILS # BLD AUTO: 0.02 THOUSANDS/ÂΜL (ref 0–0.1)
BASOPHILS NFR BLD AUTO: 0 % (ref 0–1)
BILIRUB DIRECT SERPL-MCNC: 0.29 MG/DL (ref 0–0.2)
BILIRUB SERPL-MCNC: 1.3 MG/DL (ref 0.2–1)
BILIRUB UR QL STRIP: NEGATIVE
BNP SERPL-MCNC: 167 PG/ML (ref 0–100)
BUN SERPL-MCNC: 25 MG/DL (ref 5–25)
BUN SERPL-MCNC: 28 MG/DL (ref 5–25)
CA-I BLD-SCNC: 1.12 MMOL/L (ref 1.12–1.32)
CALCIUM SERPL-MCNC: 10.1 MG/DL (ref 8.4–10.2)
CALCIUM SERPL-MCNC: 8.3 MG/DL (ref 8.4–10.2)
CARDIAC TROPONIN I PNL SERPL HS: 12 NG/L
CARDIAC TROPONIN I PNL SERPL HS: 13 NG/L
CARDIAC TROPONIN I PNL SERPL HS: 15 NG/L
CHLORIDE SERPL-SCNC: 104 MMOL/L (ref 96–108)
CHLORIDE SERPL-SCNC: 110 MMOL/L (ref 96–108)
CK SERPL-CCNC: 24 U/L (ref 39–308)
CLARITY UR: ABNORMAL
CO2 SERPL-SCNC: 23 MMOL/L (ref 21–32)
CO2 SERPL-SCNC: 24 MMOL/L (ref 21–32)
COLOR UR: YELLOW
CREAT SERPL-MCNC: 1.2 MG/DL (ref 0.6–1.3)
CREAT SERPL-MCNC: 1.35 MG/DL (ref 0.6–1.3)
D DIMER PPP FEU-MCNC: 0.76 UG/ML FEU
EOSINOPHIL # BLD AUTO: 0 THOUSAND/ÂΜL (ref 0–0.61)
EOSINOPHIL NFR BLD AUTO: 0 % (ref 0–6)
ERYTHROCYTE [DISTWIDTH] IN BLOOD BY AUTOMATED COUNT: 13.9 % (ref 11.6–15.1)
ERYTHROCYTE [DISTWIDTH] IN BLOOD BY AUTOMATED COUNT: 14.1 % (ref 11.6–15.1)
ETHANOL SERPL-MCNC: <10 MG/DL
FLUAV RNA RESP QL NAA+PROBE: NEGATIVE
FLUBV RNA RESP QL NAA+PROBE: NEGATIVE
GFR SERPL CREATININE-BSD FRML MDRD: 57 ML/MIN/1.73SQ M
GFR SERPL CREATININE-BSD FRML MDRD: 66 ML/MIN/1.73SQ M
GLUCOSE SERPL-MCNC: 125 MG/DL (ref 65–140)
GLUCOSE SERPL-MCNC: 137 MG/DL (ref 65–140)
GLUCOSE SERPL-MCNC: 137 MG/DL (ref 65–140)
GLUCOSE UR STRIP-MCNC: NEGATIVE MG/DL
HCO3 BLDA-SCNC: 20.6 MMOL/L (ref 22–28)
HCO3 BLDA-SCNC: 22.1 MMOL/L (ref 22–28)
HCT VFR BLD AUTO: 45.6 % (ref 36.5–49.3)
HCT VFR BLD AUTO: 50.3 % (ref 36.5–49.3)
HGB BLD-MCNC: 15.6 G/DL (ref 12–17)
HGB BLD-MCNC: 17.7 G/DL (ref 12–17)
HGB UR QL STRIP.AUTO: ABNORMAL
HOROWITZ INDEX BLDA+IHG-RTO: 100 MM[HG]
IMM GRANULOCYTES # BLD AUTO: 0.09 THOUSAND/UL (ref 0–0.2)
IMM GRANULOCYTES NFR BLD AUTO: 1 % (ref 0–2)
KETONES UR STRIP-MCNC: NEGATIVE MG/DL
LACTATE SERPL-SCNC: 2 MMOL/L (ref 0.5–2)
LEUKOCYTE ESTERASE UR QL STRIP: ABNORMAL
LIPASE SERPL-CCNC: 44 U/L (ref 11–82)
LYMPHOCYTES # BLD AUTO: 0.85 THOUSANDS/ÂΜL (ref 0.6–4.47)
LYMPHOCYTES NFR BLD AUTO: 5 % (ref 14–44)
MAGNESIUM SERPL-MCNC: 2.1 MG/DL (ref 1.9–2.7)
MAGNESIUM SERPL-MCNC: 2.3 MG/DL (ref 1.9–2.7)
MCH RBC QN AUTO: 29.7 PG (ref 26.8–34.3)
MCH RBC QN AUTO: 29.7 PG (ref 26.8–34.3)
MCHC RBC AUTO-ENTMCNC: 34.2 G/DL (ref 31.4–37.4)
MCHC RBC AUTO-ENTMCNC: 35.2 G/DL (ref 31.4–37.4)
MCV RBC AUTO: 84 FL (ref 82–98)
MCV RBC AUTO: 87 FL (ref 82–98)
MONOCYTES # BLD AUTO: 1.21 THOUSAND/ÂΜL (ref 0.17–1.22)
MONOCYTES NFR BLD AUTO: 7 % (ref 4–12)
NASAL CANNULA: ABNORMAL
NEUTROPHILS # BLD AUTO: 14.75 THOUSANDS/ÂΜL (ref 1.85–7.62)
NEUTS SEG NFR BLD AUTO: 87 % (ref 43–75)
NITRITE UR QL STRIP: NEGATIVE
NON-SQ EPI CELLS URNS QL MICRO: ABNORMAL /HPF
NRBC BLD AUTO-RTO: 0 /100 WBCS
O2 CT BLDA-SCNC: 24.6 ML/DL (ref 16–23)
O2 CT BLDA-SCNC: 25.3 ML/DL (ref 16–23)
OXYHGB MFR BLDA: 94.9 % (ref 94–97)
OXYHGB MFR BLDA: 98.3 % (ref 94–97)
P AXIS: 61 DEGREES
PCO2 BLDA: 26.3 MM HG (ref 36–44)
PCO2 BLDA: 42.3 MM HG (ref 36–44)
PEEP RESPIRATORY: 8 CM[H2O]
PH BLDA: 7.33 [PH] (ref 7.35–7.45)
PH BLDA: 7.51 [PH] (ref 7.35–7.45)
PH UR STRIP.AUTO: 7 [PH]
PHOSPHATE SERPL-MCNC: 3.9 MG/DL (ref 2.7–4.5)
PLATELET # BLD AUTO: 217 THOUSANDS/UL (ref 149–390)
PLATELET # BLD AUTO: 236 THOUSANDS/UL (ref 149–390)
PMV BLD AUTO: 10.1 FL (ref 8.9–12.7)
PMV BLD AUTO: 10.3 FL (ref 8.9–12.7)
PO2 BLDA: 169.9 MM HG (ref 75–129)
PO2 BLDA: 74.2 MM HG (ref 75–129)
POTASSIUM SERPL-SCNC: 3 MMOL/L (ref 3.5–5.3)
POTASSIUM SERPL-SCNC: 3.7 MMOL/L (ref 3.5–5.3)
PR INTERVAL: 116 MS
PROCALCITONIN SERPL-MCNC: <0.05 NG/ML
PROT SERPL-MCNC: 7.6 G/DL (ref 6.4–8.4)
PROT UR STRIP-MCNC: ABNORMAL MG/DL
QRS AXIS: 24 DEGREES
QRSD INTERVAL: 138 MS
QT INTERVAL: 476 MS
QTC INTERVAL: 451 MS
RBC # BLD AUTO: 5.26 MILLION/UL (ref 3.88–5.62)
RBC # BLD AUTO: 5.96 MILLION/UL (ref 3.88–5.62)
RBC #/AREA URNS AUTO: ABNORMAL /HPF
RSV RNA RESP QL NAA+PROBE: NEGATIVE
SALICYLATES SERPL-MCNC: <5 MG/DL (ref 3–20)
SARS-COV-2 RNA RESP QL NAA+PROBE: NEGATIVE
SODIUM SERPL-SCNC: 140 MMOL/L (ref 135–147)
SODIUM SERPL-SCNC: 142 MMOL/L (ref 135–147)
SP GR UR STRIP.AUTO: 1.01 (ref 1–1.03)
SPECIMEN SOURCE: ABNORMAL
SPECIMEN SOURCE: ABNORMAL
T WAVE AXIS: 73 DEGREES
T4 FREE SERPL-MCNC: 1.14 NG/DL (ref 0.76–1.46)
TSH SERPL DL<=0.05 MIU/L-ACNC: 0.27 UIU/ML (ref 0.45–4.5)
UROBILINOGEN UR QL STRIP.AUTO: 0.2 E.U./DL
VENT AC: 16
VENT- AC: AC
VENTRICULAR RATE: 54 BPM
VT SETTING VENT: 400 ML
WBC # BLD AUTO: 16.92 THOUSAND/UL (ref 4.31–10.16)
WBC # BLD AUTO: 20.82 THOUSAND/UL (ref 4.31–10.16)
WBC #/AREA URNS AUTO: ABNORMAL /HPF

## 2023-01-28 PROCEDURE — 5A1945Z RESPIRATORY VENTILATION, 24-96 CONSECUTIVE HOURS: ICD-10-PCS | Performed by: ANESTHESIOLOGY

## 2023-01-28 PROCEDURE — 0BH17EZ INSERTION OF ENDOTRACHEAL AIRWAY INTO TRACHEA, VIA NATURAL OR ARTIFICIAL OPENING: ICD-10-PCS | Performed by: ANESTHESIOLOGY

## 2023-01-28 RX ORDER — PROPOFOL 10 MG/ML
100 INJECTION, EMULSION INTRAVENOUS ONCE
Status: COMPLETED | OUTPATIENT
Start: 2023-01-28 | End: 2023-01-28

## 2023-01-28 RX ORDER — DEXMEDETOMIDINE HYDROCHLORIDE 4 UG/ML
.1-.7 INJECTION, SOLUTION INTRAVENOUS
Status: DISCONTINUED | OUTPATIENT
Start: 2023-01-28 | End: 2023-01-29

## 2023-01-28 RX ORDER — PROPOFOL 10 MG/ML
5-50 INJECTION, EMULSION INTRAVENOUS
Status: DISCONTINUED | OUTPATIENT
Start: 2023-01-28 | End: 2023-01-29

## 2023-01-28 RX ORDER — CHLORHEXIDINE GLUCONATE 0.12 MG/ML
15 RINSE ORAL EVERY 12 HOURS SCHEDULED
Status: DISCONTINUED | OUTPATIENT
Start: 2023-01-28 | End: 2023-01-30

## 2023-01-28 RX ORDER — CEFEPIME HYDROCHLORIDE 2 G/50ML
2000 INJECTION, SOLUTION INTRAVENOUS ONCE
Status: COMPLETED | OUTPATIENT
Start: 2023-01-28 | End: 2023-01-28

## 2023-01-28 RX ORDER — CEFTRIAXONE 1 G/50ML
1000 INJECTION, SOLUTION INTRAVENOUS EVERY 24 HOURS
Status: DISCONTINUED | OUTPATIENT
Start: 2023-01-28 | End: 2023-02-01 | Stop reason: HOSPADM

## 2023-01-28 RX ORDER — FENTANYL CITRATE-0.9 % NACL/PF 10 MCG/ML
150 PLASTIC BAG, INJECTION (ML) INTRAVENOUS CONTINUOUS
Status: DISCONTINUED | OUTPATIENT
Start: 2023-01-28 | End: 2023-01-28 | Stop reason: HOSPADM

## 2023-01-28 RX ORDER — POLYETHYLENE GLYCOL 3350 17 G/17G
17 POWDER, FOR SOLUTION ORAL DAILY
Status: DISCONTINUED | OUTPATIENT
Start: 2023-01-29 | End: 2023-01-29

## 2023-01-28 RX ORDER — SODIUM CHLORIDE FOR INHALATION 0.9 %
12 VIAL, NEBULIZER (ML) INHALATION ONCE
Status: COMPLETED | OUTPATIENT
Start: 2023-01-28 | End: 2023-01-28

## 2023-01-28 RX ORDER — FENTANYL CITRATE 50 UG/ML
100 INJECTION, SOLUTION INTRAMUSCULAR; INTRAVENOUS ONCE
Status: COMPLETED | OUTPATIENT
Start: 2023-01-28 | End: 2023-01-28

## 2023-01-28 RX ORDER — PROPOFOL 10 MG/ML
5-50 INJECTION, EMULSION INTRAVENOUS
Status: DISCONTINUED | OUTPATIENT
Start: 2023-01-28 | End: 2023-01-28 | Stop reason: HOSPADM

## 2023-01-28 RX ORDER — POTASSIUM CHLORIDE 20MEQ/15ML
40 LIQUID (ML) ORAL ONCE
Status: COMPLETED | OUTPATIENT
Start: 2023-01-28 | End: 2023-01-28

## 2023-01-28 RX ORDER — LORAZEPAM 2 MG/ML
2 INJECTION INTRAMUSCULAR ONCE
Status: COMPLETED | OUTPATIENT
Start: 2023-01-28 | End: 2023-01-28

## 2023-01-28 RX ORDER — ACETAMINOPHEN 160 MG/5ML
650 SUSPENSION, ORAL (FINAL DOSE FORM) ORAL EVERY 6 HOURS PRN
Status: DISCONTINUED | OUTPATIENT
Start: 2023-01-28 | End: 2023-01-29

## 2023-01-28 RX ORDER — CHLORHEXIDINE GLUCONATE 0.12 MG/ML
15 RINSE ORAL EVERY 12 HOURS SCHEDULED
Status: DISCONTINUED | OUTPATIENT
Start: 2023-01-28 | End: 2023-01-28 | Stop reason: SDUPTHER

## 2023-01-28 RX ORDER — MIDAZOLAM HYDROCHLORIDE 2 MG/2ML
2 INJECTION, SOLUTION INTRAMUSCULAR; INTRAVENOUS EVERY 4 HOURS PRN
Status: DISCONTINUED | OUTPATIENT
Start: 2023-01-28 | End: 2023-01-29

## 2023-01-28 RX ORDER — ROCURONIUM BROMIDE 10 MG/ML
70 INJECTION, SOLUTION INTRAVENOUS ONCE
Status: COMPLETED | OUTPATIENT
Start: 2023-01-28 | End: 2023-01-28

## 2023-01-28 RX ORDER — AMOXICILLIN 250 MG
2 CAPSULE ORAL 2 TIMES DAILY
Status: DISCONTINUED | OUTPATIENT
Start: 2023-01-28 | End: 2023-01-29

## 2023-01-28 RX ORDER — FAMOTIDINE 10 MG/ML
20 INJECTION, SOLUTION INTRAVENOUS 2 TIMES DAILY
Status: DISCONTINUED | OUTPATIENT
Start: 2023-01-28 | End: 2023-02-01 | Stop reason: HOSPADM

## 2023-01-28 RX ORDER — POTASSIUM CHLORIDE 14.9 MG/ML
20 INJECTION INTRAVENOUS ONCE
Status: COMPLETED | OUTPATIENT
Start: 2023-01-28 | End: 2023-01-28

## 2023-01-28 RX ORDER — SODIUM CHLORIDE, SODIUM GLUCONATE, SODIUM ACETATE, POTASSIUM CHLORIDE, MAGNESIUM CHLORIDE, SODIUM PHOSPHATE, DIBASIC, AND POTASSIUM PHOSPHATE .53; .5; .37; .037; .03; .012; .00082 G/100ML; G/100ML; G/100ML; G/100ML; G/100ML; G/100ML; G/100ML
100 INJECTION, SOLUTION INTRAVENOUS CONTINUOUS
Status: DISCONTINUED | OUTPATIENT
Start: 2023-01-28 | End: 2023-01-29

## 2023-01-28 RX ORDER — DEXMEDETOMIDINE HYDROCHLORIDE 4 UG/ML
.1-.7 INJECTION, SOLUTION INTRAVENOUS
Status: DISCONTINUED | OUTPATIENT
Start: 2023-01-28 | End: 2023-01-28 | Stop reason: HOSPADM

## 2023-01-28 RX ORDER — CHLORHEXIDINE GLUCONATE 0.12 MG/ML
15 RINSE ORAL EVERY 12 HOURS SCHEDULED
Status: DISCONTINUED | OUTPATIENT
Start: 2023-01-28 | End: 2023-01-28

## 2023-01-28 RX ORDER — SODIUM CHLORIDE 9 MG/ML
125 INJECTION, SOLUTION INTRAVENOUS CONTINUOUS
Status: DISCONTINUED | OUTPATIENT
Start: 2023-01-28 | End: 2023-01-28 | Stop reason: HOSPADM

## 2023-01-28 RX ORDER — ETOMIDATE 2 MG/ML
20 INJECTION INTRAVENOUS ONCE
Status: COMPLETED | OUTPATIENT
Start: 2023-01-28 | End: 2023-01-28

## 2023-01-28 RX ORDER — SODIUM CHLORIDE, SODIUM GLUCONATE, SODIUM ACETATE, POTASSIUM CHLORIDE, MAGNESIUM CHLORIDE, SODIUM PHOSPHATE, DIBASIC, AND POTASSIUM PHOSPHATE .53; .5; .37; .037; .03; .012; .00082 G/100ML; G/100ML; G/100ML; G/100ML; G/100ML; G/100ML; G/100ML
1000 INJECTION, SOLUTION INTRAVENOUS ONCE
Status: COMPLETED | OUTPATIENT
Start: 2023-01-28 | End: 2023-01-28

## 2023-01-28 RX ORDER — FENTANYL CITRATE-0.9 % NACL/PF 10 MCG/ML
125 PLASTIC BAG, INJECTION (ML) INTRAVENOUS CONTINUOUS
Status: DISCONTINUED | OUTPATIENT
Start: 2023-01-28 | End: 2023-01-29

## 2023-01-28 RX ORDER — ENOXAPARIN SODIUM 100 MG/ML
40 INJECTION SUBCUTANEOUS DAILY
Status: DISCONTINUED | OUTPATIENT
Start: 2023-01-29 | End: 2023-02-01 | Stop reason: HOSPADM

## 2023-01-28 RX ADMIN — PROPOFOL 40 MCG/KG/MIN: 10 INJECTION, EMULSION INTRAVENOUS at 19:06

## 2023-01-28 RX ADMIN — IOHEXOL 100 ML: 350 INJECTION, SOLUTION INTRAVENOUS at 13:41

## 2023-01-28 RX ADMIN — CHLORHEXIDINE GLUCONATE 0.12% ORAL RINSE 15 ML: 1.2 LIQUID ORAL at 20:22

## 2023-01-28 RX ADMIN — CEFTRIAXONE 1000 MG: 1 INJECTION, SOLUTION INTRAVENOUS at 20:22

## 2023-01-28 RX ADMIN — PROPOFOL 100 MG: 10 INJECTION, EMULSION INTRAVENOUS at 15:42

## 2023-01-28 RX ADMIN — DEXMEDETOMIDINE HYDROCHLORIDE 0.7 MCG/KG/HR: 400 INJECTION INTRAVENOUS at 19:06

## 2023-01-28 RX ADMIN — PROPOFOL 50 MCG/KG/MIN: 10 INJECTION, EMULSION INTRAVENOUS at 21:51

## 2023-01-28 RX ADMIN — SODIUM CHLORIDE, SODIUM GLUCONATE, SODIUM ACETATE, POTASSIUM CHLORIDE, MAGNESIUM CHLORIDE, SODIUM PHOSPHATE, DIBASIC, AND POTASSIUM PHOSPHATE 75 ML/HR: .53; .5; .37; .037; .03; .012; .00082 INJECTION, SOLUTION INTRAVENOUS at 20:09

## 2023-01-28 RX ADMIN — ETOMIDATE 20 MG: 2 INJECTION INTRAVENOUS at 15:28

## 2023-01-28 RX ADMIN — PROPOFOL 100 MG: 10 INJECTION, EMULSION INTRAVENOUS at 16:30

## 2023-01-28 RX ADMIN — ACETAMINOPHEN 650 MG: 650 SUSPENSION ORAL at 20:22

## 2023-01-28 RX ADMIN — ALBUTEROL SULFATE 10 MG: 2.5 SOLUTION RESPIRATORY (INHALATION) at 08:47

## 2023-01-28 RX ADMIN — POTASSIUM CHLORIDE 20 MEQ: 14.9 INJECTION, SOLUTION INTRAVENOUS at 13:48

## 2023-01-28 RX ADMIN — POTASSIUM CHLORIDE 40 MEQ: 1.5 SOLUTION ORAL at 22:36

## 2023-01-28 RX ADMIN — SODIUM CHLORIDE, SODIUM GLUCONATE, SODIUM ACETATE, POTASSIUM CHLORIDE, MAGNESIUM CHLORIDE, SODIUM PHOSPHATE, DIBASIC, AND POTASSIUM PHOSPHATE 1000 ML: .53; .5; .37; .037; .03; .012; .00082 INJECTION, SOLUTION INTRAVENOUS at 11:53

## 2023-01-28 RX ADMIN — CEFEPIME HYDROCHLORIDE 2000 MG: 2 INJECTION, SOLUTION INTRAVENOUS at 11:50

## 2023-01-28 RX ADMIN — ROCURONIUM BROMIDE 70 MG: 10 INJECTION, SOLUTION INTRAVENOUS at 15:29

## 2023-01-28 RX ADMIN — NOREPINEPHRINE BITARTRATE 2 MCG/MIN: 1 INJECTION, SOLUTION INTRAVENOUS at 20:03

## 2023-01-28 RX ADMIN — FENTANYL CITRATE 150 MCG/HR: 0.05 INJECTION, SOLUTION INTRAMUSCULAR; INTRAVENOUS at 16:57

## 2023-01-28 RX ADMIN — FENTANYL CITRATE 100 MCG: 50 INJECTION, SOLUTION INTRAMUSCULAR; INTRAVENOUS at 16:04

## 2023-01-28 RX ADMIN — SODIUM CHLORIDE 125 ML/HR: 0.9 INJECTION, SOLUTION INTRAVENOUS at 16:13

## 2023-01-28 RX ADMIN — DEXMEDETOMIDINE HYDROCHLORIDE 1.2 MCG/KG/HR: 400 INJECTION INTRAVENOUS at 17:17

## 2023-01-28 RX ADMIN — Medication 150 MCG/HR: at 20:59

## 2023-01-28 RX ADMIN — SENNOSIDES AND DOCUSATE SODIUM 2 TABLET: 50; 8.6 TABLET ORAL at 20:22

## 2023-01-28 RX ADMIN — PROPOFOL 10 MCG/KG/MIN: 10 INJECTION, EMULSION INTRAVENOUS at 15:37

## 2023-01-28 RX ADMIN — IPRATROPIUM BROMIDE 1 MG: 0.5 SOLUTION RESPIRATORY (INHALATION) at 08:47

## 2023-01-28 RX ADMIN — DEXMEDETOMIDINE HYDROCHLORIDE 0.7 MCG/KG/HR: 400 INJECTION INTRAVENOUS at 20:07

## 2023-01-28 RX ADMIN — FENTANYL CITRATE 100 MCG: 50 INJECTION, SOLUTION INTRAMUSCULAR; INTRAVENOUS at 16:58

## 2023-01-28 RX ADMIN — ISODIUM CHLORIDE 12 ML: 0.03 SOLUTION RESPIRATORY (INHALATION) at 08:46

## 2023-01-28 RX ADMIN — LORAZEPAM 2 MG: 2 INJECTION INTRAMUSCULAR; INTRAVENOUS at 16:02

## 2023-01-28 RX ADMIN — FENTANYL CITRATE 100 MCG: 50 INJECTION, SOLUTION INTRAMUSCULAR; INTRAVENOUS at 16:55

## 2023-01-28 RX ADMIN — VANCOMYCIN HYDROCHLORIDE 1000 MG: 5 INJECTION, POWDER, LYOPHILIZED, FOR SOLUTION INTRAVENOUS at 13:43

## 2023-01-28 RX ADMIN — FAMOTIDINE 20 MG: 10 INJECTION, SOLUTION INTRAVENOUS at 20:22

## 2023-01-28 RX ADMIN — Medication 150 MCG/HR: at 19:06

## 2023-01-28 NOTE — RESPIRATORY THERAPY NOTE
01/28/23 0850   Inhalation Therapy Tx   $ CBT - First Hour and  Each Additional Hour First hour   Pre-Treatment Pulse 66   Pre-Treatment Respirations 20   Duration 60   Breath Sounds Pre-Treatment Bilateral Diminished   Delivery Source Air;UDN   Position Semi Renee's   Treatment Tolerance Tolerated well   Resp Comments Patient came into the ED with sob, he was on a nasal cannula, on room air, oxygen saturations 94%  Patient had decreased breathe sounds, but junky upper airway, I went to suction patient, and he had a very strong productive cough of extremely large amounts of white, upper airways now sound clear  He will cough a little on command   He remains on room air, an hour long treatment is being given

## 2023-01-28 NOTE — EMTALA/ACUTE CARE TRANSFER
[facility] EMERGENCY DEPARTMENT  [address] [place] [id number]  Dept: [phone]      EMTALA TRANSFER CONSENT    NAME Vergie Lefort                                         1965                              MRN [id number]    I have been informed of my rights regarding examination, treatment, and transfer   by Dr Keon Rosas, *    Benefits: Specialized equipment and/or services available at the receiving facility (Include comment)________________________    Risks: Potential for delay in receiving treatment      Consent for Transfer:  I acknowledge that my medical condition has been evaluated and explained to me by the emergency department physician or other qualified medical person and/or my attending physician, who has recommended that I be transferred to the service of  Accepting Physician: Dr Joselin Reynoso at [address] [place] [address] Name, [address] : [address] [place], [place]  The above potential benefits of such transfer, the potential risks associated with such transfer, and the probable risks of not being transferred have been explained to me, and I fully understand them  The doctor has explained that, in my case, the benefits of transfer outweigh the risks  I agree to be transferred  I authorize the performance of emergency medical procedures and treatments upon me in both transit and upon arrival at the receiving facility  Additionally, I authorize the release of any and all medical records to the receiving facility and request they be transported with me, if possible  I understand that the safest mode of transportation during a medical emergency is an ambulance and that the Hospital advocates the use of this mode of transport  Risks of traveling to the receiving facility by car, including absence of medical control, life sustaining equipment, such as oxygen, and medical personnel has been explained to me and I fully understand them      ([place] BELOW)  [x  ]  I consent to the stated transfer and to be transported by ambulance/helicopter  [  ]  I consent to the stated transfer, but refuse transportation by ambulance and accept full responsibility for my transportation by car  I understand the risks of non-ambulance transfers and I exonerate the Hospital and its staff from any deterioration in my condition that results from this refusal     X___________________________________________    DATE  23  TIME________  Signature of patient or legally responsible individual signing on patient behalf           RELATIONSHIP TO PATIENT_________________________          Provider Certification    NAME Tiffany Ryder                                        [facility] 1965                              MRN [id number]    A medical screening exam was performed on the above named patient  Based on the examination:    Condition Necessitating Transfer The primary encounter diagnosis was Acute respiratory failure with hypoxia ([address] )  Diagnoses of Acute metabolic encephalopathy, Abnormal CT of the chest, Low TSH level, Hypokalemia, and Nephrostomy status ([address] ) were also pertinent to this visit      Patient Condition: The patient has been stabilized such that within reasonable medical probability, no material deterioration of the patient condition or the condition of the unborn child(david) is likely to result from the transfer    Reason for Transfer: Level of Care needed not available at this facility    Transfer Requirements: [address] [place], [place]   · Space available and qualified personnel available for treatment as acknowledged by    · Agreed to accept transfer and to provide appropriate medical treatment as acknowledged by       Dr Creston Baumgarten  · Appropriate medical records of the examination and treatment of the patient are provided at the time of transfer   [address], [address] _______  · Transfer will be performed by qualified personnel from    and appropriate transfer equipment as required, including the use of necessary and appropriate life support measures  Provider Certification: I have examined the patient and explained the following risks and benefits of being transferred/refusing transfer to the patient/family:  General risk, such as traffic hazards, adverse weather conditions, rough terrain or turbulence, possible failure of equipment (including vehicle or aircraft), or consequences of actions of persons outside the control of the transport personnel      Based on these reasonable risks and benefits to the patient and/or the unborn child(david), and based upon the information available at the time of the patient’s examination, I certify that the medical benefits reasonably to be expected from the provision of appropriate medical treatments at another medical facility outweigh the increasing risks, if any, to the individual’s medical condition, and in the case of labor to the unborn child, from effecting the transfer      X____________________________________________ DATE 01/28/23        TIME_______      ORIGINAL - SEND TO MEDICAL RECORDS   COPY - SEND WITH PATIENT DURING TRANSFER

## 2023-01-28 NOTE — ED NOTES
Respiratory at bedside suctioning patient  Provider at bedside setting up for central line placement        Gabrielle Shields RN  01/28/23 2335

## 2023-01-28 NOTE — ED NOTES
Provider made aware of patients oxygen at 86% on 6L  Respiratory requested for patient to be suctioned at this time  Respiratory aware        Boni Rubio RN  01/28/23 2266

## 2023-01-28 NOTE — ED NOTES
XR at bedside     Jean Carlos Richards, [facility]  01/28/23 9705

## 2023-01-28 NOTE — EMTALA/ACUTE CARE TRANSFER
[address] [facility] EMERGENCY DEPARTMENT  [address] [facility] [address]  Dept: [phone]      EMTALA TRANSFER CONSENT    NAME Shakir Hendrix                                         1965                              MRN [id number]    I have been informed of my rights regarding examination, treatment, and transfer   by Dr Ruben Raza, *    Benefits: Specialized equipment and/or services available at the receiving facility (Include comment)________________________    Risks: Potential for delay in receiving treatment      Consent for Transfer:  I acknowledge that my medical condition has been evaluated and explained to me by the emergency department physician or other qualified medical person and/or my attending physician, who has recommended that I be transferred to the service of  Accepting Physician: Dr Bro Luna at [address] [facility] Name, [address] : [address] [place], [place]  The above potential benefits of such transfer, the potential risks associated with such transfer, and the probable risks of not being transferred have been explained to me, and I fully understand them  The doctor has explained that, in my case, the benefits of transfer outweigh the risks  I agree to be transferred  I authorize the performance of emergency medical procedures and treatments upon me in both transit and upon arrival at the receiving facility  Additionally, I authorize the release of any and all medical records to the receiving facility and request they be transported with me, if possible  I understand that the safest mode of transportation during a medical emergency is an ambulance and that the Hospital advocates the use of this mode of transport  Risks of traveling to the receiving facility by car, including absence of medical control, life sustaining equipment, such as oxygen, and medical personnel has been explained to me and I fully understand them      ([place] BELOW)  [ x ]  I consent to the stated transfer and to be transported by ambulance/helicopter  [  ]  I consent to the stated transfer, but refuse transportation by ambulance and accept full responsibility for my transportation by car  I understand the risks of non-ambulance transfers and I exonerate the Hospital and its staff from any deterioration in my condition that results from this refusal     X___________________________________________    DATE  23  TIME________  Signature of patient or legally responsible individual signing on patient behalf           RELATIONSHIP TO PATIENT_________________________          Provider Certification    NAME Agusto Cardoza                                         1965                              MRN [id number]    A medical screening exam was performed on the above named patient  Based on the examination:    Condition Necessitating Transfer The primary encounter diagnosis was Acute respiratory failure with hypoxia ([address] )  Diagnoses of Acute metabolic encephalopathy, Abnormal CT of the chest, Low TSH level, Hypokalemia, and Nephrostomy status ([address] ) were also pertinent to this visit      Patient Condition: The patient has been stabilized such that within reasonable medical probability, no material deterioration of the patient condition or the condition of the unborn child(david) is likely to result from the transfer    Reason for Transfer: Level of Care needed not available at this facility    Transfer Requirements: [address] [place], [place]   · Space available and qualified personnel available for treatment as acknowledged by    · Agreed to accept transfer and to provide appropriate medical treatment as acknowledged by       Dr Belgica Hensley  · Appropriate medical records of the examination and treatment of the patient are provided at the time of transfer   [address], [address] _______  · Transfer will be performed by qualified personnel from    and appropriate transfer equipment as required, including the use of necessary and appropriate life support measures  Provider Certification: I have examined the patient and explained the following risks and benefits of being transferred/refusing transfer to the patient/family:  General risk, such as traffic hazards, adverse weather conditions, rough terrain or turbulence, possible failure of equipment (including vehicle or aircraft), or consequences of actions of persons outside the control of the transport personnel      Based on these reasonable risks and benefits to the patient and/or the unborn child(david), and based upon the information available at the time of the patient’s examination, I certify that the medical benefits reasonably to be expected from the provision of appropriate medical treatments at another medical facility outweigh the increasing risks, if any, to the individual’s medical condition, and in the case of labor to the unborn child, from effecting the transfer      X____________________________________________ DATE 01/28/23        TIME_______      ORIGINAL - SEND TO MEDICAL RECORDS   COPY - SEND WITH PATIENT DURING TRANSFER

## 2023-01-28 NOTE — ED NOTES
Several attempts made for IV by multiple nurses with no success  Provider made aware at this time        Patricia Soto, RN  01/28/23 5497

## 2023-01-28 NOTE — RESPIRATORY THERAPY NOTE
RT Ventilator Management Note  Katherine Mcfarlane 61 y o  male MRN: [id number]  Unit/Bed#: JJ20 Encounter: [id number]      Daily Screen       1/28/2023  1544             Patient safety screen outcome[de-identified] Failed    Not Ready for Weaning due to[de-identified] Underline problem not resolved; Recieving paralytics            Physical Exam:   Assessment Type: During-treatment  General Appearance: Sedated  Respiratory Pattern: Assisted  Chest Assessment: Chest expansion symmetrical, Trachea midline  Bilateral Breath Sounds: Coarse      Resp Comments: Intubated patient to protect airway, patient could not protect his airway  ED doctor intubated with size 7 5 ETT at the [address] [place] [address]  Secured by commercial tube [employer]  Verified placement with color change end tidal cap, end tidal monitoring, BS: bilateral breathe sounds  awaiting xray, ABG to be drawn later   Patient placed on ventilator

## 2023-01-28 NOTE — ED PROVIDER NOTES
History  Chief Complaint   Patient presents with   • Altered Mental Status     Pt comes in EMS for increased lethargy, altered mental status, and sob over the past two days per mother  Per EMS, patients oxygen was 60% on room air at home and not responding  Pt is 95 % on room air at present responding to verbal commands  Pt offers no complaints  50-year-old male with history of hypertension staghorn calculus right nephrostomy tube secondary to obstruction presents by EMS with complaints of lethargy for 2 days as well as increasing shortness of breath sats upon arrival approximately 68% on 10 L nasal cannula was in the low 80s  There is no history of any trauma or falls  Has no complaints of pain  Further history is unobtainable secondary to patient condition  Additional hx obtained from Mom patient has been having increasing difficulty with breathing for the last 2 days with a cough increased lethargy not eating or drinking  Decreased output from his right nephrostomy tube  Prior to Admission Medications   Prescriptions Last Dose Informant Patient Reported? Taking?    HYDROmorphone (DILAUDID) 2 mg tablet   No No   Sig: Take 1 tablet (2 mg total) by mouth every 4 (four) hours as needed for moderate pain for up to 10 doses Max Daily Amount: 12 mg   hydrOXYzine HCL (ATARAX) 25 mg tablet   No No   Sig: Take 1 tablet (25 mg total) by mouth every 6 (six) hours as needed for anxiety or itching   Patient not taking: Reported on 11/7/2022   nitrofurantoin (MACROBID) 100 mg capsule   No No   Sig: Take 1 capsule (100 mg total) by mouth 2 (two) times a day   Patient not taking: Reported on 12/28/2022   oxyCODONE (Roxicodone) 5 immediate release tablet   No No   Sig: Take 1 tablet (5 mg total) by mouth every 4 (four) hours as needed for moderate pain Max Daily Amount: 30 mg   Patient not taking: Reported on 11/18/2022   phenazopyridine (PYRIDIUM) 200 mg tablet   No No   Sig: Take 1 tablet (200 mg total) by mouth 3 (three) times a day as needed for bladder spasms   Patient not taking: Reported on 11/18/2022   sodium chloride, PF, 0 9 %   No No   Sig: 10 mL by Intracatheter route daily Intracatheter flushing daily   May substitute prefilled syringe with normal saline 10 mL vials, 10 mL syringes, and 18 g blunt needles   Patient not taking: Reported on 11/18/2022      Facility-Administered Medications Last Administration Doses Remaining   gentamicin (GARAMYCIN) 40 mg/mL injection 60 mg None recorded 1          Past Medical History:   Diagnosis Date   • Anxiety    • Bright red rectal bleeding     Last Assessed: 9/9/2015    • Drug dependence ([facility] [address] ) 10/20/2021   • Hypertension     Last Assessed: 7/9/2014    • Kidney stone    • Kidney stones     Last Assessed: 11/17/2016    • Periorbital edema     Last Assessed: 9/4/2015   • Septic shock ([facility] [address] ) 08/20/2022   • Skin tag of anus     Last Assessed: 9/9/2015    • Trigger point of thoracic region     Last Assessed: 11/6/2015        Past Surgical History:   Procedure Laterality Date   • CYSTOSCOPY W/ URETERAL STENT PLACEMENT  03/11/2013   • CYSTOSCOPY W/ URETERAL STENT PLACEMENT  06/18/2014    EXTRACORPOREAL SHOCK WAVE LITHOTRIPSY    • CYSTOSCOPY W/ URETERAL STENT PLACEMENT  07/16/2014    EXTRACORPOREAL SHOCK WAVE LITHOTRIPSY    • CYSTOSCOPY W/ URETERAL STENT REMOVAL  04/11/2013   • CYSTOSCOPY W/ URETERAL STENT REMOVAL Right 08/26/2014   • CYSTOSCOPY W/ URETEROSCOPY W/ LITHOTRIPSY  02/26/2013    Percutaneous lithotomy With Uretal Stent Plcement    • CYSTOSCOPY W/ URETEROSCOPY W/ LITHOTRIPSY  03/11/2014   • CYSTOSCOPY W/ URETEROSCOPY W/ LITHOTRIPSY Right 08/04/2014    With Uretal Stent Placement    • CYSTOSCOPY W/ URETEROSCOPY W/ LITHOTRIPSY Right 05/09/2016   • HEMORRHOID SURGERY     • HERNIA REPAIR  03/11/2013   • IR NEPHROSTOMY TUBE CHECK/CHANGE/REPOSITION/REINSERTION/UPSIZE  11/22/2022   • IR NEPHROSTOMY TUBE PLACEMENT  8/20/2022   • KIDNEY SURGERY     • LITHOTRIPSY     • CA CYSTO/URETERO W/LITHOTRIPSY &INDWELL STENT INSRT Right 7/13/2016    Procedure: CYSTOSCOPY; URETEROSCOPY WITH HOLMIUM LASER STONE EXTRACTION; RETROGRADE PYELOGRAM; URETERAL STENT INSERTION ;  Surgeon: Cristo German MD;  Location: [facility];  Service: Urology   • [facility] CYSTO/URETERO W/LITHOTRIPSY &INDWELL STENT INSRT Right 5/9/2016    Procedure: CYSTOSCOPY,  URETEROSCOPY,  WITH LITHOTRIPSY HOLMIUM LASER, STONE EXTRACTION, AND INSERTION STENT URETERAL;  Surgeon: Cristo German MD;  Location: [facility];  Service: Urology   • NH CYSTO/URETERO W/LITHOTRIPSY &INDWELL STENT INSRT Right 11/10/2022    Procedure: CYSTOSCOPY URETEROSCOPY  right RETROGRADE PYELOGRAM;  Surgeon: Andrea Turner MD;  Location: [facility];  Service: Urology   • NH LITHOTRIPSY [address] Right 6/17/2016    Procedure: Torri Troup SHOCKWAVE (ESWL); Surgeon: Cristo German MD;  Location: [facility];  Service: Urology   • TRANSURETHRAL RESECTION OF BLADDER     • URETERAL REIMPLANTION  03/11/2013       Family History   Problem Relation Age of Onset   • Heart attack Father      I have reviewed and agree with the history as documented  E-Cigarette/Vaping   • E-Cigarette Use Never User      E-Cigarette/Vaping Substances     Social History     Tobacco Use   • Smoking status: Every Day     Packs/day: 2 00     Years: 35 00     Pack years: 70 00     Types: Cigarettes   • Smokeless tobacco: Never   Vaping Use   • Vaping Use: Never used   Substance Use Topics   • Alcohol use: No   • Drug use: Yes     Types: Amphetamines       Review of Systems   Unable to perform ROS: Mental status change       Physical Exam  Physical Exam  Vitals reviewed  HENT:      Head: Normocephalic  Right Ear: Tympanic membrane normal       Left Ear: Tympanic membrane normal       Nose: Nose normal  No congestion or rhinorrhea  Mouth/Throat:      Mouth: Mucous membranes are dry  Comments:  Whitish plaques; intact gag  Eyes:      Extraocular Movements: Extraocular movements intact  Conjunctiva/sclera: Conjunctivae normal       Pupils: Pupils are equal, round, and reactive to light  Comments: 3 mm equal; no nystagmus   Cardiovascular:      Rate and Rhythm: Normal rate and regular rhythm  Pulses: Normal pulses  Pulmonary:      Effort: Respiratory distress (sl increased RR decreased BS) present  Comments: 90% on NC from EMS diminished throughout with significant diminished breath sounds to the left base; patient has transmitted upper respiratory sounds  RT was at bedside and was able to have patient cough of copious amounts of phlegm satting 95% on room air after is cleared by respiratory therapy  Abdominal:      General: Abdomen is flat  Bowel sounds are normal  There is no distension  Tenderness: There is no abdominal tenderness  There is no right CVA tenderness, left CVA tenderness or guarding  Comments: Nephrostomy tube with clear urine in the bag no CVA tenderness   Genitourinary:     Comments: Circumcised male with testes descended bilaterally no edema or erythema  Musculoskeletal:         General: Normal range of motion  Cervical back: Normal range of motion and neck supple  Right lower leg: No edema  Left lower leg: No edema  Skin:     General: Skin is warm and dry  Capillary Refill: Capillary refill takes less than 2 seconds  Comments: Diffuse scars throughout from old excoriations   Neurological:      Mental Status: He is alert  Cranial Nerves: No cranial nerve deficit  Sensory: No sensory deficit  Motor: No weakness        Coordination: Coordination normal       Deep Tendon Reflexes: Reflexes normal       Comments: GCS [id number] 14; withdrawls to pain toes downgoing   Psychiatric:      Comments: flat         Vital Signs  ED Triage Vitals   Temperature Pulse Respirations Blood Pressure SpO2   01/28/23 0837 01/28/23 0836 01/28/23 0836 01/28/23 0836 01/28/23 0836   (!) 97 1 °F (36 2 °C) 61 20 (!) 191/93 95 %      Temp Source Heart Rate Source Patient Position - Orthostatic VS BP Location FiO2 (%)   01/28/23 0837 01/28/23 0836 01/28/23 0836 01/28/23 0836 01/28/23 1443   Tympanic Monitor Lying Left arm 100      Pain Score       01/28/23 0836       No Pain           Vitals:    01/28/23 1630 01/28/23 1645 01/28/23 1700 01/28/23 1715   BP: (!) 177/108 (!) 89/55 125/72 122/75   Pulse: (!) 140 (!) 122 (!) 118 (!) 121   Patient Position - Orthostatic VS: Lying Lying Lying Lying         Visual Acuity      ED Medications  Medications   propofol (DIPRIVAN) 1000 mg in 100 mL infusion (premix) (40 mcg/kg/min × 67 1 kg Intravenous Rate/Dose Change 1/28/23 1703)   sodium chloride 0 9 % infusion (125 mL/hr Intravenous New Bag 1/28/23 1613)   norepinephrine (LEVOPHED) 4 mg (STANDARD CONCENTRATION) IV in sodium chloride 0 9% 250 mL (0 mcg/min Intravenous Hold 1/28/23 1748)   fentaNYL 1000 mcg in sodium chloride 0 9% 100mL infusion (150 mcg/hr Intravenous New Bag 1/28/23 1657)   dexmedeTOMIDine (Precedex) 400 mcg in sodium chloride 0 9% 100 mL (1 2 mcg/kg/hr × 67 1 kg Intravenous New Bag 1/28/23 1717)   multi-electrolyte (ISOLYTE-S PH 7 4) bolus 1,000 mL (0 mL Intravenous Stopped 1/28/23 1253)   albuterol inhalation solution 10 mg (10 mg Nebulization Given 1/28/23 0847)   ipratropium (ATROVENT) 0 02 % inhalation solution 1 mg (1 mg Nebulization Given 1/28/23 0847)   sodium chloride 0 9 % inhalation solution 12 mL (12 mL Nebulization Given 1/28/23 0846)   cefepime (MAXIPIME) IVPB (premix in dextrose) 2,000 mg 50 mL (0 mg Intravenous Stopped 1/28/23 1220)   vancomycin (VANCOCIN) 1000 mg in sodium chloride 0 9% 250 mL IVPB (1,000 mg Intravenous Given 1/28/23 1343)   potassium chloride 20 mEq IVPB (premix) (0 mEq Intravenous Stopped 1/28/23 1548)   iohexol (OMNIPAQUE) 350 MG/ML injection (SINGLE-DOSE) 100 mL (100 mL Intravenous Given 1/28/23 1341)   etomidate (AMIDATE) 2 mg/mL injection 20 mg (20 mg Intravenous Given 1/28/23 1528)   ROCuronium (ZEMURON) injection 70 mg (70 mg Intravenous Given 1/28/23 1529)   propofol (DIPRIVAN) 200 MG/20ML bolus injection 100 mg (100 mg Intravenous Given 1/28/23 1542)   LORazepam (ATIVAN) injection 2 mg (2 mg Intravenous Given 1/28/23 1602)   fentanyl citrate (PF) 100 MCG/2ML 100 mcg (100 mcg Intravenous Given 1/28/23 1604)   propofol (DIPRIVAN) 200 MG/20ML bolus injection 100 mg (100 mg Intravenous Given 1/28/23 1630)   fentanyl citrate (PF) 100 MCG/2ML 100 mcg (100 mcg Intravenous Given 1/28/23 1655)   fentanyl citrate (PF) 100 MCG/2ML 100 mcg (100 mcg Intravenous Given 1/28/23 1658)       Diagnostic Studies  Results Reviewed     Procedure Component Value Units Date/Time    Blood gas, arterial [[id number]]  (Abnormal) Collected: 01/28/23 1620    Lab Status: Final result Specimen: Blood, Arterial from Radial, Right Updated: 01/28/23 1653     pH, Arterial 7 335     pCO2, Arterial 42 3 mm Hg      pO2, Arterial 169 9 mm Hg      HCO3, Arterial 22 1 mmol/L      Base Excess, Arterial -3 7 mmol/L      O2 Content, Arterial 25 3 mL/dL      O2 HGB,Arterial  98 3 %      SOURCE Radial, Right     SANDRA TEST Yes     Vent Type- AC AC     AC Rate 16     Tidal Volume 400 ml      Inspired Air (FIO2) 100     PEEP 8    HS Troponin I 4hr [[id number]]  (Normal) Collected: 01/28/23 1522    Lab Status: Final result Specimen: Blood from Central Venous Line Updated: 01/28/23 1550     hs TnI 4hr 15 ng/L      Delta 4hr hsTnI 3 ng/L     Urine Microscopic [[id number]]  (Abnormal) Collected: 01/28/23 1414    Lab Status: Final result Specimen: Urine, Other Updated: 01/28/23 1427     RBC, UA 4-10 /hpf      WBC, UA Innumerable /hpf      Epithelial Cells None Seen /hpf      Bacteria, UA Innumerable /hpf     Urine culture [[id number]] Collected: 01/28/23 1414    Lab Status:  In process Specimen: Urine, Other Updated: 01/28/23 1427    UA w Reflex to Microscopic w Reflex to Culture [[id number]]  (Abnormal) Collected: 01/28/23 1418 Lab Status: Final result Specimen: Urine, Other Updated: 01/28/23 1421     Color, UA Yellow     Clarity, UA Cloudy     Specific Gravity, UA 1 015     pH, UA 7 0     Leukocytes, UA Large     Nitrite, UA Negative     Protein,  (2+) mg/dl      Glucose, UA Negative mg/dl      Ketones, UA Negative mg/dl      Urobilinogen, UA 0 2 E U /dl      Bilirubin, UA Negative     Occult Blood, UA Large    HS Troponin I 2hr [[id number]]  (Normal) Collected: 01/28/23 1343    Lab Status: Final result Specimen: Blood from Central Venous Line Updated: 01/28/23 1416     hs TnI 2hr 13 ng/L      Delta 2hr hsTnI 1 ng/L     D-dimer, quantitative [[id number]]  (Abnormal) Collected: 01/28/23 1248    Lab Status: Final result Specimen: Blood from Arm, Right Updated: 01/28/23 1334     D-Dimer, Quant 0 76 ug/ml FEU     Narrative: In the evaluation for possible pulmonary embolism, in the appropriate (Well's Score of 4 or less) patient, the age adjusted d-dimer cutoff for this patient can be calculated as:    Age x 0 01 (in ug/mL) for Age-adjusted D-dimer exclusion threshold for a patient over 50 years  TSH, 3rd generation with Free T4 reflex [[id number]]  (Abnormal) Collected: 01/28/23 1138    Lab Status: Final result Specimen: Blood from Central Venous Line Updated: 01/28/23 1239     TSH 3RD GENERATON 0 268 uIU/mL     Narrative:      Patients undergoing fluorescein dye angiography may retain small amounts of fluorescein in the body for 48-72 hours post procedure  Samples containing fluorescein can produce falsely depressed TSH values  If the patient had this procedure,a specimen should be resubmitted post fluorescein clearance  T4, free [id number] Collected: 01/28/23 1138    Lab Status:  In process Specimen: Blood from Central Venous Line Updated: 01/28/23 1239    FLU/RSV/COVID - if FLU/RSV clinically relevant [[id number]]  (Normal) Collected: 01/28/23 1138    Lab Status: Final result Specimen: Nares from Nose Updated: 01/28/23 1236     SARS-CoV-2 Negative     INFLUENZA A PCR Negative     INFLUENZA B PCR Negative     RSV PCR Negative    Narrative:      FOR PEDIATRIC PATIENTS - copy/paste COVID Guidelines URL to browser: [email or web address]  [email or web address]    SARS-CoV-2 assay is a Nucleic Acid Amplification assay intended for the  qualitative detection of nucleic acid from SARS-CoV-2 in nasopharyngeal  swabs  Results are for the presumptive identification of SARS-CoV-2 RNA  Positive results are indicative of infection with SARS-CoV-2, the virus  causing COVID-19, but do not rule out bacterial infection or co-infection  with other viruses  Laboratories within the [place] and its  territories are required to report all positive results to the appropriate  public health authorities  Negative results do not preclude SARS-CoV-2  infection and should not be used as the sole basis for treatment or other  patient management decisions  Negative results must be combined with  clinical observations, patient history, and epidemiological information  This test has not been FDA cleared or approved  This test has been authorized by FDA under an Emergency Use Authorization  (EUA)  This test is only authorized for the duration of time the  declaration that circumstances exist justifying the authorization of the  emergency use of an in vitro diagnostic tests for detection of SARS-CoV-2  virus and/or diagnosis of COVID-19 infection under section 564(b)(1) of  the Act, 21 U  S C  749ULV-4(F)(5), unless the authorization is terminated  or revoked sooner  The test has been validated but independent review by FDA  and CLIA is pending  Test performed using [employer] GeneXpert: This RT-PCR assay targets N2,  a region unique to SARS-CoV-2  A conserved region in the E-gene was chosen  for pan-Sarbecovirus detection which includes SARS-CoV-2      According to CMS-2020-01-R, this platform meets the definition of high-throughput technology  Procalcitonin [[id number]]  (Normal) Collected: 01/28/23 1138    Lab Status: Final result Specimen: Blood from Central Venous Line Updated: 01/28/23 1231     Procalcitonin <0 05 ng/ml     HS Troponin 0hr (reflex protocol) [[id number]]  (Normal) Collected: 01/28/23 1138    Lab Status: Final result Specimen: Blood from Central Venous Line Updated: 01/28/23 1230     hs TnI 0hr 12 ng/L     B-Type Natriuretic Peptide(BNP) [[id number]]  (Abnormal) Collected: 01/28/23 1138    Lab Status: Final result Specimen: Blood from Central Venous Line Updated: 01/28/23 1227      pg/mL     Lactic acid [[id number]]  (Normal) Collected: 01/28/23 1138    Lab Status: Final result Specimen: Blood from Arm, Right Updated: 01/28/23 1223     LACTIC ACID 2 0 mmol/L     Narrative:      Result may be elevated if tourniquet was used during collection      Ethanol [[id number]]  (Normal) Collected: 01/28/23 1138    Lab Status: Final result Specimen: Blood from Central Venous Line Updated: 01/28/23 1222     Ethanol Lvl <10 mg/dL     Lipase [[id number]]  (Normal) Collected: 01/28/23 1138    Lab Status: Final result Specimen: Blood from Central Venous Line Updated: 01/28/23 1222     Lipase 44 u/L     CK (with reflex to MB) [[id number]]  (Abnormal) Collected: 01/28/23 1138    Lab Status: Final result Specimen: Blood from Central Venous Line Updated: 01/28/23 1222     Total CK 24 U/L     Basic metabolic panel [[id number]]  (Abnormal) Collected: 01/28/23 1138    Lab Status: Final result Specimen: Blood from Central Venous Line Updated: 01/28/23 1222     Sodium 142 mmol/L      Potassium 3 0 mmol/L      Chloride 104 mmol/L      CO2 23 mmol/L      ANION GAP 15 mmol/L      BUN 28 mg/dL      Creatinine 1 20 mg/dL      Glucose 137 mg/dL      Calcium 10 1 mg/dL      eGFR 66 ml/min/1 73sq m     Narrative:      [place] guidelines for Chronic Kidney Disease (CKD):   •  Stage 1 with normal or high GFR (GFR > 90 mL/min/1 73 square meters)  •  Stage 2 Mild CKD (GFR = 60-89 mL/min/1 73 square meters)  •  Stage 3A Moderate CKD (GFR = 45-59 mL/min/1 73 square meters)  •  Stage 3B Moderate CKD (GFR = 30-44 mL/min/1 73 square meters)  •  Stage 4 Severe CKD (GFR = 15-29 mL/min/1 73 square meters)  •  Stage 5 End Stage CKD (GFR <15 mL/min/1 73 square meters)  Note: GFR calculation is accurate only with a steady state creatinine    Hepatic function panel [[id number]]  (Abnormal) Collected: 01/28/23 1138    Lab Status: Final result Specimen: Blood from Central Venous Line Updated: 01/28/23 1222     Total Bilirubin 1 30 mg/dL      Bilirubin, Direct 0 29 mg/dL      Alkaline Phosphatase 97 U/L      AST 11 U/L      ALT 10 U/L      Total Protein 7 6 g/dL      Albumin 4 9 g/dL     Magnesium [[id number]]  (Normal) Collected: 01/28/23 1138    Lab Status: Final result Specimen: Blood from Central Venous Line Updated: 01/28/23 1222     Magnesium 2 3 mg/dL     Salicylate level [[id number]]  (Normal) Collected: 01/28/23 1138    Lab Status: Final result Specimen: Blood from Central Venous Line Updated: 27/70/27 9584     Salicylate Lvl <5 mg/dL     Acetaminophen level-If concentration is detectable, please discuss with medical  on call   [[id number]]  (Abnormal) Collected: 01/28/23 1138    Lab Status: Final result Specimen: Blood from Central Venous Line Updated: 01/28/23 1222     Acetaminophen Level <10 ug/mL     CBC and differential [[id number]]  (Abnormal) Collected: 01/28/23 1138    Lab Status: Final result Specimen: Blood from Central Venous Line Updated: 01/28/23 1200     WBC 16 92 Thousand/uL      RBC 5 96 Million/uL      Hemoglobin 17 7 g/dL      Hematocrit 50 3 %      MCV 84 fL      MCH 29 7 pg      MCHC 35 2 g/dL      RDW 13 9 %      MPV 10 3 fL      Platelets 026 Thousands/uL      nRBC 0 /100 WBCs      Neutrophils Relative 87 %      Immat GRANS % 1 %      Lymphocytes Relative 5 %      Monocytes Relative 7 %      Eosinophils Relative 0 %      Basophils Relative 0 %      Neutrophils Absolute 14 75 Thousands/µL      Immature Grans Absolute 0 09 Thousand/uL      Lymphocytes Absolute 0 85 Thousands/µL      Monocytes Absolute 1 21 Thousand/µL      Eosinophils Absolute 0 00 Thousand/µL      Basophils Absolute 0 02 Thousands/µL     Blood culture #1 [[id number]] Collected: 01/28/23 1138    Lab Status: In process Specimen: Blood from Arm, Right Updated: 01/28/23 1156    Blood culture #2 [[id number]] Collected: 01/28/23 1138    Lab Status: In process Specimen: Blood from Central Venous Line Updated: 01/28/23 1156    Blood gas, arterial [[id number]]  (Abnormal) Collected: 01/28/23 1113    Lab Status: Final result Specimen: Blood, Arterial from Radial, Right Updated: 01/28/23 1128     pH, Arterial 7 511     pCO2, Arterial 26 3 mm Hg      pO2, Arterial 74 2 mm Hg      HCO3, Arterial 20 6 mmol/L      Base Excess, Arterial -0 3 mmol/L      O2 Content, Arterial 24 6 mL/dL      O2 HGB,Arterial  94 9 %      SOURCE Radial, Right     SANDRA TEST Yes     Nasal Cannula 4 lpm    Fingerstick Glucose (POCT) [[id number]]  (Normal) Collected: 01/28/23 0844    Lab Status: Final result Updated: 01/28/23 0845     POC Glucose 125 mg/dl                  XR chest 1 view portable   ED Interpretation by Camille So MD (01/28 1613)   Read by me; Radiologist to provide formal interpretation  Patient rotated ETT 2cm above cholo OG in stomach      PE Study with CT Abdomen and Pelvis with contrast   Final Result by Ignacio Pineda MD (01/28 1400)      No CT evidence of pulmonary embolism  Mucous secretions within the dependent portion of the trachea and extending into the right lower lobe bronchus bronchioles  There are also mucous secretions noted within the left lower lobe bronchioles  Cannot exclude underlying bronchiolitis or    aspiration  Patchy dependent consolidation in the lower lobes likely atelectasis        Percutaneous nephrostomy tube on the right with right-sided nephrolithiasis and proximal right ureteral calculus as before  No hydronephrosis or perinephric fluid collections  A few fluid-filled loops of mildly prominent small bowel loops are seen in the left lower abdomen, nonspecific  Mild enteritis not excluded  No bowel obstruction however is seen  Small hiatal hernia with mild wall thickening of the mid to distal esophagus concerning for esophagitis  Cholelithiasis  Workstation performed: [id number]         CT head without contrast   Final Result by Avni Davidson MD (01/28 1340)      No acute intracranial abnormality  Stable left middle cranial fossa arachnoid cyst                   Workstation performed: [id number]         XR chest 1 view portable   ED Interpretation by Kaye Disla MD (01/28 1122)   Read by me; Radiologist to provide formal interpretation  Rt IJ central line in good position no PTX      Final Result by Judy Ernandez MD (01/28 9440)      Right IJ line in place                  Workstation performed: [id number]         XR chest 1 view portable   ED Interpretation by Kaye Disla MD (01/28 4474)   Read by me; Radiologist to provide formal interpretation  Vs 8/21/22 ETT now removed pneumonia resovled today no acute process      Final Result by Myriam Burrows MD (01/28 8035)      No acute cardiopulmonary disease                    Workstation performed: [id number]                    Procedures  ECG 12 Lead Documentation Only    Date/Time: 1/28/2023 9:00 AM  Performed by: Kaye Disla MD  Authorized by: Kaye Disla MD     Indications / Diagnosis:  Altered mental status  ECG reviewed by me, the ED Provider: yes    Patient location:  ED  Previous ECG:     Previous ECG:  Compared to current    Comparison ECG info:  8/21/22 0820    Similarity:  Changes noted  Rate:     ECG rate:  54    ECG rate assessment: normal    Rhythm:     Rhythm: sinus bradycardia    QRS:     QRS axis:  Normal  Comments:      RBBB seen prev; twi v1-v3 new vs prev  Central Line    Date/Time: 1/28/2023 10:50 AM  Performed by: Debi Perera MD  Authorized by: Debi Perera MD     Patient location:  ED  Other Assisting Provider: Yes (comment) Jadon Albarran RT Katya RN)    Consent:     Consent obtained:  Verbal    Consent given by:  Parent    Risks discussed:  Arterial puncture, incorrect placement, bleeding, infection and pneumothorax    Alternatives discussed:  No treatment  Universal protocol:     Procedure explained and questions answered to patient or proxy's satisfaction: yes      Patient identity confirmed:  Arm band  Pre-procedure details:     Hand hygiene: Hand hygiene performed prior to insertion      Sterile barrier technique: All elements of maximal sterile technique followed      Skin preparation:  2% chlorhexidine    Skin preparation agent: Skin preparation agent completely dried prior to procedure    Indications:     Central line indications: no peripheral vascular access    Anesthesia (see MAR for exact dosages): Anesthesia method:  Local infiltration    Local anesthetic:  Lidocaine 1% w/o epi  Procedure details:     Location:  Right internal jugular    Vessel type: vein      Approach: percutaneous technique used      Patient position:  Trendelenburg    Catheter type:  Triple lumen 16cm    Catheter size:  7 Fr    Landmarks identified: yes      Ultrasound guidance: yes      Sterile ultrasound techniques: Sterile gel and sterile probe covers were used      Manometry confirmation: yes      Number of attempts:  1    Successful placement: yes      Vessel of catheter tip end:  13 cm  Post-procedure details:     Post-procedure:  Dressing applied and line sutured    Assessment:  Blood return through all ports, no pneumothorax on x-ray and placement verified by x-ray    Patient tolerance of procedure:   Tolerated well, no immediate complications  Intubation    Date/Time: 1/28/2023 3:30 PM  Performed by: Tiago Hui MD  Authorized by: Tiago Hui MD     Patient location:  ED  Other Assisting Provider: Yes (comment) Jeronimo Rice RT, Katya RN)    Consent:     Consent obtained:  Verbal    Consent given by:  Parent  Universal protocol:     Procedure explained and questions answered to patient or proxy's satisfaction: yes      Patient identity confirmed:  Arm band  Pre-procedure details:     Patient status:  Altered mental status    Mallampati score:  3    Pretreatment medications:  Etomidate    Paralytics:  Rocuronium  Indications:     Indications for intubation: respiratory failure and hypoxemia    Procedure details:     Preoxygenation:  Bag valve mask (vapotherm 40L 100%)    CPR in progress: no      Intubation method:  Oral    Oral intubation technique:  Direct    Laryngoscope blade: Mac 4    Tube size (mm):  7 5    Tube type:  Cuffed    Number of attempts:  1 (copius secretions)    Cricoid pressure: yes      Tube visualized through cords: no    Placement assessment:     ETT to lip:  24    Tube secured with:  ETT solis    Breath sounds:  Equal and absent over the epigastrium    Placement verification: chest rise, condensation, colorimetric ETCO2 device, CXR verification and equal breath sounds      CXR findings:  ETT in proper place    Ventilator settings:  A/c 13 400 100% 8 peep  Post-procedure details:     Patient tolerance of procedure:   Tolerated well, no immediate complications  CriticalCare Time  Performed by: Tiago Hui MD  Authorized by: Tiago Hui MD     Critical care provider statement:     Critical care time (minutes):  120    Critical care time was exclusive of:  Separately billable procedures and treating other patients and teaching time    Critical care was necessary to treat or prevent imminent or life-threatening deterioration of the following conditions:  Respiratory failure    Critical care was time spent personally by me on the following activities:  Obtaining history from patient or surrogate, development of treatment plan with patient or surrogate, discussions with consultants, evaluation of patient's response to treatment, examination of patient, ordering and performing treatments and interventions, ordering and review of laboratory studies, ordering and review of radiographic studies, re-evaluation of patient's condition and review of old charts    I assumed direction of critical care for this patient from another provider in my specialty: no               ED Course  ED Course as of 01/28/23 1806   Sat Jan 28, 2023   1404 RT to suction again placed on vapotherm 40L 100% sats 90-91% updated Mom with findings thus far will require intubation secondary to respiratory failure with hypoxia secondary to secreations   1613 Hypertensive after intubation administered lorazepam, propofol bolus, and fentanyl bolus contacted PACs for transfer   1629 Propofol adjusted to 50mcg/kg/min with add'; 100mg IV bolus secondary to elevated pressures   1645 Dr Sj Cardozo accepts patient in transfer  Dr Jeremiah Chandler adjusted vent setting   Patient them became hypotensive fluids opened backed down on propofol and ordered levophed gtt  and fentanyl gtt   1735 Vent settings PC 18 100% peep 8; propofol gtt at 50mcg/min; dexmetdetomidine 1 2 mcg/kg/min; fentanyl gtt 150mcg/hr; report given to flight crew did not require levophed   [address] at [phone] notified her of transfer to [facility]' Criteria for PE    Flowsheet Row Most Recent Value   Micheal' Criteria for PE    Clinical signs and symptoms of DVT 0 Filed at: 01/28/2023 1402   PE is primary diagnosis or equally likely 3 Filed at: 01/28/2023 1402   HR >100 0 Filed at: 01/28/2023 1402   Immobilization at least 3 days or Surgery in the previous 4 weeks 1 5 Filed at: 01/28/2023 1402   Previous, objectively diagnosed PE or DVT 0 Filed at: 01/28/2023 1402 Hemoptysis 0 Filed at: 01/28/2023 1402   Malignancy with treatment within 6 months or palliative 0 Filed at: 01/28/2023 1402   Wells' Criteria Total 4 5 Filed at: 01/28/2023 1402                Medical Decision Making  Mdm: 25-year-old male with right nephrostomy tube history of leg horn calculus to the right kidney with obstruction is lethargic/sepsis picture initiate empiric antibiotics of cefepime and Vanco    will administer an albuterol hour-long neb to help clear the mucus assess electrolytes rule out acute coronary syndrome pulmonary embolism CT of the head  Upon arrival with deep suctioning by RT was to maintain sats mid 90s on RA    Abnormal CT of the chest: acute illness or injury  Acute metabolic encephalopathy: self-limited or minor problem  Acute respiratory failure with hypoxia ([address] ): self-limited or minor problem  Hypokalemia: acute illness or injury  Low TSH level: acute illness or injury  Nephrostomy status ([address] ): acute illness or injury  Amount and/or Complexity of Data Reviewed  Labs: ordered  Radiology: ordered and independent interpretation performed  Risk  Prescription drug management  Disposition  Final diagnoses:   Acute respiratory failure with hypoxia (HCC)   Acute metabolic encephalopathy   Abnormal CT of the chest - The right lower lobe bronchus and bronchioles are obstructed with mucous secretions  There is some dependent mucus secretions noted also within the trachea       Low TSH level   Hypokalemia   Nephrostomy status ([address] ) - right     Time reflects when diagnosis was documented in both MDM as applicable and the Disposition within this note     Time User Action Codes Description Comment    1/28/2023  3:50 PM Lindalee Pellant Add [J96 01] Acute respiratory failure with hypoxia ([address] )     1/28/2023  3:50 PM Lindalee Pellant Add [K02 72] Acute metabolic encephalopathy     1/28/2023  3:50 PM Lindalee Pellant Add [R93 89] Abnormal CT of the chest     1/28/2023 3:51 PM Frankie Ree Modify [R93 89] Abnormal CT of the chest The right lower lobe bronchus and bronchioles are obstructed with mucous secretions  There is some dependent mucus secretions noted also within the trachea        1/28/2023  3:51 PM [place] Ree Add [R79 89] Low TSH level     1/28/2023  3:52 PM [place] Ree Add [E87 6] Hypokalemia     1/28/2023  3:53 PM [place] Ree Add [Z93 6] Nephrostomy status ([address] )     1/28/2023  3:53 PM [place] Ree Modify [Z93 6] Nephrostomy status [facility]) right      ED Disposition     ED Disposition   Transfer to Another Facility-In Network    Condition   --    Date/Time   Sat Jan 28, 2023  4:47 PM    Comment   Jennyfer Velasco should be transferred out to [facility] Dr Mary Quijano accepts patient in transfer           MD Documentation    Misha Morales Most Recent Value   Patient Condition The patient has been stabilized such that within reasonable medical probability, no material deterioration of the patient condition or the condition of the unborn child(david) is likely to result from the transfer   Reason for Transfer Level of Care needed not available at this facility   Benefits of Transfer Specialized equipment and/or services available at the receiving facility (Include comment)________________________   Risks of Transfer Potential for delay in receiving treatment   Accepting Physician Dr Marc Veloz Name, Hal Retana MD [facility]   Provider Certification General risk, such as traffic hazards, adverse weather conditions, rough terrain or turbulence, possible failure of equipment (including vehicle or aircraft), or consequences of actions of persons outside the control of the transport personnel      RN Documentation    [address] Name, [address]      Follow-up Information    None         Discharge Medication List as of 1/28/2023  5:48 PM      CONTINUE these medications which have NOT CHANGED    Details   HYDROmorphone (DILAUDID) 2 mg tablet Take 1 tablet (2 mg total) by mouth every 4 (four) hours as needed for moderate pain for up to 10 doses Max Daily Amount: 12 mg, Starting Wed 12/28/2022, Normal      hydrOXYzine HCL (ATARAX) 25 mg tablet Take 1 tablet (25 mg total) by mouth every 6 (six) hours as needed for anxiety or itching, Starting Thu 8/25/2022, Normal      nitrofurantoin (MACROBID) 100 mg capsule Take 1 capsule (100 mg total) by mouth 2 (two) times a day, Starting Thu 11/10/2022, Normal      oxyCODONE (Roxicodone) 5 immediate release tablet Take 1 tablet (5 mg total) by mouth every 4 (four) hours as needed for moderate pain Max Daily Amount: 30 mg, Starting Tue 11/8/2022, Normal      phenazopyridine (PYRIDIUM) 200 mg tablet Take 1 tablet (200 mg total) by mouth 3 (three) times a day as needed for bladder spasms, Starting Thu 11/10/2022, Normal      sodium chloride, PF, 0 9 % 10 mL by Intracatheter route daily Intracatheter flushing daily  May substitute prefilled syringe with normal saline 10 mL vials, 10 mL syringes, and 18 g blunt needles, Starting Mon 8/22/2022, Until Sun 11/20/2022, Normal             No discharge procedures on file      PDMP Review       Value Time User    PDMP Reviewed  Yes 11/8/2022  3:42 PM Katy Hdz           Provider  Electronically Signed by           Rae Cardenas MD  01/28/23 7424

## 2023-01-29 PROBLEM — I95.9 HYPOTENSION: Status: RESOLVED | Noted: 2023-01-28 | Resolved: 2023-01-29

## 2023-01-29 LAB
ALBUMIN SERPL BCP-MCNC: 3.5 G/DL (ref 3.5–5)
ALP SERPL-CCNC: 63 U/L (ref 34–104)
ALT SERPL W P-5'-P-CCNC: 8 U/L (ref 7–52)
AMPHETAMINES SERPL QL SCN: NEGATIVE
ANION GAP SERPL CALCULATED.3IONS-SCNC: 8 MMOL/L (ref 4–13)
ARTERIAL PATENCY WRIST A: YES
AST SERPL W P-5'-P-CCNC: 9 U/L (ref 13–39)
BARBITURATES UR QL: NEGATIVE
BASE EXCESS BLDA CALC-SCNC: -2.7 MMOL/L
BASOPHILS # BLD AUTO: 0.05 THOUSANDS/ÂΜL (ref 0–0.1)
BASOPHILS NFR BLD AUTO: 0 % (ref 0–1)
BENZODIAZ UR QL: POSITIVE
BILIRUB SERPL-MCNC: 1.12 MG/DL (ref 0.2–1)
BUN SERPL-MCNC: 23 MG/DL (ref 5–25)
CA-I BLD-SCNC: 1.15 MMOL/L (ref 1.12–1.32)
CALCIUM SERPL-MCNC: 8.4 MG/DL (ref 8.4–10.2)
CHLORIDE SERPL-SCNC: 109 MMOL/L (ref 96–108)
CO2 SERPL-SCNC: 25 MMOL/L (ref 21–32)
COCAINE UR QL: NEGATIVE
CREAT SERPL-MCNC: 1.19 MG/DL (ref 0.6–1.3)
EOSINOPHIL # BLD AUTO: 0.01 THOUSAND/ÂΜL (ref 0–0.61)
EOSINOPHIL NFR BLD AUTO: 0 % (ref 0–6)
ERYTHROCYTE [DISTWIDTH] IN BLOOD BY AUTOMATED COUNT: 14.1 % (ref 11.6–15.1)
GFR SERPL CREATININE-BSD FRML MDRD: 66 ML/MIN/1.73SQ M
GLUCOSE SERPL-MCNC: 120 MG/DL (ref 65–140)
HCO3 BLDA-SCNC: 21.6 MMOL/L (ref 22–28)
HCT VFR BLD AUTO: 44.7 % (ref 36.5–49.3)
HGB BLD-MCNC: 14.9 G/DL (ref 12–17)
HOROWITZ INDEX BLDA+IHG-RTO: 80 MM[HG]
IMM GRANULOCYTES # BLD AUTO: 0.14 THOUSAND/UL (ref 0–0.2)
IMM GRANULOCYTES NFR BLD AUTO: 1 % (ref 0–2)
LYMPHOCYTES # BLD AUTO: 2.16 THOUSANDS/ÂΜL (ref 0.6–4.47)
LYMPHOCYTES NFR BLD AUTO: 9 % (ref 14–44)
MAGNESIUM SERPL-MCNC: 2.3 MG/DL (ref 1.9–2.7)
MCH RBC QN AUTO: 29.2 PG (ref 26.8–34.3)
MCHC RBC AUTO-ENTMCNC: 33.3 G/DL (ref 31.4–37.4)
MCV RBC AUTO: 88 FL (ref 82–98)
METHADONE UR QL: NEGATIVE
MONOCYTES # BLD AUTO: 1.88 THOUSAND/ÂΜL (ref 0.17–1.22)
MONOCYTES NFR BLD AUTO: 8 % (ref 4–12)
NEUTROPHILS # BLD AUTO: 19.15 THOUSANDS/ÂΜL (ref 1.85–7.62)
NEUTS SEG NFR BLD AUTO: 82 % (ref 43–75)
NRBC BLD AUTO-RTO: 0 /100 WBCS
O2 CT BLDA-SCNC: 21.5 ML/DL (ref 16–23)
OPIATES UR QL SCN: NEGATIVE
OXYCODONE+OXYMORPHONE UR QL SCN: NEGATIVE
OXYHGB MFR BLDA: 97.8 % (ref 94–97)
PCO2 BLDA: 36.2 MM HG (ref 36–44)
PCP UR QL: NEGATIVE
PEEP RESPIRATORY: 8 CM[H2O]
PH BLDA: 7.39 [PH] (ref 7.35–7.45)
PHOSPHATE SERPL-MCNC: 3.6 MG/DL (ref 2.7–4.5)
PLATELET # BLD AUTO: 201 THOUSANDS/UL (ref 149–390)
PMV BLD AUTO: 10.8 FL (ref 8.9–12.7)
PO2 BLDA: 187.3 MM HG (ref 75–129)
POTASSIUM SERPL-SCNC: 3.8 MMOL/L (ref 3.5–5.3)
PROCALCITONIN SERPL-MCNC: 0.39 NG/ML
PROT SERPL-MCNC: 5.8 G/DL (ref 6.4–8.4)
RBC # BLD AUTO: 5.1 MILLION/UL (ref 3.88–5.62)
SODIUM SERPL-SCNC: 142 MMOL/L (ref 135–147)
SPECIMEN SOURCE: ABNORMAL
THC UR QL: NEGATIVE
VENT AC: 16
VT SETTING VENT: 400 ML
WBC # BLD AUTO: 23.39 THOUSAND/UL (ref 4.31–10.16)

## 2023-01-29 RX ORDER — POTASSIUM CHLORIDE 14.9 MG/ML
20 INJECTION INTRAVENOUS ONCE
Status: COMPLETED | OUTPATIENT
Start: 2023-01-29 | End: 2023-01-29

## 2023-01-29 RX ORDER — LORAZEPAM 2 MG/ML
1 INJECTION INTRAMUSCULAR ONCE
Status: COMPLETED | OUTPATIENT
Start: 2023-01-29 | End: 2023-01-29

## 2023-01-29 RX ORDER — LORAZEPAM 2 MG/ML
INJECTION INTRAMUSCULAR
Status: COMPLETED
Start: 2023-01-29 | End: 2023-01-29

## 2023-01-29 RX ADMIN — LORAZEPAM 1 MG: 2 INJECTION INTRAMUSCULAR at 07:58

## 2023-01-29 RX ADMIN — CEFTRIAXONE 1000 MG: 1 INJECTION, SOLUTION INTRAVENOUS at 21:18

## 2023-01-29 RX ADMIN — PROPOFOL 50 MCG/KG/MIN: 10 INJECTION, EMULSION INTRAVENOUS at 10:49

## 2023-01-29 RX ADMIN — SENNOSIDES AND DOCUSATE SODIUM 2 TABLET: 50; 8.6 TABLET ORAL at 08:50

## 2023-01-29 RX ADMIN — CHLORHEXIDINE GLUCONATE 0.12% ORAL RINSE 15 ML: 1.2 LIQUID ORAL at 08:50

## 2023-01-29 RX ADMIN — MIDAZOLAM 2 MG: 1 INJECTION INTRAMUSCULAR; INTRAVENOUS at 04:56

## 2023-01-29 RX ADMIN — MIDAZOLAM 2 MG: 1 INJECTION INTRAMUSCULAR; INTRAVENOUS at 08:41

## 2023-01-29 RX ADMIN — FAMOTIDINE 20 MG: 10 INJECTION, SOLUTION INTRAVENOUS at 08:50

## 2023-01-29 RX ADMIN — FAMOTIDINE 20 MG: 10 INJECTION, SOLUTION INTRAVENOUS at 17:49

## 2023-01-29 RX ADMIN — SODIUM CHLORIDE, SODIUM GLUCONATE, SODIUM ACETATE, POTASSIUM CHLORIDE, MAGNESIUM CHLORIDE, SODIUM PHOSPHATE, DIBASIC, AND POTASSIUM PHOSPHATE 100 ML/HR: .53; .5; .37; .037; .03; .012; .00082 INJECTION, SOLUTION INTRAVENOUS at 07:02

## 2023-01-29 RX ADMIN — Medication 125 MCG/HR: at 04:11

## 2023-01-29 RX ADMIN — ACETAMINOPHEN 650 MG: 650 SUSPENSION ORAL at 11:20

## 2023-01-29 RX ADMIN — POLYETHYLENE GLYCOL 3350 17 G: 17 POWDER, FOR SOLUTION ORAL at 08:50

## 2023-01-29 RX ADMIN — LORAZEPAM 1 MG: 2 INJECTION INTRAMUSCULAR; INTRAVENOUS at 07:58

## 2023-01-29 RX ADMIN — Medication 125 MCG/HR: at 11:44

## 2023-01-29 RX ADMIN — CHLORHEXIDINE GLUCONATE 0.12% ORAL RINSE 15 ML: 1.2 LIQUID ORAL at 21:18

## 2023-01-29 RX ADMIN — PROPOFOL 45 MCG/KG/MIN: 10 INJECTION, EMULSION INTRAVENOUS at 02:36

## 2023-01-29 RX ADMIN — PROPOFOL 50 MCG/KG/MIN: 10 INJECTION, EMULSION INTRAVENOUS at 07:36

## 2023-01-29 RX ADMIN — ENOXAPARIN SODIUM 40 MG: 100 INJECTION SUBCUTANEOUS at 08:50

## 2023-01-29 RX ADMIN — POTASSIUM CHLORIDE 20 MEQ: 14.9 INJECTION, SOLUTION INTRAVENOUS at 07:24

## 2023-01-29 NOTE — PLAN OF CARE
Problem: SAFETY,RESTRAINT: NV/NON-SELF DESTRUCTIVE BEHAVIOR  Goal: Remains free of harm/injury (restraint for non violent/non self-detsructive behavior)  Description: INTERVENTIONS:  - Instruct patient/family regarding restraint use   - Assess and monitor physiologic and psychological status   - Provide interventions and comfort measures to meet assessed patient needs   - Identify and implement measures to help patient regain control  - Assess readiness for release of restraint   Outcome: Progressing  Goal: Returns to optimal restraint-free functioning  Description: INTERVENTIONS:  - Assess the patient's behavior and symptoms that indicate continued need for restraint  - Identify and implement measures to help patient regain control  - Assess readiness for release of restraint   Outcome: Progressing     Problem: CONFUSION/THOUGHT DISTURBANCE  Goal: Thought disturbances (confusion, delirium, depression, dementia or psychosis) are managed to maintain or return to baseline mental status and functional level  Description: INTERVENTIONS:  - Assess for possible contributors to  thought disturbance, including but not limited to medications, infection, impaired vision or hearing, underlying metabolic abnormalities, dehydration, respiratory compromise,  psychiatric diagnoses and notify attending PHYSICAN/AP  - Monitor and intervene to maintain adequate nutrition, hydration, elimination, sleep and activity  - Decrease environmental stimuli, including noise as appropriate  - Provide frequent contacts to provide refocusing, direction and reassurance as needed  Approach patient calmly with eye contact and at their level    - [place] high risk fall precautions, aspiration precautions and other safety measures, as indicated  - If delirium suspected, notify physician/AP of change in condition and request immediate in-person evaluation  - Pursue consults as appropriate including Geriatric (campus dependent), OT for cognitive evaluation/activity planning, psychiatric, pastoral care, etc   Outcome: Progressing     Problem: BEHAVIOR  Goal: Pt/Family maintain appropriate behavior and adhere to behavioral management agreement, if implemented  Description: INTERVENTIONS:  - Assess the family dynamic   - Encourage verbalization of thoughts and concerns in a socially appropriate manner  - Assess patient/family's coping skills and non-compliant behavior (including use of illegal substances)  - Utilize positive, consistent limit setting strategies supporting safety of patient, staff and others  - Initiate consult with Case Management, Spiritual Care or other ancillary services as appropriate  - If a patient's/visitor's behavior jeopardizes the safety of the patient, staff, or others, refer to organization procedure  - Notify Security of behavior or suspected illegal substances which indicate the need for search of the patient and/or belongings  - Encourage participation in the decision making process about a behavioral management agreement; implement if patient meets criteria  Outcome: Progressing     Problem: Nutrition/Hydration-ADULT  Goal: Nutrient/Hydration intake appropriate for improving, restoring or maintaining nutritional needs  Description: Monitor and assess patient's nutrition/hydration status for malnutrition  Collaborate with interdisciplinary team and initiate plan and interventions as ordered  Monitor patient's weight and dietary intake as ordered or per policy  Utilize nutrition screening tool and intervene as necessary  Determine patient's food preferences and provide high-protein, high-caloric foods as appropriate       INTERVENTIONS:  - Monitor oral intake, urinary output, labs, and treatment plans  - Assess nutrition and hydration status and recommend course of action  - Evaluate amount of meals eaten  - Assist patient with eating if necessary   - Allow adequate time for meals  - Recommend/ encourage appropriate diets, oral nutritional supplements, and vitamin/mineral supplements  - Order, calculate, and assess calorie counts as needed  - Recommend, monitor, and adjust tube feedings and TPN/PPN based on assessed needs  - Assess need for intravenous fluids  - Provide specific nutrition/hydration education as appropriate  - Include patient/family/caregiver in decisions related to nutrition  Outcome: Progressing

## 2023-01-29 NOTE — RESPIRATORY THERAPY NOTE
01/29/23 1209   Respiratory Assessment   Assessment Type Assess only   General Appearance Awake;Drowsy   Respiratory Pattern Assisted   Chest Assessment Chest expansion symmetrical   Bilateral Breath Sounds Coarse   Resp Comments pt put on sbt, 6/6 50%   tolerating well   Vent Information    Vent Mode CPAP/PS Spont   CPAP/PS Spont Settings   FIO2 (%) 50 %   PEEP (cmH2O) 6 cmH2O   Pressure Support (cmH2O) 6 cmH20   Trigger Sensitivity Flow (lpm) 3 LPM   Esens % 25 %   Humidification Heater   Heater Temp 98 6 °F (37 °C)   CPAP/PS Spont Actuals   Resp Rate (BPM) 15 BPM   VT (mL) 613 mL   MV (Obs) 8 9   MAP (cmH2O) 9 cmH2O   Peak Pressure (cmH2O) 13 cmH2O   I/E Ratio (Obs) 1:3 4   Heater Temperature (Obs) 98 6 °F (37 °C)   CPAP/PS Spont Alarms   High Peak Pressure (cmH20) 45 cmH2O   High Resp Rate (BPM) 40 BPM   High MV (L/min) 20 L/min   Low MV (L/min) 4 L/min   High Khanh VTE (mL) 900 mL   Low Khanh VTE (mL) 300 mL   High SPONT VTE (mL) 900 mL   Low Spont VTE (mL) 300 mL   CPAP/PS Spont Apnea Settings   Resp Rate (BPM) 16 BPM   VT (mL) 400 mL   FIO2 (%) 100 %   Apnea Time (s) 20 S   Apnea Flow (LPM) 60 LPM   Maintenance   Alarm (pink) cable attached No   Resuscitation bag with peep valve at bedside Yes   Water bag changed No   Circuit changed No   Daily Screen   Patient safety screen outcome: Passed   IHI Ventilator Associated Pneumonia Bundle   Head of Bed Elevated HOB 30     RT Ventilator Management Note  Mahamed Alberts 61 y o  male MRN: [id number]  Unit/Bed#: ICU 11-01 Encounter: [id number]      Daily Screen         1/29/2023  1114 1/29/2023  1209          Patient safety screen outcome[de-identified] Failed Passed      Not Ready for Weaning due to[de-identified] Underline problem not resolved --                Physical Exam:   Assessment Type: Assess only  General Appearance: Awake, Drowsy  Respiratory Pattern: Assisted  Chest Assessment: Chest expansion symmetrical  Bilateral Breath Sounds: Coarse  Cough: Strong, Productive  Suction: ET Tube  O2 Device: vent      Resp Comments: pt put on sbt, 6/6 50%   tolerating well

## 2023-01-29 NOTE — ASSESSMENT & PLAN NOTE
· +UA 2/2 staghorn calculus s/p nephrostomy tube, is under evaluation for possible nephrectomy  · Found to also have SOB along w/ CT concerning for possible aspiration in progress w/ fluid content in trachea  · Blood cultures and urine culture in process, will send respiratory culture  · Does have hx of kleb, serratia, e coli UTI and haemophilus PNA  · Start empiric rocephin for UTI and possible aspiration

## 2023-01-29 NOTE — TELEMEDICINE
TeleConsultation - Oneil [age] y o  male MRN: [id number]  Unit/Bed#: ICU 11-01 Encounter: [id number]        REQUIRED DOCUMENTATION:     1  This service was provided via Telemedicine  2  Provider located at remote  3  TeleMed provider: Lyric Tian MD   4  Identify all parties in room with patient during tele consult:  patient  5  Patient was then informed that this was a Telemedicine visit and that the exam was being conducted confidentially over secure lines  My office door was closed  No one else was in the room  Patient acknowledged consent and understanding of privacy and security of the Telemedicine visit, and gave us permission to have the assistant stay in the room in order to assist with the history and to conduct the exam   I informed the patient that I have reviewed their record in Epic and presented the opportunity for them to ask any questions regarding the visit today  The patient agreed to participate  Assessment/Plan     Principal Problem:    Sepsis ([address] )  Active Problems:    Acute respiratory failure with hypoxia (HCC)    AMS (altered mental status)    UTI (urinary tract infection)    Hypotension    Aspiration pneumonia (HCC)    Assessment/Plan:    Delirium; polysubstance abuse (heroin and cocaine)    Delirium  There are several  recommendations used for treating delirium  Limiting use of 4-pt restraints and early mobilization have been shown to be the most important non-pharmacologic tools used to manage delirium  It's also important to avoid deliriogenic medications, including benzodiazepines, opioids and anticholinergics  Additionally, conservative management often decreases length of delirium  Also important to remember that all antipsychotics increase the risk of Qtc prolongation, especially IV formulation    Please implement conservative management for delirium, including frequent reorientation, regulating sleep/wake cycle (blinds up during daytime and closed at night), access to corrective devices (eyeglasses, hearing aids), early mobilization and decreasing use of restraints  CAPACITY ASSESSMENT    Patient at this time does not have capacity to make decision to leave the hospital [address]  - Please consult patient's next of kin for medical decision making for the patient  1  Patient does not have satisfactory knowledge and does not have adequate understanding of medical diagnosis    2  Patient does not have adequate understanding of why doctors are recommending current medications, treatment and need to stay at the hospital      3  Patient does not have adequate understanding of the potential benefits and risks of the treatment for medical conditions  4  The patient does not have adequate understanding of symptoms and how they are related to diagnosis  5  Patient also does not have adequate understanding of the possible consequences of not receiving the treatment and leaving the hospital against medical advice  6  Patient's ability for comparative and consequential reasoning and to manipulate information rationally is unsatisfactory  7  Patient does not have adequate understanding of the available choices and is not able to adequately express choice satisfactorily related to poor insight, poor judgment, and poorly treated mental illness due to noncompliance  Appelbaum's Criteria for decision making capacity:  - The ability to communicate a choice: Yes  - The ability to understand the relevant information: No  - The ability to appreciate the situation and its consequences: No  - The ability to reason rationally: No     Based upon Appelbaum's Criteria as above, it is the opinion of the Psychiatric Service that this patient LACKS the capacity to make this medical decision   Patient does not express a consistent preference regarding the proposed treatment, does not have a factual understanding of the current situation, does not understand the risks and benefits of the treatment and non-treatment, and is not able to rationally manipulate information to make a decision  Capacity is a person’s ability to make an informed decision in the context of a specific choice, such as medical treatment  By contrast, competence refers to a legal judgment relating to whether individuals have the legal right to make their own decisions  A determination of competency is a judicial finding made by the court  A physician can opine about a patient’s capacity, but cannot determine competency  Please note that this patient may have fluctuating capacity due to physiologic age or underlying medical conditions  Capacity is evaluated in a moment of time and can change depending on environment or stressors  Patient was not fully oriented  Would first recommend improving patient's delirium and treating UTI  Recommend 1:1 for patient safety and elopement precaution    Medical management as per primary care team    - Consider COWs protocol for opioid withdrawal only if absolutely needed (aim to reduce delirium and some of the following medications can potentially worsen delirium),  Hydroxyzine 25 mg PO Q8h PRN for lacrimation and anxiety (reduce dose by 50% for renal and hepatic impairment)*    Loperamide 4 mg orally followed by 2 mg after each loose stool (maximum 16 mg daily) - monitor for adequate dehydration PRN diarrhea    Melatonin 1-3 mg PO PRN insomnia at bedtime    Tylenol 650 mg  to 1000 mg orally every 4 to 6 hours as needed (maximum 4000 mg daily) PRN pain/muscle aches    Thank you for allowing us to take part in this patient's care  Re-consult as needed      Coco Johnson MD [facility]  Adult and Geriatric Psychiatry Covering  [facility] Psychiatry    Current Medications:     Current Facility-Administered Medications   Medication Dose Route Frequency Provider Last Rate   • cefTRIAXone  1,000 mg Intravenous Q24H Denise Millan PA-C 1,000 mg (01/28/23 2022)   • chlorhexidine  15 mL Mouth/Throat Q12H [employer] AND [place], [place]     • enoxaparin  40 mg Subcutaneous Daily Luke Barnett [place], [place]     • Famotidine (PF)  20 mg Intravenous BID [place], [place]           Risks / Benefits of Treatment:    Patient does not verbalize understanding at this time and will require further explanation  Inpatient consult to Psychiatry  Consult performed by: Radha Bravo MD  Consult ordered by: BASIL Padilla        Physician Requesting Consult: Manuela Pitt*  Principal Problem:Sepsis [facility])    Reason for Consult: capacity to leave ama and drug abuse      History of Present Illness      Patient is a 61 y o  male with a history of polysubstance use disorder (cocaine and heroin per collateral, patient's mom) who  was admitted to the medical service on 1/28/2023 due to AMS  Psychiatric consultation was requested due to patient requests to leave AMA and drug abuse  To evaluate for capacity  Patient was seen and evaluated  Patient was AAO to self but not to time or place  He thought it was Monday, January 1st 2024  He was not able to recall the president of the [place]  He was not able to recount what brought him to the hospital  Patient's speech was garbled  He did not provide fixed eye contact  On initial psychiatric consultation Thais Hampton reported "urinary tract infection doesn't feel like it"  Patient reported he wanted to leave because "technically i'm alive  i'm breathing"  A nasal cannula was in patient's nose, and when writer informed him of this assist, he said "no"  Patient was perseverative stating "I'm not sick  I'm not dead  You're not a doctor one bit "  he was fixated on discharge but could not adequately reason with the risks, benefits or treatment alternatives    Patient also was not able to adequately understand his diagnosis or why doctor's a requesting him to stay in the hospital for further treatment of his infection  Patient could not adequately reason  Per nursing, patient's mom called because patient had been lethargic at home  His O2 was found to be in the 60s on admission  Patient was intubated on admission but was able to be extubated today  Appears confused today stating that he is "dead"  Patient reportedly had been using heroin and cocaine at home to manage his pain  Plan is to continue treating his UTI which the patient does not believe he has  Psychiatric Review Of Systems:    SHARIFA due to patient's state of confusion    Historical Information     Past Psychiatric History:     SHARIFA due to patient's state of confusion    Substance Abuse History:  SHARIFA due to patient's state of confusion    Per collateral: cocaine and heroin misuse for pain    Family Psychiatric History:      SHARIFA due to patient's state of confusion    Social History:    Patient lives at home with his mother who reported she is unable to care for him      Traumatic History:     SHARIFA due to patient's state of confusion    Past Medical History:   Diagnosis Date   • Anxiety    • Bright red rectal bleeding     Last Assessed: 9/9/2015    • Drug dependence ([facility] [address] ) 10/20/2021   • Hypertension     Last Assessed: 7/9/2014    • Kidney stone    • Kidney stones     Last Assessed: 11/17/2016    • Periorbital edema     Last Assessed: 9/4/2015   • Septic shock ([facility] [address] ) 08/20/2022   • Skin tag of anus     Last Assessed: 9/9/2015    • Trigger point of thoracic region     Last Assessed: 11/6/2015        Medical Review Of Systems:    Review of Systems    Meds/Allergies     all current active meds have been reviewed and current meds:   Current Facility-Administered Medications   Medication Dose Route Frequency   • cefTRIAXone (ROCEPHIN) IVPB (premix in dextrose) 1,000 mg 50 mL  1,000 mg Intravenous Q24H   • chlorhexidine (PERIDEX) 0 12 % oral rinse 15 mL  15 mL Mouth/Throat Q12H [address]   • enoxaparin (LOVENOX) subcutaneous injection 40 mg  40 mg Subcutaneous Daily   • Famotidine (PF) (PEPCID) injection 20 mg  20 mg Intravenous BID     Allergies   Allergen Reactions   • Metronidazole Itching and Swelling   • Nsaids GI Intolerance     Stomach irritant   • Penicillin G    • Penicillins GI Intolerance     Sick to stomach   • Pollen Extract    • Sulfa Antibiotics        Objective     Vital signs in last 24 hours:  Temp:  [97 °F (36 1 °C)-101 5 °F (38 6 °C)] 100 6 °F (38 1 °C)  HR:  [] 67  Resp:  [12-35] 29  BP: ()/() 111/55  FiO2 (%):  [50-90] 50      Intake/Output Summary (Last 24 hours) at 1/29/2023 1559  Last data filed at 1/29/2023 1510  Gross per 24 hour   Intake 3143 82 ml   Output 1610 ml   Net 1533 82 ml       Mental Status Evaluation:    Appearance, appears stated age  Speech fluent  Behavior minimally cooperative; poor eye contact  Mood “I'm not dead”  Affect labile, delirious - mixed state  Thought Process incoherent  Thought Content did not report SI/HI w/ i/p  Perceptions no AVH endorsed or reported  Orientation/Attention AAO only to self  Memory not formally assessed, appears below average at this time 2/2 delirium  Insight limited / poor due to delirium  Judgment limited/poor due to delirium    Lab Results: I have personally reviewed all pertinent laboratory/tests results       Most Recent Labs:   Lab Results   Component Value Date    WBC 23 39 (H) 01/29/2023    RBC 5 10 01/29/2023    HGB 14 9 01/29/2023    HCT 44 7 01/29/2023     01/29/2023    RDW 14 1 01/29/2023    NEUTROABS 19 15 (H) 01/29/2023    SODIUM 142 01/29/2023    K 3 8 01/29/2023     (H) 01/29/2023    CO2 25 01/29/2023    BUN 23 01/29/2023    CREATININE 1 19 01/29/2023    GLUC 120 01/29/2023    GLUF 88 11/09/2022    CALCIUM 8 4 01/29/2023    AST 9 (L) 01/29/2023    ALT 8 01/29/2023    ALKPHOS 63 01/29/2023    TP 5 8 (L) 01/29/2023    ALB 3 5 01/29/2023    TBILI 1 12 (H) 01/29/2023    HDL 50 10/14/2015    TRIG 474 10/14/2015    LDLCALC LDL- unable to calculate when Triglyceride > 400 10/14/2015    AMMONIA 32 08/22/2022    WQH5ACXPVPAC 0 268 (L) 01/28/2023    FREET4 1 14 01/28/2023    RPR Non-Reactive 10/20/2021    HGBA1C 5 3 11/22/2021     11/22/2021       Imaging Studies: XR chest 1 view portable    Result Date: 1/29/2023  Narrative: CHEST INDICATION:   intubated  COMPARISON:  Chest CT from the same date  EXAM PERFORMED/VIEWS:  XR CHEST PORTABLE FINDINGS:  Endotracheal tube tip 4 8 cm above the cholo  Nasogastric tube tip just to the GE junction  Right IJ central line tip projects over the SVC just above the caval atrial junction  Cardiomediastinal silhouette appears unremarkable  Mild elevation right hemidiaphragm  Trace basilar effusions and some central congestion noted  No consolidation  Osseous structures appear within normal limits for patient age  Impression: Tubes and lines as described  Mild central congestion and trace basilar effusions  No pneumothorax  Workstation performed: [id number]     XR chest 1 view portable    Result Date: 1/28/2023  Narrative: CHEST INDICATION:   central line placement  COMPARISON:  1/20/2023 at 0852 hrs EXAM PERFORMED/VIEWS:  XR CHEST PORTABLE FINDINGS: Right IJ line tip in superior vena cava  Cardiomediastinal silhouette appears unremarkable  The lungs are clear  No pneumothorax or pleural effusion  Osseous structures appear within normal limits for patient age  Impression: Right IJ line in place Workstation performed: [id number]     XR chest 1 view portable    Result Date: 1/28/2023  Narrative: CHEST INDICATION:   low sats  COMPARISON:  Chest x-ray from August 21, 2022  EXAM PERFORMED/VIEWS:  XR CHEST PORTABLE FINDINGS: Cardiomediastinal silhouette appears unremarkable  The lungs are clear  No pneumothorax or pleural effusion  Osseous structures appear within normal limits for patient age  Impression: No acute cardiopulmonary disease   Workstation performed: [id number]     CT head without contrast    Result Date: 1/28/2023  Narrative: CT BRAIN - WITHOUT CONTRAST INDICATION:   Mental status change, unknown cause altered mental status  COMPARISON:  8/21/2022  TECHNIQUE:  CT examination of the brain was performed  In addition to axial images, sagittal and coronal 2D reformatted images were created and submitted for interpretation  Radiation dose length product (DLP) for this visit:  912 64 mGy-cm   This examination, like all CT scans performed in the [facility], was performed utilizing techniques to minimize radiation dose exposure, including the use of iterative  reconstruction and automated exposure control  IMAGE QUALITY:  Diagnostic  FINDINGS: PARENCHYMA:  No intracranial mass, mass effect or midline shift  No CT signs of acute infarction  No acute parenchymal hemorrhage  VENTRICLES AND EXTRA-AXIAL SPACES:  Stable left middle cranial fossa arachnoid cyst  VISUALIZED ORBITS: Normal visualized orbits  Bilateral lens replacement  PARANASAL SINUSES: Polyp versus retention cyst seen in the right sphenoid sinus  CALVARIUM AND EXTRACRANIAL SOFT TISSUES:  Normal      Impression: No acute intracranial abnormality  Stable left middle cranial fossa arachnoid cyst  Workstation performed: [id number]     NM kidney w flow and function    Result Date: 1/16/2023  Narrative: NUCLEAR RENAL SCAN INDICATION: N20 1: Calculus of ureter N20 0: Calculus of kidney History of right kidney staghorn calculus and nephrostomy catheter  COMPARISON:   CT, 11/17/2022 TECHNIQUE: The study was obtained following the intravenous administration of 10 0 mCi Tc-99m MAG-3  Posterior, dynamic flow images were obtained at one minute intervals for thirty minutes  Static, posterior, pre and post void images were then obtained  As per request, the study was performed without clamping the nephrostomy catheter    No Lasix challenge was requested FINDINGS: PERFUSION:   Left kidney:  60 4% Right kidney:  39 7% UPTAKE/EXCRETION: Time to 1/2 Peak: Left kidney:  1 2 minutes Right kidney:  0 4 minutes Time to Peak: Left kidney:  5 2 minutes Right kidney:  3 2 minutes Peak to 1/2 Peak: Left kidney:  14 5 minutes Right kidney:  17 5 minutes Split renal Function: Left kidney:  57 6% Right kidney:  42 3% Visual inspection of the images demonstrates anticipated activity within the nephrostomy catheter  Neither collecting system is distended  Blood flow is essentially symmetric  Impression: 1  With right nephrostomy catheter in place, no evidence of obstructive uropathy with activity noted throughout the nephrostomy catheter 2  Split renal function analysis demonstrates left kidney contribution of 57 6%, right kidney 42 3% Workstation performed: [id number]     X-ray chest 1 view    Result Date: 1/29/2023  Narrative: CHEST INDICATION:   Endotracheal tube positioning  COMPARISON:  CXR and CT 1/28/2023  EXAM PERFORMED/VIEWS:  XR CHEST 1 VIEW FINDINGS:  ET tube 5 cm above the cholo  NG tube below the diaphragm  Right jugular catheter in SVC  Cardiomediastinal silhouette appears unremarkable  Extensive new right base opacity  Possible right effusion  No pneumothorax  Osseous structures appear within normal limits for patient age  Impression: ET tube 5 cm above the cholo  New right base opacity which could be due to atelectasis and/or pneumonia in the appropriate clinical setting  Possible right effusion  Workstation performed: [id number]     PE Study with CT Abdomen and Pelvis with contrast    Result Date: 1/28/2023  Narrative: CT PULMONARY ANGIOGRAM OF THE CHEST AND CT ABDOMEN AND PELVIS WITH INTRAVENOUS CONTRAST INDICATION:   hypoxia, nephrostomy tube eval sepsis  COMPARISON:  Prior CT scan of the abdomen and pelvis dated 11/17/2022  TECHNIQUE:  CT examination of the chest, abdomen and pelvis was performed    Thin section CT angiographic technique was used in the chest in order to evaluate for pulmonary embolus and coronal 3D MIP postprocessing was performed on the acquisition scanner  Axial, sagittal, and coronal 2D reformatted images were created from the source data and submitted for interpretation  Radiation dose length product (DLP) for this visit:  1067 mGy-cm   This examination, like all CT scans performed in the [facility], was performed utilizing techniques to minimize radiation dose exposure, including the use of iterative reconstruction and automated exposure control  IV Contrast:  100 mL of iohexol (OMNIPAQUE) Enteric Contrast:  Enteric contrast was not administered  FINDINGS: CHEST PULMONARY ARTERIAL TREE:  No pulmonary embolus is seen  LUNGS:  There is patchy dependent atelectasis in the lower lobes right greater than left  The right lower lobe bronchus and bronchioles are obstructed with mucous secretions  There is some dependent mucus secretions noted also within the trachea  There are also mucous secretions noted within the left lower lobe bronchioles  PLEURA:  Unremarkable  HEART/AORTA:  Heart is unremarkable for patient's age  No thoracic aortic aneurysm  No pericardial effusion  There is a line from a right jugular central venous catheter in place  MEDIASTINUM AND KINGSTON:  No mediastinal, hilar, or axillary lymphadenopathy  CHEST WALL AND LOWER NECK:  Mild bilateral gynecomastia left greater than right  ABDOMEN LIVER/BILIARY TREE:  Slight nodularity again seen which may indicate underlying hepatic cirrhosis  No significant biliary ductal dilatation  GALLBLADDER:  Cholelithiasis  No pericholecystic inflammation  SPLEEN:  Unremarkable  PANCREAS:  Unremarkable  ADRENAL GLANDS:  Unremarkable  KIDNEYS/URETERS:  There is a right-sided percutaneous nephrostomy tube as before  Multiple prominent right-sided nephrolithiasis is unchanged  An 8 mm calculus in the proximal right ureter is unchanged  No hydronephrosis  No perinephric fluid collections   STOMACH AND BOWEL:  There is a small hiatal hernia with mild wall thickening of the mid to distal esophagus concerning for esophagitis  A few mildly prominent fluid-filled loops of small bowel are seen in the left lower abdomen, nonspecific  A mild enteritis not excluded  No focal inflammatory process however is seen  No convincing evidence for bowel obstruction  APPENDIX:  A normal appendix was visualized  ABDOMINOPELVIC CAVITY:  No ascites  No pneumoperitoneum  No lymphadenopathy  VESSELS:  The abdominal aorta is calcified  No retroperitoneal hematoma  PELVIS REPRODUCTIVE ORGANS:  Prostatic calcifications  URINARY BLADDER:  Mild bladder wall thickening may be due to incomplete distention but a mild cystitis not excluded  ABDOMINAL WALL/INGUINAL REGIONS:  Unremarkable  OSSEOUS STRUCTURES:  No acute fracture or destructive osseous lesion  Evidence of prior vertebroplasty with minimal compression deformity superior endplate of L4  Impression: No CT evidence of pulmonary embolism  Mucous secretions within the dependent portion of the trachea and extending into the right lower lobe bronchus bronchioles  There are also mucous secretions noted within the left lower lobe bronchioles  Cannot exclude underlying bronchiolitis or aspiration  Patchy dependent consolidation in the lower lobes likely atelectasis  Percutaneous nephrostomy tube on the right with right-sided nephrolithiasis and proximal right ureteral calculus as before  No hydronephrosis or perinephric fluid collections  A few fluid-filled loops of mildly prominent small bowel loops are seen in the left lower abdomen, nonspecific  Mild enteritis not excluded  No bowel obstruction however is seen  Small hiatal hernia with mild wall thickening of the mid to distal esophagus concerning for esophagitis  Cholelithiasis   Workstation performed: [id number]     EKG/Pathology/Other Studies:   Lab Results   Component Value Date    VENTRATE 54 01/28/2023    ATRIALRATE 54 01/28/2023    PRINT 116 01/28/2023    QRSDINT 138 01/28/2023    QTINT 476 01/28/2023    QTCINT 451 01/28/2023    PAXIS 61 01/28/2023    QRSAXIS 24 01/28/2023    TWAVEAXIS 73 01/28/2023        Code Status: Level 1 - Full Code  Advance Directive and Living Will:      Power of :    POLST:      Counseling / Coordination of Care: Total floor / unit time spent today 40 minutes  Greater than 50% of total time was spent with the patient and / or family counseling and / or coordination of care   A description of the counseling / coordination of care:

## 2023-01-29 NOTE — RESPIRATORY THERAPY NOTE
01/29/23 1315   Respiratory Assessment   Assessment Type Assess only   General Appearance Awake;Drowsy   Respiratory Pattern Normal   Chest Assessment Chest expansion symmetrical   Bilateral Breath Sounds Coarse   Suction Oral   Resp Comments pt extubated to 6L C, tolerating well, will cont to monitor   Vent Information   Ventilator End Yes   SpO2 95 %   Daily Screen   Patient safety screen outcome: Passed   Spont breathing trial outcome: Passed   Name of Medical Team Notified: Bre Obrien   Preparing to extubate/ Notify Nurse Yes   Extubation order obtained Yes   Consider Cuff Test Yes   Patient extubated Yes

## 2023-01-29 NOTE — PROGRESS NOTES
Brijesh 44  Progress Note - Vergie Lefort 1965, 61 y o  male MRN: [id number]  Unit/Bed#: ICU 11-01 Encounter: [id number]  Primary Care Provider: No primary care provider on file     Date and time admitted to hospital: 1/28/2023  6:09 PM    Acute respiratory failure with hypoxia [facility])  Assessment & Plan  · Likely 2/2 sepsis, possible aspiration 2/2 AMS  · Intubated after failure of NIV at [facility]  · Extubated on 1/29 to NC     * Sepsis [facility])  Assessment & Plan  · +UA 2/2 staghorn calculus s/p nephrostomy tube, is under evaluation for possible nephrectomy  · Found to also have SOB along w/ CT concerning for possible aspiration in progress w/ fluid content in trachea  · Blood cultures and urine culture in process, will send respiratory culture  · Does have hx of kleb, serratia, e coli UTI and haemophilus PNA  · Urine culture growing GNR, respiratory culture still waiting for finalization  · Continue rocephin for UTI and possible aspiration      UTI (urinary tract infection)  Assessment & Plan  · As above      AMS (altered mental status)  Assessment & Plan  · Patient presented to [facility] on 1/28 w/ AMS, SOB and lethargy x2 days  · Likely toxic/metabolic 2/2 UTI in nature, though patient does have hx of drug abuse which possibly may be contributing though no UDS was sent on arrival to ED so unclear  · UDS only positive for benzo which he received in medical care, however patient does admit to me he did something" fentanyl laced" (unclear if this is reliable given his mental status)  · Family informed us that he does have hx of cocaine and heroine use  · Continue serial neurochecks      Aspiration pneumonia ([address] )  Assessment & Plan  · As above      Hypotension-resolved as of 1/29/2023  Assessment & Plan  · Likely sedation related, but also possibly related to sepsis/septic shock  · Continue low dose levophed for MAP goal >=65 ----------------------------------------------------------------------------------------  HPI/24hr events:   Extubated to NC on   Patient w/ some confused speech  No other acute events    Patient appropriate for transfer out of the ICU today?: Patient does not meet criteria for referral to the ICU Follow-Up Clinic; referral has not been made  Disposition: Transfer to Med-Surg   Code Status: Level 1 - Full Code  ---------------------------------------------------------------------------------------  SUBJECTIVE  Unreliable speech  States he doesn't believe I am a medical provider and keeps asking to see my ID sharlene    Review of Systems  Review of systems was unable to be performed secondary to mental state  ---------------------------------------------------------------------------------------  OBJECTIVE    Vitals   Vitals:    23 0254 23 0300 23 0400 23 0500   BP: (!) 172/82 162/81 (!) 174/83 (!) 184/86   BP Location:       Pulse: 69 75 80 65   Resp: (!) 33 (!) 34 (!) 36 (!) 33   Temp: 100 2 °F (37 9 °C) 100 2 °F (37 9 °C) 100 2 °F (37 9 °C) 100 2 °F (37 9 °C)   TempSrc:       SpO2: 93% 93% 91% 95%   Weight:       Height:         Temp (24hrs), Av 4 °F (38 °C), Min:99 5 °F (37 5 °C), Max:100 8 °F (38 2 °C)  Current: Temperature: 100 2 °F (37 9 °C)          Respiratory:  SpO2: SpO2: 95 %, SpO2 Device: O2 Device: Nasal cannula  Nasal Cannula O2 Flow Rate (L/min): 6 L/min    Invasive/non-invasive ventilation settings   Respiratory    Lab Data (Last 4 hours)    None         O2/Vent Data (Last 4 hours)    None                Physical Exam  Constitutional:       General: He is not in acute distress  HENT:      Mouth/Throat:      Mouth: Mucous membranes are moist    Eyes:      Pupils: Pupils are equal, round, and reactive to light  Cardiovascular:      Rate and Rhythm: Normal rate and regular rhythm     Pulmonary:      Effort: Pulmonary effort is normal    Abdominal:      General: Abdomen is flat  Palpations: Abdomen is soft  Tenderness: There is no abdominal tenderness  Musculoskeletal:         General: No swelling  Skin:     General: Skin is warm and dry  Neurological:      General: No focal deficit present  Mental Status: He is alert  Comments: Confused/bizarre speech               Laboratory and Diagnostics:  Results from last 7 days   Lab Units 01/29/23  0512 01/28/23 2017 01/28/23  1138   WBC Thousand/uL 23 39* 20 82* 16 92*   HEMOGLOBIN g/dL 14 9 15 6 17 7*   HEMATOCRIT % 44 7 45 6 50 3*   PLATELETS Thousands/uL 201 217 236   NEUTROS PCT % 82*  --  87*   MONOS PCT % 8  --  7     Results from last 7 days   Lab Units 01/29/23 0512 01/28/23 2017 01/28/23  1138   SODIUM mmol/L 142 140 142   POTASSIUM mmol/L 3 8 3 7 3 0*   CHLORIDE mmol/L 109* 110* 104   CO2 mmol/L 25 24 23   ANION GAP mmol/L 8 6 15*   BUN mg/dL 23 25 28*   CREATININE mg/dL 1 19 1 35* 1 20   CALCIUM mg/dL 8 4 8 3* 10 1   GLUCOSE RANDOM mg/dL 120 137 137   ALT U/L 8  --  10   AST U/L 9*  --  11*   ALK PHOS U/L 63  --  97   ALBUMIN g/dL 3 5  --  4 9   TOTAL BILIRUBIN mg/dL 1 12*  --  1 30*     Results from last 7 days   Lab Units 01/29/23 0512 01/28/23 2017 01/28/23  1138   MAGNESIUM mg/dL 2 3 2 1 2 3   PHOSPHORUS mg/dL 3 6 3 9  --                Results from last 7 days   Lab Units 01/28/23  1138   LACTIC ACID mmol/L 2 0     ABG:  Results from last 7 days   Lab Units 01/29/23  0502   PH ART  7 393   PCO2 ART mm Hg 36 2   PO2 ART mm Hg 187 3*   HCO3 ART mmol/L 21 6*   BASE EXC ART mmol/L -2 7   ABG SOURCE  Radial, Right     VBG:  Results from last 7 days   Lab Units 01/29/23  0502   ABG SOURCE  Radial, Right     Results from last 7 days   Lab Units 01/29/23  0512 01/28/23  1138   PROCALCITONIN ng/ml 0 39* <0 05       Micro  Results from last 7 days   Lab Units 01/28/23  2255 01/28/23  2201 01/28/23  2142 01/28/23  1414 01/28/23  1138   BLOOD CULTURE  Received in Microbiology Lab   Culture in Progress  Received in Microbiology Lab  Culture in Progress  --   --  No Growth at 24 hrs  No Growth at 24 hrs  GRAM STAIN RESULT   --   --  2+ Disintegrating polys*  1+ Gram positive cocci in pairs*  1+ Gram negative rods*  1+ Yeast*  --   --    URINE CULTURE   --   --   --  >100,000 cfu/ml Gram Negative Bakari*  --        EKG: NSR  Imaging: I have personally reviewed pertinent reports  Intake and Output  I/O       01/27 0701 01/28 0700 01/28 0701 01/29 0700 01/29 0701 01/30 0700    P  O    0    I V  (mL/kg)  1387 6 (20 9) 1166 2 (17 6)    NG/GT  240 250    IV Piggyback   100    Total Intake(mL/kg)  1627 6 (24 5) 1516 2 (22 8)    Urine (mL/kg/hr)  395 775 (1)    Emesis/NG output  450 250    Stool   0    Total Output  845 1025    Net  +782 6 +491 2           Unmeasured Stool Occurrence   0 x          Height and Weights   Height: 6' (182 9 cm)  IBW (Ideal Body Weight): 77 6 kg  Body mass index is 19 85 kg/m²  Weight (last 2 days)     Date/Time Weight    01/29/23 0544 66 4 (146 39)    01/28/23 1853 67 6 (149 03)    01/28/23 1820 67 6 (149 03)            Nutrition       Diet Orders   (From admission, onward)             Start     Ordered    01/28/23 1849  Diet NPO  Diet effective now        References:    Nutrtion Support Algorithm Enteral vs  Parenteral   Question Answer Comment   Diet Type NPO    RD to adjust diet per protocol?  Yes        01/28/23 1852                  Active Medications  Scheduled Meds:  Current Facility-Administered Medications   Medication Dose Route Frequency Provider Last Rate   • cefTRIAXone  1,000 mg Intravenous Q24H Rachel Paez, PA-C 1,000 mg (01/29/23 2118)   • chlorhexidine  15 mL Mouth/Throat Q12H [employer] [place], [place]     • enoxaparin  40 mg Subcutaneous Daily Mikle Butter, [place]     • Famotidine (PF)  20 mg Intravenous BID Rachel Paez PA-CARLIN     • hydrALAZINE  10 mg Intravenous Q4H PRN Mikle Butter, [place] Continuous Infusions:     PRN Meds:   hydrALAZINE, 10 mg, Q4H PRN        Invasive Devices Review  Invasive Devices     Central Venous Catheter Line  Duration           CVC Central Lines 01/28/23 Triple Right Internal jugular 1 day          Drain  Duration           Nephrostomy Right 10 2 Fr  68 days    Urethral Catheter Temperature probe 16 Fr  1 day                Rationale for remaining devices: Difficult access, indwelling nephrostomy 2/2 obstructive uropathy  ---------------------------------------------------------------------------------------  Advance Directive and Living Will:      Power of :    POLST:    ---------------------------------------------------------------------------------------  Care Time Delivered:   No Critical Care time spent       JENNIFER FIELDS PA-C      Portions of the record may have been created with voice recognition software  Occasional wrong word or "sound a like" substitutions may have occurred due to the inherent limitations of voice recognition software    Read the chart carefully and recognize, using context, where substitutions have occurred

## 2023-01-29 NOTE — ASSESSMENT & PLAN NOTE
· Likely 2/2 sepsis, possible aspiration 2/2 AMS  · Intubated after failure of NIV at [facility]  · Continue mechanical ventilation, wean settings as tolerated  · Sedation/analgesia w/ propofol, fentanyl, precedex w/ goal RASS -1

## 2023-01-29 NOTE — ASSESSMENT & PLAN NOTE
· Patient presented to [facility] on 1/28 w/ AMS, SOB and lethargy x2 days  · Likely toxic/metabolic 2/2 UTI in nature, though patient does have hx of drug abuse which possibly may be contributing though no UDS was sent on arrival to ED so unclear  · Continue serial neurochecks

## 2023-01-29 NOTE — H&P
[address] [place] [address] 1965, 61 y o  male MRN: [id number]  Unit/Bed#: ICU 11-01 Encounter: [id number]  Primary Care Provider: No primary care provider on file  Date and time admitted to hospital: 1/28/2023  6:09 PM    Acute respiratory failure with hypoxia (HCC)  Assessment & Plan  · Likely 2/2 sepsis, possible aspiration 2/2 AMS  · Intubated after failure of NIV at [facility]  · Continue mechanical ventilation, wean settings as tolerated  · Sedation/analgesia w/ propofol, fentanyl, precedex w/ goal RASS -1    * Sepsis ([address] )  Assessment & Plan  · +UA 2/2 staghorn calculus s/p nephrostomy tube, is under evaluation for possible nephrectomy  · Found to also have SOB along w/ CT concerning for possible aspiration in progress w/ fluid content in trachea  · Blood cultures and urine culture in process, will send respiratory culture  · Does have hx of kleb, serratia, e coli UTI and haemophilus PNA  · Start empiric rocephin for UTI and possible aspiration    UTI (urinary tract infection)  Assessment & Plan  · As above    AMS (altered mental status)  Assessment & Plan  · Patient presented to [facility] on 1/28 w/ AMS, SOB and lethargy x2 days  · Likely toxic/metabolic 2/2 UTI in nature, though patient does have hx of drug abuse which possibly may be contributing though no UDS was sent on arrival to ED so unclear  · Continue serial neurochecks    -------------------------------------------------------------------------------------------------------------  Chief Complaint: AMS, SOB, lethargy    History of Present Illness   HX and PE limited by: intubated, sedated  Bella Galaviz is a 61 y o  male who presented to [facility] on 1/28 for evaluation of SOB, AMS and lethargy x2 days  Patient lives at home w/ his mother, she noted him to be increasingly lethargic, SOB and w/ decreased PO intake over that time   He was recently admitted for septic shock 2/2 UTI w/ staghorn calculus and is now s/p R PCN, undergoing eval for R nephrectomy  On arrival to ED he was found to have spO2 of 68% on 10L and was transitioned to vapotherm  He was having difficulty w/ secretions and continued hypoxia despite HF and suctioning and was thus intubated  He was transferred to [facility] for continuation of his care  On arrival the patient was agitated, purposeful, moving all 4  History obtained from chart review  -------------------------------------------------------------------------------------------------------------  Dispo: Admit to Critical Care     Code Status: Level 1 - Full Code  --------------------------------------------------------------------------------------------------------------  Review of Systems    Review of systems was unable to be performed secondary to intubation/sedation    Physical Exam  Constitutional:       General: He is not in acute distress  Eyes:      Comments: 2, sluggish   Cardiovascular:      Rate and Rhythm: Normal rate and regular rhythm  Pulmonary:      Effort: Pulmonary effort is normal    Abdominal:      Palpations: Abdomen is soft  Comments: R sided nephrostomy w/ small amount of clear yellow urine   Musculoskeletal:         General: No swelling  Skin:     General: Skin is warm and dry  Neurological:      Comments: Moving all 4, agitated but purposeful off of sedation       --------------------------------------------------------------------------------------------------------------  Vitals:   Vitals:    01/28/23 1853 01/28/23 1900 01/28/23 1906 01/28/23 1928   BP:  (!) 57/41 (!) 75/51    Pulse:  83 84 99   Resp:  12 13 (!) 25   Temp:  (!) 101 1 °F (38 4 °C) (!) 101 1 °F (38 4 °C)    SpO2:  96% 98% 100%   Weight: 67 6 kg (149 lb 0 5 oz)      Height:         Temp  Min: 97 °F (36 1 °C)  Max: 101 1 °F (38 4 °C)  IBW (Ideal Body Weight): 77 6 kg  Height: 6' (182 9 cm)  Body mass index is 20 21 kg/m²      Laboratory and Diagnostics:  Results from last 7 days   Lab Units 01/28/23  7028 WBC Thousand/uL 16 92*   HEMOGLOBIN g/dL 17 7*   HEMATOCRIT % 50 3*   PLATELETS Thousands/uL 236   NEUTROS PCT % 87*   MONOS PCT % 7     Results from last 7 days   Lab Units 01/28/23  1138   SODIUM mmol/L 142   POTASSIUM mmol/L 3 0*   CHLORIDE mmol/L 104   CO2 mmol/L 23   ANION GAP mmol/L 15*   BUN mg/dL 28*   CREATININE mg/dL 1 20   CALCIUM mg/dL 10 1   GLUCOSE RANDOM mg/dL 137   ALT U/L 10   AST U/L 11*   ALK PHOS U/L 97   ALBUMIN g/dL 4 9   TOTAL BILIRUBIN mg/dL 1 30*     Results from last 7 days   Lab Units 01/28/23  1138   MAGNESIUM mg/dL 2 3               Results from last 7 days   Lab Units 01/28/23  1138   LACTIC ACID mmol/L 2 0     ABG:  Results from last 7 days   Lab Units 01/28/23  1620   PH ART  7 335*   PCO2 ART mm Hg 42 3   PO2 ART mm Hg 169 9*   HCO3 ART mmol/L 22 1   BASE EXC ART mmol/L -3 7   ABG SOURCE  Radial, Right     VBG:  Results from last 7 days   Lab Units 01/28/23  1620   ABG SOURCE  Radial, Right     Results from last 7 days   Lab Units 01/28/23  1138   PROCALCITONIN ng/ml <0 05       Micro:        EKG: NSR  Imaging: I have personally reviewed pertinent reports     and I have personally reviewed pertinent films in PACS      Historical Information   Past Medical History:   Diagnosis Date   • Anxiety    • Bright red rectal bleeding     Last Assessed: 9/9/2015    • Drug dependence ([address] ) 10/20/2021   • Hypertension     Last Assessed: 7/9/2014    • Kidney stone    • Kidney stones     Last Assessed: 11/17/2016    • Periorbital edema     Last Assessed: 9/4/2015   • Septic shock ([address] ) 08/20/2022   • Skin tag of anus     Last Assessed: 9/9/2015    • Trigger point of thoracic region     Last Assessed: 11/6/2015      Past Surgical History:   Procedure Laterality Date   • CYSTOSCOPY W/ URETERAL STENT PLACEMENT  03/11/2013   • CYSTOSCOPY W/ URETERAL STENT PLACEMENT  06/18/2014    EXTRACORPOREAL SHOCK WAVE LITHOTRIPSY    • CYSTOSCOPY W/ URETERAL STENT PLACEMENT  07/16/2014    EXTRACORPOREAL SHOCK WAVE LITHOTRIPSY    • CYSTOSCOPY W/ URETERAL STENT REMOVAL  04/11/2013   • CYSTOSCOPY W/ URETERAL STENT REMOVAL Right 08/26/2014   • CYSTOSCOPY W/ URETEROSCOPY W/ LITHOTRIPSY  02/26/2013    Percutaneous lithotomy With Uretal Stent Plcement    • CYSTOSCOPY W/ URETEROSCOPY W/ LITHOTRIPSY  03/11/2014   • CYSTOSCOPY W/ URETEROSCOPY W/ LITHOTRIPSY Right 08/04/2014    With Uretal Stent Placement    • CYSTOSCOPY W/ URETEROSCOPY W/ LITHOTRIPSY Right 05/09/2016   • HEMORRHOID SURGERY     • HERNIA REPAIR  03/11/2013   • IR NEPHROSTOMY TUBE CHECK/CHANGE/REPOSITION/REINSERTION/UPSIZE  11/22/2022   • IR NEPHROSTOMY TUBE PLACEMENT  8/20/2022   • KIDNEY SURGERY     • LITHOTRIPSY     • IL CYSTO/URETERO W/LITHOTRIPSY &INDWELL STENT INSRT Right 7/13/2016    Procedure: CYSTOSCOPY; URETEROSCOPY WITH HOLMIUM LASER STONE EXTRACTION; RETROGRADE PYELOGRAM; URETERAL STENT INSERTION ;  Surgeon: Anurag Morales MD;  Location: [facility];  Service: Urology   • IL CYSTO/URETERO W/LITHOTRIPSY &INDWELL STENT INSRT Right 5/9/2016    Procedure: CYSTOSCOPY,  URETEROSCOPY,  WITH LITHOTRIPSY HOLMIUM LASER, STONE EXTRACTION, AND INSERTION STENT URETERAL;  Surgeon: Anurag Morales MD;  Location: [facility];  Service: Urology   • IL CYSTO/URETERO W/LITHOTRIPSY &INDWELL STENT INSRT Right 11/10/2022    Procedure: CYSTOSCOPY URETEROSCOPY  right RETROGRADE PYELOGRAM;  Surgeon: Shea Hernandez MD;  Location: [facility];  Service: Urology   • IL LITHOTRIPSY [address] [facility] [address] Right 6/17/2016    Procedure: [place] SHOCKWAVE (ESWL);   Surgeon: Anurag Morales MD;  Location: [facility];  Service: Urology   • TRANSURETHRAL RESECTION OF BLADDER     • URETERAL REIMPLANTION  03/11/2013     Social History   Social History     Substance and Sexual Activity   Alcohol Use No     Social History     Substance and Sexual Activity   Drug Use Yes   • Types: Amphetamines     Social History     Tobacco Use   Smoking Status Every Day   • Packs/day: 2 00   • Years: 35 00   • Pack years: 70 00   • Types: Cigarettes   Smokeless Tobacco Never     Family History:   Family History   Problem Relation Age of Onset   • Heart attack Father      Family history unknown      Medications:  Current Facility-Administered Medications   Medication Dose Route Frequency   • acetaminophen (TYLENOL) oral suspension 650 mg  650 mg Oral Q6H PRN   • cefTRIAXone (ROCEPHIN) IVPB (premix in dextrose) 1,000 mg 50 mL  1,000 mg Intravenous Q24H   • chlorhexidine (PERIDEX) 0 12 % oral rinse 15 mL  15 mL Mouth/Throat Q12H [address]   • dexmedeTOMIDine (Precedex) 400 mcg in sodium chloride 0 9% 100 mL  0 1-0 7 mcg/kg/hr Intravenous Titrated   • [START ON 1/29/2023] enoxaparin (LOVENOX) subcutaneous injection 40 mg  40 mg Subcutaneous Daily   • Famotidine (PF) (PEPCID) injection 20 mg  20 mg Intravenous BID   • fentaNYL 1000 mcg in sodium chloride 0 9% 100mL infusion  150 mcg/hr Intravenous Continuous   • midazolam (VERSED) injection 2 mg  2 mg Intravenous Q4H PRN   • multi-electrolyte (PLASMALYTE-A/ISOLYTE-S PH 7 4) IV solution  75 mL/hr Intravenous Continuous   • NOREPINEPHRINE 4 MG  ML NSS (CMPD ORDER) infusion  1-30 mcg/min Intravenous Titrated   • [START ON 1/29/2023] polyethylene glycol (MIRALAX) packet 17 g  17 g Oral Daily   • propofol (DIPRIVAN) 1000 mg in 100 mL infusion (premix)  5-50 mcg/kg/min Intravenous Titrated   • senna-docusate sodium (SENOKOT S) 8 6-50 mg per tablet 2 tablet  2 tablet Oral BID     Home medications:  Prior to Admission Medications   Prescriptions Last Dose Informant Patient Reported? Taking?    HYDROmorphone (DILAUDID) 2 mg tablet   No No   Sig: Take 1 tablet (2 mg total) by mouth every 4 (four) hours as needed for moderate pain for up to 10 doses Max Daily Amount: 12 mg   hydrOXYzine HCL (ATARAX) 25 mg tablet   No No   Sig: Take 1 tablet (25 mg total) by mouth every 6 (six) hours as needed for anxiety or itching   Patient not taking: Reported on 11/7/2022 nitrofurantoin (MACROBID) 100 mg capsule   No No   Sig: Take 1 capsule (100 mg total) by mouth 2 (two) times a day   Patient not taking: Reported on 12/28/2022   oxyCODONE (Roxicodone) 5 immediate release tablet   No No   Sig: Take 1 tablet (5 mg total) by mouth every 4 (four) hours as needed for moderate pain Max Daily Amount: 30 mg   Patient not taking: Reported on 11/18/2022   phenazopyridine (PYRIDIUM) 200 mg tablet   No No   Sig: Take 1 tablet (200 mg total) by mouth 3 (three) times a day as needed for bladder spasms   Patient not taking: Reported on 11/18/2022   sodium chloride, PF, 0 9 %   No No   Sig: 10 mL by Intracatheter route daily Intracatheter flushing daily  May substitute prefilled syringe with normal saline 10 mL vials, 10 mL syringes, and 18 g blunt needles   Patient not taking: Reported on 11/18/2022      Facility-Administered Medications Last Administration Doses Remaining   gentamicin (GARAMYCIN) 40 mg/mL injection 60 mg None recorded 1        Allergies: Allergies   Allergen Reactions   • Metronidazole Itching and Swelling   • Nsaids GI Intolerance     Stomach irritant   • Penicillin G    • Penicillins GI Intolerance     Sick to stomach   • Pollen Extract    • Sulfa Antibiotics        ------------------------------------------------------------------------------------------------------------  Advance Directive and Living Will:      Power of :    POLST:    ------------------------------------------------------------------------------------------------------------  Anticipated Length of Stay is > 2 midnights    Care Time Delivered:   Upon my evaluation, this patient had a high probability of imminent or life-threatening deterioration due to acute hypoxic respiratory failure, sepsis, which required my direct attention, intervention, and personal management    I have personally provided 33 minutes (1903 to 1936) of critical care time, exclusive of procedures, teaching, family meetings, and any prior time recorded by providers other than myself  Crispin Spears PA-C        Portions of the record may have been created with voice recognition software  Occasional wrong word or "sound a like" substitutions may have occurred due to the inherent limitations of voice recognition software    Read the chart carefully and recognize, using context, where substitutions have occurred

## 2023-01-29 NOTE — RESPIRATORY THERAPY NOTE
RT Ventilator Management Note  Suhail Asif 61 y o  male MRN: [id number]  Unit/Bed#: ICU 11-01 Encounter: [id number]      Daily Screen         1/28/2023  1810 1/29/2023  0800          Patient safety screen outcome[de-identified] Failed Failed      Not Ready for Weaning due to[de-identified] FiO2 >60% Underline problem not resolved                Physical Exam:   Assessment Type: (P) Assess only  General Appearance: (P) Drowsy  Respiratory Pattern: (P) Assisted  Chest Assessment: (P) Chest expansion symmetrical  Bilateral Breath Sounds: (P) Coarse, Diminished  Cough: Strong, Productive  Suction: (P) ET Tube  O2 Device: vent      Resp Comments: (P) Pt  stable on current vent settings  FIO2 titrated at this time   Possible SBT when sedation dereased

## 2023-01-29 NOTE — ASSESSMENT & PLAN NOTE
· Patient presented to [facility] on 1/28 w/ AMS, SOB and lethargy x2 days  · Likely toxic/metabolic 2/2 UTI in nature, though patient does have hx of drug abuse which possibly may be contributing though no UDS was sent on arrival to ED so unclear  · Continue serial neurochecks

## 2023-01-29 NOTE — RESPIRATORY THERAPY NOTE
01/29/23 0445   Respiratory Assessment   Assessment Type Assess only   General Appearance Sedated; Awake   Respiratory Pattern Assisted   Chest Assessment Chest expansion symmetrical   Bilateral Breath Sounds Coarse;Diminished   Cough Strong;Productive   Suction ET Tube   Resp Comments ABG done on rt radial, pt reaching for Ett to pull thrashing head trying to speak, meds given to calm, decreased to 70% monica well uneventful shift   O2 Device vent   Vent Information   Vent ID [id number]   Vent type     Vent Mode AC/VC   $ Vent Daily Charge-Subsequent Yes   $ Pulse Oximetry Spot Check Charge Completed   SpO2 99 %   AC/VC Settings   Resp Rate (BPM) 16 BPM   Vt (mL) 400 mL   FIO2 (%) 80 %   PEEP (cmH2O) 8 cmH2O   Flow Pattern (LPM) 42 L/min   Trigger Sensitivity Flow (lpm) 3 %   Humidification Heater   Heater Temperature (Set) 98 6 °F (37 °C)   AC/VC Actuals   Resp Rate (BPM) 17 BPM   VT (mL) 473   MV 11   MAP (cmH2O) 12 cmH2O   Peak Pressure (cmH2O) 20 cmH2O   I/E Ratio (Obs) 1:1 1   Heater Temperature (Obs) 98 4 °F (36 9 °C)   Static Compliance (mL/cmH20) 43 mL/cmH2O   Plateau Pressure (cm H2O) 18 cm H2O   AC/VC Alarms   High Peak Pressure (cmH2O) 45   High Resp Rate (BPM) 40 BPM   High MV (L/min) 16 L/min   Low MV (L/min) 2 5 L/min   Vt High (mL) 900 mL   Vt Low (mL) 300 mL   AC/VC Apnea Settings   Resp Rate (BPM) 16 BPM   VT (mL) 400 mL   FIO2 (%) 100 %   Apnea Time (s) 20 S   Apnea Flow (L/min) 44 L/min   Maintenance   Alarm (pink) cable attached No   Resuscitation bag with peep valve at bedside Yes   Water bag changed No   Circuit changed No   IHI Ventilator Associated Pneumonia Bundle   Head of Bed Elevated HOB 30   ETT  Cuffed 7 5 mm   Placement Date/Time: 01/28/23 (c) 4959   Type: Cuffed  Tube Size: 7 5 mm  Location: Oral  Insertion attempts: (c) 1  Placement Verification: Chest x-ray;Symmetrical chest wall movement  Secured at (cm): 24   Secured at (cm) 23   Measured from Gums   Secured Location Left Secured by Commercial tube solis  (unchanged)   Site Condition Dry   Cuff Pressure (cm H2O) 24 cm H2O   HI-LO Suction  Continuous low suction   HI-LO Secretions Scant;Thin;Clear   HI-LO Intervention Patent

## 2023-01-29 NOTE — PLAN OF CARE
Problem: SAFETY,RESTRAINT: NV/NON-SELF DESTRUCTIVE BEHAVIOR  Goal: Remains free of harm/injury (restraint for non violent/non self-detsructive behavior)  Description: INTERVENTIONS:  - Instruct patient/family regarding restraint use   - Assess and monitor physiologic and psychological status   - Provide interventions and comfort measures to meet assessed patient needs   - Identify and implement measures to help patient regain control  - Assess readiness for release of restraint   Outcome: Progressing  Goal: Returns to optimal restraint-free functioning  Description: INTERVENTIONS:  - Assess the patient's behavior and symptoms that indicate continued need for restraint  - Identify and implement measures to help patient regain control  - Assess readiness for release of restraint   Outcome: Progressing     Problem: CONFUSION/THOUGHT DISTURBANCE  Goal: Thought disturbances (confusion, delirium, depression, dementia or psychosis) are managed to maintain or return to baseline mental status and functional level  Description: INTERVENTIONS:  - Assess for possible contributors to  thought disturbance, including but not limited to medications, infection, impaired vision or hearing, underlying metabolic abnormalities, dehydration, respiratory compromise,  psychiatric diagnoses and notify attending PHYSICAN/AP  - Monitor and intervene to maintain adequate nutrition, hydration, elimination, sleep and activity  - Decrease environmental stimuli, including noise as appropriate  - Provide frequent contacts to provide refocusing, direction and reassurance as needed  Approach patient calmly with eye contact and at their level    - Mclean high risk fall precautions, aspiration precautions and other safety measures, as indicated  - If delirium suspected, notify physician/AP of change in condition and request immediate in-person evaluation  - Pursue consults as appropriate including Geriatric (campus dependent), OT for cognitive evaluation/activity planning, psychiatric, pastoral care, etc   Outcome: Progressing     Problem: BEHAVIOR  Goal: Pt/Family maintain appropriate behavior and adhere to behavioral management agreement, if implemented  Description: INTERVENTIONS:  - Assess the family dynamic   - Encourage verbalization of thoughts and concerns in a socially appropriate manner  - Assess patient/family's coping skills and non-compliant behavior (including use of illegal substances)  - Utilize positive, consistent limit setting strategies supporting safety of patient, staff and others  - Initiate consult with Case Management, Spiritual Care or other ancillary services as appropriate  - If a patient's/visitor's behavior jeopardizes the safety of the patient, staff, or others, refer to organization procedure  - Notify Security of behavior or suspected illegal substances which indicate the need for search of the patient and/or belongings  - Encourage participation in the decision making process about a behavioral management agreement; implement if patient meets criteria  Outcome: Progressing     Problem: Nutrition/Hydration-ADULT  Goal: Nutrient/Hydration intake appropriate for improving, restoring or maintaining nutritional needs  Description: Monitor and assess patient's nutrition/hydration status for malnutrition  Collaborate with interdisciplinary team and initiate plan and interventions as ordered  Monitor patient's weight and dietary intake as ordered or per policy  Utilize nutrition screening tool and intervene as necessary  Determine patient's food preferences and provide high-protein, high-caloric foods as appropriate       INTERVENTIONS:  - Monitor oral intake, urinary output, labs, and treatment plans  - Assess nutrition and hydration status and recommend course of action  - Evaluate amount of meals eaten  - Assist patient with eating if necessary   - Allow adequate time for meals  - Recommend/ encourage appropriate diets, oral nutritional supplements, and vitamin/mineral supplements  - Order, calculate, and assess calorie counts as needed  - Recommend, monitor, and adjust tube feedings and TPN/PPN based on assessed needs  - Assess need for intravenous fluids  - Provide specific nutrition/hydration education as appropriate  - Include patient/family/caregiver in decisions related to nutrition  Outcome: Progressing     Problem: MOBILITY - ADULT  Goal: Maintain or return to baseline ADL function  Description: INTERVENTIONS:  -  Assess patient's ability to carry out ADLs; assess patient's baseline for ADL function and identify physical deficits which impact ability to perform ADLs (bathing, care of mouth/teeth, toileting, grooming, dressing, etc )  - Assess/evaluate cause of self-care deficits   - Assess range of motion  - Assess patient's mobility; develop plan if impaired  - Assess patient's need for assistive devices and provide as appropriate  - Encourage maximum independence but intervene and supervise when necessary  - Involve family in performance of ADLs  - Assess for home care needs following discharge   - Consider OT consult to assist with ADL evaluation and planning for discharge  - Provide patient education as appropriate  Outcome: Progressing  Goal: Maintains/Returns to pre admission functional level  Description: INTERVENTIONS:  - Perform BMAT or MOVE assessment daily    - Set and communicate daily mobility goal to care team and patient/family/caregiver  - Collaborate with rehabilitation services on mobility goals if consulted  - Perform Range of Motion 3 times a day  - Reposition patient every 2 hours    - Dangle patient 2 times a day  - Stand patient 2 times a day  - Ambulate patient 2 times a day  - Out of bed for meals   - Out of bed for toileting  - Record patient progress and toleration of activity level   Outcome: Progressing     Problem: Prexisting or High Potential for Compromised Skin Integrity  Goal: Skin integrity is maintained or improved  Description: INTERVENTIONS:  - Identify patients at risk for skin breakdown  - Assess and monitor skin integrity  - Assess and monitor nutrition and hydration status  - Monitor labs   - Assess for incontinence   - Turn and reposition patient  - Assist with mobility/ambulation  - Relieve pressure over bony prominences  - Avoid friction and shearing  - Provide appropriate hygiene as needed including keeping skin clean and dry  - Evaluate need for skin moisturizer/barrier cream  - Collaborate with interdisciplinary team   - Patient/family teaching  - Consider wound care consult   Outcome: Progressing     Problem: RESPIRATORY - ADULT  Goal: Achieves optimal ventilation and oxygenation  Description: INTERVENTIONS:  - Assess for changes in respiratory status  - Assess for changes in mentation and behavior  - Position to facilitate oxygenation and minimize respiratory effort  - Oxygen administered by appropriate delivery if ordered  - Initiate smoking cessation education as indicated  - Encourage broncho-pulmonary hygiene including cough, deep breathe, Incentive Spirometry  - Assess the need for suctioning and aspirate as needed  - Assess and instruct to report SOB or any respiratory difficulty  - Respiratory Therapy support as indicated  Outcome: Progressing     Problem: GENITOURINARY - ADULT  Goal: Maintains or returns to baseline urinary function  Description: INTERVENTIONS:  - Assess urinary function  - Encourage oral fluids to ensure adequate hydration if ordered  - Administer IV fluids as ordered to ensure adequate hydration  - Administer ordered medications as needed  - Offer frequent toileting  - Follow urinary retention protocol if ordered  Outcome: Progressing  Goal: Absence of urinary retention  Description: INTERVENTIONS:  - Assess patient’s ability to void and empty bladder  - Monitor I/O  - Bladder scan as needed  - Discuss with physician/AP medications to alleviate retention as needed  - Discuss catheterization for long term situations as appropriate  Outcome: Progressing  Goal: Urinary catheter remains patent  Description: INTERVENTIONS:  - Assess patency of urinary catheter  - If patient has a chronic rodriguez, consider changing catheter if non-functioning  - Follow guidelines for intermittent irrigation of non-functioning urinary catheter  Outcome: Progressing     Problem: METABOLIC, FLUID AND ELECTROLYTES - ADULT  Goal: Electrolytes maintained within normal limits  Description: INTERVENTIONS:  - Monitor labs and assess patient for signs and symptoms of electrolyte imbalances  - Administer electrolyte replacement as ordered  - Monitor response to electrolyte replacements, including repeat lab results as appropriate  - Instruct patient on fluid and nutrition as appropriate  Outcome: Progressing  Goal: Fluid balance maintained  Description: INTERVENTIONS:  - Monitor labs   - Monitor I/O and WT  - Instruct patient on fluid and nutrition as appropriate  - Assess for signs & symptoms of volume excess or deficit  Outcome: Progressing  Goal: Glucose maintained within target range  Description: INTERVENTIONS:  - Monitor Blood Glucose as ordered  - Assess for signs and symptoms of hyperglycemia and hypoglycemia  - Administer ordered medications to maintain glucose within target range  - Assess nutritional intake and initiate nutrition service referral as needed  Outcome: Progressing     Problem: HEMATOLOGIC - ADULT  Goal: Maintains hematologic stability  Description: INTERVENTIONS  - Assess for signs and symptoms of bleeding or hemorrhage  - Monitor labs  - Administer supportive blood products/factors as ordered and appropriate  Outcome: Progressing

## 2023-01-29 NOTE — RESPIRATORY THERAPY NOTE
01/28/23 2032   Respiratory Assessment   Resp Comments changed peep back to 8 per Pa   AC/VC Settings   PEEP (cmH2O) (S)  8 cmH2O

## 2023-01-29 NOTE — PROGRESS NOTES
[facility]  Progress Note - Amelia Gutiérrez 1965, 61 y o  male MRN: [id number]  Unit/Bed#: ICU 11-01 Encounter: [id number]  Primary Care Provider: No primary care provider on file  Date and time admitted to hospital: 1/28/2023  6:09 PM    Acute respiratory failure with hypoxia (HCC)  Assessment & Plan  · Likely 2/2 sepsis, possible aspiration 2/2 AMS  · Intubated after failure of NIV at [facility]  · Continue mechanical ventilation, wean settings as tolerated  · Sedation/analgesia w/ propofol, fentanyl, precedex w/ goal RASS -1     * Sepsis ([facility] [address] )  Assessment & Plan  · +UA 2/2 staghorn calculus s/p nephrostomy tube, is under evaluation for possible nephrectomy  · Found to also have SOB along w/ CT concerning for possible aspiration in progress w/ fluid content in trachea  · Blood cultures and urine culture in process, will send respiratory culture  · Does have hx of kleb, serratia, e coli UTI and haemophilus PNA  · Start empiric rocephin for UTI and possible aspiration     UTI (urinary tract infection)  Assessment & Plan  · As above     AMS (altered mental status)  Assessment & Plan  · Patient presented to [facility] on 1/28 w/ AMS, SOB and lethargy x2 days  · Likely toxic/metabolic 2/2 UTI in nature, though patient does have hx of drug abuse which possibly may be contributing though no UDS was sent on arrival to ED so unclear  · Continue serial neurochecks     Aspiration pneumonia ([facility] )  Assessment & Plan  · As above     Hypotension  Assessment & Plan  · Likely sedation related, but also possibly related to sepsis/septic shock  · Continue low dose levophed for MAP goal >=65     ----------------------------------------------------------------------------------------  HPI/24hr events:   Admitted yesterday evening, vented, sedated, on low dose levophed  No major overnight events       Patient appropriate for transfer out of the ICU today?: No  Disposition: Continue Critical Care   Code Status: Level 1 - Full Code  ---------------------------------------------------------------------------------------  SUBJECTIVE  Intubated, sedated    Review of Systems  Review of systems was unable to be performed secondary to intubation/sedation  ---------------------------------------------------------------------------------------  OBJECTIVE    Vitals   Vitals:    23 0300 23 0400 23 0445 23 0500   BP: 109/62 108/60 113/64 132/63   Pulse: (!) 49 (!) 51 61 58   Resp: 18 19 17 (!) 28   Temp: 98 2 °F (36 8 °C) 98 2 °F (36 8 °C) 98 6 °F (37 °C) 98 8 °F (37 1 °C)   TempSrc:       SpO2: 99% 99% 99% 99%   Weight:       Height:         Temp (24hrs), Av 1 °F (37 8 °C), Min:97 °F (36 1 °C), Max:101 5 °F (38 6 °C)  Current: Temperature: 98 8 °F (37 1 °C)          Respiratory:  SpO2: SpO2: 99 %, SpO2 Activity: SpO2 Activity: At Rest, SpO2 Device: O2 Device: Ventilator       Invasive/non-invasive ventilation settings   Respiratory    Lab Data (Last 4 hours)       0502            pH, Arterial       7 393             pCO2, Arterial       36 2             pO2, Arterial       187 3             HCO3, Arterial       21 6             Base Excess, Arterial       -2 7                  O2/Vent Data        044   Most Recent         Vent Mode AC/VC  AC/VC      Resp Rate (BPM) (BPM) 16  16      Vt (mL) (mL) 400  400      FIO2 (%) (%) 70  70      PEEP (cmH2O) (cmH2O) 8  8      MV 11  11                  Physical Exam  Constitutional:       General: He is not in acute distress  Eyes:      Pupils: Pupils are equal, round, and reactive to light  Cardiovascular:      Rate and Rhythm: Normal rate and regular rhythm  Pulmonary:      Effort: Pulmonary effort is normal    Abdominal:      Palpations: Abdomen is soft  Comments: R sided nephrostomy   Musculoskeletal:         General: No swelling  Skin:     General: Skin is warm and dry     Neurological:      Comments: Agitated off of sedation Laboratory and Diagnostics:  Results from last 7 days   Lab Units 01/28/23  2017 01/28/23  1138   WBC Thousand/uL 20 82* 16 92*   HEMOGLOBIN g/dL 15 6 17 7*   HEMATOCRIT % 45 6 50 3*   PLATELETS Thousands/uL 217 236   NEUTROS PCT %  --  87*   MONOS PCT %  --  7     Results from last 7 days   Lab Units 01/28/23  2017 01/28/23  1138   SODIUM mmol/L 140 142   POTASSIUM mmol/L 3 7 3 0*   CHLORIDE mmol/L 110* 104   CO2 mmol/L 24 23   ANION GAP mmol/L 6 15*   BUN mg/dL 25 28*   CREATININE mg/dL 1 35* 1 20   CALCIUM mg/dL 8 3* 10 1   GLUCOSE RANDOM mg/dL 137 137   ALT U/L  --  10   AST U/L  --  11*   ALK PHOS U/L  --  97   ALBUMIN g/dL  --  4 9   TOTAL BILIRUBIN mg/dL  --  1 30*     Results from last 7 days   Lab Units 01/28/23  2017 01/28/23  1138   MAGNESIUM mg/dL 2 1 2 3   PHOSPHORUS mg/dL 3 9  --                Results from last 7 days   Lab Units 01/28/23  1138   LACTIC ACID mmol/L 2 0     ABG:  Results from last 7 days   Lab Units 01/29/23  0502   PH ART  7 393   PCO2 ART mm Hg 36 2   PO2 ART mm Hg 187 3*   HCO3 ART mmol/L 21 6*   BASE EXC ART mmol/L -2 7   ABG SOURCE  Radial, Right     VBG:  Results from last 7 days   Lab Units 01/29/23  0502   ABG SOURCE  Radial, Right     Results from last 7 days   Lab Units 01/28/23  1138   PROCALCITONIN ng/ml <0 05       Micro  Results from last 7 days   Lab Units 01/28/23  1138   BLOOD CULTURE  Received in Microbiology Lab  Culture in Progress  Received in Microbiology Lab  Culture in Progress  EKG: NSR  Imaging: I have personally reviewed pertinent reports  Intake and Output  I/O       01/27 0701  01/28 0700 01/28 0701 01/29 0700    NG/GT  140    Total Intake(mL/kg)  140 (2 1)    Urine (mL/kg/hr)  35    Total Output  35    Net  +105                Height and Weights   Height: 6' (182 9 cm)  IBW (Ideal Body Weight): 77 6 kg  Body mass index is 20 21 kg/m²    Weight (last 2 days)     Date/Time Weight    01/28/23 1853 67 6 (149 03)    01/28/23 1820 67 6 (149 03)            Nutrition       Diet Orders   (From admission, onward)             Start     Ordered    01/28/23 1849  Diet NPO  Diet effective now        References:    Nutrtion Support Algorithm Enteral vs  Parenteral   Question Answer Comment   Diet Type NPO    RD to adjust diet per protocol?  Yes        01/28/23 1852                  Active Medications  Scheduled Meds:  Current Facility-Administered Medications   Medication Dose Route Frequency Provider Last Rate   • acetaminophen  650 mg Oral Q6H PRN Anthony Dudley PA-C     • cefTRIAXone  1,000 mg Intravenous Q24H TRISH HarrisC 1,000 mg (01/28/23 2022)   • chlorhexidine  15 mL Mouth/Throat Q12H Red [address] [place], [place]     • enoxaparin  40 mg Subcutaneous Daily [place], [place]     • Famotidine (PF)  20 mg Intravenous BID Anthony Dudley PA-C     • fentaNYL  125 mcg/hr Intravenous Continuous [address] [place], [place] 125 mcg/hr (01/29/23 0411)   • midazolam  2 mg Intravenous Q4H PRN Anthony Dudley PA-C     • multi-electrolyte  100 mL/hr Intravenous Continuous Anthony [place], [place] 100 mL/hr (01/29/23 0014)   • norepinephrine  1-30 mcg/min Intravenous Titrated TRISH HarrisC 2 mcg/min (01/28/23 2101)   • polyethylene glycol  17 g Oral Daily [place], [place]     • propofol  5-50 mcg/kg/min Intravenous Titrated YOUNG Harris-C 50 mcg/kg/min (01/29/23 0456)   • senna-docusate sodium  2 tablet Oral BID Anthony Dudley PA-C       Continuous Infusions:  fentaNYL, 125 mcg/hr, Last Rate: 125 mcg/hr (01/29/23 0411)  multi-electrolyte, 100 mL/hr, Last Rate: 100 mL/hr (01/29/23 0014)  norepinephrine, 1-30 mcg/min, Last Rate: 2 mcg/min (01/28/23 2101)  propofol, 5-50 mcg/kg/min, Last Rate: 50 mcg/kg/min (01/29/23 0456)      PRN Meds:   acetaminophen, 650 mg, Q6H PRN  midazolam, 2 mg, Q4H PRN        Invasive Devices Review  Invasive Devices     Central Venous Catheter Line  Duration           CVC Central Lines 01/28/23 Triple Right Internal jugular <1 day          Drain  Duration           Nephrostomy Right 10 2 Fr  67 days    NG/OG/Enteral Tube Orogastric Center mouth 1 day    Urethral Catheter Temperature probe 16 Fr  <1 day          Airway  Duration           ETT  Cuffed 7 5 mm <1 day                Rationale for remaining devices: Difficult access, pressor medications  ---------------------------------------------------------------------------------------  Advance Directive and Living Will:      Power of :    POLST:    ---------------------------------------------------------------------------------------  Care Time Delivered:   Upon my evaluation, this patient had a high probability of imminent or life-threatening deterioration due to acute hypoxic respiratory failure, sepsis, which required my direct attention, intervention, and personal management  I have personally provided 33 minutes (0030 to 0103) of critical care time, exclusive of procedures, teaching, family meetings, and any prior time recorded by providers other than myself  JENNIFER FIELDS PA-C      Portions of the record may have been created with voice recognition software  Occasional wrong word or "sound a like" substitutions may have occurred due to the inherent limitations of voice recognition software    Read the chart carefully and recognize, using context, where substitutions have occurred

## 2023-01-29 NOTE — ASSESSMENT & PLAN NOTE
· Likely 2/2 sepsis, possible aspiration 2/2 AMS  · Intubated after failure of NIV at [facility]  · Extubated on 1/29 to [facility]

## 2023-01-29 NOTE — PROGRESS NOTES
[id number]: Received report from forrest RN  Also received report from [place]'s nurse via phone  Pt's bp had dropped down into the 70'R systolic while I was on the phone  Day nurse started levophed that was at bedside  BP improved & levophed was titrated off  Pt also went bradycardic down into the 40's, but did not sustain there  HR came back up into 70's     2000: Pt stuporous  Pupils 2 PERRLA sluggish  Eyes gazing downwards  Extremities withdrawal to painful stimuli  Scheduled blood work completed  PRN tylenol given for temp 101 5 via temp sensing rodriguez  2140: Sputum culture obtained & sent to lab  Sputum thick yellow/tan/green  2200: 2 sets of blood cultures obtained & sent to lab; one from [facility] and other from [facility] still elevated; ice packs applied  0015: Increased Isolyte gtt from 75 to 100 due to low UO      0033: Pt went bradycardic down to 34  Notified YOUNG Valdez  Precedex gtt was stopped & fentanyl gtt decreased to 125  HR slowly came back up into the 50's  0455: Pt agitated, given 2 mg iv versed PRN and propofol increased from 45 to 50

## 2023-01-29 NOTE — ASSESSMENT & PLAN NOTE
· Likely 2/2 sepsis, possible aspiration 2/2 AMS  · Intubated after failure of NIV at [facility]  · Continue mechanical ventilation, wean settings as tolerated  · Sedation/analgesia w/ propofol, fentanyl, precedex w/ goal RASS -1

## 2023-01-29 NOTE — RESPIRATORY THERAPY NOTE
01/28/23 1928   Respiratory Assessment   Assessment Type Assess only   General Appearance Sedated   Respiratory Pattern Assisted;Symmetrical   Chest Assessment Chest expansion symmetrical   Bilateral Breath Sounds Diminished;Clear   Cough Strong;Non-productive   Suction ET Tube   Resp Comments pt transferred from [facility], substance Od and intubated for airway protection, 7 5 23@ gum, moved from Center to left, skin and strap intact with two finger pass to back of head, CXr for placement, per flight team pt was on PC rate 12 100% +6 pressure 24  changed vent settings to ACVC 400 16 90% +6 PA aware, spont Strong non productive, sxn x 1 scant thin clear, no other changes made will cont to monitor   O2 Device vent   Subjective Data no txs needed   Vent Information   Vent ID [id number]   Vent type     Vent Mode AC/VC   $ Pulse Oximetry Spot Check Charge Completed   SpO2 100 %   AC/VC Settings   Resp Rate (BPM) 16 BPM   Vt (mL) 400 mL   FIO2 (%) 90 %   PEEP (cmH2O) 6 cmH2O   Flow Pattern (LPM) 42 L/min   Trigger Sensitivity Flow (lpm) 3 %   Humidification Heater   Heater Temperature (Set) 98 6 °F (37 °C)   AC/VC Actuals   Resp Rate (BPM) 25 BPM   VT (mL) 422   MV 10 2   MAP (cmH2O) 10 cmH2O   Peak Pressure (cmH2O) 17 cmH2O   I/E Ratio (Obs) 1:1 7   Heater Temperature (Obs) 98 4 °F (36 9 °C)   Static Compliance (mL/cmH20) 35 mL/cmH2O   Plateau Pressure (cm H2O) 23 cm H2O   AC/VC Alarms   High Peak Pressure (cmH2O) 45   High Resp Rate (BPM) 40 BPM   High MV (L/min) 16 L/min   Low MV (L/min) 2 5 L/min   Vt High (mL) 900 mL   Vt Low (mL) 300 mL   AC/VC Apnea Settings   Resp Rate (BPM) 16 BPM   VT (mL) 400 mL   FIO2 (%) 100 %   Apnea Time (s) 20 S   Apnea Flow (L/min) 44 L/min   Maintenance   Alarm (pink) cable attached No   Resuscitation bag with peep valve at bedside Yes   Water bag changed No   Circuit changed No   IHI Ventilator Associated Pneumonia Bundle   Head of Bed Elevated HOB 30   ETT  Cuffed 7 5 mm Placement Date/Time: 01/28/23 (c) 3863   Type: Cuffed  Tube Size: 7 5 mm  Location: Oral  Insertion attempts: (c) 1  Placement Verification: Chest x-ray;Symmetrical chest wall movement  Secured at (cm): 24   Secured at (cm) 23   Measured from Gums   Secured Location (S)  Left   Repositioned (S)  Center to Left   Secured by Commercial tube solis  (skin and strap intact with two finger pass to back of head)   Site Condition Dry   Cuff Pressure (cm H2O) 24 cm H2O   HI-LO Suction  Continuous low suction   HI-LO Secretions Other (Comment)  (none)   HI-LO Intervention Patent     RT Ventilator Management Note  Malka Don 61 y o  male MRN: [id number]  Unit/Bed#: ICU 11-01 Encounter: [id number]      Daily Screen         1/28/2023  1810             Patient safety screen outcome[de-identified] Failed    Not Ready for Weaning due to[de-identified] FiO2 >60%              Physical Exam:   Assessment Type: Assess only  General Appearance: Sedated  Respiratory Pattern: Assisted, Symmetrical  Chest Assessment: Chest expansion symmetrical  Bilateral Breath Sounds: Diminished, Clear  Cough: Strong, Non-productive  Suction: ET Tube  O2 Device: vent  Subjective Data: no txs needed      Resp Comments: pt transferred from [facility], substance Od and intubated for airway protection, 7 5 23@ gum, moved from Center to left, skin and strap intact with two finger pass to back of head, CXr for placement, per flight team pt was on PC rate 12 100% +6 pressure 24  changed vent settings to ACVC 400 16 90% +6 PA aware, spont Strong non productive, sxn x 1 scant thin clear, no other changes made will cont to monitor

## 2023-01-29 NOTE — ASSESSMENT & PLAN NOTE
· Likely sedation related, but also possibly related to sepsis/septic shock  · Continue low dose levophed for MAP goal >=65

## 2023-01-29 NOTE — UTILIZATION REVIEW
Initial Clinical Review    Transfer from [facility]  ED    Admission: Date/Time/Statement:   Admission Orders (From admission, onward)     Ordered        01/28/23 1822  Inpatient Admission  Once                      Orders Placed This Encounter   Procedures   • Inpatient Admission     Standing Status:   Standing     Number of Occurrences:   1     Order Specific Question:   Level of Care     Answer:   Critical Care [15]     Order Specific Question:   Estimated length of stay     Answer:   More than 2 Midnights     Order Specific Question:   Certification     Answer:   I certify that inpatient services are medically necessary for this patient for a duration of greater than two midnights  See H&P and MD Progress Notes for additional information about the patient's course of treatment  Initial Presentation: 61 y o  [place]  [facility] altered mental status, shortness of breath and lethargy for 2 days  Has poor po  Intake  Had a  Recent admission for  Septic shock  D/T  UTI with staghorn calculus, now S/P  R  PCN,  Undergoing eval for nephrectomy  Found hypoxic  In ED  With sats   68  %  On 10 L, transitioned to vapotherm  He was having difficulty w/ secretions and continued hypoxia despite HF and suctioning and was thus intubated   Transferred to [place] for further care  Admit  Ip  ICU LOC with   Acute respiratory failure with hypoxia,  Sepsis,  Likely  D/T  Aspiration, UTI and plan is  Vent support, serial neuro checks,  Monitor labs, blood/urine cultures  And JG>       Date: 1/29      Day 2:   Remains intubated/sedated  Continue  JG  Acute  Metabolic encephalopathy, possible  Drug overdose  Check  UDS  Will attempt   Wean trial  Follow cultures         Wt Readings from Last 1 Encounters:   01/29/23 66 4 kg (146 lb 6 2 oz)     Additional Vital Signs:   01/29/23 0300 98 2 °F (36 8 °C) 49 Abnormal  18 109/62 80 99 % -- -- -- -- --   01/29/23 0200 98 6 °F (37 °C) 51 Abnormal  19 108/62 80 99 % -- -- -- -- --   01/29/23 0100 99 5 °F (37 5 °C) 54 Abnormal  20 118/70 89 99 % -- -- -- -- --   01/29/23 0045 99 7 °F (37 6 °C) 54 Abnormal  22 123/67 90 100 % -- -- -- -- --   01/29/23 0032 99 9 °F (37 7 °C) 36 Abnormal  26 Abnormal  -- -- 100 % -- -- -- -- --   01/29/23 0030 99 9 °F (37 7 °C) 49 Abnormal  32 Abnormal  133/64 91 100 % -- -- -- -- --   01/29/23 0015 100 °F (37 8 °C) 58 18 110/63 82 100 % -- -- -- -- --   01/29/23 0000 100 4 °F (38 °C) 60 18 -- -- 100 % -- -- -- -- --   01/28/23 2358 -- 61 17 -- -- 100 % 80 -- -- Ventilator --   01/28/23 2300 101 1 °F (38 4 °C) Abnormal  66 19 92/51 63 Abnormal  100 % -- -- -- -- --   01/28/23 2200 101 3 °F (38 5 °C) Abnormal  75 35 Abnormal   86/49 Abnormal  62 Abnormal  96 % -- -- -- -- --   Resp: pt suctioned via in-line at 01/28/23 2200 01/28/23 2100 101 1 °F (38 4 °C) Abnormal  71 25 Abnormal  129/78 96 100 % -- -- -- -- --   01/28/23 2059 101 1 °F (38 4 °C) Abnormal  67 23 Abnormal  129/78 96 100 % -- -- -- -- --   01/28/23 2015 101 5 °F (38 6 °C) Abnormal  75 19 96/57 71 98 % -- -- -- -- --   01/28/23 2010 101 5 °F (38 6 °C) Abnormal  76 19 90/56 68 98 % -- -- -- -- --   01/28/23 2000 101 3 °F (38 5 °C) Abnormal   78 24 Abnormal  63/42 Abnormal   48 Abnormal  95 % 90 -- -- Ventilator Lying   Temp: PRN tylenol given at 01/28/23 2000   BP: levophed started; provider notified of drop in bp at 01/28/23 2000 01/28/23 1945 101 1 °F (38 4 °C) Abnormal  92 24 Abnormal  101/58 77 97 % -- -- -- -- --   01/28/23 1930 100 9 °F (38 3 °C) Abnormal  91 23 Abnormal  115/70 87 97 % -- -- -- --        Pertinent Labs/Diagnostic Test Results:   X-ray chest 1 view   Final Result by Cristiano Logn MD (01/29 0297)      ET tube 5 cm above the cholo  New right base opacity which could be due to atelectasis and/or pneumonia in the appropriate clinical setting  Possible right effusion                 Workstation performed: [id number] Results from last 7 days   Lab Units 01/28/23  1138   SARS-COV-2  Negative     Results from last 7 days   Lab Units 01/29/23  0512 01/28/23 2017 01/28/23  1138   WBC Thousand/uL 23 39* 20 82* 16 92*   HEMOGLOBIN g/dL 14 9 15 6 17 7*   HEMATOCRIT % 44 7 45 6 50 3*   PLATELETS Thousands/uL 201 217 236   NEUTROS ABS Thousands/µL 19 15*  --  14 75*         Results from last 7 days   Lab Units 01/29/23  0512 01/28/23 2017 01/28/23  1138   SODIUM mmol/L 142 140 142   POTASSIUM mmol/L 3 8 3 7 3 0*   CHLORIDE mmol/L 109* 110* 104   CO2 mmol/L 25 24 23   ANION GAP mmol/L 8 6 15*   BUN mg/dL 23 25 28*   CREATININE mg/dL 1 19 1 35* 1 20   EGFR ml/min/1 73sq m 66 57 66   CALCIUM mg/dL 8 4 8 3* 10 1   CALCIUM, IONIZED mmol/L 1 15 1 12  --    MAGNESIUM mg/dL 2 3 2 1 2 3   PHOSPHORUS mg/dL 3 6 3 9  --      Results from last 7 days   Lab Units 01/29/23  0512 01/28/23  1138   AST U/L 9* 11*   ALT U/L 8 10   ALK PHOS U/L 63 97   TOTAL PROTEIN g/dL 5 8* 7 6   ALBUMIN g/dL 3 5 4 9   TOTAL BILIRUBIN mg/dL 1 12* 1 30*   BILIRUBIN DIRECT mg/dL  --  0 29*     Results from last 7 days   Lab Units 01/28/23  0844   POC GLUCOSE mg/dl 125     Results from last 7 days   Lab Units 01/29/23  0512 01/28/23 2017 01/28/23  1138   GLUCOSE RANDOM mg/dL 120 137 137          Results from last 7 days   Lab Units 01/29/23  0502 01/28/23  1620 01/28/23  1113   PH ART  7 393 7 335* 7 511*   PCO2 ART mm Hg 36 2 42 3 26 3*   PO2 ART mm Hg 187 3* 169 9* 74 2*   HCO3 ART mmol/L 21 6* 22 1 20 6*   BASE EXC ART mmol/L -2 7 -3 7 -0 3   O2 CONTENT ART mL/dL 21 5 25 3* 24 6*   O2 HGB, ARTERIAL % 97 8* 98 3* 94 9   ABG SOURCE  Radial, Right Radial, Right Radial, Right             Results from last 7 days   Lab Units 01/28/23  1138   CK TOTAL U/L 24*     Results from last 7 days   Lab Units 01/28/23  1522 01/28/23  1343 01/28/23  1138   HS TNI 0HR ng/L  --   --  12   HS TNI 2HR ng/L  --  13  --    HSTNI D2 ng/L  --  1  --    HS TNI 4HR ng/L 15  --   -- HSTNI D4 ng/L 3  --   --      Results from last 7 days   Lab Units 01/28/23  1248   D-DIMER QUANTITATIVE ug/ml FEU 0 76*         Results from last 7 days   Lab Units 01/28/23  1138   TSH 3RD GENERATON uIU/mL 0 268*     Results from last 7 days   Lab Units 01/29/23  0512 01/28/23  1138   PROCALCITONIN ng/ml 0 39* <0 05     Results from last 7 days   Lab Units 01/28/23  1138   LACTIC ACID mmol/L 2 0             Results from last 7 days   Lab Units 01/28/23  1138   BNP pg/mL 167*             Results from last 7 days   Lab Units 01/28/23  1138   LIPASE u/L 44                 Results from last 7 days   Lab Units 01/28/23  1414   CLARITY UA  Cloudy   COLOR UA  Yellow   SPEC GRAV UA  1 015   PH UA  7 0   GLUCOSE UA mg/dl Negative   KETONES UA mg/dl Negative   BLOOD UA  Large*   PROTEIN UA mg/dl 100 (2+)*   NITRITE UA  Negative   BILIRUBIN UA  Negative   UROBILINOGEN UA E U /dl 0 2   LEUKOCYTES UA  Large*   WBC UA /hpf Innumerable*   RBC UA /hpf 4-10*   BACTERIA UA /hpf Innumerable*   EPITHELIAL CELLS WET PREP /hpf None Seen     Results from last 7 days   Lab Units 01/28/23  1138   INFLUENZA A PCR  Negative   INFLUENZA B PCR  Negative   RSV PCR  Negative         Results from last 7 days   Lab Units 01/29/23  1213   AMPH/METH  Negative   BARBITURATE UR  Negative   BENZODIAZEPINE UR  Positive*   COCAINE UR  Negative   METHADONE URINE  Negative   OPIATE UR  Negative   PCP UR  Negative   THC UR  Negative     Results from last 7 days   Lab Units 01/28/23  1138   ETHANOL LVL mg/dL <10   ACETAMINOPHEN LVL ug/mL <38*   SALICYLATE LVL mg/dL <5                 Results from last 7 days   Lab Units 01/28/23  2255 01/28/23  2201 01/28/23  2142 01/28/23  1414 01/28/23  1138   BLOOD CULTURE  Received in Microbiology Lab  Culture in Progress  Received in Microbiology Lab  Culture in Progress  --   --  Received in Microbiology Lab  Culture in Progress  Received in Microbiology Lab  Culture in Progress     GRAM STAIN RESULT   --   --  2+ Disintegrating polys*  1+ Gram positive cocci in pairs*  1+ Gram negative rods*  1+ Yeast*  --   --    URINE CULTURE   --   --   --  >100,000 cfu/ml Gram Negative Bakari*  --          Present on Admission:  • AMS (altered mental status)  • Sepsis ([facility] [address] )  • Acute respiratory failure with hypoxia (HCC)  • UTI (urinary tract infection)  • Aspiration pneumonia (HCC)      Admitting Diagnosis: Acute respiratory failure with hypoxia (HCC)  Age/Sex: 61 y o  male  Admission Orders:  Scheduled Medications:  cefTRIAXone, 1,000 mg, Intravenous, Q24H  chlorhexidine, 15 mL, Mouth/Throat, Q12H IVY  enoxaparin, 40 mg, Subcutaneous, Daily  Famotidine (PF), 20 mg, Intravenous, BID      Continuous IV Infusions:     PRN Meds:       IP CONSULT TO CASE MANAGEMENT  IP CONSULT TO PSYCHIATRY    Network Utilization Review Department  ATTENTION: Please call with any questions or concerns to [phone] and carefully listen to the prompts so that you are directed to the right person  All voicemails are confidential   [place] all requests for admission clinical reviews, approved or denied determinations and any other requests to dedicated fax number below belonging to the campus where the patient is receiving treatment   List of dedicated fax numbers for the Facilities:  [address] DENIALS (Administrative/Medical Necessity) [phone]   [address] (Maternity/NICU/Pediatrics) [phone]   [address] [phone]   [facility] [phone]   [address]   [address] [place] [phone]   [facility] 839 [facility] [phone]

## 2023-01-29 NOTE — ASSESSMENT & PLAN NOTE
· Patient presented to [facility] on 1/28 w/ AMS, SOB and lethargy x2 days  · Likely toxic/metabolic 2/2 UTI in nature, though patient does have hx of drug abuse which possibly may be contributing though no UDS was sent on arrival to ED so unclear  · UDS only positive for benzo which he received in medical care, however patient does admit to me he did something" fentanyl laced" (unclear if this is reliable given his mental status)  · Family informed us that he does have hx of cocaine and heroine use  · Continue serial neurochecks

## 2023-01-29 NOTE — ASSESSMENT & PLAN NOTE
· +UA 2/2 staghorn calculus s/p nephrostomy tube, is under evaluation for possible nephrectomy  · Found to also have SOB along w/ CT concerning for possible aspiration in progress w/ fluid content in trachea  · Blood cultures and urine culture in process, will send respiratory culture  · Does have hx of kleb, serratia, e coli UTI and haemophilus PNA  · Urine culture growing GNR, respiratory culture still waiting for finalization  · Continue rocephin for UTI and possible aspiration

## 2023-01-29 NOTE — NURSING NOTE
Pt agitated  Sitting up in bed, grabbing at tubes, lines  Thrashing head around and kicking  Juliet Coleman 18 made aware and at bedside  Soft ankle restraints ordered and applied  1 mg Ativan IV ordered and administered

## 2023-01-29 NOTE — RESPIRATORY THERAPY NOTE
01/28/23 1810   Respiratory Assessment   Resp Comments [place] int[place]  pt placed on 840 vent upon arrival and is stable  pt's current settings are 24/6 100%  rate of 12  pt tolerating well   inline and hi-lo suction set up  will cont to monitor   Vent Information   Vent ID [id number]   Vent type     Vent Mode AC/PC   $ Vent Charge-INITIAL Yes   Ventilator Start Yes   $ Pulse Oximetry Spot Check Charge Completed   SpO2 96 %   AC/PC Settings   Resp Rate (BPM) 12 BPM   Pressure Control (cmH2O) 24 cm H2O   Insp Time (sec) 0 72 sec   FiO2 (%) 100 %   PEEP (cmH2O) 6 cmH2O   Trigger Sensitivity Flow (lpm) 3   Humidification Heater   Heater Temp 98 6 °F (37 °C)   AC/PC ACTUALS   Resp Rate (BPM) 16 BPM   VT (mL) 721 mL   MV 9 9   MAP (cmH2O) 11 cmH2O   Peak Pressure (cmH2O) 31 cmH2O   Static Compliance (mL/cmH2O) 35 mL/cmH2O   Plateau Pressure (PZM9Y) 23 cmH2O   Heater Temperature (Obs) 98 6 °F (37 °C)   AC/PC Alarms    High Peak Pressure (cmH2O) 40 cmH2O   High MV (L/min) 17 5 L/min   Low MV (L/min) 2 5 L/min   High Khanh VT (mL) 850 mL   Low Khanh VT (mL) 255 mL   High Spont VT (mL) 850 mL   Low Spont VT (mL) 255 mL   AC/PC Apnea Settings   FiO2 (%) 100 %   Apnea Time (s) 20 S   Maintenance   Alarm (pink) cable attached No   Resuscitation bag with peep valve at bedside Yes   Water bag changed Yes   Circuit changed No   Daily Screen   Patient safety screen outcome: Failed   Not Ready for Weaning due to: FiO2 >60%   IHI Ventilator Associated Pneumonia Bundle   Daily Assessment of Readiness to Extubate Not applicable (Comment)   Head of Bed Elevated HOB 30   Oral Care Mouth suctioned   ETT  Cuffed 7 5 mm   Placement Date/Time: 01/28/23 (c) 0574   Type: Cuffed  Tube Size: 7 5 mm  Location: Oral  Insertion attempts: (c) 1  Placement Verification: Chest x-ray;Symmetrical chest wall movement  Secured at (cm): 24   Secured at (cm) 24   Measured from [facility] by Commercial tube solis   Site Condition Dry   Cuff Pressure (cm H2O) 28 cm H2O   HI-LO Suction  Continuous low suction   HI-LO Secretions Scant   HI-LO Intervention Patent     RT Ventilator Management Note  Agusto Cardoza 61 y o  male MRN: [id number]  Unit/Bed#: ICU 11-01 Encounter: [id number]      Daily Screen         1/28/2023  1810             Patient safety screen outcome[de-identified] Failed (P)     Not Ready for Weaning due to[de-identified] FiO2 >60% (P)               Physical Exam:   Assessment Type: Assess only  General Appearance: Sedated  Respiratory Pattern: Assisted  Chest Assessment: Chest expansion symmetrical, Trachea midline  Bilateral Breath Sounds: Diminished, Coarse      Resp Comments: (P) [place]  pt placed on 840 vent upon arrival and is stable  pt's current settings are 24/6 100%  rate of 12  pt tolerating well   inline and hi-lo suction set up  will cont to monitor

## 2023-01-30 ENCOUNTER — APPOINTMENT (OUTPATIENT)
Dept: RADIOLOGY | Facility: HOSPITAL | Age: 58
End: 2023-01-30

## 2023-01-30 ENCOUNTER — APPOINTMENT (INPATIENT)
Dept: CT IMAGING | Facility: HOSPITAL | Age: 58
End: 2023-01-30

## 2023-01-30 LAB
ALBUMIN SERPL BCP-MCNC: 3.7 G/DL (ref 3.5–5)
ALP SERPL-CCNC: 70 U/L (ref 34–104)
ALT SERPL W P-5'-P-CCNC: 7 U/L (ref 7–52)
AMMONIA PLAS-SCNC: 36 UMOL/L (ref 18–72)
ANION GAP SERPL CALCULATED.3IONS-SCNC: 10 MMOL/L (ref 4–13)
ANION GAP SERPL CALCULATED.3IONS-SCNC: 13 MMOL/L (ref 4–13)
AST SERPL W P-5'-P-CCNC: 11 U/L (ref 13–39)
ATRIAL RATE: 54 BPM
BACTERIA UR CULT: ABNORMAL
BACTERIA UR CULT: ABNORMAL
BASE EX.OXY STD BLDV CALC-SCNC: 84 % (ref 60–80)
BASE EXCESS BLDV CALC-SCNC: -2.8 MMOL/L
BASOPHILS # BLD AUTO: 0.05 THOUSANDS/ÂΜL (ref 0–0.1)
BASOPHILS NFR BLD AUTO: 0 % (ref 0–1)
BILIRUB SERPL-MCNC: 1.68 MG/DL (ref 0.2–1)
BUN SERPL-MCNC: 16 MG/DL (ref 5–25)
BUN SERPL-MCNC: 18 MG/DL (ref 5–25)
CA-I BLD-SCNC: 1.13 MMOL/L (ref 1.12–1.32)
CALCIUM SERPL-MCNC: 8.5 MG/DL (ref 8.4–10.2)
CALCIUM SERPL-MCNC: 8.7 MG/DL (ref 8.4–10.2)
CHLORIDE SERPL-SCNC: 107 MMOL/L (ref 96–108)
CHLORIDE SERPL-SCNC: 109 MMOL/L (ref 96–108)
CO2 SERPL-SCNC: 19 MMOL/L (ref 21–32)
CO2 SERPL-SCNC: 21 MMOL/L (ref 21–32)
CREAT SERPL-MCNC: 0.91 MG/DL (ref 0.6–1.3)
CREAT SERPL-MCNC: 1 MG/DL (ref 0.6–1.3)
EOSINOPHIL # BLD AUTO: 0.04 THOUSAND/ÂΜL (ref 0–0.61)
EOSINOPHIL NFR BLD AUTO: 0 % (ref 0–6)
ERYTHROCYTE [DISTWIDTH] IN BLOOD BY AUTOMATED COUNT: 13.9 % (ref 11.6–15.1)
GFR SERPL CREATININE-BSD FRML MDRD: 82 ML/MIN/1.73SQ M
GFR SERPL CREATININE-BSD FRML MDRD: 92 ML/MIN/1.73SQ M
GLUCOSE SERPL-MCNC: 124 MG/DL (ref 65–140)
GLUCOSE SERPL-MCNC: 158 MG/DL (ref 65–140)
GLUCOSE SERPL-MCNC: 162 MG/DL (ref 65–140)
GLUCOSE SERPL-MCNC: 89 MG/DL (ref 65–140)
HCO3 BLDV-SCNC: 20 MMOL/L (ref 24–30)
HCT VFR BLD AUTO: 43.9 % (ref 36.5–49.3)
HFNC FLOW LPM: 40
HGB BLD-MCNC: 15.2 G/DL (ref 12–17)
IMM GRANULOCYTES # BLD AUTO: 0.13 THOUSAND/UL (ref 0–0.2)
IMM GRANULOCYTES NFR BLD AUTO: 1 % (ref 0–2)
LYMPHOCYTES # BLD AUTO: 1.12 THOUSANDS/ÂΜL (ref 0.6–4.47)
LYMPHOCYTES NFR BLD AUTO: 8 % (ref 14–44)
MAGNESIUM SERPL-MCNC: 2.4 MG/DL (ref 1.9–2.7)
MAGNESIUM SERPL-MCNC: 2.4 MG/DL (ref 1.9–2.7)
MCH RBC QN AUTO: 30 PG (ref 26.8–34.3)
MCHC RBC AUTO-ENTMCNC: 34.6 G/DL (ref 31.4–37.4)
MCV RBC AUTO: 87 FL (ref 82–98)
MONOCYTES # BLD AUTO: 0.82 THOUSAND/ÂΜL (ref 0.17–1.22)
MONOCYTES NFR BLD AUTO: 6 % (ref 4–12)
NEUTROPHILS # BLD AUTO: 11.81 THOUSANDS/ÂΜL (ref 1.85–7.62)
NEUTS SEG NFR BLD AUTO: 85 % (ref 43–75)
NON VENT HFNC FIO2: 80
NON VENT TYPE HFNC: ABNORMAL
NRBC BLD AUTO-RTO: 0 /100 WBCS
O2 CT BLDV-SCNC: 19.9 ML/DL
P AXIS: 61 DEGREES
PCO2 BLDV: 30.4 MM HG (ref 42–50)
PH BLDV: 7.43 [PH] (ref 7.3–7.4)
PHOSPHATE SERPL-MCNC: 1.5 MG/DL (ref 2.7–4.5)
PHOSPHATE SERPL-MCNC: 2.7 MG/DL (ref 2.7–4.5)
PLATELET # BLD AUTO: 159 THOUSANDS/UL (ref 149–390)
PMV BLD AUTO: 11 FL (ref 8.9–12.7)
PO2 BLDV: 44.4 MM HG (ref 35–45)
POTASSIUM SERPL-SCNC: 3.1 MMOL/L (ref 3.5–5.3)
POTASSIUM SERPL-SCNC: 3.4 MMOL/L (ref 3.5–5.3)
PR INTERVAL: 116 MS
PROCALCITONIN SERPL-MCNC: 0.13 NG/ML
PROT SERPL-MCNC: 6.4 G/DL (ref 6.4–8.4)
QRS AXIS: 24 DEGREES
QRSD INTERVAL: 138 MS
QT INTERVAL: 476 MS
QTC INTERVAL: 451 MS
RBC # BLD AUTO: 5.07 MILLION/UL (ref 3.88–5.62)
SODIUM SERPL-SCNC: 139 MMOL/L (ref 135–147)
SODIUM SERPL-SCNC: 140 MMOL/L (ref 135–147)
T WAVE AXIS: 73 DEGREES
VENTRICULAR RATE: 54 BPM
WBC # BLD AUTO: 13.97 THOUSAND/UL (ref 4.31–10.16)

## 2023-01-30 RX ORDER — POTASSIUM CHLORIDE 14.9 MG/ML
20 INJECTION INTRAVENOUS ONCE
Status: COMPLETED | OUTPATIENT
Start: 2023-01-30 | End: 2023-01-30

## 2023-01-30 RX ORDER — INSULIN LISPRO 100 [IU]/ML
1-5 INJECTION, SOLUTION INTRAVENOUS; SUBCUTANEOUS
Status: DISCONTINUED | OUTPATIENT
Start: 2023-01-30 | End: 2023-02-01 | Stop reason: HOSPADM

## 2023-01-30 RX ORDER — SODIUM CHLORIDE FOR INHALATION 3 %
4 VIAL, NEBULIZER (ML) INHALATION
Status: DISCONTINUED | OUTPATIENT
Start: 2023-01-30 | End: 2023-02-01

## 2023-01-30 RX ORDER — LEVALBUTEROL 1.25 MG/.5ML
1.25 SOLUTION, CONCENTRATE RESPIRATORY (INHALATION)
Status: DISCONTINUED | OUTPATIENT
Start: 2023-01-30 | End: 2023-02-01

## 2023-01-30 RX ORDER — AMOXICILLIN 250 MG
1 CAPSULE ORAL
Status: DISCONTINUED | OUTPATIENT
Start: 2023-01-30 | End: 2023-01-31

## 2023-01-30 RX ORDER — ACETAMINOPHEN 325 MG/1
650 TABLET ORAL EVERY 6 HOURS PRN
Status: DISCONTINUED | OUTPATIENT
Start: 2023-01-30 | End: 2023-02-01 | Stop reason: HOSPADM

## 2023-01-30 RX ORDER — HYDRALAZINE HYDROCHLORIDE 20 MG/ML
10 INJECTION INTRAMUSCULAR; INTRAVENOUS EVERY 4 HOURS PRN
Status: DISCONTINUED | OUTPATIENT
Start: 2023-01-30 | End: 2023-02-01 | Stop reason: HOSPADM

## 2023-01-30 RX ORDER — HYDRALAZINE HYDROCHLORIDE 20 MG/ML
10 INJECTION INTRAMUSCULAR; INTRAVENOUS ONCE
Status: COMPLETED | OUTPATIENT
Start: 2023-01-30 | End: 2023-01-30

## 2023-01-30 RX ORDER — BISACODYL 10 MG
10 SUPPOSITORY, RECTAL RECTAL DAILY PRN
Status: DISCONTINUED | OUTPATIENT
Start: 2023-01-30 | End: 2023-02-01 | Stop reason: HOSPADM

## 2023-01-30 RX ORDER — DEXMEDETOMIDINE HYDROCHLORIDE 4 UG/ML
.1-1 INJECTION, SOLUTION INTRAVENOUS
Status: DISCONTINUED | OUTPATIENT
Start: 2023-01-30 | End: 2023-02-01 | Stop reason: HOSPADM

## 2023-01-30 RX ORDER — SENNOSIDES 8.8 MG/5ML
8.8 LIQUID ORAL
Status: DISCONTINUED | OUTPATIENT
Start: 2023-01-30 | End: 2023-01-30

## 2023-01-30 RX ADMIN — INSULIN LISPRO 1 UNITS: 100 INJECTION, SOLUTION INTRAVENOUS; SUBCUTANEOUS at 18:00

## 2023-01-30 RX ADMIN — FAMOTIDINE 20 MG: 10 INJECTION, SOLUTION INTRAVENOUS at 18:00

## 2023-01-30 RX ADMIN — SODIUM CHLORIDE SOLN NEBU 3% 4 ML: 3 NEBU SOLN at 20:39

## 2023-01-30 RX ADMIN — ENOXAPARIN SODIUM 40 MG: 100 INJECTION SUBCUTANEOUS at 08:23

## 2023-01-30 RX ADMIN — FAMOTIDINE 20 MG: 10 INJECTION, SOLUTION INTRAVENOUS at 08:23

## 2023-01-30 RX ADMIN — HYDRALAZINE HYDROCHLORIDE 10 MG: 20 INJECTION, SOLUTION INTRAMUSCULAR; INTRAVENOUS at 05:41

## 2023-01-30 RX ADMIN — LEVALBUTEROL HYDROCHLORIDE 1.25 MG: 1.25 SOLUTION, CONCENTRATE RESPIRATORY (INHALATION) at 20:39

## 2023-01-30 RX ADMIN — POTASSIUM CHLORIDE 20 MEQ: 14.9 INJECTION, SOLUTION INTRAVENOUS at 20:03

## 2023-01-30 RX ADMIN — HYDRALAZINE HYDROCHLORIDE 10 MG: 20 INJECTION, SOLUTION INTRAMUSCULAR; INTRAVENOUS at 10:17

## 2023-01-30 RX ADMIN — HYDRALAZINE HYDROCHLORIDE 10 MG: 20 INJECTION, SOLUTION INTRAMUSCULAR; INTRAVENOUS at 18:29

## 2023-01-30 RX ADMIN — DEXMEDETOMIDINE HYDROCHLORIDE 0.4 MCG/KG/HR: 400 INJECTION INTRAVENOUS at 16:30

## 2023-01-30 RX ADMIN — LEVALBUTEROL HYDROCHLORIDE 1.25 MG: 1.25 SOLUTION, CONCENTRATE RESPIRATORY (INHALATION) at 13:37

## 2023-01-30 RX ADMIN — DEXMEDETOMIDINE HYDROCHLORIDE 0.8 MCG/KG/HR: 400 INJECTION INTRAVENOUS at 22:09

## 2023-01-30 RX ADMIN — POTASSIUM PHOSPHATE, MONOBASIC POTASSIUM PHOSPHATE, DIBASIC 12 MMOL: 224; 236 INJECTION, SOLUTION, CONCENTRATE INTRAVENOUS at 19:06

## 2023-01-30 RX ADMIN — POTASSIUM CHLORIDE 20 MEQ: 14.9 INJECTION, SOLUTION INTRAVENOUS at 18:02

## 2023-01-30 RX ADMIN — SODIUM CHLORIDE SOLN NEBU 3% 4 ML: 3 NEBU SOLN at 13:37

## 2023-01-30 RX ADMIN — CHLORHEXIDINE GLUCONATE 0.12% ORAL RINSE 15 ML: 1.2 LIQUID ORAL at 08:23

## 2023-01-30 RX ADMIN — POTASSIUM CHLORIDE 20 MEQ: 14.9 INJECTION, SOLUTION INTRAVENOUS at 11:27

## 2023-01-30 RX ADMIN — ACETAMINOPHEN 650 MG: 325 TABLET ORAL at 21:24

## 2023-01-30 RX ADMIN — SENNOSIDES AND DOCUSATE SODIUM 1 TABLET: 50; 8.6 TABLET ORAL at 21:24

## 2023-01-30 RX ADMIN — HYDRALAZINE HYDROCHLORIDE 10 MG: 20 INJECTION, SOLUTION INTRAMUSCULAR; INTRAVENOUS at 02:54

## 2023-01-30 RX ADMIN — POTASSIUM CHLORIDE 20 MEQ: 14.9 INJECTION, SOLUTION INTRAVENOUS at 08:46

## 2023-01-30 RX ADMIN — CEFTRIAXONE 1000 MG: 1 INJECTION, SOLUTION INTRAVENOUS at 18:02

## 2023-01-30 RX ADMIN — POTASSIUM PHOSPHATE, MONOBASIC AND POTASSIUM PHOSPHATE, DIBASIC 30 MMOL: 224; 236 INJECTION, SOLUTION, CONCENTRATE INTRAVENOUS at 09:30

## 2023-01-30 NOTE — PLAN OF CARE
Problem: SAFETY,RESTRAINT: NV/NON-SELF DESTRUCTIVE BEHAVIOR  Goal: Remains free of harm/injury (restraint for non violent/non self-detsructive behavior)  Description: INTERVENTIONS:  - Instruct patient/family regarding restraint use   - Assess and monitor physiologic and psychological status   - Provide interventions and comfort measures to meet assessed patient needs   - Identify and implement measures to help patient regain control  - Assess readiness for release of restraint   Outcome: Not Progressing     Problem: SAFETY,RESTRAINT: NV/NON-SELF DESTRUCTIVE BEHAVIOR  Goal: Returns to optimal restraint-free functioning  Description: INTERVENTIONS:  - Assess the patient's behavior and symptoms that indicate continued need for restraint  - Identify and implement measures to help patient regain control  - Assess readiness for release of restraint   Outcome: Not Progressing     Problem: CONFUSION/THOUGHT DISTURBANCE  Goal: Thought disturbances (confusion, delirium, depression, dementia or psychosis) are managed to maintain or return to baseline mental status and functional level  Description: INTERVENTIONS:  - Assess for possible contributors to  thought disturbance, including but not limited to medications, infection, impaired vision or hearing, underlying metabolic abnormalities, dehydration, respiratory compromise,  psychiatric diagnoses and notify attending PHYSICAN/AP  - Monitor and intervene to maintain adequate nutrition, hydration, elimination, sleep and activity  - Decrease environmental stimuli, including noise as appropriate  - Provide frequent contacts to provide refocusing, direction and reassurance as needed  Approach patient calmly with eye contact and at their level    - [place] high risk fall precautions, aspiration precautions and other safety measures, as indicated  - If delirium suspected, notify physician/AP of change in condition and request immediate in-person evaluation  - Pursue consults as appropriate including Geriatric (campus dependent), OT for cognitive evaluation/activity planning, psychiatric, pastoral care, etc   Outcome: Not Progressing     Problem: BEHAVIOR  Goal: Pt/Family maintain appropriate behavior and adhere to behavioral management agreement, if implemented  Description: INTERVENTIONS:  - Assess the family dynamic   - Encourage verbalization of thoughts and concerns in a socially appropriate manner  - Assess patient/family's coping skills and non-compliant behavior (including use of illegal substances)  - Utilize positive, consistent limit setting strategies supporting safety of patient, staff and others  - Initiate consult with Case Management, Spiritual Care or other ancillary services as appropriate  - If a patient's/visitor's behavior jeopardizes the safety of the patient, staff, or others, refer to organization procedure  - Notify Security of behavior or suspected illegal substances which indicate the need for search of the patient and/or belongings  - Encourage participation in the decision making process about a behavioral management agreement; implement if patient meets criteria  Outcome: Not Progressing     Problem: Nutrition/Hydration-ADULT  Goal: Nutrient/Hydration intake appropriate for improving, restoring or maintaining nutritional needs  Description: Monitor and assess patient's nutrition/hydration status for malnutrition  Collaborate with interdisciplinary team and initiate plan and interventions as ordered  Monitor patient's weight and dietary intake as ordered or per policy  Utilize nutrition screening tool and intervene as necessary  Determine patient's food preferences and provide high-protein, high-caloric foods as appropriate       INTERVENTIONS:  - Monitor oral intake, urinary output, labs, and treatment plans  - Assess nutrition and hydration status and recommend course of action  - Evaluate amount of meals eaten  - Assist patient with eating if necessary   - Allow adequate time for meals  - Recommend/ encourage appropriate diets, oral nutritional supplements, and vitamin/mineral supplements  - Order, calculate, and assess calorie counts as needed  - Recommend, monitor, and adjust tube feedings and TPN/PPN based on assessed needs  - Assess need for intravenous fluids  - Provide specific nutrition/hydration education as appropriate  - Include patient/family/caregiver in decisions related to nutrition  Outcome: Not Progressing

## 2023-01-30 NOTE — RESPIRATORY THERAPY NOTE
RT Protocol Note  Marlys Gonzalez 61 y o  male MRN: [id number]  Unit/Bed#: ICU 11-01 Encounter: [id number]    Assessment    Principal Problem:    Sepsis ([address] )  Active Problems:    Acute respiratory failure with hypoxia ([facility] [address] )    AMS (altered mental status)    UTI (urinary tract infection)    Aspiration pneumonia (HCC)      Home Pulmonary Medications:  None    Home Devices/Therapy: Other (Comment) (None)    Past Medical History:   Diagnosis Date   • Anxiety    • Bright red rectal bleeding     Last Assessed: 9/9/2015    • Drug dependence ([facility] [address] ) 10/20/2021   • Hypertension     Last Assessed: 7/9/2014    • Kidney stone    • Kidney stones     Last Assessed: 11/17/2016    • Periorbital edema     Last Assessed: 9/4/2015   • Septic shock ([facility] [address] ) 08/20/2022   • Skin tag of anus     Last Assessed: 9/9/2015    • Trigger point of thoracic region     Last Assessed: 11/6/2015      Social History     Socioeconomic History   • Marital status:      Spouse name: Not on file   • Number of children: Not on file   • Years of education: Not on file   • Highest education level: Not on file   Occupational History   • Not on file   Tobacco Use   • Smoking status: Every Day     Packs/day: 2 00     Years: 35 00     Pack years: 70 00     Types: Cigarettes   • Smokeless tobacco: Never   Vaping Use   • Vaping Use: Never used   Substance and Sexual Activity   • Alcohol use: No   • Drug use: Yes     Types: Amphetamines   • Sexual activity: Not on file   Other Topics Concern   • Not on file   Social History Narrative    Caffeine Use     Uses safety Equipment- Seatbelts      Social Determinants of Health     Financial Resource Strain: Not on file   Food Insecurity: No Food Insecurity   • Worried About Running Out of Food in the Last Year: Never true   • Ran Out of Food in the Last Year: Never true   Transportation Needs: No Transportation Needs   • Lack of Transportation (Medical): No   • Lack of Transportation (Non-Medical):  No   Physical Activity: Not on file   Stress: Not on file   Social Connections: Not on file   Intimate Partner Violence: Not on file   Housing Stability: Unknown   • Unable to Pay for Housing in the Last Year: No   • Number of Places Lived in the Last Year: Not on file   • Unstable Housing in the Last Year: No       Subjective    Subjective Data: no txs needed    Objective    Physical Exam:   Assessment Type: Assess only  General Appearance: Alert, Awake  Respiratory Pattern: Tachypneic  Chest Assessment: Chest expansion symmetrical  Bilateral Breath Sounds: Diminished  Cough: None    Vitals:  Blood pressure 168/75, pulse 93, temperature 99 9 °F (37 7 °C), resp  rate (!) 39, height 6' (1 829 m), weight 66 kg (145 lb 8 1 oz), SpO2 91 %  Results from last 7 days   Lab Units 01/29/23  0502   PH ART  7 393   PCO2 ART mm Hg 36 2   PO2 ART mm Hg 187 3*   HCO3 ART mmol/L 21 6*   BASE EXC ART mmol/L -2 7   O2 CONTENT ART mL/dL 21 5   O2 HGB, ARTERIAL % 97 8*   ABG SOURCE  Radial, Right   SANDRA TEST  Yes       Imaging and other studies: I have personally reviewed pertinent reports  O2 Device: nc     Plan    Respiratory Plan: Mild Distress pathway  Airway Clearance Plan: Flutter, Percussive Vest (ordered by Critical care)     Resp Comments: Patient placed on High flow nasal as per Critical care  Cpt done manually by  RT and RN as per critical care on right lower lobe  Patient order on 3 % sodium chloride treatments by critical care   As per protocol pt ordered on 1 25 mg Xopenex to add to the Sodium chloride treatments  - - -

## 2023-01-30 NOTE — QUICK NOTE
I updated patient's mother, Precilla Repress, regarding patient's overall status and plan of care  All questions/concerns were answered and addressed

## 2023-01-30 NOTE — PLAN OF CARE
Problem: BEHAVIOR  Goal: Pt/Family maintain appropriate behavior and adhere to behavioral management agreement, if implemented  Description: INTERVENTIONS:  - Assess the family dynamic   - Encourage verbalization of thoughts and concerns in a socially appropriate manner  - Assess patient/family's coping skills and non-compliant behavior (including use of illegal substances)  - Utilize positive, consistent limit setting strategies supporting safety of patient, staff and others  - Initiate consult with Case Management, Spiritual Care or other ancillary services as appropriate  - If a patient's/visitor's behavior jeopardizes the safety of the patient, staff, or others, refer to organization procedure     - Notify Security of behavior or suspected illegal substances which indicate the need for search of the patient and/or belongings  - Encourage participation in the decision making process about a behavioral management agreement; implement if patient meets criteria  Outcome: Progressing     Problem: MOBILITY - ADULT  Goal: Maintain or return to baseline ADL function  Description: INTERVENTIONS:  -  Assess patient's ability to carry out ADLs; assess patient's baseline for ADL function and identify physical deficits which impact ability to perform ADLs (bathing, care of mouth/teeth, toileting, grooming, dressing, etc )  - Assess/evaluate cause of self-care deficits   - Assess range of motion  - Assess patient's mobility; develop plan if impaired  - Assess patient's need for assistive devices and provide as appropriate  - Encourage maximum independence but intervene and supervise when necessary  - Involve family in performance of ADLs  - Assess for home care needs following discharge   - Consider OT consult to assist with ADL evaluation and planning for discharge  - Provide patient education as appropriate  Outcome: Progressing     Problem: RESPIRATORY - ADULT  Goal: Achieves optimal ventilation and oxygenation  Description: INTERVENTIONS:  - Assess for changes in respiratory status  - Assess for changes in mentation and behavior  - Position to facilitate oxygenation and minimize respiratory effort  - Oxygen administered by appropriate delivery if ordered  - Initiate smoking cessation education as indicated  - Encourage broncho-pulmonary hygiene including cough, deep breathe, Incentive Spirometry  - Assess the need for suctioning and aspirate as needed  - Assess and instruct to report SOB or any respiratory difficulty  - Respiratory Therapy support as indicated  Outcome: Progressing

## 2023-01-30 NOTE — NUTRITION
01/30/23 1229   Biochemical Data,Medical Tests, and Procedures   Biochemical Data/Medical Tests/Procedures Lab values reviewed; Meds reviewed   Nutrition-Focused Physical Exam   Nutrition-Focused Physical Exam Findings RN skin assessment reviewed; No edema documented; No skin issues documented;Weight loss   Nutrition-Focused Physical Exam Findings skin integrity intact, no signs of muscle/adipose loss upon visual assessment of face (blankets pulled up)   Current PO Intake   Current Diet Order pureed, nectar thick liquids   Estimated calorie intake compared to estimated need diet recently initiated, unable to assess   Recommendations/Interventions   Malnutrition/BMI Present No  (one criteria met; weight loss)   Summary Presents to [facility] ED with AMS, increased lethargy, and SOB  Pt with right nephrostomy tube secondary to obstruction  Intubated for airway protection on 1/28  Transferred to [place]  Extubated to 6L NC 1/29  Confusion ongoing  Significant 11# weight loss in 1 month, 7% body weight  One malnutrition criteria met  Pt reports he has 2 meals daily at baseline  His appetite is decreased, pt states "I'm not real hungry " Diet advanced from NPO to pureed with nectar thick liquids today  RD to pend order for Mightyshake and lime Gelatein to provide additional kcals and protein  Given history of substance use, pt is at risk for refeeding - recommend monitoring electrolytes closely  Interventions/Recommendations Continue current diet order;Supplement initiate;Monitor I & O's;Monitor labs for refeeding syndrome   Education Assessment   Education Education not indicated at this time;Patient/caregiver not appropriate for education at this time   Patient Nutrition Goals   Goal Adequate hydration; Adequate intake;Meet PO needs; Increase kcal/PRO intake; Wt maintained

## 2023-01-30 NOTE — SPEECH THERAPY NOTE
Speech Language/Pathology  Speech/Language Pathology  Assessment    Patient Name: Ivet Tena  LPOGO'X Date: 1/30/2023     Problem List  Principal Problem:    Sepsis ([facility] [address] )  Active Problems:    Acute respiratory failure with hypoxia ([facility] [address] )    AMS (altered mental status)    UTI (urinary tract infection)    Aspiration pneumonia ([facility] [address] )    Past Medical History  Past Medical History:   Diagnosis Date    Anxiety     Bright red rectal bleeding     Last Assessed: 9/9/2015     Drug dependence ([facility] [address] ) 10/20/2021    Hypertension     Last Assessed: 7/9/2014     Kidney stone     Kidney stones     Last Assessed: 11/17/2016     Periorbital edema     Last Assessed: 9/4/2015    Septic shock ([facility] [address] ) 08/20/2022    Skin tag of anus     Last Assessed: 9/9/2015     Trigger point of thoracic region     Last Assessed: 11/6/2015      Past Surgical History  Past Surgical History:   Procedure Laterality Date    CYSTOSCOPY W/ URETERAL STENT PLACEMENT  03/11/2013    CYSTOSCOPY W/ URETERAL STENT PLACEMENT  06/18/2014    EXTRACORPOREAL SHOCK WAVE LITHOTRIPSY     CYSTOSCOPY W/ URETERAL STENT PLACEMENT  07/16/2014    EXTRACORPOREAL SHOCK WAVE LITHOTRIPSY     CYSTOSCOPY W/ URETERAL STENT REMOVAL  04/11/2013    CYSTOSCOPY W/ URETERAL STENT REMOVAL Right 08/26/2014    CYSTOSCOPY W/ URETEROSCOPY W/ LITHOTRIPSY  02/26/2013    Percutaneous lithotomy With Uretal Stent Plcement     CYSTOSCOPY W/ URETEROSCOPY W/ LITHOTRIPSY  03/11/2014    CYSTOSCOPY W/ URETEROSCOPY W/ LITHOTRIPSY Right 08/04/2014    With Uretal Stent Placement     CYSTOSCOPY W/ URETEROSCOPY W/ LITHOTRIPSY Right 05/09/2016    HEMORRHOID SURGERY      HERNIA REPAIR  03/11/2013    IR NEPHROSTOMY TUBE CHECK/CHANGE/REPOSITION/REINSERTION/UPSIZE  11/22/2022    IR NEPHROSTOMY TUBE PLACEMENT  8/20/2022    KIDNEY SURGERY      LITHOTRIPSY      NC CYSTO/URETERO W/LITHOTRIPSY &INDWELL STENT INSRT Right 7/13/2016    Procedure: CYSTOSCOPY; URETEROSCOPY WITH HOLMIUM LASER STONE EXTRACTION; RETROGRADE PYELOGRAM; URETERAL STENT INSERTION ;  Surgeon: Faye Dutta MD;  Location: [facility];  Service: Urology    DC CYSTO/URETERO W/LITHOTRIPSY &INDWELL STENT INSRT Right 5/9/2016    Procedure: CYSTOSCOPY,  URETEROSCOPY,  WITH LITHOTRIPSY HOLMIUM LASER, STONE EXTRACTION, AND INSERTION STENT URETERAL;  Surgeon: Faye Dutta MD;  Location: [facility];  Service: Urology    DC CYSTO/URETERO W/LITHOTRIPSY &INDWELL STENT INSRT Right 11/10/2022    Procedure: CYSTOSCOPY URETEROSCOPY  right RETROGRADE PYELOGRAM;  Surgeon: Cindy Whyte MD;  Location: [facility];  Service: Urology    DC LITHOTRIPSY [address]  Right 6/17/2016    Procedure: Chente Satanta SHOCKWAVE (ESWL); Surgeon: Faye Dutta MD;  Location: [facility];  Service: Urology    TRANSURETHRAL RESECTION OF BLADDER      URETERAL Terry Chentock  03/11/2013 01/30/23 1400   Patient Information   Current Medical ARF with hypoxia   Special Studies CT- negative   Swallow Information   Current Risks for Dysphagia & Aspiration Recent intubation; Respiratory compromise;Weak voicing;Mental status change;Behavioral issues   Current Symptoms/Concerns Cough;Clear throat; With food; With liquids;During meals; Moist cough; Other (Comment)  (desaturations during PO intake)   Current Diet NPO   Baseline Diet Regular; Thin liquids   Baseline Assessment   Behavior/Cognition Cooperative; Requires cueing; Other (comment)  (Pt appeared "far away" when attempting to engage him  Good eye contact but minimal participation )   Speech/Language Status Appears WFL though mentation concerns remain   Patient Positioning Upright in bed   Consistencies Assessed and Performance   Materials Admnistered Puree/Level 1; Thin liquid; Nectar thick liquid;Honey thick liquid   Oral Stage Moderate impaired   Phargngeal Stage Moderate impaired;Severe impaired;Significant aspiration risk   Swallow Mechanics Moderate delayed;Swallow initation;Weak larygneal rise;Vocal Cord dysfunction suspected;Aspiration risk   Strategies and Efficacy   (Slow rate, external pacing)   Summary   Swallow Summary Patient received sitting upright in bed with 1:1 present  NSG and CRNP report that pt demonstrating desaturations during reg/thin breakfast this morning  Pt recently intubated on 1/28 and extubated 1 day later (yesterday)  Pt on 40L @ 80% on my arrival, with O2 near 92  Pt positioned and trialed on thin, NTL, HTL and puree  Pt demonstrated muffled/resisted cough with thin liquids and suspected aspiration due to watering eyes, coloring of face  Pt remains expressionless and does not engage clinician in any meaningful conversation throughout  Pt trialed on NTL via cup, pt very impulsive with multiple sips drinking all 4 ounces across 2 trialed "sips "  Pt demonstrates two long oral holds, decreased transfer time/coordination and weak swallow initiation  No feedback provided verbally by pt  Pt demonstrates desaturation at this time from 90 to 84  Pt trending slowly down and then slowly recovering as all trials stopped for >2 minutes  Pt with nearly identical presention for HTL as well  Pt denies pain with cough, demonstrates congested cough productive to sputum bag  Pt reports "I've been coughing and spitting like this all my life "  Pt required cues to cough  CT negative at this time  Recommend pt be made NTL/puree going forward, total feed by staff likely as pt demonstrating impulsive difficulties and ongoing desaturation  ST following closely, will consider VBS tomorrow if no improvement  Recommendations   Risk for Aspiration Present   Recommendations Consider oral diet;Consider Video/Barium Swallow Study   Diet Solid Recommendation Level 1 Dysphagia/pureed   Diet Liquid Recommendation Nectar thick liquid   Recommended Form of Meds Crushed; With puree   General Precautions Aspiration precautions; Feed only when alert;Minimize distractions;Upright as possible for all oral intake;Remain upright for 45 mins after meals; Supervision with meals;Assist with feeding   Compensatory Swallowing Strategies Alternate solids and liquids   Results Reviewed with PAC/CRNP;RN;PT/Family/Caregiver   Speech Therapy Prognosis   Prognosis Guarded

## 2023-01-30 NOTE — RESPIRATORY THERAPY NOTE
RT Protocol Note  Jennyfer Velasco 61 y o  male MRN: [id number]  Unit/Bed#: ICU 11-01 Encounter: [id number]    Assessment    Principal Problem:    Sepsis ([facility] [address] )  Active Problems:    Acute respiratory failure with hypoxia ([facility] [address] )    AMS (altered mental status)    UTI (urinary tract infection)    Aspiration pneumonia ([facility] )      Home Pulmonary Medications:       Past Medical History:   Diagnosis Date    Anxiety     Bright red rectal bleeding     Last Assessed: 9/9/2015     Drug dependence ([facility] ) 10/20/2021    Hypertension     Last Assessed: 7/9/2014     Kidney stone     Kidney stones     Last Assessed: 11/17/2016     Periorbital edema     Last Assessed: 9/4/2015    Septic shock ([place] [id number] ) 08/20/2022    Skin tag of anus     Last Assessed: 9/9/2015     Trigger point of thoracic region     Last Assessed: 11/6/2015      Social History     Socioeconomic History    Marital status:      Spouse name: Not on file    Number of children: Not on file    Years of education: Not on file    Highest education level: Not on file   Occupational History    Not on file   Tobacco Use    Smoking status: Every Day     Packs/day: 2 00     Years: 35 00     Pack years: 70 00     Types: Cigarettes    Smokeless tobacco: Never   Vaping Use    Vaping Use: Never used   Substance and Sexual Activity    Alcohol use: No    Drug use: Yes     Types: Amphetamines    Sexual activity: Not on file   Other Topics Concern    Not on file   Social History Narrative    Caffeine Use     Uses safety Equipment- Seatbelts      Social Determinants of Health     Financial Resource Strain: Not on file   Food Insecurity: No Food Insecurity    Worried About Running Out of Food in the Last Year: Never true    [address] in the Last Year: Never true   Transportation Needs: No Transportation Needs    Lack of Transportation (Medical): No    Lack of Transportation (Non-Medical):  No   Physical Activity: Not on file   Stress: Not on file   Social Connections: Not on file Intimate Partner Violence: Not on file   Housing Stability: Unknown    Unable to Pay for Housing in the Last Year: No    Number of Places Lived in the Last Year: Not on file    Unstable Housing in the Last Year: No       Subjective    Subjective Data: no txs needed    Objective    Physical Exam:   Assessment Type: (P) Assess only  General Appearance: (P) Alert, Awake  Respiratory Pattern: (P) Normal  Chest Assessment: (P) Chest expansion symmetrical  Bilateral Breath Sounds: (P) Diminished, Clear  O2 Device: (P) nc    Vitals:  Blood pressure (!) 172/82, pulse 67, temperature 100 4 °F (38 °C), resp  rate (!) 34, height 6' (1 829 m), weight 66 4 kg (146 lb 6 2 oz), SpO2 93 %  Results from last 7 days   Lab Units 01/29/23  0502   PH ART  7 393   PCO2 ART mm Hg 36 2   PO2 ART mm Hg 187 3*   HCO3 ART mmol/L 21 6*   BASE EXC ART mmol/L -2 7   O2 CONTENT ART mL/dL 21 5   O2 HGB, ARTERIAL % 97 8*   ABG SOURCE  Radial, Right   SANDRA TEST  Yes       Imaging and other studies: I have personally reviewed pertinent reports        O2 Device: (P) nc     Plan    Respiratory Plan: (P) Discontinue Protocol        Resp Comments: (P) pt has no pulm hx extubated from vent today, no indication for resp txs will discont protocol, pt has 1:1 in room no apparent distress

## 2023-01-30 NOTE — NURSING NOTE
Pt agitated and restless, got out of bed and trying to leave room  Provider notified and aware  New orders in place  Vitals entered in Epic  Will continue to monitor

## 2023-01-31 ENCOUNTER — APPOINTMENT (OUTPATIENT)
Dept: RADIOLOGY | Facility: HOSPITAL | Age: 58
End: 2023-01-31

## 2023-01-31 LAB
ALBUMIN SERPL BCP-MCNC: 3.4 G/DL (ref 3.5–5)
ALP SERPL-CCNC: 60 U/L (ref 34–104)
ALT SERPL W P-5'-P-CCNC: 6 U/L (ref 7–52)
ANION GAP SERPL CALCULATED.3IONS-SCNC: 8 MMOL/L (ref 4–13)
AST SERPL W P-5'-P-CCNC: 8 U/L (ref 13–39)
BACTERIA SPT RESP CULT: ABNORMAL
BASOPHILS # BLD AUTO: 0.02 THOUSANDS/ÂΜL (ref 0–0.1)
BASOPHILS NFR BLD AUTO: 0 % (ref 0–1)
BILIRUB SERPL-MCNC: 1.24 MG/DL (ref 0.2–1)
BUN SERPL-MCNC: 18 MG/DL (ref 5–25)
CA-I BLD-SCNC: 1.16 MMOL/L (ref 1.12–1.32)
CALCIUM ALBUM COR SERPL-MCNC: 8.7 MG/DL (ref 8.3–10.1)
CALCIUM SERPL-MCNC: 8.2 MG/DL (ref 8.4–10.2)
CHLORIDE SERPL-SCNC: 110 MMOL/L (ref 96–108)
CO2 SERPL-SCNC: 20 MMOL/L (ref 21–32)
CREAT SERPL-MCNC: 0.94 MG/DL (ref 0.6–1.3)
EOSINOPHIL # BLD AUTO: 0.02 THOUSAND/ÂΜL (ref 0–0.61)
EOSINOPHIL NFR BLD AUTO: 0 % (ref 0–6)
ERYTHROCYTE [DISTWIDTH] IN BLOOD BY AUTOMATED COUNT: 13.8 % (ref 11.6–15.1)
EST. AVERAGE GLUCOSE BLD GHB EST-MCNC: 100 MG/DL
GFR SERPL CREATININE-BSD FRML MDRD: 89 ML/MIN/1.73SQ M
GLUCOSE SERPL-MCNC: 118 MG/DL (ref 65–140)
GLUCOSE SERPL-MCNC: 120 MG/DL (ref 65–140)
GLUCOSE SERPL-MCNC: 143 MG/DL (ref 65–140)
GRAM STN SPEC: ABNORMAL
HBA1C MFR BLD: 5.1 %
HCT VFR BLD AUTO: 42.7 % (ref 36.5–49.3)
HGB BLD-MCNC: 14.3 G/DL (ref 12–17)
IMM GRANULOCYTES # BLD AUTO: 0.07 THOUSAND/UL (ref 0–0.2)
IMM GRANULOCYTES NFR BLD AUTO: 1 % (ref 0–2)
LYMPHOCYTES # BLD AUTO: 0.77 THOUSANDS/ÂΜL (ref 0.6–4.47)
LYMPHOCYTES NFR BLD AUTO: 6 % (ref 14–44)
MAGNESIUM SERPL-MCNC: 3 MG/DL (ref 1.9–2.7)
MCH RBC QN AUTO: 29.3 PG (ref 26.8–34.3)
MCHC RBC AUTO-ENTMCNC: 33.5 G/DL (ref 31.4–37.4)
MCV RBC AUTO: 88 FL (ref 82–98)
MONOCYTES # BLD AUTO: 0.71 THOUSAND/ÂΜL (ref 0.17–1.22)
MONOCYTES NFR BLD AUTO: 6 % (ref 4–12)
NEUTROPHILS # BLD AUTO: 10.39 THOUSANDS/ÂΜL (ref 1.85–7.62)
NEUTS SEG NFR BLD AUTO: 87 % (ref 43–75)
NRBC BLD AUTO-RTO: 0 /100 WBCS
PHOSPHATE SERPL-MCNC: 2.3 MG/DL (ref 2.7–4.5)
PLATELET # BLD AUTO: 157 THOUSANDS/UL (ref 149–390)
PMV BLD AUTO: 10.5 FL (ref 8.9–12.7)
POTASSIUM SERPL-SCNC: 3.9 MMOL/L (ref 3.5–5.3)
PROCALCITONIN SERPL-MCNC: 0.12 NG/ML
PROT SERPL-MCNC: 5.9 G/DL (ref 6.4–8.4)
RBC # BLD AUTO: 4.88 MILLION/UL (ref 3.88–5.62)
SODIUM SERPL-SCNC: 138 MMOL/L (ref 135–147)
WBC # BLD AUTO: 11.98 THOUSAND/UL (ref 4.31–10.16)

## 2023-01-31 RX ORDER — POLYETHYLENE GLYCOL 3350 17 G/17G
17 POWDER, FOR SOLUTION ORAL DAILY
Status: DISCONTINUED | OUTPATIENT
Start: 2023-01-31 | End: 2023-02-01 | Stop reason: HOSPADM

## 2023-01-31 RX ORDER — DIAZEPAM 5 MG/1
10 TABLET ORAL EVERY 6 HOURS
Status: DISCONTINUED | OUTPATIENT
Start: 2023-01-31 | End: 2023-02-01 | Stop reason: HOSPADM

## 2023-01-31 RX ORDER — AMOXICILLIN 250 MG
2 CAPSULE ORAL
Status: DISCONTINUED | OUTPATIENT
Start: 2023-01-31 | End: 2023-02-01 | Stop reason: HOSPADM

## 2023-01-31 RX ORDER — MAGNESIUM SULFATE HEPTAHYDRATE 40 MG/ML
2 INJECTION, SOLUTION INTRAVENOUS ONCE
Status: COMPLETED | OUTPATIENT
Start: 2023-01-31 | End: 2023-01-31

## 2023-01-31 RX ORDER — DIAZEPAM 5 MG/1
10 TABLET ORAL EVERY 6 HOURS
Status: DISCONTINUED | OUTPATIENT
Start: 2023-01-31 | End: 2023-01-31

## 2023-01-31 RX ADMIN — SODIUM CHLORIDE SOLN NEBU 3% 4 ML: 3 NEBU SOLN at 13:55

## 2023-01-31 RX ADMIN — ENOXAPARIN SODIUM 40 MG: 100 INJECTION SUBCUTANEOUS at 09:00

## 2023-01-31 RX ADMIN — FAMOTIDINE 20 MG: 10 INJECTION, SOLUTION INTRAVENOUS at 18:26

## 2023-01-31 RX ADMIN — DIAZEPAM 10 MG: 5 TABLET ORAL at 12:35

## 2023-01-31 RX ADMIN — SODIUM CHLORIDE SOLN NEBU 3% 4 ML: 3 NEBU SOLN at 02:32

## 2023-01-31 RX ADMIN — CEFTRIAXONE 1000 MG: 1 INJECTION, SOLUTION INTRAVENOUS at 18:26

## 2023-01-31 RX ADMIN — LEVALBUTEROL HYDROCHLORIDE 1.25 MG: 1.25 SOLUTION, CONCENTRATE RESPIRATORY (INHALATION) at 02:32

## 2023-01-31 RX ADMIN — LEVALBUTEROL HYDROCHLORIDE 1.25 MG: 1.25 SOLUTION, CONCENTRATE RESPIRATORY (INHALATION) at 07:31

## 2023-01-31 RX ADMIN — MAGNESIUM SULFATE HEPTAHYDRATE 2 G: 40 INJECTION, SOLUTION INTRAVENOUS at 03:01

## 2023-01-31 RX ADMIN — SODIUM CHLORIDE SOLN NEBU 3% 4 ML: 3 NEBU SOLN at 07:31

## 2023-01-31 RX ADMIN — FOLIC ACID: 5 INJECTION, SOLUTION INTRAMUSCULAR; INTRAVENOUS; SUBCUTANEOUS at 09:16

## 2023-01-31 RX ADMIN — DEXMEDETOMIDINE HYDROCHLORIDE 0.6 MCG/KG/HR: 400 INJECTION INTRAVENOUS at 12:35

## 2023-01-31 RX ADMIN — POLYETHYLENE GLYCOL 3350 17 G: 17 POWDER, FOR SOLUTION ORAL at 09:00

## 2023-01-31 RX ADMIN — DEXMEDETOMIDINE HYDROCHLORIDE 0.8 MCG/KG/HR: 400 INJECTION INTRAVENOUS at 05:38

## 2023-01-31 RX ADMIN — DIAZEPAM 10 MG: 5 TABLET ORAL at 18:26

## 2023-01-31 RX ADMIN — ACETAMINOPHEN 650 MG: 325 TABLET ORAL at 09:16

## 2023-01-31 RX ADMIN — FAMOTIDINE 20 MG: 10 INJECTION, SOLUTION INTRAVENOUS at 09:00

## 2023-01-31 RX ADMIN — LEVALBUTEROL HYDROCHLORIDE 1.25 MG: 1.25 SOLUTION, CONCENTRATE RESPIRATORY (INHALATION) at 13:55

## 2023-01-31 RX ADMIN — POTASSIUM PHOSPHATE, MONOBASIC POTASSIUM PHOSPHATE, DIBASIC 12 MMOL: 224; 236 INJECTION, SOLUTION, CONCENTRATE INTRAVENOUS at 07:53

## 2023-01-31 NOTE — PLAN OF CARE
Problem: SAFETY,RESTRAINT: NV/NON-SELF DESTRUCTIVE BEHAVIOR  Goal: Remains free of harm/injury (restraint for non violent/non self-detsructive behavior)  Description: INTERVENTIONS:  - Instruct patient/family regarding restraint use   - Assess and monitor physiologic and psychological status   - Provide interventions and comfort measures to meet assessed patient needs   - Identify and implement measures to help patient regain control  - Assess readiness for release of restraint   Outcome: Progressing  Goal: Returns to optimal restraint-free functioning  Description: INTERVENTIONS:  - Assess the patient's behavior and symptoms that indicate continued need for restraint  - Identify and implement measures to help patient regain control  - Assess readiness for release of restraint   Outcome: Progressing     Problem: CONFUSION/THOUGHT DISTURBANCE  Goal: Thought disturbances (confusion, delirium, depression, dementia or psychosis) are managed to maintain or return to baseline mental status and functional level  Description: INTERVENTIONS:  - Assess for possible contributors to  thought disturbance, including but not limited to medications, infection, impaired vision or hearing, underlying metabolic abnormalities, dehydration, respiratory compromise,  psychiatric diagnoses and notify attending PHYSICAN/AP  - Monitor and intervene to maintain adequate nutrition, hydration, elimination, sleep and activity  - Decrease environmental stimuli, including noise as appropriate  - Provide frequent contacts to provide refocusing, direction and reassurance as needed  Approach patient calmly with eye contact and at their level    - [place] high risk fall precautions, aspiration precautions and other safety measures, as indicated  - If delirium suspected, notify physician/AP of change in condition and request immediate in-person evaluation  - Pursue consults as appropriate including Geriatric (campus dependent), OT for cognitive evaluation/activity planning, psychiatric, pastoral care, etc   Outcome: Progressing     Problem: BEHAVIOR  Goal: Pt/Family maintain appropriate behavior and adhere to behavioral management agreement, if implemented  Description: INTERVENTIONS:  - Assess the family dynamic   - Encourage verbalization of thoughts and concerns in a socially appropriate manner  - Assess patient/family's coping skills and non-compliant behavior (including use of illegal substances)  - Utilize positive, consistent limit setting strategies supporting safety of patient, staff and others  - Initiate consult with Case Management, Spiritual Care or other ancillary services as appropriate  - If a patient's/visitor's behavior jeopardizes the safety of the patient, staff, or others, refer to organization procedure  - Notify Security of behavior or suspected illegal substances which indicate the need for search of the patient and/or belongings  - Encourage participation in the decision making process about a behavioral management agreement; implement if patient meets criteria  Outcome: Progressing     Problem: Nutrition/Hydration-ADULT  Goal: Nutrient/Hydration intake appropriate for improving, restoring or maintaining nutritional needs  Description: Monitor and assess patient's nutrition/hydration status for malnutrition  Collaborate with interdisciplinary team and initiate plan and interventions as ordered  Monitor patient's weight and dietary intake as ordered or per policy  Utilize nutrition screening tool and intervene as necessary  Determine patient's food preferences and provide high-protein, high-caloric foods as appropriate       INTERVENTIONS:  - Monitor oral intake, urinary output, labs, and treatment plans  - Assess nutrition and hydration status and recommend course of action  - Evaluate amount of meals eaten  - Assist patient with eating if necessary   - Allow adequate time for meals  - Recommend/ encourage appropriate diets, oral nutritional supplements, and vitamin/mineral supplements  - Order, calculate, and assess calorie counts as needed  - Recommend, monitor, and adjust tube feedings and TPN/PPN based on assessed needs  - Assess need for intravenous fluids  - Provide specific nutrition/hydration education as appropriate  - Include patient/family/caregiver in decisions related to nutrition  Outcome: Progressing     Problem: MOBILITY - ADULT  Goal: Maintain or return to baseline ADL function  Description: INTERVENTIONS:  -  Assess patient's ability to carry out ADLs; assess patient's baseline for ADL function and identify physical deficits which impact ability to perform ADLs (bathing, care of mouth/teeth, toileting, grooming, dressing, etc )  - Assess/evaluate cause of self-care deficits   - Assess range of motion  - Assess patient's mobility; develop plan if impaired  - Assess patient's need for assistive devices and provide as appropriate  - Encourage maximum independence but intervene and supervise when necessary  - Involve family in performance of ADLs  - Assess for home care needs following discharge   - Consider OT consult to assist with ADL evaluation and planning for discharge  - Provide patient education as appropriate  Outcome: Progressing  Goal: Maintains/Returns to pre admission functional level  Description: INTERVENTIONS:  - Perform BMAT or MOVE assessment daily    - Set and communicate daily mobility goal to care team and patient/family/caregiver  - Collaborate with rehabilitation services on mobility goals if consulted  - Perform Range of Motion 3 times a day  - Reposition patient every 2 hours    - Dangle patient 2 times a day  - Stand patient 2 times a day  - Ambulate patient 2 times a day  - Out of bed for meals   - Out of bed for toileting  - Record patient progress and toleration of activity level   Outcome: Progressing     Problem: Prexisting or High Potential for Compromised Skin Integrity  Goal: Skin integrity is maintained or improved  Description: INTERVENTIONS:  - Identify patients at risk for skin breakdown  - Assess and monitor skin integrity  - Assess and monitor nutrition and hydration status  - Monitor labs   - Assess for incontinence   - Turn and reposition patient  - Assist with mobility/ambulation  - Relieve pressure over bony prominences  - Avoid friction and shearing  - Provide appropriate hygiene as needed including keeping skin clean and dry  - Evaluate need for skin moisturizer/barrier cream  - Collaborate with interdisciplinary team   - Patient/family teaching  - Consider wound care consult   Outcome: Progressing     Problem: RESPIRATORY - ADULT  Goal: Achieves optimal ventilation and oxygenation  Description: INTERVENTIONS:  - Assess for changes in respiratory status  - Assess for changes in mentation and behavior  - Position to facilitate oxygenation and minimize respiratory effort  - Oxygen administered by appropriate delivery if ordered  - Initiate smoking cessation education as indicated  - Encourage broncho-pulmonary hygiene including cough, deep breathe, Incentive Spirometry  - Assess the need for suctioning and aspirate as needed  - Assess and instruct to report SOB or any respiratory difficulty  - Respiratory Therapy support as indicated  Outcome: Progressing     Problem: GENITOURINARY - ADULT  Goal: Maintains or returns to baseline urinary function  Description: INTERVENTIONS:  - Assess urinary function  - Encourage oral fluids to ensure adequate hydration if ordered  - Administer IV fluids as ordered to ensure adequate hydration  - Administer ordered medications as needed  - Offer frequent toileting  - Follow urinary retention protocol if ordered  Outcome: Progressing  Goal: Absence of urinary retention  Description: INTERVENTIONS:  - Assess patient’s ability to void and empty bladder  - Monitor I/O  - Bladder scan as needed  - Discuss with physician/AP medications to alleviate retention as needed  - Discuss catheterization for long term situations as appropriate  Outcome: Progressing  Goal: Urinary catheter remains patent  Description: INTERVENTIONS:  - Assess patency of urinary catheter  - If patient has a chronic rodriguez, consider changing catheter if non-functioning  - Follow guidelines for intermittent irrigation of non-functioning urinary catheter  Outcome: Progressing     Problem: METABOLIC, FLUID AND ELECTROLYTES - ADULT  Goal: Electrolytes maintained within normal limits  Description: INTERVENTIONS:  - Monitor labs and assess patient for signs and symptoms of electrolyte imbalances  - Administer electrolyte replacement as ordered  - Monitor response to electrolyte replacements, including repeat lab results as appropriate  - Instruct patient on fluid and nutrition as appropriate  Outcome: Progressing  Goal: Fluid balance maintained  Description: INTERVENTIONS:  - Monitor labs   - Monitor I/O and WT  - Instruct patient on fluid and nutrition as appropriate  - Assess for signs & symptoms of volume excess or deficit  Outcome: Progressing  Goal: Glucose maintained within target range  Description: INTERVENTIONS:  - Monitor Blood Glucose as ordered  - Assess for signs and symptoms of hyperglycemia and hypoglycemia  - Administer ordered medications to maintain glucose within target range  - Assess nutritional intake and initiate nutrition service referral as needed  Outcome: Progressing     Problem: HEMATOLOGIC - ADULT  Goal: Maintains hematologic stability  Description: INTERVENTIONS  - Assess for signs and symptoms of bleeding or hemorrhage  - Monitor labs  - Administer supportive blood products/factors as ordered and appropriate  Outcome: Progressing

## 2023-01-31 NOTE — PLAN OF CARE
Problem: CONFUSION/THOUGHT DISTURBANCE  Goal: Thought disturbances (confusion, delirium, depression, dementia or psychosis) are managed to maintain or return to baseline mental status and functional level  Description: INTERVENTIONS:  - Assess for possible contributors to  thought disturbance, including but not limited to medications, infection, impaired vision or hearing, underlying metabolic abnormalities, dehydration, respiratory compromise,  psychiatric diagnoses and notify attending PHYSICAN/AP  - Monitor and intervene to maintain adequate nutrition, hydration, elimination, sleep and activity  - Decrease environmental stimuli, including noise as appropriate  - Provide frequent contacts to provide refocusing, direction and reassurance as needed  Approach patient calmly with eye contact and at their level  - [place] high risk fall precautions, aspiration precautions and other safety measures, as indicated  - If delirium suspected, notify physician/AP of change in condition and request immediate in-person evaluation  - Pursue consults as appropriate including Geriatric (campus dependent), OT for cognitive evaluation/activity planning, psychiatric, pastoral care, etc   Outcome: Progressing     Problem: BEHAVIOR  Goal: Pt/Family maintain appropriate behavior and adhere to behavioral management agreement, if implemented  Description: INTERVENTIONS:  - Assess the family dynamic   - Encourage verbalization of thoughts and concerns in a socially appropriate manner  - Assess patient/family's coping skills and non-compliant behavior (including use of illegal substances)  - Utilize positive, consistent limit setting strategies supporting safety of patient, staff and others  - Initiate consult with Case Management, Spiritual Care or other ancillary services as appropriate  - If a patient's/visitor's behavior jeopardizes the safety of the patient, staff, or others, refer to organization procedure     - Notify Security of behavior or suspected illegal substances which indicate the need for search of the patient and/or belongings  - Encourage participation in the decision making process about a behavioral management agreement; implement if patient meets criteria  Outcome: Progressing     Problem: RESPIRATORY - ADULT  Goal: Achieves optimal ventilation and oxygenation  Description: INTERVENTIONS:  - Assess for changes in respiratory status  - Assess for changes in mentation and behavior  - Position to facilitate oxygenation and minimize respiratory effort  - Oxygen administered by appropriate delivery if ordered  - Initiate smoking cessation education as indicated  - Encourage broncho-pulmonary hygiene including cough, deep breathe, Incentive Spirometry  - Assess the need for suctioning and aspirate as needed  - Assess and instruct to report SOB or any respiratory difficulty  - Respiratory Therapy support as indicated  Outcome: Progressing

## 2023-01-31 NOTE — ASSESSMENT & PLAN NOTE
· Presented to [facility] 1/28 with SOB and altered mental status  · Found to be hypoxic, placed on midflow, difficulty managing secretions and ongoing lethargy and was intubated  · 1/28 CT PE with no evidence of pulmonary embolism, mucous secretions within the dependent portion of the trachea and extending into the right lower lobe bronchus bronchioles  Mucous secretions noted within the left lower lobe bronchioles and patchy dependent consolidation in the lower lobes likely atelectasis    · Transferred to [facility] 1/28  · Extubated 1/29  · Now requiring [address], continue to wean for goal saturation > 90%  · 1/30 CXR concerning for increased right sided opacifications likely in the setting of mucous plugging  · Aggressive pulmonary hygiene with chest vest therapy, incentive spirometry and flutter valve as able to participate  · Continue with 3% saline nebs and xopenex  · If worsening respiratory status would consider pulm consult for bronch or intubation and bronch

## 2023-01-31 NOTE — PLAN OF CARE
Problem: PHYSICAL THERAPY ADULT  Goal: Performs mobility at highest level of function for planned discharge setting  See evaluation for individualized goals  Description: Treatment/Interventions: Functional transfer training, Elevations, Therapeutic exercise, Endurance training, Patient/family training, Gait training, Spoke to nursing  Equipment Recommended:  (none at this time, PT will continue to monitor)       See flowsheet documentation for full assessment, interventions and recommendations  Note: Prognosis: Good  Problem List: Decreased endurance, Decreased mobility, Impaired judgement, Decreased safety awareness, Impaired balance  Assessment: Pt is 61 y o  male seen for high-complexity PT evaluation on 1/31/2023 s/p admit to [facility] on 1/28/2023 w/ Sepsis ([address] )  PT was consulted to assess pt's functional mobility and d/c needs  Order placed for PT eval and tx, w/ up w/ A order  PTA, pt lives c parents in 2 SH, amb s AD at baseline, (I) ADLs  At time of eval, pt performs all mobility tasks at SUP level, amb x 110 at SUP level s AD  Upon evaluation, pt presenting with impaired functional mobility d/t decreased endurance, impaired balance, decreased mobility, impaired judgement and decreased safety awareness  Pertinent PMHx and current co-morbidities affecting pt's physical performance at time of assessment include: AMS, acute respiratory failure c hypoxia, UTI, aspiration pneumonia, anxiety, drug dependence  Personal factors affecting pt at time of eval include: decreased initiation and engagement, impulsivity, limited insight into impairments and poor safety awareness  The following objective measures performed on IE also reveal limitations: AM-PAC 6-Clicks: 93/79  Pt's clinical presentation is currently unstable/unpredictable seen in pt's presentation of need for input for task focus and mobility technique, ongoing medical assessment and impulsive   Overall, pt's rehab potential and prognosis to return to PLOF is good as impacted by objective findings, warranting pt to receive further skilled PT interventions to address identified impairments, activity limitation(s), and participation restriction(s)  Goal for patient is to go home  Pt to benefit from continued PT tx to address deficits as defined above and maximize level of functional independent mobility and consistency in order for pt to improve activity tolerance  From PT/mobility standpoint, recommendation at time of d/c would be no rehabilitation needs pending progress in order to facilitate return to PLOF  Barriers to Discharge: None     PT Discharge Recommendation: No rehabilitation needs (anticipated, pending progress)    See flowsheet documentation for full assessment

## 2023-01-31 NOTE — CASE MANAGEMENT
Case Management Assessment & Discharge Planning Note    Patient name Malka Ferrer  Location ICU /ICU  MRN [id number]  : 1965 Date 2023       Current Admission Date: 2023  Current Admission Diagnosis:Sepsis [facility])   Patient Active Problem List    Diagnosis Date Noted   • Abnormal CT scan, liver 2023   • AMS (altered mental status) 2023   • UTI (urinary tract infection) 2023   • Aspiration pneumonia ([address] ) 2023   • Smoking 11/10/2022   • Drug abuse, episodic use ([address] ) 11/10/2022   • Anxiety 2022   • Cognitive decline 2022   • Delusional disorder ([address] ) 2022   • Hypophosphatemia 2022   • Acute metabolic encephalopathy    • Acute respiratory failure with hypoxia ([address] ) 2022   • Elevated troponin 2022   • Elevated brain natriuretic peptide (BNP) level 2022   • Essential hypertension 2022   • Transaminitis 2022   • Elevated lipase 2022   • BRAULIO (acute kidney injury) ([address] ) 2022   • Hypernatremia 2022   • Sepsis ([address] ) 2022   • Increased anion gap metabolic acidosis    • Rhabdomyolysis 2022   • Elevated d-dimer 2022   • Kidney stones    • Compulsive skin picking 10/20/2021      LOS (days): 3  Geometric Mean LOS (GMLOS) (days):   Days to GMLOS:     OBJECTIVE:    Risk of Unplanned Readmission Score: 24 13     Current admission status: Inpatient    Preferred Pharmacy:   [facility] [address]  [address] [address]  Phone: [phone] Fax: [facility], [address] [facility] 94 [facility] 38 [address] [facility]  Phone: [phone] Fax: [phone]    Primary Care Provider: No primary care provider on file  Primary Insurance: BLUE CROSS  Secondary Insurance:     ASSESSMENT:  Active Health Care Proxies    There are no active Health Care Proxies on file         Advance Directives  Does patient have a [address]?: No  Was patient offered paperwork?: Yes (Declined)  Does patient have Advance Directives?: No  Was patient offered paperwork?: Yes (Declined)  Primary Contact: Fernando Leo mother    Readmission Root Cause  30 Day Readmission: No    Patient Information  Admitted from[de-identified] Home  Mental Status: Confused  During Assessment patient was accompanied by: Not accompanied during assessment  Assessment information provided by[de-identified] Patient, Other - please comment (Mother)  Primary Caregiver: Self  Support Systems: Parent  [place] of Residence: [facility] do you live in?: [address] entry access options  Select all that apply : Stairs  Number of steps to enter home  : 1  Do the steps have railings?: Yes  Type of Current Residence: [address] home  Upon entering residence, is there a bedroom on the main floor (no further steps)?: No  A bedroom is located on the following floor levels of residence (select all that apply):: 2nd Floor (Pt has been sleeping on the couch as per mother)  Upon entering residence, is there a bathroom on the main floor (no further steps)?: No  Indicate which floors of current residence have a bathroom (select all the apply):: 2nd Floor  Number of steps to 2nd floor from main floor: One Flight  In the last 12 months, was there a time when you were not able to pay the mortgage or rent on time?: No  In the last 12 months, how many places have you lived?: 1  In the last 12 months, was there a time when you did not have a steady place to sleep or slept in a shelter (including now)?: No  Homeless/housing insecurity resource given?: N/A  Living Arrangements: Lives w/ Parent(s)  Is patient a ?: No    Activities of Daily Living Prior to Admission  Functional Status: Independent  Completes ADLs independently?: Yes  Ambulates independently?: Yes  Does patient use assisted devices?: No  Does patient currently own DME?: Yes  What DME does the patient currently own?: Straight Cane, Wheelchair  Does patient have a history of Outpatient Therapy (PT/OT)?: No  Does the patient have a history of Short-Term Rehab?: No  Does patient have a history of HHC?: No  Does patient currently have [address]?: No    Patient Information Continued  Income Source: Unemployed  Does patient have prescription coverage?: Yes  Within the past 12 months, you worried that your food would run out before you got the money to buy more : Never true  Within the past 12 months, the food you bought just didn't last and you didn't have money to get more : Never true  Food insecurity resource given?: N/A  Does patient receive dialysis treatments?: No  Does patient have a history of substance abuse?: Yes  Historical substance use preference: Heroin  History of Withdrawal Symptoms: Other withdrawal symptoms (specify in comment)  Is patient currently in treatment for substance abuse?: No  Patient declined treatment information    Does patient have a history of Mental Health Diagnosis?: No    PHQ 2/9 Screening   Reviewed PHQ 2/9 Depression Screening Score?: No    Means of Transportation  Means of Transport to Appts[de-identified] Drives Self  In the past 12 months, has lack of transportation kept you from medical appointments or from getting medications?: No  In the past 12 months, has lack of transportation kept you from meetings, work, or from getting things needed for daily living?: No  Was application for public transport provided?: N/A        DISCHARGE DETAILS:    Discharge planning discussed with[de-identified] Pt and motherJudy [address] [facility] [address]  Contacts  Patient Contacts: Fernando Leo mother  Relationship to Patient[de-identified] Family  Contact Method: Phone  Phone Number: [phone]  Reason/Outcome: Discharge [address]         Is the patient interested in [address] at discharge?: No    DME Referral Provided  Referral made for DME?: No  Treatment Team Recommendation: Home  Discharge Destination Plan[de-identified] Home  Transport at Discharge : Family    Spoke to the pt at the bedside  Pt is forgetful with some answers and states he would allow his mother to be called for additional information  Spoke to the pt about when he used drugs last   Pt states he does not remember  States he was in [employer] in the past , however "nothing will help me because I did too many drugs in the past"  Declined any D&A information  TC to pt mother to obtain additional information  Mother does not know when the pt used drugs last   Pt mother states the pt was supposed to have his kidney removed as he does not want to live with a nephrosotomy tube  Does not know when this will happen  Will continue to follow for care needs

## 2023-01-31 NOTE — OCCUPATIONAL THERAPY NOTE
Occupational Therapy Evaluation      Agusto Vazs    1/31/2023    Principal Problem:    Sepsis ([facility] )  Active Problems:    Acute respiratory failure with hypoxia ([facility] )    AMS (altered mental status)    UTI (urinary tract infection)    Aspiration pneumonia (HCC)      Past Medical History:   Diagnosis Date    Anxiety     Bright red rectal bleeding     Last Assessed: 9/9/2015     Drug dependence ([facility] ) 10/20/2021    Hypertension     Last Assessed: 7/9/2014     Kidney stone     Kidney stones     Last Assessed: 11/17/2016     Periorbital edema     Last Assessed: 9/4/2015    Septic shock ([facility] ) 08/20/2022    Skin tag of anus     Last Assessed: 9/9/2015     Trigger point of thoracic region     Last Assessed: 11/6/2015        Past Surgical History:   Procedure Laterality Date    CYSTOSCOPY W/ URETERAL STENT PLACEMENT  03/11/2013    CYSTOSCOPY W/ URETERAL STENT PLACEMENT  06/18/2014    EXTRACORPOREAL SHOCK WAVE LITHOTRIPSY     CYSTOSCOPY W/ URETERAL STENT PLACEMENT  07/16/2014    EXTRACORPOREAL SHOCK WAVE LITHOTRIPSY     CYSTOSCOPY W/ URETERAL STENT REMOVAL  04/11/2013    CYSTOSCOPY W/ URETERAL STENT REMOVAL Right 08/26/2014    CYSTOSCOPY W/ URETEROSCOPY W/ LITHOTRIPSY  02/26/2013    Percutaneous lithotomy With Uretal Stent Plcement     CYSTOSCOPY W/ URETEROSCOPY W/ LITHOTRIPSY  03/11/2014    CYSTOSCOPY W/ URETEROSCOPY W/ LITHOTRIPSY Right 08/04/2014    With Uretal Stent Placement     CYSTOSCOPY W/ URETEROSCOPY W/ LITHOTRIPSY Right 05/09/2016    HEMORRHOID SURGERY      HERNIA REPAIR  03/11/2013    IR NEPHROSTOMY TUBE CHECK/CHANGE/REPOSITION/REINSERTION/UPSIZE  11/22/2022    IR NEPHROSTOMY TUBE PLACEMENT  8/20/2022    KIDNEY SURGERY      LITHOTRIPSY      [place] CYSTO/URETERO W/LITHOTRIPSY &INDWELL STENT INSRT Right 7/13/2016    Procedure: CYSTOSCOPY; URETEROSCOPY WITH HOLMIUM LASER STONE EXTRACTION; RETROGRADE PYELOGRAM; URETERAL STENT INSERTION ;  Surgeon: Janice Unger MD;  [facility];  Service: Urology    [place] CYSTO/URETERO W/LITHOTRIPSY &INDWELL STENT INSRT Right 5/9/2016    Procedure: CYSTOSCOPY,  URETEROSCOPY,  WITH LITHOTRIPSY HOLMIUM LASER, STONE EXTRACTION, AND INSERTION STENT URETERAL;  Surgeon: Adriana Cramer MD;  [facility];  Service: Urology    IN CYSTO/URETERO W/LITHOTRIPSY &INDWELL STENT INSRT Right 11/10/2022    Procedure: CYSTOSCOPY URETEROSCOPY  right RETROGRADE PYELOGRAM;  Surgeon: Nubia Mendez MD;  [facility];  Service: Urology    IN LITHOTRIPSY [address] Right 6/17/2016    Procedure: Kaleb Dollar SHOCKWAVE (ESWL); Surgeon: Adriana Cramer MD;  [facility];  Service: Urology    TRANSURETHRAL RESECTION OF BLADDER      URETERAL Eli Clif  03/11/2013 01/31/23 1106   OT Last Visit   OT Visit Date 01/31/23   Note Type   Note type Evaluation   Pain Assessment   Pain Assessment Tool 0-10   Pain Score No Pain   Restrictions/Precautions   Weight Bearing Precautions Per Order No   Other Precautions Fall Risk;1:1;Restraints;Multiple lines;Telemetry;O2;Bed Alarm;Cognitive  (5 L O2 via NC, virtual 1:1)   Home Living   Type of [address] [place] [address] Two level;Bed/bath upstairs;Stairs to enter without rails  (1 BINU; pt has been staying on the couch on 1st floor)   Home Equipment Wheelchair-manual;Cane  (no DME used at baseline)   Prior Function   Level of [place] Independent with ADLs; Independent with functional mobility; Independent with IADLS   Lives With Family  (pt lives c parents)   [address] Help From Family   IADLs Independent with driving   Falls in the last 6 months 0   Vocational Unemployed   Comments OT obtained all PLOF and home living info from CM's note in chart   ADL   UB Bathing Assistance 5  Supervision/Setup   LB Bathing Assistance 5  Supervision/Setup   UB Dressing Assistance 5  Supervision/Setup   LB Dressing Assistance 5  Supervision/Setup   Bed Mobility   Supine to Sit 5  Supervision   Additional items Assist x 1;HOB elevated; Increased time required   Sit to Supine 5  Supervision   Additional items Assist x 1; Increased time required   Transfers   Sit to Stand 5  Supervision   Additional items Assist x 1; Increased time required   Stand to Sit 5  Supervision   Additional items Assist x 1; Increased time required   Balance   Static Sitting Normal   Dynamic Sitting Good   Static Standing Good   Dynamic Standing Fair +   Ambulatory Fair +   Activity Tolerance   Activity Tolerance Patient limited by fatigue   Medical Staff Made Aware ICU provider verbalized pt appropriate for OT session, made aware of session outcomes   Nurse Made Aware NOLBERTO Spencer   RUKAI Assessment   RUE Assessment WFL   LUE Assessment   LUE Assessment WFL   Cognition   Overall Cognitive Status WFL   Arousal/Participation Alert; Cooperative   Attention Attends with cues to redirect   Orientation Level Oriented X4   Memory Decreased recall of precautions   Following Commands Follows one step commands without difficulty   Assessment   Limitation Decreased ADL status; Decreased Safe judgement during ADL;Decreased endurance;Decreased self-care trans;Decreased high-level ADLs   Prognosis Good   Assessment Pt is a 61 y o  male seen for OT evaluation s/p admit to [facility] on 1/28/2023 w/ Sepsis ([facility] )  Comorbidities affecting pt's functional performance at time of assessment include: Anxiety, Drug dependence, HTN, Septic shock  Personal factors affecting pt at time of IE include:steps to enter environment and difficulty performing ADLS  Prior to admission, pt was Mod I with ADLs  Upon evaluation: the following deficits impact occupational performance: decreased balance, decreased tolerance, decreased safety awareness and decreased coping skills  Pt to benefit from continued skilled OT tx while in the hospital to address deficits as defined above and maximize level of functional independence w ADL's and functional mobility   Occupational Performance areas to address include: bathing/shower, toilet hygiene, dressing, functional mobility and clothing management  From OT standpoint, recommendation at time of d/c would be no rehab needs  Goals   Patient Goals "to get out of here"   Plan   Treatment Interventions ADL retraining;Functional transfer training; Endurance training;Patient/family training; Compensatory technique education; Energy conservation; Activityengagement   Goal Expiration Date 02/10/23   OT Treatment Day 0   OT Frequency 3-5x/wk   Recommendation   OT Discharge Recommendation No rehabilitation needs   Additional Comments  Pt seen as a co-eval with PT due to the patient's co-morbidities, clinically unstable presentation, and present impairments which are a regression from the patient's baseline  Additional Comments 2 The patient's raw score on the AM-PAC Daily Activity inpatient short form is 20, standardized score is 42 03, greater than 39 4  Patients at this level are likely to benefit from discharge to home  Please refer to the recommendation of the Occupational Therapist for safe discharge planning     AM-PAC Daily Activity Inpatient   Lower Body Dressing 3   Bathing 3   Toileting 3   Upper Body Dressing 3   Grooming 4   Eating 4   Daily Activity Raw Score 20   Daily Activity Standardized Score (Calc for Raw Score >=11) 42 03   AM-[facility] Applied Cognition Inpatient   Following a Speech/Presentation 4   Understanding Ordinary Conversation 4   Taking Medications 4   Remembering Where Things Are Placed or Put Away 4   Remembering List of 4-5 Errands 3   Taking Care of Complicated Tasks 3   Applied Cognition Raw Score 22   Applied Cognition Standardized Score 47 83     GOALS:    Pt will achieve the following within specified time frame: STG  Pt will achieve the following goals within 5 days    *ADL transfers with (I) for inc'd independence with ADLs/purposeful tasks    *UB ADL with (I) for inc'd independence with self cares    *LB ADL with (I) using AE prn for inc'd independence with self cares    *Toileting with (I) for clothing management and hygiene for return to PLOF with personal care    *Increase dyn stand balance to G for inc'd safety with standing purposeful tasks    *Increase stand tolerance x3 m for inc'd tolerance with standing purposeful tasks    *Participate in 10m UE therex to increase overall stamina/activity tolerance for purposeful tasks    *Bed mobility- (I) for inc'd independence to manage own comfort and initiate EOB & OOB purposeful tasks    Pt will achieve the following within specified time frame: LTG  Pt will achieve the following goals within 10 days    *Increase stand tolerance x5 m for inc'd tolerance with standing purposeful tasks      Jared Hitchcock MS, OTR/L

## 2023-01-31 NOTE — PHYSICAL THERAPY NOTE
Physical Therapy Evaluation   Time in: 4995  Time out: 1107  Total evaluation time: 20 minutes    Patient's Name: Nimisha Martines    Admitting Diagnosis  Acute respiratory failure with hypoxia [facility])    Problem List  Patient Active Problem List   Diagnosis    Compulsive skin picking    Acute metabolic encephalopathy    Acute respiratory failure with hypoxia (HCC)    Elevated troponin    Elevated brain natriuretic peptide (BNP) level    Essential hypertension    Transaminitis    Elevated lipase    BRAULIO (acute kidney injury) ([address] )    Hypernatremia    Sepsis (HCC)    Increased anion gap metabolic acidosis    Rhabdomyolysis    Elevated d-dimer    Kidney stones    Delusional disorder ([address] )    Hypophosphatemia    Cognitive decline    Anxiety    Smoking    Drug abuse, episodic use (HCC)    Abnormal CT scan, liver    AMS (altered mental status)    UTI (urinary tract infection)    Aspiration pneumonia ([address] )       Past Medical History  Past Medical History:   Diagnosis Date    Anxiety     Bright red rectal bleeding     Last Assessed: 9/9/2015     Drug dependence ([address] ) 10/20/2021    Hypertension     Last Assessed: 7/9/2014     Kidney stone     Kidney stones     Last Assessed: 11/17/2016     Periorbital edema     Last Assessed: 9/4/2015    Septic shock ([address] ) 08/20/2022    Skin tag of anus     Last Assessed: 9/9/2015     Trigger point of thoracic region     Last Assessed: 11/6/2015        Past Surgical History  Past Surgical History:   Procedure Laterality Date    CYSTOSCOPY W/ URETERAL STENT PLACEMENT  03/11/2013    CYSTOSCOPY W/ URETERAL STENT PLACEMENT  06/18/2014    EXTRACORPOREAL SHOCK WAVE LITHOTRIPSY     CYSTOSCOPY W/ URETERAL STENT PLACEMENT  07/16/2014    EXTRACORPOREAL SHOCK WAVE LITHOTRIPSY     CYSTOSCOPY W/ URETERAL STENT REMOVAL  04/11/2013    CYSTOSCOPY W/ URETERAL STENT REMOVAL Right 08/26/2014    CYSTOSCOPY W/ URETEROSCOPY W/ LITHOTRIPSY  02/26/2013    Percutaneous lithotomy With Uretal Stent Plcement CYSTOSCOPY W/ URETEROSCOPY W/ LITHOTRIPSY  03/11/2014    CYSTOSCOPY W/ URETEROSCOPY W/ LITHOTRIPSY Right 08/04/2014    With Uretal Stent Placement     CYSTOSCOPY W/ URETEROSCOPY W/ LITHOTRIPSY Right 05/09/2016    HEMORRHOID SURGERY      HERNIA REPAIR  03/11/2013    IR NEPHROSTOMY TUBE CHECK/CHANGE/REPOSITION/REINSERTION/UPSIZE  11/22/2022    IR NEPHROSTOMY TUBE PLACEMENT  8/20/2022    KIDNEY SURGERY      LITHOTRIPSY      [place] CYSTO/URETERO W/LITHOTRIPSY &INDWELL STENT INSRT Right 7/13/2016    Procedure: CYSTOSCOPY; URETEROSCOPY WITH HOLMIUM LASER STONE EXTRACTION; RETROGRADE PYELOGRAM; URETERAL STENT INSERTION ;  Surgeon: Shemar Gutierrez MD;  [facility];  Service: Urology    [place] CYSTO/URETERO W/LITHOTRIPSY &INDWELL STENT INSRT Right 5/9/2016    Procedure: CYSTOSCOPY,  URETEROSCOPY,  WITH LITHOTRIPSY HOLMIUM LASER, STONE EXTRACTION, AND INSERTION STENT URETERAL;  Surgeon: Shemar Gutierrez MD;  [facility];  Service: Urology    [place] CYSTO/URETERO W/LITHOTRIPSY &INDWELL STENT INSRT Right 11/10/2022    Procedure: CYSTOSCOPY URETEROSCOPY  right RETROGRADE PYELOGRAM;  Surgeon: Dhaval Ramirez MD;  [facility];  Service: Urology    FL LITHOTRIPSY [address] [place] [address] Right 6/17/2016    Procedure: Xi Shrestha SHOCKWAVE (ESWL); Surgeon: Shemar Gutierrez MD;  [facility];  Service: Urology    TRANSURETHRAL RESECTION OF BLADDER      URETERAL Tamiko Car  03/11/2013       PT performed at least 2 patient identifiers during session: Name and wristband         01/31/23 1107   PT Last Visit   PT Visit Date 01/31/23   Note Type   Note type Evaluation   Pain Assessment   Pain Assessment Tool 0-10   Pain Score No Pain   Restrictions/Precautions   Weight Bearing Precautions Per Order No   Other Precautions Fall Risk;1:1;Restraints;Multiple lines;Telemetry;O2;Bed Alarm;Cognitive  (5 L O2 via NC)   Home Living   Type of [address] [place] [address] Two level;Bed/bath upstairs;Stairs to enter without rails  (1 BINU; pt has been staying on the couch on 1st floor)   Home Equipment Wheelchair-manual;Cane  (no DME used at baseline)   Prior Function   Level of [place] Independent with ADLs; Independent with functional mobility; Independent with IADLS   Lives With Family  (pt lives c parents)   [address] Help From Family   IADLs Independent with driving   Falls in the last 6 months 0   Vocational Unemployed   Comments PT obtained all PLOF and home living info from CM's note in chart   General   Family/Caregiver Present No   Cognition   Arousal/Participation Alert   Orientation Level Oriented X4   Memory Decreased recall of precautions   Following Commands Follows one step commands without difficulty   Comments pt agreeable to PT session   Subjective   Subjective "I am sick of these restraints"   RLE Assessment   RLE Assessment WFL   LLE Assessment   LLE Assessment WFL   Coordination   Movements are Fluid and Coordinated 0   Bed Mobility   Supine to Sit 5  Supervision   Additional items Assist x 1;HOB elevated; Increased time required   Sit to Supine 5  Supervision   Additional items Assist x 1; Increased time required   Transfers   Sit to Stand 5  Supervision   Additional items Assist x 1; Increased time required   Stand to Sit 5  Supervision   Additional items Assist x 1; Increased time required   Ambulation/Elevation   Gait pattern Improper Weight shift;Decreased foot clearance; Short stride; Excessively slow   Gait Assistance 5  Supervision   Additional items Assist x 1   Assistive Device None   Distance 120 ft   Stair Management Assistance Not tested   Balance   Static Sitting Normal   Dynamic Sitting Good   Static Standing Good   Dynamic Standing Fair +   Ambulatory Fair +   Endurance Deficit   Endurance Deficit Yes   Endurance Deficit Description +fatigue & decreased tolerance to OOB activity with 5 L O2 donned, however able to manage household distance mobility   Activity Tolerance   Activity Tolerance Patient limited by fatigue   Medical Staff Made Aware ICU provider verbalized pt appropriate for PT session, made aware of session outcomes; coordination of care provided with  [address] Yes, RN Sarah Brady verbalized pt appropriate for PT session   Assessment   Prognosis Good   Problem List Decreased endurance;Decreased mobility; Impaired judgement;Decreased safety awareness; Impaired balance   Assessment Pt is 61 y o  male seen for high-complexity PT evaluation on 1/31/2023 s/p admit to [facility] on 1/28/2023 w/ Sepsis ([address] )  PT was consulted to assess pt's functional mobility and d/c needs  Order placed for PT eval and tx, w/ up w/ A order  PTA, pt lives c parents in 2 SH, amb s AD at baseline, (I) ADLs  At time of eval, pt performs all mobility tasks at SUP level, amb x 110 at SUP level s AD  Upon evaluation, pt presenting with impaired functional mobility d/t decreased endurance, impaired balance, decreased mobility, impaired judgement and decreased safety awareness  Pertinent PMHx and current co-morbidities affecting pt's physical performance at time of assessment include: AMS, acute respiratory failure c hypoxia, UTI, aspiration pneumonia, anxiety, drug dependence  Personal factors affecting pt at time of eval include: decreased initiation and engagement, impulsivity, limited insight into impairments and poor safety awareness  The following objective measures performed on IE also reveal limitations: AM-PAC 6-Clicks: 78/34  Pt's clinical presentation is currently unstable/unpredictable seen in pt's presentation of need for input for task focus and mobility technique, ongoing medical assessment and impulsive  Overall, pt's rehab potential and prognosis to return to PLOF is good as impacted by objective findings, warranting pt to receive further skilled PT interventions to address identified impairments, activity limitation(s), and participation restriction(s)   Goal for patient is to go home  Pt to benefit from continued PT tx to address deficits as defined above and maximize level of functional independent mobility and consistency in order for pt to improve activity tolerance  From PT/mobility standpoint, recommendation at time of d/c would be no rehabilitation needs pending progress in order to facilitate return to PLOF  Barriers to Discharge None   Goals   Patient Goals "to get out of here"   [facility] Expiration Date 02/10/23   Short Term Goal #1 In 7-10 days: Perform all transfers independently to improve independence, Ambulate > 150 ft  with least restrictive assistive device modified independent w/o LOB and w/ normalized gait pattern 100% of the time, Navigate 10 stairs modified independent with unilateral handrail to facilitate return to previous living environment, Increase all balance 1/2 grade to decrease risk for falls and Complete exercise program independently   PT Treatment Day 0   Plan   Treatment/Interventions Functional transfer training;Elevations; Therapeutic exercise; Endurance training;Patient/family training;Gait training;Spoke to nursing   PT Frequency 2-3x/wk   Recommendation   PT Discharge Recommendation No rehabilitation needs  (anticipated, pending progress)   Equipment Recommended   (none at this time, PT will continue to monitor)   Additional Comments Pt's raw score on the [facility] Basic Mobility inpatient short form is 23, standardized score is 50 88  Patients at this level are likely to benefit from DC to home with no services, however, please refer to therapist recommendation for safe DC planning     [facility] Basic Mobility Inpatient   Turning in Flat Bed Without Bedrails 4   Lying on Back to Sitting on Edge of Flat Bed Without Bedrails 4   Moving Bed to Chair 4   Standing Up From Chair Using Arms 4   Walk in Room 4   Climb 3-5 Stairs With Railing 3   Basic Mobility Inpatient Raw Score 23   Basic Mobility Standardized Score 50 88   Highest Level Of Mobility   [facility] Goal 7: Walk 25 feet or more   -HLM Achieved 7: Walk 25 feet or more   End of Consult   Patient Position at End of Consult Supine;Bed/Chair alarm activated; All needs within reach  (4-pt soft restraints re-donned)       Gabriel Puckett, PT, DPT

## 2023-01-31 NOTE — SPEECH THERAPY NOTE
Speech Language/Pathology  Speech/Language Pathology Progress Note    Patient Name: Marlys CANO Date: 1/31/2023     Problem List  Principal Problem:    Sepsis ([facility] )  Active Problems:    Acute respiratory failure with hypoxia ([facility] )    AMS (altered mental status)    UTI (urinary tract infection)    Aspiration pneumonia ([facility] )    Past Medical History  Past Medical History:   Diagnosis Date   • Anxiety    • Bright red rectal bleeding     Last Assessed: 9/9/2015    • Drug dependence ([facility] ) 10/20/2021   • Hypertension     Last Assessed: 7/9/2014    • Kidney stone    • Kidney stones     Last Assessed: 11/17/2016    • Periorbital edema     Last Assessed: 9/4/2015   • Septic shock ([facility] ) 08/20/2022   • Skin tag of anus     Last Assessed: 9/9/2015    • Trigger point of thoracic region     Last Assessed: 11/6/2015      Past Surgical History  Past Surgical History:   Procedure Laterality Date   • CYSTOSCOPY W/ URETERAL STENT PLACEMENT  03/11/2013   • CYSTOSCOPY W/ URETERAL STENT PLACEMENT  06/18/2014    EXTRACORPOREAL SHOCK WAVE LITHOTRIPSY    • CYSTOSCOPY W/ URETERAL STENT PLACEMENT  07/16/2014    EXTRACORPOREAL SHOCK WAVE LITHOTRIPSY    • CYSTOSCOPY W/ URETERAL STENT REMOVAL  04/11/2013   • CYSTOSCOPY W/ URETERAL STENT REMOVAL Right 08/26/2014   • CYSTOSCOPY W/ URETEROSCOPY W/ LITHOTRIPSY  02/26/2013    Percutaneous lithotomy With Uretal Stent Plcement    • CYSTOSCOPY W/ URETEROSCOPY W/ LITHOTRIPSY  03/11/2014   • CYSTOSCOPY W/ URETEROSCOPY W/ LITHOTRIPSY Right 08/04/2014    With Uretal Stent Placement    • CYSTOSCOPY W/ URETEROSCOPY W/ LITHOTRIPSY Right 05/09/2016   • HEMORRHOID SURGERY     • HERNIA REPAIR  03/11/2013   • IR NEPHROSTOMY TUBE CHECK/CHANGE/REPOSITION/REINSERTION/UPSIZE  11/22/2022   • IR NEPHROSTOMY TUBE PLACEMENT  8/20/2022   • KIDNEY SURGERY     • LITHOTRIPSY     • [place] CYSTO/URETERO W/LITHOTRIPSY &INDWELL STENT INSRT Right 7/13/2016    Procedure: CYSTOSCOPY; URETEROSCOPY WITH HOLMIUM LASER STONE EXTRACTION; RETROGRADE PYELOGRAM; URETERAL STENT INSERTION ;  Surgeon: Janice Unger MD;  [facility];  Service: Urology   • [place] CYSTO/URETERO W/LITHOTRIPSY &INDWELL STENT INSRT Right 5/9/2016    Procedure: CYSTOSCOPY,  URETEROSCOPY,  WITH LITHOTRIPSY HOLMIUM LASER, STONE EXTRACTION, AND INSERTION STENT URETERAL;  Surgeon: Janice Unger MD;  [facility];  Service: Urology   • [place] CYSTO/URETERO W/LITHOTRIPSY &INDWELL STENT INSRT Right 11/10/2022    Procedure: CYSTOSCOPY URETEROSCOPY  right RETROGRADE PYELOGRAM;  Surgeon: Emma Santoyo MD;  [facility];  Service: Urology   • [place] LITHOTRIPSY [address] Right 6/17/2016    Procedure: Samina Pavon SHOCKWAVE (ESWL); Surgeon: Janice Unger MD;  [facility];  Service: Urology   • TRANSURETHRAL RESECTION OF BLADDER     • URETERAL REIMPLANTION  03/11/2013       Subjective:  Patient received sitting upright in bed with lunch tray present  Pt has consumed "a few bites" of his pureed roast turkey and minimal trials of NTL via cup  He reports "you wouldn't eat this, no one would "  Pt adamant that he is "fine" and "good to go "  Extensive education provided regarding medical status, needs, status at admission, need for transfer and intubation etc   Per evaluation yesterday pt recommended NTL and puree diet  Objective: Thin via straw:  Pt with multiple consecutive sips, minimal impulse control, pt encouraged to slow down and take his time  Encouraged pt to be mindful of the amount he was consuming  Following multiple consecutive trials pt with clear vocal quality and no desaturation occurs, pt holding at 95-96 O2 on 5L  Assessment:  Pt is smiling but aggressive in language, "I should radha this entire place, they're saying I have this wrong and this wrong and I don't    It's all bullshit "  Patient reports being concerned about his mom "I take care of her, I should be with her "  He is more cognitively aware than he has been previously however pt is not carrying over location  When asked do you know where you are right now? Pt responding "[address] "  Pt educated that yes he went to [place] initially and then was intubated and transferred to [address] [place] [address] in [place]  Pt states "Yea I live right around the corner from here "  "Oh in [place]?"  "No, [place] "  Patient with no carryover, becoming agitated  Plan/Recommendations:  Patient is appropriate for diet upgrade at this time to mechanical and thin  Will cont to monitor

## 2023-01-31 NOTE — ASSESSMENT & PLAN NOTE
· Likely in the setting of UTI and/or aspiration pneumonia  · +UA 2/2 staghorn calculus s/p nephrostomy tube, is under evaluation for possible nephrectomy  · CT concerning for possible aspiration in progress w/ fluid content in trachea  · Blood cultures NGTD  · Urine culture with E  Coli  · Sputum culture mixed respiratory linnea  · Continue rocephin for UTI and possible aspiration, now D5  · Procal downtrending, continue to monitor  · Monitor WBC and fever curve

## 2023-01-31 NOTE — PROGRESS NOTES
Brijesh 44  Progress Note - Marlys Byrness 1965, 61 y o  male MRN: [id number]  Unit/Bed#: ICU 11-01 Encounter: [id number]  Primary Care Provider: No primary care provider on file  Date and time admitted to hospital: 1/28/2023  6:09 PM    Acute respiratory failure with hypoxia [facility])  Assessment & Plan  · Presented to [facility] 1/28 with SOB and altered mental status  · Found to be hypoxic, placed on midflow, difficulty managing secretions and ongoing lethargy and was intubated  · 1/28 CT PE with no evidence of pulmonary embolism, mucous secretions within the dependent portion of the trachea and extending into the right lower lobe bronchus bronchioles  Mucous secretions noted within the left lower lobe bronchioles and patchy dependent consolidation in the lower lobes likely atelectasis    · Transferred to [facility] 1/28  · Extubated 1/29  · Now requiring [address], continue to wean for goal saturation > 90%  · 1/30 CXR concerning for increased right sided opacifications likely in the setting of mucous plugging  · Aggressive pulmonary hygiene with chest vest therapy, incentive spirometry and flutter valve as able to participate  · Continue with 3% saline nebs and xopenex  · If worsening respiratory status would consider pulm consult for bronch or intubation and bronch    * Sepsis ([facility] [address] )  Assessment & Plan  · Likely in the setting of UTI and/or aspiration pneumonia  · +UA 2/2 staghorn calculus s/p nephrostomy tube, is under evaluation for possible nephrectomy  · CT concerning for possible aspiration in progress w/ fluid content in trachea  · Blood cultures NGTD  · Urine culture with E  Coli  · Sputum culture growing GPC in pairs and GNR, await speciation   · Continue rocephin for UTI and possible aspiration, now D4  · Procal downtrending, continue to monitor  · Monitor WBC and fever curve    UTI (urinary tract infection)  Assessment & Plan  · See plan above    AMS (altered mental status)  Assessment & Plan  · Patient presented to [facility] on  w/ AMS, SOB and lethargy x2 days  · Likely toxic/metabolic 2/2 UTI, though patient does have hx of drug abuse which possibly may be contributing though no UDS was sent on arrival to ED so unclear  · UDS only positive for benzo which he received in medical care, however patient does admit to me he did "something fentanyl laced" (unclear if this is reliable given his mental status)  · Family informed us that he does have hx of cocaine and heroine use  · States he drinks approximately two beers a day  · Continue with virtual 1:1   · Ammonia negative  · Continue Precedex infusion, consider weaning today to evaluate neuro exam    Aspiration pneumonia ([facility] [address] )  Assessment & Plan  · See plan above    ----------------------------------------------------------------------------------------  HPI/24hr events: No acute events overnight      Patient appropriate for transfer out of the ICU today?: No  Disposition: Continue Critical Care   Code Status: Level 1 - Full Code  ---------------------------------------------------------------------------------------  SUBJECTIVE  "I am thirsty"    Review of Systems   Unable to perform ROS: Mental status change     Review of systems was unable to be performed secondary to AMS  ---------------------------------------------------------------------------------------  OBJECTIVE    Vitals   Vitals:    23 0200 23 0300 23 0400 23 0535   BP: 101/56 120/58 103/56    Pulse: 56 64 69    Resp: 22 (!) 29 (!) 23    Temp: 99 7 °F (37 6 °C) 99 3 °F (37 4 °C) 99 1 °F (37 3 °C)    TempSrc:       SpO2: 97% (!) 89% 93%    Weight:    67 kg (147 lb 11 3 oz)   Height:         Temp (24hrs), Av 2 °F (37 9 °C), Min:99 1 °F (37 3 °C), Max:100 9 °F (38 3 °C)  Current: Temperature: 99 1 °F (37 3 °C)          Respiratory:  SpO2: SpO2: 93 %, SpO2 Activity: SpO2 Activity: At Rest, SpO2 Device: O2 Device: Mid flow nasal cannula  Nasal Cannula O2 Flow Rate (L/min): (S) 7 L/min    Invasive/non-invasive ventilation settings   Respiratory    Lab Data (Last 4 hours)    None         O2/Vent Data (Last 4 hours)    None                Physical Exam  Vitals and nursing note reviewed  Constitutional:       Appearance: He is ill-appearing and toxic-appearing  HENT:      Head: Normocephalic  Mouth/Throat:      Mouth: Mucous membranes are dry  Pharynx: Oropharynx is clear  Eyes:      Pupils: Pupils are equal, round, and reactive to light  Cardiovascular:      Rate and Rhythm: Normal rate and regular rhythm  Pulses: Normal pulses  Heart sounds: Normal heart sounds  Pulmonary:      Effort: Pulmonary effort is normal       Breath sounds: Rhonchi present  Abdominal:      General: Bowel sounds are normal       Palpations: Abdomen is soft  Musculoskeletal:         General: Normal range of motion  Cervical back: Normal range of motion  Skin:     General: Skin is warm and dry  Neurological:      Mental Status: He is disoriented               Laboratory and Diagnostics:  Results from last 7 days   Lab Units 01/31/23  0526 01/30/23  0546 01/29/23  0512 01/28/23 2017 01/28/23  1138   WBC Thousand/uL 11 98* 13 97* 23 39* 20 82* 16 92*   HEMOGLOBIN g/dL 14 3 15 2 14 9 15 6 17 7*   HEMATOCRIT % 42 7 43 9 44 7 45 6 50 3*   PLATELETS Thousands/uL 157 159 201 217 236   NEUTROS PCT % 87* 85* 82*  --  87*   MONOS PCT % 6 6 8  --  7     Results from last 7 days   Lab Units 01/30/23  1635 01/30/23  0546 01/29/23  0512 01/28/23 2017 01/28/23  1138   SODIUM mmol/L 139 140 142 140 142   POTASSIUM mmol/L 3 4* 3 1* 3 8 3 7 3 0*   CHLORIDE mmol/L 107 109* 109* 110* 104   CO2 mmol/L 19* 21 25 24 23   ANION GAP mmol/L 13 10 8 6 15*   BUN mg/dL 18 16 23 25 28*   CREATININE mg/dL 1 00 0 91 1 19 1 35* 1 20   CALCIUM mg/dL 8 7 8 5 8 4 8 3* 10 1   GLUCOSE RANDOM mg/dL 158* 89 120 137 137   ALT U/L  --  7 8  --  10   AST U/L  --  11* 9*  --  11*   ALK PHOS U/L  --  70 63 --  97   ALBUMIN g/dL  --  3 7 3 5  --  4 9   TOTAL BILIRUBIN mg/dL  --  1 68* 1 12*  --  1 30*     Results from last 7 days   Lab Units 01/30/23  1635 01/30/23  0546 01/29/23  0512 01/28/23  2017 01/28/23  1138   MAGNESIUM mg/dL 2 4 2 4 2 3 2 1 2 3   PHOSPHORUS mg/dL 2 7 1 5* 3 6 3 9  --                Results from last 7 days   Lab Units 01/28/23  1138   LACTIC ACID mmol/L 2 0     ABG:  Results from last 7 days   Lab Units 01/29/23  0502   PH ART  7 393   PCO2 ART mm Hg 36 2   PO2 ART mm Hg 187 3*   HCO3 ART mmol/L 21 6*   BASE EXC ART mmol/L -2 7   ABG SOURCE  Radial, Right     VBG:  Results from last 7 days   Lab Units 01/30/23  1304 01/29/23  0502   PH JOSH  7 435*  --    PCO2 JOSH mm Hg 30 4*  --    PO2 JOSH mm Hg 44 4  --    HCO3 JOSH mmol/L 20 0*  --    BASE EXC JOSH mmol/L -2 8  --    ABG SOURCE   --  Radial, Right     Results from last 7 days   Lab Units 01/30/23  0546 01/29/23  0512 01/28/23  1138   PROCALCITONIN ng/ml 0 13 0 39* <0 05       Micro  Results from last 7 days   Lab Units 01/28/23  2255 01/28/23  2201 01/28/23  2142 01/28/23  1414 01/28/23  1138   BLOOD CULTURE  No Growth at 24 hrs  No Growth at 24 hrs   --   --  No Growth at 48 hrs  No Growth at 48 hrs  SPUTUM CULTURE   --   --  Culture too young- will reincubate  --   --    GRAM STAIN RESULT   --   --  2+ Disintegrating polys*  1+ Gram positive cocci in pairs*  1+ Gram negative rods*  1+ Yeast*  --   --    URINE CULTURE   --   --   --  >100,000 cfu/ml Escherichia coli*  20,000-29,000 cfu/ml  --        EKG: normal sinus rhythm  Imaging: I have personally reviewed pertinent reports  and I have personally reviewed pertinent films in PACS    Intake and Output  I/O       01/29 0701 01/30 0700 01/30 0701 01/31 0700    P  O  0 118    I V  (mL/kg) 1166 2 (17 7) 93 3 (1 4)    NG/     IV Piggyback 100 850 2    Total Intake(mL/kg) 1516 2 (23) 1061 5 (16 1)    Urine (mL/kg/hr) 1805 (1 1) 1310 (0 8)    Emesis/NG output 250     Stool 0 Total Output 2055 1310    Net -538 8 -248  5          Unmeasured Stool Occurrence 0 x           Height and Weights   Height: 6' (182 9 cm)  IBW (Ideal Body Weight): 77 6 kg  Body mass index is 20 03 kg/m²  Weight (last 2 days)     Date/Time Weight    01/31/23 0535 67 (147 71)    01/30/23 0600 66 (145 5)    01/29/23 0544 66 4 (146 39)            Nutrition       Diet Orders   (From admission, onward)             Start     Ordered    01/30/23 1540  Dietary nutrition supplements  Once        Question Answer Comment   Select SupplementNapolean Harrow - PRO/220 KCALS    Frequency Lunch        01/30/23 1539    01/30/23 1540  Dietary nutrition supplements  Once        Question Answer Comment   Select Supplement: MightyShake    Frequency Dinner        01/30/23 1539    01/30/23 1219  Diet Dysphagia/Modified Consistency; Dysphagia 1-Pureed; Nectar Thick Liquid  Diet effective now        References:    Nutrtion Support Algorithm Enteral vs  Parenteral   Question Answer Comment   Diet Type Dysphagia/Modified Consistency    Dysphagia/Modified Consistency Dysphagia 1-Pureed    Liquid Modifier Nectar Thick Liquid    Special Instructions Aspiration precautions    RD to adjust diet per protocol?  Yes        01/30/23 1218                  Active Medications  Scheduled Meds:  Current Facility-Administered Medications   Medication Dose Route Frequency Provider Last Rate   • acetaminophen  650 mg Oral Q6H PRN BASIL Flores     • bisacodyl  10 mg Rectal Daily PRN BASIL Aguilar     • cefTRIAXone  1,000 mg Intravenous Q24H [employer], PA-C Stopped (01/30/23 2000)   • dexmedetomidine  0 1-1 mcg/kg/hr Intravenous Titrated BASIL Soliz 0 8 mcg/kg/hr (01/31/23 0538)   • enoxaparin  40 mg Subcutaneous Daily [employer], PA-C     • Famotidine (PF)  20 mg Intravenous BID [employer], PA-C     • folic acid 1 mg, thiamine 100 mg in 0 9% sodium chloride 100 mL IVPB   Intravenous Daily BASIL Soliz     • hydrALAZINE  10 mg Intravenous Q4H PRN [employer], PA-C     • insulin lispro  1-5 Units Subcutaneous TID AC BASIL Soliz     • insulin lispro  1-5 Units Subcutaneous HS BASIL Soliz     • levalbuterol  1 25 mg Nebulization Q6H Carlos Diaz MD     • senna-docusate sodium  1 tablet Oral HS BASIL Tatum     • sodium chloride  4 mL Nebulization Q6H BASIL Soliz       Continuous Infusions:  dexmedetomidine, 0 1-1 mcg/kg/hr, Last Rate: 0 8 mcg/kg/hr (01/31/23 0538)      PRN Meds:   acetaminophen, 650 mg, Q6H PRN  bisacodyl, 10 mg, Daily PRN  hydrALAZINE, 10 mg, Q4H PRN        Invasive Devices Review  Invasive Devices     Central Venous Catheter Line  Duration           CVC Central Lines 01/28/23 Triple Right Internal jugular 2 days          Drain  Duration           Nephrostomy Right 10 2 Fr  69 days    Urethral Catheter Temperature probe 16 Fr  2 days                Rationale for remaining devices: medication administration, I & O monitoring  ---------------------------------------------------------------------------------------  Advance Directive and Living Will:      Power of :    POLST:    ---------------------------------------------------------------------------------------  Care Time Delivered:   Upon my evaluation, this patient had a high probability of imminent or life-threatening deterioration due to acute hypoxic respiratory failure, which required my direct attention, intervention, and personal management  I have personally provided 35 minutes ([phone]) of critical care time, exclusive of procedures, teaching, family meetings, and any prior time recorded by providers other than myself  BASIL Tatum      Portions of the record may have been created with voice recognition software    Occasional wrong word or "sound a like" substitutions may have occurred due to the inherent limitations of voice recognition software    Read the chart carefully and recognize, using context, where substitutions have occurred

## 2023-01-31 NOTE — ASSESSMENT & PLAN NOTE
· Likely in the setting of UTI and/or aspiration pneumonia  · +UA 2/2 staghorn calculus s/p nephrostomy tube, is under evaluation for possible nephrectomy  · CT concerning for possible aspiration in progress w/ fluid content in trachea  · Blood cultures NGTD  · Urine culture with E  Coli  · Sputum culture growing GPC in pairs and GNR, await speciation   · Continue rocephin for UTI and possible aspiration, now D4  · Procal downtrending, continue to monitor  · Monitor WBC and fever curve

## 2023-01-31 NOTE — ASSESSMENT & PLAN NOTE
· Patient presented to [facility] on 1/28 w/ AMS, SOB and lethargy x2 days  · Likely toxic/metabolic 2/2 UTI, though patient does have hx of drug abuse which possibly may be contributing though no UDS was sent on arrival to ED so unclear  · UDS only positive for benzo which he received in medical care, however patient does admit to me he did "something fentanyl laced" (unclear if this is reliable given his mental status)  · Family informed us that he does have hx of cocaine and heroine use  · States he drinks approximately two beers a day  · Continue with virtual 1:1   · Ammonia negative  · Continue Precedex infusion, consider weaning today to evaluate neuro exam

## 2023-01-31 NOTE — PLAN OF CARE
Problem: OCCUPATIONAL THERAPY ADULT  Goal: Performs self-care activities at highest level of function for planned discharge setting  See evaluation for individualized goals  Description: Treatment Interventions: ADL retraining, Functional transfer training, Endurance training, Patient/family training, Compensatory technique education, Energy conservation, Activityengagement    See flowsheet documentation for full assessment, interventions and recommendations  Note: Limitation: Decreased ADL status, Decreased Safe judgement during ADL, Decreased endurance, Decreased self-care trans, Decreased high-level ADLs  Prognosis: Good  Assessment: Pt is a 61 y o  male seen for OT evaluation s/p admit to [address] [place] [address] on 1/28/2023 w/ Sepsis ([facility] [address] )  Comorbidities affecting pt's functional performance at time of assessment include: Anxiety, Drug dependence, HTN, Septic shock  Personal factors affecting pt at time of IE include:steps to enter environment and difficulty performing ADLS  Prior to admission, pt was Mod I with ADLs  Upon evaluation: the following deficits impact occupational performance: decreased balance, decreased tolerance, decreased safety awareness and decreased coping skills  Pt to benefit from continued skilled OT tx while in the hospital to address deficits as defined above and maximize level of functional independence w ADL's and functional mobility  Occupational Performance areas to address include: bathing/shower, toilet hygiene, dressing, functional mobility and clothing management  From OT standpoint, recommendation at time of d/c would be no rehab needs       OT Discharge Recommendation: No rehabilitation needs     Jared Benavides MS, OTR/L

## 2023-02-01 ENCOUNTER — NURSE TRIAGE (OUTPATIENT)
Dept: OTHER | Facility: OTHER | Age: 58
End: 2023-02-01

## 2023-02-01 VITALS
HEART RATE: 50 BPM | HEIGHT: 72 IN | TEMPERATURE: 98.5 F | RESPIRATION RATE: 27 BRPM | DIASTOLIC BLOOD PRESSURE: 76 MMHG | BODY MASS INDEX: 20.28 KG/M2 | SYSTOLIC BLOOD PRESSURE: 159 MMHG | OXYGEN SATURATION: 92 % | WEIGHT: 149.69 LBS

## 2023-02-01 LAB
ALBUMIN SERPL BCP-MCNC: 3.3 G/DL (ref 3.5–5)
ALP SERPL-CCNC: 57 U/L (ref 34–104)
ALT SERPL W P-5'-P-CCNC: 8 U/L (ref 7–52)
ANION GAP SERPL CALCULATED.3IONS-SCNC: 7 MMOL/L (ref 4–13)
AST SERPL W P-5'-P-CCNC: 11 U/L (ref 13–39)
ATRIAL RATE: 94 BPM
BILIRUB SERPL-MCNC: 0.7 MG/DL (ref 0.2–1)
BUN SERPL-MCNC: 14 MG/DL (ref 5–25)
CALCIUM ALBUM COR SERPL-MCNC: 8.7 MG/DL (ref 8.3–10.1)
CALCIUM SERPL-MCNC: 8.1 MG/DL (ref 8.4–10.2)
CHLORIDE SERPL-SCNC: 109 MMOL/L (ref 96–108)
CO2 SERPL-SCNC: 21 MMOL/L (ref 21–32)
CREAT SERPL-MCNC: 0.89 MG/DL (ref 0.6–1.3)
ERYTHROCYTE [DISTWIDTH] IN BLOOD BY AUTOMATED COUNT: 13.5 % (ref 11.6–15.1)
GFR SERPL CREATININE-BSD FRML MDRD: 94 ML/MIN/1.73SQ M
GLUCOSE SERPL-MCNC: 103 MG/DL (ref 65–140)
GLUCOSE SERPL-MCNC: 109 MG/DL (ref 65–140)
HCT VFR BLD AUTO: 40.3 % (ref 36.5–49.3)
HGB BLD-MCNC: 13.8 G/DL (ref 12–17)
MAGNESIUM SERPL-MCNC: 2.2 MG/DL (ref 1.9–2.7)
MCH RBC QN AUTO: 29.7 PG (ref 26.8–34.3)
MCHC RBC AUTO-ENTMCNC: 34.2 G/DL (ref 31.4–37.4)
MCV RBC AUTO: 87 FL (ref 82–98)
P AXIS: 63 DEGREES
PHOSPHATE SERPL-MCNC: 2 MG/DL (ref 2.7–4.5)
PLATELET # BLD AUTO: 147 THOUSANDS/UL (ref 149–390)
PMV BLD AUTO: 10.5 FL (ref 8.9–12.7)
POTASSIUM SERPL-SCNC: 3.6 MMOL/L (ref 3.5–5.3)
PR INTERVAL: 118 MS
PROT SERPL-MCNC: 5.7 G/DL (ref 6.4–8.4)
QRS AXIS: 23 DEGREES
QRSD INTERVAL: 124 MS
QT INTERVAL: 416 MS
QTC INTERVAL: 520 MS
RBC # BLD AUTO: 4.65 MILLION/UL (ref 3.88–5.62)
SODIUM SERPL-SCNC: 137 MMOL/L (ref 135–147)
T WAVE AXIS: 58 DEGREES
VENTRICULAR RATE: 94 BPM
WBC # BLD AUTO: 10.47 THOUSAND/UL (ref 4.31–10.16)

## 2023-02-01 RX ORDER — NITROFURANTOIN 25; 75 MG/1; MG/1
100 CAPSULE ORAL 2 TIMES DAILY
Qty: 6 CAPSULE | Refills: 0 | Status: SHIPPED | OUTPATIENT
Start: 2023-02-01 | End: 2023-02-04

## 2023-02-01 RX ORDER — POTASSIUM CHLORIDE 20MEQ/15ML
40 LIQUID (ML) ORAL ONCE
Status: COMPLETED | OUTPATIENT
Start: 2023-02-01 | End: 2023-02-01

## 2023-02-01 RX ORDER — ALBUTEROL SULFATE 2.5 MG/3ML
2.5 SOLUTION RESPIRATORY (INHALATION) EVERY 4 HOURS PRN
Status: DISCONTINUED | OUTPATIENT
Start: 2023-02-01 | End: 2023-02-01 | Stop reason: HOSPADM

## 2023-02-01 RX ADMIN — FAMOTIDINE 20 MG: 10 INJECTION, SOLUTION INTRAVENOUS at 08:03

## 2023-02-01 RX ADMIN — POTASSIUM CHLORIDE 40 MEQ: 1.5 SOLUTION ORAL at 08:03

## 2023-02-01 RX ADMIN — ENOXAPARIN SODIUM 40 MG: 100 INJECTION SUBCUTANEOUS at 08:03

## 2023-02-01 RX ADMIN — DIAZEPAM 10 MG: 5 TABLET ORAL at 00:05

## 2023-02-01 RX ADMIN — FOLIC ACID: 5 INJECTION, SOLUTION INTRAMUSCULAR; INTRAVENOUS; SUBCUTANEOUS at 08:30

## 2023-02-01 RX ADMIN — DIAZEPAM 10 MG: 5 TABLET ORAL at 05:34

## 2023-02-01 RX ADMIN — DEXMEDETOMIDINE HYDROCHLORIDE 1 MCG/KG/HR: 400 INJECTION INTRAVENOUS at 03:50

## 2023-02-01 NOTE — NURSING NOTE
9:54 AM  RIJ triple lumen catheter removed, two sutures cut and complete suture intact  Catheter tip intact  Compression held and hemostasis achieved  Pt instructed to keep dressing on for 24 hours and not get wet

## 2023-02-01 NOTE — PLAN OF CARE
Problem: SAFETY,RESTRAINT: NV/NON-SELF DESTRUCTIVE BEHAVIOR  Goal: Remains free of harm/injury (restraint for non violent/non self-detsructive behavior)  Description: INTERVENTIONS:  - Instruct patient/family regarding restraint use   - Assess and monitor physiologic and psychological status   - Provide interventions and comfort measures to meet assessed patient needs   - Identify and implement measures to help patient regain control  - Assess readiness for release of restraint   Outcome: Progressing  Goal: Returns to optimal restraint-free functioning  Description: INTERVENTIONS:  - Assess the patient's behavior and symptoms that indicate continued need for restraint  - Identify and implement measures to help patient regain control  - Assess readiness for release of restraint   Outcome: Progressing     Problem: CONFUSION/THOUGHT DISTURBANCE  Goal: Thought disturbances (confusion, delirium, depression, dementia or psychosis) are managed to maintain or return to baseline mental status and functional level  Description: INTERVENTIONS:  - Assess for possible contributors to  thought disturbance, including but not limited to medications, infection, impaired vision or hearing, underlying metabolic abnormalities, dehydration, respiratory compromise,  psychiatric diagnoses and notify attending PHYSICAN/AP  - Monitor and intervene to maintain adequate nutrition, hydration, elimination, sleep and activity  - Decrease environmental stimuli, including noise as appropriate  - Provide frequent contacts to provide refocusing, direction and reassurance as needed  Approach patient calmly with eye contact and at their level    - [place] high risk fall precautions, aspiration precautions and other safety measures, as indicated  - If delirium suspected, notify physician/AP of change in condition and request immediate in-person evaluation  - Pursue consults as appropriate including Geriatric (campus dependent), OT for cognitive evaluation/activity planning, psychiatric, pastoral care, etc   Outcome: Progressing     Problem: BEHAVIOR  Goal: Pt/Family maintain appropriate behavior and adhere to behavioral management agreement, if implemented  Description: INTERVENTIONS:  - Assess the family dynamic   - Encourage verbalization of thoughts and concerns in a socially appropriate manner  - Assess patient/family's coping skills and non-compliant behavior (including use of illegal substances)  - Utilize positive, consistent limit setting strategies supporting safety of patient, staff and others  - Initiate consult with Case Management, Spiritual Care or other ancillary services as appropriate  - If a patient's/visitor's behavior jeopardizes the safety of the patient, staff, or others, refer to organization procedure  - Notify Security of behavior or suspected illegal substances which indicate the need for search of the patient and/or belongings  - Encourage participation in the decision making process about a behavioral management agreement; implement if patient meets criteria  Outcome: Progressing     Problem: Nutrition/Hydration-ADULT  Goal: Nutrient/Hydration intake appropriate for improving, restoring or maintaining nutritional needs  Description: Monitor and assess patient's nutrition/hydration status for malnutrition  Collaborate with interdisciplinary team and initiate plan and interventions as ordered  Monitor patient's weight and dietary intake as ordered or per policy  Utilize nutrition screening tool and intervene as necessary  Determine patient's food preferences and provide high-protein, high-caloric foods as appropriate       INTERVENTIONS:  - Monitor oral intake, urinary output, labs, and treatment plans  - Assess nutrition and hydration status and recommend course of action  - Evaluate amount of meals eaten  - Assist patient with eating if necessary   - Allow adequate time for meals  - Recommend/ encourage appropriate diets, oral nutritional supplements, and vitamin/mineral supplements  - Order, calculate, and assess calorie counts as needed  - Recommend, monitor, and adjust tube feedings and TPN/PPN based on assessed needs  - Assess need for intravenous fluids  - Provide specific nutrition/hydration education as appropriate  - Include patient/family/caregiver in decisions related to nutrition  Outcome: Progressing     Problem: MOBILITY - ADULT  Goal: Maintain or return to baseline ADL function  Description: INTERVENTIONS:  -  Assess patient's ability to carry out ADLs; assess patient's baseline for ADL function and identify physical deficits which impact ability to perform ADLs (bathing, care of mouth/teeth, toileting, grooming, dressing, etc )  - Assess/evaluate cause of self-care deficits   - Assess range of motion  - Assess patient's mobility; develop plan if impaired  - Assess patient's need for assistive devices and provide as appropriate  - Encourage maximum independence but intervene and supervise when necessary  - Involve family in performance of ADLs  - Assess for home care needs following discharge   - Consider OT consult to assist with ADL evaluation and planning for discharge  - Provide patient education as appropriate  Outcome: Progressing  Goal: Maintains/Returns to pre admission functional level  Description: INTERVENTIONS:  - Perform BMAT or MOVE assessment daily    - Set and communicate daily mobility goal to care team and patient/family/caregiver  - Collaborate with rehabilitation services on mobility goals if consulted  - Perform Range of Motion 3 times a day  - Reposition patient every 2 hours    - Dangle patient 2 times a day  - Stand patient 2 times a day  - Ambulate patient 2 times a day  - Out of bed for meals   - Out of bed for toileting  - Record patient progress and toleration of activity level   Outcome: Progressing     Problem: Prexisting or High Potential for Compromised Skin Integrity  Goal: Skin integrity is maintained or improved  Description: INTERVENTIONS:  - Identify patients at risk for skin breakdown  - Assess and monitor skin integrity  - Assess and monitor nutrition and hydration status  - Monitor labs   - Assess for incontinence   - Turn and reposition patient  - Assist with mobility/ambulation  - Relieve pressure over bony prominences  - Avoid friction and shearing  - Provide appropriate hygiene as needed including keeping skin clean and dry  - Evaluate need for skin moisturizer/barrier cream  - Collaborate with interdisciplinary team   - Patient/family teaching  - Consider wound care consult   Outcome: Progressing     Problem: RESPIRATORY - ADULT  Goal: Achieves optimal ventilation and oxygenation  Description: INTERVENTIONS:  - Assess for changes in respiratory status  - Assess for changes in mentation and behavior  - Position to facilitate oxygenation and minimize respiratory effort  - Oxygen administered by appropriate delivery if ordered  - Initiate smoking cessation education as indicated  - Encourage broncho-pulmonary hygiene including cough, deep breathe, Incentive Spirometry  - Assess the need for suctioning and aspirate as needed  - Assess and instruct to report SOB or any respiratory difficulty  - Respiratory Therapy support as indicated  Outcome: Progressing     Problem: GENITOURINARY - ADULT  Goal: Maintains or returns to baseline urinary function  Description: INTERVENTIONS:  - Assess urinary function  - Encourage oral fluids to ensure adequate hydration if ordered  - Administer IV fluids as ordered to ensure adequate hydration  - Administer ordered medications as needed  - Offer frequent toileting  - Follow urinary retention protocol if ordered  Outcome: Progressing  Goal: Absence of urinary retention  Description: INTERVENTIONS:  - Assess patient’s ability to void and empty bladder  - Monitor I/O  - Bladder scan as needed  - Discuss with physician/AP medications to alleviate retention as needed  - Discuss catheterization for long term situations as appropriate  Outcome: Progressing  Goal: Urinary catheter remains patent  Description: INTERVENTIONS:  - Assess patency of urinary catheter  - If patient has a chronic rodriguez, consider changing catheter if non-functioning  - Follow guidelines for intermittent irrigation of non-functioning urinary catheter  Outcome: Progressing     Problem: METABOLIC, FLUID AND ELECTROLYTES - ADULT  Goal: Electrolytes maintained within normal limits  Description: INTERVENTIONS:  - Monitor labs and assess patient for signs and symptoms of electrolyte imbalances  - Administer electrolyte replacement as ordered  - Monitor response to electrolyte replacements, including repeat lab results as appropriate  - Instruct patient on fluid and nutrition as appropriate  Outcome: Progressing  Goal: Fluid balance maintained  Description: INTERVENTIONS:  - Monitor labs   - Monitor I/O and WT  - Instruct patient on fluid and nutrition as appropriate  - Assess for signs & symptoms of volume excess or deficit  Outcome: Progressing  Goal: Glucose maintained within target range  Description: INTERVENTIONS:  - Monitor Blood Glucose as ordered  - Assess for signs and symptoms of hyperglycemia and hypoglycemia  - Administer ordered medications to maintain glucose within target range  - Assess nutritional intake and initiate nutrition service referral as needed  Outcome: Progressing     Problem: HEMATOLOGIC - ADULT  Goal: Maintains hematologic stability  Description: INTERVENTIONS  - Assess for signs and symptoms of bleeding or hemorrhage  - Monitor labs  - Administer supportive blood products/factors as ordered and appropriate  Outcome: Progressing

## 2023-02-01 NOTE — PLAN OF CARE
Problem: OCCUPATIONAL THERAPY ADULT  Goal: Performs self-care activities at highest level of function for planned discharge setting  See evaluation for individualized goals  Description: Treatment Interventions: ADL retraining, Functional transfer training, Endurance training, Patient/family training, Compensatory technique education, Energy conservation, Activityengagement    See flowsheet documentation for full assessment, interventions and recommendations  Outcome: Progressing  Note: Limitation: Decreased ADL status, Decreased Safe judgement during ADL, Decreased endurance, Decreased self-care trans, Decreased high-level ADLs  Prognosis: Good  Assessment: Patient participated in Skilled OT session this date with interventions consisting of ADL re training with the use of correct body mechnaics, Energy Conservation techniques, safety awareness and fall prevention techniques, therapeutic exercise to: increase functional use of BUEs, increase BUE muscle strength ,  therapeutic activities to: increase activity tolerance and increase dynamic sit/ stand balance during functional activity    Patient agreeable to OT treatment session, upon arrival patient was found seated in bed (back supported)  In comparison to previous session, patient with improvements in self-care accomplishment  Patient requiring one step directives, frequent rest periods and ocassional safety reminders, and occasional cues for correct technique, and self-care assistance as noted in flow sheet  Patient continues to be functioning below baseline level, occupational performance remains limited secondary to factors listed above and increased risk for falls and injury  From OT standpoint, recommendation at time of d/c would be No rehabilitation needs    Patient to benefit from continued Occupational Therapy treatment while in the hospital to address deficits as defined above and maximize level of functional independence with ADLs and functional mobility       OT Discharge Recommendation: No rehabilitation needs     Denece HUMAIRA Harley/GLEN

## 2023-02-01 NOTE — ASSESSMENT & PLAN NOTE
· Patient presented to [facility] on 1/28 w/ AMS, SOB and lethargy x2 days  · Likely toxic/metabolic 2/2 UTI, though patient does have hx of drug abuse which possibly may be contributing though no UDS was sent on arrival to ED so unclear  · UDS only positive for benzo which he received in medical care, however patient does admit to me he did "something fentanyl laced" (unclear if this is reliable given his mental status)  · Family informed us that he does have hx of cocaine and heroine use  · States he drinks approximately two beers a day  · Continue with virtual 1:1   · Ammonia negative  · Continue Precedex infusion  · Started on scheduled Valium with concern for ETOH withdrawal

## 2023-02-01 NOTE — PROGRESS NOTES
[address] 44  Progress Note - Laura Merck 1965, 61 y o  male MRN: [id number]  Unit/Bed#: ICU 11-01 Encounter: [id number]  Primary Care Provider: No primary care provider on file  Date and time admitted to hospital: 1/28/2023  6:09 PM    Acute respiratory failure with hypoxia [facility])  Assessment & Plan  · Presented to [facility] 1/28 with SOB and altered mental status  · Found to be hypoxic, placed on midflow, difficulty managing secretions and ongoing lethargy and was intubated  · 1/28 CT PE with no evidence of pulmonary embolism, mucous secretions within the dependent portion of the trachea and extending into the right lower lobe bronchus bronchioles  Mucous secretions noted within the left lower lobe bronchioles and patchy dependent consolidation in the lower lobes likely atelectasis    · Transferred to [facility] 1/28  · Extubated 1/29  · Now on RA  · 1/30 CXR concerning for increased right sided opacifications likely in the setting of mucous plugging  · 1/31 CXR much improved and deescalating oxygen requirements  · Aggressive pulmonary hygiene with chest vest therapy, incentive spirometry and flutter valve as able to participate  · Continue with 3% saline nebs and xopenex    * Sepsis ([address] )  Assessment & Plan  · Likely in the setting of UTI and/or aspiration pneumonia  · +UA 2/2 staghorn calculus s/p nephrostomy tube, is under evaluation for possible nephrectomy  · CT concerning for possible aspiration in progress w/ fluid content in trachea  · Blood cultures NGTD  · Urine culture with E  Coli  · Sputum culture mixed respiratory linnea  · Continue rocephin for UTI and possible aspiration, now D5  · Procal downtrending, continue to monitor  · Monitor WBC and fever curve    UTI (urinary tract infection)  Assessment & Plan  · See plan above    AMS (altered mental status)  Assessment & Plan  · Patient presented to [facility] on 1/28 w/ AMS, SOB and lethargy x2 days  · Likely toxic/metabolic 2/2 UTI, though patient does have hx of drug abuse which possibly may be contributing though no UDS was sent on arrival to ED so unclear  · UDS only positive for benzo which he received in medical care, however patient does admit to me he did "something fentanyl laced" (unclear if this is reliable given his mental status)  · Family informed us that he does have hx of cocaine and heroine use  · States he drinks approximately two beers a day  · Continue with virtual 1:1   · Ammonia negative  · Continue Precedex infusion  · Started on scheduled Valium with concern for ETOH withdrawal    Aspiration pneumonia ([facility] [address] )  Assessment & Plan  · See plan above      ----------------------------------------------------------------------------------------  HPI/24hr events: No acute events overnight    Patient appropriate for transfer out of the ICU today?: Patient does not meet criteria for referral to the ICU Follow-Up Clinic; referral has not been made  Disposition: Transfer to Med-Surg   Code Status: Level 1 - Full Code  ---------------------------------------------------------------------------------------  SUBJECTIVE  "I want to speak to the doctor, when will he be here tomorrow"    Review of Systems   Constitutional: Negative  HENT: Negative  Respiratory: Negative for chest tightness and shortness of breath  Cardiovascular: Negative for chest pain  Gastrointestinal: Negative for abdominal pain  Genitourinary: Negative  Musculoskeletal: Negative  Skin: Negative  Neurological: Negative  Psychiatric/Behavioral: Positive for agitation  All other systems reviewed and are negative      Review of systems was reviewed and negative unless stated above in HPI/24-hour events   ---------------------------------------------------------------------------------------  OBJECTIVE    Vitals   Vitals:    02/01/23 0400 02/01/23 0500 02/01/23 0544 02/01/23 0600   BP: 139/67 133/76  127/67   BP Location: Left arm      Pulse: (!) 47 (!) 52  (!) 51   Resp: (!) 23 16  (!) 26   Temp: 97 9 °F (36 6 °C)      TempSrc: Tympanic      SpO2: 94% 92%  91%   Weight:   67 9 kg (149 lb 11 1 oz)    Height:         Temp (24hrs), Av 3 °F (37 4 °C), Min:97 9 °F (36 6 °C), Max:100 °F (37 8 °C)  Current: Temperature: 97 9 °F (36 6 °C)          Respiratory:  SpO2: SpO2: 91 %, SpO2 Activity: SpO2 Activity: At Rest, SpO2 Device: O2 Device: None (Room air)  Nasal Cannula O2 Flow Rate (L/min): 4 L/min    Invasive/non-invasive ventilation settings   Respiratory    Lab Data (Last 4 hours)    None         O2/Vent Data (Last 4 hours)    None                Physical Exam  Vitals and nursing note reviewed  Constitutional:       Appearance: He is toxic-appearing  HENT:      Head: Normocephalic  Mouth/Throat:      Mouth: Mucous membranes are dry  Pharynx: Oropharynx is clear  Eyes:      Pupils: Pupils are equal, round, and reactive to light  Cardiovascular:      Rate and Rhythm: Regular rhythm  Bradycardia present  Pulses: Normal pulses  Heart sounds: Normal heart sounds  Pulmonary:      Effort: Pulmonary effort is normal       Breath sounds: Normal breath sounds  Abdominal:      General: Bowel sounds are normal       Palpations: Abdomen is soft  Musculoskeletal:         General: Normal range of motion  Cervical back: Normal range of motion  Skin:     General: Skin is warm and dry  Neurological:      General: No focal deficit present  Psychiatric:         Behavior: Behavior is agitated               Laboratory and Diagnostics:  Results from last 7 days   Lab Units 23  0451 23  0526 23  0546 23  0512 23  1138   WBC Thousand/uL 10 47* 11 98* 13 97* 23 39* 20 82* 16 92*   HEMOGLOBIN g/dL 13 8 14 3 15 2 14 9 15 6 17 7*   HEMATOCRIT % 40 3 42 7 43 9 44 7 45 6 50 3*   PLATELETS Thousands/uL 147* 157 159 201 217 236   NEUTROS PCT %  --  87* 85* 82*  --  87*   MONOS PCT %  --  6 6 8  --  7 Results from last 7 days   Lab Units 02/01/23  0451 01/31/23  0526 01/30/23  1635 01/30/23  0546 01/29/23  0512 01/28/23 2017 01/28/23  1138   SODIUM mmol/L 137 138 139 140 142 140 142   POTASSIUM mmol/L 3 6 3 9 3 4* 3 1* 3 8 3 7 3 0*   CHLORIDE mmol/L 109* 110* 107 109* 109* 110* 104   CO2 mmol/L 21 20* 19* 21 25 24 23   ANION GAP mmol/L 7 8 13 10 8 6 15*   BUN mg/dL 14 18 18 16 23 25 28*   CREATININE mg/dL 0 89 0 94 1 00 0 91 1 19 1 35* 1 20   CALCIUM mg/dL 8 1* 8 2* 8 7 8 5 8 4 8 3* 10 1   GLUCOSE RANDOM mg/dL 109 118 158* 89 120 137 137   ALT U/L 8 6*  --  7 8  --  10   AST U/L 11* 8*  --  11* 9*  --  11*   ALK PHOS U/L 57 60  --  70 63  --  97   ALBUMIN g/dL 3 3* 3 4*  --  3 7 3 5  --  4 9   TOTAL BILIRUBIN mg/dL 0 70 1 24*  --  1 68* 1 12*  --  1 30*     Results from last 7 days   Lab Units 02/01/23  0451 01/31/23  0526 01/30/23  1635 01/30/23  0546 01/29/23  0512 01/28/23 2017 01/28/23  1138   MAGNESIUM mg/dL 2 2 3 0* 2 4 2 4 2 3 2 1 2 3   PHOSPHORUS mg/dL 2 0* 2 3* 2 7 1 5* 3 6 3 9  --                Results from last 7 days   Lab Units 01/28/23  1138   LACTIC ACID mmol/L 2 0     ABG:  Results from last 7 days   Lab Units 01/29/23  0502   PH ART  7 393   PCO2 ART mm Hg 36 2   PO2 ART mm Hg 187 3*   HCO3 ART mmol/L 21 6*   BASE EXC ART mmol/L -2 7   ABG SOURCE  Radial, Right     VBG:  Results from last 7 days   Lab Units 01/30/23  1304 01/29/23  0502   PH JOSH  7 435*  --    PCO2 JOSH mm Hg 30 4*  --    PO2 JOSH mm Hg 44 4  --    HCO3 JOSH mmol/L 20 0*  --    BASE EXC JOSH mmol/L -2 8  --    ABG SOURCE   --  Radial, Right     Results from last 7 days   Lab Units 01/31/23  0526 01/30/23  0546 01/29/23  0512 01/28/23  1138   PROCALCITONIN ng/ml 0 12 0 13 0 39* <0 05       Micro  Results from last 7 days   Lab Units 01/28/23  2255 01/28/23  2201 01/28/23  2142 01/28/23  1414 01/28/23  1138   BLOOD CULTURE  No Growth at 48 hrs  No Growth at 48 hrs   --   --  No Growth at 72 hrs  No Growth at 72 hrs     SPUTUM CULTURE   --   --  3+ Growth of  --   --    GRAM STAIN RESULT   --   --  2+ Disintegrating polys*  1+ Gram positive cocci in pairs*  1+ Gram negative rods*  1+ Yeast*  --   --    URINE CULTURE   --   --   --  >100,000 cfu/ml Escherichia coli*  20,000-29,000 cfu/ml  --        EKG: sinus bradycardia  Imaging: I have personally reviewed pertinent reports  and I have personally reviewed pertinent films in PACS    Intake and Output  I/O       01/30 0701 01/31 0700 01/31 0701 02/01 0700    P  O  118 1638    I V  (mL/kg) 172 5 (2 6) 225 (3 4)    IV Piggyback 900 2 250    Total Intake(mL/kg) 1190 7 (17 8) 2113 (31 5)    Urine (mL/kg/hr) 1510 (0 9) 1075 (0 7)    Stool  0    Total Output 1510 1075    Net -319 3 +1038          Unmeasured Urine Occurrence  1 x    Unmeasured Stool Occurrence  2 x          Height and Weights   Height: 6' (182 9 cm)  IBW (Ideal Body Weight): 77 6 kg  Body mass index is 20 3 kg/m²  Weight (last 2 days)     Date/Time Weight    02/01/23 0544 67 9 (149 69)    01/31/23 0535 67 (147 71)    01/30/23 0600 66 (145 5)            Nutrition       Diet Orders   (From admission, onward)             Start     Ordered    01/31/23 1539  Diet Dysphagia/Modified Consistency; Dysphagia 2-Mechanical Soft; Thin Liquid  Diet effective now        References:    Nutrtion Support Algorithm Enteral vs  Parenteral   Question Answer Comment   Diet Type Dysphagia/Modified Consistency    Dysphagia/Modified Consistency Dysphagia 2-Mechanical Soft    Liquid Modifier Thin Liquid    Special Instructions Aspiration precautions    RD to adjust diet per protocol?  Yes        01/31/23 1538    01/30/23 1540  Dietary nutrition supplements  Once        Question Answer Comment   Select Supplement: Gelatein - Lime - PRO/220 KCALS    Frequency Lunch        01/30/23 1539    01/30/23 1540  Dietary nutrition supplements  Once        Question Answer Comment   Select Supplement: MightyShake    Frequency Dinner        01/30/23 1539 Active Medications  Scheduled Meds:  Current Facility-Administered Medications   Medication Dose Route Frequency Provider Last Rate   • acetaminophen  650 mg Oral Q6H PRN BASIL Santo     • bisacodyl  10 mg Rectal Daily PRN BASIL Sheridan     • cefTRIAXone  1,000 mg Intravenous Q24H [employer], PA- Stopped (01/31/23 2000)   • dexmedetomidine  0 1-1 mcg/kg/hr Intravenous Titrated BASIL Soliz 1 mcg/kg/hr (02/01/23 0350)   • diazepam  10 mg Oral Q6H BASIL Soliz     • enoxaparin  40 mg Subcutaneous Daily [employer], [place]     • Famotidine (PF)  20 mg Intravenous BID [employer], PAAndrewC     • folic acid 1 mg, thiamine 100 mg in 0 9% sodium chloride 100 mL IVPB   Intravenous Daily BASIL Soliz     • hydrALAZINE  10 mg Intravenous Q4H PRN [employer] PAAnrdew     • insulin lispro  1-5 Units Subcutaneous TID AC BASIL Soliz     • insulin lispro  1-5 Units Subcutaneous HS BASIL Soliz     • levalbuterol  1 25 mg Nebulization Q6H Carlos Avery MD     • polyethylene glycol  17 g Oral Daily BASIL Soliz     • senna-docusate sodium  2 tablet Oral HS BASIL Soliz     • sodium chloride  4 mL Nebulization Q6H BASIL Soliz       Continuous Infusions:  dexmedetomidine, 0 1-1 mcg/kg/hr, Last Rate: 1 mcg/kg/hr (02/01/23 0350)      PRN Meds:   acetaminophen, 650 mg, Q6H PRN  bisacodyl, 10 mg, Daily PRN  hydrALAZINE, 10 mg, Q4H PRN        Invasive Devices Review  Invasive Devices     Central Venous Catheter Line  Duration           CVC Central Lines 01/28/23 Triple Right Internal jugular 3 days          Drain  Duration           Nephrostomy Right 10 2 Fr  70 days                Rationale for remaining devices: medication administration/no peripheral access  ---------------------------------------------------------------------------------------  Advance Directive and Living Will:      Power of :    POLST:    ---------------------------------------------------------------------------------------  Care Time Delivered:   Upon my evaluation, this patient had a high probability of imminent or life-threatening deterioration due to encephalopathy, which required my direct attention, intervention, and personal management  I have personally provided 30 minutes (0330 to 0400) of critical care time, exclusive of procedures, teaching, family meetings, and any prior time recorded by providers other than myself  BASIL Turcios      Portions of the record may have been created with voice recognition software  Occasional wrong word or "sound a like" substitutions may have occurred due to the inherent limitations of voice recognition software    Read the chart carefully and recognize, using context, where substitutions have occurred

## 2023-02-01 NOTE — TELEPHONE ENCOUNTER
Pt's mom called in stating pt was just released from the hospital today after being admitted for Hypoxia and Intubated  While in the hospital pt was found to have a UTI and going through ETOH Withdrawal  Pt then left AMA and was given 7 days worth of ABX to treat his UTI  However, pt's mom called in stating pt is having 8 out of 10 pain and would like pain medication from his urologist  Mom made aware she would have to contact pt's PCP as pt only see a urologist with [facility]  Mom demanding Dr Alexx Dobson to Rx and treat pt's pain  Mom aware the office is not in until tomorrow and will address her concerns then

## 2023-02-01 NOTE — RESPIRATORY THERAPY NOTE
Patient is adamantly refusing any respiratory treatments  He states that he doesn't need them, he's "done with it," and that it tastes horrible  He also refuses flutter valve and chest PT therapy also  He is otherwise resting comfortably, on RA, and SPO2 level 97% at the time I saw the patient  I informed him that if he were to change his mind on the treatments to please make the nurse aware to contact me and I would be more than happy to come and give him the treatment

## 2023-02-01 NOTE — TELEPHONE ENCOUNTER
Regarding: pain level 8 concern  ----- Message from Nelly Fountain sent at 2/1/2023  5:48 PM EST -----  'My son just left the Ed today he is in for issue pain he was at the [facility] and was transferred to [place] and was discharged today and I want to see if something can be called in for him"

## 2023-02-01 NOTE — UTILIZATION REVIEW
Continued Stay Review    Date: 2/1/23                          Current Patient Class: Inpatient  Current Level of Care: Transfer to med surg today    HPI:57 y o  male initially admitted on 1/28/23 Altered mental status, Acute respiratory failure with hypoxia   • 1/30 CXR concerning for increased right sided opacifications likely in the setting of mucous plugging  • 1/31 CXR much improved and deescalating oxygen requirements    Assessment/Plan:  A&O x3, 94% on RA, Aspiration PNA improved with 5 days Ceftriaxone, aggressive pulmonary hygiene with chest vest therapy, incentive spirometry and flutter valve as able to participate, continue neb txs  Blood cxs NGTD, Urine cx with E Coli, Sputum cx with mixed resp linnea, continue recephin for UTI and possible aspiration, monitor procal, WBC and fever  Continue with virtual 1:1, continue precedex infusion and started on scheduled valium dt concern for ETOH WD        Vital Signs:   Date/Time Temp Pulse Resp BP MAP (mmHg) SpO2 FiO2 (%) Calculated FIO2 (%) - Nasal Cannula O2 Flow Rate (L/min) Nasal Cannula O2 Flow Rate (L/min) O2 Device O2 Interface Device Patient Position - Orthostatic VS   02/01/23 0700 98 5 °F (36 9 °C) 50 Abnormal  27 Abnormal  159/76 106 92 % -- -- -- -- -- -- Lying   02/01/23 0600 -- 51 Abnormal  26 Abnormal  127/67 92 91 % -- -- -- -- -- -- --   02/01/23 0500 -- 52 Abnormal  16 133/76 99 92 % -- -- -- -- -- -- --   02/01/23 0400 97 9 °F (36 6 °C) 47 Abnormal  23 Abnormal  139/67 97 94 % -- -- -- -- -- -- Lying   02/01/23 0300 -- 47 Abnormal  27 Abnormal  159/79 113 93 % -- -- -- -- -- -- --   02/01/23 0200 97 9 °F (36 6 °C) 51 Abnormal  24 Abnormal  146/74 104 93 % -- -- -- -- -- -- --   02/01/23 0100 -- 55 24 Abnormal  134/71 97 92 % -- -- -- -- -- -- --   02/01/23 0000 -- 56 23 Abnormal  122/67 90 91 % -- -- -- -- -- -- --   01/31/23 2300 -- 58 23 Abnormal  144/75 103 93 % -- -- -- -- None (Room air) -- Lying   01/31/23 2200 -- 55 27 Abnormal  149/73 104 94 % -- -- -- -- None (Room air) -- Lying   01/31/23 2000 -- 56 31 Abnormal  149/72 104 95 % -- -- -- -- None (Room air) -- Lying   01/31/23 1935 -- -- -- -- -- 97 % -- -- -- -- None (Room air) -- --   01/31/23 1916 98 2 °F (36 8 °C) 59 21 148/81 110 95 % -- -- -- -- None (Room air) -- Lying   01/31/23 1900 -- 61 23 Abnormal  148/81 110 96 % -- -- -- -- None (Room air) -- Lying   01/31/23 1800 -- 56 30 Abnormal  144/72 99 95 % -- -- -- -- None (Room air) -- --   01/31/23 1700 -- 61 25 Abnormal  137/75 100 95 % -- -- -- -- None (Room air) -- --   01/31/23 1600 99 9 °F (37 7 °C) 64 20 138/67 97 97 % -- -- -- -- None (Room air) -- --   01/31/23 1500 99 7 °F (37 6 °C) 58 29 Abnormal  132/64 92 96 % -- -- -- -- None (Room air) -- --   01/31/23 1400 -- 62 24 Abnormal  119/59 84 96 % -- 36 -- 4 L/min Nasal cannula -- --   01/31/23 1355 -- -- -- -- -- 95 % -- -- -- -- -- -- --   01/31/23 1300 99 7 °F (37 6 °C) 61 29 Abnormal  117/61 83 96 % -- -- -- -- -- -- --   01/31/23 1200 99 7 °F (37 6 °C) 61 28 Abnormal  123/63 87 96 % -- -- -- -- -- -- --   01/31/23 1100 99 9 °F (37 7 °C) 61 -- 114/58 81 94 % -- -- -- -- -- -- --   01/31/23 1000 99 9 °F (37 7 °C) 63 20 109/57 80 95 % -- -- -- -- -- -- --   01/31/23 0900 100 °F (37 8 °C) 73 27 Abnormal  110/59 80 95 % -- -- -- -- -- -- --   01/31/23 0800 99 7 °F (37 6 °C) 68 32 Abnormal  113/55 78 92 % -- 40 -- 5 L/min Nasal cannula -- --   01/31/23 0731 99 5 °F (37 5 °C) 60 32 Abnormal  111/64 82 95 % -- 40 -- 5 L/min Nasal cannula -- --   01/31/23 0700 99 5 °F (37 5 °C) 56 5 Abnormal  116/62 84 96 % -- -- -- -- -- -- --   01/31/23 0600 99 5 °F (37 5 °C) 59 30 Abnormal  117/59 82 95 % -- -- -- -- -- -- --   01/31/23 0500 99 3 °F (37 4 °C) 66 23 Abnormal  103/57 75 94 % -- -- -- -- -- -- --   01/31/23 0400 99 1 °F (37 3 °C) 69 23 Abnormal  103/56 76 93 % -- -- -- -- -- -- --   01/31/23 0300 99 3 °F (37 4 °C) 64 29 Abnormal  120/58 83 89 % Abnormal  -- -- -- -- -- -- --   01/31/23 7879 -- -- -- -- -- -- -- 48 -- 7 L/min  Mid flow nasal cannula -- --   01/31/23 0200 99 7 °F (37 6 °C) 56 22 101/56 74 97 % -- -- -- -- -- -- --   01/31/23 0100 100 °F (37 8 °C) 59 21 101/56 73 96 % -- -- -- -- -- -- --   01/31/23 0000 100 4 °F (38 °C) 63 26 Abnormal  99/54 74 96 % -- -- -- -- -- -- --   01/30/23 2300 100 6 °F (38 1 °C) Abnormal  66 26 Abnormal  92/53 68 96 % -- 52 -- 8 L/min Mid flow nasal cannula -- --   01/30/23 2200 100 4 °F (38 °C) 68 27 Abnormal  103/56 76 95 % -- -- -- -- -- -- --   01/30/23 2100 100 4 °F (38 °C) 68 33 Abnormal  103/55 76 95 % -- -- -- -- -- -- --   01/30/23 2040 -- -- -- -- -- -- -- 52 -- 8 L/min Mid flow nasal cannula -- --   01/30/23 2000 100 9 °F (38 3 °C) Abnormal  61 25 Abnormal  123/63 86 97 % -- 52 -- 8 L/min Mid flow nasal cannula -- --   01/30/23 1940 100 3 °F (37 9 °C) 69 26 Abnormal  122/65 88 94 % -- 52 -- 8 L/min Mid flow nasal cannula -- Lying   01/30/23 1900 100 9 °F (38 3 °C) Abnormal  66 -- 109/55 76 95 % -- 52 -- 8 L/min Mid flow nasal cannula -- --   01/30/23 1833 100 9 °F (38 3 °C) Abnormal  66 -- 162/81 115 97 % -- 52 -- 8 L/min Mid flow nasal cannula -- --   01/30/23 1828 100 8 °F (38 2 °C) Abnormal  65 -- 179/79 Abnormal  114 98 % -- -- 8 L/min -- Mid flow nasal cannula -- --   01/30/23 1800 -- 53 Abnormal  -- 168/79 114 98 % 60 -- 35 L/min -- High flow nasal cannula -- --   01/30/23 1700 -- 88 -- 176/84 Abnormal  121 93 % -- -- -- -- -- -- --   01/30/23 1600 100 4 °F (38 °C) 76 45 Abnormal  180/85 Abnormal  122 96 % -- -- -- -- -- -- --   01/30/23 1552 -- -- -- -- -- 96 % 70   -- 40 L/min   -- High flow nasal cannula HFNC prongs --   FiO2 (%): Decreased to 60 %  NOLBERTO [facility] CRNP aware at 01/30/23 1552   O2 Flow Rate (L/min): Decreased ro 35 liters,   NOLBERTO [facility] CRNP aware at 01/30/23 1552   01/30/23 1500 100 4 °F (38 °C) 75 31 Abnormal  165/83 117 97 % -- -- -- -- -- -- --   01/30/23 1400 100 2 °F (37 9 °C) 88 44 Abnormal  160/79 113 91 % -- -- -- -- -- -- Lying   01/30/23 1337 -- -- -- -- -- 96 % 80   -- 40 L/min -- High flow nasal cannula HFNC prongs --   FiO2 (%): Decreased to 70 %   RN Tj aware at 01/30/23 1337   01/30/23 1300 100 °F (37 8 °C) 96 31 Abnormal  152/80 110 94 % -- -- -- -- -- -- --   01/30/23 1200 100 °F (37 8 °C) 100 43 Abnormal  149/72 101 93 % -- -- -- -- High flow nasal cannula -- --   01/30/23 1100 100 4 °F (38 °C) 107 Abnormal  45 Abnormal  131/60 87 87 % Abnormal  -- -- -- -- -- -- --   01/30/23 1047 -- -- -- -- -- 91 % 80 -- 40 L/min -- High flow nasal cannula HFNC prongs --   01/30/23 1035 -- -- -- -- -- 87 % Abnormal  -- 80 -- 15 L/min Mid flow nasal cannula -- --   01/30/23 1015 99 9 °F (37 7 °C) 93 39 Abnormal  168/75 111 86 % Abnormal  -- 44 -- 6 L/min Nasal cannula -- --   01/30/23 1000 99 9 °F (37 7 °C) 88 42 Abnormal  181/85 Abnormal  122 86 % Abnormal  -- 44 -- 6 L/min Nasal cannula -- --   01/30/23 0900 100 4 °F (38 °C) 88 36 Abnormal  147/76 105 93 % -- -- -- -- -- -- --   01/30/23 0800 100 2 °F (37 9 °C) 93 36 Abnormal  136/65 93 90 % -- 44 -- 6 L/min Nasal cannula -- --   01/30/23 0700 100 2 °F (37 9 °C) 92 37 Abnormal  170/81 116 91 % -- -- -- -- -- -- --   01/30/23 0600 100 °F (37 8 °C) 87 35 Abnormal  163/76 109 92 % -- -- -- -- -- -- --   01/30/23 0541 -- -- -- 169/81 -- -- -- -- -- -- -- -- --   01/30/23 0500 100 2 °F (37 9 °C) 65 33 Abnormal  184/86 Abnormal  123 95 % -- -- -- -- -- -- --   01/30/23 0400 100 2 °F (37 9 °C) 80 36 Abnormal  174/83 Abnormal  119 91 % -- -- -- -- -- -- --   01/30/23 0300 100 2 °F (37 9 °C) 75 34 Abnormal  162/81 116 93 % -- -- -- -- -- -- --   01/30/23 0254 100 2 °F (37 9 °C) 69 33 Abnormal  172/82 Abnormal  118 93 % -- -- -- -- -- -- --   01/30/23 0200 100 4 °F (38 °C) 67 34 Abnormal  171/79 Abnormal  113 93 % -- -- -- -- -- -- --   01/30/23 0100 100 4 °F (38 °C) 65 33 Abnormal  168/84 120 93 % -- -- -- -- -- -- --   01/30/23 0000 100 4 °F (38 °C) 64 34 Abnormal  174/79 Abnormal  114 91 % -- -- -- -- -- -- Lying       Pertinent Labs/Diagnostic Results:   Results from last 7 days   Lab Units 01/28/23  1138   SARS-COV-2  Negative     Results from last 7 days   Lab Units 02/01/23  0451 01/31/23  0526 01/30/23  0546 01/29/23  0512 01/28/23  2017   WBC Thousand/uL 10 47* 11 98* 13 97* 23 39* 20 82*   HEMOGLOBIN g/dL 13 8 14 3 15 2 14 9 15 6   HEMATOCRIT % 40 3 42 7 43 9 44 7 45 6   PLATELETS Thousands/uL 147* 157 159 201 217   NEUTROS ABS Thousands/µL  --  10 39* 11 81* 19 15*  --          Results from last 7 days   Lab Units 02/01/23  0451 01/31/23  0526 01/30/23  1635 01/30/23  0546 01/29/23  0512 01/28/23 2017   SODIUM mmol/L 137 138 139 140 142 140   POTASSIUM mmol/L 3 6 3 9 3 4* 3 1* 3 8 3 7   CHLORIDE mmol/L 109* 110* 107 109* 109* 110*   CO2 mmol/L 21 20* 19* 21 25 24   ANION GAP mmol/L 7 8 13 10 8 6   BUN mg/dL 14 18 18 16 23 25   CREATININE mg/dL 0 89 0 94 1 00 0 91 1 19 1 35*   EGFR ml/min/1 73sq m 94 89 82 92 66 57   CALCIUM mg/dL 8 1* 8 2* 8 7 8 5 8 4 8 3*   CALCIUM, IONIZED mmol/L  --  1 16  --  1 13 1 15 1 12   MAGNESIUM mg/dL 2 2 3 0* 2 4 2 4 2 3 2 1   PHOSPHORUS mg/dL 2 0* 2 3* 2 7 1 5* 3 6 3 9     Results from last 7 days   Lab Units 02/01/23  0451 01/31/23  0526 01/30/23  1916 01/30/23  0546 01/29/23  0512 01/28/23  1138   AST U/L 11* 8*  --  11* 9* 11*   ALT U/L 8 6*  --  7 8 10   ALK PHOS U/L 57 60  --  70 63 97   TOTAL PROTEIN g/dL 5 7* 5 9*  --  6 4 5 8* 7 6   ALBUMIN g/dL 3 3* 3 4*  --  3 7 3 5 4 9   TOTAL BILIRUBIN mg/dL 0 70 1 24*  --  1 68* 1 12* 1 30*   BILIRUBIN DIRECT mg/dL  --   --   --   --   --  0 29*   AMMONIA umol/L  --   --  36  --   --   --      Results from last 7 days   Lab Units 02/01/23  0700 01/31/23  2033 01/31/23  1133 01/30/23  2101 01/30/23  1023 01/28/23  0844   POC GLUCOSE mg/dl 103 120 143* 124 162* 125     Results from last 7 days   Lab Units 02/01/23  0451 01/31/23  0526 01/30/23  1635 01/30/23  0546 01/29/23  5310 01/28/23  2017 01/28/23  1138   GLUCOSE RANDOM mg/dL 109 118 158* 89 120 137 137         Results from last 7 days   Lab Units 01/30/23  0546   HEMOGLOBIN A1C % 5 1   EAG mg/dl 100      Results from last 7 days   Lab Units 01/30/23  1304 01/29/23  0502 01/28/23  1620 01/28/23  1113   PH ART   --  7 393 7 335* 7 511*   PCO2 ART mm Hg  --  36 2 42 3 26 3*   PO2 ART mm Hg  --  187 3* 169 9* 74 2*   HCO3 ART mmol/L  --  21 6* 22 1 20 6*   BASE EXC ART mmol/L  --  -2 7 -3 7 -0 3   O2 CONTENT ART mL/dL  --  21 5 25 3* 24 6*   O2 HGB, ARTERIAL %  --  97 8* 98 3* 94 9   ABG SOURCE   --  Radial, Right Radial, Right Radial, Right   NON VENT TYPE HFNC  HFNC Flow  --   --   --    HFNC FLOW LPM  40  --   --   --      Results from last 7 days   Lab Units 01/30/23  1304   PH JOSH  7 435*   PCO2 JOSH mm Hg 30 4*   PO2 JOSH mm Hg 44 4   HCO3 JOSH mmol/L 20 0*   BASE EXC JOSH mmol/L -2 8   O2 CONTENT JOSH ml/dL 19 9   O2 HGB, VENOUS % 84 0*     Results from last 7 days   Lab Units 01/28/23  1138   CK TOTAL U/L 24*     Results from last 7 days   Lab Units 01/28/23  1522 01/28/23  1343 01/28/23  1138   HS TNI 0HR ng/L  --   --  12   HS TNI 2HR ng/L  --  13  --    HSTNI D2 ng/L  --  1  --    HS TNI 4HR ng/L 15  --   --    HSTNI D4 ng/L 3  --   --      Results from last 7 days   Lab Units 01/28/23  1248   D-DIMER QUANTITATIVE ug/ml FEU 0 76*         Results from last 7 days   Lab Units 01/28/23  1138   TSH 3RD GENERATON uIU/mL 0 268*     Results from last 7 days   Lab Units 01/31/23  0526 01/30/23  0546 01/29/23  0512 01/28/23  1138   PROCALCITONIN ng/ml 0 12 0 13 0 39* <0 05     Results from last 7 days   Lab Units 01/28/23  1138   LACTIC ACID mmol/L 2 0     Results from last 7 days   Lab Units 01/28/23  1138   BNP pg/mL 167*     Results from last 7 days   Lab Units 01/28/23  1138   LIPASE u/L 44     Results from last 7 days   Lab Units 01/28/23  1414   CLARITY UA  Cloudy   COLOR UA  Yellow   SPEC GRAV UA  1 015   PH UA  7 0   GLUCOSE UA mg/dl Negative   KETONES UA mg/dl Negative   BLOOD UA  Large*   PROTEIN UA mg/dl 100 (2+)*   NITRITE UA  Negative   BILIRUBIN UA  Negative   UROBILINOGEN UA E U /dl 0 2   LEUKOCYTES UA  Large*   WBC UA /hpf Innumerable*   RBC UA /hpf 4-10*   BACTERIA UA /hpf Innumerable*   EPITHELIAL CELLS WET PREP /hpf None Seen     Results from last 7 days   Lab Units 01/28/23  1138   INFLUENZA A PCR  Negative   INFLUENZA B PCR  Negative   RSV PCR  Negative     Results from last 7 days   Lab Units 01/29/23  1213   AMPH/METH  Negative   BARBITURATE UR  Negative   BENZODIAZEPINE UR  Positive*   COCAINE UR  Negative   METHADONE URINE  Negative   OPIATE UR  Negative   PCP UR  Negative   THC UR  Negative     Results from last 7 days   Lab Units 01/28/23  1138   ETHANOL LVL mg/dL <10   ACETAMINOPHEN LVL ug/mL <54*   SALICYLATE LVL mg/dL <5     Results from last 7 days   Lab Units 01/28/23  2255 01/28/23  2201 01/28/23  2142 01/28/23  1414 01/28/23  1138   BLOOD CULTURE  No Growth at 48 hrs  No Growth at 48 hrs   --   --  No Growth at 72 hrs  No Growth at 72 hrs     SPUTUM CULTURE   --   --  3+ Growth of  --   --    GRAM STAIN RESULT   --   --  2+ Disintegrating polys*  1+ Gram positive cocci in pairs*  1+ Gram negative rods*  1+ Yeast*  --   --    URINE CULTURE   --   --   --  >100,000 cfu/ml Escherichia coli*  20,000-29,000 cfu/ml  --    Medications:   Scheduled Medications:  cefTRIAXone, 1,000 mg, Intravenous, Q24H  diazepam, 10 mg, Oral, Q6H  enoxaparin, 40 mg, Subcutaneous, Daily  Famotidine (PF), 20 mg, Intravenous, BID  folic acid 1 mg, thiamine 100 mg in 0 9% sodium chloride 100 mL IVPB, , Intravenous, Daily  insulin lispro, 1-5 Units, Subcutaneous, TID AC  insulin lispro, 1-5 Units, Subcutaneous, HS  levalbuterol, 1 25 mg, Nebulization, Q6H  polyethylene glycol, 17 g, Oral, Daily  senna-docusate sodium, 2 tablet, Oral, HS  sodium chloride, 4 mL, Nebulization, Q6H      Continuous IV Infusions:  dexmedetomidine, 0 1-1 mcg/kg/hr, Intravenous, Titrated      PRN Meds:  acetaminophen, 650 mg, Oral, Q6H PRN  bisacodyl, 10 mg, Rectal, Daily PRN  hydrALAZINE, 10 mg, Intravenous, Q4H PRN        Discharge Plan: d    Network Utilization Review Department  ATTENTION: Please call with any questions or concerns to [phone] and carefully listen to the prompts so that you are directed to the right person  All voicemails are confidential   Dilshad Oar all requests for admission clinical reviews, approved or denied determinations and any other requests to dedicated fax number below belonging to the campus where the patient is receiving treatment   List of dedicated fax numbers for the Facilities:  [address] DENIALS (Administrative/Medical Necessity) [phone]   [address] (Maternity/NICU/Pediatrics) [phone]   [address] [place] [phone]   [facility] [address] 77 [phone]   [address] [address] Brittaney Anderson [facility] [phone]   [address] [place] [address] [facility] 134 815 [facility] [phone]

## 2023-02-01 NOTE — RESPIRATORY THERAPY NOTE
RT Protocol Note  Ivet Tena 61 y o  male MRN: [id number]  Unit/Bed#: ICU 11-01 Encounter: [id number]    Assessment    Principal Problem:    Sepsis ([facility] )  Active Problems:    Acute respiratory failure with hypoxia ([facility] )    AMS (altered mental status)    UTI (urinary tract infection)    Aspiration pneumonia ([facility] )      Home Pulmonary Medications:  none  Home Devices/Therapy: Other (Comment) (None)    Past Medical History:   Diagnosis Date    Anxiety     Bright red rectal bleeding     Last Assessed: 9/9/2015     Drug dependence ([facility] ) 10/20/2021    Hypertension     Last Assessed: 7/9/2014     Kidney stone     Kidney stones     Last Assessed: 11/17/2016     Periorbital edema     Last Assessed: 9/4/2015    Septic shock ([facility] ) 08/20/2022    Skin tag of anus     Last Assessed: 9/9/2015     Trigger point of thoracic region     Last Assessed: 11/6/2015      Social History     Socioeconomic History    Marital status:      Spouse name: Not on file    Number of children: Not on file    Years of education: Not on file    Highest education level: Not on file   Occupational History    Not on file   Tobacco Use    Smoking status: Every Day     Packs/day: 2 00     Years: 35 00     Pack years: 70 00     Types: Cigarettes    Smokeless tobacco: Never   Vaping Use    Vaping Use: Never used   Substance and Sexual Activity    Alcohol use: No    Drug use: Yes     Types: Amphetamines    Sexual activity: Not on file   Other Topics Concern    Not on file   Social History Narrative    Caffeine Use     Uses safety Equipment- Seatbelts      Social Determinants of Health     Financial Resource Strain: Not on file   Food Insecurity: No Food Insecurity    Worried About Running Out of Food in the Last Year: Never true    [address] in the Last Year: Never true   Transportation Needs: No Transportation Needs    Lack of Transportation (Medical): No    Lack of Transportation (Non-Medical):  No   Physical Activity: Not on file Stress: Not on file   Social Connections: Not on file   Intimate Partner Violence: Not on file   Housing Stability: Low Risk     Unable to Pay for Housing in the Last Year: No    Number of Places Lived in the Last Year: 1    Unstable Housing in the Last Year: No       Subjective    Subjective Data: no txs needed    Objective    Physical Exam:        Vitals:  Blood pressure 159/76, pulse (!) 50, temperature 98 5 °F (36 9 °C), temperature source Tympanic, resp  rate (!) 27, height 6' (1 829 m), weight 67 9 kg (149 lb 11 1 oz), SpO2 92 %  Results from last 7 days   Lab Units 01/29/23  0502   PH ART  7 393   PCO2 ART mm Hg 36 2   PO2 ART mm Hg 187 3*   HCO3 ART mmol/L 21 6*   BASE EXC ART mmol/L -2 7   O2 CONTENT ART mL/dL 21 5   O2 HGB, ARTERIAL % 97 8*   ABG SOURCE  Radial, Right   SANDRA TEST  Yes       Imaging and other studies: I have personally reviewed pertinent reports  O2 Device: nc     Plan    Respiratory Plan: Mild Distress pathway  Airway Clearance Plan: (P) Discontinue Protocol     Resp Comments: (P) Pt refuses to do udn therapy and vest/flutter  UDN changed to prn

## 2023-02-01 NOTE — OCCUPATIONAL THERAPY NOTE
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   Intrinsic Gratification working outside [address] [place] [address] and [place] [employer] tasks   ADL   Where Assessed Other (Comment)   Grooming Comments patient brushed teeth and combed hair with adequate results   UB Bathing Assistance 4  Minimal Assistance   UB Bathing Deficit Setup;Verbal cueing; Increased time to complete   UB Bathing Comments lacked some thoroughness   LB Bathing Assistance 4  Minimal Assistance   LB Bathing Comments I provided washing of Lower Legs and Feet   UB Dressing Comments *Minimal Assistance required for unfamiliar aspects of hospital gown change   Toileting Assistance  4  Minimal Assistance   Toileting Deficit Setup;Verbal cueing;Supervison/safety; Increased time to complete; Bedside commode;Perineal hygiene   Bed Mobility   Supine to Sit 5  Supervision   Additional items Assist x 1;HOB elevated; Increased time required   Sit to Supine 5  Supervision   Additional items Assist x 1; Increased time required; Impulsive   Additional Comments Denied any dizziness or lightheadedness with positional changes   Transfers   Sit to Stand 5  Supervision   Additional items Assist x 1; Increased time required;Verbal cues   Stand to Sit 5  Supervision   Additional items Assist x 1; Increased time required;Verbal cues   Stand pivot 5  Supervision  (to Bedside Commode)   Additional items Assist x 1; Increased time required   Therapeutic Excerise-Strength   UE Strength   (patient declined participation/practice of UE exercise but did attend to brief instruction given of appropriate, beneficial arm movements for during hospital periods of inactivity )   Coordination   Gross Motor WFL   Dexterity appears WFL   Subjective   Subjective "I'm getting out of here today"  Cognition   Overall Cognitive Status WFL   Arousal/Participation Alert; Cooperative   Attention Attends with cues to redirect   Following Commands Follows one step commands without difficulty   Comments patient verbalized appreciation of services at end of session   Activity Tolerance   Activity Tolerance Patient limited by fatigue   Medical Staff Made Aware Nurse Cornell Lundberg aware of session's content > she reported to me before my entering of the room that patient is on a light sedative   Assessment   Assessment Patient participated in Skilled OT session this date with interventions consisting of ADL re training with the use of correct body mechnaics, Energy Conservation techniques, safety awareness and fall prevention techniques, therapeutic exercise to: increase functional use of BUEs, increase BUE muscle strength ,  therapeutic activities to: increase activity tolerance and increase dynamic sit/ stand balance during functional activity    Patient agreeable to OT treatment session, upon arrival patient was found seated in bed (back supported)  In comparison to previous session, patient with improvements in self-care accomplishment  Patient requiring one step directives, frequent rest periods and ocassional safety reminders, and occasional cues for correct technique, and self-care assistance as noted in flow sheet  Patient continues to be functioning below baseline level, occupational performance remains limited secondary to factors listed above and increased risk for falls and injury  From OT standpoint, recommendation at time of d/c would be No rehabilitation needs    Patient to benefit from continued Occupational Therapy treatment while in the hospital to address deficits as defined above and maximize level of functional independence with ADLs and functional mobility  Plan   Treatment Interventions ADL retraining;Functional transfer training;UE strengthening/ROM; Patient/family training; Activityengagement; Energy conservation   Goal Expiration Date 02/10/23   OT Treatment Day 1   Recommendation   OT Discharge Recommendation No rehabilitation needs   Additional Comments 2 The patient's raw score on the AM-PAC Daily Activity inpatient short form is 21, standardized score is 44 27, greater than 39 4  Patients at this level are likely to benefit from discharge to home  Please refer to the recommendation of the Occupational Therapist for safe discharge planning     AM-PAC Daily Activity Inpatient   Lower Body Dressing 3   Bathing 3   Toileting 3   Upper Body Dressing 4   Grooming 4   Eating 4   Daily Activity Raw Score 21   Daily Activity Standardized Score (Calc for Raw Score >=11) 44 27   AM-PAC Applied Cognition Inpatient   Following a Speech/Presentation 4   Understanding Ordinary Conversation 4   Taking Medications 4   Remembering Where Things Are Placed or Put Away 4   Remembering List of 4-5 Errands 3   Taking Care of Complicated Tasks 3   Applied Cognition Raw Score 22   Applied Cognition Standardized Score 47 83   HUMAIRA Pop/GLEN

## 2023-02-01 NOTE — TELEPHONE ENCOUNTER
Reason for Disposition  • Prescription refill request for a CONTROLLED substance (e g , narcotics, ADHD medicines)    Answer Assessment - Initial Assessment Questions  1  NAME of MEDICATION: "What medicine are you calling about?"      Pain medication   2  QUESTION: "What is your question?" (e g , medication refill, side effect)     "He was just released from the hospital with no pain medications, He need them"  3  PRESCRIBING HCP: "Who prescribed it?" Reason: if prescribed by specialist, call should be referred to that group  Urology   4   SYMPTOMS: "Do you have any symptoms?"   Pain is a 8 out of 10    Protocols used: MEDICATION QUESTION CALL-ADULT-

## 2023-02-01 NOTE — ASSESSMENT & PLAN NOTE
· Presented to [facility] 1/28 with SOB and altered mental status  · Found to be hypoxic, placed on midflow, difficulty managing secretions and ongoing lethargy and was intubated  · 1/28 CT PE with no evidence of pulmonary embolism, mucous secretions within the dependent portion of the trachea and extending into the right lower lobe bronchus bronchioles  Mucous secretions noted within the left lower lobe bronchioles and patchy dependent consolidation in the lower lobes likely atelectasis    · Transferred to [facility] 1/28  · Extubated 1/29  · Now on RA  · 1/30 CXR concerning for increased right sided opacifications likely in the setting of mucous plugging  · 1/31 CXR much improved and deescalating oxygen requirements  · Aggressive pulmonary hygiene with chest vest therapy, incentive spirometry and flutter valve as able to participate  · Continue with 3% saline nebs and xopenex

## 2023-02-01 NOTE — DISCHARGE SUMMARY
Discharge Summary - Glenna Tomas 61 y o  male MRN: [id number]    Unit/Bed#: ICU 11-01 Encounter: [id number]    Admission Date:   Admission Orders (From admission, onward)     Ordered        01/28/23 1822  Inpatient Admission  Once                        Admitting Diagnosis: Acute respiratory failure with hypoxia [facility])    HPI: Glenna Tomas is a 61 y o  male who presented to [facility] on 1/28 for evaluation of SOB, AMS and lethargy x2 days  Patient lives at home w/ his mother, she noted him to be increasingly lethargic, SOB and w/ decreased PO intake over that time  He was recently admitted for septic shock 2/2 UTI w/ staghorn calculus and is now s/p R PCN, undergoing eval for R nephrectomy  On arrival to ED he was found to have spO2 of 68% on 10L and was transitioned to vapotherm  He was having difficulty w/ secretions and continued hypoxia despite HF and suctioning and was thus intubated  He was transferred to [facility] for continuation of his care  On arrival the patient was agitated, purposeful, moving all 4         Procedures Performed: No orders of the defined types were placed in this encounter  Summary of Hospital Course: Extubated 1/29 to [facility]  Aggressive pulmonary hygiene for suspected mucous plugging  Received ceftriaxone for E Coli UTI  Required precedex and scheduled valium for suspected ETOH withdrawal  He was able to be titrated off of supplemental O2  On 2/1 the patient requested to leave AMA  He was evaluated, and deemed competent to make medical decisions  Discussion was held with the patient regarding risks of leaving AMA including hypoxia, respiratory distress, hypotension, infection, and death  He verbalized understanding and still wished to proceed with leaving AMA  AMa paperwork signed and given to nursing  Patient was provided with a script for macrobid to complete 7 days abx for UTI       Significant Findings, Care, Treatment and Services Provided:   1/28 E Coli UTI   1/29 Extubation       Discharge Diagnosis: Acute respiratory failure with hypoxia, sepsis, UTI     Medical Problems     Resolved Problems  Date Reviewed: 2/1/2023          Resolved    Hypotension 1/29/2023     Resolved by  Pamela Del Angel PA-C          Condition at Discharge: stable         Discharge instructions/Information to patient and family:   See after visit summary for information provided to patient and family  Provisions for Follow-Up Care:  See after visit summary for information related to follow-up care and any pertinent home health orders  PCP: No primary care provider on file  Disposition: Left against medical advice    Planned Readmission: No      Discharge Statement   I spent 20 minutes discharging the patient  This time was spent on the day of discharge  I had direct contact with the patient on the day of discharge  Additional documentation is required if more than 30 minutes were spent on discharge  Discharge Medications:  See after visit summary for reconciled discharge medications provided to patient and family

## 2023-02-02 DIAGNOSIS — G89.29 FLANK PAIN, CHRONIC: ICD-10-CM

## 2023-02-02 DIAGNOSIS — R10.9 FLANK PAIN, CHRONIC: ICD-10-CM

## 2023-02-02 DIAGNOSIS — N30.00 ACUTE CYSTITIS WITHOUT HEMATURIA: Primary | ICD-10-CM

## 2023-02-02 RX ORDER — CEPHALEXIN 500 MG/1
500 CAPSULE ORAL EVERY 6 HOURS SCHEDULED
Qty: 28 CAPSULE | Refills: 0 | Status: SHIPPED | OUTPATIENT
Start: 2023-02-02 | End: 2023-02-09

## 2023-02-02 RX ORDER — TRAMADOL HYDROCHLORIDE 50 MG/1
50 TABLET ORAL EVERY 6 HOURS PRN
Qty: 20 TABLET | Refills: 0 | Status: SHIPPED | OUTPATIENT
Start: 2023-02-02 | End: 2023-05-05 | Stop reason: SDUPTHER

## 2023-02-02 NOTE — TELEPHONE ENCOUNTER
Telephone call to pt and spoke with pt's mom, Boogie Kimberli (ok per com  Sheet)  Pt stated that his pain is "in my kidney " Per pt's mom SPT is "fine "     She is also requesting a new antibiotic for UTI as he took the Macrobid 100 mg  It caused him "Great" stomach upset and "terrible stomach pain"    Explained to pt's mom that Dr Hubert Harrell is out of office and that I would get it over to him, but I was unsure when he would get back to us

## 2023-02-02 NOTE — TELEPHONE ENCOUNTER
Telephone call to pt  Spoke to pt's mom, Deandre Spence  I gave the information from Dr Joan Carcamo and she verbalized understanding

## 2023-02-02 NOTE — TELEPHONE ENCOUNTER
He needs to be evaluated before pain meds are just called in- is his nephrostomy tube blocked- is it draining? I don't know what "pain " I'm treating

## 2023-02-03 LAB
BACTERIA BLD CULT: NORMAL

## 2023-02-06 NOTE — UTILIZATION REVIEW
NOTIFICATION OF ADMISSION DISCHARGE   This is a Notification of Discharge from [address]  Please be advised that this patient has been discharge from our facility  Below you will find the admission and discharge date and time including the patient’s disposition  UTILIZATION REVIEW CONTACT:  Chava Chou  Utilization   Network Utilization Review Department  Phone: [phone] carefully listen to the prompts  All voicemails are confidential   Email: [email or web address]     ADMISSION INFORMATION  PRESENTATION DATE: 1/28/2023  6:09 PM  OBERVATION ADMISSION DATE:   INPATIENT ADMISSION DATE: 1/28/23  6:09 PM   DISCHARGE DATE: 2/1/2023  1:08 PM   DISPOSITION:Left against medical advice or discontinued care    IMPORTANT INFORMATION:  Send all requests for admission clinical reviews, approved or denied determinations and any other requests to dedicated fax number below belonging to the campus where the patient is receiving treatment   List of dedicated fax numbers:  [address] DENIALS (Administrative/Medical Necessity) [phone]   [address] (Maternity/NICU/Pediatrics) [phone]   [facility] [phone]   [place] [address] [phone]   [facility] [address] [phone]   220 [facility] [phone]   90 [facility] [phone]   [address] [place] [address] 119 [phone]   [facility]  [phone]   [facility] [phone]   [facility] [address] [facility] [phone]

## 2023-02-07 NOTE — TELEPHONE ENCOUNTER
Left message advising of appointment availability for tomorrow with Dr Marlon Decker at 3:30 at the [facility] office  Office number provided

## 2023-02-10 NOTE — TELEPHONE ENCOUNTER
Pt's mother called the office to reschedule an appt  Pt can not do morning appt  preferred [place] location if possible         Kolby Almanzar can be reached at [phone]

## 2023-02-13 ENCOUNTER — HOSPITAL ENCOUNTER (EMERGENCY)
Facility: HOSPITAL | Age: 58
Discharge: HOME | End: 2023-02-13
Attending: EMERGENCY MEDICINE | Admitting: EMERGENCY MEDICINE

## 2023-02-13 ENCOUNTER — HOSPITAL ENCOUNTER (OUTPATIENT)
Dept: INTERVENTIONAL RADIOLOGY/VASCULAR | Facility: HOSPITAL | Age: 58
Discharge: HOME | End: 2023-02-13

## 2023-02-13 VITALS
OXYGEN SATURATION: 98 % | WEIGHT: 162.92 LBS | BODY MASS INDEX: 22.1 KG/M2 | TEMPERATURE: 98.7 F | HEART RATE: 74 BPM | RESPIRATION RATE: 16 BRPM | SYSTOLIC BLOOD PRESSURE: 156 MMHG | DIASTOLIC BLOOD PRESSURE: 73 MMHG

## 2023-02-13 DIAGNOSIS — N20.0 KIDNEY STONES: Primary | ICD-10-CM

## 2023-02-13 DIAGNOSIS — T83.022A DISPLACEMENT OF NEPHROSTOMY TUBE (HCC): Primary | ICD-10-CM

## 2023-02-13 DIAGNOSIS — N17.9 AKI (ACUTE KIDNEY INJURY) (HCC): Primary | ICD-10-CM

## 2023-02-13 DIAGNOSIS — N17.9 AKI (ACUTE KIDNEY INJURY) (HCC): ICD-10-CM

## 2023-02-13 RX ORDER — LIDOCAINE HYDROCHLORIDE 10 MG/ML
INJECTION, SOLUTION EPIDURAL; INFILTRATION; INTRACAUDAL; PERINEURAL AS NEEDED
Status: COMPLETED | OUTPATIENT
Start: 2023-02-13 | End: 2023-02-13

## 2023-02-13 RX ORDER — LIDOCAINE HYDROCHLORIDE 20 MG/ML
JELLY TOPICAL AS NEEDED
Status: COMPLETED | OUTPATIENT
Start: 2023-02-13 | End: 2023-02-13

## 2023-02-13 RX ADMIN — LIDOCAINE HYDROCHLORIDE 10 ML: 10 INJECTION, SOLUTION EPIDURAL; INFILTRATION; INTRACAUDAL at 14:15

## 2023-02-13 RX ADMIN — LIDOCAINE HYDROCHLORIDE 1 APPLICATION: 20 JELLY TOPICAL at 14:11

## 2023-02-13 RX ADMIN — IOHEXOL 15 ML: 350 INJECTION, SOLUTION INTRAVENOUS at 14:41

## 2023-02-13 NOTE — BRIEF OP NOTE (RAD/CATH)
INTERVENTIONAL RADIOLOGY PROCEDURE NOTE    Date: 2/13/2023    Procedure:   Procedure Summary     Date: 02/13/23 Room / Location: [facility] Interventional Radiology    Anesthesia Start:  Anesthesia Stop:     Procedure: IR NEPHROSTOMY TUBE CHECK/CHANGE/REPOSITION/REINSERTION/UPSIZE Diagnosis:       BRAULIO (acute kidney injury) ([facility] [address] )      (tube accidently removed)    Scheduled Providers: Nelia Leggett MD Responsible Provider:     Anesthesia Type: Not recorded ASA Status: Not recorded          Preoperative diagnosis:   1  [facility] )         Postoperative diagnosis: Same  Surgeon: Nelia Leggett MD     Assistant: None  No qualified resident was available  Blood loss: 0    Specimens: Urine for culture    Findings:     Right nephrostomy tube placed through prior tract    8 English nephrostomy tube placed in the renal pelvis    Two sutures placed    Large stones in the pelvis and smaller ones obstructing the ureter    Complications: None immediate      Anesthesia: local

## 2023-02-13 NOTE — DISCHARGE INSTRUCTIONS
[address]  Interventional Radiology  [phone]       Nephrostomy Tube Care     WHAT YOU NEED TO KNOW:   A nephrostomy tube is a catheter (thin plastic tube) that is inserted through your skin and into your kidney  The nephrostomy tube drains urine from your kidney into a collecting bag outside your body  You may need a nephrostomy tube when something is blocking the normal flow of urine  A nephrostomy tube may be used for a short or a long period of time  The nephrostomy tube comes out of your back, so you will need someone to help care for your nephrostomy tube  DISCHARGE INSTRUCTIONS:      How to clean the skin around the nephrostomy tube and change the bandage:  Since the nephrostomy tube comes out of your back, you will not be able to care for it by yourself  Ask someone to follow the general directions below to check and care for your nephrostomy tube  Gather the items you will need  Disposable (single use) under-pad, and a clean washcloth  Plain soap, warm water, and new medical gloves  Sterile gauze bandages  Clear adhesive dressing or medical tape  Skin barrier  Protective skin film  Trash bag  Remove the old bandage, and check the tube entry site  Have the patient lie on his side with the nephrostomy tube entry site facing up  Place the under-pad where it will catch drainage as you are working with the nephrostomy tube  Wash your hands with soap and water  Put on new medical gloves  Gently remove the old bandage, without pulling on the tube  Do this by holding the skin beside the tube with one hand  With the other hand, gently remove sticky tape and the skin barrier by pulling in the same direction as hair growth  Do not touch the side of the bandage that is placed over or around the tube  Throw the bandage and skin barrier away in a trash bag  Look for signs of infection, such as skin redness and swelling   Report any skin changes to healthcare providers  Clean the tube entry site  Hold the tube in place to keep it from being pulled out while you are cleaning around it  You will need to clean the area twice  For the first cleaning, wet a new gauze bandage with soap and water  Begin at the entry site of the tube  Wipe the skin in circles, moving away from the entry site  Remove blood and any other material with the gauze  Do this as often as needed  Use a new gauze bandage each time you clean the area, moving away from the entry site  For the second cleaning, wet a new gauze bandage with water  Begin at the entry site of the tube  Wipe the skin in circles, moving away from the entry site  Use a new gauze bandage each time you clean the area, moving away from the entry site  Gently pat the skin with a clean washcloth to dry it  Apply the skin barrier and bandages  Roll up a bandage to make it thick, and place it under  the place where the tube enters the skin  Place it to support the tube, and stop it from kinking or bending  Tape the bandage in place, and apply more bandages if directed by a healthcare provider  Bring the tubing forward to the front and tape it to the skin  Do not stretch the tube tight, because this may pull the nephrostomy tube out  How often to change the bandage  Change the bandage around the tube, every other day  If your bandages  get dirty or wet, change them right away, and as often as needed  If your nephrostomy tube is to be used for a long period of time, the tube needs to be changed every 2 to 3 months  Healthcare providers will tell you when you need to make an appointment to have your tube changed  How to care for the urine drainage bag:   Ask if you need to measure and write down how much urine is in the bag before you empty it  Drain urine out of the drainage bag when it is ½ to ? full  Open the spout at the bottom of the bag to empty the urine into the toilet     You may need to detach the drainage bag from the nephrostomy tube to change it    If so, attach a new drainage bag tightly to the nephrostomy tube  How to prevent problems with your nephrostomy tube:   Change bandages, directed  This helps to prevent infection  Throw away or clean your drainage bag as directed by your healthcare provider  Wipe the connecting ends of the drainage bag with alcohol before you reconnect the bag to the tube  This helps prevent infection  Keep the tube taped to your skin and connected to a drainage bag placed below the level of your kidneys  This helps prevent urine from backing up into your kidneys  You may wear a small drainage bag strapped to your leg to let you move around more easily  Check the catheter to be sure it is in place after you change your clothes or do other activities  Do not wear tight clothing over the tube  Place the tubing over your thigh rather than under it when you are sitting down  Be sure that nothing is pulling on the nephrostomy tube when you move around  Change positions if you see little or no urine in your drainage bag  Check to see if the urine tube is twisted or bent  Be sure that you are not sitting or lying on the tube  If there are no kinks and there is little or no urine in the drainage bag, tell your healthcare provider  Flush out the tube as directed  Some tubes get flushed one time a day with 10 mls of NSS You will be given a prescription for the flushes  To flush the nephrostomy tube, clean both connections with alcohol swap  Twist off the drainage bag tube and twist the saline syringe into the nephrostomy tube and flush briskly  Remove the syringe and twist the drainage bag tube back into the nephrostomy tube  Keep the site covered while you shower  Tape a piece of clear adhesive plastic over the dressing to keep it dry while you shower  Do not take tub baths      Contact Interventional Radiology at [phone] Peña PATIENTS: Contact Interventional Radiology at [phone]) Dhaval Caraballo PATIENTS: Contact Interventional Radiology at [phone]) if:  The skin around the nephrostomy tube is red, swollen, itches, or has a rash  You have a fever greater than 101 or chills  You have lower back or hip pain  There are changes in how your urine looks or smells  You have little or no urine draining from the nephrostomy tube  You have nausea and are vomiting  The black beata on your tube has moved, or the tube is longer than when it was put in  You have questions or concerns about your condition or care  The nephrostomy tube comes out completely  There is blood, pus, or a bad smell coming from the place where the tube enters your skin  Urine is leaking around the tube  The following pharmacies carry the flush syringes  [facility]                     [facility]       [address] [facility]                    [id number] [place] [address]  Phone [phone]            Phone [phone]  [address] & [facility]                      [address]                                 [phone]  Phone [phone]            Phone [phone]    [facility]                                                                         [facility] [phone]  [address]   Phone [phone]

## 2023-02-13 NOTE — ED PROVIDER NOTES
History  Chief Complaint   Patient presents with   • Medical Problem     Patient presents to the ER due to his nephrostomy tube being removed while sleeping last night  Patient presents to the emergency department today alone providing his own history stating his right-sided  nephrostomy tube became dislodged sometime overnight  It had become completely pulled out  He is here for replacement of the nephrostomy tube e  This is a 48-year-old male who has a history of right-sided staghorn calculus  He had nephrostomy tube placed August 2022, it was changed November 2022 we believe this has been been the last tube change  He was seen for respiratory failure and ended up being intubated and after a short time signed out [address] he was found to have a urinary tract infection at that point as well  He did not take the Macrobid that he was sent home with  His only complaint right now is the tube dislodgment  Denies any other systemic symptoms no vital signs currently that would suggest sepsis  Prior to Admission Medications   Prescriptions Last Dose Informant Patient Reported? Taking?   hydrOXYzine HCL (ATARAX) 25 mg tablet   No No   Sig: Take 1 tablet (25 mg total) by mouth every 6 (six) hours as needed for anxiety or itching   Patient not taking: Reported on 11/7/2022   sodium chloride, PF, 0 9 %   No No   Sig: 10 mL by Intracatheter route daily Intracatheter flushing daily   May substitute prefilled syringe with normal saline 10 mL vials, 10 mL syringes, and 18 g blunt needles   Patient not taking: Reported on 11/18/2022   traMADol (ULTRAM) 50 mg tablet   No No   Sig: Take 1 tablet (50 mg total) by mouth every 6 (six) hours as needed for moderate pain      Facility-Administered Medications Last Administration Doses Remaining   gentamicin (GARAMYCIN) 40 mg/mL injection 60 mg None recorded 1          Past Medical History:   Diagnosis Date   • Anxiety    • Bright red rectal bleeding Last Assessed: 9/9/2015    • Drug dependence ([facility] [address] ) 10/20/2021   • Hypertension     Last Assessed: 7/9/2014    • Kidney stone    • Kidney stones     Last Assessed: 11/17/2016    • Periorbital edema     Last Assessed: 9/4/2015   • Septic shock ([facility] [address] ) 08/20/2022   • Skin tag of anus     Last Assessed: 9/9/2015    • Trigger point of thoracic region     Last Assessed: 11/6/2015        Past Surgical History:   Procedure Laterality Date   • CYSTOSCOPY W/ URETERAL STENT PLACEMENT  03/11/2013   • CYSTOSCOPY W/ URETERAL STENT PLACEMENT  06/18/2014    EXTRACORPOREAL SHOCK WAVE LITHOTRIPSY    • CYSTOSCOPY W/ URETERAL STENT PLACEMENT  07/16/2014    EXTRACORPOREAL SHOCK WAVE LITHOTRIPSY    • CYSTOSCOPY W/ URETERAL STENT REMOVAL  04/11/2013   • CYSTOSCOPY W/ URETERAL STENT REMOVAL Right 08/26/2014   • CYSTOSCOPY W/ URETEROSCOPY W/ LITHOTRIPSY  02/26/2013    Percutaneous lithotomy With Uretal Stent Plcement    • CYSTOSCOPY W/ URETEROSCOPY W/ LITHOTRIPSY  03/11/2014   • CYSTOSCOPY W/ URETEROSCOPY W/ LITHOTRIPSY Right 08/04/2014    With Uretal Stent Placement    • CYSTOSCOPY W/ URETEROSCOPY W/ LITHOTRIPSY Right 05/09/2016   • HEMORRHOID SURGERY     • HERNIA REPAIR  03/11/2013   • IR NEPHROSTOMY TUBE CHECK/CHANGE/REPOSITION/REINSERTION/UPSIZE  11/22/2022   • IR NEPHROSTOMY TUBE PLACEMENT  8/20/2022   • KIDNEY SURGERY     • LITHOTRIPSY     • MD CYSTO/URETERO W/LITHOTRIPSY &INDWELL STENT INSRT Right 7/13/2016    Procedure: CYSTOSCOPY; URETEROSCOPY WITH HOLMIUM LASER STONE EXTRACTION; RETROGRADE PYELOGRAM; URETERAL STENT INSERTION ;  Surgeon: Xi Land MD;  [facility]: [facility];  Service: Urology   • MD CYSTO/URETERO W/LITHOTRIPSY &INDWELL STENT INSRT Right 5/9/2016    Procedure: CYSTOSCOPY,  URETEROSCOPY,  WITH LITHOTRIPSY HOLMIUM LASER, STONE EXTRACTION, AND INSERTION STENT URETERAL;  Surgeon: Xi Land MD;  [facility];  Service: Urology   • MD CYSTO/URETERO W/LITHOTRIPSY &INDWELL STENT INSRT Right 11/10/2022 Procedure: CYSTOSCOPY URETEROSCOPY  right RETROGRADE PYELOGRAM;  Surgeon: Bridgette Ferguson MD;  Location: [facility];  Service: Urology   • AR LITHOTRIPSY [address] Right 6/17/2016    Procedure: Madhavi Caban SHOCKWAVE (ESWL); Surgeon: Prescott Leyden, MD;  Location:  MAIN OR;  Service: Urology   • TRANSURETHRAL RESECTION OF BLADDER     • URETERAL REIMPLANTION  03/11/2013       Family History   Problem Relation Age of Onset   • Heart attack Father      I have reviewed and agree with the history as documented  E-Cigarette/Vaping   • E-Cigarette Use Never User      E-Cigarette/Vaping Substances     Social History     Tobacco Use   • Smoking status: Every Day     Packs/day: 2 00     Years: 35 00     Pack years: 70 00     Types: Cigarettes   • Smokeless tobacco: Never   Vaping Use   • Vaping Use: Never used   Substance Use Topics   • Alcohol use: No   • Drug use: Yes     Types: Amphetamines       Review of Systems   Constitutional: Negative  Negative for activity change, appetite change, chills, diaphoresis, fatigue, fever and unexpected weight change  HENT: Negative  Negative for sore throat, trouble swallowing and voice change  Eyes: Negative  Respiratory: Negative  Negative for cough, chest tightness, shortness of breath and wheezing  Cardiovascular: Negative  Negative for chest pain, palpitations and leg swelling  Gastrointestinal: Negative  Negative for abdominal pain, blood in stool, nausea and vomiting  Endocrine: Negative  Genitourinary: Negative  Negative for flank pain and hematuria  Musculoskeletal: Negative  Negative for arthralgias, back pain, gait problem, joint swelling, myalgias, neck pain and neck stiffness  Skin: Negative  Negative for rash and wound  Right neph tube dislodged   Allergic/Immunologic: Negative  Neurological: Negative  Negative for dizziness, seizures, syncope, weakness, light-headedness and headaches     Hematological: Negative  Psychiatric/Behavioral: Negative  All other systems reviewed and are negative  Physical Exam  Physical Exam  Vitals and nursing note reviewed  Constitutional:       General: He is not in acute distress  Appearance: He is well-developed  HENT:      Head: Normocephalic and atraumatic  Eyes:      Conjunctiva/sclera: Conjunctivae normal    Cardiovascular:      Rate and Rhythm: Normal rate and regular rhythm  Heart sounds: No murmur heard  Pulmonary:      Effort: Pulmonary effort is normal  No respiratory distress  Breath sounds: Normal breath sounds  Abdominal:      Palpations: Abdomen is soft  Tenderness: There is no abdominal tenderness  Musculoskeletal:         General: No swelling  Cervical back: Neck supple  Skin:     General: Skin is warm and dry  Capillary Refill: Capillary refill takes less than 2 seconds  Comments: Right-sided nephrostomy tube site was evaluated, there is no evidence of any active bleeding or discharge  Tube has completely pulled out  Neurological:      Mental Status: He is alert     Psychiatric:         Mood and Affect: Mood normal          Vital Signs  ED Triage Vitals [02/13/23 1031]   Temperature Pulse Respirations Blood Pressure SpO2   98 7 °F (37 1 °C) 74 16 156/73 98 %      Temp Source Heart Rate Source Patient Position - Orthostatic VS BP Location FiO2 (%)   Temporal Monitor Lying Left arm --      Pain Score       No Pain           Vitals:    02/13/23 1031   BP: 156/73   Pulse: 74   Patient Position - Orthostatic VS: Lying         Visual Acuity      ED Medications  Medications - No data to display    Diagnostic Studies  Results Reviewed     None                 No orders to display              Procedures  Procedures         ED Course  ED Course as of 02/13/23 1113   Mon Feb 13, 2023   1032 Blood Pressure: 156/73   1032 Temperature: 98 7 °F (37 1 °C)   1032 Pulse: 74   1032 Respirations: 16   1032 SpO2: 98 %   1047 TT sent to on call DENISE Franklin  1104 Per on-call interventional radiology if patient can be safely discharged from here they can add him onto the schedule this afternoon at [facility]   1110 Pt agreeable to outpt appt at [facility], awaiting a time and will be d/c                                             MDM    Disposition  Final diagnoses:   Displacement of nephrostomy tube [facility])     Time reflects when diagnosis was documented in both MDM as applicable and the Disposition within this note     Time User Action Codes Description Comment    2/13/2023 11:11 AM Liazon Press Add [M40 033D] Displacement of nephrostomy tube [facility])       ED Disposition     ED Disposition   Discharge    Condition   Stable    Date/Time   Mon Feb 13, 2023 11:11 AM    Comment   Sabina Stafford discharge to home/self care  Follow-up Information     Follow up With Specialties Details Why [address] today            Patient's Medications   Discharge Prescriptions    No medications on file       No discharge procedures on file      PDMP Review       Value Time User    PDMP Reviewed  Yes 11/8/2022  3:42 PM [facility], [address] [place] [address]          ED Provider  Electronically Signed by           Racquel Celis PA-C  02/13/23 6161

## 2023-02-15 LAB — BACTERIA UR CULT: ABNORMAL

## 2023-03-29 PROBLEM — J69.0 ASPIRATION PNEUMONIA (HCC): Status: RESOLVED | Noted: 2023-01-28 | Resolved: 2023-03-29

## 2023-03-29 PROBLEM — N39.0 UTI (URINARY TRACT INFECTION): Status: RESOLVED | Noted: 2023-01-28 | Resolved: 2023-03-29

## 2023-03-29 PROBLEM — A41.9 SEPSIS (HCC): Status: RESOLVED | Noted: 2022-08-20 | Resolved: 2023-03-29

## 2023-04-28 ENCOUNTER — HOSPITAL ENCOUNTER (OUTPATIENT)
Dept: INTERVENTIONAL RADIOLOGY/VASCULAR | Facility: HOSPITAL | Age: 58
Discharge: HOME | End: 2023-04-28
Attending: RADIOLOGY | Admitting: RADIOLOGY

## 2023-04-28 DIAGNOSIS — N20.0 KIDNEY STONES: ICD-10-CM

## 2023-04-28 RX ORDER — LIDOCAINE HYDROCHLORIDE 10 MG/ML
INJECTION, SOLUTION EPIDURAL; INFILTRATION; INTRACAUDAL; PERINEURAL AS NEEDED
Status: COMPLETED | OUTPATIENT
Start: 2023-04-28 | End: 2023-04-28

## 2023-04-28 RX ADMIN — LIDOCAINE HYDROCHLORIDE 15 ML: 10 INJECTION, SOLUTION EPIDURAL; INFILTRATION; INTRACAUDAL; PERINEURAL at 10:30

## 2023-04-28 RX ADMIN — IOHEXOL 10 ML: 350 INJECTION, SOLUTION INTRAVENOUS at 10:52

## 2023-04-28 NOTE — BRIEF OP NOTE (RAD/CATH)
INTERVENTIONAL RADIOLOGY PROCEDURE NOTE    Date: 4/28/2023    Procedure:   Procedure Summary     Date: 04/28/23 Room / Location: [facility] Interventional Radiology    Anesthesia Start:  Anesthesia Stop:     Procedure: IR NEPHROSTOMY TUBE CHECK/CHANGE/REPOSITION/REINSERTION/UPSIZE Diagnosis:       Kidney stones      (PCN)    Scheduled Providers:  Responsible Provider:     Anesthesia Type: Not recorded ASA Status: Not recorded          Preoperative diagnosis:   1  Kidney stones         Postoperative diagnosis: Same  Surgeon: Tenisha Peng MD     Assistant: None  No qualified resident was available  Blood loss: 0    Specimens: 0     Findings: R PCN exchange 8 Scottish    2 sutures placed    Plans for nephrectomy discussed    Complications: None immediate      Anesthesia: local

## 2023-05-01 ENCOUNTER — TELEPHONE (OUTPATIENT)
Dept: UROLOGY | Facility: MEDICAL CENTER | Age: 58
End: 2023-05-01

## 2023-05-01 DIAGNOSIS — G89.29 FLANK PAIN, CHRONIC: ICD-10-CM

## 2023-05-01 DIAGNOSIS — R10.9 FLANK PAIN, CHRONIC: ICD-10-CM

## 2023-05-01 NOTE — TELEPHONE ENCOUNTER
Pt's mother called the office to schedule surgery for kidney removal with Dr Diana Rene can be reached at [phone]    Please review

## 2023-05-02 ENCOUNTER — PREP FOR PROCEDURE (OUTPATIENT)
Dept: UROLOGY | Facility: AMBULATORY SURGERY CENTER | Age: 58
End: 2023-05-02

## 2023-05-02 DIAGNOSIS — Z01.812 PRE-OPERATIVE LABORATORY EXAMINATION: ICD-10-CM

## 2023-05-02 DIAGNOSIS — N13.5 URETERAL STRICTURE: ICD-10-CM

## 2023-05-02 DIAGNOSIS — Z79.01 LONG TERM (CURRENT) USE OF ANTICOAGULANTS: ICD-10-CM

## 2023-05-02 DIAGNOSIS — R39.89 SUSPECTED UTI: ICD-10-CM

## 2023-05-02 DIAGNOSIS — N20.0 STAGHORN CALCULUS: Primary | ICD-10-CM

## 2023-05-02 DIAGNOSIS — Z01.818 PREOP EXAMINATION: ICD-10-CM

## 2023-05-02 NOTE — TELEPHONE ENCOUNTER
I returned pt 's call to discuss scheduling his procedure with Dr Cesar Aguiar  Pt was no available so I spoke with his mother and confirmed scheduling surgery at the [facility] on 5/22/2023  I verbally went over all of pt 's pre op instructions and prep information with Chani Fontaine  She is aware that pt will have a bowel prep and that his pre op testing should be completed 2 weeks prior to surgery  I also informed her that pt will need a medical clearance appt with his PCP  Chani Fontaine stated that currently pt does not have a PCP and will find a new one and make a appt  I informed her that he should be calling around today because it could take longer than normal to get in with a new PCP  Per Farrah's request I will be e-mailing her a copy of pt 's surgical packet and mailing as well  The e-mail she provided was [email or web address]  She was instructed to call our office with any questions or concerns regarding this procedure

## 2023-05-05 RX ORDER — TRAMADOL HYDROCHLORIDE 50 MG/1
50 TABLET ORAL EVERY 6 HOURS PRN
Qty: 20 TABLET | Refills: 0 | Status: SHIPPED | OUTPATIENT
Start: 2023-05-05

## 2023-05-05 NOTE — TELEPHONE ENCOUNTER
Patient's mother called stating they cannot get in anywhere to get the patient in for a medical clearance before his procedure  They even tried [facility] and they stated they could not see him or give him an order for blood work  Patient is also having severe pain from his nephrostomy tube for a few days now  Please call mother back to advise

## 2023-05-05 NOTE — TELEPHONE ENCOUNTER
Called and spoke with Louise  He reports his nephrostomy tube is functioning without issues and draining urine  Advised that script for prn Tramadol was called into [facility]

## 2023-05-05 NOTE — TELEPHONE ENCOUNTER
Spoke with family practice in [place] they state pt will need to set up first time visit and PCP med clearance on a second visit  [place] [facility] will not do establishing visit and medication clearance visit together       Please advise

## 2023-05-05 NOTE — TELEPHONE ENCOUNTER
I have sent prescription for tramadol to patient's pharmacy for pain  Please confirm patient's nephrostomy tube is functioning properly  Otherwise patient may need to contact IR for tube assessment

## 2023-05-13 ENCOUNTER — LAB REQUISITION (OUTPATIENT)
Dept: LAB | Facility: HOSPITAL | Age: 58
End: 2023-05-13

## 2023-05-13 ENCOUNTER — APPOINTMENT (OUTPATIENT)
Dept: LAB | Facility: MEDICAL CENTER | Age: 58
End: 2023-05-13

## 2023-05-13 DIAGNOSIS — Z79.01 LONG TERM (CURRENT) USE OF ANTICOAGULANTS: ICD-10-CM

## 2023-05-13 DIAGNOSIS — N20.0 STAGHORN CALCULUS: ICD-10-CM

## 2023-05-13 DIAGNOSIS — Z01.818 ENCOUNTER FOR OTHER PREPROCEDURAL EXAMINATION: ICD-10-CM

## 2023-05-13 DIAGNOSIS — Z01.812 PRE-OPERATIVE LABORATORY EXAMINATION: ICD-10-CM

## 2023-05-13 DIAGNOSIS — Z01.812 ENCOUNTER FOR PREPROCEDURAL LABORATORY EXAMINATION: ICD-10-CM

## 2023-05-13 DIAGNOSIS — N20.0 CALCULUS OF KIDNEY: ICD-10-CM

## 2023-05-13 DIAGNOSIS — N13.5 CROSSING VESSEL AND STRICTURE OF URETER WITHOUT HYDRONEPHROSIS: ICD-10-CM

## 2023-05-13 DIAGNOSIS — Z01.818 PREOP EXAMINATION: ICD-10-CM

## 2023-05-13 DIAGNOSIS — N13.5 URETERAL STRICTURE: ICD-10-CM

## 2023-05-13 DIAGNOSIS — R39.89 SUSPECTED UTI: ICD-10-CM

## 2023-05-13 LAB
ABO GROUP BLD: NORMAL
ANION GAP SERPL CALCULATED.3IONS-SCNC: 0 MMOL/L (ref 4–13)
APTT PPP: 36 SECONDS (ref 23–37)
BASOPHILS # BLD AUTO: 0.04 THOUSANDS/ÂΜL (ref 0–0.1)
BASOPHILS NFR BLD AUTO: 1 % (ref 0–1)
BLD GP AB SCN SERPL QL: NEGATIVE
BUN SERPL-MCNC: 19 MG/DL (ref 5–25)
CALCIUM SERPL-MCNC: 9.3 MG/DL (ref 8.3–10.1)
CHLORIDE SERPL-SCNC: 111 MMOL/L (ref 96–108)
CO2 SERPL-SCNC: 29 MMOL/L (ref 21–32)
CREAT SERPL-MCNC: 1.27 MG/DL (ref 0.6–1.3)
EOSINOPHIL # BLD AUTO: 0.36 THOUSAND/ÂΜL (ref 0–0.61)
EOSINOPHIL NFR BLD AUTO: 6 % (ref 0–6)
ERYTHROCYTE [DISTWIDTH] IN BLOOD BY AUTOMATED COUNT: 15.9 % (ref 11.6–15.1)
GFR SERPL CREATININE-BSD FRML MDRD: 61 ML/MIN/1.73SQ M
GLUCOSE P FAST SERPL-MCNC: 92 MG/DL (ref 65–99)
HCT VFR BLD AUTO: 43.5 % (ref 36.5–49.3)
HGB BLD-MCNC: 14.3 G/DL (ref 12–17)
IMM GRANULOCYTES # BLD AUTO: 0.03 THOUSAND/UL (ref 0–0.2)
IMM GRANULOCYTES NFR BLD AUTO: 1 % (ref 0–2)
INR PPP: 1 (ref 0.84–1.19)
LYMPHOCYTES # BLD AUTO: 1.51 THOUSANDS/ÂΜL (ref 0.6–4.47)
LYMPHOCYTES NFR BLD AUTO: 24 % (ref 14–44)
MCH RBC QN AUTO: 29.7 PG (ref 26.8–34.3)
MCHC RBC AUTO-ENTMCNC: 32.9 G/DL (ref 31.4–37.4)
MCV RBC AUTO: 90 FL (ref 82–98)
MONOCYTES # BLD AUTO: 0.54 THOUSAND/ÂΜL (ref 0.17–1.22)
MONOCYTES NFR BLD AUTO: 9 % (ref 4–12)
NEUTROPHILS # BLD AUTO: 3.71 THOUSANDS/ÂΜL (ref 1.85–7.62)
NEUTS SEG NFR BLD AUTO: 59 % (ref 43–75)
NRBC BLD AUTO-RTO: 0 /100 WBCS
PLATELET # BLD AUTO: 209 THOUSANDS/UL (ref 149–390)
PMV BLD AUTO: 10.2 FL (ref 8.9–12.7)
POTASSIUM SERPL-SCNC: 4 MMOL/L (ref 3.5–5.3)
PROTHROMBIN TIME: 13.4 SECONDS (ref 11.6–14.5)
RBC # BLD AUTO: 4.81 MILLION/UL (ref 3.88–5.62)
RH BLD: NEGATIVE
SODIUM SERPL-SCNC: 140 MMOL/L (ref 135–147)
SPECIMEN EXPIRATION DATE: NORMAL
WBC # BLD AUTO: 6.19 THOUSAND/UL (ref 4.31–10.16)

## 2023-05-15 ENCOUNTER — TELEPHONE (OUTPATIENT)
Dept: UROLOGY | Facility: AMBULATORY SURGERY CENTER | Age: 58
End: 2023-05-15

## 2023-05-15 LAB
BACTERIA UR CULT: ABNORMAL
BACTERIA UR CULT: ABNORMAL

## 2023-05-15 NOTE — TELEPHONE ENCOUNTER
Please inform of urine culture  Based on results and his allergy profile, best treatment would be office gentamicin regimen  Maybe can be offered in [place]

## 2023-05-16 ENCOUNTER — TELEPHONE (OUTPATIENT)
Dept: UROLOGY | Facility: CLINIC | Age: 58
End: 2023-05-16

## 2023-05-16 ENCOUNTER — TELEPHONE (OUTPATIENT)
Dept: FAMILY MEDICINE CLINIC | Facility: CLINIC | Age: 58
End: 2023-05-16

## 2023-05-16 ENCOUNTER — CONSULT (OUTPATIENT)
Dept: FAMILY MEDICINE CLINIC | Facility: CLINIC | Age: 58
End: 2023-05-16

## 2023-05-16 ENCOUNTER — PROCEDURE VISIT (OUTPATIENT)
Dept: UROLOGY | Facility: CLINIC | Age: 58
End: 2023-05-16

## 2023-05-16 VITALS
OXYGEN SATURATION: 98 % | BODY MASS INDEX: 23.83 KG/M2 | HEIGHT: 67 IN | DIASTOLIC BLOOD PRESSURE: 82 MMHG | TEMPERATURE: 97.1 F | WEIGHT: 151.8 LBS | HEART RATE: 58 BPM | SYSTOLIC BLOOD PRESSURE: 164 MMHG

## 2023-05-16 VITALS
SYSTOLIC BLOOD PRESSURE: 140 MMHG | WEIGHT: 150.4 LBS | HEIGHT: 67 IN | HEART RATE: 60 BPM | BODY MASS INDEX: 23.61 KG/M2 | DIASTOLIC BLOOD PRESSURE: 64 MMHG

## 2023-05-16 DIAGNOSIS — N20.0 STAGHORN CALCULUS: ICD-10-CM

## 2023-05-16 DIAGNOSIS — N39.0 UTI (URINARY TRACT INFECTION), BACTERIAL: ICD-10-CM

## 2023-05-16 DIAGNOSIS — Z01.818 PREOPERATIVE CLEARANCE: Primary | ICD-10-CM

## 2023-05-16 DIAGNOSIS — N13.5 URETERAL STRICTURE: ICD-10-CM

## 2023-05-16 DIAGNOSIS — N30.00 ACUTE CYSTITIS WITHOUT HEMATURIA: Primary | ICD-10-CM

## 2023-05-16 DIAGNOSIS — A49.9 UTI (URINARY TRACT INFECTION), BACTERIAL: ICD-10-CM

## 2023-05-16 RX ORDER — GENTAMICIN SULFATE 40 MG/ML
160 INJECTION, SOLUTION INTRAMUSCULAR; INTRAVENOUS
Status: SHIPPED | OUTPATIENT
Start: 2023-05-16 | End: 2023-05-22

## 2023-05-16 RX ADMIN — GENTAMICIN SULFATE 160 MG: 40 INJECTION, SOLUTION INTRAMUSCULAR; INTRAVENOUS at 15:36

## 2023-05-16 NOTE — PROGRESS NOTES
INTERNAL MEDICINE PRE-OPERATIVE EVALUATION  [facility] PRIMARY CARE    NAME: Alber Blanco  AGE: 61 y o  SEX: male  : 1965     DATE: 2023     Internal Medicine Pre-Operative Evaluation:     Chief Complaint: Pre-operative Evaluation     Surgery: R nephrectomy  Anticipated Date of Surgery: 23  Referring Provider: Dr Alo Valladares      History of Present Illness:     Alber Blanco is a 61 y o  male who presents to the office today for a preoperative consultation at the request of surgeon, Dr Alo Valladares, who plans on performing R nephrectomy on 23  Planned anesthesia is general  Patient has a bleeding risk of: no recent abnormal bleeding  Patient does not have objections to receiving blood products if needed  Current anti-platelet/anti-coagulation medications that the patient is prescribed includes: none  Assessment of Cardiac Risk:  · Denies unstable or severe angina or MI in the last 6 weeks or history of stent placement in the last year   · Denies decompensated heart failure (e g  New onset heart failure, NYHA functional class IV heart failure, or worsening existing heart failure)  · Denies significant arrhythmias such as high grade AV block, symptomatic ventricular arrhythmia, newly recognized ventricular tachycardia, supraventricular tachycardia with resting heart rate >100, or symptomatic bradycardia  · Denies severe heart valve disease including aortic stenosis or symptomatic mitral stenosis         Prior Anesthesia Reactions: No     Personal history of venous thromboembolic disease? No    History of steroid use for >2 weeks within last year? No          Review of Systems:     Review of Systems   Constitutional: Negative for activity change, appetite change, chills, diaphoresis, fatigue, fever and unexpected weight change     HENT: Negative for congestion, ear pain, postnasal drip, rhinorrhea, sinus pressure, sinus pain, sneezing, sore throat, tinnitus and voice change  Eyes: Negative for pain, redness and visual disturbance  Respiratory: Negative for cough, chest tightness, shortness of breath and wheezing  Cardiovascular: Negative for chest pain, palpitations and leg swelling  Gastrointestinal: Negative for abdominal pain, blood in stool, constipation, diarrhea, nausea and vomiting  Genitourinary: Positive for flank pain (R sided)  Musculoskeletal: Positive for back pain  Negative for arthralgias, gait problem, joint swelling, myalgias, neck pain and neck stiffness  Skin: Negative for color change, pallor, rash and wound  Neurological: Negative for dizziness, tremors, weakness, light-headedness and headaches  Psychiatric/Behavioral: Negative for dysphoric mood, self-injury, sleep disturbance and suicidal ideas  The patient is not nervous/anxious  Problem List:     Patient Active Problem List   Diagnosis   • Compulsive skin picking   • Acute metabolic encephalopathy   • Acute respiratory failure with hypoxia (HCC)   • Elevated troponin   • Elevated brain natriuretic peptide (BNP) level   • Essential hypertension   • Transaminitis   • Elevated lipase   • BRAULIO (acute kidney injury) ([facility] )   • Hypernatremia   • Increased anion gap metabolic acidosis   • Rhabdomyolysis   • Elevated d-dimer   • Kidney stones   • Delusional disorder ([facility] [address] )   • Hypophosphatemia   • Cognitive decline   • Anxiety   • Smoking   • Drug abuse, episodic use (HCC)   • Abnormal CT scan, liver   • AMS (altered mental status)   • UTI (urinary tract infection), bacterial        Allergies:      Allergies   Allergen Reactions   • Metronidazole Itching and Swelling   • Nsaids GI Intolerance     Stomach irritant   • Penicillin G    • Penicillins GI Intolerance     Sick to stomach   • Pollen Extract    • Sulfa Antibiotics         Current Medications:       Current Outpatient Medications:   •  methadone (DOLOPHINE) 10 MG/5ML solution, Take 5 mg by mouth every 6 (six) hours as needed for moderate pain, Disp: , Rfl:     Current Facility-Administered Medications:   •  gentamicin (GARAMYCIN) 40 mg/mL injection 160 mg, 160 mg, Intramuscular, Q72H, BASIL Carrasco, 160 mg at 05/16/23 1536  •  gentamicin (GARAMYCIN) 40 mg/mL injection 60 mg, 1 mg/kg (Ideal), Intramuscular, Once, Juni Nguyen MD     Past History:     Past Medical History:   Diagnosis Date   • Anxiety    • Bright red rectal bleeding     Last Assessed: 9/9/2015    • Drug dependence ([facility] [address] ) 10/20/2021   • Hypertension     Last Assessed: 7/9/2014    • Kidney stone    • Kidney stones     Last Assessed: 11/17/2016    • Periorbital edema     Last Assessed: 9/4/2015   • Septic shock ([facility] [address] ) 08/20/2022   • Skin tag of anus     Last Assessed: 9/9/2015    • Trigger point of thoracic region     Last Assessed: 11/6/2015         Past Surgical History:   Procedure Laterality Date   • CYSTOSCOPY W/ URETERAL STENT PLACEMENT  03/11/2013   • CYSTOSCOPY W/ URETERAL STENT PLACEMENT  06/18/2014    EXTRACORPOREAL SHOCK WAVE LITHOTRIPSY    • CYSTOSCOPY W/ URETERAL STENT PLACEMENT  07/16/2014    EXTRACORPOREAL SHOCK WAVE LITHOTRIPSY    • CYSTOSCOPY W/ URETERAL STENT REMOVAL  04/11/2013   • CYSTOSCOPY W/ URETERAL STENT REMOVAL Right 08/26/2014   • CYSTOSCOPY W/ URETEROSCOPY W/ LITHOTRIPSY  02/26/2013    Percutaneous lithotomy With Uretal Stent Plcement    • CYSTOSCOPY W/ URETEROSCOPY W/ LITHOTRIPSY  03/11/2014   • CYSTOSCOPY W/ URETEROSCOPY W/ LITHOTRIPSY Right 08/04/2014    With Uretal Stent Placement    • CYSTOSCOPY W/ URETEROSCOPY W/ LITHOTRIPSY Right 05/09/2016   • HERNIA REPAIR  03/11/2013   • IR NEPHROSTOMY TUBE CHECK/CHANGE/REPOSITION/REINSERTION/UPSIZE  11/22/2022   • IR NEPHROSTOMY TUBE CHECK/CHANGE/REPOSITION/REINSERTION/UPSIZE  02/13/2023   • IR NEPHROSTOMY TUBE CHECK/CHANGE/REPOSITION/REINSERTION/UPSIZE  04/28/2023   • IR NEPHROSTOMY TUBE PLACEMENT  08/20/2022   • KIDNEY SURGERY     • LITHOTRIPSY     • VA CYSTO/URETERO W/LITHOTRIPSY &INDWELL STENT INSRT Right 07/13/2016    Procedure: CYSTOSCOPY; URETEROSCOPY WITH HOLMIUM LASER STONE EXTRACTION; RETROGRADE PYELOGRAM; URETERAL STENT INSERTION ;  Surgeon: Mendel Glisson, MD;  [facility];  Service: Urology   • ND CYSTO/URETERO W/LITHOTRIPSY &INDWELL STENT INSRT Right 05/09/2016    Procedure: CYSTOSCOPY,  URETEROSCOPY,  WITH LITHOTRIPSY HOLMIUM LASER, STONE EXTRACTION, AND INSERTION STENT URETERAL;  Surgeon: Mendel Glisson, MD;  [facility];  Service: Urology   • ND CYSTO/URETERO W/LITHOTRIPSY &INDWELL STENT INSRT Right 11/10/2022    Procedure: CYSTOSCOPY URETEROSCOPY  right RETROGRADE PYELOGRAM;  Surgeon: Kit Tejada MD;  [facility];  Service: Urology   • ND LITHOTRIPSY [address] Right 06/17/2016    Procedure: Lachelle Kohut SHOCKWAVE (ESWL);   Surgeon: Mendel Glisson, MD;  [facility];  Service: Urology   • TRANSURETHRAL RESECTION OF BLADDER     • URETERAL REIMPLANTION  03/11/2013        Family History   Problem Relation Age of Onset   • No Known Problems Mother    • Heart attack Father         Social History     Socioeconomic History   • Marital status:      Spouse name: Not on file   • Number of children: Not on file   • Years of education: Not on file   • Highest education level: Not on file   Occupational History   • Not on file   Tobacco Use   • Smoking status: Every Day     Packs/day: 2 00     Years: 35 00     Pack years: 70 00     Types: Cigarettes   • Smokeless tobacco: Never   Vaping Use   • Vaping Use: Never used   Substance and Sexual Activity   • Alcohol use: Not Currently   • Drug use: Not Currently   • Sexual activity: Not Currently   Other Topics Concern   • Not on file   Social History Narrative    Caffeine Use     Uses safety Equipment- Seatbelts      Social Determinants of Health     Financial Resource Strain: Not on file   Food Insecurity: No Food Insecurity   • Worried About [address] in the Last Year: "Never true   • Ran Out of Food in the Last Year: Never true   Transportation Needs: No Transportation Needs   • Lack of Transportation (Medical): No   • Lack of Transportation (Non-Medical): No   Physical Activity: Not on file   Stress: Not on file   Social Connections: Not on file   Intimate Partner Violence: Not on file   Housing Stability: Low Risk    • Unable to Pay for Housing in the Last Year: No   • Number of Places Lived in the Last Year: 1   • Unstable Housing in the Last Year: No        Physical Exam:      /82   Pulse 58   Temp (!) 97 1 °F (36 2 °C)   Ht 5' 7\" (1 702 m)   Wt 68 9 kg (151 lb 12 8 oz)   SpO2 98%   BMI 23 78 kg/m²     Physical Exam  Vitals reviewed  Constitutional:       General: He is not in acute distress  Appearance: He is well-developed  He is not diaphoretic  HENT:      Head: Normocephalic and atraumatic  Right Ear: Hearing, tympanic membrane, ear canal and external ear normal       Left Ear: Hearing, tympanic membrane, ear canal and external ear normal       Mouth/Throat:      Pharynx: Uvula midline  No oropharyngeal exudate  Eyes:      General: No scleral icterus  Right eye: No discharge  Left eye: No discharge  Conjunctiva/sclera: Conjunctivae normal    Neck:      Thyroid: No thyromegaly  Vascular: No carotid bruit  Cardiovascular:      Rate and Rhythm: Normal rate and regular rhythm  Heart sounds: Normal heart sounds  No murmur heard  Pulmonary:      Effort: Pulmonary effort is normal  No respiratory distress  Breath sounds: Normal breath sounds  No wheezing  Abdominal:      General: Bowel sounds are normal  There is no distension  Palpations: Abdomen is soft  There is no mass  Tenderness: There is no abdominal tenderness  There is no guarding or rebound  Musculoskeletal:         General: No tenderness  Normal range of motion  Cervical back: Neck supple     Lymphadenopathy:      Cervical: No cervical " adenopathy  Skin:     General: Skin is warm and dry  Findings: No erythema or rash  Neurological:      Mental Status: He is alert and oriented to person, place, and time  Psychiatric:         Behavior: Behavior normal          Thought Content: Thought content normal          Judgment: Judgment normal            Data:     Pre-operative work-up    Laboratory Results: I have personally reviewed the pertinent laboratory results/reports               Assessment:     1  Preoperative clearance        2  Staghorn calculus        3  Ureteral stricture        4  UTI (urinary tract infection), bacterial             Plan:     61 y o  male with planned surgery: R nephrectomy  1  Further preoperative workup as follows:   - Pt needs to repeat urine culture to show that infections are cleared, currently with E coli as well as Staph aureus infections    2  Medication Management/Recommendations: pt to discuss with surgical team      Clearance  5/17/2023  Per Dr Easton Elliott, pt should not be denied medical clearance based on asymptomatic bacteriuria (E coli and Staph aureus)  He is being treated preoperatively with 2 IM doses of Gentamycin and they are not requiring a clear urine culture to proceed with surgery as planned  I discussed case with Dr Monalisa Wolf who is also in agreement  Pt is medically cleared from a cardiac/pulmonary standpoint       Ruth Lanier PA-C  [facility] PRIMARY CARE  [facility] [address] [address]  Phone#  [phone]  Fax#  [phone]

## 2023-05-16 NOTE — ASSESSMENT & PLAN NOTE
Pt seeing urology later today to initiate treatment with IM medication  Will need a clear urine culture to be cleared for surgery

## 2023-05-16 NOTE — H&P (VIEW-ONLY)
INTERNAL MEDICINE PRE-OPERATIVE EVALUATION  [facility] [place] [facility] PRIMARY CARE    NAME: Judy Moreno  AGE: 61 y o  SEX: male  : 1965     DATE: 2023     Internal Medicine Pre-Operative Evaluation:     Chief Complaint: Pre-operative Evaluation     Surgery: R nephrectomy  Anticipated Date of Surgery: 23  Referring Provider: Dr Amie Pop      History of Present Illness:     Judy Moreno is a 61 y o  male who presents to the office today for a preoperative consultation at the request of surgeon, Dr Amie Pop, who plans on performing R nephrectomy on 23  Planned anesthesia is general  Patient has a bleeding risk of: no recent abnormal bleeding  Patient does not have objections to receiving blood products if needed  Current anti-platelet/anti-coagulation medications that the patient is prescribed includes: none  Assessment of Cardiac Risk:  · Denies unstable or severe angina or MI in the last 6 weeks or history of stent placement in the last year   · Denies decompensated heart failure (e g  New onset heart failure, NYHA functional class IV heart failure, or worsening existing heart failure)  · Denies significant arrhythmias such as high grade AV block, symptomatic ventricular arrhythmia, newly recognized ventricular tachycardia, supraventricular tachycardia with resting heart rate >100, or symptomatic bradycardia  · Denies severe heart valve disease including aortic stenosis or symptomatic mitral stenosis         Prior Anesthesia Reactions: No     Personal history of venous thromboembolic disease? No    History of steroid use for >2 weeks within last year? No          Review of Systems:     Review of Systems   Constitutional: Negative for activity change, appetite change, chills, diaphoresis, fatigue, fever and unexpected weight change     HENT: Negative for congestion, ear pain, postnasal drip, rhinorrhea, sinus pressure, sinus pain, sneezing, sore throat, tinnitus and voice change  Eyes: Negative for pain, redness and visual disturbance  Respiratory: Negative for cough, chest tightness, shortness of breath and wheezing  Cardiovascular: Negative for chest pain, palpitations and leg swelling  Gastrointestinal: Negative for abdominal pain, blood in stool, constipation, diarrhea, nausea and vomiting  Genitourinary: Positive for flank pain (R sided)  Musculoskeletal: Positive for back pain  Negative for arthralgias, gait problem, joint swelling, myalgias, neck pain and neck stiffness  Skin: Negative for color change, pallor, rash and wound  Neurological: Negative for dizziness, tremors, weakness, light-headedness and headaches  Psychiatric/Behavioral: Negative for dysphoric mood, self-injury, sleep disturbance and suicidal ideas  The patient is not nervous/anxious  Problem List:     Patient Active Problem List   Diagnosis   • Compulsive skin picking   • Acute metabolic encephalopathy   • Acute respiratory failure with hypoxia (HCC)   • Elevated troponin   • Elevated brain natriuretic peptide (BNP) level   • Essential hypertension   • Transaminitis   • Elevated lipase   • [facility]   • Hypernatremia   • Increased anion gap metabolic acidosis   • Rhabdomyolysis   • Elevated d-dimer   • Kidney stones   • Delusional disorder ([facility] )   • Hypophosphatemia   • Cognitive decline   • Anxiety   • Smoking   • Drug abuse, episodic use (HCC)   • Abnormal CT scan, liver   • AMS (altered mental status)   • UTI (urinary tract infection), bacterial        Allergies:      Allergies   Allergen Reactions   • Metronidazole Itching and Swelling   • Nsaids GI Intolerance     Stomach irritant   • Penicillin G    • Penicillins GI Intolerance     Sick to stomach   • Pollen Extract    • Sulfa Antibiotics         Current Medications:       Current Outpatient Medications:   •  methadone (DOLOPHINE) 10 MG/5ML solution, Take 5 mg by mouth every 6 (six) hours as needed for moderate pain, Disp: , Rfl:     Current Facility-Administered Medications:   •  gentamicin (GARAMYCIN) 40 mg/mL injection 160 mg, 160 mg, Intramuscular, Q72H, BASIL Carrasco, 160 mg at 05/16/23 1536  •  gentamicin (GARAMYCIN) 40 mg/mL injection 60 mg, 1 mg/kg (Ideal), Intramuscular, Once, Emelia Cordova MD     Past History:     Past Medical History:   Diagnosis Date   • Anxiety    • Bright red rectal bleeding     Last Assessed: 9/9/2015    • Drug dependence ([facility] ) 10/20/2021   • Hypertension     Last Assessed: 7/9/2014    • Kidney stone    • Kidney stones     Last Assessed: 11/17/2016    • Periorbital edema     Last Assessed: 9/4/2015   • Septic shock ([facility] ) 08/20/2022   • Skin tag of anus     Last Assessed: 9/9/2015    • Trigger point of thoracic region     Last Assessed: 11/6/2015         Past Surgical History:   Procedure Laterality Date   • CYSTOSCOPY W/ URETERAL STENT PLACEMENT  03/11/2013   • CYSTOSCOPY W/ URETERAL STENT PLACEMENT  06/18/2014    EXTRACORPOREAL SHOCK WAVE LITHOTRIPSY    • CYSTOSCOPY W/ URETERAL STENT PLACEMENT  07/16/2014    EXTRACORPOREAL SHOCK WAVE LITHOTRIPSY    • CYSTOSCOPY W/ URETERAL STENT REMOVAL  04/11/2013   • CYSTOSCOPY W/ URETERAL STENT REMOVAL Right 08/26/2014   • CYSTOSCOPY W/ URETEROSCOPY W/ LITHOTRIPSY  02/26/2013    Percutaneous lithotomy With Uretal Stent Plcement    • CYSTOSCOPY W/ URETEROSCOPY W/ LITHOTRIPSY  03/11/2014   • CYSTOSCOPY W/ URETEROSCOPY W/ LITHOTRIPSY Right 08/04/2014    With Uretal Stent Placement    • CYSTOSCOPY W/ URETEROSCOPY W/ LITHOTRIPSY Right 05/09/2016   • HERNIA REPAIR  03/11/2013   • IR NEPHROSTOMY TUBE CHECK/CHANGE/REPOSITION/REINSERTION/UPSIZE  11/22/2022   • IR NEPHROSTOMY TUBE CHECK/CHANGE/REPOSITION/REINSERTION/UPSIZE  02/13/2023   • IR NEPHROSTOMY TUBE CHECK/CHANGE/REPOSITION/REINSERTION/UPSIZE  04/28/2023   • IR NEPHROSTOMY TUBE PLACEMENT  08/20/2022   • KIDNEY SURGERY     • LITHOTRIPSY     • RI CYSTO/URETERO W/LITHOTRIPSY &INDWELL STENT INSRT Right 07/13/2016    Procedure: CYSTOSCOPY; URETEROSCOPY WITH HOLMIUM LASER STONE EXTRACTION; RETROGRADE PYELOGRAM; URETERAL STENT INSERTION ;  Surgeon: Ruiz Mcduffie MD;  Location: [facility];  Service: Urology   • MT CYSTO/URETERO W/LITHOTRIPSY &INDWELL STENT INSRT Right 05/09/2016    Procedure: CYSTOSCOPY,  URETEROSCOPY,  WITH LITHOTRIPSY HOLMIUM LASER, STONE EXTRACTION, AND INSERTION STENT URETERAL;  Surgeon: Ruiz Mcduffie MD;  Location: [facility];  Service: Urology   • MT CYSTO/URETERO W/LITHOTRIPSY &INDWELL STENT INSRT Right 11/10/2022    Procedure: CYSTOSCOPY URETEROSCOPY  right RETROGRADE PYELOGRAM;  Surgeon: Lina Garcia MD;  Location: [facility];  Service: Urology   • MT LITHOTRIPSY [address] Right 06/17/2016    Procedure: Damien Osler SHOCKWAVE (ESWL);   Surgeon: Ruiz Mcduffie MD;  Location: [facility];  Service: Urology   • TRANSURETHRAL RESECTION OF BLADDER     • URETERAL REIMPLANTION  03/11/2013        Family History   Problem Relation Age of Onset   • No Known Problems Mother    • Heart attack Father         Social History     Socioeconomic History   • Marital status:      Spouse name: Not on file   • Number of children: Not on file   • Years of education: Not on file   • Highest education level: Not on file   Occupational History   • Not on file   Tobacco Use   • Smoking status: Every Day     Packs/day: 2 00     Years: 35 00     Pack years: 70 00     Types: Cigarettes   • Smokeless tobacco: Never   Vaping Use   • Vaping Use: Never used   Substance and Sexual Activity   • Alcohol use: Not Currently   • Drug use: Not Currently   • Sexual activity: Not Currently   Other Topics Concern   • Not on file   Social History Narrative    Caffeine Use     Uses safety Equipment- Seatbelts      Social Determinants of Health     Financial Resource Strain: Not on file   Food Insecurity: No Food Insecurity   • Worried About [address] in the Last Year: "Never true   • Ran Out of Food in the Last Year: Never true   Transportation Needs: No Transportation Needs   • Lack of Transportation (Medical): No   • Lack of Transportation (Non-Medical): No   Physical Activity: Not on file   Stress: Not on file   Social Connections: Not on file   Intimate Partner Violence: Not on file   Housing Stability: Low Risk    • Unable to Pay for Housing in the Last Year: No   • Number of Places Lived in the Last Year: 1   • Unstable Housing in the Last Year: No        Physical Exam:      /82   Pulse 58   Temp (!) 97 1 °F (36 2 °C)   Ht 5' 7\" (1 702 m)   Wt 68 9 kg (151 lb 12 8 oz)   SpO2 98%   BMI 23 78 kg/m²     Physical Exam  Vitals reviewed  Constitutional:       General: He is not in acute distress  Appearance: He is well-developed  He is not diaphoretic  HENT:      Head: Normocephalic and atraumatic  Right Ear: Hearing, tympanic membrane, ear canal and external ear normal       Left Ear: Hearing, tympanic membrane, ear canal and external ear normal       Mouth/Throat:      Pharynx: Uvula midline  No oropharyngeal exudate  Eyes:      General: No scleral icterus  Right eye: No discharge  Left eye: No discharge  Conjunctiva/sclera: Conjunctivae normal    Neck:      Thyroid: No thyromegaly  Vascular: No carotid bruit  Cardiovascular:      Rate and Rhythm: Normal rate and regular rhythm  Heart sounds: Normal heart sounds  No murmur heard  Pulmonary:      Effort: Pulmonary effort is normal  No respiratory distress  Breath sounds: Normal breath sounds  No wheezing  Abdominal:      General: Bowel sounds are normal  There is no distension  Palpations: Abdomen is soft  There is no mass  Tenderness: There is no abdominal tenderness  There is no guarding or rebound  Musculoskeletal:         General: No tenderness  Normal range of motion  Cervical back: Neck supple     Lymphadenopathy:      Cervical: No cervical " adenopathy  Skin:     General: Skin is warm and dry  Findings: No erythema or rash  Neurological:      Mental Status: He is alert and oriented to person, place, and time  Psychiatric:         Behavior: Behavior normal          Thought Content: Thought content normal          Judgment: Judgment normal            Data:     Pre-operative work-up    Laboratory Results: I have personally reviewed the pertinent laboratory results/reports               Assessment:     1  Preoperative clearance        2  Staghorn calculus        3  Ureteral stricture        4  UTI (urinary tract infection), bacterial             Plan:     61 y o  male with planned surgery: R nephrectomy  1  Further preoperative workup as follows:   - Pt needs to repeat urine culture to show that infections are cleared, currently with E coli as well as Staph aureus infections    2  Medication Management/Recommendations: pt to discuss with surgical team      Clearance  5/17/2023  Per Dr Lissett Callahan, pt should not be denied medical clearance based on asymptomatic bacteriuria (E coli and Staph aureus)  He is being treated preoperatively with 2 IM doses of Gentamycin and they are not requiring a clear urine culture to proceed with surgery as planned  I discussed case with Dr Ora Aquino who is also in agreement  Pt is medically cleared from a cardiac/pulmonary standpoint       Asia Boone PA-C  [facility] PRIMARY CARE  [facility] [id number]  Phone#  [phone]  Fax#  [phone]

## 2023-05-16 NOTE — PRE-PROCEDURE INSTRUCTIONS
Pre-Surgery Instructions:   Medication Instructions   • methadone (DOLOPHINE) 10 MG/5ML solution Uses PRN- OK to take day of surgery   • sodium chloride, PF, 0 9 % Instructions provided by MD   • traMADol (ULTRAM) 50 mg tablet Uses PRN- OK to take day of surgery   Have you had / have a sore throat? No  Have you had / have a cough less than 1 week? No  Have you had / have a fever greater than 100 0 - 100  4? No  Are you experiencing any shortness of breath? No    Review with patient and his mother Aye Delaney via phone medications and showering instructions  Instructed to avoid all ASA and OTC Vit/Supp 1 week prior to surgery and to avoid NSAIDs 3 days prior to surgery per anesthesia instructions  Tylenol ok to take prn  [facility] call with surgery schedule time, nothing eat or drink after midnight  Verbalized understanding  Advise Smoking Cessation Education not interested

## 2023-05-16 NOTE — TELEPHONE ENCOUNTER
Please call patient, urine culture shows 2 different infections, looks like urology is going to be treating it  I do not feel comfortable clearing him for surgery until his infections are cleared  Please reschedule his pre-op   Thanks

## 2023-05-16 NOTE — TELEPHONE ENCOUNTER
Pt's mother aware  He is still coming in for appointment; however she is aware he is not cleared until the infection is gone   I encouraged them to call urology prior to coming to appointment to find out what they would like to do since he is not getting cleared until the infection is gone

## 2023-05-16 NOTE — TELEPHONE ENCOUNTER
Called and spoke with patients mother, Radha Loc  Advised her of patients urine culture results and Dr Bon Jhaveri recommendation for IM Gentamicin  Patient scheduled for nurse visit this afternoon in the [place] office and Friday afternoon in the [place] office for 2 doses of Gentamicin, 72 hours apart  Will forward to provider to place orders for antibiotics

## 2023-05-16 NOTE — LETTER
May 17, 2023     Thomas Whipple MD   [facility]    Patient: Henna Navarro   YOB: 1965   Date of Visit: 2023       Dear Dr Sonal Gordillo:    Thank you for referring Henna Navarro to me for evaluation  Below are my notes for this consultation  If you have questions, please do not hesitate to call me  I look forward to following your patient along with you  Sincerely,        Adiel Navas PA-C        CC: No Recipients  Osman Brink  2023 11:55 AM  Cosign Needed Addendum  INTERNAL MEDICINE PRE-OPERATIVE EVALUATION  [facility] CARE    NAME: Henna Navarro  AGE: 61 y o  SEX: male  : 1965     DATE: 2023     Internal Medicine Pre-Operative Evaluation:     Chief Complaint: Pre-operative Evaluation     Surgery: R nephrectomy  Anticipated Date of Surgery: 23  Referring Provider: Dr Sonal Gordillo      History of Present Illness:     Henna Navaror is a 61 y o  male who presents to the office today for a preoperative consultation at the request of surgeon, Dr Sonal Gordillo, who plans on performing R nephrectomy on 23  Planned anesthesia is general  Patient has a bleeding risk of: no recent abnormal bleeding  Patient does not have objections to receiving blood products if needed  Current anti-platelet/anti-coagulation medications that the patient is prescribed includes: none        Assessment of Cardiac Risk:  · Denies unstable or severe angina or MI in the last 6 weeks or history of stent placement in the last year   · Denies decompensated heart failure (e g  New onset heart failure, NYHA functional class IV heart failure, or worsening existing heart failure)  · Denies significant arrhythmias such as high grade AV block, symptomatic ventricular arrhythmia, newly recognized ventricular tachycardia, supraventricular tachycardia with resting heart rate >100, or symptomatic bradycardia  · Denies severe heart valve disease including aortic stenosis or symptomatic mitral stenosis         Prior Anesthesia Reactions: No     Personal history of venous thromboembolic disease? No    History of steroid use for >2 weeks within last year? No          Review of Systems:     Review of Systems   Constitutional: Negative for activity change, appetite change, chills, diaphoresis, fatigue, fever and unexpected weight change  HENT: Negative for congestion, ear pain, postnasal drip, rhinorrhea, sinus pressure, sinus pain, sneezing, sore throat, tinnitus and voice change  Eyes: Negative for pain, redness and visual disturbance  Respiratory: Negative for cough, chest tightness, shortness of breath and wheezing  Cardiovascular: Negative for chest pain, palpitations and leg swelling  Gastrointestinal: Negative for abdominal pain, blood in stool, constipation, diarrhea, nausea and vomiting  Genitourinary: Positive for flank pain (R sided)  Musculoskeletal: Positive for back pain  Negative for arthralgias, gait problem, joint swelling, myalgias, neck pain and neck stiffness  Skin: Negative for color change, pallor, rash and wound  Neurological: Negative for dizziness, tremors, weakness, light-headedness and headaches  Psychiatric/Behavioral: Negative for dysphoric mood, self-injury, sleep disturbance and suicidal ideas  The patient is not nervous/anxious           Problem List:     Patient Active Problem List   Diagnosis   • Compulsive skin picking   • Acute metabolic encephalopathy   • Acute respiratory failure with hypoxia (HCC)   • Elevated troponin   • Elevated brain natriuretic peptide (BNP) level   • Essential hypertension   • Transaminitis   • Elevated lipase   • BRAULIO (acute kidney injury) ([facility] )   • Hypernatremia   • Increased anion gap metabolic acidosis   • Rhabdomyolysis   • Elevated d-dimer   • Kidney stones   • Delusional disorder ([facility] )   • Hypophosphatemia   • Cognitive decline   • Anxiety   • Smoking   • Drug abuse, episodic use (HCC)   • Abnormal CT scan, liver   • AMS (altered mental status)   • UTI (urinary tract infection), bacterial        Allergies:      Allergies   Allergen Reactions   • Metronidazole Itching and Swelling   • Nsaids GI Intolerance     Stomach irritant   • Penicillin G    • Penicillins GI Intolerance     Sick to stomach   • Pollen Extract    • Sulfa Antibiotics         Current Medications:       Current Outpatient Medications:   •  methadone (DOLOPHINE) 10 MG/5ML solution, Take 5 mg by mouth every 6 (six) hours as needed for moderate pain, Disp: , Rfl:     Current Facility-Administered Medications:   •  gentamicin (GARAMYCIN) 40 mg/mL injection 160 mg, 160 mg, Intramuscular, Q72H, BASIL Carrasco, 160 mg at 05/16/23 1536  •  gentamicin (GARAMYCIN) 40 mg/mL injection 60 mg, 1 mg/kg (Ideal), Intramuscular, Once, Subhash Vázquez MD     Past History:     Past Medical History:   Diagnosis Date   • Anxiety    • Bright red rectal bleeding     Last Assessed: 9/9/2015    • Drug dependence ([address] ) 10/20/2021   • Hypertension     Last Assessed: 7/9/2014    • Kidney stone    • Kidney stones     Last Assessed: 11/17/2016    • Periorbital edema     Last Assessed: 9/4/2015   • Septic shock ([address] ) 08/20/2022   • Skin tag of anus     Last Assessed: 9/9/2015    • Trigger point of thoracic region     Last Assessed: 11/6/2015         Past Surgical History:   Procedure Laterality Date   • CYSTOSCOPY W/ URETERAL STENT PLACEMENT  03/11/2013   • CYSTOSCOPY W/ URETERAL STENT PLACEMENT  06/18/2014    EXTRACORPOREAL SHOCK WAVE LITHOTRIPSY    • CYSTOSCOPY W/ URETERAL STENT PLACEMENT  07/16/2014    EXTRACORPOREAL SHOCK WAVE LITHOTRIPSY    • CYSTOSCOPY W/ URETERAL STENT REMOVAL  04/11/2013   • CYSTOSCOPY W/ URETERAL STENT REMOVAL Right 08/26/2014   • CYSTOSCOPY W/ URETEROSCOPY W/ LITHOTRIPSY  02/26/2013    Percutaneous lithotomy With Uretal Stent Plcement    • CYSTOSCOPY W/ URETEROSCOPY W/ LITHOTRIPSY  03/11/2014   • CYSTOSCOPY W/ URETEROSCOPY W/ LITHOTRIPSY Right 08/04/2014    With Uretal Stent Placement    • CYSTOSCOPY W/ URETEROSCOPY W/ LITHOTRIPSY Right 05/09/2016   • HERNIA REPAIR  03/11/2013   • IR NEPHROSTOMY TUBE CHECK/CHANGE/REPOSITION/REINSERTION/UPSIZE  11/22/2022   • IR NEPHROSTOMY TUBE CHECK/CHANGE/REPOSITION/REINSERTION/UPSIZE  02/13/2023   • IR NEPHROSTOMY TUBE CHECK/CHANGE/REPOSITION/REINSERTION/UPSIZE  04/28/2023   • IR NEPHROSTOMY TUBE PLACEMENT  08/20/2022   • KIDNEY SURGERY     • LITHOTRIPSY     • MT CYSTO/URETERO W/LITHOTRIPSY &INDWELL STENT INSRT Right 07/13/2016    Procedure: CYSTOSCOPY; URETEROSCOPY WITH HOLMIUM LASER STONE EXTRACTION; RETROGRADE PYELOGRAM; URETERAL STENT INSERTION ;  Surgeon: Hafsa Larson MD;  [facility];  Service: Urology   • MT CYSTO/URETERO W/LITHOTRIPSY &INDWELL STENT INSRT Right 05/09/2016    Procedure: CYSTOSCOPY,  URETEROSCOPY,  WITH LITHOTRIPSY HOLMIUM LASER, STONE EXTRACTION, AND INSERTION STENT URETERAL;  Surgeon: Hafsa Larson MD;  [facility];  Service: Urology   • MT CYSTO/URETERO W/LITHOTRIPSY &INDWELL STENT INSRT Right 11/10/2022    Procedure: CYSTOSCOPY URETEROSCOPY  right RETROGRADE PYELOGRAM;  Surgeon: Mercedes Emery MD;  [facility];  Service: Urology   • MT LITHOTRIPSY [address] Right 06/17/2016    Procedure: LITHROTRIPSY EXTRACORPORAL SHOCKWAVE (ESWL);   Surgeon: Hafsa Larson MD;  [facility];  Service: Urology   • TRANSURETHRAL RESECTION OF BLADDER     • URETERAL REIMPLANTION  03/11/2013        Family History   Problem Relation Age of Onset   • No Known Problems Mother    • Heart attack Father         Social History     Socioeconomic History   • Marital status:      Spouse name: Not on file   • Number of children: Not on file   • Years of education: Not on file   • Highest education level: Not on file   Occupational History   • Not on file   Tobacco Use   • Smoking status: "Every Day     Packs/day: 2 00     Years: 35 00     Pack years: 70 00     Types: Cigarettes   • Smokeless tobacco: Never   Vaping Use   • Vaping Use: Never used   Substance and Sexual Activity   • Alcohol use: Not Currently   • Drug use: Not Currently   • Sexual activity: Not Currently   Other Topics Concern   • Not on file   Social History Narrative    Caffeine Use     Uses safety Equipment- Seatbelts      Social Determinants of Health     Financial Resource Strain: Not on file   Food Insecurity: No Food Insecurity   • Worried About Running Out of Food in the Last Year: Never true   • Ran Out of Food in the Last Year: Never true   Transportation Needs: No Transportation Needs   • Lack of Transportation (Medical): No   • Lack of Transportation (Non-Medical): No   Physical Activity: Not on file   Stress: Not on file   Social Connections: Not on file   Intimate Partner Violence: Not on file   Housing Stability: Low Risk    • Unable to Pay for Housing in the Last Year: No   • Number of Places Lived in the Last Year: 1   • Unstable Housing in the Last Year: No        Physical Exam:      /82   Pulse 58   Temp (!) 97 1 °F (36 2 °C)   Ht 5' 7\" (1 702 m)   Wt 68 9 kg (151 lb 12 8 oz)   SpO2 98%   BMI 23 78 kg/m²     Physical Exam  Vitals reviewed  Constitutional:       General: He is not in acute distress  Appearance: He is well-developed  He is not diaphoretic  HENT:      Head: Normocephalic and atraumatic  Right Ear: Hearing, tympanic membrane, ear canal and external ear normal       Left Ear: Hearing, tympanic membrane, ear canal and external ear normal       Mouth/Throat:      Pharynx: Uvula midline  No oropharyngeal exudate  Eyes:      General: No scleral icterus  Right eye: No discharge  Left eye: No discharge  Conjunctiva/sclera: Conjunctivae normal    Neck:      Thyroid: No thyromegaly  Vascular: No carotid bruit     Cardiovascular:      Rate and Rhythm: Normal rate " and regular rhythm  Heart sounds: Normal heart sounds  No murmur heard  Pulmonary:      Effort: Pulmonary effort is normal  No respiratory distress  Breath sounds: Normal breath sounds  No wheezing  Abdominal:      General: Bowel sounds are normal  There is no distension  Palpations: Abdomen is soft  There is no mass  Tenderness: There is no abdominal tenderness  There is no guarding or rebound  Musculoskeletal:         General: No tenderness  Normal range of motion  Cervical back: Neck supple  Lymphadenopathy:      Cervical: No cervical adenopathy  Skin:     General: Skin is warm and dry  Findings: No erythema or rash  Neurological:      Mental Status: He is alert and oriented to person, place, and time  Psychiatric:         Behavior: Behavior normal          Thought Content: Thought content normal          Judgment: Judgment normal            Data:     Pre-operative work-up    Laboratory Results: I have personally reviewed the pertinent laboratory results/reports               Assessment:     1  Preoperative clearance        2  Staghorn calculus        3  Ureteral stricture        4  UTI (urinary tract infection), bacterial             Plan:     61 y o  male with planned surgery: R nephrectomy  1  Further preoperative workup as follows:   - Pt needs to repeat urine culture to show that infections are cleared, currently with E coli as well as Staph aureus infections    2  Medication Management/Recommendations: pt to discuss with surgical team      Clearance  5/17/2023  Per Dr Sonal Gordillo, pt should not be denied medical clearance based on asymptomatic bacteriuria (E coli and Staph aureus)  He is being treated preoperatively with 2 IM doses of Gentamycin and they are not requiring a clear urine culture to proceed with surgery as planned  I discussed case with Dr Lucina Christianson who is also in agreement  Pt is medically cleared from a cardiac/pulmonary standpoint  Magaly Grant PA-C  [facility] CARE  [facility] [address] [id number]  Phone#  [phone]  Fax#  [phone]

## 2023-05-16 NOTE — PROGRESS NOTES
"5/16/2023  Rico Patel is a 61 y o  male  [id number]    Diagnosis: Urinary tract infection   Chief Complaint    Gentamicin injection         Patient presents for IM Gentamicin injection managed by Dr Beacher Cogan:  Follow up on Friday, 5/19/23 for second dose of Gentamicin  Medication administration:    Gentamicin 160 mg administered IM as ordered, 80 mg in each deltoid  Patient tolerated well  Side effects reviewed and patient will contact the office with any issues       Administrations This Visit     gentamicin (GARAMYCIN) 40 mg/mL injection 160 mg     Admin Date  05/16/2023 Action  Given Dose  160 mg Route  Intramuscular Administered By  Brian Smith RN                Vitals:    05/16/23 1519   BP: 140/64   Pulse: 60   Weight: 68 2 kg (150 lb 6 4 oz)   Height: 5' 7\" (1 702 m)         Brian Smith RN  "

## 2023-05-16 NOTE — LETTER
May 16, 2023     Colletta Slimmer, MD   [facility]    Patient: Kathe Ballard   YOB: 1965   Date of Visit: 2023       Dear Dr Jasiel Angulo:    Thank you for referring Kathe Ballard to me for evaluation  Below are my notes for this consultation  If you have questions, please do not hesitate to call me  I look forward to following your patient along with you  Sincerely,        Kym Herron PA-C        CC: No Recipients  Osman Ramires  2023  3:24 PM  Sign when Signing Visit  INTERNAL MEDICINE PRE-OPERATIVE EVALUATION  [facility] PRIMARY CARE    NAME: Kathe Ballard  AGE: 61 y o  SEX: male  : 1965     DATE: 2023     Internal Medicine Pre-Operative Evaluation:     Chief Complaint: Pre-operative Evaluation     Surgery: R nephrectomy  Anticipated Date of Surgery: 23  Referring Provider: Dr Jasiel Angulo      History of Present Illness:     Kathe Ballard is a 61 y o  male who presents to the office today for a preoperative consultation at the request of surgeon, Dr Jasiel Angulo, who plans on performing R nephrectomy on 23  Planned anesthesia is general  Patient has a bleeding risk of: no recent abnormal bleeding  Patient does not have objections to receiving blood products if needed  Current anti-platelet/anti-coagulation medications that the patient is prescribed includes: none        Assessment of Cardiac Risk:  · Denies unstable or severe angina or MI in the last 6 weeks or history of stent placement in the last year   · Denies decompensated heart failure (e g  New onset heart failure, NYHA functional class IV heart failure, or worsening existing heart failure)  · Denies significant arrhythmias such as high grade AV block, symptomatic ventricular arrhythmia, newly recognized ventricular tachycardia, supraventricular tachycardia with resting heart rate >100, or symptomatic bradycardia  · Denies severe heart valve disease including aortic stenosis or symptomatic mitral stenosis         Prior Anesthesia Reactions: No     Personal history of venous thromboembolic disease? No    History of steroid use for >2 weeks within last year? No          Review of Systems:     Review of Systems   Constitutional: Negative for activity change, appetite change, chills, diaphoresis, fatigue, fever and unexpected weight change  HENT: Negative for congestion, ear pain, postnasal drip, rhinorrhea, sinus pressure, sinus pain, sneezing, sore throat, tinnitus and voice change  Eyes: Negative for pain, redness and visual disturbance  Respiratory: Negative for cough, chest tightness, shortness of breath and wheezing  Cardiovascular: Negative for chest pain, palpitations and leg swelling  Gastrointestinal: Negative for abdominal pain, blood in stool, constipation, diarrhea, nausea and vomiting  Genitourinary: Positive for flank pain (R sided)  Musculoskeletal: Positive for back pain  Negative for arthralgias, gait problem, joint swelling, myalgias, neck pain and neck stiffness  Skin: Negative for color change, pallor, rash and wound  Neurological: Negative for dizziness, tremors, weakness, light-headedness and headaches  Psychiatric/Behavioral: Negative for dysphoric mood, self-injury, sleep disturbance and suicidal ideas  The patient is not nervous/anxious           Problem List:     Patient Active Problem List   Diagnosis   • Compulsive skin picking   • Acute metabolic encephalopathy   • Acute respiratory failure with hypoxia (HCC)   • Elevated troponin   • Elevated brain natriuretic peptide (BNP) level   • Essential hypertension   • Transaminitis   • Elevated lipase   • BRAULIO (acute kidney injury) ([facility] )   • Hypernatremia   • Increased anion gap metabolic acidosis   • Rhabdomyolysis   • Elevated d-dimer   • Kidney stones   • Delusional disorder ([facility] )   • Hypophosphatemia   • Cognitive decline   • Anxiety   • Smoking   • Drug abuse, episodic use (HCC)   • Abnormal CT scan, liver   • AMS (altered mental status)   • UTI (urinary tract infection), bacterial        Allergies:      Allergies   Allergen Reactions   • Metronidazole Itching and Swelling   • Nsaids GI Intolerance     Stomach irritant   • Penicillin G    • Penicillins GI Intolerance     Sick to stomach   • Pollen Extract    • Sulfa Antibiotics         Current Medications:       Current Outpatient Medications:   •  methadone (DOLOPHINE) 10 MG/5ML solution, Take 5 mg by mouth every 6 (six) hours as needed for moderate pain, Disp: , Rfl:     Current Facility-Administered Medications:   •  gentamicin (GARAMYCIN) 40 mg/mL injection 60 mg, 1 mg/kg (Ideal), Intramuscular, Once, Aramis Davidson MD     Past History:     Past Medical History:   Diagnosis Date   • Anxiety    • Bright red rectal bleeding     Last Assessed: 9/9/2015    • Drug dependence ([facility] ) 10/20/2021   • Hypertension     Last Assessed: 7/9/2014    • Kidney stone    • Kidney stones     Last Assessed: 11/17/2016    • Periorbital edema     Last Assessed: 9/4/2015   • Septic shock ([facility] ) 08/20/2022   • Skin tag of anus     Last Assessed: 9/9/2015    • Trigger point of thoracic region     Last Assessed: 11/6/2015         Past Surgical History:   Procedure Laterality Date   • CYSTOSCOPY W/ URETERAL STENT PLACEMENT  03/11/2013   • CYSTOSCOPY W/ URETERAL STENT PLACEMENT  06/18/2014    EXTRACORPOREAL SHOCK WAVE LITHOTRIPSY    • CYSTOSCOPY W/ URETERAL STENT PLACEMENT  07/16/2014    EXTRACORPOREAL SHOCK WAVE LITHOTRIPSY    • CYSTOSCOPY W/ URETERAL STENT REMOVAL  04/11/2013   • CYSTOSCOPY W/ URETERAL STENT REMOVAL Right 08/26/2014   • CYSTOSCOPY W/ URETEROSCOPY W/ LITHOTRIPSY  02/26/2013    Percutaneous lithotomy With Uretal Stent Plcement    • CYSTOSCOPY W/ URETEROSCOPY W/ LITHOTRIPSY  03/11/2014   • CYSTOSCOPY W/ URETEROSCOPY W/ LITHOTRIPSY Right 08/04/2014    With Uretal Stent Placement    • CYSTOSCOPY W/ URETEROSCOPY W/ LITHOTRIPSY Right 05/09/2016   • HERNIA REPAIR  03/11/2013   • IR NEPHROSTOMY TUBE CHECK/CHANGE/REPOSITION/REINSERTION/UPSIZE  11/22/2022   • IR NEPHROSTOMY TUBE CHECK/CHANGE/REPOSITION/REINSERTION/UPSIZE  02/13/2023   • IR NEPHROSTOMY TUBE CHECK/CHANGE/REPOSITION/REINSERTION/UPSIZE  04/28/2023   • IR NEPHROSTOMY TUBE PLACEMENT  08/20/2022   • KIDNEY SURGERY     • LITHOTRIPSY     • [place] CYSTO/URETERO W/LITHOTRIPSY &INDWELL STENT INSRT Right 07/13/2016    Procedure: CYSTOSCOPY; URETEROSCOPY WITH HOLMIUM LASER STONE EXTRACTION; RETROGRADE PYELOGRAM; URETERAL STENT INSERTION ;  Surgeon: Loren Bhatt MD;  [facility];  Service: Urology   • KS CYSTO/URETERO W/LITHOTRIPSY &INDWELL STENT INSRT Right 05/09/2016    Procedure: CYSTOSCOPY,  URETEROSCOPY,  WITH LITHOTRIPSY HOLMIUM LASER, STONE EXTRACTION, AND INSERTION STENT URETERAL;  Surgeon: Loren Bhatt MD;  [facility];  Service: Urology   • KS CYSTO/URETERO W/LITHOTRIPSY &INDWELL STENT INSRT Right 11/10/2022    Procedure: CYSTOSCOPY URETEROSCOPY  right RETROGRADE PYELOGRAM;  Surgeon: Armin Rodriguez MD;  [facility];  Service: Urology   • KS LITHOTRIPSY [address] [place] [address] Right 06/17/2016    Procedure: LITHROTRIPSY EXTRACORPORAL SHOCKWAVE (ESWL);   Surgeon: Loren Bhatt MD;  [facility];  Service: Urology   • TRANSURETHRAL RESECTION OF BLADDER     • URETERAL REIMPLANTION  03/11/2013        Family History   Problem Relation Age of Onset   • No Known Problems Mother    • Heart attack Father         Social History     Socioeconomic History   • Marital status:      Spouse name: Not on file   • Number of children: Not on file   • Years of education: Not on file   • Highest education level: Not on file   Occupational History   • Not on file   Tobacco Use   • Smoking status: Every Day     Packs/day: 2 00     Years: 35 00     Pack years: 70 00     Types: Cigarettes   • Smokeless tobacco: Never "  Vaping Use   • Vaping Use: Never used   Substance and Sexual Activity   • Alcohol use: Not Currently   • Drug use: Not Currently   • Sexual activity: Not Currently   Other Topics Concern   • Not on file   Social History Narrative    Caffeine Use     Uses safety Equipment- Seatbelts      Social Determinants of Health     Financial Resource Strain: Not on file   Food Insecurity: No Food Insecurity   • Worried About Running Out of Food in the Last Year: Never true   • Ran Out of Food in the Last Year: Never true   Transportation Needs: No Transportation Needs   • Lack of Transportation (Medical): No   • Lack of Transportation (Non-Medical): No   Physical Activity: Not on file   Stress: Not on file   Social Connections: Not on file   Intimate Partner Violence: Not on file   Housing Stability: Low Risk    • Unable to Pay for Housing in the Last Year: No   • Number of Places Lived in the Last Year: 1   • Unstable Housing in the Last Year: No        Physical Exam:      /82   Pulse 58   Temp (!) 97 1 °F (36 2 °C)   Ht 5' 7\" (1 702 m)   Wt 68 9 kg (151 lb 12 8 oz)   SpO2 98%   BMI 23 78 kg/m²     Physical Exam  Vitals reviewed  Constitutional:       General: He is not in acute distress  Appearance: He is well-developed  He is not diaphoretic  HENT:      Head: Normocephalic and atraumatic  Right Ear: Hearing, tympanic membrane, ear canal and external ear normal       Left Ear: Hearing, tympanic membrane, ear canal and external ear normal       Mouth/Throat:      Pharynx: Uvula midline  No oropharyngeal exudate  Eyes:      General: No scleral icterus  Right eye: No discharge  Left eye: No discharge  Conjunctiva/sclera: Conjunctivae normal    Neck:      Thyroid: No thyromegaly  Vascular: No carotid bruit  Cardiovascular:      Rate and Rhythm: Normal rate and regular rhythm  Heart sounds: Normal heart sounds  No murmur heard    Pulmonary:      Effort: Pulmonary effort " is normal  No respiratory distress  Breath sounds: Normal breath sounds  No wheezing  Abdominal:      General: Bowel sounds are normal  There is no distension  Palpations: Abdomen is soft  There is no mass  Tenderness: There is no abdominal tenderness  There is no guarding or rebound  Musculoskeletal:         General: No tenderness  Normal range of motion  Cervical back: Neck supple  Lymphadenopathy:      Cervical: No cervical adenopathy  Skin:     General: Skin is warm and dry  Findings: No erythema or rash  Neurological:      Mental Status: He is alert and oriented to person, place, and time  Psychiatric:         Behavior: Behavior normal          Thought Content: Thought content normal          Judgment: Judgment normal            Data:     Pre-operative work-up    Laboratory Results: I have personally reviewed the pertinent laboratory results/reports               Assessment:     1  Preoperative clearance        2  Staghorn calculus        3  Ureteral stricture        4  UTI (urinary tract infection), bacterial             Plan:     61 y o  male with planned surgery: R nephrectomy  1  Further preoperative workup as follows:   - Pt needs to repeat urine culture to show that infections are cleared, currently with E coli as well as Staph aureus infections    2  Medication Management/Recommendations: pt to discuss with surgical team      Clearance  Patient is DENIED for surgery at this time  Angel Esteves requires further consultation with urology regarding current urinary tract infections       Alan Merchant PA-C  [facility] PRIMARY CARE  [facility] [phone]  Phone#  [phone]  Fax#  [phone]

## 2023-05-17 NOTE — PROGRESS NOTES
I supervised the Advanced Practitioner  I reviewed the Advanced Practitioner note and agree      Gautam Fink MD 05/17/23

## 2023-05-19 ENCOUNTER — PROCEDURE VISIT (OUTPATIENT)
Dept: UROLOGY | Facility: CLINIC | Age: 58
End: 2023-05-19

## 2023-05-19 VITALS
HEART RATE: 64 BPM | WEIGHT: 150 LBS | SYSTOLIC BLOOD PRESSURE: 118 MMHG | BODY MASS INDEX: 23.54 KG/M2 | HEIGHT: 67 IN | DIASTOLIC BLOOD PRESSURE: 68 MMHG

## 2023-05-19 DIAGNOSIS — R39.9 UTI SYMPTOMS: Primary | ICD-10-CM

## 2023-05-19 DIAGNOSIS — G89.29 FLANK PAIN, CHRONIC: ICD-10-CM

## 2023-05-19 DIAGNOSIS — R10.9 FLANK PAIN, CHRONIC: ICD-10-CM

## 2023-05-19 RX ADMIN — GENTAMICIN SULFATE 60 MG: 40 INJECTION, SOLUTION INTRAMUSCULAR; INTRAVENOUS at 13:45

## 2023-05-19 NOTE — PROGRESS NOTES
"5/19/2023  Roselyn Clark is a 61 y o  male  [id number]    Diagnosis: Urinary tract infection   Chief Complaint    2nd Gentamicin injection         Patient presents for Gentamicin injection managed by Dr Norma Dacosta:  Proceed with surgery as scheduled on Monday per Dr Silvano Butler       Medication administration:    Gentamicin 160 mg administered IM as ordered, 80 mg in each deltoid  Patient tolerated well       Administrations This Visit     gentamicin (GARAMYCIN) 40 mg/mL injection 60 mg     Admin Date  05/19/2023 Action  Given Dose  60 mg Route  Intramuscular Administered By  Namrata Ariza RN                Vitals:    05/19/23 1335   BP: 118/68   Pulse: 64   Weight: 68 kg (150 lb)   Height: 5' 7\" (1 702 m)         Namrata Ariza RN  "

## 2023-05-21 ENCOUNTER — ANESTHESIA EVENT (OUTPATIENT)
Dept: PERIOP | Facility: HOSPITAL | Age: 58
End: 2023-05-21

## 2023-05-22 ENCOUNTER — HOSPITAL ENCOUNTER (INPATIENT)
Facility: HOSPITAL | Age: 58
LOS: 7 days | Discharge: HOME | End: 2023-05-29
Attending: UROLOGY | Admitting: UROLOGY

## 2023-05-22 ENCOUNTER — APPOINTMENT (OUTPATIENT)
Dept: RADIOLOGY | Facility: HOSPITAL | Age: 58
End: 2023-05-22

## 2023-05-22 ENCOUNTER — ANESTHESIA (OUTPATIENT)
Dept: PERIOP | Facility: HOSPITAL | Age: 58
End: 2023-05-22

## 2023-05-22 DIAGNOSIS — R41.89 COGNITIVE DECLINE: ICD-10-CM

## 2023-05-22 DIAGNOSIS — N17.9 AKI (ACUTE KIDNEY INJURY) (HCC): ICD-10-CM

## 2023-05-22 DIAGNOSIS — R44.3 HALLUCINATIONS: ICD-10-CM

## 2023-05-22 DIAGNOSIS — R79.89 ELEVATED SERUM CREATININE: ICD-10-CM

## 2023-05-22 DIAGNOSIS — N13.5 URETERAL STRICTURE: ICD-10-CM

## 2023-05-22 DIAGNOSIS — F41.9 ANXIETY: ICD-10-CM

## 2023-05-22 DIAGNOSIS — N20.0 STAGHORN CALCULUS: ICD-10-CM

## 2023-05-22 DIAGNOSIS — R41.82 ALTERED MENTAL STATUS, UNSPECIFIED ALTERED MENTAL STATUS TYPE: ICD-10-CM

## 2023-05-22 DIAGNOSIS — F19.10 DRUG ABUSE, EPISODIC USE (HCC): Primary | ICD-10-CM

## 2023-05-22 DIAGNOSIS — G93.41 ACUTE METABOLIC ENCEPHALOPATHY: ICD-10-CM

## 2023-05-22 LAB
ANION GAP SERPL CALCULATED.3IONS-SCNC: 1 MMOL/L (ref 4–13)
BUN SERPL-MCNC: 12 MG/DL (ref 5–25)
CALCIUM SERPL-MCNC: 8.6 MG/DL (ref 8.3–10.1)
CHLORIDE SERPL-SCNC: 113 MMOL/L (ref 96–108)
CO2 SERPL-SCNC: 22 MMOL/L (ref 21–32)
CREAT SERPL-MCNC: 1.31 MG/DL (ref 0.6–1.3)
ERYTHROCYTE [DISTWIDTH] IN BLOOD BY AUTOMATED COUNT: 15.9 % (ref 11.6–15.1)
GFR SERPL CREATININE-BSD FRML MDRD: 59 ML/MIN/1.73SQ M
GLUCOSE SERPL-MCNC: 144 MG/DL (ref 65–140)
GLUCOSE SERPL-MCNC: 158 MG/DL (ref 65–140)
HCT VFR BLD AUTO: 44.1 % (ref 36.5–49.3)
HGB BLD-MCNC: 14.5 G/DL (ref 12–17)
MCH RBC QN AUTO: 29.2 PG (ref 26.8–34.3)
MCHC RBC AUTO-ENTMCNC: 32.9 G/DL (ref 31.4–37.4)
MCV RBC AUTO: 89 FL (ref 82–98)
PLATELET # BLD AUTO: 128 THOUSANDS/UL (ref 149–390)
PMV BLD AUTO: 9.9 FL (ref 8.9–12.7)
POTASSIUM SERPL-SCNC: 4 MMOL/L (ref 3.5–5.3)
RBC # BLD AUTO: 4.97 MILLION/UL (ref 3.88–5.62)
SODIUM SERPL-SCNC: 136 MMOL/L (ref 135–147)
WBC # BLD AUTO: 9.78 THOUSAND/UL (ref 4.31–10.16)

## 2023-05-22 PROCEDURE — 0TT04ZZ RESECTION OF RIGHT KIDNEY, PERCUTANEOUS ENDOSCOPIC APPROACH: ICD-10-PCS | Performed by: UROLOGY

## 2023-05-22 PROCEDURE — 0TP5X0Z REMOVAL OF DRAINAGE DEVICE FROM KIDNEY, EXTERNAL APPROACH: ICD-10-PCS | Performed by: UROLOGY

## 2023-05-22 PROCEDURE — 8E0W4CZ ROBOTIC ASSISTED PROCEDURE OF TRUNK REGION, PERCUTANEOUS ENDOSCOPIC APPROACH: ICD-10-PCS | Performed by: UROLOGY

## 2023-05-22 RX ORDER — SENNOSIDES 8.6 MG
1 TABLET ORAL DAILY
Status: DISCONTINUED | OUTPATIENT
Start: 2023-05-23 | End: 2023-05-29 | Stop reason: HOSPADM

## 2023-05-22 RX ORDER — HYDROMORPHONE HCL/PF 1 MG/ML
0.5 SYRINGE (ML) INJECTION
Status: DISCONTINUED | OUTPATIENT
Start: 2023-05-22 | End: 2023-05-22 | Stop reason: HOSPADM

## 2023-05-22 RX ORDER — ONDANSETRON 2 MG/ML
4 INJECTION INTRAMUSCULAR; INTRAVENOUS ONCE AS NEEDED
Status: DISCONTINUED | OUTPATIENT
Start: 2023-05-22 | End: 2023-05-22 | Stop reason: HOSPADM

## 2023-05-22 RX ORDER — ROCURONIUM BROMIDE 10 MG/ML
INJECTION, SOLUTION INTRAVENOUS AS NEEDED
Status: DISCONTINUED | OUTPATIENT
Start: 2023-05-22 | End: 2023-05-22

## 2023-05-22 RX ORDER — HYDROMORPHONE HCL/PF 1 MG/ML
0.5 SYRINGE (ML) INJECTION EVERY 2 HOUR PRN
Status: DISCONTINUED | OUTPATIENT
Start: 2023-05-22 | End: 2023-05-23

## 2023-05-22 RX ORDER — MEPERIDINE HYDROCHLORIDE 25 MG/ML
12.5 INJECTION INTRAMUSCULAR; INTRAVENOUS; SUBCUTANEOUS ONCE
Status: DISCONTINUED | OUTPATIENT
Start: 2023-05-22 | End: 2023-05-22 | Stop reason: HOSPADM

## 2023-05-22 RX ORDER — SODIUM CHLORIDE 9 MG/ML
100 INJECTION, SOLUTION INTRAVENOUS CONTINUOUS
Status: DISCONTINUED | OUTPATIENT
Start: 2023-05-22 | End: 2023-05-23

## 2023-05-22 RX ORDER — ONDANSETRON 2 MG/ML
4 INJECTION INTRAMUSCULAR; INTRAVENOUS EVERY 6 HOURS PRN
Status: DISCONTINUED | OUTPATIENT
Start: 2023-05-22 | End: 2023-05-22

## 2023-05-22 RX ORDER — MAGNESIUM HYDROXIDE 1200 MG/15ML
LIQUID ORAL AS NEEDED
Status: DISCONTINUED | OUTPATIENT
Start: 2023-05-22 | End: 2023-05-22 | Stop reason: HOSPADM

## 2023-05-22 RX ORDER — LIDOCAINE HYDROCHLORIDE 10 MG/ML
0.5 INJECTION, SOLUTION EPIDURAL; INFILTRATION; INTRACAUDAL; PERINEURAL ONCE AS NEEDED
Status: DISCONTINUED | OUTPATIENT
Start: 2023-05-22 | End: 2023-05-22

## 2023-05-22 RX ORDER — ONDANSETRON 2 MG/ML
4 INJECTION INTRAMUSCULAR; INTRAVENOUS EVERY 4 HOURS PRN
Status: DISCONTINUED | OUTPATIENT
Start: 2023-05-22 | End: 2023-05-29 | Stop reason: HOSPADM

## 2023-05-22 RX ORDER — LABETALOL HYDROCHLORIDE 5 MG/ML
5 INJECTION, SOLUTION INTRAVENOUS
Status: DISCONTINUED | OUTPATIENT
Start: 2023-05-22 | End: 2023-05-22 | Stop reason: HOSPADM

## 2023-05-22 RX ORDER — DOCUSATE SODIUM 100 MG/1
100 CAPSULE, LIQUID FILLED ORAL 2 TIMES DAILY
Status: DISCONTINUED | OUTPATIENT
Start: 2023-05-22 | End: 2023-05-29 | Stop reason: HOSPADM

## 2023-05-22 RX ORDER — BUPIVACAINE HYDROCHLORIDE 2.5 MG/ML
INJECTION, SOLUTION EPIDURAL; INFILTRATION; INTRACAUDAL AS NEEDED
Status: DISCONTINUED | OUTPATIENT
Start: 2023-05-22 | End: 2023-05-22 | Stop reason: HOSPADM

## 2023-05-22 RX ORDER — ONDANSETRON 2 MG/ML
INJECTION INTRAMUSCULAR; INTRAVENOUS AS NEEDED
Status: DISCONTINUED | OUTPATIENT
Start: 2023-05-22 | End: 2023-05-22

## 2023-05-22 RX ORDER — NICOTINE 21 MG/24HR
1 PATCH, TRANSDERMAL 24 HOURS TRANSDERMAL DAILY
Status: DISCONTINUED | OUTPATIENT
Start: 2023-05-23 | End: 2023-05-29 | Stop reason: HOSPADM

## 2023-05-22 RX ORDER — ROPIVACAINE HYDROCHLORIDE 2 MG/ML
INJECTION, SOLUTION EPIDURAL; INFILTRATION; PERINEURAL AS NEEDED
Status: DISCONTINUED | OUTPATIENT
Start: 2023-05-22 | End: 2023-05-22

## 2023-05-22 RX ORDER — ALBUTEROL SULFATE 2.5 MG/3ML
2.5 SOLUTION RESPIRATORY (INHALATION) ONCE AS NEEDED
Status: DISCONTINUED | OUTPATIENT
Start: 2023-05-22 | End: 2023-05-22 | Stop reason: HOSPADM

## 2023-05-22 RX ORDER — ENOXAPARIN SODIUM 100 MG/ML
40 INJECTION SUBCUTANEOUS DAILY
Status: DISCONTINUED | OUTPATIENT
Start: 2023-05-23 | End: 2023-05-23 | Stop reason: ALTCHOICE

## 2023-05-22 RX ORDER — FENTANYL CITRATE 50 UG/ML
INJECTION, SOLUTION INTRAMUSCULAR; INTRAVENOUS AS NEEDED
Status: DISCONTINUED | OUTPATIENT
Start: 2023-05-22 | End: 2023-05-22

## 2023-05-22 RX ORDER — ALBUTEROL SULFATE 90 UG/1
AEROSOL, METERED RESPIRATORY (INHALATION) AS NEEDED
Status: DISCONTINUED | OUTPATIENT
Start: 2023-05-22 | End: 2023-05-22

## 2023-05-22 RX ORDER — CALCIUM CARBONATE 500 MG/1
1000 TABLET, CHEWABLE ORAL DAILY PRN
Status: DISCONTINUED | OUTPATIENT
Start: 2023-05-22 | End: 2023-05-29 | Stop reason: HOSPADM

## 2023-05-22 RX ORDER — SODIUM CHLORIDE 9 MG/ML
INJECTION, SOLUTION INTRAVENOUS CONTINUOUS PRN
Status: DISCONTINUED | OUTPATIENT
Start: 2023-05-22 | End: 2023-05-22

## 2023-05-22 RX ORDER — MIDAZOLAM HYDROCHLORIDE 2 MG/2ML
INJECTION, SOLUTION INTRAMUSCULAR; INTRAVENOUS AS NEEDED
Status: DISCONTINUED | OUTPATIENT
Start: 2023-05-22 | End: 2023-05-22

## 2023-05-22 RX ORDER — SODIUM CHLORIDE, SODIUM LACTATE, POTASSIUM CHLORIDE, CALCIUM CHLORIDE 600; 310; 30; 20 MG/100ML; MG/100ML; MG/100ML; MG/100ML
125 INJECTION, SOLUTION INTRAVENOUS CONTINUOUS
Status: DISCONTINUED | OUTPATIENT
Start: 2023-05-22 | End: 2023-05-22

## 2023-05-22 RX ORDER — HYDROMORPHONE HCL/PF 1 MG/ML
SYRINGE (ML) INJECTION AS NEEDED
Status: DISCONTINUED | OUTPATIENT
Start: 2023-05-22 | End: 2023-05-22

## 2023-05-22 RX ORDER — DIPHENHYDRAMINE HYDROCHLORIDE 50 MG/ML
25 INJECTION INTRAMUSCULAR; INTRAVENOUS EVERY 6 HOURS PRN
Status: DISCONTINUED | OUTPATIENT
Start: 2023-05-22 | End: 2023-05-29 | Stop reason: HOSPADM

## 2023-05-22 RX ORDER — FENTANYL CITRATE/PF 50 MCG/ML
25 SYRINGE (ML) INJECTION
Status: DISCONTINUED | OUTPATIENT
Start: 2023-05-22 | End: 2023-05-22 | Stop reason: HOSPADM

## 2023-05-22 RX ORDER — PROMETHAZINE HYDROCHLORIDE 25 MG/ML
12.5 INJECTION, SOLUTION INTRAMUSCULAR; INTRAVENOUS ONCE AS NEEDED
Status: DISCONTINUED | OUTPATIENT
Start: 2023-05-22 | End: 2023-05-22 | Stop reason: HOSPADM

## 2023-05-22 RX ORDER — KETAMINE HCL IN NACL, ISO-OSM 100MG/10ML
SYRINGE (ML) INJECTION AS NEEDED
Status: DISCONTINUED | OUTPATIENT
Start: 2023-05-22 | End: 2023-05-22

## 2023-05-22 RX ORDER — PROPOFOL 10 MG/ML
INJECTION, EMULSION INTRAVENOUS AS NEEDED
Status: DISCONTINUED | OUTPATIENT
Start: 2023-05-22 | End: 2023-05-22

## 2023-05-22 RX ORDER — DEXAMETHASONE SODIUM PHOSPHATE 10 MG/ML
INJECTION, SOLUTION INTRAMUSCULAR; INTRAVENOUS AS NEEDED
Status: DISCONTINUED | OUTPATIENT
Start: 2023-05-22 | End: 2023-05-22

## 2023-05-22 RX ADMIN — FENTANYL CITRATE 50 MCG: 50 INJECTION INTRAMUSCULAR; INTRAVENOUS at 13:25

## 2023-05-22 RX ADMIN — AZTREONAM 2000 MG: 2 INJECTION, POWDER, LYOPHILIZED, FOR SOLUTION INTRAMUSCULAR; INTRAVENOUS at 12:50

## 2023-05-22 RX ADMIN — ROPIVACAINE HYDROCHLORIDE: 2 INJECTION, SOLUTION EPIDURAL; INFILTRATION at 16:45

## 2023-05-22 RX ADMIN — ROPIVACAINE HYDROCHLORIDE 5 ML: 2 INJECTION, SOLUTION EPIDURAL; INFILTRATION at 14:54

## 2023-05-22 RX ADMIN — MIDAZOLAM 2 MG: 1 INJECTION INTRAMUSCULAR; INTRAVENOUS at 11:55

## 2023-05-22 RX ADMIN — SODIUM CHLORIDE 100 ML/HR: 0.9 INJECTION, SOLUTION INTRAVENOUS at 17:29

## 2023-05-22 RX ADMIN — SUGAMMADEX 200 MG: 100 INJECTION, SOLUTION INTRAVENOUS at 15:32

## 2023-05-22 RX ADMIN — ROPIVACAINE HYDROCHLORIDE 2 ML: 2 INJECTION, SOLUTION EPIDURAL; INFILTRATION at 16:35

## 2023-05-22 RX ADMIN — ROCURONIUM BROMIDE 50 MG: 10 INJECTION, SOLUTION INTRAVENOUS at 12:29

## 2023-05-22 RX ADMIN — HYDROMORPHONE HYDROCHLORIDE 1 MG: 1 INJECTION, SOLUTION INTRAMUSCULAR; INTRAVENOUS; SUBCUTANEOUS at 14:41

## 2023-05-22 RX ADMIN — FENTANYL CITRATE 25 MCG: 50 INJECTION INTRAMUSCULAR; INTRAVENOUS at 17:00

## 2023-05-22 RX ADMIN — ROPIVACAINE HYDROCHLORIDE 3 ML: 2 INJECTION, SOLUTION EPIDURAL; INFILTRATION at 16:23

## 2023-05-22 RX ADMIN — AZTREONAM 1000 MG: 2 INJECTION, POWDER, LYOPHILIZED, FOR SOLUTION INTRAMUSCULAR; INTRAVENOUS at 20:08

## 2023-05-22 RX ADMIN — ROPIVACAINE HYDROCHLORIDE 5 ML: 2 INJECTION, SOLUTION EPIDURAL; INFILTRATION at 15:05

## 2023-05-22 RX ADMIN — Medication 20 MG: at 13:34

## 2023-05-22 RX ADMIN — DOCUSATE SODIUM 100 MG: 100 CAPSULE, LIQUID FILLED ORAL at 21:09

## 2023-05-22 RX ADMIN — ROCURONIUM BROMIDE 10 MG: 10 INJECTION, SOLUTION INTRAVENOUS at 14:08

## 2023-05-22 RX ADMIN — ROCURONIUM BROMIDE 20 MG: 10 INJECTION, SOLUTION INTRAVENOUS at 14:42

## 2023-05-22 RX ADMIN — LABETALOL HYDROCHLORIDE 5 MG: 5 INJECTION, SOLUTION INTRAVENOUS at 17:23

## 2023-05-22 RX ADMIN — SODIUM CHLORIDE, SODIUM LACTATE, POTASSIUM CHLORIDE, AND CALCIUM CHLORIDE: .6; .31; .03; .02 INJECTION, SOLUTION INTRAVENOUS at 11:55

## 2023-05-22 RX ADMIN — ONDANSETRON 4 MG: 2 INJECTION INTRAMUSCULAR; INTRAVENOUS at 14:59

## 2023-05-22 RX ADMIN — PROPOFOL 150 MG: 10 INJECTION, EMULSION INTRAVENOUS at 12:28

## 2023-05-22 RX ADMIN — Medication 30 MG: at 14:01

## 2023-05-22 RX ADMIN — DEXAMETHASONE SODIUM PHOSPHATE 10 MG: 10 INJECTION, SOLUTION INTRAMUSCULAR; INTRAVENOUS at 12:33

## 2023-05-22 RX ADMIN — FENTANYL CITRATE 100 MCG: 50 INJECTION INTRAMUSCULAR; INTRAVENOUS at 12:27

## 2023-05-22 RX ADMIN — ALBUTEROL SULFATE 2 PUFF: 90 AEROSOL, METERED RESPIRATORY (INHALATION) at 15:50

## 2023-05-22 RX ADMIN — SODIUM CHLORIDE: 0.9 INJECTION, SOLUTION INTRAVENOUS at 12:22

## 2023-05-22 RX ADMIN — ROCURONIUM BROMIDE 20 MG: 10 INJECTION, SOLUTION INTRAVENOUS at 13:09

## 2023-05-22 RX ADMIN — FENTANYL CITRATE 50 MCG: 50 INJECTION INTRAMUSCULAR; INTRAVENOUS at 13:34

## 2023-05-22 NOTE — ANESTHESIA PROCEDURE NOTES
Epidural Block    Start time: 5/22/2023 11:45 AM  Reason for block: procedure for pain and at surgeon's request  Staffing  Performed: Anesthesiologist   Anesthesiologist: Anni Catalan MD  Preanesthetic Checklist  Completed: patient identified, IV checked, site marked, risks and benefits discussed, surgical consent, monitors and equipment checked, pre-op evaluation and timeout performed  Epidural  Patient position: sitting  Prep: ChloraPrep  Patient monitoring: cardiac monitor, frequent blood pressure checks, continuous pulse ox and heart rate  Approach: midline  Location: thoracic  Injection technique: CLARY air  Needle  Needle type: Tuohy   Needle gauge: 18 G  Catheter type: side hole  Catheter size: 20 G  Catheter at skin depth: 8 cm  Catheter securement method: stabilization device  Test dose: negative  Assessment  Number of attempts: 1negative aspiration for CSF, negative aspiration for heme and no paresthesia on injection  patient tolerated the procedure well with no immediate complications

## 2023-05-22 NOTE — ANESTHESIA POSTPROCEDURE EVALUATION
Post-Op Assessment Note    CV Status:  Stable  Pain Score: 6    Pain management: adequate     Mental Status:  Alert and awake   Hydration Status:  Euvolemic   PONV Controlled:  Controlled   Airway Patency:  Patent      Post Op Vitals Reviewed: Yes      Staff: Anesthesiologist, CRNA   Comments: c/o abd pain; epidural dosed and infusion ordered    Post-op block assessment: no complications      No notable events documented      BP  174/108   Temp 98 1   Pulse  88   Resp   14   SpO2   96

## 2023-05-22 NOTE — ANESTHESIA PREPROCEDURE EVALUATION
Procedure:  NEPHRECTOMY RADICAL LAPAROSCOPIC W/ ROBOTICS (Right: Abdomen)    Relevant Problems   ANESTHESIA (within normal limits)      CARDIO  Echo: 55%EF, valves OK   (+) Essential hypertension      ENDO (within normal limits)      /RENAL   (+) BRAULIO (acute kidney injury) ([facility] [address] )   (+) Kidney stones      MUSCULOSKELETAL   (+) Rhabdomyolysis      NEURO/PSYCH  Occasional narcotic abuse  Last time was a few days jessica  He is not using daily  (+) Anxiety      PULMONARY   (+) Acute respiratory failure with hypoxia (HCC)   (+) Smoking        Physical Exam    Airway    Mallampati score: II  TM Distance: >3 FB  Neck ROM: full     Dental       Cardiovascular      Pulmonary      Other Findings        Anesthesia Plan  ASA Score- 2     Anesthesia Type- general with ASA Monitors  Additional Monitors:   Airway Plan: ETT  Comment: Patient seen and examined  History reviewed  Patient to be done under general anesthesia with ETT and routine monitors  Epidural for post-op pain  Risks discussed with the patient  Consent obtained          Plan Factors-Exercise tolerance (METS): >4 METS  Chart reviewed  EKG reviewed  Existing labs reviewed  Patient summary reviewed  Patient is a current smoker  Patient smoked on day of surgery  Induction- intravenous  Postoperative Plan- Plan for postoperative opioid use  Planned trial extubation    Informed Consent- Anesthetic plan and risks discussed with patient  I personally reviewed this patient with the CRNA  Discussed and agreed on the Anesthesia Plan with the CRNA  Sommer Hall

## 2023-05-22 NOTE — RESPIRATORY THERAPY NOTE
RT Protocol Note  Leonel Batista 61 y o  male MRN: [id number]  Unit/Bed#: [facility] 320-01 Encounter: [id number]    Assessment    Principal Problem:    Kidney stones  Active Problems:    UTI (urinary tract infection), bacterial    Staghorn calculus      Home Pulmonary Medications:  NA       Past Medical History:   Diagnosis Date    Anxiety     Bright red rectal bleeding     Last Assessed: 9/9/2015     Drug dependence ([facility] [address] ) 10/20/2021    Hypertension     Last Assessed: 7/9/2014     Kidney stone     Kidney stones     Last Assessed: 11/17/2016     Periorbital edema     Last Assessed: 9/4/2015    Se[facility] shock [facility] ) 08/20/2022    Skin tag of anus     Last Assessed: 9/9/2015     Trigger point of thoracic region     Last Assessed: 11/6/2015      Social History     Socioeconomic History    Marital status:      Spouse name: None    Number of children: None    Years of education: None    Highest education level: None   Occupational History    None   Tobacco Use    Smoking status: Every Day     Packs/day: 2 00     Years: 35 00     Pack years: 70 00     Types: Cigarettes    Smokeless tobacco: Never   Vaping Use    Vaping Use: Never used   Substance and Sexual Activity    Alcohol use: Not Currently    Drug use: Not Currently    Sexual activity: Not Currently   Other Topics Concern    None   Social History Narrative    Caffeine Use     Uses safety Equipment- Seatbelts      Social Determinants of Health     Financial Resource Strain: Not on file   Food Insecurity: No Food Insecurity    Worried About Running Out of Food in the Last Year: Never true    Ran Out of Food in the Last Year: Never true   Transportation Needs: No Transportation Needs    Lack of Transportation (Medical): No    Lack of Transportation (Non-Medical):  No   Physical Activity: Not on file   Stress: Not on file   Social Connections: Not on file   Intimate Partner Violence: Not on file   Housing Stability: Low Risk     Unable to Pay for Housing in the Last "Year: No    Number of Places Lived in the Last Year: 1    Unstable Housing in the Last Year: No       Subjective         Objective    Physical Exam:   Assessment Type: Assess only  General Appearance: Lethargic, Sleeping, Eyes open/responds to voice  Respiratory Pattern: Normal  Chest Assessment: Chest expansion symmetrical  Bilateral Breath Sounds: Diminished, Clear  O2 Device: NC    Vitals:  Blood pressure (!) 178/74, pulse 85, temperature (!) 97 4 °F (36 3 °C), temperature source Oral, resp  rate 18, height 5' 7\" (1 702 m), weight 66 4 kg (146 lb 7 9 oz), SpO2 96 %  Imaging and other studies: I have personally reviewed pertinent reports  O2 Device: NC     Plan    Respiratory Plan: No distress/Pulmonary history        Resp Comments: Respiratory Protocol initiated  No home meds or pulm hx  Patient states he smokes 2PPD  Overnight Capnography set up  VS stable  No distress     "

## 2023-05-22 NOTE — INTERVAL H&P NOTE
H&P reviewed  After examining the patient I find no changes in the patients condition since the H&P had been written  Vitals:    05/22/23 1113   BP: 134/73   Pulse: 58   Resp: 18   Temp: (!) 96 1 °F (35 6 °C)   SpO2: 98%     Patient was treated with gentamicin preop  Discussed risks and benefits of nephrectomy, including risk of postop infection  They understand and agree with the plan

## 2023-05-22 NOTE — OP NOTE
OPERATIVE REPORT  PATIENT NAME: Jack Aquino    :  1965  MRN: [id number]  Pt Location: BE OR ROOM 14    SURGERY DATE: 2023    Surgeon(s) and Role:     * Piper Still MD - Primary     * Sundeep Bowman PA-C - Assisting  NOTE:  There were no qualified teaching residents to assist with this case    *The physician assistant was medically necessary and integral during the entire procedure to help maintain retraction irrigation suction and instrument insertion during the procedure      Preop Diagnosis:  Staghorn calculus [N20 0]  Ureteral stricture [N13 5]    Post-Op Diagnosis Codes:     * Staghorn calculus [N20 0]     * Ureteral stricture [N13 5]    Procedure(s):  Right - NEPHRECTOMY RADICAL LAPAROSCOPIC W/ ROBOTICS    Specimen(s):  ID Type Source Tests Collected by Time Destination   1 : Right Kidney Tissue Kidney, Right TISSUE EXAM Piper Still MD 2023 1517        Estimated Blood Loss:   Minimal    Drains:  Closed/Suction Drain Right RLQ Bulb 19 Fr  (Active)   Number of days: 0       Urethral Catheter Latex;Straight-tip 16 Fr  (Active)   Number of days: 0       Anesthesia Type:   General    Operative Indications:  Staghorn calculus [N20 0]  Ureteral stricture [N13 5]  Chronic infection    Operative Findings:  Appearance of prior bowel surgery or appendectomy with lower quadrant adhesions  Significantly enlarged liver  Diffuse inflammatory changes right retroperitoneum making dissection difficult  Indwelling nephrostomy tube removed  Complications:   None    Procedure and Technique:  The patient was identified, brought to the operating room and placed on the table in supine position  After induction of general anesthesia, a Yee catheter was placed and he was placed in the left lateral decubitus position  Gel rolls were placed behind the shoulder and hip  Axillary roll was placed  He was secured to the table with foam towels and cloth tape    Right arm placed over left and secured with lateral arm positioner  All joints and pressure points were padded with foam padding  He was prepped and draped in the usual sterile fashion  A formal timeout was performed  A lower midline incision scar was noted and there was concern for adhesions due to prior surgery  Veress needle was placed in the abdominal cavity  Initially insufflation was quite difficult and I moved the needle to the midline instead of the right mid quadrant  The abdomen was insufflated to 15 mmHg pressure  A line was drawn on the abdomen along the right midclavicular line, and spacing determined for port placement  An 8 mm port was placed and the robotic laparoscope was placed  No significant abdominal adhesions along the midline or in the right lower quadrant noted  Three additional 8 mm robotic ports and a 12 mm assistant port were then placed under direct vision  A 5 mm subxiphoid port was placed for the liver retraction instrument  The robot was then docked  On evaluation, it appeared that there was a needle puncture to the liver  There was mild clot there but it was hemostatic  This was left alone  Right lower quadrant adhesions and midline adhesions were noted however we avoided these by placing ports under direct vision  These did not require adhesiolysis  From the robotic console I took down the hepatic flexure of the right colon with the cautery  The colon was reflected medially  There was a good deal of inflammatory change to the right retroperitoneum  the duodenum was identified and carefully Kocherized  The vena cava was identified  Gerota's fascia was then entered and the area of the hilum was dissected  Both the vein and artery were carefully dissected  The ureter was then carefully dissected  It was inflamed and stuck within a good amount of surrounding fat which was carefully taken down    Ureter appeared to contain a stone and so dissection was carried down more distally  Hem-o-laney clip was then placed in the ureter was then divided above it  The lateral portion of the kidney was dissected as well  The nephrostomy tube was identified  This was cut and then removed externally by the circulating nurse  The robotic stapler was then placed and the renal hilum was taken with the stapler  Additional dissection was then performed between the kidney and the adrenal gland and this was stapled off as well  The remaining portion of the dissection of the perinephric fat was then performed and the kidney was freed  The specimen was placed in a specimen bag  The area was irrigated  There was good hemostasis  Careful evaluation was then performed of the liver itself due to concern due to its size and the need to retract and passed numerous instruments alongside the liver  There was excellent hemostasis though  10 mL of Floseal were placed over the liver and an additional 10 mL placed over the nephrectomy site  A David drain was placed into the right lower quadrant  All ports were then removed  Right lower quadrant transverse incision was then made through fascia, muscle, and peritoneum and the specimen bag was retrieved  The assistant port was closed with Vicryl fascial suture  The right lower quadrant port was then closed with 2 layers of PDS as well as a subcutaneous Vicryl suture  All incisions were then irrigated and closed with 4-0 Monocryl subcuticular stitch and histoacryl  The patient tolerated the procedure well and was transferred to recovery room awake alert and in stable condition  All counts were correct at the end of the case ×2  I was present for the entire procedure      Patient Disposition:  PACU         SIGNATURE: Job Stevenson MD  DATE: May 22, 2023  TIME: 3:39 PM

## 2023-05-22 NOTE — LETTER
[address] [facility] [address]  [address]  [place] [address]  Dept: [phone]    May 29, 2023     Patient: Nick Kaufman   YOB: 1965   Date of Visit: 5/22/2023       To Whom it May Concern:    Nick Kaufman is under my professional care  He was seen in the hospital from 5/22/2023 to 05/29/23  He has office follow up with Dr Jodi Goodson on 6/15  If you have any questions or concerns, please don't hesitate to call           Sincerely,      [place] M[place] PA-C

## 2023-05-22 NOTE — ANESTHESIA PROCEDURE NOTES
"Central Line Insertion  Performed by: Savanna Gonzalez MD  Authorized by: Savanna Gonzalez MD     Date/Time: 5/22/2023 12:10 PM  Catheter Type:  triple lumen  Consent: Verbal consent obtained  Written consent obtained  Consent given by: patient  Patient understanding: patient states understanding of the procedure being performed  Patient consent: the patient's understanding of the procedure matches consent given  Procedure consent: procedure consent matches procedure scheduled  Relevant documents: relevant documents present and verified  Test results: test results available and properly labeled  Site marked: the operative site was not marked  Patient identity confirmed: hospital-assigned identification number and verbally with patient  Time out: Immediately prior to procedure a \"time out\" was called to verify the correct patient, procedure, equipment, support staff and site/side marked as required    Indications: vascular access  Catheter size: 7 Fr  Patient position: Trendelenburg  Anesthesia: local infiltration  Assessment: blood return through all ports  Preparation: skin prepped with 2% chlorhexidine  Skin prep agent dried: skin prep agent completely dried prior to procedure  Sterile barriers: all five maximum sterile barriers used - cap, mask, sterile gown, sterile gloves, and large sterile sheet  Hand hygiene: hand hygiene performed prior to central venous catheter insertion  sterile gel and probe cover used in ultrasound-guided central venous catheter insertionPre-procedure: landmarks identified  Vessel of Catheter Tip End: SVC  Number of attempts: 1  Successful placement: yes  Post-procedure: line sutured and dressing applied  Patient tolerance: Patient tolerated the procedure well with no immediate complications and patient tolerated the procedure well with no immediate complications      "

## 2023-05-23 LAB
ABO GROUP BLD BPU: NORMAL
ABO GROUP BLD BPU: NORMAL
ANION GAP SERPL CALCULATED.3IONS-SCNC: 0 MMOL/L (ref 4–13)
BPU ID: NORMAL
BPU ID: NORMAL
BUN SERPL-MCNC: 15 MG/DL (ref 5–25)
CALCIUM SERPL-MCNC: 8.3 MG/DL (ref 8.3–10.1)
CHLORIDE SERPL-SCNC: 110 MMOL/L (ref 96–108)
CO2 SERPL-SCNC: 24 MMOL/L (ref 21–32)
CREAT SERPL-MCNC: 1.5 MG/DL (ref 0.6–1.3)
CROSSMATCH: NORMAL
CROSSMATCH: NORMAL
ERYTHROCYTE [DISTWIDTH] IN BLOOD BY AUTOMATED COUNT: 15.8 % (ref 11.6–15.1)
GFR SERPL CREATININE-BSD FRML MDRD: 50 ML/MIN/1.73SQ M
GLUCOSE SERPL-MCNC: 115 MG/DL (ref 65–140)
HCT VFR BLD AUTO: 39 % (ref 36.5–49.3)
HGB BLD-MCNC: 12.6 G/DL (ref 12–17)
MCH RBC QN AUTO: 28.8 PG (ref 26.8–34.3)
MCHC RBC AUTO-ENTMCNC: 32.3 G/DL (ref 31.4–37.4)
MCV RBC AUTO: 89 FL (ref 82–98)
PLATELET # BLD AUTO: 133 THOUSANDS/UL (ref 149–390)
PMV BLD AUTO: 9.7 FL (ref 8.9–12.7)
POTASSIUM SERPL-SCNC: 4.5 MMOL/L (ref 3.5–5.3)
RBC # BLD AUTO: 4.37 MILLION/UL (ref 3.88–5.62)
SODIUM SERPL-SCNC: 134 MMOL/L (ref 135–147)
UNIT DISPENSE STATUS: NORMAL
UNIT DISPENSE STATUS: NORMAL
UNIT PRODUCT CODE: NORMAL
UNIT PRODUCT CODE: NORMAL
UNIT PRODUCT VOLUME: 350 ML
UNIT PRODUCT VOLUME: 350 ML
UNIT RH: NORMAL
UNIT RH: NORMAL
WBC # BLD AUTO: 12.32 THOUSAND/UL (ref 4.31–10.16)

## 2023-05-23 RX ORDER — LORAZEPAM 2 MG/ML
1 INJECTION INTRAMUSCULAR
Status: DISCONTINUED | OUTPATIENT
Start: 2023-05-23 | End: 2023-05-26

## 2023-05-23 RX ORDER — GLYCOPYRROLATE 0.2 MG/ML
0.2 INJECTION INTRAMUSCULAR; INTRAVENOUS EVERY 4 HOURS PRN
Status: DISCONTINUED | OUTPATIENT
Start: 2023-05-23 | End: 2023-05-26

## 2023-05-23 RX ORDER — HALOPERIDOL 5 MG/ML
2 INJECTION INTRAMUSCULAR
Status: DISCONTINUED | OUTPATIENT
Start: 2023-05-23 | End: 2023-05-26

## 2023-05-23 RX ORDER — HYDROMORPHONE HCL/PF 1 MG/ML
1 SYRINGE (ML) INJECTION EVERY 2 HOUR PRN
Status: DISCONTINUED | OUTPATIENT
Start: 2023-05-23 | End: 2023-05-27

## 2023-05-23 RX ORDER — HEPARIN SODIUM 5000 [USP'U]/ML
5000 INJECTION, SOLUTION INTRAVENOUS; SUBCUTANEOUS EVERY 8 HOURS SCHEDULED
Status: DISCONTINUED | OUTPATIENT
Start: 2023-05-24 | End: 2023-05-29 | Stop reason: HOSPADM

## 2023-05-23 RX ORDER — METHADONE HYDROCHLORIDE 10 MG/ML
110 CONCENTRATE ORAL DAILY
Status: DISCONTINUED | OUTPATIENT
Start: 2023-05-23 | End: 2023-05-29 | Stop reason: HOSPADM

## 2023-05-23 RX ORDER — SODIUM CHLORIDE, SODIUM GLUCONATE, SODIUM ACETATE, POTASSIUM CHLORIDE, MAGNESIUM CHLORIDE, SODIUM PHOSPHATE, DIBASIC, AND POTASSIUM PHOSPHATE .53; .5; .37; .037; .03; .012; .00082 G/100ML; G/100ML; G/100ML; G/100ML; G/100ML; G/100ML; G/100ML
100 INJECTION, SOLUTION INTRAVENOUS CONTINUOUS
Status: DISPENSED | OUTPATIENT
Start: 2023-05-23 | End: 2023-05-23

## 2023-05-23 RX ORDER — HYDROMORPHONE HCL/PF 1 MG/ML
1 SYRINGE (ML) INJECTION ONCE
Status: COMPLETED | OUTPATIENT
Start: 2023-05-23 | End: 2023-05-23

## 2023-05-23 RX ADMIN — HYDROMORPHONE HYDROCHLORIDE 0.5 MG: 1 INJECTION, SOLUTION INTRAMUSCULAR; INTRAVENOUS; SUBCUTANEOUS at 08:16

## 2023-05-23 RX ADMIN — ROPIVACAINE HYDROCHLORIDE: 2 INJECTION, SOLUTION EPIDURAL; INFILTRATION at 21:36

## 2023-05-23 RX ADMIN — ONDANSETRON 4 MG: 2 INJECTION INTRAMUSCULAR; INTRAVENOUS at 10:19

## 2023-05-23 RX ADMIN — ROPIVACAINE HYDROCHLORIDE: 2 INJECTION, SOLUTION EPIDURAL; INFILTRATION at 01:39

## 2023-05-23 RX ADMIN — AZTREONAM 1000 MG: 2 INJECTION, POWDER, LYOPHILIZED, FOR SOLUTION INTRAMUSCULAR; INTRAVENOUS at 11:17

## 2023-05-23 RX ADMIN — HYDROMORPHONE HYDROCHLORIDE 1 MG: 1 INJECTION, SOLUTION INTRAMUSCULAR; INTRAVENOUS; SUBCUTANEOUS at 21:21

## 2023-05-23 RX ADMIN — ENOXAPARIN SODIUM 40 MG: 40 INJECTION SUBCUTANEOUS at 08:30

## 2023-05-23 RX ADMIN — SODIUM CHLORIDE 100 ML/HR: 0.9 INJECTION, SOLUTION INTRAVENOUS at 02:21

## 2023-05-23 RX ADMIN — HYDROMORPHONE HYDROCHLORIDE 1 MG: 1 INJECTION, SOLUTION INTRAMUSCULAR; INTRAVENOUS; SUBCUTANEOUS at 12:32

## 2023-05-23 RX ADMIN — METHADONE HYDROCHLORIDE 110 MG: 10 CONCENTRATE ORAL at 12:07

## 2023-05-23 RX ADMIN — HYDROMORPHONE HYDROCHLORIDE 0.5 MG: 1 INJECTION, SOLUTION INTRAMUSCULAR; INTRAVENOUS; SUBCUTANEOUS at 10:19

## 2023-05-23 RX ADMIN — HYDROMORPHONE HYDROCHLORIDE 1 MG: 1 INJECTION, SOLUTION INTRAMUSCULAR; INTRAVENOUS; SUBCUTANEOUS at 15:01

## 2023-05-23 RX ADMIN — HYDROMORPHONE HYDROCHLORIDE 1 MG: 1 INJECTION, SOLUTION INTRAMUSCULAR; INTRAVENOUS; SUBCUTANEOUS at 17:28

## 2023-05-23 RX ADMIN — SODIUM CHLORIDE, SODIUM GLUCONATE, SODIUM ACETATE, POTASSIUM CHLORIDE, MAGNESIUM CHLORIDE, SODIUM PHOSPHATE, DIBASIC, AND POTASSIUM PHOSPHATE 100 ML/HR: .53; .5; .37; .037; .03; .012; .00082 INJECTION, SOLUTION INTRAVENOUS at 12:29

## 2023-05-23 RX ADMIN — ROPIVACAINE HYDROCHLORIDE: 2 INJECTION, SOLUTION EPIDURAL; INFILTRATION at 09:52

## 2023-05-23 RX ADMIN — HYDROMORPHONE HYDROCHLORIDE 1 MG: 1 INJECTION, SOLUTION INTRAMUSCULAR; INTRAVENOUS; SUBCUTANEOUS at 11:22

## 2023-05-23 RX ADMIN — NICOTINE 1 PATCH: 21 PATCH, EXTENDED RELEASE TRANSDERMAL at 08:30

## 2023-05-23 RX ADMIN — KETAMINE HYDROCHLORIDE 0.1 MG/KG/HR: 50 INJECTION, SOLUTION INTRAMUSCULAR; INTRAVENOUS at 15:35

## 2023-05-23 RX ADMIN — AZTREONAM 1000 MG: 2 INJECTION, POWDER, LYOPHILIZED, FOR SOLUTION INTRAMUSCULAR; INTRAVENOUS at 03:07

## 2023-05-23 RX ADMIN — ROPIVACAINE HYDROCHLORIDE: 2 INJECTION, SOLUTION EPIDURAL; INFILTRATION at 16:05

## 2023-05-23 NOTE — PROGRESS NOTES
Progress Note -urology  Rose Marie Guzman 61 y o  male MRN: [id number]  Unit/Bed#: [facility] 320-01 Encounter: [id number]    Assessment & Plan:    Right nephrectomy radical laparoscopic with robotics for staghorn calculus and ureteral stricture, progressing slowly:  -Postop day 1 radical right nephrectomy laparoscopic with robotics progressing slowly  -Continue with pain control, history of substance abuse, alcohol abuse currently managed on methadone  Keep pain services following and managing pain appreciate their services  -Status post nephrectomy, nephrology also consulted and following  Baseline creatinine 0 9-1 2 increased to 1 5 postoperatively expected with loss of nephrons  Appreciate any additional renal optimization/Recommendations   -Encourage ambulation, abdominal binder  -Wean off of O2  -Provide a good bowel regimen in the setting of several narcotics  -Patient reporting nausea we will hold off on advancement of diet will continue clear liquids  Continue with antiemetics  -KATHY drain in place with 105 cc in drain, will continue to monitor output to remove prior to discharge   -Yee catheter removed this a m , discussed t with nursing staff bladder scan to assure emptying adequately   -Patient hemodynamically stable afebrile  -Mild reactive leukocytosis 12,   -hemoglobin 12 6 plan for repeat tomorrow   -Anticipate patient to stay for a few more days approximately 48 to 72 hours for medical optimization and postsurgical optimization  Subjective/Objective   Chief Complaint: Pain    Subjective:   Patient reporting to have continued pain despite epidural and IV as needed meds  Reports pain to primarily be on his right side at site of large incision and KATHY drain  Reports some nausea denies any vomiting  Has not been up and out of bed ambulating due to severe pain  States he will attempt to try it later today pain better controlled with adjustment of medications      Objective:     Blood pressure "148/71, pulse 69, temperature 98 1 °F (36 7 °C), temperature source Oral, resp  rate 17, height 5' 7\" (1 702 m), weight 66 4 kg (146 lb 7 9 oz), SpO2 97 %  ,Body mass index is 22 94 kg/m²  Intake/Output Summary (Last 24 hours) at 5/23/2023 1145  Last data filed at 5/23/2023 1142  Gross per 24 hour   Intake 1694 5 ml   Output 1140 ml   Net 554 5 ml       Invasive Devices     Central Venous Catheter Line  Duration           CVC Central Lines 05/22/23 Triple <1 day          Epidural Line  Duration           Epidural Catheter 05/22/23 1 day          Drain  Duration           Closed/Suction Drain Right RLQ Bulb 19 Fr  <1 day            Physical Exam  Constitutional:       General: He is not in acute distress  Appearance: He is normal weight  He is not ill-appearing, toxic-appearing or diaphoretic  HENT:      Head: Normocephalic and atraumatic  Right Ear: External ear normal       Left Ear: External ear normal       Nose: Nose normal       Mouth/Throat:      Pharynx: Oropharynx is clear  Eyes:      General: No scleral icterus  Conjunctiva/sclera: Conjunctivae normal    Cardiovascular:      Rate and Rhythm: Normal rate and regular rhythm  Pulses: Normal pulses  Heart sounds: No murmur heard  No friction rub  No gallop  Pulmonary:      Effort: Pulmonary effort is normal  No respiratory distress  Breath sounds: No wheezing, rhonchi or rales  Abdominal:      General: Bowel sounds are normal  There is no distension  Palpations: Abdomen is soft  Tenderness: There is no abdominal tenderness  Comments: Surgical incisions intact, dry, no sign of wound dehiscence or underlying infection, KATHY drain with approximately 20 to 30 cc of bloody serous fluid  Abdomen is mildly distended bowel sounds are present throughout, severe tenderness on the right side primarily at large incision     Genitourinary:     Comments: Urethral Yee catheter removed patient voiding on his own clear " yellow urine  Musculoskeletal:         General: Normal range of motion  Cervical back: Normal range of motion  Skin:     General: Skin is warm and dry  Neurological:      General: No focal deficit present  Mental Status: He is alert and oriented to person, place, and time  Psychiatric:         Mood and Affect: Mood normal          Behavior: Behavior normal          Thought Content: Thought content normal          Judgment: Judgment normal            Lab, Imaging and other studies:I have personally reviewed pertinent lab results      Lab Results   Component Value Date    WBC 12 32 (H) 05/23/2023    HGB 12 6 05/23/2023    HCT 39 0 05/23/2023    MCV 89 05/23/2023     (L) 05/23/2023     Lab Results   Component Value Date    SODIUM 134 (L) 05/23/2023    K 4 5 05/23/2023     (H) 05/23/2023    CO2 24 05/23/2023    BUN 15 05/23/2023    CREATININE 1 50 (H) 05/23/2023    GLUC 115 05/23/2023    CALCIUM 8 3 05/23/2023         VTE Pharmacologic Prophylaxis: Enoxaparin (Lovenox)  VTE Mechanical Prophylaxis: sequential compression device      Carolyn Alcazar PA-C

## 2023-05-23 NOTE — PROGRESS NOTES
Follow up Consultation    Nephrology   Llana Bloch 61 y o  male MRN: [id number]  Unit/Bed#: [facility] 320-01 Encounter: [id number]      Physician Requesting Consult: Walker Jolley MD        ASSESSMENT/PLAN:  54-year-old male with multiple comorbidities including staghorn calculi with nephrolithiasis with previous PCN placement in August 2022 and had BRAULIO at that time presented for elective surgical intervention with radical right laparoscopic nephrectomy on 5/22/2023  Nephrology consulted for perioperative optimization to reduce incidence for acute kidney injury        Perioperative optimization to reduce incidence for acute kidney injury/elevated creatinine: At risk for BRAULIO multifactorial most likely secondary to ischemic injury secondary hemodynamic perturbations intraoperatively plus decreased nephron mass with nephrectomy  After review of records In [facility] as well as Care everywhere it appears that the patient has a baseline Creatinine of 0 9-1 2 mg/dL  Preadmission creatinine of 1 27 mg/dL on 5/13  Creatinine today is at 1 16 mg/dL, stable and improved from yesterday we will need to see where new baseline creatinine will be  Discussed with patient we will have to wait a period of 3 months to see where his new baseline creatinine will be  Okay to DC IV fluids later today  check BMP in a m  Await renal recovery  Optimize hemodynamic status to avoid delay in renal recovery  Place on a renal diet when allowed diet order  Avoid nephrotoxins, adjust meds to appropriate GFR  Strict I/O  Daily weights  Urinary retention protocol if patient does not have a Yee  Most likely has underlying CKD secondary to nephrolithiasis history of prior BRAULIO and now decreased nephron mass  will need to set up patient for follow up with Nephrology as an outpatient post hospitalization    Outpatient nephrology follow-up with Dr Brian Gibbons for discharge from renal standpoint medically cleared will need outpatient "follow-up with blood work in 4 weeks on  message sent to the office arrange for outpatient follow-up upon discharge     Acid-base electrolytes:  Electrolytes:       Hyponatremia:  Most recent sodium at 131 mEq  Check BMP in a m  Likely decreased secondary to increased ADH secondary to pain continue to monitor for now     Acid-base:    Most recent bicarb stable at 27     H/H/anemia:  most recent hemoglobin at 13 0 g/dL  maintain hemoglobin greater than 8 grams/deciliter     Blood pressure/normotensive:  current medications: None  recommendations: No changes  Optimize hemodynamics  Maintain MAP > 65mmHg  Avoid BP fluctuations      Other medical problems:  History of drug abuse:  Management per primary team   On methadone as an outpatient  Staghorn calculi/recurrent nephrolithiasis:  Management per primary team   Follow-up with urology status post radical right nephrectomy 2023  Pain management for assistance with pain control  Follow-up with nephrology as an outpatient for further stone work-up  Thanks for the consult  Will continue to follow  Please call with questions/ concerns  Above-mentioned orders and Plan in terms of stable for discharge from his standpoint will need outpatient follow-up for lab work were discussed with the team in depth and they agree  Thi Chen MD, [facility] 2023, 11:03 AM                  Objective :   Patient seen and examined in his room hemodynamically stable remains afebrile still with continued pain on ketamine for pain management  Urine output 2 L        PHYSICAL EXAM  /67 (BP Location: Left arm)   Pulse 68   Temp 98 1 °F (36 7 °C) (Oral)   Resp 15   Ht 5' 7\" (1 702 m)   Wt 66 4 kg (146 lb 7 9 oz)   SpO2 93%   BMI 22 94 kg/m²   Temp (24hrs), Av 4 °F (36 9 °C), Min:97 4 °F (36 3 °C), Max:99 7 °F (37 6 °C)        Intake/Output Summary (Last 24 hours) at 2023 1443  Last data filed at 2023 1436  Gross per 24 hour   Intake 3721 5 ml " Output 1440 ml   Net 2281 5 ml       I/O last 24 hours: In: 3721 5 [P O :100; I V :3571 5; IV Piggyback:50]  Out: 1440 [Urine:1115; Drains:125; Blood:200]      Current Weight: Weight - Scale: 66 4 kg (146 lb 7 9 oz)  First Weight: Weight - Scale: 66 4 kg (146 lb 7 9 oz)  Physical Exam  Vitals and nursing note reviewed  Constitutional:       General: He is not in acute distress  Appearance: Normal appearance  He is normal weight  He is not ill-appearing, toxic-appearing or diaphoretic  HENT:      Head: Normocephalic and atraumatic  Mouth/Throat:      Pharynx: No oropharyngeal exudate  Eyes:      General: No scleral icterus  Conjunctiva/sclera: Conjunctivae normal    Cardiovascular:      Rate and Rhythm: Normal rate  Heart sounds: Normal heart sounds  No friction rub  Pulmonary:      Effort: No respiratory distress  Breath sounds: Normal breath sounds  No stridor  Abdominal:      General: There is no distension  Palpations: Abdomen is soft  There is no mass  Tenderness: There is no abdominal tenderness  Musculoskeletal:         General: No swelling  Cervical back: Normal range of motion  No rigidity  Skin:     Coloration: Skin is not jaundiced  Neurological:      General: No focal deficit present  Mental Status: He is alert and oriented to person, place, and time  Psychiatric:         Mood and Affect: Mood normal          Behavior: Behavior normal              Review of Systems   Constitutional: Negative for chills and fatigue  HENT: Negative for congestion  Respiratory: Negative for cough and shortness of breath  Cardiovascular: Negative for leg swelling  Gastrointestinal: Positive for abdominal pain  Negative for diarrhea  Genitourinary: Negative for hematuria  Musculoskeletal: Negative for back pain  Neurological: Negative for headaches  Psychiatric/Behavioral: Negative for agitation     All other systems reviewed and are negative  Scheduled Meds:  Current Facility-Administered Medications   Medication Dose Route Frequency Provider Last Rate   • calcium carbonate  1,000 mg Oral Daily PRN Steven Mckeon MD     • diphenhydrAMINE  25 mg Intravenous Q6H PRN Lisandro Fonseca MD     • docusate sodium  100 mg Oral BID Steven Mckeon MD     • glycopyrrolate  0 2 mg Intravenous Q4H PRN Tony Trujillo MD     • haloperidol lactate  2 mg Intramuscular Q30 Min PRN Tony Trujillo MD     • heparin (porcine)  5,000 Units Subcutaneous [facility] Juan Kaplan PA-C     • HYDROmorphone  1 mg Intravenous Q2H PRN Tony Trujillo MD     • ketamine  0 1 mg/kg/hr Intravenous Continuous Tony Trujillo MD     • lactated ringers  1,000 mL Intravenous Once PRN Steven Mckeon MD      And   • lactated ringers  1,000 mL Intravenous Once PRN Steven Mckeon MD     • LORazepam  1 mg Intravenous Q1H PRN Tony Trujillo MD     • methadone  110 mg Oral Daily Tony Trujillo MD     • multi-electrolyte  100 mL/hr Intravenous Continuous Andrew Edward  mL/hr (05/23/23 1229)   • nicotine  1 patch Transdermal Daily Steven Mckeon MD     • ondansetron  4 mg Intravenous Q4H PRN Lisandro Fonseca MD     • ropivacaine 0 2%   Epidural Continuous Tony Trujillo MD     • senna  1 tablet Oral Daily Steven Mckeon MD     • sodium chloride  1,000 mL Intravenous Once PRN Steven Mckeon MD      And   • sodium chloride  1,000 mL Intravenous Once PRN Steven Mckeon MD         PRN Meds:  •  calcium carbonate  •  diphenhydrAMINE  •  glycopyrrolate  •  haloperidol lactate  •  HYDROmorphone  •  lactated ringers **AND** lactated ringers  •  LORazepam  •  ondansetron  •  sodium chloride **AND** sodium chloride    Continuous Infusions:ketamine, 0 1 mg/kg/hr  multi-electrolyte, 100 mL/hr, Last Rate: 100 mL/hr (05/23/23 1229)  ropivacaine 0 2%,           Invasive Devices:      Invasive Devices "   Central Venous Catheter Line  Duration           CVC Central Lines 05/22/23 Triple 1 day          Epidural Line  Duration           Epidural Catheter 05/22/23 1 day          Drain  Duration           Closed/Suction Drain Right RLQ Bulb 19 Fr  <1 day                  LABORATORY:    Results from last 7 days   Lab Units 05/23/23  0611 05/22/23  1656   WBC Thousand/uL 12 32* 9 78   HEMOGLOBIN g/dL 12 6 14 5   HEMATOCRIT % 39 0 44 1   PLATELETS Thousands/uL 133* 128*   POTASSIUM mmol/L 4 5 4 0   CHLORIDE mmol/L 110* 113*   CO2 mmol/L 24 22   BUN mg/dL 15 12   CREATININE mg/dL 1 50* 1 31*   CALCIUM mg/dL 8 3 8 6      rest all reviewed    RADIOLOGY:  XR chest portable   Final Result by Naya Echevarria MD (05/22 1720)      Right IJ catheter in adequate position  No evidence of pneumothorax  Pneumoperitoneum, most likely related to the recent surgery  Workstation performed: [id number]           Rest all reviewed    Portions of the record may have been created with voice recognition software  Occasional wrong word or \"sound a like\" substitutions may have occurred due to the inherent limitations of voice recognition software  Read the chart carefully and recognize, using context, where substitutions have occurred  If you have any questions, please contact the dictating provider      "

## 2023-05-23 NOTE — PLAN OF CARE
Problem: MOBILITY - ADULT  Goal: Maintain or return to baseline ADL function  Description: INTERVENTIONS:  -  Assess patient's ability to carry out ADLs; assess patient's baseline for ADL function and identify physical deficits which impact ability to perform ADLs (bathing, care of mouth/teeth, toileting, grooming, dressing, etc )  - Assess/evaluate cause of self-care deficits   - Assess range of motion  - Assess patient's mobility; develop plan if impaired  - Assess patient's need for assistive devices and provide as appropriate  - Encourage maximum independence but intervene and supervise when necessary  - Involve family in performance of ADLs  - Assess for home care needs following discharge   - Consider OT consult to assist with ADL evaluation and planning for discharge  - Provide patient education as appropriate  Outcome: Progressing  Goal: Maintains/Returns to pre admission functional level  Description: INTERVENTIONS:  - Perform BMAT or MOVE assessment daily    - Set and communicate daily mobility goal to care team and patient/family/caregiver  - Collaborate with rehabilitation services on mobility goals if consulted  - Perform Range of Motion 4 times a day  - Reposition patient every 2 hours    - Dangle patient 4 times a day  - Stand patient 4 times a day  - Ambulate patient 3 times a day  - Out of bed to chair 3 times a day   - Out of bed for meals 3 times a day  - Out of bed for toileting  - Record patient progress and toleration of activity level   Outcome: Progressing     Problem: PAIN - ADULT  Goal: Verbalizes/displays adequate comfort level or baseline comfort level  Description: Interventions:  - Encourage patient to monitor pain and request assistance  - Assess pain using appropriate pain scale  - Administer analgesics based on type and severity of pain and evaluate response  - Implement non-pharmacological measures as appropriate and evaluate response  - Consider cultural and social influences on pain and pain management  - Notify physician/advanced practitioner if interventions unsuccessful or patient reports new pain  Outcome: Progressing     Problem: INFECTION - ADULT  Goal: Absence or prevention of progression during hospitalization  Description: INTERVENTIONS:  - Assess and monitor for signs and symptoms of infection  - Monitor lab/diagnostic results  - Monitor all insertion sites, i e  indwelling lines, tubes, and drains  - Monitor endotracheal if appropriate and nasal secretions for changes in amount and color  - [place] appropriate cooling/warming therapies per order  - Administer medications as ordered  - Instruct and encourage patient and family to use good hand hygiene technique  - Identify and instruct in appropriate isolation precautions for identified infection/condition  Outcome: Progressing     Problem: RESPIRATORY - ADULT  Goal: Achieves optimal ventilation and oxygenation  Description: INTERVENTIONS:  - Assess for changes in respiratory status  - Assess for changes in mentation and behavior  - Position to facilitate oxygenation and minimize respiratory effort  - Oxygen administered by appropriate delivery if ordered  - Initiate smoking cessation education as indicated  - Encourage broncho-pulmonary hygiene including cough, deep breathe, Incentive Spirometry  - Assess the need for suctioning and aspirate as needed  - Assess and instruct to report SOB or any respiratory difficulty  - Respiratory Therapy support as indicated  Outcome: Progressing     Problem: GENITOURINARY - ADULT  Goal: Maintains or returns to baseline urinary function  Description: INTERVENTIONS:  - Assess urinary function  - Encourage oral fluids to ensure adequate hydration if ordered  - Administer IV fluids as ordered to ensure adequate hydration  - Administer ordered medications as needed  - Offer frequent toileting  - Follow urinary retention protocol if ordered  Outcome: Progressing  Goal: Absence of urinary retention  Description: INTERVENTIONS:  - Assess patient’s ability to void and empty bladder  - Monitor I/O  - Bladder scan as needed  - Discuss with physician/AP medications to alleviate retention as needed  - Discuss catheterization for long term situations as appropriate  Outcome: Progressing     Problem: SKIN/TISSUE INTEGRITY - ADULT  Goal: Incision(s), wounds(s) or drain site(s) healing without S/S of infection  Description: INTERVENTIONS  - Assess and document dressing, incision, wound bed, drain sites and surrounding tissue  - Provide patient and family education  - Perform skin care/dressing changes  Outcome: Progressing

## 2023-05-23 NOTE — CONSULTS
"Inpatient consult to Acute Pain Service  Consult performed by: Tony Trujillo MD  Consult ordered by: Lisandro Fonseca MD        Epidural Follow-up Note - Acute Pain Service    Darcy Calvo 61 y o  male MRN: [id number]  Unit/Bed#: [facility] 320-01 Encounter: [id number]      Assessment:   Principal Problem:    Kidney stones  Active Problems:    UTI (urinary tract infection), bacterial    Staghorn calculus      Darcy Calvo is a 61y o  year old male s/p Right nephrectomy on 5/22  PMH sig for chronic methadone maintenance at [facility]  Pt received a thoracic epidural preoperatively for pain control  Consulted for post op pain control  Pt seen and examined today, remains in severe pain  Epidural with minimal relief, describes pain all over  Unable to elucidate quality  Remains on clear diet, has not ambulated 2/2 pain  Denies flatus/BM  Denies n/v/pruritus/LE weakness    Plan:  Analgesia:  - Increase Thoracic epidural infusion of Ropivacaine 0 2% to 10/5/10/4  - Add Ketamine infusion @0 1mg/kg/hr  - Add home Methadone 110mg   - confirmed personally with [facility] 5/23/23  - Dilaudid 1mg IV q2hr PRN for breakthrough pain    - Anticipate epidural removal 1-2 days  - Encourage IS OOB  - Bowel regimen when appropriate from surgical perspective    APS will continue to follow  Please contact Acute Pain Service - [facility] via [employer] from [phone] with additional questions or concerns  See Lobito or Adelina for additional contacts and after hours information      Pain History  Current pain location(s): generalized abd  Pain Scale:   10  Quality: \"hurts everywhere\"  24 hour history: as above    PCEA use: max  Opioid requirement previous 24 hours: 0 5mg IV Dilaudid    Meds/Allergies   all current active meds have been reviewed    Allergies   Allergen Reactions   • Bee Venom Anaphylaxis   • Metronidazole Itching and Swelling   • Nsaids GI Intolerance     Stomach irritant   • " Penicillin G    • Penicillins GI Intolerance     Sick to stomach   • Pollen Extract    • Sulfa Antibiotics      Review of Systems - ROS reviewed and negative except as indicated    FH/SH - reviewed    Objective     Temp:  [96 1 °F (35 6 °C)-99 7 °F (37 6 °C)] 98 1 °F (36 7 °C)  HR:  [58-88] 69  Resp:  [14-22] 18  BP: ()/(46-88) 156/75    Physical Exam  Vitals and nursing note reviewed  Constitutional:       General: He is not in acute distress  Appearance: Normal appearance  HENT:      Head: Normocephalic and atraumatic  Mouth/Throat:      Mouth: Mucous membranes are moist    Eyes:      Extraocular Movements: Extraocular movements intact  Cardiovascular:      Rate and Rhythm: Normal rate  Pulmonary:      Effort: Pulmonary effort is normal       Comments: 1LNC  Abdominal:      General: There is no distension  Palpations: Abdomen is soft  Tenderness: There is abdominal tenderness  Musculoskeletal:         General: No swelling  Skin:     General: Skin is warm and dry  Neurological:      Mental Status: He is alert and oriented to person, place, and time  Psychiatric:         Mood and Affect: Mood normal          Behavior: Behavior normal        Epidural: Site clean/dry/intact, no surrounding erythema/edema/induration, infusion functioning appropriately    Lab Results:   Results from last 7 days   Lab Units 05/23/23  0611   WBC Thousand/uL 12 32*   HEMOGLOBIN g/dL 12 6   HEMATOCRIT % 39 0   PLATELETS Thousands/uL 133*      Results from last 7 days   Lab Units 05/23/23  0611   POTASSIUM mmol/L 4 5   CHLORIDE mmol/L 110*   CO2 mmol/L 24   BUN mg/dL 15   CREATININE mg/dL 1 50*   CALCIUM mg/dL 8 3          Imaging Studies: I have personally reviewed pertinent reports  EKG, Pathology, and Other Studies: I have personally reviewed pertinent reports  Counseling / Coordination of Care  Total floor / unit time spent today 20 minutes   Greater than 50% of total time was spent with the patient and / or family counseling and / or coordination of care  A description of the counseling / coordination of care: chart review, post op pain and regional/neuraxial pain management, discussion/planning with nursing/medical/surgical teams    Please note that the APS provides consultative services regarding pain management only  With the exception of ketamine, peripheral nerve catheters, and epidural infusions (and except when indicated), final decisions regarding starting or changing doses of analgesic medications are at the discretion of the consulting service  Off hours consultation and/or medication management is generally not available      Agata Aguilar MD  Acute Pain Service

## 2023-05-23 NOTE — UTILIZATION REVIEW
NOTIFICATION OF INPATIENT ADMISSION   AUTHORIZATION REQUEST   SERVICING FACILITY:   [facility]  Address: [address] [place] [address]  Tax ID: [id number]  NPI: [id number] ATTENDING PROVIDER:  Attending Name and NPI#: Haim Mckenzie Md [[id number]]  Address: [address] [place] [address]  Phone: [phone]   ADMISSION INFORMATION:  Place of Service: Inpatient [address]  60[address]  Place of Service Code: 21  Inpatient Admission Date/Time: 5/22/23 11:53 AM  Discharge Date/Time: No discharge date for patient encounter  Admitting Diagnosis Code/Description:  Staghorn calculus [N20 0]  Ureteral stricture [N13 5]     UTILIZATION REVIEW CONTACT:  Alexandro Neal Utilization   Network Utilization Review Department  Phone: [phone]  Fax: [phone]  Email: [email or web address]  Contact for approvals/pending authorizations, clinical reviews, and discharge  PHYSICIAN ADVISORY SERVICES:  Medical Necessity Denial & [facility] Review  Phone: [phone]  Fax: [phone]  Email: [email or web address]

## 2023-05-23 NOTE — PLAN OF CARE
Problem: PAIN - ADULT  Goal: Verbalizes/displays adequate comfort level or baseline comfort level  Description: Interventions:  - Encourage patient to monitor pain and request assistance  - Assess pain using appropriate pain scale  - Administer analgesics based on type and severity of pain and evaluate response  - Implement non-pharmacological measures as appropriate and evaluate response  - Consider cultural and social influences on pain and pain management  - Notify physician/advanced practitioner if interventions unsuccessful or patient reports new pain  Outcome: Progressing     Problem: INFECTION - ADULT  Goal: Absence or prevention of progression during hospitalization  Description: INTERVENTIONS:  - Assess and monitor for signs and symptoms of infection  - Monitor lab/diagnostic results  - Monitor all insertion sites, i e  indwelling lines, tubes, and drains  - Monitor endotracheal if appropriate and nasal secretions for changes in amount and color  - [place] appropriate cooling/warming therapies per order  - Administer medications as ordered  - Instruct and encourage patient and family to use good hand hygiene technique  - Identify and instruct in appropriate isolation precautions for identified infection/condition  Outcome: Progressing     Problem: RESPIRATORY - ADULT  Goal: Achieves optimal ventilation and oxygenation  Description: INTERVENTIONS:  - Assess for changes in respiratory status  - Assess for changes in mentation and behavior  - Position to facilitate oxygenation and minimize respiratory effort  - Oxygen administered by appropriate delivery if ordered  - Initiate smoking cessation education as indicated  - Encourage broncho-pulmonary hygiene including cough, deep breathe, Incentive Spirometry  - Assess the need for suctioning and aspirate as needed  - Assess and instruct to report SOB or any respiratory difficulty  - Respiratory Therapy support as indicated  Outcome: Progressing     Problem: GENITOURINARY - ADULT  Goal: Maintains or returns to baseline urinary function  Description: INTERVENTIONS:  - Assess urinary function  - Encourage oral fluids to ensure adequate hydration if ordered  - Administer IV fluids as ordered to ensure adequate hydration  - Administer ordered medications as needed  - Offer frequent toileting  - Follow urinary retention protocol if ordered  Outcome: Progressing  Goal: Absence of urinary retention  Description: INTERVENTIONS:  - Assess patient’s ability to void and empty bladder  - Monitor I/O  - Bladder scan as needed  - Discuss with physician/AP medications to alleviate retention as needed  - Discuss catheterization for long term situations as appropriate  Outcome: Progressing  Goal: Urinary catheter remains patent  Description: INTERVENTIONS:  - Assess patency of urinary catheter  - If patient has a chronic rodriguez, consider changing catheter if non-functioning  - Follow guidelines for intermittent irrigation of non-functioning urinary catheter  Outcome: Progressing     Problem: SKIN/TISSUE INTEGRITY - ADULT  Goal: Incision(s), wounds(s) or drain site(s) healing without S/S of infection  Description: INTERVENTIONS  - Assess and document dressing, incision, wound bed, drain sites and surrounding tissue  - Provide patient and family education shift  Outcome: Progressing

## 2023-05-23 NOTE — UTILIZATION REVIEW
Initial Clinical Review    Elective Inpatient surgical procedure  Age/Sex: 61 y o  male  Surgery Date: 05/22/2023  Procedure: Right - NEPHRECTOMY RADICAL LAPAROSCOPIC W/ ROBOTICS  Anesthesia: General  Operative Findings:   Appearance of prior bowel surgery or appendectomy with lower quadrant adhesions  Significantly enlarged liver  Diffuse inflammatory changes right retroperitoneum making dissection difficult  Indwelling nephrostomy tube removed      POD#1 Progress Note: Pt s/p Right nephrectomy radical laparoscopic with robotics for staghorn calculus and ureteral stricture on 05/22  Pt progressing slowly  Reports nausea  KATHY drain in place w/ 105 cc in drain  Yee cath removed this am  WBC 12 32 today, hgb 12 6  Creatinine increased to 1 5, baseline 0 9-1 2  Nephrology consulted  Continue with pain control  Encourage ambulation, abdominal binder  Wean off of O2  Bowel regimen  Cont CLD, hold advancement of diet  Cont antiemetics  Bladder scan  Rpt hgb tomorrow  05/23 Nephrology Consult: Perioperative optimization to reduce the incidence of acute kidney injury: Baseline Creatinine 0 9-1 2, preop- 1 27, postop-1 31, currently 1 50  Suspect BRAULIO in the setting of perturbations of blood pressure preoperatively, prior gentamicin IV use,and loss of nephron mass with nephrectomy  Chage IVF to Isolyte 100ml/hr  Keep BP >120 or MAP >65   BMP in am        Admission Orders: Date/Time/Statement:   Admission Orders (From admission, onward)     Ordered        05/22/23 1153  Inpatient Admission  Once                      Orders Placed This Encounter   Procedures   • Inpatient Admission     Standing Status:   Standing     Number of Occurrences:   1     Order Specific Question:   Level of Care     Answer:   Med Surg [16]     Order Specific Question:   Estimated length of stay     Answer:   Inpatient Only Surgery     Vital Signs: /59 (BP Location: Left arm)   Pulse 75   Temp 99 3 °F (37 4 °C) (Oral)   Resp 15   Ht 5' "7\" (1 702 m)   Wt 66 4 kg (146 lb 7 9 oz)   SpO2 95%   BMI 22 94 kg/m²     Pertinent Labs/Diagnostic Test Results:   XR chest portable   Final Result by Beth Amezcua MD (05/22 1720)      Right IJ catheter in adequate position  No evidence of pneumothorax  Pneumoperitoneum, most likely related to the recent surgery  Workstation performed: [id number]               Results from last 7 days   Lab Units 05/23/23  0611 05/22/23  1656   WBC Thousand/uL 12 32* 9 78   HEMOGLOBIN g/dL 12 6 14 5   HEMATOCRIT % 39 0 44 1   PLATELETS Thousands/uL 133* 128*         Results from last 7 days   Lab Units 05/23/23  0611 05/22/23  1656   SODIUM mmol/L 134* 136   POTASSIUM mmol/L 4 5 4 0   CHLORIDE mmol/L 110* 113*   CO2 mmol/L 24 22   ANION GAP mmol/L 0* 1*   BUN mg/dL 15 12   CREATININE mg/dL 1 50* 1 31*   EGFR ml/min/1 73sq m 50 59   CALCIUM mg/dL 8 3 8 6         Results from last 7 days   Lab Units 05/22/23  1714   POC GLUCOSE mg/dl 144*     Results from last 7 days   Lab Units 05/23/23  0611 05/22/23  1656   GLUCOSE RANDOM mg/dL 115 158*           Results from last 7 days   Lab Units 05/23/23  0700   UNIT PRODUCT CODE  [id number]  [id number]   UNIT NUMBER  [id number]  [id number]   UNITABO  A  A   UNITRH  NEG  NEG   CROSSMATCH  Compatible  Compatible   UNIT DISPENSE STATUS  Return to Inv  Return to Inv   UNIT PRODUCT VOL mL 350  350         Diet: Clear Liquid  Mobility: Up as tolerated;  Thoracic epidural Activity order- not to ambulate alone until meeting ambulation criteria and has ambulated at least once w/ assistance  DVT Prophylaxis: SCD, Enoxaparin SC    Medications/Pain Control:   Scheduled Medications:  aztreonam, 1,000 mg, Intravenous, Q8H  docusate sodium, 100 mg, Oral, BID  enoxaparin, 40 mg, Subcutaneous, Daily  nicotine, 1 patch, Transdermal, Daily  senna, 1 tablet, Oral, Daily      Continuous IV Infusions:  ropivacaine 0 2%, , Epidural, Continuous  sodium chloride, 100 mL/hr, " Intravenous, Continuous  multi-electrolyte (PLASMALYTE-A/ISOLYTE-S PH 7 4) IV solution  Rate: 100 mL/hr Dose: 100 mL/hr  Freq: Continuous Route: IV  Indications of Use: IV Hydration  Last Dose: 100 mL/hr (05/23/23 1229)  Start: 05/23/23 1130    PRN Meds:  calcium carbonate, 1,000 mg, Oral, Daily PRN  diphenhydrAMINE, 25 mg, Intravenous, Q6H PRN  HYDROmorphone, 0 5 mg, Intravenous, Q2H PRN 05/23 x 2  HYDROmorphone (DILAUDID) injection 1 mg, q2h, IV prn 05/23 x 1  labetalol (NORMODYNE) injection 5 mg [id number] IV prn for SBP>170 05/22 x 1  lactated ringers, 1,000 mL, Intravenous, Once PRN   And  lactated ringers, 1,000 mL, Intravenous, Once PRN  ondansetron, 4 mg, Intravenous, Q4H PRN 05/23 x 1  sodium chloride, 1,000 mL, Intravenous, Once PRN   And  sodium chloride, 1,000 mL, Intravenous, Once PRN        Network Utilization Review Department  ATTENTION: Please call with any questions or concerns to [phone] and carefully listen to the prompts so that you are directed to the right person  All voicemails are confidential   Gunner Ovalle all requests for admission clinical reviews, approved or denied determinations and any other requests to dedicated fax number below belonging to the campus where the patient is receiving treatment  List of dedicated fax numbers for the Facilities:  [address] DENIALS (Administrative/Medical Necessity) [phone]   [address] (Maternity/NICU/Pediatrics) [phone]   [address] [place] [phone]   [facility] [address] [phone]   [address] [address] [address] [place] [place] [address] [address] [place] [address] [facility] 310 [phone]     [address] [phone]

## 2023-05-23 NOTE — PROGRESS NOTES
Consultation - Nephrology   Jadon Maddox 61 y o  male MRN: [id number]  Unit/Bed#: [facility] 320-01 Encounter: [id number]    ASSESSMENT and PLAN:  Perioperative optimization to reduce the incidence of acute kidney injury  · Has history of severe BRAULIO in August 2022/rhabdomyolysis-resolved  · Follows with Dr Atif Alvarado and last seen on 11/18/2022  · Baseline creatinine 0 9-1 2  · Preoperative creatinine 1 27 on 5/13/2023  · Post opcreatinine 1 31 and currently 1 50  · Suspect BRAULIO in the setting of perturbations of blood pressure preoperatively, prior gentamicin IV use,and loss of nephron mass with nephrectomy  · Currently on IV fluids-we will change to Isolyte at 100 ml/hour  · Avoid hypotension, perturbations of blood pressure to prevent decreased renal perfusion  · Keep blood pressure greater than 120 or MAP greater than 65  · BMP in a m  Recurrent nephrolithiasis/staghorn calculi   · Status post radical right nephrectomy 5/22/2023  · Urology following  · Pain management per anesthesia-has epidural    Blood pressure/hypertension:  • Current BP appears less labile  • Home medications include: None  • Current medications include: None  • Maximize hemodynamics to maintain MAP >65  • Avoid hypotension or fluctuations in blood pressure  • Will continue to trend    Other medical issues:  History of drug abuse-on methadone outpatient  Tobacco abuse  Anxiety      Medical records through [address] [place] [address] has been reviewed for this patient encounter    HISTORY OF PRESENT ILLNESS:  Requesting Physician: Kiran Ibarra MD  Reason for Consult: Perioperative optimization to reduce the incidence of acute kidney injury    Jadon Maddox is a 61 y o  male who has PMH of staghorn calculi/nephrolithiasis with previous PCN, prior BRAULIO in 8/2022 which resolved who presented for surgical intervention and underwent radical right laparoscopic nephrectomy 5/22/2023   A renal consultation is requested today perioperative optimization to reduce the incidence of acute kidney injury in the setting of recurrent nephrolithiasis  Currently patient is in severe pain answering questions but with yes and no answers        PAST MEDICAL HISTORY:  Past Medical History:   Diagnosis Date   • Anxiety    • Bright red rectal bleeding     Last Assessed: 9/9/2015    • Drug dependence ([facility] ) 10/20/2021   • Hypertension     Last Assessed: 7/9/2014    • Kidney stone    • Kidney stones     Last Assessed: 11/17/2016    • Periorbital edema     Last Assessed: 9/4/2015   • Septic shock ([facility] ) 08/20/2022   • Skin tag of anus     Last Assessed: 9/9/2015    • Trigger point of thoracic region     Last Assessed: 11/6/2015        PAST SURGICAL HISTORY:  Past Surgical History:   Procedure Laterality Date   • CYSTOSCOPY W/ URETERAL STENT PLACEMENT  03/11/2013   • CYSTOSCOPY W/ URETERAL STENT PLACEMENT  06/18/2014    EXTRACORPOREAL SHOCK WAVE LITHOTRIPSY    • CYSTOSCOPY W/ URETERAL STENT PLACEMENT  07/16/2014    EXTRACORPOREAL SHOCK WAVE LITHOTRIPSY    • CYSTOSCOPY W/ URETERAL STENT REMOVAL  04/11/2013   • CYSTOSCOPY W/ URETERAL STENT REMOVAL Right 08/26/2014   • CYSTOSCOPY W/ URETEROSCOPY W/ LITHOTRIPSY  02/26/2013    Percutaneous lithotomy With Uretal Stent Plcement    • CYSTOSCOPY W/ URETEROSCOPY W/ LITHOTRIPSY  03/11/2014   • CYSTOSCOPY W/ URETEROSCOPY W/ LITHOTRIPSY Right 08/04/2014    With Uretal Stent Placement    • CYSTOSCOPY W/ URETEROSCOPY W/ LITHOTRIPSY Right 05/09/2016   • HERNIA REPAIR  03/11/2013   • IR NEPHROSTOMY TUBE CHECK/CHANGE/REPOSITION/REINSERTION/UPSIZE  11/22/2022   • IR NEPHROSTOMY TUBE CHECK/CHANGE/REPOSITION/REINSERTION/UPSIZE  02/13/2023   • IR NEPHROSTOMY TUBE CHECK/CHANGE/REPOSITION/REINSERTION/UPSIZE  04/28/2023   • IR NEPHROSTOMY TUBE PLACEMENT  08/20/2022   • KIDNEY SURGERY     • LITHOTRIPSY     • PA CYSTO/URETERO W/LITHOTRIPSY &INDWELL STENT INSRT Right 07/13/2016    Procedure: CYSTOSCOPY; URETEROSCOPY WITH HOLMIUM LASER STONE EXTRACTION; RETROGRADE PYELOGRAM; URETERAL STENT INSERTION ;  Surgeon: Josh Kelsey MD;  Location[facility];  Service: Urology   • [place] CYSTO/URETERO W/LITHOTRIPSY &INDWELL STENT INSRT Right 05/09/2016    Procedure: CYSTOSCOPY,  URETEROSCOPY,  WITH LITHOTRIPSY HOLMIUM LASER, STONE EXTRACTION, AND INSERTION STENT URETERAL;  Surgeon: Josh Kelsey MD;  Location: [facility];  Service: Urology   • AZ CYSTO/URETERO W/LITHOTRIPSY &INDWELL STENT INSRT Right 11/10/2022    Procedure: CYSTOSCOPY URETEROSCOPY  right RETROGRADE PYELOGRAM;  Surgeon: Johnny Villanueva MD;  Location: [facility];  Service: Urology   • AZ LAPAROSCOPY RADICAL NEPHRECTOMY Right 5/22/2023    Procedure: NEPHRECTOMY RADICAL LAPAROSCOPIC W/ ROBOTICS;  Surgeon: Tari Stephens MD;  Location: [facility];  Service: Urology   • AZ LITHOTRIPSY [address] [facility] [address] Right 06/17/2016    Procedure: Wayne hSeffield SHOCKWAVE (ESWL);   Surgeon: Josh Kelsey MD;  [facility];  Service: Urology   • TRANSURETHRAL RESECTION OF BLADDER     • URETERAL REIMPLANTION  03/11/2013       ALLERGIES:  Allergies   Allergen Reactions   • Bee Venom Anaphylaxis   • Metronidazole Itching and Swelling   • Nsaids GI Intolerance     Stomach irritant   • Penicillin G    • Penicillins GI Intolerance     Sick to stomach   • Pollen Extract    • Sulfa Antibiotics        SOCIAL HISTORY:  Social History     Substance and Sexual Activity   Alcohol Use Not Currently     Social History     Substance and Sexual Activity   Drug Use Not Currently     Social History     Tobacco Use   Smoking Status Every Day   • Packs/day: 2 00   • Years: 35 00   • Pack years: 70 00   • Types: Cigarettes   Smokeless Tobacco Never       FAMILY HISTORY:  Family History   Problem Relation Age of Onset   • No Known Problems Mother    • Heart attack Father        MEDICATIONS:    Current Facility-Administered Medications:   •  aztreonam (AZACTAM) 1,000 mg in sodium chloride 0 9 % 50 mL IVPB, 1,000 mg, Intravenous, Q8H, Josef Naylor MD, Last Rate: 100 mL/hr at 05/23/23 0307, 1,000 mg at 05/23/23 0307  •  calcium carbonate (TUMS) chewable tablet 1,000 mg, 1,000 mg, Oral, Daily PRN, Josef Naylor MD  •  diphenhydrAMINE (BENADRYL) injection 25 mg, 25 mg, Intravenous, Q6H PRN, Moi Warner MD  •  docusate sodium (COLACE) capsule 100 mg, 100 mg, Oral, BID, Josef Naylor MD, 100 mg at 05/22/23 2109  •  enoxaparin (LOVENOX) subcutaneous injection 40 mg, 40 mg, Subcutaneous, Daily, Josef Naylor MD, 40 mg at 05/23/23 0830  •  HYDROmorphone (DILAUDID) injection 1 mg, 1 mg, Intravenous, Q2H PRN, Akbar Fuentes MD  •  HYDROmorphone (DILAUDID) injection 1 mg, 1 mg, Intravenous, Once, Akbar Fuentes MD  •  lactated ringers bolus 1,000 mL, 1,000 mL, Intravenous, Once PRN **AND** lactated ringers bolus 1,000 mL, 1,000 mL, Intravenous, Once PRN, Josef Naylor MD  •  methadone (DOLOPHINE) oral concentrated solution 110 mg, 110 mg, Oral, Daily, Akbar Fuentes MD  •  nicotine (NICODERM CQ) 21 mg/24 hr TD 24 hr patch 1 patch, 1 patch, Transdermal, Daily, Josef Naylor MD, 1 patch at 05/23/23 0830  •  ondansetron [facility]) injection 4 mg, 4 mg, Intravenous, Q4H PRN, Moi Warner MD, 4 mg at 05/23/23 1019  •  ropivacaine 0 2% PCEA, , Epidural, Continuous, Akbar Fuentes MD, New Bag at 05/23/23 4826  •  senna (SENOKOT) tablet 8 6 mg, 1 tablet, Oral, Daily, Josef Naylor MD  •  sodium chloride 0 9 % bolus 1,000 mL, 1,000 mL, Intravenous, Once PRN **AND** sodium chloride 0 9 % bolus 1,000 mL, 1,000 mL, Intravenous, Once PRN, Josef Naylor MD  •  sodium chloride 0 9 % infusion, 100 mL/hr, Intravenous, Continuous, Josef Naylor MD, Last Rate: 100 mL/hr at 05/23/23 0221, 100 mL/hr at 05/23/23 0221      REVIEW OF SYSTEMS:  Patient seen and examined at bedside    Patient reports severe pain controlled at this time  Review of Systems   Constitutional: Positive for fatigue  HENT: Negative  Eyes: Negative  Respiratory: Negative  Cardiovascular: Negative  Endocrine: Negative  Genitourinary:        Surgical pain   Musculoskeletal: Negative  Skin: Positive for wound  Hematological: Negative  Psychiatric/Behavioral: Negative  PHYSICAL EXAM:  Current weight: Weight - Scale: 66 4 kg (146 lb 7 9 oz)  Vitals:    05/23/23 0600 05/23/23 0800 05/23/23 0900 05/23/23 1020   BP: 115/59 140/66  156/75   BP Location: Left arm Left arm  Left arm   Pulse: 75 70 70 69   Resp: 15 16 16 18   Temp: 99 3 °F (37 4 °C) 98 1 °F (36 7 °C)     TempSrc: Oral Oral     SpO2: 95% 97% 97% 98%   Weight:       Height:           Physical Exam  Vitals and nursing note reviewed  HENT:      Head: Normocephalic and atraumatic  Eyes:      Conjunctiva/sclera: Conjunctivae normal    Cardiovascular:      Rate and Rhythm: Normal rate and regular rhythm  Pulmonary:      Effort: Pulmonary effort is normal       Breath sounds: Decreased breath sounds present  Chest:   Breasts:     Breasts are asymmetrical    Abdominal:      General: A surgical scar is present  Bowel sounds are normal       Palpations: Abdomen is soft  Genitourinary:     Comments: Yee catheter patent for clear yellow urine  Musculoskeletal:      Cervical back: Full passive range of motion without pain  Skin:     General: Skin is warm and dry  Neurological:      General: No focal deficit present  Mental Status: He is alert  Psychiatric:         Behavior: Behavior normal  Behavior is cooperative                Lab Results:   Results from last 7 days   Lab Units 05/23/23  0611 05/22/23  1656   WBC Thousand/uL 12 32* 9 78   HEMOGLOBIN g/dL 12 6 14 5   HEMATOCRIT % 39 0 44 1   PLATELETS Thousands/uL 133* 128*   SODIUM mmol/L 134* 136   POTASSIUM mmol/L 4 5 4 0   CHLORIDE mmol/L 110* 113*   CO2 mmol/L 24 22   BUN mg/dL 15 12   CREATININE mg/dL 1 50* 1 31*   CALCIUM mg/dL "8 3 8 6         Portions of the record may have been created with voice recognition software  Occasional wrong word or \"sound a like\" substitutions may have occurred due to the inherent limitations of voice recognition software   Read the chart carefully and recognize,   "

## 2023-05-24 ENCOUNTER — TELEPHONE (OUTPATIENT)
Dept: NEPHROLOGY | Facility: CLINIC | Age: 58
End: 2023-05-24

## 2023-05-24 DIAGNOSIS — N17.9 AKI (ACUTE KIDNEY INJURY) (HCC): Primary | ICD-10-CM

## 2023-05-24 LAB
ANION GAP SERPL CALCULATED.3IONS-SCNC: 0 MMOL/L (ref 4–13)
BUN SERPL-MCNC: 12 MG/DL (ref 5–25)
CALCIUM SERPL-MCNC: 8.7 MG/DL (ref 8.3–10.1)
CHLORIDE SERPL-SCNC: 104 MMOL/L (ref 96–108)
CO2 SERPL-SCNC: 27 MMOL/L (ref 21–32)
CREAT SERPL-MCNC: 1.16 MG/DL (ref 0.6–1.3)
ERYTHROCYTE [DISTWIDTH] IN BLOOD BY AUTOMATED COUNT: 15.2 % (ref 11.6–15.1)
GFR SERPL CREATININE-BSD FRML MDRD: 69 ML/MIN/1.73SQ M
GLUCOSE SERPL-MCNC: 115 MG/DL (ref 65–140)
HCT VFR BLD AUTO: 38.3 % (ref 36.5–49.3)
HGB BLD-MCNC: 13 G/DL (ref 12–17)
MCH RBC QN AUTO: 29.8 PG (ref 26.8–34.3)
MCHC RBC AUTO-ENTMCNC: 33.9 G/DL (ref 31.4–37.4)
MCV RBC AUTO: 88 FL (ref 82–98)
PLATELET # BLD AUTO: 136 THOUSANDS/UL (ref 149–390)
PMV BLD AUTO: 9.7 FL (ref 8.9–12.7)
POTASSIUM SERPL-SCNC: 4.1 MMOL/L (ref 3.5–5.3)
RBC # BLD AUTO: 4.36 MILLION/UL (ref 3.88–5.62)
SODIUM SERPL-SCNC: 131 MMOL/L (ref 135–147)
WBC # BLD AUTO: 12.99 THOUSAND/UL (ref 4.31–10.16)

## 2023-05-24 RX ORDER — METHOCARBAMOL 500 MG/1
500 TABLET, FILM COATED ORAL EVERY 6 HOURS SCHEDULED
Status: DISCONTINUED | OUTPATIENT
Start: 2023-05-24 | End: 2023-05-25

## 2023-05-24 RX ADMIN — DOCUSATE SODIUM 100 MG: 100 CAPSULE, LIQUID FILLED ORAL at 18:06

## 2023-05-24 RX ADMIN — HYDROMORPHONE HYDROCHLORIDE 1 MG: 1 INJECTION, SOLUTION INTRAMUSCULAR; INTRAVENOUS; SUBCUTANEOUS at 02:43

## 2023-05-24 RX ADMIN — HEPARIN SODIUM 5000 UNITS: 5000 INJECTION INTRAVENOUS; SUBCUTANEOUS at 22:57

## 2023-05-24 RX ADMIN — HYDROMORPHONE HYDROCHLORIDE 1 MG: 1 INJECTION, SOLUTION INTRAMUSCULAR; INTRAVENOUS; SUBCUTANEOUS at 14:24

## 2023-05-24 RX ADMIN — NICOTINE 1 PATCH: 21 PATCH, EXTENDED RELEASE TRANSDERMAL at 08:20

## 2023-05-24 RX ADMIN — ROPIVACAINE HYDROCHLORIDE: 2 INJECTION, SOLUTION EPIDURAL; INFILTRATION at 13:58

## 2023-05-24 RX ADMIN — METHOCARBAMOL TABLETS 500 MG: 500 TABLET, COATED ORAL at 14:13

## 2023-05-24 RX ADMIN — METHADONE HYDROCHLORIDE 110 MG: 10 CONCENTRATE ORAL at 08:21

## 2023-05-24 RX ADMIN — ROPIVACAINE HYDROCHLORIDE: 2 INJECTION, SOLUTION EPIDURAL; INFILTRATION at 19:59

## 2023-05-24 RX ADMIN — SENNOSIDES 8.6 MG: 8.6 TABLET, FILM COATED ORAL at 08:20

## 2023-05-24 RX ADMIN — HYDROMORPHONE HYDROCHLORIDE 1 MG: 1 INJECTION, SOLUTION INTRAMUSCULAR; INTRAVENOUS; SUBCUTANEOUS at 09:14

## 2023-05-24 RX ADMIN — METHOCARBAMOL TABLETS 500 MG: 500 TABLET, COATED ORAL at 18:06

## 2023-05-24 RX ADMIN — HEPARIN SODIUM 5000 UNITS: 5000 INJECTION INTRAVENOUS; SUBCUTANEOUS at 05:03

## 2023-05-24 RX ADMIN — ROPIVACAINE HYDROCHLORIDE: 2 INJECTION, SOLUTION EPIDURAL; INFILTRATION at 05:58

## 2023-05-24 RX ADMIN — HYDROMORPHONE HYDROCHLORIDE 1 MG: 1 INJECTION, SOLUTION INTRAMUSCULAR; INTRAVENOUS; SUBCUTANEOUS at 16:46

## 2023-05-24 RX ADMIN — METHOCARBAMOL TABLETS 500 MG: 500 TABLET, COATED ORAL at 23:00

## 2023-05-24 RX ADMIN — HYDROMORPHONE HYDROCHLORIDE 1 MG: 1 INJECTION, SOLUTION INTRAMUSCULAR; INTRAVENOUS; SUBCUTANEOUS at 04:43

## 2023-05-24 RX ADMIN — DOCUSATE SODIUM 100 MG: 100 CAPSULE, LIQUID FILLED ORAL at 08:20

## 2023-05-24 RX ADMIN — HEPARIN SODIUM 5000 UNITS: 5000 INJECTION INTRAVENOUS; SUBCUTANEOUS at 14:13

## 2023-05-24 RX ADMIN — KETAMINE HYDROCHLORIDE 0.2 MG/KG/HR: 50 INJECTION, SOLUTION INTRAMUSCULAR; INTRAVENOUS at 18:32

## 2023-05-24 NOTE — PLAN OF CARE
Problem: PAIN - ADULT  Goal: Verbalizes/displays adequate comfort level or baseline comfort level  Description: Interventions:  - Encourage patient to monitor pain and request assistance  - Assess pain using appropriate pain scale  - Administer analgesics based on type and severity of pain and evaluate response  - Implement non-pharmacological measures as appropriate and evaluate response  - Consider cultural and social influences on pain and pain management  - Notify physician/advanced practitioner if interventions unsuccessful or patient reports new pain  Outcome: Progressing     Problem: INFECTION - ADULT  Goal: Absence or prevention of progression during hospitalization  Description: INTERVENTIONS:  - Assess and monitor for signs and symptoms of infection  - Monitor lab/diagnostic results  - Monitor all insertion sites, i e  indwelling lines, tubes, and drains  - Monitor endotracheal if appropriate and nasal secretions for changes in amount and color  - [place] appropriate cooling/warming therapies per order  - Administer medications as ordered  - Instruct and encourage patient and family to use good hand hygiene technique  - Identify and instruct in appropriate isolation precautions for identified infection/condition  Outcome: Progressing

## 2023-05-24 NOTE — PROGRESS NOTES
Progress Note -urology  Rosa Chaney 61 y o  male MRN: [id number]  Unit/Bed#: [facility] 320-01 Encounter: [id number]    Assessment & Plan:    Right nephrectomy radical laparoscopic with robotics for staghorn calculus and ureteral stricture, progressing slowly:    -Postop day 2  radical right nephrectomy laparoscopic with robotics progressing slowly  -Continue with pain control, history of substance abuse, alcohol abuse currently managed on methadone  acute pain services following and managing pain appreciate their services patient on ketamine drip, epidural and methadone  Plan to wean off of epidural in 24-48 hours  -Status post nephrectomy, nephrology also consulted and following  Baseline creatinine 0 9-1 2 increased to 1 5 postoperatively expected with loss of nephrons  Renal function back at baseline  Clear from nephrology standpoint for d/c with outpt follow up    -Encourage ambulation, abdominal binder, Ordered PT as patient reluctant to ambulate due to pain  AP and Nurse placed patient up in recliner today  - patient now on room air    -Provide a good bowel regimen in the setting of several narcotics  -advanced diet to surgical soft  Tolerating clears currently, reports passing some flatus  -KATHY Drain with lower outpt 40 cc today, will discuss removal with Dr Gia Romo  May assist with pain control    -condom cath , patient voiding    -Patient hemodynamically stable afebrile  -Mild reactive leukocytosis 12,   -hemoglobin stable   -Anticipate patient to stay until pain able to be managed  Subjective/Objective   Chief Complaint: Pain    Subjective:   Patient reporting that he continued to have pain  Reports that his nausea has improved tolerating clear liquids at this time  Has not been up and out of bed ambulating postoperatively  Denies any shortness of breath  Reports he has pain in his shoulders bilaterally and his right lower quadrant at site of large incision    Appears to be doing mildly "improved compared to yesterday reports passage of some gas  Objective:     Blood pressure 128/66, pulse 61, temperature 98 1 °F (36 7 °C), temperature source Oral, resp  rate 13, height 5' 7\" (1 702 m), weight 66 4 kg (146 lb 7 9 oz), SpO2 93 %  ,Body mass index is 22 94 kg/m²  Intake/Output Summary (Last 24 hours) at 5/24/2023 1219  Last data filed at 5/24/2023 1154  Gross per 24 hour   Intake 2403 9 ml   Output 2775 ml   Net -371 1 ml       Invasive Devices     Central Venous Catheter Line  Duration           CVC Central Lines 05/22/23 Triple 2 days          Epidural Line  Duration           Epidural Catheter 05/22/23 2 days          Drain  Duration           Closed/Suction Drain Right RLQ Bulb 19 Fr  1 day    External Urinary Catheter <1 day            Physical Exam  Constitutional:       General: He is not in acute distress  Appearance: He is normal weight  He is not ill-appearing, toxic-appearing or diaphoretic  HENT:      Head: Normocephalic and atraumatic  Right Ear: External ear normal       Left Ear: External ear normal       Nose: Nose normal       Mouth/Throat:      Pharynx: Oropharynx is clear  Eyes:      General: No scleral icterus  Conjunctiva/sclera: Conjunctivae normal    Cardiovascular:      Rate and Rhythm: Normal rate and regular rhythm  Pulses: Normal pulses  Heart sounds: No murmur heard  No friction rub  No gallop  Pulmonary:      Effort: Pulmonary effort is normal  No respiratory distress  Breath sounds: No wheezing, rhonchi or rales  Abdominal:      General: Bowel sounds are normal  There is no distension  Palpations: Abdomen is soft  Tenderness: There is no abdominal tenderness  Comments: Surgical incisions intact, dry, no sign of wound dehiscence or underlying infection, KATHY drain with low output of serous fluid  Abdomen is mildly distended bowel sounds are present throughout, improved abdominal tenderness    Majority of " tenderness at large surgical incision from right nephrectomy and KATHY drain  Genitourinary:     Comments: Condom catheter in place with clear yellow urine  Musculoskeletal:         General: Normal range of motion  Cervical back: Normal range of motion  Skin:     General: Skin is warm and dry  Neurological:      General: No focal deficit present  Mental Status: He is alert and oriented to person, place, and time  Psychiatric:         Mood and Affect: Mood normal          Behavior: Behavior normal          Thought Content: Thought content normal          Judgment: Judgment normal            Lab, Imaging and other studies:I have personally reviewed pertinent lab results      Lab Results   Component Value Date    HCT 38 3 05/24/2023    HGB 13 0 05/24/2023    MCV 88 05/24/2023     (L) 05/24/2023    WBC 12 99 (H) 05/24/2023     Lab Results   Component Value Date    BUN 12 05/24/2023    CALCIUM 8 7 05/24/2023     05/24/2023    CO2 27 05/24/2023    CREATININE 1 16 05/24/2023    GLUC 115 05/24/2023    K 4 1 05/24/2023    SODIUM 131 (L) 05/24/2023         VTE Pharmacologic Prophylaxis: Enoxaparin (Lovenox)  VTE Mechanical Prophylaxis: sequential compression device      Jo Ann Maya PA-C

## 2023-05-24 NOTE — PLAN OF CARE
Problem: MOBILITY - ADULT  Goal: Maintain or return to baseline ADL function  Description: INTERVENTIONS:  -  Assess patient's ability to carry out ADLs; assess patient's baseline for ADL function and identify physical deficits which impact ability to perform ADLs (bathing, care of mouth/teeth, toileting, grooming, dressing, etc )  - Assess/evaluate cause of self-care deficits   - Assess range of motion  - Assess patient's mobility; develop plan if impaired  - Assess patient's need for assistive devices and provide as appropriate  - Encourage maximum independence but intervene and supervise when necessary  - Involve family in performance of ADLs  - Assess for home care needs following discharge   - Consider OT consult to assist with ADL evaluation and planning for discharge  - Provide patient education as appropriate  Outcome: Progressing  Goal: Maintains/Returns to pre admission functional level  Description: INTERVENTIONS:  - Perform BMAT or MOVE assessment daily    - Set and communicate daily mobility goal to care team and patient/family/caregiver  - Collaborate with rehabilitation services on mobility goals if consulted  - Perform Range of Motion 4 times a day  - Reposition patient every 2 hours    - Dangle patient 4 times a  Problem: PAIN - ADULT  Goal: Verbalizes/displays adequate comfort level or baseline comfort level  Description: Interventions:  - Encourage patient to monitor pain and request assistance  - Assess pain using appropriate pain scale  - Administer analgesics based on type and severity of pain and evaluate response  - Implement non-pharmacological measures as appropriate and evaluate response  - Consider cultural and social influences on pain and pain management  - Notify physician/advanced practitioner if interventions unsuccessful or patient reports new pain  Outcome: Not Progressing     Problem: GENITOURINARY - ADULT  Goal: Urinary catheter remains patent  Description: INTERVENTIONS:  - Assess patency of urinary catheter  - If patient has a chronic rodriguez, consider changing catheter if non-functioning  - Follow guidelines for intermittent irrigation of non-functioning urinary catheter  Outcome: Completed    day  - Stand patient 3 times a day  - Ambulate patient 3 times a day  - Out of bed to chair 3 times a day   - Out of bed for meals 3 times a day  - Out of bed for toileting  - Record patient progress and toleration of activity level   Outcome: Progressing     Problem: RESPIRATORY - ADULT  Goal: Achieves optimal ventilation and oxygenation  Description: INTERVENTIONS:  - Assess for changes in respiratory status  - Assess for changes in mentation and behavior  - Position to facilitate oxygenation and minimize respiratory effort  - Oxygen administered by appropriate delivery if ordered  - Initiate smoking cessation education as indicated  - Encourage broncho-pulmonary hygiene including cough, deep breathe, Incentive Spirometry  - Assess the need for suctioning and aspirate as needed  - Assess and instruct to report SOB or any respiratory difficulty  - Respiratory Therapy support as indicated  Outcome: Progressing     Problem: GENITOURINARY - ADULT  Goal: Maintains or returns to baseline urinary function  Description: INTERVENTIONS:  - Assess urinary function  - Encourage oral fluids to ensure adequate hydration if ordered  - Administer IV fluids as ordered to ensure adequate hydration  - Administer ordered medications as needed  - Offer frequent toileting  - Follow urinary retention protocol if ordered  Outcome: Progressing  Goal: Absence of urinary retention  Description: INTERVENTIONS:  - Assess patient’s ability to void and empty bladder  - Monitor I/O  - Bladder scan as needed  - Discuss with physician/AP medications to alleviate retention as needed  - Discuss catheterization for long term situations as appropriate  Outcome: Progressing     Problem: Prexisting or High Potential for Compromised Skin Integrity  Goal: Skin integrity is maintained or improved  Description: INTERVENTIONS:  - Identify patients at risk for skin breakdown  - Assess and monitor skin integrity  - Assess and monitor nutrition and hydration status  - Monitor labs   - Assess for incontinence   - Turn and reposition patient  - Assist with mobility/ambulation  - Relieve pressure over bony prominences  - Avoid friction and shearing  - Provide appropriate hygiene as needed including keeping skin clean and dry  - Evaluate need for skin moisturizer/barrier cream  - Collaborate with interdisciplinary team   - Patient/family teaching  - Consider wound care consult   Outcome: Progressing

## 2023-05-24 NOTE — PROGRESS NOTES
Epidural Follow-up Note - Acute Pain Service    Gautam Andres 61 y o  male MRN: [id number]  Unit/Bed#: [facility] 320-01 Encounter: [id number]      Assessment:   Principal Problem:    Kidney stones  Active Problems:    UTI (urinary tract infection), bacterial    Staghorn calculus    Past Medical History:   Diagnosis Date   • Anxiety    • Bright red rectal bleeding     Last Assessed: 9/9/2015    • Drug dependence ([address] ) 10/20/2021   • Hypertension     Last Assessed: 7/9/2014    • Kidney stone    • Kidney stones     Last Assessed: 11/17/2016    • Periorbital edema     Last Assessed: 9/4/2015   • Septic shock ([facility] [address] ) 08/20/2022   • Skin tag of anus     Last Assessed: 9/9/2015    • Trigger point of thoracic region     Last Assessed: 11/6/2015        Gautam Andres is a 61y o  year old male with PMHx of chronic methadone maintenance at [facility] and nephrolithiasis now s/p laparoscopic right nephrectomy on (05/22/23) for which an epidural was placed preoperatively for postoperative analgesia  APS was consulted for postoperative epidural management  Yesterday patient with increase pain and so the epidural rate was increased to 10/5/10/4, a ketamine gtt was started at 0 1mg/kg/hr, and home methadone 110mg was resumed  Today, patient reports continued severe pain  Patient appears to be comfortable lying in bed this morning however nursing reports continued pain scores of 10/10 with minimal effort to ambulate or move around  I reviewed with Tam Goff the importance of utilizing the PCEA button function on the epidural given that he only utilized this 7 times in the past 24hrs  He denies any side effects from the ketamine gtt start yesterday, reporting no agitation, nightmares, visual disturbances, hallucination, or palpitations  We discussed increasing the ketamine gtt in an attempt to smooth the transition of epidural removal in the next 24-48hr    We also discussed the addition of robaxin and patient was understanding and in agreement with the plan  Of note, patient reports that he is also have bilateral shoulder pain/soreness that he believes feels musculoskeletal in nature  Patient receiving heparin SQ 5000U TID, last dose today at [place] [id number]  Plan:  Analgesia:   - continue Thoracic epidural infusion of Ropivacaine 0 2% at 10/5/10/4   - continue encouragement and education with regards to PCEA function   - continue Dilaudid 1mg IV q2hr PRN for breakthrough pain   - anticipate epidural removal and transition to primarily PO regimen in the next 24-48hr pending clinical progression, would plan to continue ketamine gtt to help with transition from epidural     - increase ketamine gtt to 0 2mg/kg/hr   - continue home methadone 110mg PO daily   - start robaxin 500mg PO q6h scheduled    Bowel Regimen:  - Senna 1 tablet PO qhs    APS will continue to follow  Please contact Acute Pain Service - [facility] via [employer] from [phone] with additional questions or concerns  See [employer] or Adelina for additional contacts and after hours information      Pain History  Current pain location(s): right abdomen  Pain Scale:   10/10  Quality: aching, soreness  24 hour history: see assessment    PCEA use:  7 deliveries on 13 attempts  Opioid requirement previous 24 hours: home methadone 110mg PO x1, dilaudid 1mg IV x5 doses    Meds/Allergies   all current active meds have been reviewed and current meds:   Current Facility-Administered Medications   Medication Dose Route Frequency   • calcium carbonate (TUMS) chewable tablet 1,000 mg  1,000 mg Oral Daily PRN   • diphenhydrAMINE (BENADRYL) injection 25 mg  25 mg Intravenous Q6H PRN   • docusate sodium (COLACE) capsule 100 mg  100 mg Oral BID   • glycopyrrolate (ROBINUL) injection 0 2 mg  0 2 mg Intravenous Q4H PRN   • haloperidol lactate (HALDOL) injection 2 mg  2 mg Intramuscular Q30 Min PRN   • heparin (porcine) subcutaneous injection 5,000 Units  5,000 Units Subcutaneous Q8H [address]   • HYDROmorphone (DILAUDID) injection 1 mg  1 mg Intravenous Q2H PRN   • ketamine 250 mg (STANDARD CONCENTRATION) IV in sodium chloride 0 9% 250 mL  0 1 mg/kg/hr Intravenous Continuous   • LORazepam (ATIVAN) injection 1 mg  1 mg Intravenous Q1H PRN   • methadone (DOLOPHINE) oral concentrated solution 110 mg  110 mg Oral Daily   • nicotine (NICODERM CQ) 21 mg/24 hr TD 24 hr patch 1 patch  1 patch Transdermal Daily   • ondansetron (ZOFRAN) injection 4 mg  4 mg Intravenous Q4H PRN   • ropivacaine 0 2% PCEA   Epidural Continuous   • senna (SENOKOT) tablet 8 6 mg  1 tablet Oral Daily       Allergies   Allergen Reactions   • Bee Venom Anaphylaxis   • Metronidazole Itching and Swelling   • Nsaids GI Intolerance     Stomach irritant   • Penicillin G    • Penicillins GI Intolerance     Sick to stomach   • Pollen Extract    • Sulfa Antibiotics        Objective     Temp:  [98 °F (36 7 °C)-98 7 °F (37 1 °C)] 98 1 °F (36 7 °C)  HR:  [61-72] 61  Resp:  [12-20] 18  BP: (135-172)/(66-88) 142/80    Physical Exam  Vitals and nursing note reviewed  Constitutional:       General: He is not in acute distress  Appearance: Normal appearance  He is not toxic-appearing  HENT:      Head: Normocephalic and atraumatic  Nose: Nose normal       Mouth/Throat:      Mouth: Mucous membranes are moist       Pharynx: Oropharynx is clear  Eyes:      General: No scleral icterus  Pupils: Pupils are equal, round, and reactive to light  Cardiovascular:      Rate and Rhythm: Normal rate and regular rhythm  Pulses: Normal pulses  Heart sounds: Normal heart sounds  Pulmonary:      Effort: Pulmonary effort is normal  No respiratory distress  Breath sounds: Normal breath sounds  No wheezing  Abdominal:      General: Abdomen is flat  There is no distension  Palpations: Abdomen is soft  Tenderness: There is abdominal tenderness  Musculoskeletal:         General: Tenderness present        Cervical back: Normal range of motion and neck supple  No rigidity or tenderness  Comments: Bilateral shoulder soreness/tenderness   Skin:     General: Skin is warm and dry  Coloration: Skin is not jaundiced or pale  Neurological:      General: No focal deficit present  Mental Status: He is alert and oriented to person, place, and time  Mental status is at baseline  Sensory: Sensory deficit (appropriate in setting of epidural) present  Motor: No weakness  Epidural: Site clean/dry/intact, no surrounding erythema/edema/induration, infusion functioning appropriately    Lab Results:   Results from last 7 days   Lab Units 05/23/23  0611   HEMATOCRIT % 39 0   HEMOGLOBIN g/dL 12 6   PLATELETS Thousands/uL 133*   WBC Thousand/uL 12 32*      Results from last 7 days   Lab Units 05/24/23  0502   BUN mg/dL 12   CALCIUM mg/dL 8 7   CHLORIDE mmol/L 104   CO2 mmol/L 27   CREATININE mg/dL 1 16   POTASSIUM mmol/L 4 1          Imaging Studies: I have personally reviewed pertinent reports  EKG, Pathology, and Other Studies: I have personally reviewed pertinent reports  Counseling / Coordination of Care  Total floor / unit time spent today 15 minutes  Greater than 50% of total time was spent with the patient and / or family counseling and / or coordination of care  Please note that the APS provides consultative services regarding pain management only  With the exception of ketamine, peripheral nerve catheters, and epidural infusions (and except when indicated), final decisions regarding starting or changing doses of analgesic medications are at the discretion of the consulting service  Off hours consultation and/or medication management is generally not available      Prince Cally MD  Acute Pain Service

## 2023-05-24 NOTE — PROGRESS NOTES
Follow up Consultation    Nephrology   Rosa Chaney 61 y o  male MRN: [id number]  Unit/Bed#: [facility] 928-01 Encounter: [id number]      Physician Requesting Consult: Haim Mckenzie MD        ASSESSMENT/PLAN:  60-year-old male with multiple comorbidities including staghorn calculi with nephrolithiasis with previous PCN placement in August 2022 and had BRAULIO at that time presented for elective surgical intervention with radical right laparoscopic nephrectomy on 5/22/2023  Nephrology consulted for perioperative optimization to reduce incidence for acute kidney injury        Perioperative optimization to reduce incidence for acute kidney injury/elevated creatinine: At risk for BRAULIO multifactorial most likely secondary to ischemic injury secondary hemodynamic perturbations intraoperatively plus decreased nephron mass with nephrectomy  [facility] as well as Care everywhere it appears that the patient has a baseline Creatinine of 0 9-1 2 mg/dL  Preadmission creatinine of 1 27 mg/dL on 5/13  Creatinine today is at 1 02 mg/dL, stable, we will need to see where new baseline creatinine will be  Discussed with patient we will have to wait a period of 3 months to see where his new baseline creatinine will be  Hold further IV fluids for now  check BMP in a m  if still in the hospital  Await renal recovery  Optimize hemodynamic status to avoid delay in renal recovery  Place on a renal diet when allowed diet order  Avoid nephrotoxins, adjust meds to appropriate GFR  Strict I/O  Daily weights  Urinary retention protocol if patient does not have a Yee  Most likely has underlying CKD secondary to nephrolithiasis history of prior BRAULIO and now decreased nephron mass  will need to set up patient for follow up with Nephrology as an outpatient post hospitalization    Outpatient nephrology follow-up with Dr Katherine Segura for discharge from renal standpoint medically cleared will need outpatient "follow-up with blood work in 4 weeks on  message sent to the office arrange for outpatient follow-up upon discharge     Acid-base electrolytes:  Electrolytes:       Hyponatremia:  Most recent sodium at 129mEq  Check BMP in a m  Likely decreased secondary to increased ADH secondary to pain continue to monitor for now  Placed on fluid restriction 1 5 L/day     Acid-base:    Most recent bicarb stable at 26     H/H/anemia:  most recent hemoglobin at 12 7 g/dL  maintain hemoglobin greater than 8 grams/deciliter     Blood pressure/normotensive:  current medications: None  recommendations: No changes  Optimize hemodynamics  Maintain MAP > 65mmHg  Avoid BP fluctuations      Other medical problems:  History of drug abuse:  Management per primary team   On methadone as an outpatient  Staghorn calculi/recurrent nephrolithiasis:  Management per primary team   Follow-up with urology status post radical right nephrectomy 2023  Pain management for assistance with pain control  Follow-up with nephrology as an outpatient for further stone work-up  Thanks for the consult  Will continue to follow  Please call with questions/ concerns  Above-mentioned orders and Plan in terms of stable for discharge from his standpoint will need outpatient follow-up for lab work will place on fluid restriction 1 5 L/day were discussed with the team in depth and they agree  Mary Newby MD, [facility], 2023, 12:47 PM            Objective :   Patient seen and examined in his room blood pressure slightly elevated remains afebrile pain improved, urine output 1 8 L happy renal parameters are stable      PHYSICAL EXAM  BP (!) 180/108 (BP Location: Left arm) Comment: B  Temple Moder and MARTY Belcher made aware, hydralazine given  Pulse 65   Temp 98 °F (36 7 °C)   Resp 16   Ht 5' 7\" (1 702 m)   Wt 66 4 kg (146 lb 7 9 oz)   SpO2 95%   BMI 22 94 kg/m²   Temp (24hrs), Av 8 °F (36 6 °C), Min:97 4 °F (36 3 °C), Max:98 °F (36 7 " °C)        Intake/Output Summary (Last 24 hours) at 5/25/2023 1247  Last data filed at 5/25/2023 1139  Gross per 24 hour   Intake 527 21 ml   Output 755 ml   Net -227 79 ml       I/O last 24 hours: In: 714 8 [I V :714 8]  Out: 1885 [Urine:1825; Drains:60]      Current Weight: Weight - Scale: 66 4 kg (146 lb 7 9 oz)  First Weight: Weight - Scale: 66 4 kg (146 lb 7 9 oz)  Physical Exam  Vitals and nursing note reviewed  Constitutional:       General: He is not in acute distress  Appearance: Normal appearance  He is normal weight  He is not ill-appearing, toxic-appearing or diaphoretic  HENT:      Head: Normocephalic and atraumatic  Mouth/Throat:      Mouth: Mucous membranes are moist       Pharynx: Oropharynx is clear  No oropharyngeal exudate  Eyes:      General: No scleral icterus  Conjunctiva/sclera: Conjunctivae normal    Cardiovascular:      Rate and Rhythm: Normal rate  Heart sounds: Normal heart sounds  No friction rub  Pulmonary:      Effort: No respiratory distress  Breath sounds: Normal breath sounds  No stridor  Abdominal:      General: There is no distension  Palpations: Abdomen is soft  There is no mass  Tenderness: There is no abdominal tenderness  Musculoskeletal:         General: No swelling  Cervical back: Normal range of motion  No rigidity  Skin:     General: Skin is warm  Coloration: Skin is not jaundiced  Neurological:      General: No focal deficit present  Mental Status: He is alert and oriented to person, place, and time  Psychiatric:         Mood and Affect: Mood normal          Behavior: Behavior normal              Review of Systems   Constitutional: Negative for chills, fatigue and fever  HENT: Negative for congestion  Respiratory: Negative for cough and shortness of breath  Cardiovascular: Negative for leg swelling  Gastrointestinal: Negative for abdominal pain and nausea     Genitourinary: Negative for flank pain    Musculoskeletal: Negative for back pain  Neurological: Negative for headaches  Psychiatric/Behavioral: Negative for agitation and confusion  All other systems reviewed and are negative  Scheduled Meds:  Current Facility-Administered Medications   Medication Dose Route Frequency Provider Last Rate   • calcium carbonate  1,000 mg Oral Daily PRN Bing Marino MD     • diphenhydrAMINE  25 mg Intravenous Q6H PRN Ya George MD     • docusate sodium  100 mg Oral BID Bing Marino MD     • glycopyrrolate  0 2 mg Intravenous Q4H PRN Teofilo Anaya MD     • haloperidol lactate  2 mg Intramuscular Q30 Min PRN Teofilo Anaya MD     • heparin (porcine)  5,000 Units Subcutaneous [facility] Watson Hughes PA-C     • HYDROmorphone  1 mg Intravenous Q2H PRN Teofilo Anaya MD     • ketamine  0 1 mg/kg/hr Intravenous Continuous Francisco Valdivia MD 0 1 mg/kg/hr (05/25/23 1206)   • LORazepam  1 mg Intravenous Q1H PRN Teofilo Anaya MD     • methadone  110 mg Oral Daily Teofilo Anaya MD     • methocarbamol  750 mg Oral Q6H [facility] Francisco Valdivia MD     • nicotine  1 patch Transdermal Daily Bing Marino MD     • ondansetron  4 mg Intravenous Q4H PRN Ya George MD     • polyethylene glycol  17 g Oral Daily PRN Watson Hughes PA-C     • ropivacaine 0 2%   Epidural Continuous Teofilo Anaya MD     • senna  1 tablet Oral Daily Bing Marino MD     • simethicone  80 mg Oral Q6H PRN Watson Hughes PA-C         PRN Meds:  •  calcium carbonate  •  diphenhydrAMINE  •  glycopyrrolate  •  haloperidol lactate  •  HYDROmorphone  •  LORazepam  •  ondansetron  •  polyethylene glycol  •  simethicone    Continuous Infusions:ketamine, 0 1 mg/kg/hr, Last Rate: 0 1 mg/kg/hr (05/25/23 1206)  ropivacaine 0 2%,           Invasive Devices:      Invasive Devices     Central Venous Catheter Line  Duration           CVC Central Lines 05/22/23 Triple 3 days "      Epidural Line  Duration           Epidural Catheter 05/22/23 3 days          Drain  Duration           Closed/Suction Drain Right RLQ Bulb 19 Fr  2 days    External Urinary Catheter 1 day                  LABORATORY:    Results from last 7 days   Lab Units 05/25/23  0500 05/24/23  0920 05/24/23  0502 05/23/23  0611 05/22/23  1656   BUN mg/dL 19  --  12 15 12   CALCIUM mg/dL 8 9  --  8 7 8 3 8 6   CHLORIDE mmol/L 103  --  104 110* 113*   CO2 mmol/L 26  --  27 24 22   CREATININE mg/dL 1 02  --  1 16 1 50* 1 31*   HEMATOCRIT % 38 2 38 3  --  39 0 44 1   HEMOGLOBIN g/dL 12 7 13 0  --  12 6 14 5   POTASSIUM mmol/L 3 9  --  4 1 4 5 4 0   PLATELETS Thousands/uL 153 136*  --  133* 128*   WBC Thousand/uL 11 25* 12 99*  --  12 32* 9 78      rest all reviewed    RADIOLOGY:  XR chest portable   Final Result by Debbie Lemons MD (05/22 1720)      Right IJ catheter in adequate position  No evidence of pneumothorax  Pneumoperitoneum, most likely related to the recent surgery  Workstation performed: [id number]           Rest all reviewed    Portions of the record may have been created with voice recognition software  Occasional wrong word or \"sound a like\" substitutions may have occurred due to the inherent limitations of voice recognition software  Read the chart carefully and recognize, using context, where substitutions have occurred  If you have any questions, please contact the dictating provider      "

## 2023-05-24 NOTE — DISCHARGE INSTR - AVS FIRST PAGE
You may shower, walk and take stairs  No heavy lifting greater than 15 pounds for 2 weeks  See us in the office in [place] in 3 to 4 weeks  Call for an appointment  Nephrology discharge recommendations:  - To follow up on your kidney function please get blood work upon discharge on or around 6/24/23  - You will receive a copy of the order form for the blood work in the mail from the office   - Nephrology office will contact you to set up a follow-up appointment in a few days/weeks  - In the interim if any issues from a kidney standpoint please contact the office at [phone]  Acute Kidney Injury (BRAULIO)  You have been diagnosed with Acute Kidney Injury (BRAULIO)  The following information has been developed to provide you with information about BRAULIO and treatment  What is Acute Kidney Injury (BRAULIO)? BRAULIO occurs when kidney function decreases over a short period of time  This condition causes a buildup of waste products in the blood and can cause fluid to build up causing swelling in the legs and shortness of breath  Sometimes called Acute Kidney Failure or Acute Renal Failure, BRAULIO is often reversible if it is found and treated quickly  How do you know if you have BRAULIO? BRAULIO is diagnosed by assessing kidney function  This is done by obtaining a blood test to measure the blood level of creatinine  Decreased urine output can sometimes also indicate BRAULIO  Who is at risk for BRAULIO? BRAULIO can happen to anyone but usually happens to people who are already sick and may be in the hospital  People are at higher risk for BRAULIO if they have any of the following:  age 72 years or older  high or low blood pressure  underlying kidney disease (e g , Chronic Kidney Disease (CKD)  peripheral vascular disease (hardening of arteries)  chronic diseases such as liver disease, heart disease and diabetes  a single kidney    What are the symptoms of BRAULIO?    You may or may not have the symptoms to suggest you have kidney injury until the BRAULIO has progressed  Some of the symptoms are listed below:  Not making enough urine  Increased swelling in legs   Feeling tired  Trouble breathing or shortness of breath  Nausea  New or worsening confusion    What causes BRAULIO? The causes are divided into three categories:  Not enough blood flowing to the kidneys (e g , low blood pressure, bleeding, diarrhea, dehydration)  Injury directly to the kidneys (e g , blood clots, severe infections such as sepsis, medicine toxicity, IV contrast dye used for cardiac catheterization or CT scans)  Blockage to the tubes (ureters) that drain the urine from the kidneys (e g , enlarged prostate, kidney stones, blood clots)    What is the treatment for BRAULIO? The treatment for BRAULIO depends on correcting what caused it  Treatment usually involves removing the cause and measures to prevent further injury to the kidneys  This may require the use of intravenous fluids or medications and/or temporary dialysis  Dialysis is a process using a machine that does the job of the kidneys to remove waste and help correct the electrolyte and fluid balance while the kidneys are recovering  If dialysis is needed to treat BRAULIO, the doctor will assess daily to see if the kidneys are showing signs of recovery  The daily assessments determine how long dialysis needs to continue  Depending on the cause and the extent of damage, an episode of BRAULIO may resolve in a few days to several weeks to several months  What are the long term effects of having an episode of BRAULIO? People who have one episode of BRAULIO are at an increased risk of having another episode of BRAULIO as well as other health problems such as kidney disease, stroke, and heart disease  In a small number of people who had unrecognized kidney disease, an BRAULIO episode may result in Chronic Kidney Disease (CKD) which requires lifelong monitoring and treatment  How do you prevent future episodes of BRAULIO?   Make sure to follow up with the kidney doctor after hospital discharge and obtain blood work to reassess and monitor kidney function  If you have diabetes, keep your blood sugar in goal range and keep appointments with your diabetes specialist    If you have high blood pressure, have your blood pressure checked regularly to make sure it is in target range  If you take blood pressure medicine called ACEIs or ARBs (e g , Lisinopril, Enalapril, Diovan, Losartan), your doctor may tell you to skip a dose or two if you have severe dehydration and your blood pressure is running low  Avoid using medicines such as NSAIDs (Nonsteroidal Anti-inflammatory Drugs) and Pham-2 Inhibitors (a type of NSAID) that may be harmful to kidney function  These may include the medicines listed in the table that follows  Examples of NSAIDS and Pham-2 Inhibitors   Talk to your doctor or healthcare provider before stopping any medicine ordered for you  Celecoxib (CELEBREX) Ketoprofen (ORUDIS, ORUVAIL)   Diclofenac (VOLTAREN, CATAFLAM) Ketorolac (TORADOL)   Diflunisal (DOLOBID) Meloxicam (MOBIC)   Etodolac (LODINE) Nabumetone (RELAFEN)   Fenoprofen (NALFON) Naproxen (ALEVE, NAPROSYN,    NAPRELAN, ANAPROX)   Flurbiprofen (ANSAID) Oxaprozin (DAYPRO)   Ibuprofen (MOTRIN, ADVIL) Piroxicam (FELDENE)   Indomethacin (INDOCIN) Sulindac (CLINORIL)    Tolmetin (Lisette Dose)     Is there a special diet for people with BRAULIO? People with BRAULIO and/or other kidney disease often have high potassium and phosphorus levels in their blood  To protect the kidneys from further injury and to avoid complications, most doctors recommend following a healthy diet choosing foods low potassium and low phosphorus  Limiting dietary potassium to 2 5 grams/day and phosphorus to 800 milligrams/day is recommended  A dietitian can help you with learning more about this type of diet  The tables on the following page may help you to choose lower potassium and phosphorus foods      The following websites are also good sources of information:  Matt at  org/nutrition/Kidney-Disease-Stages-1-4   [facility]   https://www niddk nih gov/health-information/kidney-disease/chronic-kidney-disease-ckd/eating-nutrition  https://[facility]/health/articles/15641-renal-diet-basics  [employer] cy    If you have any questions or concerns about your condition, please contact your doctor or healthcare provider  These tables may help you to choose lower potassium and phosphorus foods    AVOID these higher phosphorus* foods: CHOOSE these lower phosphorus* foods:   Milk, pudding , yogurt made from animals and from many soy varieties Rice milk (unfortified), nondairy creamer   Hard cheeses, ricotta, cottage cheese, fat-free cream cheese Regular and low-fat cream cheese   Ice cream, frozen yogurt Sherbet, frozen fruit pops, sorbet   Soups made with milk, dried peas, beans, lentils or other high phosphorus ingredients Soups made with broth, are water-based, or other lower phosphorus ingredients   Whole grains, including whole-grain breads, crackers, cereal, rice and pasta, corn tortillas Refined grains, including white bread, crackers, cereals, rice and pasta   Quick breads, biscuits, cornbread, muffins, pancakes, waffles, granola, wheat germ Homemade refined (white) dinner rolls, bagels, English muffins, sugar cookies, shortbread cookies, meena food cake   Dried peas (split, black-eyed), beans (black, garbanzo, lima, kidney, navy, chaudhry), lentils Green peas (canned, frozen), green beans, wax beans   Organ meats, walleye, [place], sardines Lean beef, pork, lamb, poultry, other fish   Nuts and seeds Popcorn   Peanut butter, other nut butters; tofu, veggie or soy burgers Jam, jelly, honey   Chocolate, including chocolate drinks Carob (chocolate-flavored) candy, hard candy,  gumdrops   Angeles, "pepper-type sodas, flavored fontaine, bottled teas, beer Lemon-lime soda, ginger ale or root beer, plain water, cream soda, grape soda   *Always read labels and avoid foods with ingredients containing \"phos\"  AVOID these higher potassium foods: CHOOSE these lower potassium foods:      Milk (fat free, low fat, whole, buttermilk, Soy), yogurt    Regular and low-fat cream cheese      Beans (white, [place]), [place] sprouts, spinach Swiss       chard, broccoli, avocado, artichoke, potatoes, sweet      potatoes, tomatoes/tomato sauce, beet greens      Green beans, alfalfa sprouts, bamboo shoots (canned),       cabbage, carrots, cauliflower, corn, cucumber, eggplant,      endive, lettuce, mushrooms, onions, radishes,  watercress,       water chestnuts (canned), rice, peas      Halibut, tuna, cod, snapper, tuna fish, turkey    Egg, lean beef, pork, lamb, shellfish, chicken       Banana, papaya, orange, cantaloupe, dates, raisins and      other dried fruit, pomegranate, avocado      Apple, applesauce, blackberries, raspberries, pears,     watermelon, cucumbers, blueberries, cranberries,       peaches      Almonds, peanuts, hazelnuts, [place], cashew, mixed,      seeds (sunflower, pumpkin)     Homemade refined (white) dinner rolls, bagels,      English muffins, flour tortilla, crackers, taylor      crackers, popcorn, pretzels, spaghetti or macaroni,       hummus      Tomato or vegetable juice, prune juice    Papaya, olivier, or pear nectar, cranberry juice cocktail      Molasses                                                                   "

## 2023-05-24 NOTE — TELEPHONE ENCOUNTER
----- Message from Broderick Kelley MD sent at 5/24/2023 11:05 AM EDT -----  Regarding: hosp dc  Please schedule the patient for hospital discharge follow-up for elevated cr s/p nephrectomy to be seen in 4-6weeks  Please send the patient orders for renal function panel on 6/24 and then again prior to  the visit  Patient usually follows up with no nephrologist as an outpatient      Thanks  bro

## 2023-05-25 LAB
ANION GAP SERPL CALCULATED.3IONS-SCNC: 0 MMOL/L (ref 4–13)
BUN SERPL-MCNC: 19 MG/DL (ref 5–25)
CALCIUM SERPL-MCNC: 8.9 MG/DL (ref 8.3–10.1)
CHLORIDE SERPL-SCNC: 103 MMOL/L (ref 96–108)
CO2 SERPL-SCNC: 26 MMOL/L (ref 21–32)
CREAT SERPL-MCNC: 1.02 MG/DL (ref 0.6–1.3)
ERYTHROCYTE [DISTWIDTH] IN BLOOD BY AUTOMATED COUNT: 15 % (ref 11.6–15.1)
GFR SERPL CREATININE-BSD FRML MDRD: 80 ML/MIN/1.73SQ M
GLUCOSE SERPL-MCNC: 83 MG/DL (ref 65–140)
HCT VFR BLD AUTO: 38.2 % (ref 36.5–49.3)
HGB BLD-MCNC: 12.7 G/DL (ref 12–17)
MCH RBC QN AUTO: 29.3 PG (ref 26.8–34.3)
MCHC RBC AUTO-ENTMCNC: 33.2 G/DL (ref 31.4–37.4)
MCV RBC AUTO: 88 FL (ref 82–98)
PLATELET # BLD AUTO: 153 THOUSANDS/UL (ref 149–390)
PMV BLD AUTO: 10.2 FL (ref 8.9–12.7)
POTASSIUM SERPL-SCNC: 3.9 MMOL/L (ref 3.5–5.3)
RBC # BLD AUTO: 4.33 MILLION/UL (ref 3.88–5.62)
SODIUM SERPL-SCNC: 129 MMOL/L (ref 135–147)
WBC # BLD AUTO: 11.25 THOUSAND/UL (ref 4.31–10.16)

## 2023-05-25 RX ORDER — METHOCARBAMOL 750 MG/1
750 TABLET, FILM COATED ORAL EVERY 6 HOURS SCHEDULED
Status: DISCONTINUED | OUTPATIENT
Start: 2023-05-25 | End: 2023-05-29 | Stop reason: HOSPADM

## 2023-05-25 RX ORDER — HYDRALAZINE HYDROCHLORIDE 20 MG/ML
5 INJECTION INTRAMUSCULAR; INTRAVENOUS ONCE
Status: COMPLETED | OUTPATIENT
Start: 2023-05-25 | End: 2023-05-25

## 2023-05-25 RX ORDER — POLYETHYLENE GLYCOL 3350 17 G/17G
17 POWDER, FOR SOLUTION ORAL DAILY PRN
Status: DISCONTINUED | OUTPATIENT
Start: 2023-05-25 | End: 2023-05-29 | Stop reason: HOSPADM

## 2023-05-25 RX ORDER — SIMETHICONE 80 MG
80 TABLET,CHEWABLE ORAL EVERY 6 HOURS PRN
Status: DISCONTINUED | OUTPATIENT
Start: 2023-05-25 | End: 2023-05-29 | Stop reason: HOSPADM

## 2023-05-25 RX ADMIN — ROPIVACAINE HYDROCHLORIDE: 2 INJECTION, SOLUTION EPIDURAL; INFILTRATION at 18:16

## 2023-05-25 RX ADMIN — METHADONE HYDROCHLORIDE 110 MG: 10 CONCENTRATE ORAL at 08:37

## 2023-05-25 RX ADMIN — METHOCARBAMOL 750 MG: 750 TABLET ORAL at 23:16

## 2023-05-25 RX ADMIN — HYDROMORPHONE HYDROCHLORIDE 1 MG: 1 INJECTION, SOLUTION INTRAMUSCULAR; INTRAVENOUS; SUBCUTANEOUS at 16:06

## 2023-05-25 RX ADMIN — HYDRALAZINE HYDROCHLORIDE 5 MG: 20 INJECTION, SOLUTION INTRAMUSCULAR; INTRAVENOUS at 12:22

## 2023-05-25 RX ADMIN — METHOCARBAMOL TABLETS 500 MG: 500 TABLET, COATED ORAL at 11:21

## 2023-05-25 RX ADMIN — DOCUSATE SODIUM 100 MG: 100 CAPSULE, LIQUID FILLED ORAL at 17:00

## 2023-05-25 RX ADMIN — ROPIVACAINE HYDROCHLORIDE: 2 INJECTION, SOLUTION EPIDURAL; INFILTRATION at 06:48

## 2023-05-25 RX ADMIN — HYDROMORPHONE HYDROCHLORIDE 1 MG: 1 INJECTION, SOLUTION INTRAMUSCULAR; INTRAVENOUS; SUBCUTANEOUS at 09:09

## 2023-05-25 RX ADMIN — HEPARIN SODIUM 5000 UNITS: 5000 INJECTION INTRAVENOUS; SUBCUTANEOUS at 05:01

## 2023-05-25 RX ADMIN — HYDROMORPHONE HYDROCHLORIDE 1 MG: 1 INJECTION, SOLUTION INTRAMUSCULAR; INTRAVENOUS; SUBCUTANEOUS at 19:44

## 2023-05-25 RX ADMIN — DOCUSATE SODIUM 100 MG: 100 CAPSULE, LIQUID FILLED ORAL at 08:37

## 2023-05-25 RX ADMIN — NICOTINE 1 PATCH: 21 PATCH, EXTENDED RELEASE TRANSDERMAL at 08:37

## 2023-05-25 RX ADMIN — METHOCARBAMOL 750 MG: 750 TABLET ORAL at 17:00

## 2023-05-25 RX ADMIN — METHOCARBAMOL TABLETS 500 MG: 500 TABLET, COATED ORAL at 05:01

## 2023-05-25 RX ADMIN — SENNOSIDES 8.6 MG: 8.6 TABLET, FILM COATED ORAL at 08:37

## 2023-05-25 RX ADMIN — ROPIVACAINE HYDROCHLORIDE: 2 INJECTION, SOLUTION EPIDURAL; INFILTRATION at 01:29

## 2023-05-25 RX ADMIN — HEPARIN SODIUM 5000 UNITS: 5000 INJECTION INTRAVENOUS; SUBCUTANEOUS at 13:06

## 2023-05-25 RX ADMIN — HYDROMORPHONE HYDROCHLORIDE 1 MG: 1 INJECTION, SOLUTION INTRAMUSCULAR; INTRAVENOUS; SUBCUTANEOUS at 00:43

## 2023-05-25 RX ADMIN — HYDROMORPHONE HYDROCHLORIDE 1 MG: 1 INJECTION, SOLUTION INTRAMUSCULAR; INTRAVENOUS; SUBCUTANEOUS at 05:06

## 2023-05-25 RX ADMIN — HYDRALAZINE HYDROCHLORIDE 5 MG: 20 INJECTION, SOLUTION INTRAMUSCULAR; INTRAVENOUS at 23:28

## 2023-05-25 RX ADMIN — HEPARIN SODIUM 5000 UNITS: 5000 INJECTION INTRAVENOUS; SUBCUTANEOUS at 21:45

## 2023-05-25 RX ADMIN — ROPIVACAINE HYDROCHLORIDE: 2 INJECTION, SOLUTION EPIDURAL; INFILTRATION at 12:45

## 2023-05-25 NOTE — PROGRESS NOTES
Epidural Follow-up Note - Acute Pain Service    Yaneth Molina 61 y o  male MRN: [id number]  [facility] Encounter: [id number]      Assessment:   Principal Problem:    Kidney stones  Active Problems:    UTI (urinary tract infection), bacterial    Staghorn calculus      Yaneth Molina is a 61y o  year old male with PMHx of chronic methadone maintenance at [facility] and nephrolithiasis now s/p laparoscopic right nephrectomy on (05/22/23) for which an epidural was placed preoperatively for postoperative analgesia  APS was consulted for postoperative epidural management  Yesterday, ketamine gtt was increased from 0 1mg/kg/hr to 0 2mg/kg/hr in an attempt to improve analgesia and facilitate upcoming epidural removal   Overnight however patient with reports of hallucinations and so ketamine gtt discontinued at about 0500 this morning  Today, patient was AOx4 and back at baseline when I evaluated him this morning  He endorses continued generalized abdominal pain however the main focus of his pain seems to be a sharp pain that radiates from the KATHY drain site and radiates to his right testicle and scrotum  Given the quality of the pain and location, I suspect this may be due to pressure on a portion of genitofemoral nerve  KATHY drain likely to be removed later today and I suspect pain should improve at that time  Palpation over the incisional site above the drain produced minimal discomfort  Epidural continues without issue, he denies any LAST symptoms such as headache, visual changes, tinnitus, perioral numbness/tingling, nausea, or vomit  We discussed resuming the ketamine at the original dose of 0 1mg/kg/hr which he was agreeable to given that this provided good analgesia without side effects  We will plan to removed the epidural tomorrow  Patient receiving heparin SQ 5000U TID, last dose today at 0501      Plan:  Analgesia:   - continue Thoracic epidural infusion of Ropivacaine 0 2% at 10/5/10/4   - continue encouragement and education with regards to PCEA function   - anticipate epidural removal and transition to primarily PO regimen possibly tomorrow, will place order to hold heparin DVT ppx for tomorrow     - restart ketamine gtt at 0 1mg/kg/hr, discussed with patient that he was getting good benefit from the previous dose which he had been tolerating for a few days before the increase and subsequent hallucinations     - continue home methadone 110mg PO solution dialy   - increase robaxin to 750mg PO q6h scheduled   - avoiding NSAIDs in setting of recent nephrectomy    Bowel Regimen:  - Docusate (Colace) 100 mg PO twice daily  - Senna 1 tablet PO qhs    APS will continue to follow  Please contact Acute Pain Service - [facility] via [employer] from [phone] with additional questions or concerns  See Lobito or Adelina for additional contacts and after hours information      Pain History  Current pain location(s): right abdomen, shooting pain of right groin into right testicle  Pain Scale:   10/10  Quality: aching, soreness  24 hour history: see assessmnet    PCEA use:  13 deliveries on 129 attempts  Opioid requirement previous 24 hours: dilaudid IV 1mg x2 doses    Meds/Allergies   all current active meds have been reviewed and current meds:   Current Facility-Administered Medications   Medication Dose Route Frequency   • calcium carbonate (TUMS) chewable tablet 1,000 mg  1,000 mg Oral Daily PRN   • diphenhydrAMINE (BENADRYL) injection 25 mg  25 mg Intravenous Q6H PRN   • docusate sodium (COLACE) capsule 100 mg  100 mg Oral BID   • glycopyrrolate (ROBINUL) injection 0 2 mg  0 2 mg Intravenous Q4H PRN   • haloperidol lactate (HALDOL) injection 2 mg  2 mg Intramuscular Q30 Min PRN   • heparin (porcine) subcutaneous injection 5,000 Units  5,000 Units Subcutaneous Q8H [address]   • HYDROmorphone (DILAUDID) injection 1 mg  1 mg Intravenous Q2H PRN   • ketamine 250 mg (STANDARD CONCENTRATION) IV in sodium chloride 0 9% 250 mL  0 2 mg/kg/hr Intravenous Continuous   • LORazepam (ATIVAN) injection 1 mg  1 mg Intravenous Q1H PRN   • methadone (DOLOPHINE) oral concentrated solution 110 mg  110 mg Oral Daily   • methocarbamol (ROBAXIN) tablet 500 mg  500 mg Oral Q6H IVY   • nicotine (NICODERM CQ) 21 mg/24 hr TD 24 hr patch 1 patch  1 patch Transdermal Daily   • ondansetron (ZOFRAN) injection 4 mg  4 mg Intravenous Q4H PRN   • ropivacaine 0 2% PCEA   Epidural Continuous   • senna (SENOKOT) tablet 8 6 mg  1 tablet Oral Daily       Allergies   Allergen Reactions   • Bee Venom Anaphylaxis   • Metronidazole Itching and Swelling   • Nsaids GI Intolerance     Stomach irritant   • Penicillin G    • Penicillins GI Intolerance     Sick to stomach   • Pollen Extract    • Sulfa Antibiotics        Objective     Temp:  [97 4 °F (36 3 °C)-97 9 °F (36 6 °C)] 97 9 °F (36 6 °C)  HR:  [60-66] 60  Resp:  [13-18] 18  BP: (118-169)/(60-88) 159/86    Physical Exam  Constitutional:       General: He is not in acute distress  Appearance: Normal appearance  He is not ill-appearing, toxic-appearing or diaphoretic  HENT:      Head: Normocephalic and atraumatic  Nose: Nose normal       Mouth/Throat:      Mouth: Mucous membranes are moist       Pharynx: Oropharynx is clear  Eyes:      General: No scleral icterus  Conjunctiva/sclera: Conjunctivae normal    Cardiovascular:      Rate and Rhythm: Normal rate  Pulses: Normal pulses  Heart sounds: Normal heart sounds  Pulmonary:      Effort: Pulmonary effort is normal  No respiratory distress  Breath sounds: Normal breath sounds  No wheezing  Abdominal:      General: Abdomen is flat  There is distension  Palpations: Abdomen is soft  Tenderness: There is abdominal tenderness  Comments: RLQ KATHY drain, right incision appears to be healing well   Musculoskeletal:         General: Tenderness (bilateral shoulder tenderness) present        Cervical back: No rigidity or tenderness  Skin:     General: Skin is warm and dry  Coloration: Skin is not jaundiced or pale  Neurological:      General: No focal deficit present  Mental Status: He is alert and oriented to person, place, and time  Mental status is at baseline  Motor: No weakness  Psychiatric:         Mood and Affect: Mood normal          Behavior: Behavior normal          Thought Content: Thought content normal          Judgment: Judgment normal        Epidural: Site clean/dry/intact, no surrounding erythema/edema/induration, infusion functioning appropriately    Lab Results:   Results from last 7 days   Lab Units 05/25/23  0500   HEMATOCRIT % 38 2   HEMOGLOBIN g/dL 12 7   PLATELETS Thousands/uL 153   WBC Thousand/uL 11 25*      Results from last 7 days   Lab Units 05/25/23  0500   BUN mg/dL 19   CALCIUM mg/dL 8 9   CHLORIDE mmol/L 103   CO2 mmol/L 26   CREATININE mg/dL 1 02   POTASSIUM mmol/L 3 9          Imaging Studies: I have personally reviewed pertinent reports  EKG, Pathology, and Other Studies: I have personally reviewed pertinent reports  Counseling / Coordination of Care  Total floor / unit time spent today 15 minutes  Greater than 50% of total time was spent with the patient and / or family counseling and / or coordination of care  Please note that the APS provides consultative services regarding pain management only  With the exception of ketamine, peripheral nerve catheters, and epidural infusions (and except when indicated), final decisions regarding starting or changing doses of analgesic medications are at the discretion of the consulting service  Off hours consultation and/or medication management is generally not available      Ruth Ann Martines MD  Acute Pain Service

## 2023-05-25 NOTE — PROGRESS NOTES
"Patient called nurse and PCA into room saying \"There's a cat in the corner\"  No cat is currently in the room  Patient alert and oriented to time, place, person, and situation  Acute pain C  Ye and urology B  uJlián aware    "

## 2023-05-25 NOTE — PHYSICAL THERAPY NOTE
Physical Therapy Evaluation     Patient's Name: Ewa Theodore    Admitting Diagnosis  Staghorn calculus [N20 0]  Ureteral stricture [N13 5]    Problem List  Patient Active Problem List   Diagnosis    Compulsive skin picking    Acute metabolic encephalopathy    Acute respiratory failure with hypoxia (HCC)    Elevated troponin    Elevated brain natriuretic peptide (BNP) level    Essential hypertension    Transaminitis    Elevated lipase    BRAULIO (acute kidney injury) ([address] )    Hypernatremia    Increased anion gap metabolic acidosis    Rhabdomyolysis    Elevated d-dimer    Kidney stones    Delusional disorder ([address] )    Hypophosphatemia    Cognitive decline    Anxiety    Smoking    Drug abuse, episodic use (HCC)    Abnormal CT scan, liver    AMS (altered mental status)    UTI (urinary tract infection), bacterial    Staghorn calculus       Past Medical History  Past Medical History:   Diagnosis Date    Anxiety     Bright red rectal bleeding     Last Assessed: 9/9/2015     Drug dependence ([facility] ) 10/20/2021    Hypertension     Last Assessed: 7/9/2014     Kidney stone     Kidney stones     Last Assessed: 11/17/2016     Periorbital edema     Last Assessed: 9/4/2015    Septic shock ([facility] [facility] ) 08/20/2022    Skin tag of anus     Last Assessed: 9/9/2015     Trigger point of thoracic region     Last Assessed: 11/6/2015        Past Surgical History  Past Surgical History:   Procedure Laterality Date    CYSTOSCOPY W/ URETERAL STENT PLACEMENT  03/11/2013    CYSTOSCOPY W/ URETERAL STENT PLACEMENT  06/18/2014    EXTRACORPOREAL SHOCK WAVE LITHOTRIPSY     CYSTOSCOPY W/ URETERAL STENT PLACEMENT  07/16/2014    EXTRACORPOREAL SHOCK WAVE LITHOTRIPSY     CYSTOSCOPY W/ URETERAL STENT REMOVAL  04/11/2013    CYSTOSCOPY W/ URETERAL STENT REMOVAL Right 08/26/2014    CYSTOSCOPY W/ URETEROSCOPY W/ LITHOTRIPSY  02/26/2013    Percutaneous lithotomy With Uretal Stent Plcement     CYSTOSCOPY W/ URETEROSCOPY W/ LITHOTRIPSY  03/11/2014    CYSTOSCOPY W/ "URETEROSCOPY W/ LITHOTRIPSY Right 08/04/2014    With Uretal Stent Placement     CYSTOSCOPY W/ URETEROSCOPY W/ LITHOTRIPSY Right 05/09/2016    HERNIA REPAIR  03/11/2013    IR NEPHROSTOMY TUBE CHECK/CHANGE/REPOSITION/REINSERTION/UPSIZE  11/22/2022    IR NEPHROSTOMY TUBE CHECK/CHANGE/REPOSITION/REINSERTION/UPSIZE  02/13/2023    IR NEPHROSTOMY TUBE CHECK/CHANGE/REPOSITION/REINSERTION/UPSIZE  04/28/2023    IR NEPHROSTOMY TUBE PLACEMENT  08/20/2022    KIDNEY SURGERY      LITHOTRIPSY      ND CYSTO/URETERO W/LITHOTRIPSY &INDWELL STENT INSRT Right 07/13/2016    Procedure: CYSTOSCOPY; URETEROSCOPY WITH HOLMIUM LASER STONE EXTRACTION; RETROGRADE PYELOGRAM; URETERAL STENT INSERTION ;  Surgeon: Tacho Barrett MD;  [facility];  Service: Urology    ND CYSTO/URETERO W/LITHOTRIPSY &INDWELL STENT INSRT Right 05/09/2016    Procedure: CYSTOSCOPY,  URETEROSCOPY,  WITH LITHOTRIPSY HOLMIUM LASER, STONE EXTRACTION, AND INSERTION STENT URETERAL;  Surgeon: Tacho Barrett MD;  Location: [facility];  Service: Urology    ND CYSTO/URETERO W/LITHOTRIPSY &INDWELL STENT INSRT Right 11/10/2022    Procedure: CYSTOSCOPY URETEROSCOPY  right RETROGRADE PYELOGRAM;  Surgeon: Michelle Conrad MD;  Location: [facility];  Service: Urology    ND LAPAROSCOPY RADICAL NEPHRECTOMY Right 5/22/2023    Procedure: NEPHRECTOMY RADICAL LAPAROSCOPIC W/ ROBOTICS;  Surgeon: Jordan Patino MD;  Location: [facility];  Service: Urology    ND LITHOTRIPSY [address] [place] [address] Right 06/17/2016    Procedure: LITHROTRIPSY EXTRACORPORAL SHOCKWAVE (ESWL);   Surgeon: Tacho Barrett MD;  Location: [facility];  Service: Urology    TRANSURETHRAL RESECTION OF BLADDER      URETERAL Bee Arlene  03/11/2013 05/25/23 0955   PT Last Visit   PT Visit Date 05/25/23   Note Type   Note type Evaluation   Pain Assessment   Pain Assessment Tool FLACC   Pain Score 10 - Worst Possible Pain   Pain Location/Orientation   (\"testicles\")   Hospital Pain Intervention(s) " "Ambulation/increased activity;Repositioned   Pain Rating: FLACC (Rest) - Face 0   Pain Rating: FLACC (Rest) - Legs 0   Pain Rating: FLACC (Rest) - Activity 0   Pain Rating: FLACC (Rest) - Cry 0   Pain Rating: FLACC (Rest) - Consolability 0   Score: FLACC (Rest) 0   Pain Rating: FLACC (Activity) - Face 1   Pain Rating: FLACC (Activity) - Legs 0   Pain Rating: FLACC (Activity) - Activity 0   Pain Rating: FLACC (Activity) - Cry 0   Pain Rating: FLACC (Activity) - Consolability 0   Score: FLACC (Activity) 1   Restrictions/Precautions   Weight Bearing Precautions Per Order No   Braces or Orthoses   (none)   Other Precautions Pain; Fall Risk;Telemetry;Multiple lines; Bed Alarm; Chair Alarm;Cognitive  (PCA pump)   Home Living   Type of [address] [place] [address] Two level;Stairs to enter with rails  (1 binu)   Additional Comments Per PT evaluation from 01/2023: patient resided with his parents in a 2 story home (1 BINU, stays on 1st floor on couch)  Patient tells me today that he has been I with mobility (no use of AD) and 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 in the last 6 months   (denies)   Vocational   (denies being on disabilty; states he is not working)   General   Additional Pertinent History 61year old male admitted to [facility] on 5/22/2023 for the following planned procedure: right nephrectomy radial laparoscopic     Cognition   Overall Cognitive Status (S)  Impaired   Arousal/Participation Lethargic   Orientation Level Oriented to person;Oriented to place;Oriented to situation  (time \"april 2023\")   Memory Decreased recall of precautions;Decreased recall of recent events;Decreased short term memory   Following Commands Follows one step commands with increased time or repetition   Comments + hallucinations- patient " "seeing cat in his room, aware he is hallucinating   Subjective   Subjective \"I can eat the banana\" patient took banana and rubbed it on his neck   RUE Assessment   RUE Assessment WFL   LUE Assessment   LUE Assessment WFL   RLE Assessment   RLE Assessment WFL  (4/5 grossly)   LLE Assessment   LLE Assessment WFL  (4/5 grossly)   Bed Mobility   Supine to Sit 4  Minimal assistance   Additional items Assist x 1; Increased time required;LE management   Sit to Supine Unable to assess   Additional Comments patient in chair with alarm active post eval   Transfers   Sit to Stand 4  Minimal assistance   Additional items Assist x 1; Increased time required;Verbal cues   Stand to Sit 4  Minimal assistance   Additional items Assist x 1; Increased time required;Verbal cues   Stand pivot 4  Minimal assistance   Additional items Assist x 1; Increased time required;Verbal cues   Ambulation/Elevation   Gait pattern Excessively slow; Short stride;Decreased foot clearance   Gait Assistance 4  Minimal assist   Additional items Assist x 1   Assistive Device Other (Comment)  (hand held assist)   Distance 2 feet (bed to chair)   Ambulation/Elevation Additional Comments Did not attempt further mobility in setting of patient's lethargy/hallucinations/reduced command following  Balance   Static Sitting Fair +   Static Standing Fair -   Ambulatory Poor +   Endurance Deficit   Endurance Deficit Yes   Endurance Deficit Description lethargy, confusion ? from medications   Activity Tolerance   Activity Tolerance Other (Comment)  (impaired cognition)   Nurse Made Aware silas to see per NOLBERTO Canseco and f/u post   Assessment   Prognosis Good   Problem List Decreased strength;Decreased endurance; Impaired balance;Decreased mobility; Decreased cognition;Pain; Impaired judgement;Decreased safety awareness   Assessment PT completed evaluation of 61year old male [facility] [place] [facility] for the following planned procedure: right nephrectomy radial " laparoscopic  Current status instabilities include acute cognitive deficits and hallucinations, PCA pump, falls risk, bed/chair alarms and a regression in functional status from baseline  PMH is significant for chronic methadone maintenance, HTN, and rhabdomyolysis  Patient is currently a limited historian  Per PT evaluation from 01/2023: patient resided with his parents in a 2 story home (1 IBNU, stays on 1st floor on couch)  Patient tells me today that he has been I with mobility (no use of AD) and 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  Current impairments include decreased cognition, balance, and activity tolerance and gait deviations  During PT evaluation patient required min-AX1 for bed mobility, sit<-->stand transfer, and short distance ambulation  With hand held assist he walked 2 feet (bed to chair)  Did not attempt further mobility in setting of patient's lethargy/hallucinations/reduced command following  Discussed with RN who believes patient's affect to be dur to medications  Anticipate with improvement in cognition patient's functional status will improve and he will d/c home with family  PT will continue to follow for d/c planning and mobility progression  Patient will continue to benefit from continued skilled PT this admission to achieve maximal function and safety     Goals   Patient Goals to eat his banana   LTG Expiration Date 06/08/23   Long Term Goal #1 1) Perform bed mobility S level to participate in frequent repositioning and improve skin integrity; 2) Perform functional transfers S level to promote I with toileting and OOB mobility; 3) Ambulate 200 feet S level with least restrictive device to participate in household and community level mobility; 4) Improve b/l LE strength by 1/2 grade in order to improve efficiency of tranfers; 5) Improve balance by 1 grade to reduce risk for falls; 6) Navigate 12 steps S level in order to safely navigate multiple floors at home   PT Treatment Day 0   Plan   Treatment/Interventions Functional transfer training;LE strengthening/ROM; Therapeutic exercise; Endurance training;Patient/family training;Equipment eval/education; Bed mobility;Gait training;OT;Spoke to nursing   PT Frequency 3-5x/wk   Recommendation   PT Discharge Recommendation (S)  Home with home health rehabilitation  (home pending progress)   Equipment Recommended   (will cont to assess)   AM-PAC Basic Mobility Inpatient   Turning in Flat Bed Without Bedrails 3   Lying on Back to Sitting on Edge of Flat Bed Without Bedrails 3   Moving Bed to Chair 3   Standing Up From Chair Using Arms 3   Walk in Room 2   Climb 3-5 Stairs With Railing 2   Basic Mobility Inpatient Raw Score 16   Basic Mobility Standardized Score 38 32   Highest Level Of Mobility   JH-HLM Goal 5: Stand one or more mins   JH-HLM Achieved 4: Move to chair/commode     The patient's AM-PAC Basic Mobility Inpatient Standardized Score is less than 42 9, suggesting this patient may benefit from discharge to post-acute rehabilitation services  Please also refer to the recommendation of the Physical Therapist for safe discharge planning        Raphael Castro, PT, DPT

## 2023-05-25 NOTE — CASE MANAGEMENT
Case Management Assessment & Discharge Planning Note    Patient name Tremaine Son  [address] [facility] [id number]  : 1965 Date 2023       Current Admission Date: 2023  Current Admission Diagnosis:Kidney stones   Patient Active Problem List    Diagnosis Date Noted   • Staghorn calculus 2023   • UTI (urinary tract infection), bacterial 2023   • Abnormal CT scan, liver 2023   • AMS (altered mental status) 2023   • Smoking 11/10/2022   • Drug abuse, episodic use ([facility] ) 11/10/2022   • Anxiety 2022   • Cognitive decline 2022   • Delusional disorder ([facility] [address] ) 2022   • Hypophosphatemia 2022   • Acute metabolic encephalopathy    • Acute respiratory failure with hypoxia ([facility] [address] ) 2022   • Elevated troponin 2022   • Elevated brain natriuretic peptide (BNP) level 2022   • Essential hypertension 2022   • Transaminitis 2022   • Elevated lipase 2022   • BRAULIO (acute kidney injury) ([facility] [address] ) 2022   • Hypernatremia 2022   • Increased anion gap metabolic acidosis    • Rhabdomyolysis 2022   • Elevated d-dimer 2022   • Kidney stones    • Compulsive skin picking 10/20/2021      LOS (days): 3  Geometric Mean LOS (GMLOS) (days): 1 80  Days to GMLOS:-1 1     OBJECTIVE:    Risk of Unplanned Readmission Score: 20 8         Current admission status: Inpatient       Preferred Pharmacy:   [facility] [address] [place] [id number]  Phone: [phone] Fax: [address] [place] [address] [place] [address] [place] [address]  Phone: [phone] Fax: [phone]    Primary Care Provider: Madhavi Westfall DO    Primary Insurance: Hamzah GRAHAM  Secondary Insurance:     ASSESSMENT:  [address] [place], [address] [place] Representative - Mother   Primary "Phone: [phone] (Home)               Advance Directives  Does patient have a [address] [facility]?: No  Was patient offered paperwork?: Yes  Does patient currently have a Health Care decision maker?: Yes, please see Health Care Proxy section  Does patient have Advance Directives?: No  Was patient offered paperwork?: Yes  Primary Contact: David Morales pt's mother: [phone]         Readmission Root Cause  30 Day Readmission: No    Patient Information  Admitted from[de-identified] Home  Mental Status: Confused (\"Acute metabolic encephalopothy\")  During Assessment patient was accompanied by: Parent  Assessment information provided by[de-identified] Parent  Primary Caregiver: Self  Support Systems: Parent  [place] of Residence: [facility] do you live in?: [address] entry access options  Select all that apply : Stairs  Number of steps to enter home  : 1  Do the steps have railings?: Yes  Type of Current Residence: [address] home  Upon entering residence, is there a bedroom on the main floor (no further steps)?: No  A bedroom is located on the following floor levels of residence (select all that apply):: 2nd Floor  Upon entering residence, is there a bathroom on the main floor (no further steps)?: No  Indicate which floors of current residence have a bathroom (select all the apply):: 2nd Floor  Number of steps to 2nd floor from main floor: One Flight  In the last 12 months, was there a time when you were not able to pay the mortgage or rent on time?: No  In the last 12 months, how many places have you lived?: 1  In the last 12 months, was there a time when you did not have a steady place to sleep or slept in a shelter (including now)?: No  Homeless/housing insecurity resource given?: N/A  Living Arrangements: Lives w/ Parent(s)  Is patient a ?: No    Activities of Daily Living Prior to Admission  Functional Status: Independent  Completes ADLs independently?: Yes  Ambulates independently?: Yes  Does patient use assisted " devices?: No  Does patient currently own DME?: Yes  What DME does the patient currently own?: Doris Sher Kristy  Does patient have a history of Outpatient Therapy (PT/OT)?: No  Does the patient have a history of Short-Term Rehab?: No  Does patient have a history of HHC?: No  Does patient currently have [facility]?: No         Patient Information Continued  Income Source: Unemployed  Does patient have prescription coverage?: Yes  Within the past 12 months, you worried that your food would run out before you got the money to buy more : Never true  Within the past 12 months, the food you bought just didn't last and you didn't have money to get more : Never true  Food insecurity resource given?: N/A  Does patient receive dialysis treatments?: No  Does patient have a history of substance abuse?: Yes  Historical substance use preference:  (Pt's mother reported pt has a history of drug use)  Does patient have a history of Mental Health Diagnosis?: Yes (Anxiety)  Is patient receiving treatment for mental health?: No  Treatment options were provided    Has patient received inpatient treatment related to mental health in the last 2 years?: No         Means of Transportation  Means of Transport to Appts[de-identified] Drives Self  In the past 12 months, has lack of transportation kept you from medical appointments or from getting medications?: No  In the past 12 months, has lack of transportation kept you from meetings, work, or from getting things needed for daily living?: No  Was application for public transport provided?: N/A        DISCHARGE DETAILS:    Discharge planning discussed with[de-identified] Patient's mother, Pratibha Lorenzo of Choice: Yes     CM contacted family/caregiver?: Yes             Contacts  Patient Contacts: Yan Garvey  Relationship to Patient[de-identified] Family  Contact Method: Phone  Phone Number: [phone]  Reason/Outcome: Continuity of Care, Emergency Contact, Discharge Planning              Other Referral/Resources/Interventions Provided:  Referral Comments: Pending recs         Treatment Team Recommendation: Other (TBD)  Discharge Destination Plan[de-identified] Other (TBD)  Transport at Discharge : Family                 Additional Comments: CM called pt's mother, Elenor Skiff, to complete open assessment  CM introduced self and role  Pt's mother reported that she and pt live together and pt has her support in the home  CM to follow

## 2023-05-25 NOTE — PROGRESS NOTES
"Pt called out complaining he was \"tripping  \"  Pt further clarified that he was \"hallucinating  I see Solange Jean and [employer]  \"  Ketamine gtt turned off  Dr Ailyn Mulligan with anesthesia notified and instructed to keep ketamine gtt off for now and day team will reassess in morning    "

## 2023-05-25 NOTE — PROGRESS NOTES
Follow up Consultation    Nephrology   Judy Moreno 61 y o  male MRN: [id number]  Unit/Bed#: [facility] 928-01 Encounter: [id number]      Physician Requesting Consult: Jus Thornton MD        ASSESSMENT/PLAN:  59-year-old male with multiple comorbidities including staghorn calculi with nephrolithiasis with previous PCN placement in August 2022 and had BRAULIO at that time presented for elective surgical intervention with radical right laparoscopic nephrectomy on 5/22/2023  Nephrology consulted for perioperative optimization to reduce incidence for acute kidney injury        Perioperative optimization to reduce incidence for acute kidney injury/elevated creatinine: At risk for BRAULIO multifactorial most likely secondary to ischemic injury secondary hemodynamic perturbations intraoperatively plus decreased nephron mass with nephrectomy  After review of records In [facility] as well as Care everywhere it appears that the patient has a baseline Creatinine of 0 9-1 2 mg/dL  Preadmission creatinine of 1 27 mg/dL on 5/13  Creatinine today is at 1 02 mg/dL, stable, we will need to see where new baseline creatinine will be  Discussed with patient we will have to wait a period of 3 months to see where his new baseline creatinine will be  Hold further IV fluids for now  check BMP in a m  if still in the hospital  Await renal recovery  Optimize hemodynamic status to avoid delay in renal recovery  Place on a renal diet when allowed diet order  Avoid nephrotoxins, adjust meds to appropriate GFR  Strict I/O  Daily weights  Urinary retention protocol if patient does not have a Yee  Most likely has underlying CKD secondary to nephrolithiasis history of prior BRAULIO and now decreased nephron mass  will need to set up patient for follow up with Nephrology as an outpatient post hospitalization    Outpatient nephrology follow-up with Dr Ash River for discharge from renal standpoint medically cleared will need outpatient "follow-up with blood work in 4 weeks on 6/24 message sent to the office arrange for outpatient follow-up upon discharge     Acid-base electrolytes:  Hypokalemia:    Most recent serum potassium 3 4 mEq supplement    Hyponatremia:  Most recent sodium at 130Eq, improving  Check BMP in a m  Likely decreased secondary to increased ADH secondary to pain continue to monitor for now  Continue on fluid restriction 1 5 L/day     Acid-base:    Most recent bicarb stable at 24     H/H/anemia:  most recent hemoglobin at 12 7 g/dL  maintain hemoglobin greater than 8 grams/deciliter     Blood pressure/normotensive:  current medications: None  recommendations: No changes, blood pressure continues to be elevated consider placing on Norvasc 5 mg p o  daily  Likely some association due to pain  Optimize hemodynamics  Maintain MAP > 65mmHg  Avoid BP fluctuations      Other medical problems:  History of drug abuse:  Management per primary team   On methadone as an outpatient  Staghorn calculi/recurrent nephrolithiasis:  Management per primary team   Follow-up with urology status post radical right nephrectomy 5/22/2023  Pain management for assistance with pain control  Follow-up with nephrology as an outpatient for further stone work-up  Thanks for the consult  Will sign off  Please call with questions/ concerns  Above-mentioned orders and Plan in terms of stable for discharge from his standpoint will need outpatient follow-up for lab work as an outpatient were discussed with the team in depth and they agree      1 Melo Mcknight MD, EYAD, 5/26/2023, 12:44 PM                Objective :   Patient seen and examined in his room blood pressure slightly elevated intermittently remains afebrile happy serum sodium is improving and creatinine stable hopeful for discharge tomorrow, urine output 1 6 L / 24 hours      PHYSICAL EXAM  BP (!) 185/94   Pulse 62   Temp 98 2 °F (36 8 °C)   Resp 20   Ht 5' 7\" (1 702 m)   Wt 66 4 kg (146 lb " 7 9 oz)   SpO2 96%   BMI 22 94 kg/m²   Temp (24hrs), Av 1 °F (36 7 °C), Min:97 5 °F (36 4 °C), Max:99 1 °F (37 3 °C)        Intake/Output Summary (Last 24 hours) at 2023 1244  Last data filed at 2023 1002  Gross per 24 hour   Intake 937 45 ml   Output 1893 ml   Net -955 55 ml       I/O last 24 hours: In: 937 5 [P O :526; I V :411 5]  Out: 1938 [Urine:1918; Drains:20]      Current Weight: Weight - Scale: 66 4 kg (146 lb 7 9 oz)  First Weight: Weight - Scale: 66 4 kg (146 lb 7 9 oz)  Physical Exam  Vitals and nursing note reviewed  Constitutional:       General: He is not in acute distress  Appearance: Normal appearance  He is normal weight  He is not ill-appearing, toxic-appearing or diaphoretic  HENT:      Head: Normocephalic and atraumatic  Mouth/Throat:      Pharynx: No oropharyngeal exudate  Eyes:      General: No scleral icterus  Conjunctiva/sclera: Conjunctivae normal    Cardiovascular:      Rate and Rhythm: Normal rate  Heart sounds: Normal heart sounds  No friction rub  Pulmonary:      Breath sounds: Normal breath sounds  No stridor  Abdominal:      General: There is no distension  Palpations: Abdomen is soft  There is no mass  Tenderness: There is no abdominal tenderness  Musculoskeletal:         General: No swelling  Cervical back: Normal range of motion  No rigidity  Skin:     Coloration: Skin is not jaundiced  Neurological:      General: No focal deficit present  Mental Status: He is alert and oriented to person, place, and time  Psychiatric:         Mood and Affect: Mood normal          Behavior: Behavior normal              Review of Systems   Constitutional: Negative for diaphoresis and fever  HENT: Negative for congestion  Respiratory: Negative for cough and shortness of breath  Cardiovascular: Negative for leg swelling  Gastrointestinal: Negative for abdominal pain  Genitourinary: Negative for flank pain  Musculoskeletal: Negative for back pain  Neurological: Negative for headaches  Psychiatric/Behavioral: Negative for agitation and confusion  All other systems reviewed and are negative  Scheduled Meds:  Current Facility-Administered Medications   Medication Dose Route Frequency Provider Last Rate   • calcium carbonate  1,000 mg Oral Daily PRN Star Leong MD     • diphenhydrAMINE  25 mg Intravenous Q6H PRN Savanna Gonzalez MD     • docusate sodium  100 mg Oral BID Star Leong MD     • heparin (porcine)  5,000 Units Subcutaneous [facility] Amari Upton PA-C     • hydrALAZINE  5 mg Intravenous Q6H PRN Amari Upton PA-C     • HYDROmorphone  1 mg Intravenous Q2H PRN Jose Angel Sinha MD     • methadone  110 mg Oral Daily Jose Angel Sinha MD     • methocarbamol  750 mg Oral Q6H [address] Nilda Guevara MD     • nicotine  1 patch Transdermal Daily Star Leong MD     • ondansetron  4 mg Intravenous Q4H PRN Savanna Gonzalez MD     • polyethylene glycol  17 g Oral Daily PRN Amari Upton PA-C     • senna  1 tablet Oral Daily Star Leong MD     • simethicone  80 mg Oral Q6H PRN Amari Upton PA-C         PRN Meds:  •  calcium carbonate  •  diphenhydrAMINE  •  hydrALAZINE  •  HYDROmorphone  •  ondansetron  •  polyethylene glycol  •  simethicone    Continuous Infusions:       Invasive Devices:      Invasive Devices     Central Venous Catheter Line  Duration           CVC Central Lines 05/22/23 Triple 4 days                  LABORATORY:    Results from last 7 days   Lab Units 05/26/23  0524 05/25/23  0500 05/24/23  0920 05/24/23  0502 05/23/23  0611 05/22/23  1656   BUN mg/dL 22 19  --  12 15 12   CALCIUM mg/dL 8 9 8 9  --  8 7 8 3 8 6   CHLORIDE mmol/L 104 103  --  104 110* 113*   CO2 mmol/L 24 26  --  27 24 22   CREATININE mg/dL 1 02 1 02  --  1 16 1 50* 1 31*   HEMATOCRIT % 39 4 38 2 38 3  --  39 0 44 1   HEMOGLOBIN g/dL 13 6 12 7 13 0  --  12 6 14 5 "  POTASSIUM mmol/L 3 4* 3 9  --  4 1 4 5 4 0   PLATELETS Thousands/uL 191 153 136*  --  133* 128*   WBC Thousand/uL 10 71* 11 25* 12 99*  --  12 32* 9 78      rest all reviewed    RADIOLOGY:  XR chest portable   Final Result by Debbie Lemons MD (05/22 1720)      Right IJ catheter in adequate position  No evidence of pneumothorax  Pneumoperitoneum, most likely related to the recent surgery  Workstation performed: [id number]           Rest all reviewed    Portions of the record may have been created with voice recognition software  Occasional wrong word or \"sound a like\" substitutions may have occurred due to the inherent limitations of voice recognition software  Read the chart carefully and recognize, using context, where substitutions have occurred  If you have any questions, please contact the dictating provider      "

## 2023-05-25 NOTE — PROGRESS NOTES
Progress Note -urology  Liss Can 61 y o  male MRN: [id number]  Unit/Bed#: [facility] 928-01 Encounter: [id number]    Assessment & Plan:    Right nephrectomy radical laparoscopic with robotics for staghorn calculus and ureteral stricture, progressing slowly:    -Postop day 3 radical right nephrectomy laparoscopic with robotics progressing slowly  -Continue with pain control, history of substance abuse, alcohol abuse currently managed on methadone  acute pain services following and managing pain appreciate their services patient on ketamine drip, epidural and methadone  Plan to wean off of epidural in 24-48 hours  Patient's pain improving, with plan to wean off of epidural tomorrow   -Status post nephrectomy, nephrology also consulted and following  Baseline creatinine 0 9-1 2 increased to 1 5 postoperatively expected with loss of nephrons  Renal function back at baseline  Clear from nephrology standpoint for d/c with outpt follow up  Patient with hyponatremia nephrology recommending fluid restriction 1500 for now reevaluate with BMP tomorrow  Believes most likely in the setting of pain    -Encourage ambulation, abdominal binder, Ordered PT, patient reluctant to move due to pain  Sitting up in chair today upon evaluation improved pain  May be able to continue with ambulation   -Patient now with recent bladder scan of 400, urinary retention protocol placed,  - patient now on room air     -Bowel regimen adjusted to Colace, Senokot, MiraLAX and simethicone  -advanced diet to surgical soft  Tolerating clears currently, reports passing some flatus  - plan to remove drain today output   -Patient hemodynamically stable afebrile  -Active leukocytosis trending downward  -Globin continues to be stable  -Anticipate patient to stay until pain able to be managed  Subjective/Objective   Chief Complaint: Pain    Subjective:   Patient currently sitting in chair no acute distress    Reports he continues to have severe "pain but did get up and out of bed today into a chair  He does report being nervous to ambulate due to pain  He reports that he feels as though he can ambulate but does not want to  He does report that his pain is improved since the past 2 days  Denies any nausea or vomiting  Does report decreased appetite    Objective:     Blood pressure (!) 180/108, pulse 65, temperature 97 9 °F (36 6 °C), resp  rate 16, height 5' 7\" (1 702 m), weight 66 4 kg (146 lb 7 9 oz), SpO2 95 %  ,Body mass index is 22 94 kg/m²  Intake/Output Summary (Last 24 hours) at 5/25/2023 1212  Last data filed at 5/25/2023 1139  Gross per 24 hour   Intake 527 21 ml   Output 755 ml   Net -227 79 ml       Invasive Devices     Central Venous Catheter Line  Duration           CVC Central Lines 05/22/23 Triple 3 days          Epidural Line  Duration           Epidural Catheter 05/22/23 3 days          Drain  Duration           Closed/Suction Drain Right RLQ Bulb 19 Fr  2 days    External Urinary Catheter 1 day            Physical Exam  Constitutional:       General: He is not in acute distress  Appearance: He is normal weight  He is not ill-appearing, toxic-appearing or diaphoretic  HENT:      Head: Normocephalic and atraumatic  Right Ear: External ear normal       Left Ear: External ear normal       Nose: Nose normal       Mouth/Throat:      Pharynx: Oropharynx is clear  Eyes:      General: No scleral icterus  Conjunctiva/sclera: Conjunctivae normal    Cardiovascular:      Rate and Rhythm: Normal rate and regular rhythm  Pulses: Normal pulses  Heart sounds: No murmur heard  No friction rub  No gallop  Pulmonary:      Effort: Pulmonary effort is normal  No respiratory distress  Breath sounds: No wheezing, rhonchi or rales  Abdominal:      General: Bowel sounds are normal  There is no distension  Palpations: Abdomen is soft  Tenderness: There is no abdominal tenderness        Comments: Surgical " incisions intact, dry, no sign of wound dehiscence or underlying infection, KATHY drain with low output of serous fluid  Abdomen is mildly distended bowel sounds are present throughout, improved abdominal tenderness able to palpate large nephrectomy incision and KATHY drain without significant discomfort  Genitourinary:     Comments: Condom catheter in place with clear yellow urine  Musculoskeletal:         General: Normal range of motion  Cervical back: Normal range of motion  Skin:     General: Skin is warm and dry  Neurological:      General: No focal deficit present  Mental Status: He is alert and oriented to person, place, and time  Psychiatric:         Mood and Affect: Mood normal          Behavior: Behavior normal          Thought Content: Thought content normal          Judgment: Judgment normal            Lab, Imaging and other studies:I have personally reviewed pertinent lab results      Lab Results   Component Value Date    HCT 38 2 05/25/2023    HGB 12 7 05/25/2023    MCV 88 05/25/2023     05/25/2023    WBC 11 25 (H) 05/25/2023     Lab Results   Component Value Date    BUN 19 05/25/2023    CALCIUM 8 9 05/25/2023     05/25/2023    CO2 26 05/25/2023    CREATININE 1 02 05/25/2023    GLUC 83 05/25/2023    K 3 9 05/25/2023    SODIUM 129 (L) 05/25/2023         VTE Pharmacologic Prophylaxis: Heparin  VTE Mechanical Prophylaxis: sequential compression device      Watson Hughes PA-C

## 2023-05-25 NOTE — PLAN OF CARE
Problem: PHYSICAL THERAPY ADULT  Goal: Performs mobility at highest level of function for planned discharge setting  See evaluation for individualized goals  Description: Treatment/Interventions: Functional transfer training, LE strengthening/ROM, Therapeutic exercise, Endurance training, Patient/family training, Equipment eval/education, Bed mobility, Gait training, OT, Spoke to nursing  Equipment Recommended:  (will cont to assess)       See flowsheet documentation for full assessment, interventions and recommendations  Note: Prognosis: Good  Problem List: Decreased strength, Decreased endurance, Impaired balance, Decreased mobility, Decreased cognition, Pain, Impaired judgement, Decreased safety awareness  Assessment: PT completed evaluation of 61year old male admitted to [facility] on 5/22/2023 for the following planned procedure: right nephrectomy radial laparoscopic  Current status instabilities include acute cognitive deficits and hallucinations, PCA pump, falls risk, bed/chair alarms and a regression in functional status from baseline  PMH is significant for chronic methadone maintenance, HTN, and rhabdomyolysis  Patient is currently a limited historian  Per PT evaluation from 01/2023: patient resided with his parents in a 2 story home ([facility], stays on 1st floor on couch)  Patient tells me today that he has been I with mobility (no use of AD) and 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  Current impairments include decreased cognition, balance, and activity tolerance and gait deviations  During PT evaluation patient required min-AX1 for bed mobility, sit<-->stand transfer, and short distance ambulation  With hand held assist he walked 2 feet (bed to chair)  Did not attempt further mobility in setting of patient's lethargy/hallucinations/reduced command following  Discussed with RN who believes patient's affect to be dur to medications   Anticipate with improvement in cognition patient's functional status will improve and he will d/c home with family  PT will continue to follow for d/c planning and mobility progression  Patient will continue to benefit from continued skilled PT this admission to achieve maximal function and safety  PT Discharge Recommendation: (S) Home with home health rehabilitation (home pending progress)    See flowsheet documentation for full assessment

## 2023-05-26 ENCOUNTER — APPOINTMENT (INPATIENT)
Dept: RADIOLOGY | Facility: HOSPITAL | Age: 58
End: 2023-05-26

## 2023-05-26 PROBLEM — E87.1 HYPONATREMIA: Status: ACTIVE | Noted: 2023-05-26

## 2023-05-26 PROBLEM — F10.939 ALCOHOL WITHDRAWAL (HCC): Status: ACTIVE | Noted: 2023-05-26

## 2023-05-26 PROBLEM — R94.31 PROLONGED QT INTERVAL: Status: ACTIVE | Noted: 2023-05-26

## 2023-05-26 LAB
ALBUMIN SERPL BCP-MCNC: 2.9 G/DL (ref 3.5–5)
ALP SERPL-CCNC: 82 U/L (ref 46–116)
ALT SERPL W P-5'-P-CCNC: 23 U/L (ref 12–78)
AMMONIA PLAS-SCNC: <10 UMOL/L (ref 11–35)
ANION GAP SERPL CALCULATED.3IONS-SCNC: 2 MMOL/L (ref 4–13)
ANION GAP SERPL CALCULATED.3IONS-SCNC: 2 MMOL/L (ref 4–13)
AST SERPL W P-5'-P-CCNC: 17 U/L (ref 5–45)
BASE EXCESS BLDA CALC-SCNC: -2 MMOL/L (ref -2–3)
BASOPHILS # BLD AUTO: 0.02 THOUSANDS/ÂΜL (ref 0–0.1)
BASOPHILS NFR BLD AUTO: 0 % (ref 0–1)
BILIRUB SERPL-MCNC: 0.82 MG/DL (ref 0.2–1)
BUN SERPL-MCNC: 22 MG/DL (ref 5–25)
BUN SERPL-MCNC: 23 MG/DL (ref 5–25)
CA-I BLD-SCNC: 1.18 MMOL/L (ref 1.12–1.32)
CALCIUM ALBUM COR SERPL-MCNC: 9.9 MG/DL (ref 8.3–10.1)
CALCIUM SERPL-MCNC: 8.9 MG/DL (ref 8.3–10.1)
CALCIUM SERPL-MCNC: 9 MG/DL (ref 8.3–10.1)
CHLORIDE SERPL-SCNC: 103 MMOL/L (ref 96–108)
CHLORIDE SERPL-SCNC: 104 MMOL/L (ref 96–108)
CO2 SERPL-SCNC: 24 MMOL/L (ref 21–32)
CO2 SERPL-SCNC: 24 MMOL/L (ref 21–32)
CREAT SERPL-MCNC: 1.02 MG/DL (ref 0.6–1.3)
CREAT SERPL-MCNC: 1.12 MG/DL (ref 0.6–1.3)
EOSINOPHIL # BLD AUTO: 0.07 THOUSAND/ÂΜL (ref 0–0.61)
EOSINOPHIL NFR BLD AUTO: 1 % (ref 0–6)
ERYTHROCYTE [DISTWIDTH] IN BLOOD BY AUTOMATED COUNT: 14.7 % (ref 11.6–15.1)
ERYTHROCYTE [DISTWIDTH] IN BLOOD BY AUTOMATED COUNT: 14.7 % (ref 11.6–15.1)
GFR SERPL CREATININE-BSD FRML MDRD: 72 ML/MIN/1.73SQ M
GFR SERPL CREATININE-BSD FRML MDRD: 80 ML/MIN/1.73SQ M
GLUCOSE SERPL-MCNC: 100 MG/DL (ref 65–140)
GLUCOSE SERPL-MCNC: 102 MG/DL (ref 65–140)
GLUCOSE SERPL-MCNC: 92 MG/DL (ref 65–140)
GLUCOSE SERPL-MCNC: 99 MG/DL (ref 65–140)
HCO3 BLDA-SCNC: 20.8 MMOL/L (ref 24–30)
HCT VFR BLD AUTO: 39.4 % (ref 36.5–49.3)
HCT VFR BLD AUTO: 40.2 % (ref 36.5–49.3)
HCT VFR BLD CALC: 41 % (ref 36.5–49.3)
HGB BLD-MCNC: 13.6 G/DL (ref 12–17)
HGB BLD-MCNC: 13.9 G/DL (ref 12–17)
HGB BLDA-MCNC: 13.9 G/DL (ref 12–17)
IMM GRANULOCYTES # BLD AUTO: 0.09 THOUSAND/UL (ref 0–0.2)
IMM GRANULOCYTES NFR BLD AUTO: 1 % (ref 0–2)
LACTATE SERPL-SCNC: 2 MMOL/L (ref 0.5–2)
LACTATE SERPL-SCNC: 2.3 MMOL/L (ref 0.5–2)
LYMPHOCYTES # BLD AUTO: 0.71 THOUSANDS/ÂΜL (ref 0.6–4.47)
LYMPHOCYTES NFR BLD AUTO: 6 % (ref 14–44)
MAGNESIUM SERPL-MCNC: 2.7 MG/DL (ref 1.6–2.6)
MCH RBC QN AUTO: 29 PG (ref 26.8–34.3)
MCH RBC QN AUTO: 29.1 PG (ref 26.8–34.3)
MCHC RBC AUTO-ENTMCNC: 34.5 G/DL (ref 31.4–37.4)
MCHC RBC AUTO-ENTMCNC: 34.6 G/DL (ref 31.4–37.4)
MCV RBC AUTO: 84 FL (ref 82–98)
MCV RBC AUTO: 84 FL (ref 82–98)
MONOCYTES # BLD AUTO: 0.95 THOUSAND/ÂΜL (ref 0.17–1.22)
MONOCYTES NFR BLD AUTO: 9 % (ref 4–12)
NEUTROPHILS # BLD AUTO: 9.26 THOUSANDS/ÂΜL (ref 1.85–7.62)
NEUTS SEG NFR BLD AUTO: 83 % (ref 43–75)
NRBC BLD AUTO-RTO: 0 /100 WBCS
PCO2 BLD: 22 MMOL/L (ref 21–32)
PCO2 BLD: 29.3 MM HG (ref 42–50)
PH BLD: 7.46 [PH] (ref 7.3–7.4)
PHOSPHATE SERPL-MCNC: 1.9 MG/DL (ref 2.7–4.5)
PLATELET # BLD AUTO: 191 THOUSANDS/UL (ref 149–390)
PLATELET # BLD AUTO: 224 THOUSANDS/UL (ref 149–390)
PMV BLD AUTO: 9.2 FL (ref 8.9–12.7)
PMV BLD AUTO: 9.6 FL (ref 8.9–12.7)
PO2 BLD: 29 MM HG (ref 35–45)
POTASSIUM BLD-SCNC: 3.2 MMOL/L (ref 3.5–5.3)
POTASSIUM SERPL-SCNC: 3.2 MMOL/L (ref 3.5–5.3)
POTASSIUM SERPL-SCNC: 3.4 MMOL/L (ref 3.5–5.3)
PROT SERPL-MCNC: 7.4 G/DL (ref 6.4–8.4)
RBC # BLD AUTO: 4.68 MILLION/UL (ref 3.88–5.62)
RBC # BLD AUTO: 4.8 MILLION/UL (ref 3.88–5.62)
SAO2 % BLD FROM PO2: 60 % (ref 60–85)
SODIUM BLD-SCNC: 132 MMOL/L (ref 136–145)
SODIUM SERPL-SCNC: 129 MMOL/L (ref 135–147)
SODIUM SERPL-SCNC: 130 MMOL/L (ref 135–147)
SPECIMEN SOURCE: ABNORMAL
WBC # BLD AUTO: 10.71 THOUSAND/UL (ref 4.31–10.16)
WBC # BLD AUTO: 11.1 THOUSAND/UL (ref 4.31–10.16)

## 2023-05-26 RX ORDER — HYDRALAZINE HYDROCHLORIDE 20 MG/ML
5 INJECTION INTRAMUSCULAR; INTRAVENOUS EVERY 6 HOURS PRN
Status: DISCONTINUED | OUTPATIENT
Start: 2023-05-26 | End: 2023-05-27

## 2023-05-26 RX ORDER — POTASSIUM CHLORIDE 20 MEQ/1
40 TABLET, EXTENDED RELEASE ORAL ONCE
Status: COMPLETED | OUTPATIENT
Start: 2023-05-26 | End: 2023-05-26

## 2023-05-26 RX ORDER — LORAZEPAM 2 MG/ML
4 INJECTION INTRAMUSCULAR ONCE
Status: COMPLETED | OUTPATIENT
Start: 2023-05-26 | End: 2023-05-26

## 2023-05-26 RX ORDER — LANOLIN ALCOHOL/MO/W.PET/CERES
100 CREAM (GRAM) TOPICAL DAILY
Status: DISCONTINUED | OUTPATIENT
Start: 2023-05-26 | End: 2023-05-26

## 2023-05-26 RX ORDER — POTASSIUM CHLORIDE 20 MEQ/1
40 TABLET, EXTENDED RELEASE ORAL ONCE
Status: DISCONTINUED | OUTPATIENT
Start: 2023-05-26 | End: 2023-05-26

## 2023-05-26 RX ORDER — LABETALOL HYDROCHLORIDE 5 MG/ML
10 INJECTION, SOLUTION INTRAVENOUS ONCE
Status: COMPLETED | OUTPATIENT
Start: 2023-05-26 | End: 2023-05-26

## 2023-05-26 RX ORDER — LORAZEPAM 2 MG/ML
2 INJECTION INTRAMUSCULAR EVERY 4 HOURS PRN
Status: DISCONTINUED | OUTPATIENT
Start: 2023-05-26 | End: 2023-05-29 | Stop reason: HOSPADM

## 2023-05-26 RX ORDER — SODIUM CHLORIDE 9 MG/ML
50 INJECTION, SOLUTION INTRAVENOUS CONTINUOUS
Status: DISCONTINUED | OUTPATIENT
Start: 2023-05-26 | End: 2023-05-27

## 2023-05-26 RX ORDER — LORAZEPAM 2 MG/ML
2 INJECTION INTRAMUSCULAR ONCE
Status: COMPLETED | OUTPATIENT
Start: 2023-05-26 | End: 2023-05-26

## 2023-05-26 RX ORDER — AMLODIPINE BESYLATE 5 MG/1
5 TABLET ORAL DAILY
Status: DISCONTINUED | OUTPATIENT
Start: 2023-05-27 | End: 2023-05-26

## 2023-05-26 RX ORDER — SODIUM CHLORIDE, SODIUM GLUCONATE, SODIUM ACETATE, POTASSIUM CHLORIDE, MAGNESIUM CHLORIDE, SODIUM PHOSPHATE, DIBASIC, AND POTASSIUM PHOSPHATE .53; .5; .37; .037; .03; .012; .00082 G/100ML; G/100ML; G/100ML; G/100ML; G/100ML; G/100ML; G/100ML
500 INJECTION, SOLUTION INTRAVENOUS ONCE
Status: COMPLETED | OUTPATIENT
Start: 2023-05-26 | End: 2023-05-26

## 2023-05-26 RX ORDER — FOLIC ACID 1 MG/1
1 TABLET ORAL DAILY
Status: DISCONTINUED | OUTPATIENT
Start: 2023-05-26 | End: 2023-05-26

## 2023-05-26 RX ORDER — AMLODIPINE BESYLATE 5 MG/1
5 TABLET ORAL DAILY
Status: DISCONTINUED | OUTPATIENT
Start: 2023-05-26 | End: 2023-05-29 | Stop reason: HOSPADM

## 2023-05-26 RX ORDER — HYDROXYZINE HYDROCHLORIDE 25 MG/1
25 TABLET, FILM COATED ORAL ONCE
Status: COMPLETED | OUTPATIENT
Start: 2023-05-26 | End: 2023-05-26

## 2023-05-26 RX ADMIN — ROPIVACAINE HYDROCHLORIDE: 2 INJECTION, SOLUTION EPIDURAL; INFILTRATION at 01:24

## 2023-05-26 RX ADMIN — POTASSIUM CHLORIDE 20 MEQ: 1500 TABLET, EXTENDED RELEASE ORAL at 10:04

## 2023-05-26 RX ADMIN — HEPARIN SODIUM 5000 UNITS: 5000 INJECTION INTRAVENOUS; SUBCUTANEOUS at 21:38

## 2023-05-26 RX ADMIN — SENNOSIDES 8.6 MG: 8.6 TABLET, FILM COATED ORAL at 09:58

## 2023-05-26 RX ADMIN — SODIUM CHLORIDE, SODIUM GLUCONATE, SODIUM ACETATE, POTASSIUM CHLORIDE, MAGNESIUM CHLORIDE, SODIUM PHOSPHATE, DIBASIC, AND POTASSIUM PHOSPHATE 500 ML: .53; .5; .37; .037; .03; .012; .00082 INJECTION, SOLUTION INTRAVENOUS at 18:35

## 2023-05-26 RX ADMIN — HEPARIN SODIUM 5000 UNITS: 5000 INJECTION INTRAVENOUS; SUBCUTANEOUS at 13:23

## 2023-05-26 RX ADMIN — METHADONE HYDROCHLORIDE 110 MG: 10 CONCENTRATE ORAL at 09:57

## 2023-05-26 RX ADMIN — HYDRALAZINE HYDROCHLORIDE 5 MG: 20 INJECTION, SOLUTION INTRAMUSCULAR; INTRAVENOUS at 17:00

## 2023-05-26 RX ADMIN — ONDANSETRON 4 MG: 2 INJECTION INTRAMUSCULAR; INTRAVENOUS at 10:09

## 2023-05-26 RX ADMIN — HYDROMORPHONE HYDROCHLORIDE 1 MG: 1 INJECTION, SOLUTION INTRAMUSCULAR; INTRAVENOUS; SUBCUTANEOUS at 03:08

## 2023-05-26 RX ADMIN — SODIUM CHLORIDE 50 ML/HR: 0.9 INJECTION, SOLUTION INTRAVENOUS at 18:03

## 2023-05-26 RX ADMIN — NICOTINE 1 PATCH: 21 PATCH, EXTENDED RELEASE TRANSDERMAL at 09:58

## 2023-05-26 RX ADMIN — METHOCARBAMOL 750 MG: 750 TABLET ORAL at 05:11

## 2023-05-26 RX ADMIN — ONDANSETRON 4 MG: 2 INJECTION INTRAMUSCULAR; INTRAVENOUS at 17:00

## 2023-05-26 RX ADMIN — POTASSIUM PHOSPHATE, MONOBASIC AND POTASSIUM PHOSPHATE, DIBASIC 30 MMOL: 224; 236 INJECTION, SOLUTION, CONCENTRATE INTRAVENOUS at 20:21

## 2023-05-26 RX ADMIN — LORAZEPAM 2 MG: 2 INJECTION INTRAMUSCULAR; INTRAVENOUS at 18:03

## 2023-05-26 RX ADMIN — HYDROMORPHONE HYDROCHLORIDE 1 MG: 1 INJECTION, SOLUTION INTRAMUSCULAR; INTRAVENOUS; SUBCUTANEOUS at 01:04

## 2023-05-26 RX ADMIN — HYDROMORPHONE HYDROCHLORIDE 1 MG: 1 INJECTION, SOLUTION INTRAMUSCULAR; INTRAVENOUS; SUBCUTANEOUS at 05:13

## 2023-05-26 RX ADMIN — HYDROXYZINE HYDROCHLORIDE 25 MG: 25 TABLET, FILM COATED ORAL at 20:39

## 2023-05-26 RX ADMIN — LORAZEPAM 4 MG: 2 INJECTION INTRAMUSCULAR; INTRAVENOUS at 21:43

## 2023-05-26 RX ADMIN — HYDRALAZINE HYDROCHLORIDE 5 MG: 20 INJECTION, SOLUTION INTRAMUSCULAR; INTRAVENOUS at 10:21

## 2023-05-26 RX ADMIN — METHOCARBAMOL 750 MG: 750 TABLET ORAL at 13:23

## 2023-05-26 RX ADMIN — LORAZEPAM 4 MG: 2 INJECTION INTRAMUSCULAR; INTRAVENOUS at 21:01

## 2023-05-26 RX ADMIN — AMLODIPINE BESYLATE 5 MG: 5 TABLET ORAL at 20:27

## 2023-05-26 RX ADMIN — LABETALOL HYDROCHLORIDE 10 MG: 5 INJECTION INTRAVENOUS at 20:42

## 2023-05-26 RX ADMIN — DOCUSATE SODIUM 100 MG: 100 CAPSULE, LIQUID FILLED ORAL at 09:58

## 2023-05-26 RX ADMIN — LORAZEPAM 4 MG: 2 INJECTION INTRAMUSCULAR; INTRAVENOUS at 20:18

## 2023-05-26 NOTE — PROGRESS NOTES
Epidural Follow-up Note - Acute Pain Service    Leonel Batista 61 y o  male MRN: [id number]  Unit/Bed#: [facility] 928-01 Encounter: [id number]      Assessment:   Principal Problem:    Kidney stones  Active Problems:    UTI (urinary tract infection), bacterial    Staghorn calculus      Leonel Batista is a 61y o  year old male with PMHx of chronic methadone maintenance at [facility] and nephrolithiasis now s/p laparoscopic right nephrectomy on (05/22/23) for which an epidural was placed preoperatively for postoperative analgesia   APS was consulted for postoperative epidural management       Patient was seen on rounds this morning  He is doing well at time of interview and reports mild pain, well controlled  Per notes he had severe pain overnight and hallucinations from the lower dose Ketamine infusion  During interview patient reports his pain wasn't so bad overnight  Ketamine now off  Patient reports he is eager to go home  We discussed pulling the epidural vs leaving it in another day and the patient would like the epidural removed  He is ambulating and tolerating a diet  Plan:  Analgesia:  - Discontinue Thoracic epidural infusion   - heparin SQ held this morning   - Last platelet count   Lab Results   Component Value Date     05/26/2023     - d/c ketamine    - continue home methadone 110mg PO solution dialy  - increase robaxin to 750mg PO q6h scheduled  - avoiding NSAIDs in setting of recent nephrectomy    - wean IV dilaudid as tolerated, OK to d/c and give 1x PRN doses of 1mg      Bowel Regimen:  - Docusate (Colace) 100 mg PO twice daily  - Senna 1 tablet PO qhs    APS will continue to follow  Please contact Acute Pain Service - [facility] via [employer] from [phone] with additional questions or concerns  See Lobito or Adelina for additional contacts and after hours information      Plan discussed with primary team    Pain History  Current pain location(s): right abdomen  Pain Scale: mild at time of interview  Quality: sore, ache  24 hour history: as above    PCEA use: 26 attempts, 3 doses  Opioid requirement previous 24 hours: 2 doses IV dilaudid 1mg    Meds/Allergies   all current active meds have been reviewed    Allergies   Allergen Reactions   • Bee Venom Anaphylaxis   • Metronidazole Itching and Swelling   • Nsaids GI Intolerance     Stomach irritant   • Penicillin G    • Penicillins GI Intolerance     Sick to stomach   • Pollen Extract    • Sulfa Antibiotics        Objective     Temp:  [97 5 °F (36 4 °C)-99 1 °F (37 3 °C)] 98 2 °F (36 8 °C)  HR:  [61-66] 65  Resp:  [16-20] 20  BP: (142-180)/() 175/87    Physical Exam  Constitutional:       General: He is not in acute distress  Appearance: Normal appearance  Eyes:      Extraocular Movements: Extraocular movements intact  Conjunctiva/sclera: Conjunctivae normal    Cardiovascular:      Rate and Rhythm: Normal rate and regular rhythm  Pulmonary:      Effort: Pulmonary effort is normal  No respiratory distress  Abdominal:      General: Abdomen is flat  There is no distension  Palpations: Abdomen is soft  Musculoskeletal:         General: Normal range of motion  Cervical back: Normal range of motion and neck supple  Skin:     General: Skin is warm and dry  Neurological:      General: No focal deficit present  Mental Status: He is alert and oriented to person, place, and time  Psychiatric:         Mood and Affect: Mood normal          Behavior: Behavior normal          Lab Results:   Results from last 7 days   Lab Units 05/26/23  0524   HEMATOCRIT % 39 4   HEMOGLOBIN g/dL 13 6   PLATELETS Thousands/uL 191   WBC Thousand/uL 10 71*      Results from last 7 days   Lab Units 05/26/23  0524   BUN mg/dL 22   CALCIUM mg/dL 8 9   CHLORIDE mmol/L 104   CO2 mmol/L 24   CREATININE mg/dL 1 02   POTASSIUM mmol/L 3 4*          Please note that the APS provides consultative services regarding pain management only    With the exception of ketamine, peripheral nerve catheters, and epidural infusions (and except when indicated), final decisions regarding starting or changing doses of analgesic medications are at the discretion of the consulting service  Off hours consultation and/or medication management is generally not available      Emerita Valdovinos MD  Acute Pain Service

## 2023-05-26 NOTE — PLAN OF CARE
Problem: PHYSICAL THERAPY ADULT  Goal: Performs mobility at highest level of function for planned discharge setting  See evaluation for individualized goals  Description: Treatment/Interventions: Functional transfer training, LE strengthening/ROM, Therapeutic exercise, Endurance training, Patient/family training, Equipment eval/education, Bed mobility, Gait training, OT, Spoke to nursing  Equipment Recommended:  (will cont to assess)       See flowsheet documentation for full assessment, interventions and recommendations  Outcome: Progressing  Note: Prognosis: Good  Problem List: Decreased strength, Decreased range of motion, Decreased endurance, Decreased mobility, Impaired balance, Decreased coordination, Decreased cognition, Impaired judgement, Decreased safety awareness, Pain  Assessment: Patient was received supine in bed   Patient was agreeable to therapy services today with increased verbal encouragement  PT session focused on transfers and ambulation tolerance today in order to improve functional mobility and independence  Patient reported nausea throughout therapy session today  Patient required several standing and seated rest breaks throughout therapy session  Patient continues to display confusion and impaired judgement with transfers and gait  Patient requires repeated cuing and encouragement to complete tasks  Patient will benefit from continued PT services while in hospital in order to address remaining limitations  The patients AM-PAC Basic Mobility Inpatient Short From Raw Score is 17   A Raw score of 17 suggests that the patient may benefit from discharge to home   PT recommendation at this time is for discharge home with HHPT services pending progress made in hospital  Please also refer to the recommendation of the Physical Therapist for safe discharge planning    Barriers to Discharge: Inaccessible home environment, Decreased caregiver support     PT Discharge Recommendation: Home with home health rehabilitation (home pending progress)    See flowsheet documentation for full assessment
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
## 2023-05-26 NOTE — CASE MANAGEMENT
Case Management Discharge Planning Note    Patient name Jade Shah  Location [address] [place] [address] [facility] [id number]  : 1965 Date 2023       Current Admission Date: 2023  Current Admission Diagnosis:Kidney stones   Patient Active Problem List    Diagnosis Date Noted   • Staghorn calculus 2023   • UTI (urinary tract infection), bacterial 2023   • Abnormal CT scan, liver 2023   • AMS (altered mental status) 2023   • Smoking 11/10/2022   • Drug abuse, episodic use ([facility] ) 11/10/2022   • Anxiety 2022   • Cognitive decline 2022   • Delusional disorder ([facility] ) 2022   • Hypophosphatemia 2022   • Acute metabolic encephalopathy    • Acute respiratory failure with hypoxia ([facility] ) 2022   • Elevated troponin 2022   • Elevated brain natriuretic peptide (BNP) level 2022   • Essential hypertension 2022   • Transaminitis 2022   • Elevated lipase 2022   • BRAULIO (acute kidney injury) ([facility] ) 2022   • Hypernatremia 2022   • Increased anion gap metabolic acidosis    • Rhabdomyolysis 2022   • Elevated d-dimer 2022   • Kidney stones    • Compulsive skin picking 10/20/2021      LOS (days): 4  Geometric Mean LOS (GMLOS) (days): 3 00  Days to GMLOS:-1 1     OBJECTIVE:  Risk of Unplanned Readmission Score: 18 37         Current admission status: Inpatient   Preferred Pharmacy:   Lara Adkins [address] [place] [id number]  Phone: [phone] Fax: 408 [facility], [address] [facility]  Phone: [phone] Fax: [phone]    Primary Care Provider: Flakito Zavaleta DO    Primary Insurance: [employer]  Secondary Insurance:     DISCHARGE DETAILS:    [address]         Is the patient interested in [place] [address] at discharge?: Yes  [address] Discipline requested[de-identified] Occupational Therapy, Physical R Deepika [address] Agency Name[de-identified] Other (TBD)  [address] Provider[de-identified] PCP  Home Health Services Needed[de-identified] Evaluate Functional Status and Safety, Gait/ADL Training, Strengthening/Theraputic Exercises to Improve Function  Homebound Criteria Met[de-identified] Requires the Assistance of Another Person for Safe Ambulation or to Leave the Home  Supporting Clincal Findings[de-identified] Fatigues Easliy in [employer], Limited Endurance         Other Referral/Resources/Interventions Provided:  Interventions: [facility]  Referral Comments: Referral in [place]         Treatment Team Recommendation: Home with [address] [employer]  Discharge Destination Plan[de-identified] Home with [address] [employer]              Additional Comments: CM reviewed PT/OT rec with pt  Pt agreeable to CM sending referral for [facility]  CM also reviewed with pt's mother via phone  Referral open in A[place]

## 2023-05-26 NOTE — RAPID RESPONSE
Rapid Response Note  Neptali Larkin 61 y o  male MRN: [id number]  [facility] Encounter: [id number]    Rapid Response Notification(s):   Response called date/time:  5/26/2023 3:42 PM  Response team arrival date/time:  5/26/2023 3:44 PM  Level of care:  [facility]  Rapid response location:  [facility] unit  Primary reason for rapid response call:  Acute change in neuro status    Rapid Response Intervention(s):   Airway:  None  Breathing:  None  Circulation:  None  Fluids administered:  None  Medications administered:  None       Assessment:   · Acute agitation  · Altered mental status  · D/Dx (no particular order): alcohol withdrawal, ketamine emergence reaction, UTI/urinary retention, other intracranial or metabolic process    Plan:   · STAT CT head  · Labs: CBC, CMP, mag, phos, ionized calcium, lactic acid, VBG, ammonia  · Recommend medicine consult for workup of altered mental status  · CIWA protocol  · Soft limb restraints     Rapid Response Outcome:   Transfer:  Remain on floor  Primary service notified of transfer: Yes    Code Status: Level 1 (Full Code)      Family notified of transfer: yes  Family member contacted: by primary     Background/Situation:   Neptali Larkin is a 61 y o  male who is admitted following a radical nephrectomy  POD4  Rapid response called for altered mental status  Review of Systems   Unable to perform ROS: Mental status change       Objective:   Vitals:    05/26/23 1550 05/26/23 1551 05/26/23 1553 05/26/23 1556   BP:  (!) 176/93     Pulse: 66  68 67   Resp:       Temp:       TempSrc:       SpO2: 96%  95% 95%   Weight:       Height:         Physical Exam  Vitals reviewed  Constitutional:       General: He is awake  He is in acute distress  Appearance: He is not ill-appearing, toxic-appearing or diaphoretic  Interventions: He is restrained  HENT:      Head: Normocephalic        Mouth/Throat:      Mouth: Mucous membranes are moist    Eyes:      General: No "scleral icterus  Extraocular Movements: Extraocular movements intact  Pupils: Pupils are equal, round, and reactive to light  Neck:      Vascular: No JVD  Trachea: No tracheal deviation  Comments: RIJ CVC w dressing c/d/i  Cardiovascular:      Rate and Rhythm: Normal rate and regular rhythm  Pulses: Normal pulses  Heart sounds: Normal heart sounds  Pulmonary:      Effort: Pulmonary effort is normal       Breath sounds: Normal breath sounds and air entry  No wheezing, rhonchi or rales  Abdominal:      General: There is no distension  Palpations: Abdomen is soft  Tenderness: There is no abdominal tenderness  Comments: RLQ surgical dressing c/d/i   Musculoskeletal:      Right lower leg: No edema  Left lower leg: No edema  Comments: KAUFMAN   Skin:     General: Skin is warm and dry  Capillary Refill: Capillary refill takes less than 2 seconds  Coloration: Skin is not cyanotic, jaundiced, mottled or pale  Neurological:      General: No focal deficit present  Motor: Motor function is intact  Psychiatric:         Behavior: Behavior is uncooperative  Portions of the record may have been created with voice recognition software  Occasional wrong word or \"sound a like\" substitutions may have occurred due to the inherent limitations of voice recognition software  Read the chart carefully and recognize, using context, where substitutions have occurred      Vik Levin PA-C    "
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
## 2023-05-26 NOTE — PROGRESS NOTES
Progress Note -urology  Nick Kaufman 61 y o  male MRN: [id number]  Unit/Bed#: [facility] 928-01 Encounter: [id number]    Assessment & Plan:    Right nephrectomy radical laparoscopic with robotics for staghorn calculus and ureteral stricture, progressing slowly:    -Postop day 4 radical right nephrectomy laparoscopic with robotics progressing slowly  -Continue with pain control, history of substance abuse, alcohol abuse currently managed on methadone  acute pain services following and managing pain appreciate their services patient on ketamine drip discontinued and epidural today  Continuing methadone and as needed IV Dilaudid  Pain improved  -Status post nephrectomy, nephrology also consulted and following  Baseline creatinine 0 9-1 2 increased to 1 5 postoperatively expected with loss of nephrons  Renal function back at baseline  Clear from nephrology standpoint for d/c with outpt follow up  Patient with hyponatremia mildly improved to 130 today  We will continue with fluid restriction we will continue to follow-up on recommendations from nephrology  We will plan for repeat BMP tomorrow  -Encourage ambulation patient up in chair yesterday reports he was ambulating earlier today  Continues to pass flatus  -Patient continues to tolerate advance diet  -Patient had elevated bladder scans yesterday requiring straight catheterization for 850 cc since that time patient has been voided with repeat PVRs acceptable continue with urinary retention protocol  - patient now on room air     -Bowel regimen adjusted to Colace, Senokot, MiraLAX and simethicone  -advanced diet to surgical soft  Tolerating clears currently, reports passing some flatus     - plan to remove drain today output   -Patient hemodynamically stable afebrile  -Active leukocytosis trending downward  -Hemoglobin continues to be stable  -Mild hypokalemia replenished  -Anticipate discharge within the next 24 to 48 hours now the pain is controlled "patient ambulating tolerating diet and passing flatus  Assuring stability of pain control, hypertension and hyponatremia  Subjective/Objective   Chief Complaint: Pain    Subjective:   Patient currently sitting in chair no acute distress  Reports he continues to have severe pain but did get up and out of bed today into a chair  He does report being nervous to ambulate due to pain  He reports that he feels as though he can ambulate but does not want to  He does report that his pain is improved since the past 2 days  Denies any nausea or vomiting  Does report decreased appetite    Objective:     Blood pressure (!) 185/94, pulse 62, temperature 98 2 °F (36 8 °C), resp  rate 20, height 5' 7\" (1 702 m), weight 66 4 kg (146 lb 7 9 oz), SpO2 96 %  ,Body mass index is 22 94 kg/m²  Intake/Output Summary (Last 24 hours) at 5/26/2023 1200  Last data filed at 5/26/2023 1002  Gross per 24 hour   Intake 937 45 ml   Output 1893 ml   Net -955 55 ml       Invasive Devices     Central Venous Catheter Line  Duration           CVC Central Lines 05/22/23 Triple 3 days            Physical Exam  Constitutional:       General: He is not in acute distress  Appearance: He is normal weight  He is not ill-appearing, toxic-appearing or diaphoretic  HENT:      Head: Normocephalic and atraumatic  Right Ear: External ear normal       Left Ear: External ear normal       Nose: Nose normal       Mouth/Throat:      Pharynx: Oropharynx is clear  Eyes:      General: No scleral icterus  Conjunctiva/sclera: Conjunctivae normal    Cardiovascular:      Rate and Rhythm: Normal rate and regular rhythm  Pulses: Normal pulses  Heart sounds: No murmur heard  No friction rub  No gallop  Pulmonary:      Effort: Pulmonary effort is normal  No respiratory distress  Breath sounds: No wheezing, rhonchi or rales  Abdominal:      General: Bowel sounds are normal  There is no distension        Palpations: Abdomen is " soft       Tenderness: There is no abdominal tenderness  Comments: Surgical incisions intact, dry, no sign of wound dehiscence or underlying infection, KATHY drain with low output of serous fluid  No abdominal distention, but are nonpalpable surgical incisions and tender to palpation significantly improved compared to prior examinations  KATHY drain removed yesterday  Dressing clean and intact   Genitourinary:     Comments: Condom catheter in place with clear yellow urine  Musculoskeletal:         General: Normal range of motion  Cervical back: Normal range of motion  Skin:     General: Skin is warm and dry  Neurological:      General: No focal deficit present  Mental Status: He is alert and oriented to person, place, and time  Psychiatric:         Mood and Affect: Mood normal          Behavior: Behavior normal          Thought Content: Thought content normal          Judgment: Judgment normal            Lab, Imaging and other studies:I have personally reviewed pertinent lab results      Lab Results   Component Value Date    HCT 39 4 05/26/2023    HGB 13 6 05/26/2023    MCV 84 05/26/2023     05/26/2023    WBC 10 71 (H) 05/26/2023     Lab Results   Component Value Date    BUN 22 05/26/2023    CALCIUM 8 9 05/26/2023     05/26/2023    CO2 24 05/26/2023    CREATININE 1 02 05/26/2023    GLUC 100 05/26/2023    K 3 4 (L) 05/26/2023    SODIUM 130 (L) 05/26/2023         VTE Pharmacologic Prophylaxis: Heparin  VTE Mechanical Prophylaxis: sequential compression device      Lakia Gomez PA-C

## 2023-05-26 NOTE — PROGRESS NOTES
"Pt given AM robaxin  Observed pt attempting to pour water in the medicine cup  When questioned about what he was doing, pt became agitated stating \"This is what you are supposed to do  I'm trying to cut my fentanyl  \"  Pt told that the medicine was robaxin and not fentanyl to which pt replied \"I'm sorry  I was trying to cut my methodone  \"  Again reiterated it was robaxin and not methadone or fentanyl    "

## 2023-05-26 NOTE — PROGRESS NOTES
BP s/p hydralazine 179/89  HR 66  Pt reports 10/10 pain still  Prn dilauded given  Neto KINGSLEY Oswald notified

## 2023-05-26 NOTE — ASSESSMENT & PLAN NOTE
· SLIM consulted for change in mentation  Rapid response was called earlier today due to change in mentation, patient aggressive, agitated, violent towards staff, attempting to leave  · Stat CT head negative for acute finding  · CBC and CMP unremarkable except for mild hypokalemia  · Hypophosphatemia noted-replete  · Initially thought to be alcohol withdrawal delirium, placed on CIWA protocol, received IV Ativan 2 mg x 1   · However after discussion with mother, patient does not have history of alcohol abuse  · He does have narcotic abuse history for which she is on maintenance dose of methadone  · Mother reports in previous hospital stay, patient had very similar episode of aggression after pain medication was reduced  · Noted ketamine infusion was discontinued last night  · Discussed with on-call , symptoms does not appear consistent with opioid withdrawal     · Symptoms more consistent with alcohol withdrawal versus benzo withdrawal versus underlying psychiatric illness  · Of note, previously in 08/2022, he was evaluated by neuropsych and deemed not competent  · We will continue CIWA protocol, and IV Ativan as needed  · Noted QTc prolonged in previous EKG, obtain repeat EKG  If QTc less than 500, will administer IM Haldol versus IM Zyprexa    · Continue continual observation, locked restraints  · Telemetry monitor

## 2023-05-26 NOTE — PROGRESS NOTES
"Pt reports hallucinating again and requests the ketamine be \"turned down  \"  Ketamine gtt turned off and Dr Leonard Jimenez with anesthesia notified   Instructed to keep Ketamine drip on hold overnight and APS will reevaluate in the AM   "

## 2023-05-26 NOTE — ASSESSMENT & PLAN NOTE
Noted patient's blood pressure persistently elevated, currently on as needed IV hydralazine    We will add amlodipine 5 mg

## 2023-05-26 NOTE — PROGRESS NOTES
Pt states pain remains 10/10 with little to no improvement from prn IV dilauded  Epidural currently still infusing  Pt requesting additional pain medication but not due until 0304  Dr Antonio Watson with anesthesia notified  Instructed to give prn dilauded when due

## 2023-05-26 NOTE — QUICK NOTE
Rapid response called at approximately 3:39 p m  Patient this afternoon was noted to be more agitated running out into the halls this afternoon  Placed back in his bedroom  Patient continued to be more agitated and became violent with nursing staff resulting in rapid response called for altered mental status  Critical care team arrived to the bedside shortly after  Continue with work-up with blood work and plan for CT of head without contrast    Differential alcohol withdrawal as patient was previously on ketamine only recently discontinued last night with epidural removed early this morning  Patient takes methadone chronically for history of drug abuse  Has been taking methadone since January  Patient lives with mother and she reports that patient does not drink alcohol excessively on outpatient  She reports he may have 1 drink a month with low percentage  CIWA precaution ordered, internal medicine consultation placed  Soft restraints ordered  Of note patient's mother reports that patient has been having hallucinations primarily at nighttime for the past year  Primarily hallucinations of animals  Bladder scan obtained 95 cc    Patient examined prior to placement of soft restraints swinging and kicking at staff  He was responsive primarily responding and no  Heart rate, O2 normal   BP elevated  Abdomen nondistended       Wt Readings from Last 3 Encounters:   05/22/23 66 4 kg (146 lb 7 9 oz)   05/19/23 68 kg (150 lb)   05/16/23 68 2 kg (150 lb 6 4 oz)     Temp Readings from Last 3 Encounters:   05/26/23 98 9 °F (37 2 °C)   05/16/23 (!) 97 1 °F (36 2 °C)   02/13/23 98 7 °F (37 1 °C) (Temporal)     BP Readings from Last 3 Encounters:   05/26/23 (!) 176/93   05/19/23 118/68   05/16/23 140/64     Pulse Readings from Last 3 Encounters:   05/26/23 67   05/19/23 64   05/16/23 60         Aamri Upton PA-C

## 2023-05-26 NOTE — PHYSICAL THERAPY NOTE
"                                                                                  PHYSICAL THERAPY NOTE          Patient Name: Yani Bingham  SZEHQ'E Date: 5/26/2023 05/26/23 1139   PT Last Visit   PT Visit Date 05/26/23   Note Type   Note Type Treatment   Pain Assessment   Pain Assessment Tool 0-10   Pain Score 6   Pain Location/Orientation Other (Comment)  (his \"whole body\")   Patient's Stated Pain Goal No pain   Hospital Pain Intervention(s) Repositioned; Ambulation/increased activity   Restrictions/Precautions   Weight Bearing Precautions Per Order No   Other Precautions Pain; Fall Risk;Cognitive; Chair Alarm; Bed Alarm   General   Chart Reviewed Yes   Response to Previous Treatment Patient with no complaints from previous session  Family/Caregiver Present No   Cognition   Overall Cognitive Status (S)  Impaired   Arousal/Participation Alert; Responsive; Cooperative   Attention Attends with cues to redirect   Orientation Level Oriented X4   Following Commands Follows multistep commands with increased time or repetition   Comments Patient did not report any hallucinations during treatment session today   Subjective   Subjective Patient mostly pleasant and agreeable to therapy session today  Patient was received supine in bed with all needs within reach  Bed Mobility   Supine to Sit 4  Minimal assistance   Additional items Assist x 1; Increased time required;HOB elevated; Bedrails   Transfers   Sit to Stand 5  Supervision   Additional items Increased time required; Impulsive;Verbal cues  (with RW)   Stand to Sit 5  Supervision   Additional items Impulsive;Verbal cues; Increased time required  (with RW)   Ambulation/Elevation   Gait pattern Improper Weight shift;Decreased foot clearance; Inconsistent jose; Foward flexed; Redundant gait at times; Ataxia; Excessively slow   Gait Assistance 4  Minimal assist  (CGA)   Additional items Assist x 1; Tactile cues   Assistive Device Rolling walker   Distance 15'x4  (with " standing/seated rest breaks)   Balance   Static Sitting Fair +   Dynamic Sitting Fair   Static Standing Fair -   Dynamic Standing Poor +   Ambulatory Poor +   Endurance Deficit   Endurance Deficit Yes   Endurance Deficit Description lethargy, nausea   Activity Tolerance   Activity Tolerance Patient limited by fatigue;Patient limited by pain   Assessment   Prognosis Good   Problem List Decreased strength;Decreased range of motion;Decreased endurance;Decreased mobility; Impaired balance;Decreased coordination;Decreased cognition; Impaired judgement;Decreased safety awareness;Pain   Assessment Patient was received supine in bed   Patient was agreeable to therapy services today with increased verbal encouragement  PT session focused on transfers and ambulation tolerance today in order to improve functional mobility and independence  Patient reported nausea throughout therapy session today  Patient required several standing and seated rest breaks throughout therapy session  Patient continues to display confusion and impaired judgement with transfers and gait  Patient requires repeated cuing and encouragement to complete tasks  Patient will benefit from continued PT services while in hospital in order to address remaining limitations  The patients AM-PAC Basic Mobility Inpatient Short From Raw Score is 17   A Raw score of 17 suggests that the patient may benefit from discharge to home   PT recommendation at this time is for discharge home with HHPT services pending progress made in hospital  Please also refer to the recommendation of the Physical Therapist for safe discharge planning  Barriers to Discharge Inaccessible home environment;Decreased caregiver support   Goals   Patient Goals to go home   STG Expiration Date 06/08/23   PT Treatment Day 1   Plan   Treatment/Interventions ADL retraining;Functional transfer training; Therapeutic exercise; Endurance training;Bed mobility;Gait training   Progress Slow progress, decreased activity tolerance   PT Frequency 3-5x/wk   Recommendation   PT Discharge Recommendation Home with home health rehabilitation  (home pending progress)   AM-PAC Basic Mobility Inpatient   Turning in Flat Bed Without Bedrails 3   Lying on Back to Sitting on Edge of Flat Bed Without Bedrails 3   Moving Bed to Chair 3   Standing Up From Chair Using Arms 3   Walk in Room 3   Climb 3-5 Stairs With Railing 2   Basic Mobility Inpatient Raw Score 17   Basic Mobility Standardized Score 39 67   Highest Level Of Mobility   JH-HLM Goal 5: Stand one or more mins   JH-HLM Achieved 7: Walk 25 feet or more   End of Consult   Patient Position at End of Consult Bedside chair;Bed/Chair alarm activated; All needs within reach     Sherwin Hernandez PT, DPT

## 2023-05-26 NOTE — TREATMENT PLAN
Tremaine Olivo  05/26/23  [id number]    Urology Treatment Plan      Tremaine Olivo is a 50-year-old male with history of substance abuse postoperative day 4 radical right nephrectomy  Contacted after second call for controlled team today  Plan:  1  Patient in locked restraints  2  Appreciate internal medicine management/assistance  3  Toxicology contacted  4  Frequent reassessments  5  Otherwise surgically stable

## 2023-05-26 NOTE — CONSULTS
INTERPROFESSIONAL (PHONE) [facility] 1981 Toxicology  Umair Barrera 61 y o  male MRN: [id number]  Unit/Bed#: [facility] 928-01 Encounter: [id number]       Reason for Consult / Principal Problem: Toxic encephalopathy, opioid use disorder    Inpatient consult to Toxicology  Consult performed by: Andreina Munguia MD  Consult ordered by: Irving Tarango MD        05/26/23    ASSESSMENT:  Opioid use disorder  Methadone maintenance therapy  Right nephrectomy  Toxic encephaopathy    RECOMMENDATIONS:  Continue supportive care, including airway monitoring, head of bed elevation, aspiration precautions, cardiac telemetry, and continuous pulse oximetry  Differential diagnosis for the patient's toxic encephalopathy from a toxicology perspective includes WILEY agonist withdrawal (alcohol or benzodiazepines), opioid withdrawal, intoxication, behavioral or primary psychiatric disturbance, intracranial process  Low suspicion for ketamine emergence reaction as patient was receiving sub-dissociative doses and was improved off ketamine this morning and requesting to go home per APS documentation  Opioid withdrawal would also usually demonstrate other objective findings, including vomiting, diarrhea, piloerection, rhinorrhea, yawning  Patient has been receiving his home methadone, and 30 mg of methadone is enough to prevent physiologic withdrawal (patient is on 110 mg daily)  Patient has also been receiving as needed opioid analgesia  Patient's mother reports patient does not drink a significant amount of alcohol  However, it is possible patient is using benzodiazepines, leading to a withdrawal state  Recommend CIWA monitoring with symptom-triggered, as needed lorazepam or diazepam every 1-2 hours for potential benzodiazepine withdrawal  Benzodiazepines can also be utilized as needed for agitation but appropriately spaced to prevent dose stacking       Alternatively, can utilize haloperidol or olanzapine as needed for agitation  Recommend checking baseline EKG to evaluate for QTc prolongation  Methadone can also cause QTc prolongation with increased risk with doses greater than 100 mg  For further questions, please contact the medical  on call via [place] Text or throughl the [employer] or Patient [employer]LL[employer]  Please see additional teaching note below:    Hx and PE limited by the dynamics of a phone consultation  I have not personally interviewed or evaluated the patient, but only advised based on the information provided to me  Primary provider is responsible for all clinical decisions  Pertinent history, physical exam and clinical findings and course discussed: Danna Arevalo is a 61y o  year old male who presents with toxic encephalopathy POD4 s/p right nephrectomy  Patient weaned off ketamine infusion this morning  Became progressively more altered and agitation throughout the day, becoming aggressive with staff and requiring restraints  He is on methadone 110 mg daily  Mother reports no alcohol use at home  Patient has also been receiving opioid analgesia as needed  Review of systems and physical exam not performed by me      Historical Information   Past Medical History:   Diagnosis Date   • Anxiety    • Bright red rectal bleeding     Last Assessed: 9/9/2015    • Drug dependence ([address] ) 10/20/2021   • Hypertension     Last Assessed: 7/9/2014    • Kidney stone    • Kidney stones     Last Assessed: 11/17/2016    • Periorbital edema     Last Assessed: 9/4/2015   • Septic shock ([address] ) 08/20/2022   • Skin tag of anus     Last Assessed: 9/9/2015    • Trigger point of thoracic region     Last Assessed: 11/6/2015      Past Surgical History:   Procedure Laterality Date   • CYSTOSCOPY W/ URETERAL STENT PLACEMENT  03/11/2013   • CYSTOSCOPY W/ URETERAL STENT PLACEMENT  06/18/2014    EXTRACORPOREAL SHOCK WAVE LITHOTRIPSY    • CYSTOSCOPY W/ URETERAL STENT PLACEMENT  07/16/2014 EXTRACORPOREAL SHOCK WAVE LITHOTRIPSY    • CYSTOSCOPY W/ URETERAL STENT REMOVAL  04/11/2013   • CYSTOSCOPY W/ URETERAL STENT REMOVAL Right 08/26/2014   • CYSTOSCOPY W/ URETEROSCOPY W/ LITHOTRIPSY  02/26/2013    Percutaneous lithotomy With Uretal Stent Plcement    • CYSTOSCOPY W/ URETEROSCOPY W/ LITHOTRIPSY  03/11/2014   • CYSTOSCOPY W/ URETEROSCOPY W/ LITHOTRIPSY Right 08/04/2014    With Uretal Stent Placement    • CYSTOSCOPY W/ URETEROSCOPY W/ LITHOTRIPSY Right 05/09/2016   • HERNIA REPAIR  03/11/2013   • IR NEPHROSTOMY TUBE CHECK/CHANGE/REPOSITION/REINSERTION/UPSIZE  11/22/2022   • IR NEPHROSTOMY TUBE CHECK/CHANGE/REPOSITION/REINSERTION/UPSIZE  02/13/2023   • IR NEPHROSTOMY TUBE CHECK/CHANGE/REPOSITION/REINSERTION/UPSIZE  04/28/2023   • IR NEPHROSTOMY TUBE PLACEMENT  08/20/2022   • KIDNEY SURGERY     • LITHOTRIPSY     • NH CYSTO/URETERO W/LITHOTRIPSY &INDWELL STENT INSRT Right 07/13/2016    Procedure: CYSTOSCOPY; URETEROSCOPY WITH HOLMIUM LASER STONE EXTRACTION; RETROGRADE PYELOGRAM; URETERAL STENT INSERTION ;  Surgeon: Blanche Zambrano MD;  Location[facility];  Service: Urology   • NH CYSTO/URETERO W/LITHOTRIPSY &INDWELL STENT INSRT Right 05/09/2016    Procedure: CYSTOSCOPY,  URETEROSCOPY,  WITH LITHOTRIPSY HOLMIUM LASER, STONE EXTRACTION, AND INSERTION STENT URETERAL;  Surgeon: Blanche Zambrano MD;  Location: [facility];  Service: Urology   • NH CYSTO/URETERO W/LITHOTRIPSY &INDWELL STENT INSRT Right 11/10/2022    Procedure: CYSTOSCOPY URETEROSCOPY  right RETROGRADE PYELOGRAM;  Surgeon: Dwayne Vivas MD;  Location: [facility];  Service: Urology   • NH LAPAROSCOPY RADICAL NEPHRECTOMY Right 5/22/2023    Procedure: NEPHRECTOMY RADICAL LAPAROSCOPIC W/ ROBOTICS;  Surgeon: Josef Naylor MD;  Location: [facility];  Service: Urology   • NH LITHOTRIPSY [address] Right 06/17/2016    Procedure: LITHROTRIPSY EXTRACORPORAL SHOCKWAVE (ESWL);   Surgeon: Blanche Zambrano MD;  Location: [facility];  Service: Urology   • TRANSURETHRAL RESECTION OF BLADDER     • URETERAL REIMPLANTION  03/11/2013     Social History   Social History     Substance and Sexual Activity   Alcohol Use Not Currently     Social History     Substance and Sexual Activity   Drug Use Not Currently     Social History     Tobacco Use   Smoking Status Every Day   • Packs/day: 2 00   • Years: 35 00   • Total pack years: 70 00   • Types: Cigarettes   Smokeless Tobacco Never     Family History   Problem Relation Age of Onset   • No Known Problems Mother    • Heart attack Father         Prior to Admission medications    Medication Sig Start Date End Date Taking? Authorizing Provider   methadone (DOLOPHINE) 10 MG/5ML solution Take 5 mg by mouth every 6 (six) hours as needed for moderate pain   Yes Historical Provider, MD   METHADONE HCL PO Take 110 mg by mouth in the morning This dose was verified with Phuong De León, the director of the [facility]  Pt usually receives liquid form  Pt was instructed to go to clinic am of surgery, as usual, to receive his am dose and proceed to the hospital with an arrival time of 1015   The [facility] opens at 0am   Yes Historical Provider, MD       Current Facility-Administered Medications   Medication Dose Route Frequency   • [START ON 5/27/2023] amLODIPine (NORVASC) tablet 5 mg  5 mg Oral Daily   • calcium carbonate (TUMS) chewable tablet 1,000 mg  1,000 mg Oral Daily PRN   • diphenhydrAMINE (BENADRYL) injection 25 mg  25 mg Intravenous Q6H PRN   • docusate sodium (COLACE) capsule 100 mg  100 mg Oral BID   • heparin (porcine) subcutaneous injection 5,000 Units  5,000 Units Subcutaneous Q8H [facility]   • hydrALAZINE (APRESOLINE) injection 5 mg  5 mg Intravenous Q6H PRN   • HYDROmorphone (DILAUDID) injection 1 mg  1 mg Intravenous Q2H PRN   • LORazepam (ATIVAN) injection 2 mg  2 mg Intravenous Q4H PRN   • methadone (DOLOPHINE) oral concentrated solution 110 mg  110 mg Oral Daily   • methocarbamol (ROBAXIN) tablet 750 mg  750 mg Oral Q6H [address]   • multivitamin-minerals (CENTRUM) tablet 1 tablet  1 tablet Oral Daily   • nicotine (NICODERM CQ) 21 mg/24 hr TD 24 hr patch 1 patch  1 patch Transdermal Daily   • ondansetron (ZOFRAN) injection 4 mg  4 mg Intravenous Q4H PRN   • polyethylene glycol (MIRALAX) packet 17 g  17 g Oral Daily PRN   • potassium phosphates 30 mmol in sodium chloride 0 9 % 250 mL infusion  30 mmol Intravenous Once   • senna (SENOKOT) tablet 8 6 mg  1 tablet Oral Daily   • simethicone (MYLICON) chewable tablet 80 mg  80 mg Oral Q6H PRN   • sodium chloride 0 9 % infusion  50 mL/hr Intravenous Continuous       Allergies   Allergen Reactions   • Bee Venom Anaphylaxis   • Metronidazole Itching and Swelling   • Nsaids GI Intolerance     Stomach irritant   • Penicillin G    • Penicillins GI Intolerance     Sick to stomach   • Pollen Extract    • Sulfa Antibiotics        Objective       Intake/Output Summary (Last 24 hours) at 5/26/2023 2005  Last data filed at 5/26/2023 1002  Gross per 24 hour   Intake 691 42 ml   Output 890 ml   Net -198 58 ml       Invasive Devices:   CVC Central Lines 05/22/23 Triple (Active)   Reasons to continue CVC line  No peripheral vascular access 05/26/23 0957   Goal for Removal D/C when meds requiring line finished 05/24/23 0739   Line Necessity Reviewed Yes, reviewed with provider 05/26/23 0957   Site Assessment WDL 05/26/23 0957   Proximal Lumen Status Flushed;Blood return noted;Normal saline locked 05/26/23 0957   Medial Lumen Status Flushed;Blood return noted;Normal saline locked 05/26/23 0957   Distal Lumen Status Flushed; No blood return;Normal saline locked 05/26/23 0957   Dressing Type Securing device; Chlorhexidine dressing 05/26/23 0957   Dressing Status Clean;Dry; Intact 05/26/23 0957   Dressing Intervention Dressing changed 05/25/23 0900   Dressing Change Due 05/31/23 05/26/23 0957       Vitals   Vitals:    05/26/23 1553 05/26/23 1556 05/26/23 1635 05/26/23 1800   BP:   (!) 175/91 161/85   TempSrc:       Pulse: 68 67 65    Resp:       Patient Position - Orthostatic VS:       Temp:             EKG, Pathology, and/or Other Studies: I have personally reviewed pertinent reports  Lab Results: I have personally reviewed pertinent reports  Labs:    Results from last 7 days   Lab Units 05/26/23  1556   EOS PCT % 1   HEMATOCRIT % 40 2   HEMOGLOBIN g/dL 13 9   LYMPHS PCT % 6*   MONOS PCT % 9   NEUTROS PCT % 83*   PLATELETS Thousands/uL 224   WBC Thousand/uL 11 10*      Results from last 7 days   Lab Units 05/26/23  1555 05/26/23  1551   ALK PHOS U/L 82  --    ALT U/L 23  --    AST U/L 17  --    BUN mg/dL 23  --    CALCIUM mg/dL 9 0  --    CHLORIDE mmol/L 103  --    CO2 mmol/L 24  --    CO2, I-STAT mmol/L  --  22   CREATININE mg/dL 1 12  --    GLUCOSE, ISTAT mg/dl  --  99   POTASSIUM mmol/L 3 2*  --    MAGNESIUM mg/dL 2 7*  --    PHOSPHORUS mg/dL 1 9*  --    SODIUM mmol/L 129*  --           Results from last 7 days   Lab Units 05/26/23  1556   LACTIC ACID mmol/L 2 3*     0   Lab Value Date/Time    TROPONINI <0 02 06/02/2021 2336             Invalid input(s):       Imaging Studies: I have personally reviewed pertinent reports  Counseling / Coordination of Care  Total time spent today 15 minutes  This was a phone consultation

## 2023-05-27 PROBLEM — Z72.0 TOBACCO ABUSE: Status: ACTIVE | Noted: 2022-11-10

## 2023-05-27 PROBLEM — F11.20 METHADONE DEPENDENCE (HCC): Status: ACTIVE | Noted: 2023-05-27

## 2023-05-27 PROBLEM — E87.8 ELECTROLYTE ABNORMALITY: Status: ACTIVE | Noted: 2022-08-22

## 2023-05-27 PROBLEM — D72.829 LEUKOCYTOSIS: Status: ACTIVE | Noted: 2023-05-27

## 2023-05-27 LAB
ANION GAP SERPL CALCULATED.3IONS-SCNC: 4 MMOL/L (ref 4–13)
BACTERIA UR QL AUTO: ABNORMAL /HPF
BILIRUB UR QL STRIP: NEGATIVE
BUN SERPL-MCNC: 19 MG/DL (ref 5–25)
CALCIUM SERPL-MCNC: 8.5 MG/DL (ref 8.3–10.1)
CHLORIDE SERPL-SCNC: 105 MMOL/L (ref 96–108)
CLARITY UR: CLEAR
CO2 SERPL-SCNC: 23 MMOL/L (ref 21–32)
COLOR UR: YELLOW
CREAT SERPL-MCNC: 1.05 MG/DL (ref 0.6–1.3)
ERYTHROCYTE [DISTWIDTH] IN BLOOD BY AUTOMATED COUNT: 14.9 % (ref 11.6–15.1)
GFR SERPL CREATININE-BSD FRML MDRD: 77 ML/MIN/1.73SQ M
GLUCOSE SERPL-MCNC: 87 MG/DL (ref 65–140)
GLUCOSE UR STRIP-MCNC: NEGATIVE MG/DL
HCT VFR BLD AUTO: 37 % (ref 36.5–49.3)
HGB BLD-MCNC: 12.7 G/DL (ref 12–17)
HGB UR QL STRIP.AUTO: NEGATIVE
KETONES UR STRIP-MCNC: NEGATIVE MG/DL
LEUKOCYTE ESTERASE UR QL STRIP: NEGATIVE
MCH RBC QN AUTO: 29.1 PG (ref 26.8–34.3)
MCHC RBC AUTO-ENTMCNC: 34.3 G/DL (ref 31.4–37.4)
MCV RBC AUTO: 85 FL (ref 82–98)
NITRITE UR QL STRIP: NEGATIVE
NON-SQ EPI CELLS URNS QL MICRO: ABNORMAL /HPF
PH UR STRIP.AUTO: 6.5 [PH]
PLATELET # BLD AUTO: 218 THOUSANDS/UL (ref 149–390)
PMV BLD AUTO: 9.5 FL (ref 8.9–12.7)
POTASSIUM SERPL-SCNC: 3.4 MMOL/L (ref 3.5–5.3)
PROT UR STRIP-MCNC: ABNORMAL MG/DL
RBC # BLD AUTO: 4.37 MILLION/UL (ref 3.88–5.62)
RBC #/AREA URNS AUTO: ABNORMAL /HPF
SODIUM SERPL-SCNC: 132 MMOL/L (ref 135–147)
SP GR UR STRIP.AUTO: 1.02 (ref 1–1.03)
UROBILINOGEN UR STRIP-ACNC: 3 MG/DL
WBC # BLD AUTO: 9.83 THOUSAND/UL (ref 4.31–10.16)
WBC #/AREA URNS AUTO: ABNORMAL /HPF

## 2023-05-27 RX ORDER — LABETALOL HYDROCHLORIDE 5 MG/ML
10 INJECTION, SOLUTION INTRAVENOUS EVERY 4 HOURS PRN
Status: DISCONTINUED | OUTPATIENT
Start: 2023-05-27 | End: 2023-05-29 | Stop reason: HOSPADM

## 2023-05-27 RX ORDER — LORAZEPAM 2 MG/ML
2 INJECTION INTRAMUSCULAR ONCE
Status: COMPLETED | OUTPATIENT
Start: 2023-05-27 | End: 2023-05-27

## 2023-05-27 RX ORDER — OXYCODONE HYDROCHLORIDE 5 MG/1
5 TABLET ORAL EVERY 4 HOURS PRN
Status: DISCONTINUED | OUTPATIENT
Start: 2023-05-27 | End: 2023-05-29 | Stop reason: HOSPADM

## 2023-05-27 RX ORDER — ACETAMINOPHEN 325 MG/1
975 TABLET ORAL EVERY 8 HOURS SCHEDULED
Status: DISCONTINUED | OUTPATIENT
Start: 2023-05-27 | End: 2023-05-29 | Stop reason: HOSPADM

## 2023-05-27 RX ORDER — SODIUM CHLORIDE AND POTASSIUM CHLORIDE 300; 900 MG/100ML; MG/100ML
75 INJECTION, SOLUTION INTRAVENOUS CONTINUOUS
Status: DISCONTINUED | OUTPATIENT
Start: 2023-05-27 | End: 2023-05-29

## 2023-05-27 RX ORDER — HYDROMORPHONE HCL/PF 1 MG/ML
0.5 SYRINGE (ML) INJECTION EVERY 4 HOURS PRN
Status: DISPENSED | OUTPATIENT
Start: 2023-05-27 | End: 2023-05-28

## 2023-05-27 RX ORDER — OXYCODONE HYDROCHLORIDE 10 MG/1
10 TABLET ORAL EVERY 4 HOURS PRN
Status: DISCONTINUED | OUTPATIENT
Start: 2023-05-27 | End: 2023-05-29 | Stop reason: HOSPADM

## 2023-05-27 RX ORDER — LORAZEPAM 2 MG/ML
4 INJECTION INTRAMUSCULAR ONCE
Status: COMPLETED | OUTPATIENT
Start: 2023-05-27 | End: 2023-05-27

## 2023-05-27 RX ORDER — HYDROMORPHONE HCL/PF 1 MG/ML
0.5 SYRINGE (ML) INJECTION EVERY 4 HOURS PRN
Status: DISCONTINUED | OUTPATIENT
Start: 2023-05-27 | End: 2023-05-27

## 2023-05-27 RX ADMIN — LABETALOL HYDROCHLORIDE 10 MG: 5 INJECTION INTRAVENOUS at 19:58

## 2023-05-27 RX ADMIN — HYDROMORPHONE HYDROCHLORIDE 1 MG: 1 INJECTION, SOLUTION INTRAMUSCULAR; INTRAVENOUS; SUBCUTANEOUS at 01:15

## 2023-05-27 RX ADMIN — HEPARIN SODIUM 5000 UNITS: 5000 INJECTION INTRAVENOUS; SUBCUTANEOUS at 05:27

## 2023-05-27 RX ADMIN — LORAZEPAM 2 MG: 2 INJECTION INTRAMUSCULAR; INTRAVENOUS at 01:30

## 2023-05-27 RX ADMIN — POTASSIUM CHLORIDE AND SODIUM CHLORIDE 75 ML/HR: 900; 300 INJECTION, SOLUTION INTRAVENOUS at 13:48

## 2023-05-27 RX ADMIN — NICOTINE 1 PATCH: 21 PATCH, EXTENDED RELEASE TRANSDERMAL at 08:53

## 2023-05-27 RX ADMIN — HYDROMORPHONE HYDROCHLORIDE 0.5 MG: 1 INJECTION, SOLUTION INTRAMUSCULAR; INTRAVENOUS; SUBCUTANEOUS at 21:42

## 2023-05-27 RX ADMIN — FOLIC ACID: 5 INJECTION, SOLUTION INTRAMUSCULAR; INTRAVENOUS; SUBCUTANEOUS at 11:46

## 2023-05-27 RX ADMIN — HYDROMORPHONE HYDROCHLORIDE 0.5 MG: 1 INJECTION, SOLUTION INTRAMUSCULAR; INTRAVENOUS; SUBCUTANEOUS at 11:23

## 2023-05-27 RX ADMIN — METHADONE HYDROCHLORIDE 110 MG: 10 CONCENTRATE ORAL at 11:48

## 2023-05-27 RX ADMIN — HYDRALAZINE HYDROCHLORIDE 5 MG: 20 INJECTION, SOLUTION INTRAMUSCULAR; INTRAVENOUS at 01:29

## 2023-05-27 RX ADMIN — HEPARIN SODIUM 5000 UNITS: 5000 INJECTION INTRAVENOUS; SUBCUTANEOUS at 13:57

## 2023-05-27 RX ADMIN — HEPARIN SODIUM 5000 UNITS: 5000 INJECTION INTRAVENOUS; SUBCUTANEOUS at 21:42

## 2023-05-27 RX ADMIN — ONDANSETRON 4 MG: 2 INJECTION INTRAMUSCULAR; INTRAVENOUS at 11:24

## 2023-05-27 RX ADMIN — LORAZEPAM 2 MG: 2 INJECTION INTRAMUSCULAR; INTRAVENOUS at 03:25

## 2023-05-27 NOTE — PROGRESS NOTES
Progress Note - Acute Pain Service    Gene Pitts 61 y o  male MRN: [id number]  Unit/Bed#: [facility] 928-01 Encounter: [id number]      Assessment:   Principal Problem:    Kidney stones  Active Problems:    Acute metabolic encephalopathy    Essential hypertension    Delusional disorder ([address] )    Hypophosphatemia    UTI (urinary tract infection), bacterial    Staghorn calculus    Hyponatremia    Prolonged QT interval    Gene Pitts is a 61 y o  male with a history of methadone maintenance at [facility] center and nephrolithiasis status post laparoscopic right nephrectomy on 2023  An epidural was placed preoperatively for postop pain control  Yesterday late afternoon a rapid response was called due to acute agitation, altered mental status concerns for alcohol withdrawal, ketamine emergence reaction, or other intracranial metabolic processes  CT head was negative for acute intracranial abnormality  CIWA protocol initiated, patient received multiple doses of IV Ativan overnight  Toxicology consulted  Plan:   Multimodal analgesia:  · Add Tylenol 975 mg every 8 hours scheduled  · Methadone 110 mg daily  · Home medication for MOUD; follows with [facility]  · Robaxin 750 mg every 6 hours scheduled  · Decrease Dilaudid to 0 5 mg IV every 4 hours as needed for breakthrough pain  · Order is set to  in 24 hours  · Okay to use IV Dilaudid over oral oxycodone if patient is n p o /unable to take p o  · Add oxycodone 5 mg every 4 hours as needed for moderate pain  · Add oxycodone 10 mg every 4 hours as needed for severe pain    Continue to monitor mentation and level of sedation given benzodiazepine administration  Hold opioid/muscle relaxants for persistent sedation  Bowel management  · Colace 100 mg twice a day  · Senokot daily  · MiraLAX daily as needed    Oral opioid regimen added in addition to scheduled Tylenol    Once patient is more alert can utilize oral analgesics for pain control  Continue to monitor level of pain, mentation and level of sedation and adjust analgesic regimen as appropriate  Patient will be seen peripherally over the weekend, please contact acute pain service with any questions regarding pain control  Treatment plan discussed with bedside nursing staff and primary care service  APS will continue to follow  Please contact Acute Pain Service - [facility] via [employer] from [phone] with additional questions or concerns  See [employer] or [place] for additional contacts and after hours information  Pain History  Current pain location(s): FLACC 0  24 hour history: Patient sedated, unable to open eyes or follow commands at present time, status post benzodiazepine administration  Appears in no acute distress      Opioid requirement previous 24 hours: Dilaudid 1 mg IV    Meds/Allergies   all current active meds have been reviewed and current meds:   Current Facility-Administered Medications   Medication Dose Route Frequency   • amLODIPine (NORVASC) tablet 5 mg  5 mg Oral Daily   • calcium carbonate (TUMS) chewable tablet 1,000 mg  1,000 mg Oral Daily PRN   • diphenhydrAMINE (BENADRYL) injection 25 mg  25 mg Intravenous Q6H PRN   • docusate sodium (COLACE) capsule 100 mg  100 mg Oral BID   • heparin (porcine) subcutaneous injection 5,000 Units  5,000 Units Subcutaneous Q8H [address]   • hydrALAZINE (APRESOLINE) injection 5 mg  5 mg Intravenous Q6H PRN   • HYDROmorphone (DILAUDID) injection 1 mg  1 mg Intravenous Q2H PRN   • LORazepam (ATIVAN) injection 2 mg  2 mg Intravenous Q4H PRN   • methadone (DOLOPHINE) oral concentrated solution 110 mg  110 mg Oral Daily   • methocarbamol (ROBAXIN) tablet 750 mg  750 mg Oral Q6H IVY   • multivitamin-minerals (CENTRUM) tablet 1 tablet  1 tablet Oral Daily   • nicotine (NICODERM CQ) 21 mg/24 hr TD 24 hr patch 1 patch  1 patch Transdermal Daily   • ondansetron (ZOFRAN) injection 4 mg  4 mg Intravenous Q4H PRN   • polyethylene glycol (MIRALAX) packet 17 g  17 g Oral Daily PRN   • senna (SENOKOT) tablet 8 6 mg  1 tablet Oral Daily   • simethicone (MYLICON) chewable tablet 80 mg  80 mg Oral Q6H PRN   • sodium chloride 0 9 % infusion  50 mL/hr Intravenous Continuous       Allergies   Allergen Reactions   • Bee Venom Anaphylaxis   • Metronidazole Itching and Swelling   • Nsaids GI Intolerance     Stomach irritant   • Penicillin G    • Penicillins GI Intolerance     Sick to stomach   • Pollen Extract    • Sulfa Antibiotics        Objective     Temp:  [96 6 °F (35 9 °C)-98 9 °F (37 2 °C)] 98 4 °F (36 9 °C)  HR:  [59-88] 69  Resp:  [16-28] 20  BP: (146-192)/() 155/93    Physical Exam  Vitals and nursing note reviewed  Constitutional:       General: He is not in acute distress  Appearance: He is not diaphoretic  HENT:      Head: Normocephalic and atraumatic  Nose: No congestion or rhinorrhea  Mouth/Throat:      Mouth: Mucous membranes are dry  Cardiovascular:      Rate and Rhythm: Normal rate  Pulmonary:      Effort: Pulmonary effort is normal  No respiratory distress  Musculoskeletal:      Thoracic back: No edema  Skin:     General: Skin is warm and dry  Findings: No rash  Neurological:      Comments: Unable to follow commands  Somnolent  Sedated s/p multiple doses of Ativan    Psychiatric:         Behavior: Behavior is not agitated           Lab Results:   Results from last 7 days   Lab Units 05/27/23  0541   HEMATOCRIT % 37 0   HEMOGLOBIN g/dL 12 7   PLATELETS Thousands/uL 218   WBC Thousand/uL 9 83      Results from last 7 days   Lab Units 05/27/23  0541 05/26/23  1555 05/26/23  1551   ALK PHOS U/L  --  82  --    ALT U/L  --  23  --    AST U/L  --  17  --    BUN mg/dL 19 23  --    CALCIUM mg/dL 8 5 9 0  --    CHLORIDE mmol/L 105 103  --    CO2 mmol/L 23 24  --    CO2, I-STAT mmol/L  --   --  22   CREATININE mg/dL 1 05 1 12  --    GLUCOSE, ISTAT mg/dl  --   --  99   POTASSIUM mmol/L 3 4* 3 2*  --        Imaging Studies: I have personally reviewed pertinent reports  Please note that the APS provides consultative services regarding pain management only  With the exception of ketamine and epidural infusions and except when indicated, final decisions regarding starting or changing doses of analgesic medications are at the discretion of the consulting service  Off hours consultation and/or medication management is generally not available      BASIL Saravia  Acute Pain Service

## 2023-05-27 NOTE — PROGRESS NOTES
After rapid response patient more awake alert  Patient is using profanity, verbal threat towards towards staff and agitated  Patient is constantly trying to get out of restraints and not redirectable  Patient on CIWA protocol and got 2mg ativan per protocol

## 2023-05-27 NOTE — ASSESSMENT & PLAN NOTE
Recent urine culture earlier this month on 5/13 grew E  coli and MSSA  In light of altered mentation, repeat urinalysis ordered today -> consider another course of antibiotics if mentation symptoms persist and/or urinalysis reveals residual infection

## 2023-05-27 NOTE — PLAN OF CARE
Problem: MOBILITY - ADULT  Goal: Maintain or return to baseline ADL function  Description: INTERVENTIONS:  -  Assess patient's ability to carry out ADLs; assess patient's baseline for ADL function and identify physical deficits which impact ability to perform ADLs (bathing, care of mouth/teeth, toileting, grooming, dressing, etc )  - Assess/evaluate cause of self-care deficits   - Assess range of motion  - Assess patient's mobility; develop plan if impaired  - Assess patient's need for assistive devices and provide as appropriate  - Encourage maximum independence but intervene and supervise when necessary  - Involve family in performance of ADLs  - Assess for home care needs following discharge   - Consider OT consult to assist with ADL evaluation and planning for discharge  - Provide patient education as appropriate  Outcome: Progressing  Goal: Maintains/Returns to pre admission functional level  Description: INTERVENTIONS:  - Perform BMAT or MOVE assessment daily    - Set and communicate daily mobility goal to care team and patient/family/caregiver     - Collaborate with rehabilitation services on mobility goals if consulted  - Out of bed to chair 3 times a day   - Out of bed for meals 3 times a day  - Out of bed for toileting  - Record patient progress and toleration of activity level   Outcome: Progressing     Problem: PAIN - ADULT  Goal: Verbalizes/displays adequate comfort level or baseline comfort level  Description: Interventions:  - Encourage patient to monitor pain and request assistance  - Assess pain using appropriate pain scale (e g  0-10)  - Administer analgesics based on type and severity of pain and evaluate response  - Implement non-pharmacological measures as appropriate and evaluate response  - Consider cultural and social influences on pain and pain management  - Notify physician/advanced practitioner if interventions unsuccessful or patient reports new pain  Outcome: Progressing     Problem: INFECTION - ADULT  Goal: Absence or prevention of progression during hospitalization  Description: INTERVENTIONS:  - Assess and monitor for signs and symptoms of infection  - Monitor lab/diagnostic results  - Monitor all insertion sites, i e  TLC  - [place] appropriate cooling/warming therapies per order  - Administer medications as ordered  - Instruct and encourage patient and family to use good hand hygiene technique  - Identify and instruct in appropriate isolation precautions for identified infection/condition  Outcome: Progressing     Problem: RESPIRATORY - ADULT  Goal: Achieves optimal ventilation and oxygenation  Description: INTERVENTIONS:  - Assess for changes in respiratory status  - Assess for changes in mentation and behavior  - Position to facilitate oxygenation and minimize respiratory effort  - Oxygen administered by appropriate delivery if ordered  - Initiate smoking cessation education as indicated  - Encourage broncho-pulmonary hygiene including cough, deep breathe, Incentive Spirometry  - Assess the need for suctioning and aspirate as needed  - Assess and instruct to report SOB or any respiratory difficulty  - Respiratory Therapy support as indicated  Outcome: Progressing     Problem: GENITOURINARY - ADULT  Goal: Maintains or returns to baseline urinary function  Description: INTERVENTIONS:  - Assess urinary function  - Administer IV fluids as ordered to ensure adequate hydration  - Administer ordered medications as needed  - Offer frequent toileting  - Follow urinary retention protocol if ordered  Outcome: Progressing  Goal: Absence of urinary retention  Description: INTERVENTIONS:  - Assess patient's ability to void and empty bladder  - Monitor I/O  - Bladder scan as needed  - Discuss with physician/AP medications to alleviate retention as needed  Outcome: Progressing     Problem: SKIN/TISSUE INTEGRITY - ADULT  Goal: Incision(s), wounds(s) or drain site(s) healing without S/S of infection  Description: INTERVENTIONS  - Assess and document dressing, incision, wound bed, drain sites and surrounding tissue  - Provide patient and family education  Outcome: Progressing     Problem: Prexisting or High Potential for Compromised Skin Integrity  Goal: Skin integrity is maintained or improved  Description: INTERVENTIONS:  - Identify patients at risk for skin breakdown  - Assess and monitor skin integrity  - Assess and monitor nutrition and hydration status  - Monitor labs   - Assess for incontinence   - Turn and reposition patient  - Assist with mobility/ambulation  - Relieve pressure over bony prominences  - Avoid friction and shearing  - Provide appropriate hygiene as needed including keeping skin clean and dry  - Evaluate need for skin moisturizer/barrier cream  - Collaborate with interdisciplinary team   - Patient/family teaching  Outcome: Progressing     Problem: SAFETY,RESTRAINT: NV/NON-SELF DESTRUCTIVE BEHAVIOR  Goal: Remains free of harm/injury (restraint for non violent/non self-detsructive behavior)  Description: INTERVENTIONS:  - Instruct patient/family regarding restraint use   - Assess and monitor physiologic and psychological status   - Provide interventions and comfort measures to meet assessed patient needs   - Identify and implement measures to help patient regain control  - Assess readiness for release of restraint   Outcome: Not Progressing  Goal: Returns to optimal restraint-free functioning  Description: INTERVENTIONS:  - Assess the patient's behavior and symptoms that indicate continued need for restraint  - Identify and implement measures to help patient regain control  - Assess readiness for release of restraint   Outcome: Not Progressing     Problem: Nutrition/Hydration-ADULT  Goal: Nutrient/Hydration intake appropriate for improving, restoring or maintaining nutritional needs  Description: Monitor and assess patient's nutrition/hydration status for malnutrition   Collaborate with interdisciplinary team and initiate plan and interventions as ordered  Monitor patient's weight and dietary intake as ordered or per policy  Utilize nutrition screening tool and intervene as necessary  Determine patient's food preferences and provide high-protein, high-caloric foods as appropriate       INTERVENTIONS:  - Monitor oral intake, urinary output, labs, and treatment plans  - Assess nutrition and hydration status and recommend course of action  - Evaluate amount of meals eaten  - Assist patient with eating if necessary   - Allow adequate time for meals  - Recommend/ encourage appropriate diets, oral nutritional supplements, and vitamin/mineral supplements  - Order, calculate, and assess calorie counts as needed  - Assess need for intravenous fluids  - Provide specific nutrition/hydration education as appropriate  - Include patient/family/caregiver in decisions related to nutrition  - Follow 1500 cc/24 hours fluid restriction as ordered  Outcome: Progressing     Problem: SAFETY, RESTRAINT - VIOLENT/SELF-DESTRUCTIVE  Goal: Returns to optimal restraint-free functioning  Description: INTERVENTIONS:  - Assess the patient's behavior and symptoms that indicate continued need for restraint  - Identify and implement measures to help patient regain control  - Assess readiness for release of restraint   Outcome: Not Progressing  Goal: Remains free of harm/injury from restraints (Restraint for Violent/Self-Destructive Behavior)  Description: INTERVENTIONS:  - Instruct patient regarding restraint use   - Assess and monitor physiologic and psychological status   - Provide interventions and comfort measures to meet assessed patient needs   - Ensure continuous in person monitoring is provided   - Identify and implement measures to help patient regain control  - Assess readiness for release of restraint  Outcome: Not Progressing

## 2023-05-27 NOTE — ASSESSMENT & PLAN NOTE
Medicine service consulted for change in mentation resulting in a rapid response called on 5/26 due to change in mentation with increased aggression/agitation and attempted violence towards staff  History of delusional disorder noted  CT of head negative for acute intracranial etiology - await urinalysis (recent positive urine cultures noted) - monitor/replete electrolyte deficiencies if present  Possibly secondary to discontinuation of ketamine infusion and subsequently increased pain -> appreciate pain management evaluation regarding analgesic control   Consider alcohol withdrawal delirium -> on CIWA protocol w/ intermittent doses of benzodiazepines administered  Per previous provider discussion with mother, however, patient does not have history of alcohol abuse -> does note a history of narcotic abuse requiring daily maintenance Methadone  Mother also reports during previous hospital course, patient had very similar episode of aggression after pain medication was abruptly reduced  Previous provider with on-call  -> symptoms do not appear consistent with opioid withdrawal at this time  Symptoms more consistent with alcohol withdrawal vs benzodiazepine withdrawal vs underlying psychiatric illness ? ?    Of note, previously in August 2022, he was evaluated by neuropsychology and deemed incompetent  In light of elevated QTc, avoiding antipsychotics (i e  Haldol/Zyprexa, etc )  Currently on continual 1:1 observation and restraints

## 2023-05-27 NOTE — ASSESSMENT & PLAN NOTE
Noted prolonged QT on EKG of 510  Avoid Haldol/Zyprexa and other QTc prolonging agents -> note chronic Methadone use however

## 2023-05-27 NOTE — PROGRESS NOTES
[facility]  Consult Progress Note  Name: Ana Luisa Fuller  MRN: [id number]  Unit/Bed#: [facility] 928-01 I Date of Admission: 5/22/2023   Date of Service: 5/27/2023 I Hospital Day: 5      Assessment & Plan:    Acute metabolic encephalopathy  Assessment & Plan  Medicine service consulted for change in mentation resulting in a rapid response called on 5/26 due to change in mentation with increased aggression/agitation and attempted violence towards staff  History of delusional disorder noted  CT of head negative for acute intracranial etiology - await urinalysis (recent positive urine cultures noted) - monitor/replete electrolyte deficiencies if present  Possibly secondary to discontinuation of ketamine infusion and subsequently increased pain -> appreciate pain management evaluation regarding analgesic control   Consider alcohol withdrawal delirium -> on CIWA protocol w/ intermittent doses of benzodiazepines administered  Per previous provider discussion with mother, however, patient does not have history of alcohol abuse -> does note a history of narcotic abuse requiring daily maintenance Methadone  Mother also reports during previous hospital course, patient had very similar episode of aggression after pain medication was abruptly reduced  Previous provider with on-call  -> symptoms do not appear consistent with opioid withdrawal at this time  Symptoms more consistent with alcohol withdrawal vs benzodiazepine withdrawal vs underlying psychiatric illness ? ?    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 stricture on 5/22  Acute pain service managing analgesic regimen -> ketamine infusion discontinued on 5/25 w/ subsequent alteration of sensorium with increased agitation/combativeness  In light of above-mentioned altered mentation, check urinalysis to assess for current/residual UTI -> recent urine culture two weeks ago grew E  coli and MSSA -> observe off antibiotics for now  PRN emesis control - continue IV fluids  Further recommendations per primary service (urology)    UTI (urinary tract infection)  Assessment & Plan  Recent urine culture earlier this month on 5/13 grew E  coli and MSSA  In light of altered mentation, repeat urinalysis ordered today -> consider another course of antibiotics if mentation symptoms persist and/or urinalysis reveals residual infection    Delusional disorder   Assessment & Plan  Noted history of delusional disorder - not currently seeing any psychiatrist as per mother -> in light of altered mentation and combativeness/agitation, will appreciate psychiatry input  Mother also reports he has been intermittently hallucinating for over a year now     Methadone dependence   Assessment & Plan  Continue daily methadone regimen  Outpatient follow-up at methadone clinic postdischarge    Essential hypertension  Assessment & Plan  Continue Norvasc -> PRN IV Labetalol on board for BP spikes  PRN pain control    Tobacco abuse  Assessment & Plan  Cessation counseling  Transdermal nicotine patch on board    Prolonged QT interval  Assessment & Plan  Noted prolonged QT on EKG of 510  Avoid Haldol/Zyprexa and other QTc prolonging agents -> note chronic Methadone use however    Hyponatremia  Assessment & Plan  Likely a SIADH-type etiology in the setting of pain  Sodium improved to 132 currently on fluid restriction  Continue to monitor    Multiple electrolyte abnormalities  Assessment & Plan  Monitor/replete serum potassium and phosphate as necessary  Serum magnesium normal    Leukocytosis  Assessment & Plan  Likely reactive acute medical issue(s)  Monitor WBC count -> normalized today      DVT Prophylaxis:  Heparin SC    Patient Centered Rounds:  I have performed bedside rounds and discussed plan of care with nursing today  Discussions with Specialists or Other Care Team Provider:  see above assessments if applicable    Education and Discussions with Family / Patient:  Patient at bedside - primary service (urology) should update family as necessary    Time Spent for Care:  32 minutes  More than 50% of total time spent on counseling and coordination of care as described above  Current Length of Stay: 5 day(s)  Current Patient Status: Inpatient   Certification Statement:  Patient will continue to require additional hospital stay due to assessments as noted above  Code Status: Prior        Subjective:     Encountered earlier in the day  Remains sedated at this time  Objective:     Vitals:   Temp (24hrs), Av 2 °F (36 8 °C), Min:96 6 °F (35 9 °C), Max:98 9 °F (37 2 °C)    Temp:  [96 6 °F (35 9 °C)-98 9 °F (37 2 °C)] 98 1 °F (36 7 °C)  HR:  [59-88] 72  Resp:  [16-28] 18  BP: (146-192)/() 154/92  SpO2:  [92 %-97 %] 94 %  Body mass index is 22 94 kg/m²  Input and Output Summary (last 24 hours):        Intake/Output Summary (Last 24 hours) at 2023 1302  Last data filed at 2023 1151  Gross per 24 hour   Intake 1375 ml   Output 1050 ml   Net 325 ml       Physical Exam:     GENERAL  weak/fatigued   HEAD   Normocephalic - atraumatic   EYES  nonicteric   MOUTH   Mucosa moist   NECK   Supple - full range of motion   CARDIAC  rate controlled- S1/S2 positive   PULMONARY    Clear breath sounds bilaterally - nonlabored respirations   ABDOMEN   Soft - nontender/nondistended - active bowel sounds   MUSCULOSKELETAL   Motor strength/range of motion deconditioned   NEUROLOGIC  sedated   SKIN   Chronic wrinkles/blemishes    PSYCHIATRIC   Mood/affect not determined due to sedated state         Additional Data:     Labs & Recent Cultures:    Results from last 7 days   Lab Units 05/27/23  0541 05/26/23  1556   EOS PCT %  --  1   HEMATOCRIT % 37 0 40 2   HEMOGLOBIN g/dL 12 7 13 9   LYMPHS PCT %  --  6*   MONOS PCT %  --  9   NEUTROS PCT %  --  83*   PLATELETS Thousands/uL 218 224   WBC Thousand/uL 9 83 11 10*     Results from last 7 days   Lab Units 05/27/23  0541 05/26/23  1555 05/26/23  1551   ALK PHOS U/L  --  82  --    ALT U/L  --  23  --    AST U/L  --  17  --    BUN mg/dL 19 23  --    CALCIUM mg/dL 8 5 9 0  --    CHLORIDE mmol/L 105 103  --    CO2 mmol/L 23 24  --    CO2, I-STAT mmol/L  --   --  22   CREATININE mg/dL 1 05 1 12  --    GLUCOSE, ISTAT mg/dl  --   --  99   POTASSIUM mmol/L 3 4* 3 2*  --          Results from last 7 days   Lab Units 05/26/23  1542 05/22/23  1714   POC GLUCOSE mg/dl 102 144*         Results from last 7 days   Lab Units 05/26/23  2036 05/26/23  1556   LACTIC ACID mmol/L 2 0 2 3*                 Lines/Drains:  Invasive Devices     Central Venous Catheter Line  Duration           CVC Central Lines 05/22/23 Triple 5 days                  Last 24 Hours Medication List:   Current Facility-Administered Medications   Medication Dose Route Frequency Provider Last Rate   • acetaminophen  975 mg Oral Q8H [facility] BASIL [place]     • amLODIPine  5 mg Oral Daily Padmini De Santiago MD     • calcium carbonate  1,000 mg Oral Daily PRN Faina Barron MD     • diphenhydrAMINE  25 mg Intravenous Q6H PRN Scott Mclaughlin MD     • docusate sodium  100 mg Oral BID Faina Barron MD     • folic acid 1 mg, thiamine (VITAMIN B1) 100 mg in sodium chloride 0 9 % 100 mL IV piggyback   Intravenous Daily Mark Chiang MD     • heparin (porcine)  5,000 Units Subcutaneous [facility] Juana Mcdonough PA-C     • HYDROmorphone  0 5 mg Intravenous Q4H PRN BASIL Aguirre     • labetalol  10 mg Intravenous Q4H PRN Mark Chiang MD     • LORazepam  2 mg Intravenous Q4H PRN Padmini De Santiago MD     • methadone  110 mg Oral Daily Louisa Ghosh MD     • methocarbamol  750 mg Oral Q6H [address] Frances Gregory MD     • nicotine  1 patch Transdermal Daily Jordan Patino MD     • ondansetron  4 mg Intravenous Q4H PRN Shaniqua Hui MD     • oxyCODONE  5 mg Oral Q4H PRN Rolfe Najjar, CRNP      Or   • oxyCODONE  10 mg Oral Q4H PRN Rolfe Najjar, CRNP     • polyethylene glycol  17 g Oral Daily PRN Romi Coto PA-C     • senna  1 tablet Oral Daily Jordan Patino MD     • simethicone  80 mg Oral Q6H PRN Romi Coto PA-C     • sodium chloride 0 9 % with KCl 40 mEq/L  75 mL/hr Intravenous Continuous Kirill Fontaine MD                      ** Please Note: This note is constructed using a voice recognition dictation system  An occasional wrong word/phrase or “sound-a-like” substitution may have been picked up by dictation device due to the inherent limitations of voice recognition software  Read the chart carefully and recognize, using reasonable context, where substitutions may have occurred  **

## 2023-05-27 NOTE — PROGRESS NOTES
"Pt requesting valium stating he takes [place] lot\" of it at home  Pt also states he drinks \"a 6 pack every few days\" at home and additionally takes \"whatever drugs he wants, whenever he wants\" but did not clarify what drugs he takes at home further  /92; HR 71 and CIWA score 24  Dr Lenwood Habermann notified of the above    "

## 2023-05-27 NOTE — ASSESSMENT & PLAN NOTE
Noted prolonged QT on EKG, 510    Will avoid Haldol, and Zyprexa  Will need to discuss with APS for dose adjustment for methadone

## 2023-05-27 NOTE — PROGRESS NOTES
Pt has not slept more than a few hours total over the last 3 nights  PHILIPPE Ramirez Flakes notified could be contributing to pt delirium  Room darkened and measures implemented to promote rest have been thus far ineffective

## 2023-05-27 NOTE — PROGRESS NOTES
"Progress Note - Judy Moreno 61 y o  male MRN: [id number]    Unit/Bed#: [facility] 928-01 Encounter: [id number]      Assessment:  Postoperative day #5 status post right nephrectomy for nephrolithiasis/staghorn calculi  History of substance abuse with mental status changes patient was in locked restraints yesterday  Creatinine normal at 1 05 today, hemoglobin 12 7  Normal white blood count  Patient is in no condition to go home presently due to mental status changes, agitation  Plan:  Continue with control of agitation/pain issues  Well from my standpoint  Subjective:   Patient asleep    Objective: Postoperative day #5 status post right nephrectomy for nephrolithiasis above  Acute pain is management of his pain and agitation and the patient is currently quite somnolent right now which is okay given that he had to be placed in restraints  Vitals: Blood pressure 155/93, pulse 69, temperature 98 4 °F (36 9 °C), resp  rate 20, height 5' 7\" (1 702 m), weight 66 4 kg (146 lb 7 9 oz), SpO2 95 %  ,Body mass index is 22 94 kg/m²  Intake/Output Summary (Last 24 hours) at 5/27/2023 0819  Last data filed at 5/27/2023 0545  Gross per 24 hour   Intake 1493 ml   Output 925 ml   Net 568 ml       Physical Exam: Somnolent, abdomen soft, wounds clean dry and intact  Condom catheter in place  Invasive Devices       Central Venous Catheter Line  Duration             CVC Central Lines 05/22/23 Triple 4 days                    Lab, Imaging and other studies: I have personally reviewed pertinent reports      VTE Pharmacologic Prophylaxis: Sequential compression device (Venodyne)   VTE Mechanical Prophylaxis: sequential compression device   "

## 2023-05-27 NOTE — CONSULTS
[facility]  Consult  Name: Ewa Theodore 61 y o  male I MRN: [id number]  Unit/Bed#: [facility] 928-01 I Date of Admission: 5/22/2023   Date of Service: 5/26/2023 I Hospital Day: 4    Inpatient consult to Internal Medicine  Consult performed by: Ed Mendez MD  Consult ordered by: Sb Fernandez PA-C          Assessment/Plan   Acute metabolic encephalopathy  Assessment & Plan  · SLIM consulted for change in mentation  Rapid response was called earlier today due to change in mentation, patient aggressive, agitated, violent towards staff, attempting to leave  · Stat CT head negative for acute finding  · CBC and CMP unremarkable except for mild hypokalemia  · Hypophosphatemia noted-replete  · Initially thought to be alcohol withdrawal delirium, placed on CIWA protocol, received IV Ativan 2 mg x 1   · However after discussion with mother, patient does not have history of alcohol abuse  · He does have narcotic abuse history for which she is on maintenance dose of methadone  · Mother reports in previous hospital stay, patient had very similar episode of aggression after pain medication was reduced  · Noted ketamine infusion was discontinued last night  · Discussed with on-call , symptoms does not appear consistent with opioid withdrawal     · Symptoms more consistent with alcohol withdrawal versus benzo withdrawal versus underlying psychiatric illness  · Of note, previously in 08/2022, he was evaluated by neuropsych and deemed not competent  · We will continue CIWA protocol, and IV Ativan as needed  · Noted QTc prolonged in previous EKG, obtain repeat EKG  If QTc less than 500, will administer IM Haldol versus IM Zyprexa    · Continue continual observation, locked restraints  · Telemetry monitor    * Kidney stones  Assessment & Plan  Patient currently admitted under urology service, underwent radical right nephrectomy secondary to staghorn calculus and Bedside report received from off-going RN visual identification made, assumed care of pt. Pt resting quietly with eyes closed, no agitation or restlessness, no grimacing or groaning. Pt respirations unlabored. Tab alert in place, rails up x 2, bed in lowest position, safety maintained. FLACC 0. Pt is at GIP level of care, no change to pt discharge plan. Pt to stay at [facility] until passing. Plan of care reviewed. 1943- Pt started moaning out, grabbing at things in the air, would not respond to this RN if was in pain or if he need anything. PRN haldol and dilaudid given and this time. Fentanyl patch placed on right arm. 2004- PRN medication effective pt rest quietly with eyes closed breathing even and unlabored. 2124- Pt has visitor(Aryan). Has turn all light on and is attempting to stimulate pt into talking to her. Provided education about pt agitation/delirium and medication staff have given to try and keep clam.     2141- Visitor (Aryan) show's very poor understand on pt condition. Continue's to try and ask question to pt. Visitor's behavior make's the RN feel that she is in denial stage of grief. This RN will continue to try and provide support to both pt and love ones. 2308- PRN dilaudid given. Pt was moaning. 2330-PRN medication effective. Pt resting comfortably in bed with eyes closed; no signs of pain or distress noted. RR non labored. No agitation, NVD, or SOB. FLACC 0/10.    0107- During rounds pt was moaning out. PRN dilaudid given for pain. Family at bedside. 8326- Pt was trying climb out of bed, speech was garbled, moaning, kick out at staff. PRN haldol and Dilaudid given.    0301-PRN medication effective. Pt resting comfortably in bed with eyes closed; no signs of pain or distress noted. RR non labored. No agitation, NVD, or SOB. FLACC 0/10.    0510- . Pt resting comfortably in bed with eyes closed; no signs of pain or distress noted. RR non labored. No agitation, NVD, or SOB.  FLACC 0/10. ureteral stricture  Today postop day 4  APS is following for pain management, noted ketamine infusion was discontinued yesterday  Currently on as needed Dilaudid IV, and maintenance methadone dose    Prolonged QT interval  Assessment & Plan  Noted prolonged QT on EKG, 510  Will avoid Haldol, and Zyprexa  Will need to discuss with APS for dose adjustment for methadone    Hyponatremia  Assessment & Plan  Likely secondary to pain  Continue to monitor  Currently on fluid restriction    Hypophosphatemia  Assessment & Plan  Replete, recheck    Delusional disorder [facility])  Assessment & Plan  Noted he has a history of delusional disorder  Currently not seeing any psychiatrist as per mother  Mother also reports he has been intermittently hallucinating for over a year now however did not see psychiatrist    Essential hypertension  Assessment & Plan  Noted patient's blood pressure persistently elevated, currently on as needed IV hydralazine  We will add amlodipine 5 mg           VTE Prophylaxis: Heparin  / sequential compression device     Recommendations for Discharge:  · As per urology, not ready for discharge from medicine      Collaboration of Care: Were Recommendations Directly Discussed with Primary Treatment Team? - Yes     History of Present Illness:    Danna Arevalo is a 61 y o  male who is originally admitted to the urology service service due to BRAULIO secondary to staghorn calculus, ureteral stricture, status post right nephrectomy  We are consulted for acute change in mentation  Patient with PMH of substance abuse, currently on methadone, cognitive decline, delusional disorder, essential hypertension, admitted in the hospital for status post right nephrectomy  Patient today is postop day 4  His pain was managed by acute pain services, was on ketamine infusion up until last night  Currently switched over to IV Dilaudid as needed    However this afternoon, patient noted to be agitated, aggressive, violent towards staff, profanity, not directable  Rapid response was called  A stat CT head was obtained which was negative for acute finding  Labs were unremarkable as well except for mildly elevated lactic acid  Patient was thought to be from alcohol withdrawal delirium, and patient was placed on CIWA protocol  Patient received Ativan 2 mg IV however he did not improve and continued to be violent  Patient was eventually placed on soft restraints, then locked restraints due to increased agitation  After discussion with mother, patient does not have history of alcohol abuse  However he did have similar episodes, in previous hospital stay outside of [facility]  Patient was admitted in the hospital for urological procedure, however within few days noted to be aggressive  Mother does not know what other substance abuse patient was using  On my exam, patient is using profanity, verbal threat towards myself and staff, aggressive and agitated  Unable to obtain any history from patient      Review of Systems:    Review of Systems   Unable to perform ROS: Mental status change       Past Medical and Surgical History:     Past Medical History:   Diagnosis Date   • Anxiety    • Bright red rectal bleeding     Last Assessed: 9/9/2015    • Drug dependence ([address] ) 10/20/2021   • Hypertension     Last Assessed: 7/9/2014    • Kidney stone    • Kidney stones     Last Assessed: 11/17/2016    • Periorbital edema     Last Assessed: 9/4/2015   • Septic shock ([address] ) 08/20/2022   • Skin tag of anus     Last Assessed: 9/9/2015    • Trigger point of thoracic region     Last Assessed: 11/6/2015        Past Surgical History:   Procedure Laterality Date   • CYSTOSCOPY W/ URETERAL STENT PLACEMENT  03/11/2013   • CYSTOSCOPY W/ URETERAL STENT PLACEMENT  06/18/2014    EXTRACORPOREAL SHOCK WAVE LITHOTRIPSY    • CYSTOSCOPY W/ URETERAL STENT PLACEMENT  07/16/2014    EXTRACORPOREAL SHOCK WAVE LITHOTRIPSY    • CYSTOSCOPY W/ URETERAL STENT REMOVAL  04/11/2013   • CYSTOSCOPY W/ URETERAL STENT REMOVAL Right 08/26/2014   • CYSTOSCOPY W/ URETEROSCOPY W/ LITHOTRIPSY  02/26/2013    Percutaneous lithotomy With Uretal Stent Plcement    • CYSTOSCOPY W/ URETEROSCOPY W/ LITHOTRIPSY  03/11/2014   • CYSTOSCOPY W/ URETEROSCOPY W/ LITHOTRIPSY Right 08/04/2014    With Uretal Stent Placement    • CYSTOSCOPY W/ URETEROSCOPY W/ LITHOTRIPSY Right 05/09/2016   • HERNIA REPAIR  03/11/2013   • IR NEPHROSTOMY TUBE CHECK/CHANGE/REPOSITION/REINSERTION/UPSIZE  11/22/2022   • IR NEPHROSTOMY TUBE CHECK/CHANGE/REPOSITION/REINSERTION/UPSIZE  02/13/2023   • IR NEPHROSTOMY TUBE CHECK/CHANGE/REPOSITION/REINSERTION/UPSIZE  04/28/2023   • IR NEPHROSTOMY TUBE PLACEMENT  08/20/2022   • KIDNEY SURGERY     • LITHOTRIPSY     • IA CYSTO/URETERO W/LITHOTRIPSY &INDWELL STENT INSRT Right 07/13/2016    Procedure: CYSTOSCOPY; URETEROSCOPY WITH HOLMIUM LASER STONE EXTRACTION; RETROGRADE PYELOGRAM; URETERAL STENT INSERTION ;  Surgeon: Svetlana Matthews MD;  Location: [facility];  Service: Urology   • IA CYSTO/URETERO W/LITHOTRIPSY &INDWELL STENT INSRT Right 05/09/2016    Procedure: CYSTOSCOPY,  URETEROSCOPY,  WITH LITHOTRIPSY HOLMIUM LASER, STONE EXTRACTION, AND INSERTION STENT URETERAL;  Surgeon: Svetlana Matthews MD;  Location: [facility];  Service: Urology   • IA CYSTO/URETERO W/LITHOTRIPSY &INDWELL STENT INSRT Right 11/10/2022    Procedure: CYSTOSCOPY URETEROSCOPY  right RETROGRADE PYELOGRAM;  Surgeon: Alisha Courtney MD;  Location: [facility];  Service: Urology   • IA LAPAROSCOPY RADICAL NEPHRECTOMY Right 5/22/2023    Procedure: NEPHRECTOMY RADICAL LAPAROSCOPIC W/ ROBOTICS;  Surgeon: Edda Severin, MD;  Location: [facility];  Service: Urology   • IA LITHOTRIPSY [address] Right 06/17/2016    Procedure: LITHROTRIPSY EXTRACORPORAL SHOCKWAVE (ESWL);   Surgeon: Svetlana Matthews MD;  Location: [facility];  Service: Urology   • TRANSURETHRAL RESECTION OF BLADDER     • URETERAL "Maria Elena Adjutant  03/11/2013       Meds/Allergies:    PTA meds:   Prior to Admission Medications   Prescriptions Last Dose Informant Patient Reported? Taking? METHADONE HCL PO 5/21/2023 at 1900  Yes Yes   Sig: Take 110 mg by mouth in the morning This dose was verified with Jorge Trujillo, the director of the [facility]  Pt usually receives liquid form  Pt was instructed to go to clinic am of surgery, as usual, to receive his am dose and proceed to the hospital with an arrival time of 1015  The [facility] opens at 530am   methadone (DOLOPHINE) 10 MG/5ML solution   Yes Yes   Sig: Take 5 mg by mouth every 6 (six) hours as needed for moderate pain      Facility-Administered Medications Last Administration Doses Remaining   gentamicin (GARAMYCIN) 40 mg/mL injection 160 mg 5/16/2023  3:36 PM 1          Allergies:    Allergies   Allergen Reactions   • Bee Venom Anaphylaxis   • Metronidazole Itching and Swelling   • Nsaids GI Intolerance     Stomach irritant   • Penicillin G    • Penicillins GI Intolerance     Sick to stomach   • Pollen Extract    • Sulfa Antibiotics        Social History:     Marital Status:     Substance Use History:   Social History     Substance and Sexual Activity   Alcohol Use Not Currently     Social History     Tobacco Use   Smoking Status Every Day   • Packs/day: 2 00   • Years: 35 00   • Total pack years: 70 00   • Types: Cigarettes   Smokeless Tobacco Never     Social History     Substance and Sexual Activity   Drug Use Not Currently       Family History:    Family History   Problem Relation Age of Onset   • No Known Problems Mother    • Heart attack Father        Physical Exam:     Vitals:   Blood Pressure: 161/85 (05/26/23 1800)  Pulse: 65 (05/26/23 1635)  Temperature: 98 9 °F (37 2 °C) (05/26/23 1545)  Temp Source: Oral (05/24/23 0739)  Respirations: 20 (05/26/23 0813)  Height: 5' 7\" (170 2 cm) (05/22/23 1113)  Weight - Scale: 66 4 kg (146 lb 7 9 oz) (05/22/23 " 1120)  SpO2: 95 % (05/26/23 1635)    Physical Exam  Constitutional:       General: He is not in acute distress  Appearance: He is not ill-appearing or toxic-appearing  Eyes:      Extraocular Movements: Extraocular movements intact  Conjunctiva/sclera: Conjunctivae normal    Pulmonary:      Effort: Pulmonary effort is normal  No respiratory distress  Neurological:      Mental Status: He is alert  Psychiatric:         Mood and Affect: Affect is angry  Behavior: Behavior is uncooperative, agitated, aggressive, hyperactive and combative  Judgment: Judgment is impulsive and inappropriate  Additional Data:     Lab Results: I have personally reviewed pertinent reports  Results from last 7 days   Lab Units 05/26/23  1556   EOS PCT % 1   HEMATOCRIT % 40 2   HEMOGLOBIN g/dL 13 9   LYMPHS PCT % 6*   MONOS PCT % 9   NEUTROS PCT % 83*   PLATELETS Thousands/uL 224   WBC Thousand/uL 11 10*     Results from last 7 days   Lab Units 05/26/23  1555   ANION GAP mmol/L 2*   ALBUMIN g/dL 2 9*   ALK PHOS U/L 82   ALT U/L 23   AST U/L 17   BUN mg/dL 23   CALCIUM mg/dL 9 0   CHLORIDE mmol/L 103   CO2 mmol/L 24   CREATININE mg/dL 1 12   GLUCOSE RANDOM mg/dL 92   POTASSIUM mmol/L 3 2*   SODIUM mmol/L 129*   TOTAL BILIRUBIN mg/dL 0 82             Lab Results   Component Value Date/Time    HGBA1C 5 1 01/30/2023 05:46 AM    HGBA1C 5 3 11/22/2021 03:23 PM     Results from last 7 days   Lab Units 05/26/23  1542 05/22/23  1714   POC GLUCOSE mg/dl 102 144*     Results from last 7 days   Lab Units 05/26/23  1556   LACTIC ACID mmol/L 2 3*       Imaging: I have personally reviewed pertinent reports  CT head wo contrast   Final Result by Nam Varghese MD (05/26 1634)      No acute intracranial abnormality  Workstation performed: [id number]         XR chest portable   Final Result by Janak Wilkins MD (05/22 1720)      Right IJ catheter in adequate position  No evidence of pneumothorax  Pneumoperitoneum, most likely related to the recent surgery  Workstation performed: [id number]             EKG, Pathology, and Other Studies Reviewed on Admission:   · EKG: Reviewed EKG, sinus rhythm with QTc 510    ** Please Note: This note has been constructed using a voice recognition system   **

## 2023-05-27 NOTE — ASSESSMENT & PLAN NOTE
Noted he has a history of delusional disorder  Currently not seeing any psychiatrist as per mother    Mother also reports he has been intermittently hallucinating for over a year now however did not see psychiatrist

## 2023-05-27 NOTE — PROGRESS NOTES
[facility] 13  Pt relatively subdued however still hallucinating and occasionally pulling at restraints  2 mg ativan per [facility] protocol held due to concern pt would become over sedated  SLIM KINGSLEY Holloway aware  Will reevaluate in an hour

## 2023-05-27 NOTE — NURSING NOTE
Pt still frequently agitated and hallucinating however more subdued than earlier in the night  Alert to person only  CIWA 15   Per protocol pt due for another 4 mg of ativan, however held due to concern for oversedation as pt has received a total of 14 mg IV ativan, in addition to 1 mg of dilauded and 25 mg of atarax since 2018 last night  PHILIPPE Holloway notified

## 2023-05-27 NOTE — PROGRESS NOTES
CIWA score 19  Per protocol pt due for another 4 mg IV ativan  Agitation and fighting restraints seem to have slightly improved since last evaluated and pt is starting to become drowsy  Concerned additional ativan right now would cause oversedation  Spoke with Dr Lenwood Habermann, instructed to hold ativan now and reevaluate pt in an hour

## 2023-05-27 NOTE — PROGRESS NOTES
Pt CIWA 19  Awake and hallucinating, intermittently fighting restraints, however drowsy at times  Per protocol pt due for 4 mg of IV ativan however concerned for oversedation if pt given additional 4 mg of ativan  2 mg given and pt monitored for response  Pt less agitated and more subdued with 2 mg of ativan  PHILIPPE Holloway notified  Ok to hold off on additional 2 mg of ativan at this time

## 2023-05-27 NOTE — ASSESSMENT & PLAN NOTE
Status post radical right nephrectomy secondary to staghorn calculus and ureteral stricture on 5/22  Acute pain service managing analgesic regimen -> ketamine infusion discontinued on 5/25 w/ subsequent alteration of sensorium with increased agitation/combativeness  In light of above-mentioned altered mentation, check urinalysis to assess for current/residual UTI -> recent urine culture two weeks ago grew E  coli and MSSA -> observe off antibiotics for now  PRN emesis control - continue IV fluids  Further recommendations per primary service (urology)

## 2023-05-27 NOTE — ASSESSMENT & PLAN NOTE
Noted history of delusional disorder - not currently seeing any psychiatrist as per mother -> in light of altered mentation and combativeness/agitation, will appreciate psychiatry input  Mother also reports he has been intermittently hallucinating for over a year now

## 2023-05-27 NOTE — ASSESSMENT & PLAN NOTE
Likely a SIADH-type etiology in the setting of pain  Sodium improved to 132 currently on fluid restriction  Continue to monitor

## 2023-05-28 PROBLEM — Z87.440 HISTORY OF UTI: Status: ACTIVE | Noted: 2023-05-16

## 2023-05-28 LAB
ANION GAP SERPL CALCULATED.3IONS-SCNC: 2 MMOL/L (ref 4–13)
BASOPHILS # BLD AUTO: 0.03 THOUSANDS/ÂΜL (ref 0–0.1)
BASOPHILS NFR BLD AUTO: 0 % (ref 0–1)
BUN SERPL-MCNC: 16 MG/DL (ref 5–25)
CALCIUM SERPL-MCNC: 7.7 MG/DL (ref 8.3–10.1)
CHLORIDE SERPL-SCNC: 106 MMOL/L (ref 96–108)
CO2 SERPL-SCNC: 22 MMOL/L (ref 21–32)
CREAT SERPL-MCNC: 1.08 MG/DL (ref 0.6–1.3)
EOSINOPHIL # BLD AUTO: 0.4 THOUSAND/ÂΜL (ref 0–0.61)
EOSINOPHIL NFR BLD AUTO: 4 % (ref 0–6)
ERYTHROCYTE [DISTWIDTH] IN BLOOD BY AUTOMATED COUNT: 15.1 % (ref 11.6–15.1)
GFR SERPL CREATININE-BSD FRML MDRD: 75 ML/MIN/1.73SQ M
GLUCOSE SERPL-MCNC: 92 MG/DL (ref 65–140)
HCT VFR BLD AUTO: 34.5 % (ref 36.5–49.3)
HGB BLD-MCNC: 11.9 G/DL (ref 12–17)
IMM GRANULOCYTES # BLD AUTO: 0.12 THOUSAND/UL (ref 0–0.2)
IMM GRANULOCYTES NFR BLD AUTO: 1 % (ref 0–2)
LYMPHOCYTES # BLD AUTO: 0.76 THOUSANDS/ÂΜL (ref 0.6–4.47)
LYMPHOCYTES NFR BLD AUTO: 7 % (ref 14–44)
MAGNESIUM SERPL-MCNC: 2.5 MG/DL (ref 1.6–2.6)
MCH RBC QN AUTO: 29.5 PG (ref 26.8–34.3)
MCHC RBC AUTO-ENTMCNC: 34.5 G/DL (ref 31.4–37.4)
MCV RBC AUTO: 86 FL (ref 82–98)
MONOCYTES # BLD AUTO: 1.05 THOUSAND/ÂΜL (ref 0.17–1.22)
MONOCYTES NFR BLD AUTO: 10 % (ref 4–12)
NEUTROPHILS # BLD AUTO: 8.54 THOUSANDS/ÂΜL (ref 1.85–7.62)
NEUTS SEG NFR BLD AUTO: 78 % (ref 43–75)
NRBC BLD AUTO-RTO: 0 /100 WBCS
PHOSPHATE SERPL-MCNC: 1.7 MG/DL (ref 2.7–4.5)
PLATELET # BLD AUTO: 211 THOUSANDS/UL (ref 149–390)
PMV BLD AUTO: 9.3 FL (ref 8.9–12.7)
POTASSIUM SERPL-SCNC: 3.5 MMOL/L (ref 3.5–5.3)
RBC # BLD AUTO: 4.03 MILLION/UL (ref 3.88–5.62)
SODIUM SERPL-SCNC: 130 MMOL/L (ref 135–147)
WBC # BLD AUTO: 10.9 THOUSAND/UL (ref 4.31–10.16)

## 2023-05-28 RX ADMIN — POTASSIUM CHLORIDE AND SODIUM CHLORIDE 75 ML/HR: 900; 300 INJECTION, SOLUTION INTRAVENOUS at 17:53

## 2023-05-28 RX ADMIN — HYDROMORPHONE HYDROCHLORIDE 0.5 MG: 1 INJECTION, SOLUTION INTRAMUSCULAR; INTRAVENOUS; SUBCUTANEOUS at 01:11

## 2023-05-28 RX ADMIN — DOCUSATE SODIUM 100 MG: 100 CAPSULE, LIQUID FILLED ORAL at 10:07

## 2023-05-28 RX ADMIN — ACETAMINOPHEN 975 MG: 325 TABLET ORAL at 21:17

## 2023-05-28 RX ADMIN — HYDROMORPHONE HYDROCHLORIDE 0.5 MG: 1 INJECTION, SOLUTION INTRAMUSCULAR; INTRAVENOUS; SUBCUTANEOUS at 07:43

## 2023-05-28 RX ADMIN — POTASSIUM CHLORIDE AND SODIUM CHLORIDE 75 ML/HR: 900; 300 INJECTION, SOLUTION INTRAVENOUS at 03:28

## 2023-05-28 RX ADMIN — SENNOSIDES 8.6 MG: 8.6 TABLET, FILM COATED ORAL at 10:07

## 2023-05-28 RX ADMIN — METHOCARBAMOL 750 MG: 750 TABLET ORAL at 23:30

## 2023-05-28 RX ADMIN — POTASSIUM PHOSPHATE, MONOBASIC AND POTASSIUM PHOSPHATE, DIBASIC 30 MMOL: 224; 236 INJECTION, SOLUTION, CONCENTRATE INTRAVENOUS at 17:40

## 2023-05-28 RX ADMIN — OXYCODONE HYDROCHLORIDE 10 MG: 10 TABLET ORAL at 15:33

## 2023-05-28 RX ADMIN — OXYCODONE HYDROCHLORIDE 10 MG: 10 TABLET ORAL at 05:43

## 2023-05-28 RX ADMIN — ACETAMINOPHEN 650 MG: 325 TABLET ORAL at 13:16

## 2023-05-28 RX ADMIN — METHOCARBAMOL 750 MG: 750 TABLET ORAL at 11:45

## 2023-05-28 RX ADMIN — HEPARIN SODIUM 5000 UNITS: 5000 INJECTION INTRAVENOUS; SUBCUTANEOUS at 13:17

## 2023-05-28 RX ADMIN — OXYCODONE HYDROCHLORIDE 10 MG: 10 TABLET ORAL at 21:16

## 2023-05-28 RX ADMIN — AMLODIPINE BESYLATE 5 MG: 5 TABLET ORAL at 10:07

## 2023-05-28 RX ADMIN — METHADONE HYDROCHLORIDE 110 MG: 10 CONCENTRATE ORAL at 10:08

## 2023-05-28 RX ADMIN — OXYCODONE HYDROCHLORIDE 10 MG: 10 TABLET ORAL at 00:15

## 2023-05-28 RX ADMIN — METHOCARBAMOL 750 MG: 750 TABLET ORAL at 00:15

## 2023-05-28 RX ADMIN — NICOTINE 1 PATCH: 21 PATCH, EXTENDED RELEASE TRANSDERMAL at 09:29

## 2023-05-28 RX ADMIN — FOLIC ACID: 5 INJECTION, SOLUTION INTRAMUSCULAR; INTRAVENOUS; SUBCUTANEOUS at 09:29

## 2023-05-28 RX ADMIN — DOCUSATE SODIUM 100 MG: 100 CAPSULE, LIQUID FILLED ORAL at 17:41

## 2023-05-28 RX ADMIN — HEPARIN SODIUM 5000 UNITS: 5000 INJECTION INTRAVENOUS; SUBCUTANEOUS at 21:18

## 2023-05-28 RX ADMIN — METHOCARBAMOL 750 MG: 750 TABLET ORAL at 17:40

## 2023-05-28 RX ADMIN — OXYCODONE HYDROCHLORIDE 10 MG: 10 TABLET ORAL at 11:45

## 2023-05-28 NOTE — PLAN OF CARE
Problem: MOBILITY - ADULT  Goal: Maintain or return to baseline ADL function  Description: INTERVENTIONS:  -  Assess patient's ability to carry out ADLs; assess patient's baseline for ADL function and identify physical deficits which impact ability to perform ADLs (bathing, care of mouth/teeth, toileting, grooming, dressing, etc )  - Assess/evaluate cause of self-care deficits   - Assess range of motion  - Assess patient's mobility; develop plan if impaired  - Assess patient's need for assistive devices and provide as appropriate  - Encourage maximum independence but intervene and supervise when necessary  - Involve family in performance of ADLs  - Assess for home care needs following discharge   - Consider OT consult to assist with ADL evaluation and planning for discharge  - Provide patient education as appropriate  Outcome: Progressing  Goal: Maintains/Returns to pre admission functional level  Description: INTERVENTIONS:  - Perform BMAT or MOVE assessment daily    - Set and communicate daily mobility goal to care team and patient/family/caregiver     - Collaborate with rehabilitation services on mobility goals if consulted  - Out of bed to chair 3 times a day   - Out of bed for meals 3 times a day  - Out of bed for toileting  - Record patient progress and toleration of activity level   Outcome: Progressing     Problem: PAIN - ADULT  Goal: Verbalizes/displays adequate comfort level or baseline comfort level  Description: Interventions:  - Encourage patient to monitor pain and request assistance  - Assess pain using appropriate pain scale (e g  0-10)  - Administer analgesics based on type and severity of pain and evaluate response  - Implement non-pharmacological measures as appropriate and evaluate response  - Consider cultural and social influences on pain and pain management  - Notify physician/advanced practitioner if interventions unsuccessful or patient reports new pain  Outcome: Progressing     Problem: INFECTION - ADULT  Goal: Absence or prevention of progression during hospitalization  Description: INTERVENTIONS:  - Assess and monitor for signs and symptoms of infection  - Monitor lab/diagnostic results  - Monitor all insertion sites, i e  TLC  - [place] appropriate cooling/warming therapies per order  - Administer medications as ordered  - Instruct and encourage patient and family to use good hand hygiene technique  - Identify and instruct in appropriate isolation precautions for identified infection/condition  Outcome: Progressing     Problem: RESPIRATORY - ADULT  Goal: Achieves optimal ventilation and oxygenation  Description: INTERVENTIONS:  - Assess for changes in respiratory status  - Assess for changes in mentation and behavior  - Position to facilitate oxygenation and minimize respiratory effort  - Oxygen administered by appropriate delivery if ordered  - Initiate smoking cessation education as indicated  - Encourage broncho-pulmonary hygiene including cough, deep breathe, Incentive Spirometry  - Assess the need for suctioning and aspirate as needed  - Assess and instruct to report SOB or any respiratory difficulty  - Respiratory Therapy support as indicated  Outcome: Progressing     Problem: GENITOURINARY - ADULT  Goal: Maintains or returns to baseline urinary function  Description: INTERVENTIONS:  - Assess urinary function  - Administer IV fluids as ordered to ensure adequate hydration  - Administer ordered medications as needed  - Offer frequent toileting  - Follow urinary retention protocol if ordered  Outcome: Progressing  Goal: Absence of urinary retention  Description: INTERVENTIONS:  - Assess patient's ability to void and empty bladder  - Monitor I/O  - Bladder scan as needed  - Discuss with physician/AP medications to alleviate retention as needed  Outcome: Progressing     Problem: SKIN/TISSUE INTEGRITY - ADULT  Goal: Incision(s), wounds(s) or drain site(s) healing without S/S of infection  Description: INTERVENTIONS  - Assess and document dressing, incision, wound bed, drain sites and surrounding tissue  - Provide patient and family education  Outcome: Progressing     Problem: Prexisting or High Potential for Compromised Skin Integrity  Goal: Skin integrity is maintained or improved  Description: INTERVENTIONS:  - Identify patients at risk for skin breakdown  - Assess and monitor skin integrity  - Assess and monitor nutrition and hydration status  - Monitor labs   - Assess for incontinence   - Turn and reposition patient  - Assist with mobility/ambulation  - Relieve pressure over bony prominences  - Avoid friction and shearing  - Provide appropriate hygiene as needed including keeping skin clean and dry  - Evaluate need for skin moisturizer/barrier cream  - Collaborate with interdisciplinary team   - Patient/family teaching  Outcome: Progressing     Problem: Nutrition/Hydration-ADULT  Goal: Nutrient/Hydration intake appropriate for improving, restoring or maintaining nutritional needs  Description: Monitor and assess patient's nutrition/hydration status for malnutrition  Collaborate with interdisciplinary team and initiate plan and interventions as ordered  Monitor patient's weight and dietary intake as ordered or per policy  Utilize nutrition screening tool and intervene as necessary  Determine patient's food preferences and provide high-protein, high-caloric foods as appropriate       INTERVENTIONS:  - Monitor oral intake, urinary output, labs, and treatment plans  - Assess nutrition and hydration status and recommend course of action  - Evaluate amount of meals eaten  - Assist patient with eating if necessary   - Allow adequate time for meals  - Recommend/ encourage appropriate diets, oral nutritional supplements, and vitamin/mineral supplements  - Order, calculate, and assess calorie counts as needed  - Assess need for intravenous fluids  - Provide specific nutrition/hydration education as appropriate  - Include patient/family/caregiver in decisions related to nutrition  - Follow 1500 cc/24 hours fluid restriction as ordered  Outcome: Progressing     Problem: Nutrition/Hydration-ADULT  Goal: Nutrient/Hydration intake appropriate for improving, restoring or maintaining nutritional needs  Description: Monitor and assess patient's nutrition/hydration status for malnutrition  Collaborate with interdisciplinary team and initiate plan and interventions as ordered  Monitor patient's weight and dietary intake as ordered or per policy  Utilize nutrition screening tool and intervene as necessary  Determine patient's food preferences and provide high-protein, high-caloric foods as appropriate       INTERVENTIONS:  - Monitor oral intake, urinary output, labs, and treatment plans  - Assess nutrition and hydration status and recommend course of action  - Evaluate amount of meals eaten  - Assist patient with eating if necessary   - Allow adequate time for meals  - Recommend/ encourage appropriate diets, oral nutritional supplements, and vitamin/mineral supplements  - Order, calculate, and assess calorie counts as needed  - Assess need for intravenous fluids  - Provide specific nutrition/hydration education as appropriate  - Include patient/family/caregiver in decisions related to nutrition  - Follow 1500 cc/24 hours fluid restriction as ordered  Outcome: Progressing     Problem: SAFETY,RESTRAINT: NV/NON-SELF DESTRUCTIVE BEHAVIOR  Goal: Remains free of harm/injury (restraint for non violent/non self-detsructive behavior)  Description: INTERVENTIONS:  - Instruct patient/family regarding restraint use   - Assess and monitor physiologic and psychological status   - Provide interventions and comfort measures to meet assessed patient needs   - Identify and implement measures to help patient regain control  - Assess readiness for release of restraint   Outcome: Progressing  Goal: Returns to optimal restraint-free functioning  Description: INTERVENTIONS:  - Assess the patient's behavior and symptoms that indicate continued need for restraint  - Identify and implement measures to help patient regain control  - Assess readiness for release of restraint   Outcome: Progressing     Problem: SAFETY, RESTRAINT - VIOLENT/SELF-DESTRUCTIVE  Goal: Returns to optimal restraint-free functioning  Description: INTERVENTIONS:  - Assess the patient's behavior and symptoms that indicate continued need for restraint  - Identify and implement measures to help patient regain control  - Assess readiness for release of restraint   Outcome: Progressing  Goal: Remains free of harm/injury from restraints (Restraint for Violent/Self-Destructive Behavior)  Description: INTERVENTIONS:  - Instruct patient regarding restraint use   - Assess and monitor physiologic and psychological status   - Provide interventions and comfort measures to meet assessed patient needs   - Ensure continuous in person monitoring is provided   - Identify and implement measures to help patient regain control  - Assess readiness for release of restraint  Outcome: Progressing

## 2023-05-28 NOTE — PROGRESS NOTES
[facility]  Consult Progress Note  Name: Dilshad Pal  MRN: [id number]  Unit/Bed#: [facility] 928-01 I Date of Admission: 5/22/2023   Date of Service: 5/28/2023 I Hospital Day: 6      Assessment & Plan:    Acute metabolic encephalopathy  Assessment & Plan  Medicine service consulted for change in mentation resulting in a rapid response called on 5/26 due to change in mentation with increased aggression/agitation and attempted violence towards staff  History of delusional disorder noted  CT of head negative for acute intracranial etiology - await urinalysis (recent positive urine cultures noted) - monitor/replete electrolyte deficiencies if present  Possibly secondary to discontinuation of ketamine infusion and subsequently increased pain -> appreciate pain management evaluation regarding analgesic control   Consider alcohol withdrawal delirium -> on CIWA protocol w/ intermittent doses of benzodiazepines administered  Per previous provider discussion with mother, however, patient does not have history of alcohol abuse -> does note a history of narcotic abuse requiring daily maintenance Methadone  Mother also reports during previous hospital course, patient had very similar episode of aggression after pain medication was abruptly reduced  Previous provider with on-call  -> symptoms do not appear consistent with opioid withdrawal at this time  Symptoms more consistent with alcohol withdrawal vs benzodiazepine withdrawal vs underlying psychiatric illness ? ?    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 stricture on 5/22  Acute pain service managing analgesic regimen -> ketamine infusion discontinued on 5/25 w/ subsequent alteration of sensorium with increased agitation/combativeness  In light of above-mentioned altered mentation, check urinalysis to assess for current/residual UTI -> recent urine culture two weeks ago grew E  coli and MSSA -> observe off antibiotics for now  PRN emesis control   Further recommendations per primary service (urology)    Recent UTI  Assessment & Plan  Recent urine culture earlier this month on 5/13 grew E  coli and MSSA  In light of altered mentation, repeat urinalysis ordered on 5/27, fortunately negative    Delusional disorder   Assessment & Plan  Noted history of delusional disorder - not currently seeing any psychiatrist as per mother -> in light of altered mentation and combativeness/agitation, will appreciate psychiatry input  Mother also reports he has been intermittently hallucinating for over a year now     Methadone dependence   Assessment & Plan  Continue daily Methadone regimen  Outpatient follow-up at Methadone clinic post-discharge    Essential hypertension  Assessment & Plan  Continue Norvasc -> PRN IV Labetalol on board for BP spikes  PRN pain control    Tobacco abuse  Assessment & Plan  Cessation counseling  Transdermal nicotine patch on board    Prolonged QT interval  Assessment & Plan  Noted prolonged QT on EKG of 510  Avoid Haldol/Zyprexa and other QTc prolonging agents -> note chronic Methadone use however    Hyponatremia  Assessment & Plan  Likely a SIADH-type etiology in the setting of pain  Serum sodium currently at 130 - continue fluid restriction    Multiple electrolyte abnormalities  Assessment & Plan  Monitor/replete serum potassium and phosphate as necessary  Serum magnesium normal    Leukocytosis  Assessment & Plan  Likely reactive acute medical issue(s)  Monitor WBC count -> relatively improved      DVT Prophylaxis:  Heparin SC    Patient Centered "Rounds:  I have performed bedside rounds and discussed plan of care with nursing today  Discussions with Specialists or Other Care Team Provider:  see above assessments if applicable    Education and Discussions with Family / Patient:  Patient at bedside - primary service (urology) should update family as necessary    Time Spent for Care:  32 minutes  More than 50% of total time spent on counseling and coordination of care as described above  Current Length of Stay: 6 day(s)  Current Patient Status: Inpatient   Certification Statement:  Patient will continue to require additional hospital stay due to assessments as noted above  Code Status: Prior        Subjective:     Seen and examined earlier in the day  More awake today and responds to simple questions  He states that he wants to [facility]\"  Denies worsening pain at this time  Objective:     Vitals:   Temp (24hrs), Av 7 °F (37 1 °C), Min:97 9 °F (36 6 °C), Max:99 2 °F (37 3 °C)    Temp:  [97 9 °F (36 6 °C)-99 2 °F (37 3 °C)] 98 3 °F (36 8 °C)  HR:  [66-72] 67  Resp:  [16-22] 21  BP: (104-162)/(57-78) 104/57  SpO2:  [93 %-97 %] 96 %  Body mass index is 22 94 kg/m²  Input and Output Summary (last 24 hours):        Intake/Output Summary (Last 24 hours) at 2023 1629  Last data filed at 2023 1323  Gross per 24 hour   Intake 1900 ml   Output 1650 ml   Net 250 ml       Physical Exam:     GENERAL  waxing/waning weakness/fatigue   HEAD   Normocephalic - atraumatic   EYES  nonicteric   MOUTH   Mucosa moist   NECK   Supple - full range of motion   CARDIAC  rate controlled - S1/S2 positive   PULMONARY    Clear breath sounds bilaterally - nonlabored respirations   ABDOMEN   Soft - nontender/nondistended - active bowel sounds   MUSCULOSKELETAL   Motor strength/range of motion deconditioned   NEUROLOGIC  alert and awake today but mildly sluggish in responses   SKIN   Chronic wrinkles/blemishes    PSYCHIATRIC   Mood/affect flat " Additional Data:     Labs & Recent Cultures:    Results from last 7 days   Lab Units 05/28/23  0553   EOS PCT % 4   HEMATOCRIT % 34 5*   HEMOGLOBIN g/dL 11 9*   LYMPHS PCT % 7*   MONOS PCT % 10   NEUTROS PCT % 78*   PLATELETS Thousands/uL 211   WBC Thousand/uL 10 90*     Results from last 7 days   Lab Units 05/28/23  0553 05/27/23  0541 05/26/23  1555 05/26/23  1551   ALK PHOS U/L  --   --  82  --    ALT U/L  --   --  23  --    AST U/L  --   --  17  --    BUN mg/dL 16   < > 23  --    CALCIUM mg/dL 7 7*   < > 9 0  --    CHLORIDE mmol/L 106   < > 103  --    CO2 mmol/L 22   < > 24  --    CO2, I-STAT mmol/L  --   --   --  22   CREATININE mg/dL 1 08   < > 1 12  --    GLUCOSE, ISTAT mg/dl  --   --   --  99   POTASSIUM mmol/L 3 5   < > 3 2*  --     < > = values in this interval not displayed           Results from last 7 days   Lab Units 05/26/23  1542 05/22/23  1714   POC GLUCOSE mg/dl 102 144*         Results from last 7 days   Lab Units 05/26/23  2036 05/26/23  1556   LACTIC ACID mmol/L 2 0 2 3*                 Lines/Drains:  Invasive Devices     Central Venous Catheter Line  Duration           CVC Central Lines 05/22/23 Triple 6 days                  Last 24 Hours Medication List:   Current Facility-Administered Medications   Medication Dose Route Frequency Provider Last Rate   • acetaminophen  975 mg Oral Q8H [facility] BASIL [place]     • amLODIPine  5 mg Oral Daily Melanie Holman MD     • calcium carbonate  1,000 mg Oral Daily PRN Bing Marino MD     • diphenhydrAMINE  25 mg Intravenous Q6H PRN Ya George MD     • docusate sodium  100 mg Oral BID Bing Marino MD     • folic acid 1 mg, thiamine (VITAMIN B1) 100 mg in sodium chloride 0 9 % 100 mL IV piggyback   Intravenous Daily Velton Landau, MD Stopped (05/28/23 1000)   • heparin (porcine)  5,000 Units Subcutaneous [facility] Watson Hughes PA-C     • labetalol  10 mg Intravenous Q4H PRN Velton Landau, MD     • LORazepam  2 mg Intravenous Q4H PRN Uziel Matias MD     • methadone  110 mg Oral Daily Jose Angel Sinha MD     • methocarbamol  750 mg Oral Q6H [facility] Nilda Guevara MD     • nicotine  1 patch Transdermal Daily Star Leong MD     • ondansetron  4 mg Intravenous Q4H PRN Savanna Gonzalez MD     • oxyCODONE  5 mg Oral Q4H PRN BASIL Phillips      Or   • oxyCODONE  10 mg Oral Q4H PRN BASIL Phillips     • polyethylene glycol  17 g Oral Daily PRN Amari Upton PA-C     • potassium phosphate  30 mmol Intravenous Once Cheryl Calvin MD     • senna  1 tablet Oral Daily Star Leong MD     • simethicone  80 mg Oral Q6H PRN Amari Upton PA-C     • sodium chloride 0 9 % with KCl 40 mEq/L  75 mL/hr Intravenous Continuous Cheryl Calvin MD 75 mL/hr (05/28/23 0328)                    ** Please Note: This note is constructed using a voice recognition dictation system  An occasional wrong word/phrase or “sound-a-like” substitution may have been picked up by dictation device due to the inherent limitations of voice recognition software  Read the chart carefully and recognize, using reasonable context, where substitutions may have occurred  **

## 2023-05-28 NOTE — ASSESSMENT & PLAN NOTE
Medicine service consulted for change in mentation resulting in a rapid response called on 5/26 due to change in mentation with increased aggression/agitation and attempted violence towards staff  History of delusional disorder noted  CT of head negative for acute intracranial etiology - await urinalysis (recent positive urine cultures noted) - monitor/replete electrolyte deficiencies if present  Possibly secondary to discontinuation of ketamine infusion and subsequently increased pain -> appreciate pain management evaluation regarding analgesic control   Consider alcohol withdrawal delirium -> on CIWA protocol w/ intermittent doses of benzodiazepines administered  Per previous provider discussion with mother, however, patient does not have history of alcohol abuse -> does note a history of narcotic abuse requiring daily maintenance Methadone  Mother also reports during previous hospital course, patient had very similar episode of aggression after pain medication was abruptly reduced  Previous provider with on-call  -> symptoms do not appear consistent with opioid withdrawal at this time  Symptoms more consistent with alcohol withdrawal vs benzodiazepine withdrawal vs underlying psychiatric illness ? ?    Of note, previously in August 2022, he was evaluated by neuropsychology and deemed incompetent  In light of elevated QTc, avoiding antipsychotics (i e  Haldol/Zyprexa, etc )  Currently on continual 1:1 observation - due to improvement in mentation today, will liberate a degree of restraints

## 2023-05-28 NOTE — ASSESSMENT & PLAN NOTE
Status post radical right nephrectomy secondary to staghorn calculus and ureteral stricture on 5/22  Acute pain service managing analgesic regimen -> ketamine infusion discontinued on 5/25 w/ subsequent alteration of sensorium with increased agitation/combativeness  In light of above-mentioned altered mentation, check urinalysis to assess for current/residual UTI -> recent urine culture two weeks ago grew E  coli and MSSA -> observe off antibiotics for now  PRN emesis control   Further recommendations per primary service (urology)

## 2023-05-28 NOTE — PLAN OF CARE
Problem: PAIN - ADULT  Goal: Verbalizes/displays adequate comfort level or baseline comfort level  Description: Interventions:  - Encourage patient to monitor pain and request assistance  - Assess pain using appropriate pain scale (e g  0-10)  - Administer analgesics based on type and severity of pain and evaluate response  - Implement non-pharmacological measures as appropriate and evaluate response  - Consider cultural and social influences on pain and pain management  - Notify physician/advanced practitioner if interventions unsuccessful or patient reports new pain  Outcome: Progressing     Problem: SAFETY,RESTRAINT: NV/NON-SELF DESTRUCTIVE BEHAVIOR  Goal: Remains free of harm/injury (restraint for non violent/non self-detsructive behavior)  Description: INTERVENTIONS:  - Instruct patient/family regarding restraint use   - Assess and monitor physiologic and psychological status   - Provide interventions and comfort measures to meet assessed patient needs   - Identify and implement measures to help patient regain control  - Assess readiness for release of restraint   Outcome: Progressing  Goal: Returns to optimal restraint-free functioning  Description: INTERVENTIONS:  - Assess the patient's behavior and symptoms that indicate continued need for restraint  - Identify and implement measures to help patient regain control  - Assess readiness for release of restraint   Outcome: Progressing

## 2023-05-28 NOTE — PROGRESS NOTES
"Progress Note - Abel Hoffmann 61 y o  male MRN: [id number]    Unit/Bed#: [facility] 928-01 Encounter: [id number]      Assessment:  Postoperative day #6 status post right nephrectomy for nonfunctioning kidney with stone  Encephalopathy as below, recovering  Plan:  Await recovery from encephalopathy  Doing okay from my standpoint  Okay for discharge when cleared by internal medicine and acute pain  Subjective:   I want to go home    Objective: Status post right radical nephrectomy for staghorn calculus and ureteral stricture on May 22, 2023  Continues with issues with encephalopathy, on restraints and acute pain service involved  Appreciate internal medicine and pain management involvement  He is now more awake, but still has 1 restraint on  He wants to go home and thinks he would do better at home  His pain is less  Vitals: Blood pressure 162/78, pulse 68, temperature 99 °F (37 2 °C), resp  rate 21, height 5' 7\" (1 702 m), weight 66 4 kg (146 lb 7 9 oz), SpO2 96 %  ,Body mass index is 22 94 kg/m²  Intake/Output Summary (Last 24 hours) at 5/28/2023 0940  Last data filed at 5/28/2023 8335  Gross per 24 hour   Intake 1921 67 ml   Output 1400 ml   Net 521 67 ml       Physical Exam: Awake, mostly alert and oriented  Wounds are clean dry and intact  No longer wearing the condom catheter  Invasive Devices       Central Venous Catheter Line  Duration             CVC Central Lines 05/22/23 Triple 5 days              Drain  Duration             External Urinary Catheter 1 day                    Lab, Imaging and other studies: I have personally reviewed pertinent reports      VTE Pharmacologic Prophylaxis: Sequential compression device (Venodyne)   VTE Mechanical Prophylaxis: sequential compression device   "

## 2023-05-28 NOTE — ASSESSMENT & PLAN NOTE
Likely a SIADH-type etiology in the setting of pain  Serum sodium currently at 130 - continue fluid restriction

## 2023-05-28 NOTE — ASSESSMENT & PLAN NOTE
Recent urine culture earlier this month on 5/13 grew E  coli and MSSA  In light of altered mentation, repeat urinalysis ordered on 5/27, fortunately negative

## 2023-05-29 VITALS
HEART RATE: 59 BPM | BODY MASS INDEX: 22.99 KG/M2 | RESPIRATION RATE: 18 BRPM | OXYGEN SATURATION: 98 % | HEIGHT: 67 IN | TEMPERATURE: 98.7 F | WEIGHT: 146.5 LBS | DIASTOLIC BLOOD PRESSURE: 65 MMHG | SYSTOLIC BLOOD PRESSURE: 116 MMHG

## 2023-05-29 LAB
ANION GAP SERPL CALCULATED.3IONS-SCNC: 1 MMOL/L (ref 4–13)
BASOPHILS # BLD AUTO: 0.04 THOUSANDS/ÂΜL (ref 0–0.1)
BASOPHILS NFR BLD AUTO: 0 % (ref 0–1)
BUN SERPL-MCNC: 13 MG/DL (ref 5–25)
CALCIUM SERPL-MCNC: 8.1 MG/DL (ref 8.3–10.1)
CHLORIDE SERPL-SCNC: 107 MMOL/L (ref 96–108)
CO2 SERPL-SCNC: 22 MMOL/L (ref 21–32)
CREAT SERPL-MCNC: 1 MG/DL (ref 0.6–1.3)
EOSINOPHIL # BLD AUTO: 0.32 THOUSAND/ÂΜL (ref 0–0.61)
EOSINOPHIL NFR BLD AUTO: 3 % (ref 0–6)
ERYTHROCYTE [DISTWIDTH] IN BLOOD BY AUTOMATED COUNT: 15.3 % (ref 11.6–15.1)
GFR SERPL CREATININE-BSD FRML MDRD: 82 ML/MIN/1.73SQ M
GLUCOSE SERPL-MCNC: 92 MG/DL (ref 65–140)
HCT VFR BLD AUTO: 35.3 % (ref 36.5–49.3)
HGB BLD-MCNC: 12.1 G/DL (ref 12–17)
IMM GRANULOCYTES # BLD AUTO: 0.15 THOUSAND/UL (ref 0–0.2)
IMM GRANULOCYTES NFR BLD AUTO: 1 % (ref 0–2)
LYMPHOCYTES # BLD AUTO: 0.8 THOUSANDS/ÂΜL (ref 0.6–4.47)
LYMPHOCYTES NFR BLD AUTO: 7 % (ref 14–44)
MAGNESIUM SERPL-MCNC: 2.5 MG/DL (ref 1.6–2.6)
MCH RBC QN AUTO: 29.5 PG (ref 26.8–34.3)
MCHC RBC AUTO-ENTMCNC: 34.3 G/DL (ref 31.4–37.4)
MCV RBC AUTO: 86 FL (ref 82–98)
MONOCYTES # BLD AUTO: 0.85 THOUSAND/ÂΜL (ref 0.17–1.22)
MONOCYTES NFR BLD AUTO: 7 % (ref 4–12)
NEUTROPHILS # BLD AUTO: 9.57 THOUSANDS/ÂΜL (ref 1.85–7.62)
NEUTS SEG NFR BLD AUTO: 82 % (ref 43–75)
NRBC BLD AUTO-RTO: 0 /100 WBCS
PHOSPHATE SERPL-MCNC: 2.3 MG/DL (ref 2.7–4.5)
PLATELET # BLD AUTO: 221 THOUSANDS/UL (ref 149–390)
PMV BLD AUTO: 9.2 FL (ref 8.9–12.7)
POTASSIUM SERPL-SCNC: 4.2 MMOL/L (ref 3.5–5.3)
RBC # BLD AUTO: 4.1 MILLION/UL (ref 3.88–5.62)
SODIUM SERPL-SCNC: 130 MMOL/L (ref 135–147)
WBC # BLD AUTO: 11.73 THOUSAND/UL (ref 4.31–10.16)

## 2023-05-29 RX ORDER — OXYCODONE HYDROCHLORIDE 10 MG/1
10 TABLET ORAL EVERY 6 HOURS PRN
Qty: 12 TABLET | Refills: 0 | Status: SHIPPED | OUTPATIENT
Start: 2023-05-29 | End: 2023-05-29 | Stop reason: SDUPTHER

## 2023-05-29 RX ORDER — AMLODIPINE BESYLATE 5 MG/1
5 TABLET ORAL DAILY
Qty: 30 TABLET | Refills: 1 | Status: SHIPPED | OUTPATIENT
Start: 2023-05-30 | End: 2023-05-29 | Stop reason: SDUPTHER

## 2023-05-29 RX ORDER — AMLODIPINE BESYLATE 5 MG/1
5 TABLET ORAL DAILY
Qty: 30 TABLET | Refills: 0 | Status: SHIPPED | OUTPATIENT
Start: 2023-05-30 | End: 2023-06-09

## 2023-05-29 RX ORDER — OXYCODONE HYDROCHLORIDE 10 MG/1
10 TABLET ORAL EVERY 6 HOURS PRN
Qty: 12 TABLET | Refills: 0 | Status: SHIPPED | OUTPATIENT
Start: 2023-05-29 | End: 2023-06-09

## 2023-05-29 RX ADMIN — METHOCARBAMOL 750 MG: 750 TABLET ORAL at 13:43

## 2023-05-29 RX ADMIN — AMLODIPINE BESYLATE 5 MG: 5 TABLET ORAL at 08:13

## 2023-05-29 RX ADMIN — FOLIC ACID: 5 INJECTION, SOLUTION INTRAMUSCULAR; INTRAVENOUS; SUBCUTANEOUS at 08:58

## 2023-05-29 RX ADMIN — OXYCODONE HYDROCHLORIDE 10 MG: 10 TABLET ORAL at 05:38

## 2023-05-29 RX ADMIN — OXYCODONE HYDROCHLORIDE 10 MG: 10 TABLET ORAL at 01:28

## 2023-05-29 RX ADMIN — SENNOSIDES 8.6 MG: 8.6 TABLET, FILM COATED ORAL at 08:13

## 2023-05-29 RX ADMIN — HEPARIN SODIUM 5000 UNITS: 5000 INJECTION INTRAVENOUS; SUBCUTANEOUS at 05:38

## 2023-05-29 RX ADMIN — POTASSIUM CHLORIDE AND SODIUM CHLORIDE 75 ML/HR: 900; 300 INJECTION, SOLUTION INTRAVENOUS at 06:16

## 2023-05-29 RX ADMIN — DOCUSATE SODIUM 100 MG: 100 CAPSULE, LIQUID FILLED ORAL at 08:13

## 2023-05-29 RX ADMIN — METHOCARBAMOL 750 MG: 750 TABLET ORAL at 05:38

## 2023-05-29 RX ADMIN — NICOTINE 1 PATCH: 21 PATCH, EXTENDED RELEASE TRANSDERMAL at 08:14

## 2023-05-29 RX ADMIN — OXYCODONE HYDROCHLORIDE 10 MG: 10 TABLET ORAL at 13:50

## 2023-05-29 RX ADMIN — METHADONE HYDROCHLORIDE 110 MG: 10 CONCENTRATE ORAL at 08:13

## 2023-05-29 RX ADMIN — ACETAMINOPHEN 975 MG: 325 TABLET ORAL at 05:38

## 2023-05-29 NOTE — PLAN OF CARE
Problem: MOBILITY - ADULT  Goal: Maintains/Returns to pre admission functional level  Description: INTERVENTIONS:  - Perform BMAT or MOVE assessment daily    - Set and communicate daily mobility goal to care team and patient/family/caregiver     - Collaborate with rehabilitation services on mobility goals if consulted  - Out of bed to chair 3 times a day   - Out of bed for meals 3 times a day  - Out of bed for toileting  - Record patient progress and toleration of activity level   Outcome: Progressing     Problem: PAIN - ADULT  Goal: Verbalizes/displays adequate comfort level or baseline comfort level  Description: Interventions:  - Encourage patient to monitor pain and request assistance  - Assess pain using appropriate pain scale (e g  0-10)  - Administer analgesics based on type and severity of pain and evaluate response  - Implement non-pharmacological measures as appropriate and evaluate response  - Consider cultural and social influences on pain and pain management  - Notify physician/advanced practitioner if interventions unsuccessful or patient reports new pain  Outcome: Progressing     Problem: SAFETY,RESTRAINT: NV/NON-SELF DESTRUCTIVE BEHAVIOR  Goal: Remains free of harm/injury (restraint for non violent/non self-detsructive behavior)  Description: INTERVENTIONS:  - Instruct patient/family regarding restraint use   - Assess and monitor physiologic and psychological status   - Provide interventions and comfort measures to meet assessed patient needs   - Identify and implement measures to help patient regain control  - Assess readiness for release of restraint   Outcome: Progressing  Goal: Returns to optimal restraint-free functioning  Description: INTERVENTIONS:  - Assess the patient's behavior and symptoms that indicate continued need for restraint  - Identify and implement measures to help patient regain control  - Assess readiness for release of restraint   Outcome: Progressing

## 2023-05-29 NOTE — QUICK NOTE
APS brief follow-up note:    Case reviewed with urology  Plan is for discharge today  Patient reports pain in his right abdomen which is well controlled with oxycodone 10 mg tablet administration  Tolerating current analgesic regimen, no reported side effects, denied headache, dizziness, nausea or vomiting  Patient will continue his methadone 110 mg daily with [facility]  No methadone prescriptions at d/c  Can continue Tylenol and short course of oxycodone at time of discharge, would recommend oxycodone 10 mg tablets every 6 hours as needed for moderate or severe pain for no more than 2 to 3 days posthospitalization  Acute pain service will sign off      BASIL Ro  Acute Pain Service

## 2023-05-29 NOTE — DISCHARGE SUMMARY
Discharge Summary - Jade Shah 61 y o  male MRN: [id number]    [facility] Encounter: [id number]    Admission Date: 5/22/2023     Discharge Date: 05/29/23    HPI: Jade Shah is a 61 y o  male who presented for right radical nephrectomy by Dr Pirya Harry  Patient underwent surgery on 5/22/23  No complications during his procedure  POD#1 evaluated by pain services given outpatient methadone for substance abuse, rodriguez removed, and creatinine slightly increased to expected postop level of 1 5  POD#2 remained on epidural, diet advanced  POD#3 awaited return bowel function  POD#4 had elevated bladder residuals and was straight catheterized  Rapid response called later that day for acute agitation and AMS  Patient placed in restraints for patient and staff safety  Toxicology and SLIM were consulted  Placed on CIWA protocol  POD#5 and 6 awaiting resolution of encephalopathy  Today, POD#7 patient is doing well  He is cleared by Internal Medicine for discharge with Norvasc, phosnak, and BMP with phosphorus levels in 1 week  Clear from acute pain perspective with recommendations of oxycodone 10 mg po Q6 prn x 3 days  Will continue home methadone dose with outpatient follow up  Will see Dr Priya Harry in office on 6/15  Procedure(s):  NEPHRECTOMY RADICAL LAPAROSCOPIC W/ ROBOTICS  Surgeon(s):  KINGSLEY Pollard MD  5/22/2023    Hospital Course:     Discharge Diagnosis: Staghorn calculus    Condition at Discharge: good     Discharge Medications:  See after visit summary for reconciled discharge medications provided to patient and family  Patient was discharged home on home medications with the addition of: Norvasc, phosnak, oxycodone  Discharge instructions/Information to patient and family:   See after visit summary for information provided to patient and family        Provisions for Follow-Up Care:  See after visit summary for information related to follow-up care and any pertinent home health orders  Disposition: Home    Planned Readmission: No    Discharge Statement   I spent 15 minutes discharging the patient  This time was spent on the day of discharge  I had direct contact with the patient on the day of discharge  Additional documentation is required if more than 30 minutes were spent on discharge       Signature:   Osman Gallardo  Date: 5/29/2023 Time: 12:23 PM

## 2023-05-29 NOTE — PROGRESS NOTES
"Progress Note - Lebron De Luna 61 y o  male MRN: [id number]    Unit/Bed#: [facility] 928-01 Encounter: [id number]      Assessment:  Postoperative day #7 status post right nephrectomy for nonfunctioning kidney with staghorn calculus  Postoperative encephalopathy metabolic or otherwise  Doing much better  Plan:  Discharge home for follow-up with me in 3 to 4 weeks in [place] if okay with medicine and acute pain service  Subjective:   I feel good    Objective: Status post right radical nephrectomy for staghorn calculus and ureteral stricture on May 22, 2023  White blood count 11,700 today, sodium remains low at 130, creatinine 1 0  He wants to go home  Does not appear to be in pain  Vitals: Blood pressure 151/79, pulse 63, temperature 97 5 °F (36 4 °C), temperature source Oral, resp  rate 17, height 5' 7\" (1 702 m), weight 66 4 kg (146 lb 7 9 oz), SpO2 94 %  ,Body mass index is 22 94 kg/m²  Intake/Output Summary (Last 24 hours) at 5/29/2023 0913  Last data filed at 5/29/2023 0616  Gross per 24 hour   Intake 3195 ml   Output 2550 ml   Net 645 ml       Physical Exam: Awake, alert and in no apparent distress  He appears to be much better mentally  His wounds are clean dry and intact  No abdominal distention  Drain is out  Invasive Devices     Central Venous Catheter Line  Duration           CVC Central Lines 05/22/23 Triple 6 days                Lab, Imaging and other studies: I have personally reviewed pertinent reports      VTE Pharmacologic Prophylaxis: Sequential compression device (Venodyne)   VTE Mechanical Prophylaxis: sequential compression device   "

## 2023-05-29 NOTE — QUICK NOTE
Discharge medications initially sent to [facility]  Unfortunately, pharmacy closed given holiday today  New scripts sent to [facility] in [place] which is open until 6 pm  Previous scripts cancelled       Zackary Sibley PA-C

## 2023-05-30 ENCOUNTER — PATIENT OUTREACH (OUTPATIENT)
Dept: FAMILY MEDICINE CLINIC | Facility: CLINIC | Age: 58
End: 2023-05-30

## 2023-05-30 ENCOUNTER — TRANSITIONAL CARE MANAGEMENT (OUTPATIENT)
Dept: FAMILY MEDICINE CLINIC | Facility: CLINIC | Age: 58
End: 2023-05-30

## 2023-05-30 DIAGNOSIS — Z71.89 COORDINATION OF COMPLEX CARE: Primary | ICD-10-CM

## 2023-05-30 NOTE — UTILIZATION REVIEW
NOTIFICATION OF ADMISSION DISCHARGE   This is a Notification of Discharge from [facility]  Please be advised that this patient has been discharge from our facility  Below you will find the admission and discharge date and time including the patient’s disposition  UTILIZATION REVIEW CONTACT:  Shaji Almodovar  Utilization   Network Utilization Review Department  Phone: [phone] x carefully listen to the prompts  All voicemails are confidential   Email: [email or web address]     ADMISSION INFORMATION  PRESENTATION DATE: 5/22/2023 11:02 AM  OBERVATION ADMISSION DATE:  INPATIENT ADMISSION DATE: 5/22/23 11:53 AM   DISCHARGE DATE: 5/29/2023  4:59 PM   DISPOSITION:Home/Self Care    IMPORTANT INFORMATION:  Send all requests for admission clinical reviews, approved or denied determinations and any other requests to dedicated fax number below belonging to the campus where the patient is receiving treatment   List of dedicated fax numbers:  [address] DENIALS (Administrative/Medical Necessity) [phone]   [address] (Maternity/NICU/Pediatrics) [phone]   [facility] [phone]   [facility] [phone]   [address] [phone]   [address] [facility] [phone] [address] [facility] [phone]   [address] [place] [address] 119 [phone]   [facility]  [phone]   [address] [facility] [phone]   [address] [facility] [address] [facility] [phone]

## 2023-05-30 NOTE — PROGRESS NOTES
"Outpatient Care Management Note:  In basket referral for complex care management received  Chart review completed  Home care not set up on discharge per referral denial in chart  Outreach call placed to Mr Thong Haines  Introduced myself and my role  Mr Thong Haines lives with his parents who are providing assistance as needed  His mother is managing his pain medication  Mr Thong Haines complains of abdominal pain  He has been using oxycodone but does not feel it lasts long enough  Reviewed instructions with him  Melia Yung did not realize the medication was written for a maximum of four times daily  He has only been taking it twice  He feels more medication may be needed  Discussed trying to take the oxycodone every 6 hours for the next 24 hours to see if his pain is better managed, if this is not effective instructed him to reach out to Dr Christine Arreguin   Verbalized understanding and will try that  Melia Yung is on methadone as an outpatient but states it is to keep him from \"getting sick\"  And is not useful for pain  Reviewed current dose as well as other medications  Incisional area is open to air  No redness, swelling or drainage noted  He is aware of signs of infection that need to be reported  Denies any other needs at this time  He does not feel outreach is needed  Advised him I can be reached through his PCP office for future questions or concerns           "

## 2023-06-01 ENCOUNTER — PATIENT OUTREACH (OUTPATIENT)
Dept: FAMILY MEDICINE CLINIC | Facility: CLINIC | Age: 58
End: 2023-06-01

## 2023-06-01 ENCOUNTER — TELEPHONE (OUTPATIENT)
Dept: UROLOGY | Facility: AMBULATORY SURGERY CENTER | Age: 58
End: 2023-06-01

## 2023-06-01 LAB
ATRIAL RATE: 71 BPM
P AXIS: 40 DEGREES
PR INTERVAL: 120 MS
QRS AXIS: 0 DEGREES
QRSD INTERVAL: 138 MS
QT INTERVAL: 470 MS
QTC INTERVAL: 510 MS
T WAVE AXIS: 34 DEGREES
VENTRICULAR RATE: 71 BPM

## 2023-06-01 NOTE — TELEPHONE ENCOUNTER
Called and spoke with mother  Reports patient is still having a lot of pain in abdomen  Reports crying out in pain  Reports pain is where the large incision  Mother denies any redness at incision site  Reports pain is at level 10  Pt's discharge note states oxy for 10 days but only given enough for 3 days  Having some chills but no fever  Taking mitrale    Mother repr to help with constipation  Mother reports can see pt is having severe pain  Asking if they can get refill on the pain medicaiton since discharge instructions did advise for 10 days

## 2023-06-01 NOTE — PROGRESS NOTES
Outpatient Care Management Note:  Received call from Mejia's mother, Bouchra Nava  She is upset and requesting help  She did speak with Mejia's CM from [facility], who referred her to me  Aaron Mccarthy continues to have significant abdominal pain post-operatively  He was only provided 3 days of pain medication  Bouchra Nava has attempted to reach the office but received a message that there was a 33 minute wait time  She did leave a message but has not received a return call  Advised her I would attempt to reach the Urology office and provide her with an update  She is agreeable to same  Call placed to [place] [address] [facility]  When choosing option for nurse, I was informed there was a 26 minute wait time  Called back and chose scheduling line which was answered immediately  Explained issue with Mejia's ongoing pain and his mother's inability to reach the office  Message was sent to nursing who will reach out to Mrs Benjamin Huerta to assess patient prior to a refill being provided  Thanked her for her assistance  Call placed to Mrs Benjamin Huerta to make her aware of above information  She was appreciative of assistance

## 2023-06-01 NOTE — TELEPHONE ENCOUNTER
Lisseth Ortega outpatient  called in stating the patient and family are very upset  Patient had NEPHRECTOMY RADICAL LAPAROSCOPIC W/ ROBOTICS (Right: Abdomen) on 5/22/23 with Dr Ortiz   states that patient is in a lot of pain and states he was only given three days of pain medication after his surgery  Patients mother requesting a call back asap about this matter           CB: [phone]  Patients mother Jann Ríos

## 2023-06-01 NOTE — TELEPHONE ENCOUNTER
Patient's mother is calling in regarding her request for medication for her son  Please reach out to her as soon as possible

## 2023-06-02 ENCOUNTER — PATIENT OUTREACH (OUTPATIENT)
Dept: FAMILY MEDICINE CLINIC | Facility: CLINIC | Age: 58
End: 2023-06-02

## 2023-06-02 NOTE — PROGRESS NOTES
Outpatient Care Management Note:  Chart reviewed after message sent to office  Office staff has reached out to Mrs Jennifer Martin

## 2023-06-02 NOTE — PROGRESS NOTES
Outpatient Care Management Note:  Received voicemail from Mrs Eric Liu requesting an update on conversation that she had with nursing afternoon of 6/2/23  Chart notes reviewed  Attempted call back to Mrs Eric Liu  Message left that medication order has not been sent yet  Advised in message that I would send a message and update her once I get a response

## 2023-06-02 NOTE — TELEPHONE ENCOUNTER
Per discharge instructions patient prescribed oxycodone 10mg x3 days  Patient may use otc tylenol as needed  Encourage patient to continue bowel regimen as constipation can exacerbate discomfort and review supportive measures

## 2023-06-02 NOTE — TELEPHONE ENCOUNTER
Spoke with mother and reivewed medication     Also changed follow up appointment from 7:30 to [phone]

## 2023-06-05 ENCOUNTER — APPOINTMENT (EMERGENCY)
Dept: CT IMAGING | Facility: HOSPITAL | Age: 58
End: 2023-06-05
Payer: COMMERCIAL

## 2023-06-05 ENCOUNTER — NURSE TRIAGE (OUTPATIENT)
Dept: OTHER | Facility: OTHER | Age: 58
End: 2023-06-05

## 2023-06-05 ENCOUNTER — HOSPITAL ENCOUNTER (INPATIENT)
Facility: HOSPITAL | Age: 58
LOS: 2 days | Discharge: HOME HEALTH | End: 2023-06-09
Attending: EMERGENCY MEDICINE | Admitting: INTERNAL MEDICINE
Payer: COMMERCIAL

## 2023-06-05 DIAGNOSIS — T81.49XA ABSCESS OF POSTOPERATIVE WOUND OF ABDOMINAL WALL: Primary | ICD-10-CM

## 2023-06-05 DIAGNOSIS — I10 ESSENTIAL HYPERTENSION: ICD-10-CM

## 2023-06-05 DIAGNOSIS — L02.211 ABDOMINAL WALL ABSCESS: ICD-10-CM

## 2023-06-05 DIAGNOSIS — F11.20 METHADONE DEPENDENCE (HCC): ICD-10-CM

## 2023-06-05 DIAGNOSIS — N20.0 STAGHORN CALCULUS: ICD-10-CM

## 2023-06-05 LAB
ALBUMIN SERPL BCP-MCNC: 3.4 G/DL (ref 3.5–5)
ALP SERPL-CCNC: 72 U/L (ref 34–104)
ALT SERPL W P-5'-P-CCNC: 17 U/L (ref 7–52)
ANION GAP SERPL CALCULATED.3IONS-SCNC: 6 MMOL/L (ref 4–13)
APTT PPP: 32 SECONDS (ref 23–37)
AST SERPL W P-5'-P-CCNC: 15 U/L (ref 13–39)
BASOPHILS # BLD MANUAL: 0 THOUSAND/UL (ref 0–0.1)
BASOPHILS NFR MAR MANUAL: 0 % (ref 0–1)
BILIRUB SERPL-MCNC: 0.53 MG/DL (ref 0.2–1)
BUN SERPL-MCNC: 15 MG/DL (ref 5–25)
CALCIUM ALBUM COR SERPL-MCNC: 9.7 MG/DL (ref 8.3–10.1)
CALCIUM SERPL-MCNC: 9.2 MG/DL (ref 8.4–10.2)
CHLORIDE SERPL-SCNC: 96 MMOL/L (ref 96–108)
CO2 SERPL-SCNC: 29 MMOL/L (ref 21–32)
CREAT SERPL-MCNC: 1.26 MG/DL (ref 0.6–1.3)
EOSINOPHIL # BLD MANUAL: 0.18 THOUSAND/UL (ref 0–0.4)
EOSINOPHIL NFR BLD MANUAL: 1 % (ref 0–6)
ERYTHROCYTE [DISTWIDTH] IN BLOOD BY AUTOMATED COUNT: 15.3 % (ref 11.6–15.1)
GFR SERPL CREATININE-BSD FRML MDRD: 62 ML/MIN/1.73SQ M
GLUCOSE SERPL-MCNC: 90 MG/DL (ref 65–140)
HCT VFR BLD AUTO: 41.4 % (ref 36.5–49.3)
HGB BLD-MCNC: 13.5 G/DL (ref 12–17)
INR PPP: 1.16 (ref 0.84–1.19)
LACTATE SERPL-SCNC: 1 MMOL/L (ref 0.5–2)
LYMPHOCYTES # BLD AUTO: 1.4 THOUSAND/UL (ref 0.6–4.47)
LYMPHOCYTES # BLD AUTO: 8 % (ref 14–44)
MCH RBC QN AUTO: 29.9 PG (ref 26.8–34.3)
MCHC RBC AUTO-ENTMCNC: 32.6 G/DL (ref 31.4–37.4)
MCV RBC AUTO: 92 FL (ref 82–98)
MONOCYTES # BLD AUTO: 1.23 THOUSAND/UL (ref 0–1.22)
MONOCYTES NFR BLD: 7 % (ref 4–12)
NEUTROPHILS # BLD MANUAL: 14.73 THOUSAND/UL (ref 1.85–7.62)
NEUTS BAND NFR BLD MANUAL: 3 % (ref 0–8)
NEUTS SEG NFR BLD AUTO: 81 % (ref 43–75)
PLATELET # BLD AUTO: 356 THOUSANDS/UL (ref 149–390)
PLATELET BLD QL SMEAR: ADEQUATE
PMV BLD AUTO: 8.5 FL (ref 8.9–12.7)
POTASSIUM SERPL-SCNC: 4.6 MMOL/L (ref 3.5–5.3)
PROCALCITONIN SERPL-MCNC: 0.17 NG/ML
PROT SERPL-MCNC: 6.8 G/DL (ref 6.4–8.4)
PROTHROMBIN TIME: 15 SECONDS (ref 11.6–14.5)
RBC # BLD AUTO: 4.51 MILLION/UL (ref 3.88–5.62)
RBC MORPH BLD: NORMAL
SODIUM SERPL-SCNC: 131 MMOL/L (ref 135–147)
WBC # BLD AUTO: 17.53 THOUSAND/UL (ref 4.31–10.16)

## 2023-06-05 PROCEDURE — 85730 THROMBOPLASTIN TIME PARTIAL: CPT | Performed by: EMERGENCY MEDICINE

## 2023-06-05 PROCEDURE — 36415 COLL VENOUS BLD VENIPUNCTURE: CPT | Performed by: EMERGENCY MEDICINE

## 2023-06-05 PROCEDURE — 80053 COMPREHEN METABOLIC PANEL: CPT | Performed by: EMERGENCY MEDICINE

## 2023-06-05 PROCEDURE — 99285 EMERGENCY DEPT VISIT HI MDM: CPT

## 2023-06-05 PROCEDURE — G1004 CDSM NDSC: HCPCS

## 2023-06-05 PROCEDURE — 85027 COMPLETE CBC AUTOMATED: CPT | Performed by: EMERGENCY MEDICINE

## 2023-06-05 PROCEDURE — 83605 ASSAY OF LACTIC ACID: CPT | Performed by: EMERGENCY MEDICINE

## 2023-06-05 PROCEDURE — 96365 THER/PROPH/DIAG IV INF INIT: CPT

## 2023-06-05 PROCEDURE — 84145 PROCALCITONIN (PCT): CPT | Performed by: EMERGENCY MEDICINE

## 2023-06-05 PROCEDURE — 96375 TX/PRO/DX INJ NEW DRUG ADDON: CPT

## 2023-06-05 PROCEDURE — 96361 HYDRATE IV INFUSION ADD-ON: CPT

## 2023-06-05 PROCEDURE — 85007 BL SMEAR W/DIFF WBC COUNT: CPT | Performed by: EMERGENCY MEDICINE

## 2023-06-05 PROCEDURE — 74176 CT ABD & PELVIS W/O CONTRAST: CPT

## 2023-06-05 PROCEDURE — 87040 BLOOD CULTURE FOR BACTERIA: CPT | Performed by: EMERGENCY MEDICINE

## 2023-06-05 PROCEDURE — 99285 EMERGENCY DEPT VISIT HI MDM: CPT | Performed by: EMERGENCY MEDICINE

## 2023-06-05 PROCEDURE — 85610 PROTHROMBIN TIME: CPT | Performed by: EMERGENCY MEDICINE

## 2023-06-05 RX ORDER — ACETAMINOPHEN 325 MG/1
975 TABLET ORAL ONCE
Status: DISCONTINUED | OUTPATIENT
Start: 2023-06-05 | End: 2023-06-07

## 2023-06-05 RX ORDER — SODIUM CHLORIDE 9 MG/ML
100 INJECTION, SOLUTION INTRAVENOUS CONTINUOUS
Status: DISCONTINUED | OUTPATIENT
Start: 2023-06-05 | End: 2023-06-06

## 2023-06-05 RX ORDER — CEFEPIME HYDROCHLORIDE 2 G/50ML
2000 INJECTION, SOLUTION INTRAVENOUS ONCE
Status: COMPLETED | OUTPATIENT
Start: 2023-06-05 | End: 2023-06-05

## 2023-06-05 RX ADMIN — CEFEPIME HYDROCHLORIDE 2000 MG: 2 INJECTION, SOLUTION INTRAVENOUS at 22:36

## 2023-06-05 RX ADMIN — VANCOMYCIN HYDROCHLORIDE 1000 MG: 1 INJECTION, POWDER, LYOPHILIZED, FOR SOLUTION INTRAVENOUS at 23:13

## 2023-06-05 RX ADMIN — SODIUM CHLORIDE 1000 ML: 0.9 INJECTION, SOLUTION INTRAVENOUS at 21:20

## 2023-06-05 RX ADMIN — SODIUM CHLORIDE 150 ML/HR: 0.9 INJECTION, SOLUTION INTRAVENOUS at 23:11

## 2023-06-05 NOTE — TELEPHONE ENCOUNTER
"Pt's mother calling  Pt iis s/p right radical nephrectomy on 05/22/2023 by Dr Judy Syed  Mother is calling stating that he has 10/10 LLQ and RLQ pain  She states that his incision has been red and warm for the last few days  He is urniating normally, urine has an orange tint to it  Mother has not taken temperature  Sravani Gurpreet on call provider contacted  Pt to go to the ED  Reji Walker Tono aware  States \"I will try to get him to the hospital but he won't be happy about it  \"       Reason for Disposition  • Sounds like a serious complication to the triager    Answer Assessment - Initial Assessment Questions  1  SYMPTOM: \"What's the main symptom you're concerned about? \" (e g , redness, pain, drainage)      Redness at incision and pain   2  ONSET: \"When did symptoms  start? \"      Pain today, redness x  A few days  3  SURGERY: \"What surgery was performed? \"      Right radical nephrectomy   4  DATE of SURGERY: \"When was surgery performed? \"       05/22/2023  5  INCISION SITE: \"Where is the incision located? \"       Abdomen  6  REDNESS: \"Is there any redness at the incision site? \" If yes, ask: \"How wide across is the redness? \" (Inches, centimeters)       Red and warm  7  PAIN: \"Is there any pain? \" If Yes, ask: \"How bad is it? \"  (Scale 1-10; or mild, moderate, severe)      10/10  8  BLEEDING: \"Is there any bleeding? \" If Yes, ask: \"How much? \" and \"Where? \"      no  9  DRAINAGE: \"Is there any drainage from the incision site? \" If yes, ask: \"What color and how much? \" (e g , red, cloudy, pus; drops, teaspoon)      no  10  FEVER: \"Do you have a fever? \" If Yes, ask: \"What is your temperature, how was it measured, and when did it start? \"        \"I don't know  \"  11  OTHER SYMPTOMS: \"Do you have any other symptoms? \" (e g , shaking chills, weakness, rash elsewhere on body)        no    Protocols used: POST-OP INCISION SYMPTOMS AND QUESTIONS-ADULT-[place]    "

## 2023-06-05 NOTE — LETTER
June 9, 2023 April DO [address]    Patient: Sydnie Casanova   YOB: 1965   Date of Visit: 6/5/2023       Dear Dr Megan Borrego: Thank you for referring Sydnie Casanova to me for evaluation  Below are my notes for this consultation  If you have questions, please do not hesitate to call me  I look forward to following your patient along with you  Sincerely,        No name on file        CC: No Recipients    Isa Watt MD  6/8/2023 10:57 AM  [address]  Progress Note  Name: Sydnie Casanova  MRN: [id number]  Unit/Bed#:  I Date of Admission: 6/5/2023   Date of Service: 6/8/2023 I Hospital Day: 1    Assessment/Plan   * Abscess of postoperative wound of abdominal wall  Assessment & Plan  -s/p Abdominal Drain per IR 6-6-2023   -Bcx: 4+ Growth of Staphylococcus aureus Abnormal  P         Continue current broad-spectrum antibiotics and follow-up culture  Cefepime/Vancomycin D 4 #   Urology following   ID team following; pending culture sensitivity  IR aware ; will arrange drain removal in 2 wks   Wound care per nursing       Methadone dependence   Assessment & Plan  Continue methadone  Hyponatremia  Assessment & Plan  Restart fluid restriction    -labs pending    -follow up accordingly    Essential hypertension  Assessment & Plan  /53 on arrival  Monitor blood pressure and avoid hypotension  VTE Pharmacologic Prophylaxis:   Pharmacologic: Does not qualify  Mechanical VTE Prophylaxis in Place: Yes    Patient Centered Rounds: I have performed bedside rounds with nursing staff today  Discussions with Specialists or Other Care Team Provider: Urology, infectious disease, interventional radiology, case management    Education and Discussions with Family / Patient: Patient  Time Spent for Care: 30 minutes    More than 50% of total time spent on counseling and coordination of care as described above  Current Length of Stay: 1 day(s)    Current Patient Status: Inpatient   Certification Statement: The patient will continue to require additional inpatient hospital stay due to Management for abscess  Discharge Plan: When stable  Refer to home VNA  Code Status: Level 1 - Full Code      Subjective:   Patient was seen today at bedside  Reports marked improvement in abdominal pain  No associated nausea or vomiting, diarrhea or constipation  No active urinary symptoms  -Afebrile at 24 hours  -Vitally stable   -No concerns per nursing note  -Tolerating oral diet  Objective:     Vitals:   Temp (24hrs), Av 9 °F (36 1 °C), Min:96 4 °F (35 8 °C), Max:97 2 °F (36 2 °C)    Temp:  [96 4 °F (35 8 °C)-97 2 °F (36 2 °C)] 97 1 °F (36 2 °C)  HR:  [53-57] 53  Resp:  [18-19] 18  BP: ()/(54-71) 94/54  SpO2:  [95 %-97 %] 95 %  Body mass index is 22 13 kg/m²  Input and Output Summary (last 24 hours): Intake/Output Summary (Last 24 hours) at 2023 1054  Last data filed at 2023 0644  Gross per 24 hour   Intake 780 ml   Output 1125 ml   Net -345 ml       Physical Exam:     Physical Exam  Vitals and nursing note reviewed  Constitutional:       Appearance: Normal appearance  HENT:      Head: Normocephalic and atraumatic  Cardiovascular:      Rate and Rhythm: Normal rate and regular rhythm  Pulses: Normal pulses  Heart sounds: Normal heart sounds  No murmur heard  Pulmonary:      Effort: Pulmonary effort is normal  No respiratory distress  Breath sounds: Normal breath sounds  No wheezing or rales  Abdominal:      General: Abdomen is flat  Bowel sounds are normal  There is no distension  Palpations: Abdomen is soft  Tenderness: There is abdominal tenderness (mild) in the right upper quadrant  There is no right CVA tenderness or left CVA tenderness  Comments: Drain site      Musculoskeletal:      Cervical back: Normal range of motion  Skin:     General: Skin is warm  Capillary Refill: Capillary refill takes less than 2 seconds  Neurological:      General: No focal deficit present  Mental Status: He is alert  Mental status is at baseline  Psychiatric:         Mood and Affect: Mood normal          Behavior: Behavior normal          Additional Data:     Labs:    Results from last 7 days   Lab Units 06/07/23  0448 06/06/23  0907 06/05/23 2056   BANDS PCT %  --   --  3   EOS PCT %  --  3 1   HEMATOCRIT % 36 6 34 6* 41 4   HEMOGLOBIN g/dL 11 9* 11 3* 13 5   LYMPHO PCT %  --  7* 8*   MONO PCT %  --  9 7   PLATELETS Thousands/uL 342 293 356   WBC Thousand/uL 13 01* 13 26* 17 53*     Results from last 7 days   Lab Units 06/07/23 0448   ANION GAP mmol/L 8   ALBUMIN g/dL 3 2*   ALK PHOS U/L 67   ALT U/L 14   AST U/L 16   BUN mg/dL 14   CALCIUM mg/dL 8 6   CHLORIDE mmol/L 97   CO2 mmol/L 27   CREATININE mg/dL 1 16   GLUCOSE RANDOM mg/dL 105   POTASSIUM mmol/L 4 1   SODIUM mmol/L 132*   TOTAL BILIRUBIN mg/dL 0 41     Results from last 7 days   Lab Units 06/05/23 2056   INR  1 16             Results from last 7 days   Lab Units 06/05/23 2056   LACTIC ACID mmol/L 1 0   PROCALCITONIN ng/ml 0 17           * I Have Reviewed All Lab Data Listed Above  * Additional Pertinent Lab Tests Reviewed: Zac 66 Admission Reviewed    Imaging:    Imaging Reports Reviewed Today Include:   IR drainage tube placement   Final Result   Impression:   Successful placement of a 10 Sami catheter into a right anterior abdominal incisional soft tissue abscess under ultrasound control, and 90 mL of tan purulent fluid was aspirated and a specimen sent to the lab as described above  PLAN: Tube to KATHY bulb suction  Flush with 10 cc every 8 hours in the hospital and q  day at home   Return in 2 weeks for possible tube removal             Workstation performed: [id number]         CT abdomen pelvis wo contrast   Final Result      Limited evaluation without intravenous contrast       Postsurgical changes with 8 3 cm fluid collection in the upper right ventral abdominal wall soft tissues with some mild adjacent stranding in the subcutaneous fat  Findings may reflect postoperative seroma although superimposed infection i e  developing    abscess difficult to exclude  Clinical correlation and surgical consult advised  Status post right radical nephrectomy with small amount of loculated fluid and trace free intraperitoneal air within the surgical resection bed, within limits of expected postoperative appearance (POD 14)  Cholelithiasis  Redemonstrated cirrhotic morphology of the liver  Tiny focus of antidependent air within the urinary bladder likely on the basis of recent instrumentation  Correlate with urinalysis to exclude infectious etiology  Constipation  Punctate nonobstructive left lower pole intrarenal calculus  Above findings discussed with Dr Liset Vincent at 10:26 p m  on 6/5/2023  Workstation performed: [id number]         IR drainage tube check and/or removal    (Results Pending)       Imaging Personally Reviewed by Myself Includes:    IR drainage tube placement   Final Result   Impression:   Successful placement of a 10 Turkmen catheter into a right anterior abdominal incisional soft tissue abscess under ultrasound control, and 90 mL of tan purulent fluid was aspirated and a specimen sent to the lab as described above  PLAN: Tube to KATHY bulb suction  Flush with 10 cc every 8 hours in the hospital and q  day at home  Return in 2 weeks for possible tube removal             Workstation performed: [id number]         CT abdomen pelvis wo contrast   Final Result      Limited evaluation without intravenous contrast       Postsurgical changes with 8 3 cm fluid collection in the upper right ventral abdominal wall soft tissues with some mild adjacent stranding in the subcutaneous fat   Findings may reflect postoperative seroma although superimposed infection i e  developing    abscess difficult to exclude  Clinical correlation and surgical consult advised  Status post right radical nephrectomy with small amount of loculated fluid and trace free intraperitoneal air within the surgical resection bed, within limits of expected postoperative appearance (POD 14)  Cholelithiasis  Redemonstrated cirrhotic morphology of the liver  Tiny focus of antidependent air within the urinary bladder likely on the basis of recent instrumentation  Correlate with urinalysis to exclude infectious etiology  Constipation  Punctate nonobstructive left lower pole intrarenal calculus  Above findings discussed with Dr Cheli Sepulveda at 10:26 p m  on 6/5/2023  Workstation performed: [id number]         IR drainage tube check and/or removal    (Results Pending)         Recent Cultures (last 7 days):     Results from last 7 days   Lab Units 06/06/23  1408 06/05/23 2056   BLOOD CULTURE   --  No Growth at 48 hrs  No Growth at 48 hrs  BODY FLUID CULTURE, STERILE  4+ Growth of Staphylococcus aureus*  --    GRAM STAIN RESULT  4+ Polys*  3+ Gram positive cocci in clusters*  --        Last 24 Hours Medication List:   Current Facility-Administered Medications   Medication Dose Route Frequency Provider Last Rate   • acetaminophen  650 mg Oral Q6H PRN Lorriane Rito, DO     • amLODIPine  2 5 mg Oral Daily [place] Bobo Mkiy, DO     • cefepime  2,000 mg Intravenous Q24H Marah Thea, DO 2,000 mg (06/07/23 2148)   • HYDROmorphone  4 mg Oral Q6H PRN Janeen He, DO     • methadone  110 mg Oral Daily Fermín Carolyne, DO     • nicotine  1 patch Transdermal Daily Marah Thea, DO     • vancomycin  750 mg Intravenous Q12H Lorriane Ko,  mg (06/08/23 1032)        Today, Patient Was Seen By: Laila Trejo MD    ** Please Note: Dictation voice to text software may have been used in the creation of this document  "**          Tamaratory Ragsdale Him  6/8/2023 10:58 AM  Attested  Progress Note - Urology   Ju Fitzgerald 61 y o  male MRN: [id number]  Unit/Bed#:  Encounter: [id number]    Assessment:  59-year-old male s/p robotic assisted laparoscopic right nephrectomy (5/23/2023) with abdominal wall abscess  -post-procedure day 2 s/p IR drainage of abdominal wall abscess  -Afebrile, vital signs stable  -Drain output -75 cc serous with some clots present  -Today's labs need to be collected, wbc yesterday 13 01  -Preliminary body fluid culture growing 4+ polyps, 3+ gram-negative cocci  -RLQ abdominal incision with contained erythema, drain located at the lateral edge of that incision with mild surrounding erythema, mild TTP surrounding drain and incision    PMH - methadone dependence, hypertension    Plan:  -Maintain IR drain to gravity, record output, drain care with flushing  -Will maintain drain upon discharge  -Return for tube check and possible removal in 2 weeks with IR  -Continue to follow-up body fluid culture results; await sensitivities  -Continue IV antibiotics  -Analgesia and antiemetics prn   -Follow-up with Dr Aretha Valentin as scheduled next week (6/15/23)  -Remainder of management per primary team  -Discussed with urology AP on call    Subjective/Objective     Subjective: Patient reports mild pain surrounding right lower quadrant drain site  Patient reports he is a lot better since having the drain placed and being on antibiotics  Reports he is urinating without difficulty  Denies dysuria, urinary frequency, fevers, chills, chest pain, shortness of breath, nausea, vomiting, constipation  Patient reports he is ambulating  Objective:     Blood pressure 137/71, pulse (!) 53, temperature (!) 97 1 °F (36 2 °C), temperature source Temporal, resp  rate 18, height 5' 7\" (1 702 m), weight 64 1 kg (141 lb 5 oz), SpO2 95 %  ,Body mass index is 22 13 kg/m²        Intake/Output Summary (Last 24 hours) at 6/8/2023 " [address] filed at 6/8/2023 0644  Gross per 24 hour   Intake 1380 ml   Output 1825 ml   Net -445 ml       Invasive Devices       Peripheral Intravenous Line  Duration             Peripheral IV 06/05/23 Dorsal (posterior); Left Forearm 2 days              Drain  Duration             Abscess Drain RLQ 1 day                    Physical Exam:  General -no acute distress  Heart - RRR  Lungs -clear to auscultation bilaterally; auscultated anteriorly and laterally  Abdomen -bowel sounds present  Port site incisions present, clean, dry, and intact  Right lower quadrant abdominal incision present with mild surrounding erythema, drain is positioned at the lateral aspect of the incision with mild surrounding erythema, drain output is serous with some clot noted in drainage bulb  Mild tenderness to palpation along right lower quadrant abdominal incision and around drain site  No guarding or rebound  Extremities -multiple sites of skin picking and small healed areas on b/l upper extremities  No lower extremity pitting edema b/l    Lab, Imaging and other studies:I have personally reviewed pertinent lab results  Emily Soto PA-C  6/8/2023      Attestation signed by Lila Payton MD at 6/8/2023 12:13 PM:    I supervised the Advanced Practitioner  ? I performed, in its entirety, the assessment/plan component of the visit  I agree with the Advanced Practitioner's note with the following additions/exceptions: I agree with the assessment advanced practitioner  The patient is status post robot-assisted laparoscopic right nephrectomy with a right anterior abdominal wall abscess  Cultures are growing gram-negative cocci consistent with urinary tract pathogens  Agree with continuing antibiotics and maintaining a drain in place at this time  Impression: Infected right kidney, status post right robot-assisted laparoscopic nephrectomy, anterior abdominal wall abscess, status post IR drainage    Plan:  I recommend continuing antibiotics  Follow cultures for final speciation and susceptibility  KATHY drain per interventional radiology  Outpatient follow-up with Dr Uriel Pham MD 23             Veronica Delacruz DO  2023  6:33 PM  Signed  [address]  Progress Note  Name: Rose Montenegro  MRN: [id number]  Unit/Bed#:  I Date of Admission: 2023   Date of Service: 2023 I Hospital Day: 0    Assessment/Plan   * Abscess of postoperative wound of abdominal wall  Assessment & Plan  Continue current broad-spectrum antibiotics and follow-up culture  Patient does have some surrounding induration, erythema about the previous incision site  We will consult infectious disease  Methadone dependence   Assessment & Plan  Continue methadone  Prolonged QT interval  Assessment & Plan  Prolonged QTc prolongation during recent admission so ordered EKG to recheck    Hyponatremia  Assessment & Plan  Restart fluid restriction, follow-up a m  labs    Essential hypertension  Assessment & Plan  /53 on arrival  Monitor blood pressure and avoid hypotension  Urology note reviewed, case discussed with nursing team, follow-up cultures  Code Status: Level 1 - Full Code    Subjective:   Patient having mild to moderate pain today    Objective:     Vitals:   Temp (24hrs), Av 5 °F (36 4 °C), Min:96 4 °F (35 8 °C), Max:98 3 °F (36 8 °C)    Temp:  [96 4 °F (35 8 °C)-98 3 °F (36 8 °C)] 96 4 °F (35 8 °C)  HR:  [53] 53  Resp:  [18] 18  BP: (112-143)/(58-69) 112/58  SpO2:  [95 %-97 %] 97 %  Body mass index is 22 37 kg/m²  Input and Output Summary (last 24 hours): Intake/Output Summary (Last 24 hours) at 2023 182  Last data filed at 2023 1817  Gross per 24 hour   Intake 1080 ml   Output 1515 ml   Net -435 ml       Physical Exam:   Physical Exam  Vitals and nursing note reviewed  HENT:      Head: Normocephalic     Cardiovascular: Rate and Rhythm: Normal rate  Pulses: Normal pulses  Abdominal:      Comments: KATHY drain in place draining serosanguineous fluid    Healing abdominal wound, surrounding induration and erythema is present, mild tenderness to palpation  Neurological:      General: No focal deficit present  Mental Status: He is alert  Additional Data:     Labs:  Results from last 7 days   Lab Units 06/07/23  0448 06/06/23  0907 06/05/23 2056   BANDS PCT %  --   --  3   EOS PCT %  --  3 1   HEMATOCRIT % 36 6 34 6* 41 4   HEMOGLOBIN g/dL 11 9* 11 3* 13 5   LYMPHO PCT %  --  7* 8*   MONO PCT %  --  9 7   PLATELETS Thousands/uL 342 293 356   WBC Thousand/uL 13 01* 13 26* 17 53*     Results from last 7 days   Lab Units 06/07/23 0448   ANION GAP mmol/L 8   ALBUMIN g/dL 3 2*   ALK PHOS U/L 67   ALT U/L 14   AST U/L 16   BUN mg/dL 14   CALCIUM mg/dL 8 6   CHLORIDE mmol/L 97   CO2 mmol/L 27   CREATININE mg/dL 1 16   GLUCOSE RANDOM mg/dL 105   POTASSIUM mmol/L 4 1   SODIUM mmol/L 132*   TOTAL BILIRUBIN mg/dL 0 41     Results from last 7 days   Lab Units 06/05/23 2056   INR  1 16             Results from last 7 days   Lab Units 06/05/23 2056   LACTIC ACID mmol/L 1 0   PROCALCITONIN ng/ml 0 17       Lines/Drains:  Invasive Devices       Peripheral Intravenous Line  Duration             Peripheral IV 06/05/23 Dorsal (posterior); Left Forearm 1 day              Drain  Duration             Abscess Drain RLQ 1 day                          Imaging: No pertinent imaging reviewed  Recent Cultures (last 7 days):   Results from last 7 days   Lab Units 06/06/23  1408 06/05/23 2056   BLOOD CULTURE   --  No Growth at 24 hrs  No Growth at 24 hrs     BODY FLUID CULTURE, STERILE  Culture too young- will reincubate  --    GRAM STAIN RESULT  4+ Polys*  3+ Gram positive cocci in clusters*  --        Last 24 Hours Medication List:   Current Facility-Administered Medications   Medication Dose Route Frequency Provider Last Rate   • acetaminophen  650 mg Oral Q6H PRN Kimberly Mary, DO     • amLODIPine  5 mg Oral Daily Marah Sidhu, DO     • cefepime  2,000 mg Intravenous Q24H Marah Sidhu, DO 2,000 mg (06/06/23 2132)   • HYDROmorphone  6 mg Oral Q6H PRN Fermín Barfield, DO     • methadone  110 mg Oral Daily Fermín Banai, DO     • nicotine  1 patch Transdermal Daily Marah Sidhu, DO     • vancomycin  750 mg Intravenous Q12H Marah Sidhu,  mg (06/07/23 7868)        Today, Patient Was Seen By: Bonifacio Pierce DO    **Please Note: This note may have been constructed using a voice recognition system  **    Yue Kirby PA-C  6/7/2023 11:38 AM  Attested Addendum  Progress Note - UROLOGY   Trista Sanchez 61 y o  male MRN: [id number]  Unit/Bed#:  Encounter: [id number]    Assessment:  Postprocedure day #1 status post IR drainage of abdominal wall abscess, 90 mL of donna pus drained  Preliminary body fluid culture and Gram stain 4+ polys, 3+ gram-positive cocci in clusters  Blood cultures x2 obtained prior to initiation of antibiotics, no growth at 24 hours both  Status post robotic assisted laparoscopic right radical nephrectomy by Dr Igor Duran 5/23/2023 at [facility] for nonfunctioning right kidney with staghorn calculus  Patient's postoperative course complicated with metabolic encephalopathy  He was discharged from [facility] on May 29th  History of drug dependency on methadone treatment  Previous history of bright red rectal bleeding 2021  Essential hypertension  History of multiple kidney stones and as wall shockwave lithotripsy     WBC today 13 01, on admission 17 53     Vital signs reviewed, afebrile  Hemodynamically stable  Most recent temp 98 3    Abdominal wall drain output 155 mL lightly bloody purulent fluid since yesterday      Plan:  Maintain percutaneous drain to gravity and monitor output  IR instructions for drain to KATHY bulb suction, flush with 10 mL normal saline every 8 "hours in the hospital and daily at home, return for tube check and possible removal in 2 weeks with IR if the fluid collection drains less than 10 mL daily for 3 consecutive days  Await body fluid culture and Gram stain report, drug sensitivities  Continue present IV antibiotics, cefepime and vancomycin  NicoDerm CQ nicotine patch replacement topically daily  Analgesics and antiemetics as ordered as needed  Regular house diet as tolerated  Medical management the direction the attending hospitalist physician  Resume home meds, patient is on methadone 110 mg daily which has been continued  Repeat a m  labs including CBC  The patient has an existing follow-up appointment with Dr Tiffanie Dias on [address] at 11:15 AM at the [facility] for urology, [place]  He needs to maintain follow-up with urologist after hospital discharge as scheduled  Subjective/Objective      Chief Complaint: No overnight events  No report of any fever  Subjective: 79-year-old male admitted to the hospital with abdominal wall abscess, he underwent IR drainage yesterday with 90 mL of donna pus drained, surgical drain in place  Scheduled Meds:  Current Facility-Administered Medications   Medication Dose Route Frequency Provider Last Rate   • acetaminophen  650 mg Oral Q6H PRN Redell Koyanagi, DO     • amLODIPine  5 mg Oral Daily Marah Sidhu, DO     • cefepime  2,000 mg Intravenous Q24H Marah Sidhu, DO 2,000 mg (06/06/23 2132)   • HYDROmorphone  6 mg Oral Q6H PRN Fermín Banai, DO     • methadone  110 mg Oral Daily Fermín Banai, DO     • nicotine  1 patch Transdermal Daily Marah Sidhu, DO     • vancomycin  750 mg Intravenous Q12H Marah Sidhu,  mg (06/07/23 0939)     Continuous Infusions:   PRN Meds: •  acetaminophen  •  HYDROmorphone      Objective:     Blood pressure 120/61, pulse 62, temperature 98 3 °F (36 8 °C), temperature source Temporal, resp   rate 18, height 5' 7\" (1 702 m), weight 64 8 kg (142 lb 13 7 oz), " SpO2 95 %  ,Body mass index is 22 37 kg/m²  I/O         06/05 0701  06/06 0700 06/06 0701 06/07 0700 06/07 0701  06/08 0700    P  O   540 360    IV Piggyback 1050      Total Intake(mL/kg) 1050 540 (8 3) 360 (5 6)    Urine (mL/kg/hr) 300 200 (0 1)     Drains  155     Total Output 300 355     Net +750 +185 +360           Unmeasured Urine Occurrence  1 x             Invasive Devices       Peripheral Intravenous Line  Duration             Peripheral IV 06/05/23 Dorsal (posterior); Left Forearm 1 day              Drain  Duration             Abscess Drain RLQ <1 day                    Physical Exam:  General no acute distress  Skin warm dry touch  No rash or pallor  ENT clear  Heart RRR  Lungs clear to auscultation  Abdomen positive bowel sounds  Erythema still noted around periwound right upper quadrant with tenderness to palpation  No wound disruption or drainage  Some fullness but no crepitus  Bulb grenade with about 40 mL of lightly bloody purulent pus in the right upper quadrant  Extremities no calf tenderness, no peripheral edema  Neurological exam fully oriented, mental status appropriate  The patient is ambulatory  Lab, Imaging and other studies:  I have personally reviewed pertinent lab results    , CBC:   Lab Results   Component Value Date    HCT 36 6 06/07/2023    HGB 11 9 (L) 06/07/2023    MCH 29 3 06/07/2023    MCHC 32 5 06/07/2023    MCV 90 06/07/2023    MPV 8 5 (L) 06/07/2023     06/07/2023    RBC 4 06 06/07/2023    RDW 14 7 06/07/2023    WBC 13 01 (H) 06/07/2023   , CMP:   Lab Results   Component Value Date    ALKPHOS 67 06/07/2023    ALT 14 06/07/2023    AST 16 06/07/2023    BUN 14 06/07/2023    CALCIUM 8 6 06/07/2023    CL 97 06/07/2023    CO2 27 06/07/2023    CREATININE 1 16 06/07/2023    EGFR 69 06/07/2023    K 4 1 06/07/2023    SODIUM 132 (L) 06/07/2023     Body fluid culture and Gram stain  Order: [id number]  Status: Preliminary result     Visible to patient: No (not released)     Next appt: 06/13/2023 at 02:30 PM in Nephrology Juan Daniel Castañeda DO)     Specimen Information: Abscess; Body Fluid   0 Result Notes  GRAM STAIN RESULT   Abnormal   4+ Polys      3+ Gram positive cocci in clusters                    Specimen Collected: 06/06/23 14:08 Last Resulted: 06/07/23 01:35           VTE Pharmacologic Prophylaxis: The patient is ambulatory, not initiated by medicine service as the patient is considered low risk  VTE Mechanical Prophylaxis: sequential compression device     Lucas Barrios PA-C      Attestation signed by Nenita Gonsalez MD at 6/7/2023  3:38 PM:    I supervised the Advanced Practitioner  ? I performed, in its entirety, the assessment/plan component of the visit  I agree with the Advanced Practitioner's note with the following additions/exceptions: I agree with the plan of the advanced practitioner  Patient is status post robot-assisted laparoscopic right nephrectomy and developed a anterior abdominal wall abscess  Impression: Status post robot-assisted laparoscopic right nephrectomy secondary to a chronically infected and poor functioning right kidney with staghorn calculus, status post IR drainage of a right anterior abdominal wall fluid collection/abscess  Plan: Follow cultures from the drainage  Continue with antibiotics  Monitor drain output and maintain drain to gravity drainage      Nenita Gosnalez MD 06/07/23

## 2023-06-05 NOTE — TELEPHONE ENCOUNTER
"Regarding: Pain and redness/ Post op  ----- Message from Eldonna Sandifer sent at 6/5/2023  4:14 PM EDT -----  \"Where his big incision is it is very red and he is having a lot of pain since the procedure  \"    "

## 2023-06-06 ENCOUNTER — APPOINTMENT (OUTPATIENT)
Dept: INTERVENTIONAL RADIOLOGY/VASCULAR | Facility: HOSPITAL | Age: 58
End: 2023-06-06
Attending: RADIOLOGY
Payer: COMMERCIAL

## 2023-06-06 PROBLEM — T81.49XA ABSCESS OF POSTOPERATIVE WOUND OF ABDOMINAL WALL: Status: ACTIVE | Noted: 2023-06-06

## 2023-06-06 PROBLEM — L02.211 ABDOMINAL WALL ABSCESS: Status: ACTIVE | Noted: 2023-06-06

## 2023-06-06 LAB
ALBUMIN SERPL BCP-MCNC: 2.4 G/DL (ref 3.5–5)
ALP SERPL-CCNC: 52 U/L (ref 34–104)
ALT SERPL W P-5'-P-CCNC: 11 U/L (ref 7–52)
ANION GAP SERPL CALCULATED.3IONS-SCNC: 5 MMOL/L (ref 4–13)
AST SERPL W P-5'-P-CCNC: 10 U/L (ref 13–39)
BASOPHILS # BLD MANUAL: 0 THOUSAND/UL (ref 0–0.1)
BASOPHILS NFR MAR MANUAL: 0 % (ref 0–1)
BILIRUB SERPL-MCNC: 0.48 MG/DL (ref 0.2–1)
BILIRUB UR QL STRIP: NEGATIVE
BUN SERPL-MCNC: 13 MG/DL (ref 5–25)
CALCIUM ALBUM COR SERPL-MCNC: 8.2 MG/DL (ref 8.3–10.1)
CALCIUM SERPL-MCNC: 6.9 MG/DL (ref 8.4–10.2)
CHLORIDE SERPL-SCNC: 107 MMOL/L (ref 96–108)
CLARITY UR: CLEAR
CO2 SERPL-SCNC: 25 MMOL/L (ref 21–32)
COLOR UR: YELLOW
CREAT SERPL-MCNC: 0.92 MG/DL (ref 0.6–1.3)
EOSINOPHIL # BLD MANUAL: 0.4 THOUSAND/UL (ref 0–0.4)
EOSINOPHIL NFR BLD MANUAL: 3 % (ref 0–6)
ERYTHROCYTE [DISTWIDTH] IN BLOOD BY AUTOMATED COUNT: 15.1 % (ref 11.6–15.1)
GFR SERPL CREATININE-BSD FRML MDRD: 91 ML/MIN/1.73SQ M
GLUCOSE P FAST SERPL-MCNC: 75 MG/DL (ref 65–99)
GLUCOSE SERPL-MCNC: 75 MG/DL (ref 65–140)
GLUCOSE UR STRIP-MCNC: NEGATIVE MG/DL
HCT VFR BLD AUTO: 34.6 % (ref 36.5–49.3)
HGB BLD-MCNC: 11.3 G/DL (ref 12–17)
HGB UR QL STRIP.AUTO: NEGATIVE
KETONES UR STRIP-MCNC: NEGATIVE MG/DL
LEUKOCYTE ESTERASE UR QL STRIP: NEGATIVE
LYMPHOCYTES # BLD AUTO: 0.93 THOUSAND/UL (ref 0.6–4.47)
LYMPHOCYTES # BLD AUTO: 7 % (ref 14–44)
MCH RBC QN AUTO: 30.1 PG (ref 26.8–34.3)
MCHC RBC AUTO-ENTMCNC: 32.7 G/DL (ref 31.4–37.4)
MCV RBC AUTO: 92 FL (ref 82–98)
MONOCYTES # BLD AUTO: 1.19 THOUSAND/UL (ref 0–1.22)
MONOCYTES NFR BLD: 9 % (ref 4–12)
NEUTROPHILS # BLD MANUAL: 10.74 THOUSAND/UL (ref 1.85–7.62)
NEUTS SEG NFR BLD AUTO: 81 % (ref 43–75)
NITRITE UR QL STRIP: NEGATIVE
PH UR STRIP.AUTO: 6 [PH]
PLATELET # BLD AUTO: 293 THOUSANDS/UL (ref 149–390)
PLATELET BLD QL SMEAR: ADEQUATE
PMV BLD AUTO: 8.4 FL (ref 8.9–12.7)
POTASSIUM SERPL-SCNC: 3.8 MMOL/L (ref 3.5–5.3)
PROT SERPL-MCNC: 4.6 G/DL (ref 6.4–8.4)
PROT UR STRIP-MCNC: NEGATIVE MG/DL
RBC # BLD AUTO: 3.76 MILLION/UL (ref 3.88–5.62)
RBC MORPH BLD: NORMAL
SODIUM SERPL-SCNC: 137 MMOL/L (ref 135–147)
SP GR UR STRIP.AUTO: 1.01 (ref 1–1.03)
UROBILINOGEN UR QL STRIP.AUTO: 0.2 E.U./DL
WBC # BLD AUTO: 13.26 THOUSAND/UL (ref 4.31–10.16)

## 2023-06-06 PROCEDURE — 87205 SMEAR GRAM STAIN: CPT | Performed by: RADIOLOGY

## 2023-06-06 PROCEDURE — 87070 CULTURE OTHR SPECIMN AEROBIC: CPT | Performed by: RADIOLOGY

## 2023-06-06 PROCEDURE — 87186 SC STD MICRODIL/AGAR DIL: CPT | Performed by: RADIOLOGY

## 2023-06-06 PROCEDURE — 85007 BL SMEAR W/DIFF WBC COUNT: CPT | Performed by: STUDENT IN AN ORGANIZED HEALTH CARE EDUCATION/TRAINING PROGRAM

## 2023-06-06 PROCEDURE — NC001 PR NO CHARGE

## 2023-06-06 PROCEDURE — 81003 URINALYSIS AUTO W/O SCOPE: CPT | Performed by: STUDENT IN AN ORGANIZED HEALTH CARE EDUCATION/TRAINING PROGRAM

## 2023-06-06 PROCEDURE — C1729 CATH, DRAINAGE: HCPCS

## 2023-06-06 PROCEDURE — 80053 COMPREHEN METABOLIC PANEL: CPT | Performed by: STUDENT IN AN ORGANIZED HEALTH CARE EDUCATION/TRAINING PROGRAM

## 2023-06-06 PROCEDURE — C1769 GUIDE WIRE: HCPCS

## 2023-06-06 PROCEDURE — 99152 MOD SED SAME PHYS/QHP 5/>YRS: CPT

## 2023-06-06 PROCEDURE — 85027 COMPLETE CBC AUTOMATED: CPT | Performed by: STUDENT IN AN ORGANIZED HEALTH CARE EDUCATION/TRAINING PROGRAM

## 2023-06-06 PROCEDURE — NC001 PR NO CHARGE: Performed by: UROLOGY

## 2023-06-06 PROCEDURE — 10030 IMG GID FLU COLL DRG SFT TIS: CPT

## 2023-06-06 PROCEDURE — 97167 OT EVAL HIGH COMPLEX 60 MIN: CPT

## 2023-06-06 PROCEDURE — 99223 1ST HOSP IP/OBS HIGH 75: CPT | Performed by: STUDENT IN AN ORGANIZED HEALTH CARE EDUCATION/TRAINING PROGRAM

## 2023-06-06 PROCEDURE — 99152 MOD SED SAME PHYS/QHP 5/>YRS: CPT | Performed by: RADIOLOGY

## 2023-06-06 PROCEDURE — 10030 IMG GID FLU COLL DRG SFT TIS: CPT | Performed by: RADIOLOGY

## 2023-06-06 PROCEDURE — 36415 COLL VENOUS BLD VENIPUNCTURE: CPT | Performed by: STUDENT IN AN ORGANIZED HEALTH CARE EDUCATION/TRAINING PROGRAM

## 2023-06-06 PROCEDURE — NC001 PR NO CHARGE: Performed by: RADIOLOGY

## 2023-06-06 PROCEDURE — 0W9F30Z DRAINAGE OF ABDOMINAL WALL WITH DRAINAGE DEVICE, PERCUTANEOUS APPROACH: ICD-10-PCS | Performed by: RADIOLOGY

## 2023-06-06 PROCEDURE — 97163 PT EVAL HIGH COMPLEX 45 MIN: CPT

## 2023-06-06 RX ORDER — NICOTINE 21 MG/24HR
1 PATCH, TRANSDERMAL 24 HOURS TRANSDERMAL DAILY
Status: DISCONTINUED | OUTPATIENT
Start: 2023-06-06 | End: 2023-06-09 | Stop reason: HOSPADM

## 2023-06-06 RX ORDER — MIDAZOLAM HYDROCHLORIDE 2 MG/2ML
INJECTION, SOLUTION INTRAMUSCULAR; INTRAVENOUS AS NEEDED
Status: COMPLETED | OUTPATIENT
Start: 2023-06-06 | End: 2023-06-06

## 2023-06-06 RX ORDER — AMLODIPINE BESYLATE 5 MG/1
5 TABLET ORAL DAILY
Status: DISCONTINUED | OUTPATIENT
Start: 2023-06-06 | End: 2023-06-07

## 2023-06-06 RX ORDER — ACETAMINOPHEN 325 MG/1
650 TABLET ORAL EVERY 6 HOURS PRN
Status: DISCONTINUED | OUTPATIENT
Start: 2023-06-06 | End: 2023-06-09 | Stop reason: HOSPADM

## 2023-06-06 RX ORDER — METHADONE HYDROCHLORIDE 10 MG/1
110 TABLET ORAL DAILY
Status: DISCONTINUED | OUTPATIENT
Start: 2023-06-07 | End: 2023-06-09 | Stop reason: HOSPADM

## 2023-06-06 RX ORDER — HYDROMORPHONE HYDROCHLORIDE 2 MG/1
6 TABLET ORAL EVERY 6 HOURS PRN
Status: DISCONTINUED | OUTPATIENT
Start: 2023-06-06 | End: 2023-06-07

## 2023-06-06 RX ORDER — METHADONE HYDROCHLORIDE 10 MG/1
100 TABLET ORAL DAILY
Status: DISCONTINUED | OUTPATIENT
Start: 2023-06-06 | End: 2023-06-06

## 2023-06-06 RX ORDER — CEFEPIME HYDROCHLORIDE 2 G/50ML
2000 INJECTION, SOLUTION INTRAVENOUS EVERY 24 HOURS
Status: DISCONTINUED | OUTPATIENT
Start: 2023-06-06 | End: 2023-06-08

## 2023-06-06 RX ORDER — METHADONE HYDROCHLORIDE 10 MG/1
5 TABLET ORAL EVERY 6 HOURS PRN
Status: DISCONTINUED | OUTPATIENT
Start: 2023-06-06 | End: 2023-06-06

## 2023-06-06 RX ORDER — FENTANYL CITRATE 50 UG/ML
INJECTION, SOLUTION INTRAMUSCULAR; INTRAVENOUS AS NEEDED
Status: COMPLETED | OUTPATIENT
Start: 2023-06-06 | End: 2023-06-06

## 2023-06-06 RX ORDER — SODIUM CHLORIDE 9 MG/ML
10 INJECTION INTRAVENOUS DAILY
Qty: 300 ML | Refills: 2 | Status: SHIPPED | OUTPATIENT
Start: 2023-06-06 | End: 2023-09-04

## 2023-06-06 RX ADMIN — FENTANYL CITRATE 50 MCG: 50 INJECTION, SOLUTION INTRAMUSCULAR; INTRAVENOUS at 14:01

## 2023-06-06 RX ADMIN — VANCOMYCIN HYDROCHLORIDE 750 MG: 1 INJECTION, POWDER, LYOPHILIZED, FOR SOLUTION INTRAVENOUS at 22:04

## 2023-06-06 RX ADMIN — NICOTINE 1 PATCH: 21 PATCH, EXTENDED RELEASE TRANSDERMAL at 10:17

## 2023-06-06 RX ADMIN — MIDAZOLAM HYDROCHLORIDE 1 MG: 1 INJECTION, SOLUTION INTRAMUSCULAR; INTRAVENOUS at 13:52

## 2023-06-06 RX ADMIN — HYDROMORPHONE HYDROCHLORIDE 6 MG: 2 TABLET ORAL at 18:25

## 2023-06-06 RX ADMIN — METHADONE HYDROCHLORIDE 100 MG: 10 TABLET ORAL at 10:17

## 2023-06-06 RX ADMIN — MIDAZOLAM HYDROCHLORIDE 1 MG: 1 INJECTION, SOLUTION INTRAMUSCULAR; INTRAVENOUS at 13:56

## 2023-06-06 RX ADMIN — FENTANYL CITRATE 50 MCG: 50 INJECTION, SOLUTION INTRAMUSCULAR; INTRAVENOUS at 13:52

## 2023-06-06 RX ADMIN — AMLODIPINE BESYLATE 5 MG: 5 TABLET ORAL at 10:17

## 2023-06-06 RX ADMIN — CEFEPIME HYDROCHLORIDE 2000 MG: 2 INJECTION, SOLUTION INTRAVENOUS at 21:32

## 2023-06-06 RX ADMIN — METHADONE HYDROCHLORIDE 5 MG: 10 TABLET ORAL at 01:31

## 2023-06-06 RX ADMIN — FENTANYL CITRATE 50 MCG: 50 INJECTION, SOLUTION INTRAMUSCULAR; INTRAVENOUS at 13:56

## 2023-06-06 RX ADMIN — VANCOMYCIN HYDROCHLORIDE 750 MG: 750 INJECTION, POWDER, LYOPHILIZED, FOR SOLUTION INTRAVENOUS at 10:38

## 2023-06-06 NOTE — PHYSICAL THERAPY NOTE
Physical Therapy Evaluation    Patient Name: Deana Hernandez    MDPIV'Y Date: 6/6/2023     Problem List  Principal Problem:    Abdominal wall abscess  Active Problems:    Essential hypertension    Hyponatremia    Prolonged QT interval    Methadone dependence        Past Medical History  Past Medical History:   Diagnosis Date    Anxiety     Bright red rectal bleeding     Last Assessed: 9/9/2015     Drug dependence ([facility] [address] ) 10/20/2021    Hypertension     Last Assessed: 7/9/2014     Kidney stone     Kidney stones     Last Assessed: 11/17/2016     Periorbital edema     Last Assessed: 9/4/2015    Septic shock ([facility] [address] ) 08/20/2022    Skin tag of anus     Last Assessed: 9/9/2015     Trigger point of thoracic region     Last Assessed: 11/6/2015         Past Surgical History  Past Surgical History:   Procedure Laterality Date    CYSTOSCOPY W/ URETERAL STENT PLACEMENT  03/11/2013    CYSTOSCOPY W/ URETERAL STENT PLACEMENT  06/18/2014    EXTRACORPOREAL SHOCK WAVE LITHOTRIPSY     CYSTOSCOPY W/ URETERAL STENT PLACEMENT  07/16/2014    EXTRACORPOREAL SHOCK WAVE LITHOTRIPSY     CYSTOSCOPY W/ URETERAL STENT REMOVAL  04/11/2013    CYSTOSCOPY W/ URETERAL STENT REMOVAL Right 08/26/2014    CYSTOSCOPY W/ URETEROSCOPY W/ LITHOTRIPSY  02/26/2013    Percutaneous lithotomy With Uretal Stent Plcement     CYSTOSCOPY W/ URETEROSCOPY W/ LITHOTRIPSY  03/11/2014    CYSTOSCOPY W/ URETEROSCOPY W/ LITHOTRIPSY Right 08/04/2014    With Uretal Stent Placement     CYSTOSCOPY W/ URETEROSCOPY W/ LITHOTRIPSY Right 05/09/2016    HERNIA REPAIR  03/11/2013    IR DRAINAGE TUBE PLACEMENT  6/6/2023    IR NEPHROSTOMY TUBE CHECK/CHANGE/REPOSITION/REINSERTION/UPSIZE  11/22/2022    IR NEPHROSTOMY TUBE CHECK/CHANGE/REPOSITION/REINSERTION/UPSIZE  02/13/2023    IR NEPHROSTOMY TUBE CHECK/CHANGE/REPOSITION/REINSERTION/UPSIZE  04/28/2023    IR NEPHROSTOMY TUBE PLACEMENT  08/20/2022    KIDNEY SURGERY      LITHOTRIPSY      [place] CYSTO/URETERO W/LITHOTRIPSY &INDWELL STENT INSRT Right 07/13/2016    Procedure: CYSTOSCOPY; URETEROSCOPY WITH HOLMIUM LASER STONE EXTRACTION; RETROGRADE PYELOGRAM; URETERAL STENT INSERTION ;  Surgeon: Solange Villalta MD;  [facility];  Service: Urology    MS CYSTO/URETERO W/LITHOTRIPSY &INDWELL STENT INSRT Right 05/09/2016    Procedure: CYSTOSCOPY,  URETEROSCOPY,  WITH LITHOTRIPSY HOLMIUM LASER, STONE EXTRACTION, AND INSERTION STENT URETERAL;  Surgeon: Solange Villalta MD;  [facility];  Service: Urology    MS CYSTO/URETERO W/LITHOTRIPSY &INDWELL STENT INSRT Right 11/10/2022    Procedure: CYSTOSCOPY URETEROSCOPY  right RETROGRADE PYELOGRAM;  Surgeon: More Stephens MD;  [facility];  Service: Urology    MS LAPAROSCOPY RADICAL NEPHRECTOMY Right 5/22/2023    Procedure: NEPHRECTOMY RADICAL LAPAROSCOPIC W/ ROBOTICS;  Surgeon: Neal Malone MD;  [facility];  Service: Urology    MS LITHOTRIPSY [address] Right 06/17/2016    Procedure: LITHROTRIPSY EXTRACORPORAL SHOCKWAVE (ESWL); Surgeon: Solange Villalta MD;  [facility];  Service: Urology    TRANSURETHRAL RESECTION OF BLADDER      URETERAL Callie Jannie  03/11/2013 06/06/23 1336   PT Last Visit   PT Visit Date 06/06/23   Note Type   Note type Evaluation   Pain Assessment   Pain Assessment Tool 0-10   Pain Score 8   Pain Location/Orientation Location: Abdomen   Restrictions/Precautions   Weight Bearing Precautions Per Order No   Other Precautions Fall Risk;Pain;Multiple lines   Home Living   Type of [address] [place] [address] Two level;Bed/bath upstairs;Stairs to enter without rails  (1 BINU)   Home Equipment Cane; Wheelchair-manual   Additional Comments pt denies use of DME at baseline   Prior Function   Level of [place] Independent with ADLs; Independent with functional mobility; Independent with [address] in the last 6 months 0   Comments all information gathered from EMR "  General   Family/Caregiver Present No   Cognition   Overall Cognitive Status WFL   Arousal/Participation Alert   Orientation Level Oriented X4   Following Commands Follows one step commands without difficulty   Comments pt moderately agitated throughout session but in agreement with participation   Subjective   Subjective \"[place] [address]\"   RLE Assessment   RLE Assessment WFL  (assessed functionally)   LLE Assessment   LLE Assessment WFL  (assessed functionally)   Bed Mobility   Additional Comments pt OOB at start/end of session   Transfers   Sit to Stand 5  Supervision   Additional items Armrests; Increased time required;Verbal cues   Stand to Sit 5  Supervision   Additional items Armrests; Increased time required;Verbal cues   Additional Comments no device used   Ambulation/Elevation   Gait pattern Excessively slow; Short stride; Foward flexed; Inconsistent jose;Decreased foot clearance; Improper Weight shift;Narrow JAYLA   Gait Assistance 5  Supervision   Additional items Verbal cues   Assistive Device None   Distance 200'   Balance   Static Sitting Good   Dynamic Sitting Good   Static Standing Fair +   Dynamic Standing Fair   Ambulatory Fair   Endurance Deficit   Endurance Deficit Yes   Endurance Deficit Description pt appears fatigued with ambulation   Activity Tolerance   Activity Tolerance Patient limited by fatigue   Assessment   Prognosis Good   Problem List Decreased strength; Impaired balance;Decreased endurance;Decreased mobility; Decreased safety awareness   Assessment Patient is a 61 y o  male evaluated by Physical Therapy s/p admit to [address] [place] [address] on 6/5/2023 with admitting diagnosis of: Back pain, Abdominal wall abscess, Abscess of postoperative wound of abdominal wall, and principal problem of: Abdominal wall abscess  PT was consulted to assess patient's functional mobility and discharge needs  Ordered are PT Evaluation and treatment with activity level of: up and OOB as tolerated   " Comorbidities affecting patient's physical performance at time of assessment include: HTN, hx of drug use, cognitive decline  Personal factors affecting the patient at time of IE include: lives in 2 story home, step(s) to enter home and inability/difficulty performing IADLs  Please locate objective findings from PT assessment regarding body systems outlined above  Upon evaluation, pt able to perform all functional mobility with SUP, increased time, and moderate verbal cuing  Pt appears angry throughout session surrounding his current medical status, however in agreement with participating in PT intervention  Pt able to ambulate 200' before taking seated rest break  Moderate postural sway demonstrated however no true LOB experienced  HR and SpO2 remained WFL on RA throughout  The patient's AM-PAC Basic Mobility Inpatient Short Form Raw Score is 21  A Raw score of greater than 16 suggests the patient may benefit from discharge to home  Please also refer to the recommendation of the Physical Therapist for safe discharge planning  Co treatment with OT secondary to complex medical condition of pt, possible A of 2 required to achieve and maintain transitional movements, requiring the need of skilled therapeutic intervention of 2 therapists to achieve delivery of services  Pt will benefit from continued PT intervention during LOS to address current deficits, increase LOF, and facilitate safe d/c to next level of care when medically appropriate  D/c recommendation at this time is home with outpatient rehabilitation services  Goals   Patient Goals to go home   LTG Expiration Date 06/20/23   Long Term Goal #1 Pt will participate in B LE strengthening exercises to facilitate improved functional activity tolerance  Pt will perform all functional transfers and bed mobility mod(I) with good safety awareness  Pt will ambulate 250' mod(I) with LRAD while maintaining good functional dynamic balance   Pt will ascend/descend a FFOS with HR and SUP to reflect the ability to safely navigate the home  Plan   Treatment/Interventions Functional transfer training;LE strengthening/ROM; Elevations; Therapeutic exercise; Endurance training;Bed mobility;Gait training   PT Frequency 3-5x/wk   Recommendation   PT Discharge Recommendation Home with outpatient rehabilitation   AM-PAC Basic Mobility Inpatient   Turning in Flat Bed Without Bedrails 4   Lying on Back to Sitting on Edge of Flat Bed Without Bedrails 4   Moving Bed to Chair 3   Standing Up From Chair Using Arms 4   Walk in Room 3   Climb 3-5 Stairs With Railing 3   Basic Mobility Inpatient Raw Score 21   Basic Mobility Standardized Score 45 55   Highest Level Of Mobility   JH-HLM Goal 6: Walk 10 steps or more   JH-HLM Achieved 7: Walk 25 feet or more   End of Consult   Patient Position at End of Consult Other (comment)  (pt being transported to IR via stretcher)

## 2023-06-06 NOTE — OCCUPATIONAL THERAPY NOTE
Occupational Therapy Evaluation     Patient Name: Chandler Juarez  WBWBK'I Date: 6/6/2023  Problem List  Principal Problem:    Abdominal wall abscess  Active Problems:    Essential hypertension    Hyponatremia    Prolonged QT interval    Methadone dependence     Past Medical History  Past Medical History:   Diagnosis Date    Anxiety     Bright red rectal bleeding     Last Assessed: 9/9/2015     Drug dependence ([facility] [address] ) 10/20/2021    Hypertension     Last Assessed: 7/9/2014     Kidney stone     Kidney stones     Last Assessed: 11/17/2016     Periorbital edema     Last Assessed: 9/4/2015    Septic shock ([facility] [address] ) 08/20/2022    Skin tag of anus     Last Assessed: 9/9/2015     Trigger point of thoracic region     Last Assessed: 11/6/2015      Past Surgical History  Past Surgical History:   Procedure Laterality Date    CYSTOSCOPY W/ URETERAL STENT PLACEMENT  03/11/2013    CYSTOSCOPY W/ URETERAL STENT PLACEMENT  06/18/2014    EXTRACORPOREAL SHOCK WAVE LITHOTRIPSY     CYSTOSCOPY W/ URETERAL STENT PLACEMENT  07/16/2014    EXTRACORPOREAL SHOCK WAVE LITHOTRIPSY     CYSTOSCOPY W/ URETERAL STENT REMOVAL  04/11/2013    CYSTOSCOPY W/ URETERAL STENT REMOVAL Right 08/26/2014    CYSTOSCOPY W/ URETEROSCOPY W/ LITHOTRIPSY  02/26/2013    Percutaneous lithotomy With Uretal Stent Plcement     CYSTOSCOPY W/ URETEROSCOPY W/ LITHOTRIPSY  03/11/2014    CYSTOSCOPY W/ URETEROSCOPY W/ LITHOTRIPSY Right 08/04/2014    With Uretal Stent Placement     CYSTOSCOPY W/ URETEROSCOPY W/ LITHOTRIPSY Right 05/09/2016    HERNIA REPAIR  03/11/2013    IR DRAINAGE TUBE PLACEMENT  6/6/2023    IR NEPHROSTOMY TUBE CHECK/CHANGE/REPOSITION/REINSERTION/UPSIZE  11/22/2022    IR NEPHROSTOMY TUBE CHECK/CHANGE/REPOSITION/REINSERTION/UPSIZE  02/13/2023    IR NEPHROSTOMY TUBE CHECK/CHANGE/REPOSITION/REINSERTION/UPSIZE  04/28/2023    IR NEPHROSTOMY TUBE PLACEMENT  08/20/2022    KIDNEY SURGERY      LITHOTRIPSY      [place] CYSTO/URETERO W/LITHOTRIPSY &INDWELL STENT INSRT Right 07/13/2016    Procedure: CYSTOSCOPY; URETEROSCOPY WITH HOLMIUM LASER STONE EXTRACTION; RETROGRADE PYELOGRAM; URETERAL STENT INSERTION ;  Surgeon: Real Contreras MD;  [facility];  Service: Urology    [place] CYSTO/URETERO W/LITHOTRIPSY &INDWELL STENT INSRT Right 05/09/2016    Procedure: CYSTOSCOPY,  URETEROSCOPY,  WITH LITHOTRIPSY HOLMIUM LASER, STONE EXTRACTION, AND INSERTION STENT URETERAL;  Surgeon: Real Contreras MD;  [facility];  Service: Urology    ID CYSTO/URETERO W/LITHOTRIPSY &INDWELL STENT INSRT Right 11/10/2022    Procedure: CYSTOSCOPY URETEROSCOPY  right RETROGRADE PYELOGRAM;  Surgeon: Gilmar Schmitt MD;  [facility];  Service: Urology    ID LAPAROSCOPY RADICAL NEPHRECTOMY Right 5/22/2023    Procedure: NEPHRECTOMY RADICAL LAPAROSCOPIC W/ ROBOTICS;  Surgeon: Clayborne Angelucci, MD;  [facility];  Service: Urology    ID LITHOTRIPSY [address] [place] [address] Right 06/17/2016    Procedure: LITHROTRIPSY EXTRACORPORAL SHOCKWAVE (ESWL); Surgeon: Real Contreras MD;  [facility];  Service: Urology    TRANSURETHRAL RESECTION OF BLADDER      URETERAL Lavone Catherine  03/11/2013 06/06/23 1335   OT Last Visit   OT Visit Date 06/06/23   Note Type   Note type Evaluation   Pain Assessment   Pain Assessment Tool 0-10   Pain Score 8   Pain Location/Orientation Location: Abdomen   Restrictions/Precautions   Weight Bearing Precautions Per Order No   Other Precautions Fall Risk; Chair Alarm   Home Living   Type of [address] [place] [address] Two level;Performs ADLs on one level; Able to live on main level with bedroom/bathroom;Stairs to enter without rails; Other (Comment)  (1 BINU no HR; pt reports staying on 1st floor)   [address]; Wheelchair-manual   Additional Comments pt reports no device at baseline during functional mobility   Prior Function   Level of [place] Independent with ADLs; Independent with functional "mobility; Independent with IADLS   Lives With Other (Comment); Family  (parents)   IADLs Family/Friend/Other provides transportation   Falls in the last 6 months   (unknown)   Vocational On disability   Subjective   Subjective \"I just want to get out of here\"   ADL   Where Assessed Chair   LB Dressing Assistance 5  Supervision/Setup   LB Dressing Deficit Don/doff R sock; Don/doff L sock   Additional Comments crossover technique seated in chair   Bed Mobility   Additional Comments pt seated in chair at start and end of session; SpO2 WFL on RA   Transfers   Sit to Stand 5  Supervision   Additional items Increased time required;Verbal cues  (no device)   Stand to Sit 5  Supervision   Additional items Increased time required;Verbal cues  (no device)   Additional Comments pt performs functional transfers with no device; no significant LOB or instability   Functional Mobility   Functional Mobility 5  Supervision   Additional Comments pt performs ~200ft with x1-2 minor LOB and ability to self-correct   Additional items   (no device)   Balance   Static Sitting Good   Dynamic Sitting Good   Static Standing Fair +   Dynamic Standing Fair +   Ambulatory Fair +   Activity Tolerance   Activity Tolerance Patient limited by fatigue   RUE Assessment   RUE Assessment WFL   LUE Assessment   LUE Assessment WFL   Hand Function   Gross Motor Coordination Functional   Fine Motor Coordination Functional   Sensation   Light Touch No apparent deficits   Sharp/Dull No apparent deficits   Psychosocial   Psychosocial (WDL) X   Patient Behaviors/Mood Flat affect; Angry   Cognition   Overall Cognitive Status WFL   Arousal/Participation Alert; Cooperative   Attention Attends with cues to redirect   Orientation Level Oriented X4   Memory Within functional limits   Following Commands Follows one step commands without difficulty   Comments pt is slow to respond at times and very soft spoken   Assessment   Limitation Decreased ADL status; Decreased Safe " judgement during ADL;Decreased UE strength;Decreased endurance;Decreased self-care trans;Decreased high-level ADLs   Assessment Pt is a 61 y o  male seen for OT evaluation s/p admit to [facility] on 6/5/2023 w/ Abdominal wall abscess  Comorbidities affecting pt's functional performance at time of assessment include: kidney stones, anxiety, HTN, periorbital edema, skin tag of anus, drug dependence  Personal factors affecting pt at time of IE include:steps to enter environment, limited home support, behavioral pattern, difficulty performing ADLS, difficulty performing IADLS , limited insight into deficits, decreased initiation and engagement  and health management   Prior to admission, pt was (I) with ADLs and IADLs with use of no device during mobility  Upon evaluation: Pt requires (S) level with no device during mobility 2* the following deficits impacting occupational performance: weakness, decreased strength, decreased balance, decreased tolerance, impaired initiation, decreased safety awareness, increased pain and impaired interpersonal skills  Pt to benefit from continued skilled OT tx while in the hospital to address deficits as defined above and maximize level of functional independence w ADL's and functional mobility  Occupational Performance areas to address include: grooming, bathing/shower, toilet hygiene, dressing, functional mobility, community mobility and clothing management  The patient's raw score on the AM-PAC Daily Activity Inpatient Short Form is 21  A raw score of greater than or equal to 19 suggests the patient may benefit from discharge to home  Please refer to the recommendation of the Occupational Therapist for safe discharge planning    Pt benefited from co-evaluation of skilled OT and PT therapists in order to most appropriately address functional deficits d/t extensive assistance required for safe functional mobility, decreased activity tolerance, and regression from functioning level prior to admission and/or onset of present illness  OT/PT objectives were addressed separately; please see PT note for specific goal areas targeted  Goals   Patient Goals to go home   Short Term Goal  pt will perform UE strengthening exercises   Long Term Goal #1 pt will demonstrate toilet transfers and hygiene at (I) level   Long Term Goal #2 pt will dmeonstrate UB/LB bathing and grooming tasks at (I) level   Long Term Goal pt will perform functional mobility with no device at (I) level   Plan   Treatment Interventions ADL retraining;Functional transfer training;UE strengthening/ROM; Endurance training;Patient/family training; Activityengagement   Goal Expiration Date 06/20/23   OT Frequency 3-5x/wk   Recommendation   OT Discharge Recommendation Home with outpatient rehabilitation   AM-PAC Daily Activity Inpatient   Lower Body Dressing 3   Bathing 3   Toileting 3   Upper Body Dressing 4   Grooming 4   Eating 4   Daily Activity Raw Score 21   Daily Activity Standardized Score (Calc for Raw Score >=11) 44 27   AM-PAC Applied Cognition Inpatient   Following a Speech/Presentation 4   Understanding Ordinary Conversation 4   Taking Medications 3   Remembering Where Things Are Placed or Put Away 3   Remembering List of 4-5 Errands 3   Taking Care of Complicated Tasks 3   Applied Cognition Raw Score 20   Applied Cognition Standardized Score 41 76

## 2023-06-06 NOTE — PROGRESS NOTES
Robb Calvillo is a 61 y o  male who is currently ordered Vancomycin IV with management by the Pharmacy Consult service  Relevant clinical data and objective / subjective history reviewed  Vancomycin Assessment:  Indication and Goal AUC/Trough: Soft tissue (goal -600, trough >10)  Clinical Status:  New  Micro:     Renal Function:  SCr: 1 26 mg/dL  CrCl: 57 mL/min  Renal replacement: Not on dialysis  Days of Therapy: 2  Current Dose: 1000mg IV q12h  Vancomycin Plan:  New Dosinmg IV q12h  Estimated AUC: 554 mcg*hr/mL  Estimated Trough: 18 7 mcg/mL  Next Level: 23 with AM labs  Renal Function Monitoring: Daily BMP and [place] will continue to follow closely for s/sx of nephrotoxicity, infusion reactions and appropriateness of therapy  BMP and CBC will be ordered per protocol  We will continue to follow the patient’s culture results and clinical progress daily      Daisy Chavez, Pharmacist

## 2023-06-06 NOTE — QUICK NOTE
Received TT from ED  Patient known to Dr Chasity Levin, status post right radical nephrectomy on 5/23/23  He presented with worsening abdominal pain and concerns for infection at incision site  CT shows 8 3 cm fluid collection with some stranding at right upper ventral abdominal wall  Seroma vs  Possible developing abscess and small amount of fluid within surgical site  Patient afebrile, VSS, lactic 1 0, WBC 17 53, Cr 1 26  Discussed case with Dr Mati Stevens  Since patient hemodynamically stable and nontoxic, plan to admit to [facility], and recommend IR drain fluid collection tomorrow  ED states IR will be in house tomorrow to consult  Our team will complete consult and assess patient tomorrow

## 2023-06-06 NOTE — CASE MANAGEMENT
Case Management Assessment & Discharge Planning Note    Patient name Junior Acuña  Location / MRN [id number]  : 1965 Date 2023       Current Admission Date: 2023  Current Admission Diagnosis:Abdominal wall abscess   Patient Active Problem List    Diagnosis Date Noted   • Abdominal wall abscess 2023   • Methadone dependence  2023   • Leukocytosis 2023   • Hyponatremia 2023   • Prolonged QT interval 2023   • Staghorn calculus 2023   • Recent UTI 2023   • Abnormal CT scan, liver 2023   • AMS (altered mental status) 2023   • Tobacco abuse 11/10/2022   • Drug abuse, episodic use ([facility] [address] ) 11/10/2022   • Anxiety 2022   • Cognitive decline 2022   • Delusional disorder  2022   • Multiple electrolyte abnormalities 2022   • Acute metabolic encephalopathy    • Acute respiratory failure with hypoxia ([address] ) 2022   • Elevated troponin 2022   • Elevated brain natriuretic peptide (BNP) level 2022   • Essential hypertension 2022   • Transaminitis 2022   • Elevated lipase 2022   • BRAULIO (acute kidney injury) ([facility] [address] ) 2022   • Hypernatremia 2022   • Increased anion gap metabolic acidosis    • Rhabdomyolysis 2022   • Elevated d-dimer 2022   • Kidney stones    • Compulsive skin picking 10/20/2021      LOS (days): 0  Geometric Mean LOS (GMLOS) (days):   Days to GMLOS:     OBJECTIVE:              Current admission status: Observation       Preferred Pharmacy:   [place] [address]  Phone: [phone] Fax: [phone]    [address] [place] [address], [facility] [address] [place] [address]  Phone: [phone] Fax: [phone] [place] [address], [address] [address]  Phone: [phone] Fax: [phone]    Primary Care Provider: Yandel Hurtado DO    Primary Insurance: Hamzah GRAHAM  Secondary Insurance:     ASSESSMENT:  [address], [address] [place] Representative - Mother   Primary Phone: [phone] (Home)                         Readmission Root Cause  30 Day Readmission: Yes  Who directed you to return to the hospital?: Self  Did you understand whom to contact if you had questions or problems?: Yes  Did you get your prescriptions before you left the hospital?: Yes  Were you able to get your prescriptions filled when you left the hospital?: Yes  Did you take your medications as prescribed?: Yes  Were you able to get to your follow-up appointments?: No  Reason[de-identified] Readmitted prior to appointment  During previous admission, was a post-acute recommendation made?: Yes  What post-acute resources were offered?: Mundo Rodríguez  Patient was readmitted due to: Abd pain , Abd cellulitis  Action Plan: Pt will need home care on discharge    Patient Information  Admitted from[de-identified] Home  Mental Status: Alert  During Assessment patient was accompanied by: Not accompanied during assessment  Assessment information provided by[de-identified] Parent  Primary Caregiver: Self  Support Systems: Parent  [place] of Residence: [facility] do you live in?: [address] entry access options   Select all that apply : No steps to enter home  Type of Current Residence: 2 story home  Upon entering residence, is there a bedroom on the main floor (no further steps)?: No  A bedroom is located on the following floor levels of residence (select all that apply):: 2nd Floor  Upon entering residence, is there a bathroom on the main floor (no further steps)?: No  Indicate which floors of current residence have a bathroom (select all the apply):: 2nd Floor  Number of steps to 2nd floor from main floor: One Flight  In the last 12 months, was there a time when you were not able to pay the mortgage or rent on time?: No  In the last 12 months, how many places have you lived?: 6  In the last 12 months, was there a time when you did not have a steady place to sleep or slept in a shelter (including now)?: No  Homeless/housing insecurity resource given?: N/A  Living Arrangements: Lives w/ Parent(s)  Is patient a ?: No    Activities of Daily Living Prior to Admission  Functional Status: Independent  Completes ADLs independently?: Yes  Ambulates independently?: Yes  Does patient use assisted devices?: No  Does patient currently own DME?: Yes  What DME does the patient currently own?: Wheelchair, [employer], [employer]  Does patient have a history of Outpatient Therapy (PT/OT)?: No  Does the patient have a history of Short-Term Rehab?: No  Does patient have a history of HHC?: No  Does patient currently have [facility]?: No         Patient Information Continued  Income Source: Unemployed  Does patient have prescription coverage?: Yes  Within the past 12 months, you worried that your food would run out before you got the money to buy more : Never true  Within the past 12 months, the food you bought just didn't last and you didn't have money to get more : Never true  Food insecurity resource given?: N/A  Does patient receive dialysis treatments?: No  Does patient have a history of substance abuse?: Yes (Pt is currently in methadone treatment )  History of Withdrawal Symptoms: Denies past symptoms  Is patient currently in treatment for substance abuse?: No  Patient declined treatment information  (Pt is currently in a methadone program  )  Does patient have a history of Mental Health Diagnosis?: Yes (Pt has a history  of anxiety)  Is patient receiving treatment for mental health?: No  Patient declined treatment information   (Pt's PCP follows him for same )  Has patient received inpatient treatment related to mental health in the last 2 years?: No         Means of Transportation  Means of Transport to Appts[de-identified] Family transport (Pt's mother drives him to appts )  In the past 12 months, has lack of transportation kept you from medical appointments or from getting medications?: No  In the past 12 months, has lack of transportation kept you from meetings, work, or from getting things needed for daily living?: No  Was application for public transport provided?: N/A        DISCHARGE DETAILS:    Discharge planning discussed with[de-identified] Pt and his mother  Freedom of Choice: Yes     CM contacted family/caregiver?:  (Pt's mother was present in the room when I was talking to patient )             Pt is a readmission  Pt was discharged from [facility]"" on 5/29/23 after having a right radical nephrectomy  Pt and his mom said patient was not set up with home care or any services when he was discharged from [facility] and they hope he can have home care when he is discharged from here  I will continue to follow for any Case Management needs

## 2023-06-06 NOTE — CONSULTS
Consultation - UROLOGY  Karlo Shrestha 61 y o  male MRN: [id number]  Unit/Bed#: NE55 Encounter: [id number]    Assessment/Plan     Assessment:  Cellulitis right upper ventral abdominal wall  8 3 cm fluid collection with stranding right upper ventral abdominal wall incision on abdominal CTAP  Status post robotic assisted laparoscopic right radical nephrectomy by Dr Petra Ray 5/23/2023 at [facility] for nonfunctioning right kidney with staghorn calculus  Patient's postoperative course complicated with metabolic encephalopathy  He was discharged from [facility] on May 29th  Concern for surgical wound abscess versus seroma    History of drug dependency on methadone treatment  Previous history of bright red rectal bleeding 2021  Essential hypertension  History of multiple kidney stones and as wall shockwave lithotripsy    WBC on admission 17 53  Lactic acid normal at 1 0  Creatinine 1 26    Vital signs reviewed, afebrile  Hemodynamically stable  Most recent temp 97 2  Plan:  No need for tertiary transfer or urgent urological intervention at this time  Consult IR for percutaneous drainage of abdominal wall fluid collection  Please review the urology recommendations from the overnight urology AP, [facility]  Urology management was discussed by her with Dr Cherelle Schultz, on-call urologist covering this [facility]  The patient was hemodynamically stable overnight    He was admitted to medicine service, attending physician Dr Eduardo Sargent  He is n p o  anticipating IR procedure later today  Treat with IV antibiotics, Maxipime and vancomycin  IV fluids for hydration   A m  labs per medicine  Monitor vital signs, fever curve  Analgesics and antiemetics as ordered as needed  Await blood cultures x2  IR consultation for drainage of abdominal wall fluid collection later today, body fluid for culture  Medical management of medical comorbidities at the direction of the attending hospitalist service    History of Present Illness     HPI:  Trista Sanchez is a 61 y o  male who was seen in the ED here at [facility] last evening brought to the hospital with his mother and family friend for evaluation with redness, pain and swelling of the right upper quadrant abdominal incision  The patient was discharged on May 29 from [address] [place] [address], he underwent robotic assisted radical right nephrectomy for nonfunctioning kidney and staghorn calculus  Surgery performed by Dr Igor Duran   Surgical course was complicated with metabolic encephalopathy  The patient is unsure how he got to the hospital   Right now he has erythema around the incision site in the right upper abdomen  He is unsure if he had a fever at home though did admit to feeling warm with decreased appetite  He denies difficulty passing his urine  No hematuria  Denies any bowel movement changes  He does not report any drainage from the surgical incision  Patient was seen last evening in the ED where CT abdomen pelvis without contrast demonstrated postsurgical changes with an 8 3 cm fluid collection in the upper right ventral abdominal wall soft tissues with mild adjacent stranding in the subcutaneous fat and may reflect postoperative seroma versus superimposed developing infection or abscess  Status post right radical nephrectomy, small amount of loculated fluid and trace free intraperitoneal air within the surgical resection bed within limits of expected postoperative appearance for postop day #14  Also finding of right greater than left basilar atelectatic changes versus pneumonia noted on CT  Patient was also noted to have incidental finding of liver cirrhosis and cholelithiasis  Patient was recommended admission here under medicine, he was started on IV antibiotics  Right now the plan is to proceed with IR consultation for drainage of abdominal wall fluid collection      Inpatient consult to Urology  Consult performed by: Chrystal Mora PA-C  Consult ordered by: Candace Veras DO        Review of Systems   Constitutional: Positive for activity change, appetite change, chills and fever  HENT: Negative  Negative for trouble swallowing  Eyes: Positive for photophobia, pain and visual disturbance  Respiratory: Negative  Cardiovascular: Negative  Gastrointestinal: Negative for abdominal pain, constipation, diarrhea, nausea and vomiting  Endocrine: Negative  Genitourinary: Negative  Negative for decreased urine volume, hematuria and urgency  Musculoskeletal: Negative for back pain and neck pain  Skin: Positive for color change, rash and wound  Allergic/Immunologic: Negative  Neurological: Positive for weakness  Negative for dizziness, syncope, light-headedness and headaches  Hematological: Negative  Psychiatric/Behavioral: Negative         Historical Information   Past Medical History:   Diagnosis Date   • Anxiety    • Bright red rectal bleeding     Last Assessed: 9/9/2015    • Drug dependence ([facility] [address] ) 10/20/2021   • Hypertension     Last Assessed: 7/9/2014    • Kidney stone    • Kidney stones     Last Assessed: 11/17/2016    • Periorbital edema     Last Assessed: 9/4/2015   • Septic shock ([facility] [address] ) 08/20/2022   • Skin tag of anus     Last Assessed: 9/9/2015    • Trigger point of thoracic region     Last Assessed: 11/6/2015      Past Surgical History:   Procedure Laterality Date   • CYSTOSCOPY W/ URETERAL STENT PLACEMENT  03/11/2013   • CYSTOSCOPY W/ URETERAL STENT PLACEMENT  06/18/2014    EXTRACORPOREAL SHOCK WAVE LITHOTRIPSY    • CYSTOSCOPY W/ URETERAL STENT PLACEMENT  07/16/2014    EXTRACORPOREAL SHOCK WAVE LITHOTRIPSY    • CYSTOSCOPY W/ URETERAL STENT REMOVAL  04/11/2013   • CYSTOSCOPY W/ URETERAL STENT REMOVAL Right 08/26/2014   • CYSTOSCOPY W/ URETEROSCOPY W/ LITHOTRIPSY  02/26/2013    Percutaneous lithotomy With Uretal Stent Plcement    • CYSTOSCOPY W/ URETEROSCOPY W/ LITHOTRIPSY  03/11/2014 • CYSTOSCOPY W/ URETEROSCOPY W/ LITHOTRIPSY Right 08/04/2014    With Uretal Stent Placement    • CYSTOSCOPY W/ URETEROSCOPY W/ LITHOTRIPSY Right 05/09/2016   • HERNIA REPAIR  03/11/2013   • IR NEPHROSTOMY TUBE CHECK/CHANGE/REPOSITION/REINSERTION/UPSIZE  11/22/2022   • IR NEPHROSTOMY TUBE CHECK/CHANGE/REPOSITION/REINSERTION/UPSIZE  02/13/2023   • IR NEPHROSTOMY TUBE CHECK/CHANGE/REPOSITION/REINSERTION/UPSIZE  04/28/2023   • IR NEPHROSTOMY TUBE PLACEMENT  08/20/2022   • KIDNEY SURGERY     • LITHOTRIPSY     • NH CYSTO/URETERO W/LITHOTRIPSY &INDWELL STENT INSRT Right 07/13/2016    Procedure: CYSTOSCOPY; URETEROSCOPY WITH HOLMIUM LASER STONE EXTRACTION; RETROGRADE PYELOGRAM; URETERAL STENT INSERTION ;  Surgeon: Vandana Cerda MD;  Location[facility];  Service: Urology   • NH CYSTO/URETERO W/LITHOTRIPSY &INDWELL STENT INSRT Right 05/09/2016    Procedure: CYSTOSCOPY,  URETEROSCOPY,  WITH LITHOTRIPSY HOLMIUM LASER, STONE EXTRACTION, AND INSERTION STENT URETERAL;  Surgeon: Vandana Cerda MD;  Location: [facility];  Service: Urology   • NH CYSTO/URETERO W/LITHOTRIPSY &INDWELL STENT INSRT Right 11/10/2022    Procedure: CYSTOSCOPY URETEROSCOPY  right RETROGRADE PYELOGRAM;  Surgeon: Jhon Anand MD;  Location: [facility];  Service: Urology   • NH LAPAROSCOPY RADICAL NEPHRECTOMY Right 5/22/2023    Procedure: NEPHRECTOMY RADICAL LAPAROSCOPIC W/ ROBOTICS;  Surgeon: Ria Cummings MD;  Location: [facility];  Service: Urology   • NH LITHOTRIPSY [address] Right 06/17/2016    Procedure: Therman Sofie SHOCKWAVE (ESWL);   Surgeon: Vandana Cerda MD;  Location: [facility];  Service: Urology   • TRANSURETHRAL RESECTION OF BLADDER     • URETERAL REIMPLANTION  03/11/2013     Social History   Social History     Substance and Sexual Activity   Alcohol Use Not Currently     Social History     Substance and Sexual Activity   Drug Use Not Currently     E-Cigarette/Vaping   • E-Cigarette Use Never User E-Cigarette/Vaping Substances     Social History     Tobacco Use   Smoking Status Every Day   • Packs/day: 2 00   • Years: 35 00   • Total pack years: 70 00   • Types: Cigarettes   Smokeless Tobacco Never     Family History:   Family History   Problem Relation Age of Onset   • No Known Problems Mother    • Heart attack Father        Meds/Allergies   current meds:   Current Facility-Administered Medications   Medication Dose Route Frequency   • acetaminophen (TYLENOL) tablet 650 mg  650 mg Oral Q6H PRN   • acetaminophen (TYLENOL) tablet 975 mg  975 mg Oral Once   • amLODIPine (NORVASC) tablet 5 mg  5 mg Oral Daily   • cefepime (MAXIPIME) IVPB (premix in dextrose) 2,000 mg 50 mL  2,000 mg Intravenous Q24H   • methadone (DOLOPHINE) tablet 5 mg  5 mg Oral Q6H PRN   • nicotine (NICODERM CQ) 21 mg/24 hr TD 24 hr patch 1 patch  1 patch Transdermal Daily   • vancomycin 750 mg in sodium chloride 0 9% 250 mL  750 mg Intravenous Q12H     Allergies   Allergen Reactions   • Bee Venom Anaphylaxis   • Metronidazole Itching and Swelling   • Nsaids GI Intolerance     Stomach irritant   • Penicillin G    • Penicillins GI Intolerance     Sick to stomach   • Pollen Extract    • Sulfa Antibiotics        Objective   First Vitals:   Blood Pressure: 101/53 (06/05/23 1912)  Pulse: 78 (06/05/23 1912)  Temperature: 99 °F (37 2 °C) (06/05/23 1912)  Temp Source: Tympanic (06/05/23 1912)  Respirations: 18 (06/05/23 1912)  SpO2: 95 % (06/05/23 1912)    Current Vitals:   Blood Pressure: 148/72 (06/06/23 0716)  Pulse: 59 (06/06/23 0716)  Temperature: (!) 97 2 °F (36 2 °C) (06/06/23 0716)  Temp Source: Temporal (06/06/23 0716)  Respirations: 18 (06/06/23 0716)  SpO2: 95 % (06/05/23 1912)      Intake/Output Summary (Last 24 hours) at 6/6/2023 0749  Last data filed at 6/6/2023 0657  Gross per 24 hour   Intake 1050 ml   Output 300 ml   Net 750 ml       Invasive Devices     Peripheral Intravenous Line  Duration           Peripheral IV 06/05/23 Dorsal (posterior); Left Forearm <1 day                Physical Exam  Constitutional:       General: He is not in acute distress  Appearance: He is ill-appearing  He is not toxic-appearing  HENT:      Head: Normocephalic  Nose: Nose normal  No congestion  Mouth/Throat:      Mouth: Mucous membranes are moist       Pharynx: Oropharynx is clear  Eyes:      General: No scleral icterus  Right eye: No discharge  Left eye: No discharge  Conjunctiva/sclera: Conjunctivae normal       Comments: The patient is very photosensitive to bright lights in both eyes  No scleral icterus  Conjunctiva pink  No eye drainage  Cardiovascular:      Rate and Rhythm: Normal rate and regular rhythm  Pulses: Normal pulses  Heart sounds: No murmur heard  No gallop  Pulmonary:      Effort: Pulmonary effort is normal       Breath sounds: No wheezing or rales  Abdominal:      General: Abdomen is flat  Bowel sounds are normal       Palpations: Abdomen is soft  There is no mass  Tenderness: There is abdominal tenderness  There is no right CVA tenderness, left CVA tenderness, guarding or rebound  Hernia: No hernia is present  Comments: Right upper quadrant abdominal incision with fullness, nonblanchable erythema surrounding the periwound, feels warm to touch, some fluctuation and palpable subcutaneous firmness to palpation  Tender to touch  There is no wound disruption or drainage  Musculoskeletal:         General: No swelling or deformity  Normal range of motion  Cervical back: Normal range of motion  Right lower leg: No edema  Left lower leg: No edema  Skin:     General: Skin is warm  Capillary Refill: Capillary refill takes less than 2 seconds  Neurological:      General: No focal deficit present  Mental Status: He is alert and oriented to person, place, and time  Motor: No weakness        Coordination: Coordination normal    Psychiatric: Mood and Affect: Mood normal          Thought Content: Thought content normal          Judgment: Judgment normal              Lab Results:   I have personally reviewed pertinent lab results  , CBC:   Lab Results   Component Value Date    HCT 41 4 06/05/2023    HGB 13 5 06/05/2023    MCH 29 9 06/05/2023    MCHC 32 6 06/05/2023    MCV 92 06/05/2023    MPV 8 5 (L) 06/05/2023     06/05/2023    RBC 4 51 06/05/2023    RDW 15 3 (H) 06/05/2023    WBC 17 53 (H) 06/05/2023   , CMP:   Lab Results   Component Value Date    ALKPHOS 72 06/05/2023    ALT 17 06/05/2023    AST 15 06/05/2023    BUN 15 06/05/2023    CALCIUM 9 2 06/05/2023    CL 96 06/05/2023    CO2 29 06/05/2023    CREATININE 1 26 06/05/2023    EGFR 62 06/05/2023    K 4 6 06/05/2023    SODIUM 131 (L) 06/05/2023   , Coagulation:   Lab Results   Component Value Date    INR 1 16 06/05/2023   , Urinalysis:   Lab Results   Component Value Date    BILIRUBINUR Negative 06/06/2023    BLOODU Negative 06/06/2023    CLARITYU Clear 06/06/2023    COLORU Yellow 06/06/2023    GLUCOSEU Negative 06/06/2023    KETONESU Negative 06/06/2023    LEUKOCYTESUR Negative 06/06/2023    NITRITE Negative 06/06/2023    PHUR 6 0 06/06/2023    SPECGRAV 1 015 06/06/2023     Imaging: I have personally reviewed pertinent reports  CT ABDOMEN AND PELVIS WITHOUT IV CONTRAST     INDICATION:   recent nephrectomy, increased pain, redness over incision      COMPARISON: 1/28/2023      TECHNIQUE:  CT examination of the abdomen and pelvis was performed without intravenous contrast  Multiplanar 2D reformatted images were created from the source data      This examination, like all CT scans performed in the [facility], was performed utilizing techniques to minimize radiation dose exposure, including the use of iterative reconstruction and automated exposure control   Radiation dose length   product (DLP) for this visit:  591 19 mGy-cm     Enteric contrast was not administered      FINDINGS:     ABDOMEN     LOWER CHEST: Right greater than left basilar probable atelectatic change versus pneumonia      LIVER/BILIARY TREE: Redemonstrated subtle nodularity suggestive of underlying cirrhotic disease  No biliary ductal dilatation      GALLBLADDER:  There are gallstone(s) within the gallbladder, without pericholecystic inflammatory changes      SPLEEN:  Unremarkable      PANCREAS:  Unremarkable      ADRENAL GLANDS:  Unremarkable      KIDNEYS/URETERS: Postsurgical changes of recent radical nephrectomy noted with loculated small volume free fluid and intraperitoneal air in the postoperative bed  Small amount of free fluid tracks in the right paracolic gutter region  Punctate   nonobstructing left lower pole intrarenal calculus  No hydronephrosis      STOMACH AND BOWEL: No bowel obstruction  Colonic diverticulosis without evidence for focal acute diverticulitis  Moderate severe fecal retention      APPENDIX:  A normal appendix was visualized      ABDOMINOPELVIC CAVITY: Postsurgical changes in the resection bed as above  No lymphadenopathy      VESSELS:  Atherosclerotic changes are present  No evidence of aneurysm      PELVIS     REPRODUCTIVE ORGANS:  Unremarkable for patient's age      URINARY BLADDER: Tiny focus of antidependent air likely on the basis of recent instrumentation      ABDOMINAL WALL/INGUINAL REGIONS: Postsurgical changes ventral abdominal wall seen with focal fluid collection within the right upper ventral abdominal wall measuring approximately 2 8 x 8 3 x 5 7 cm  There are mild adjacent edematous/inflammatory changes   within the subcutaneous fat      OSSEOUS STRUCTURES:  No acute fracture or destructive osseous lesion   Prior L4 vertebroplasty changes      IMPRESSION:     Limited evaluation without intravenous contrast      Postsurgical changes with 8 3 cm fluid collection in the upper right ventral abdominal wall soft tissues with some mild adjacent stranding in the subcutaneous fat  Findings may reflect postoperative seroma although superimposed infection i e  developing   abscess difficult to exclude  Clinical correlation and surgical consult advised      Status post right radical nephrectomy with small amount of loculated fluid and trace free intraperitoneal air within the surgical resection bed, within limits of expected postoperative appearance (POD 14)     Cholelithiasis  Redemonstrated cirrhotic morphology of the liver      Tiny focus of antidependent air within the urinary bladder likely on the basis of recent instrumentation  Correlate with urinalysis to exclude infectious etiology      Constipation      Punctate nonobstructive left lower pole intrarenal calculus  EKG, Pathology, and Other Studies: I have personally reviewed pertinent reports  Counseling / Coordination of Care  Total floor / unit time spent today 45 minutes  Greater than 50% of total time was spent with the patient and / or family counseling and / or coordination of care    A description of the counseling / coordination of care: Review of the patient's medical records, diagnostic imaging laboratory studies, personal examination patient, review of urological recommendations placed by the overnight urology MELLISSA on-call all conducted in the evaluation of the patient at this time representing urology service here at this hospital       Danica Wood PA-C

## 2023-06-06 NOTE — DISCHARGE INSTRUCTIONS
"[facility]  Interventional Radiology  [phone] after your procedure:    Resume your normal diet  Small sips of flat soda will help with nausea  1  The properly functioning catheter should be forward flushed once (1x) daily with 10ml of normal saline using clean technique  You will be given a prescription for flushes  To flush the tube, clean both connections with alcohol swab  Twist off the drainage bag/ bulb  tubing and twist the saline syringe into the drainage tube and flush  Remove the syringe and twist the drainage bag / bulb tubing tubing back on     2  The drainage bag/bulb may be emptied as necessary  Keep a record of the amount of fluid you drain from your tube  This should be done with clean technique as well  3  A fresh dressing should be applied daily over the tube insertion site  4  As the tube is secured to the skin with only a suture,try not to pull on your tube  Tub baths are not permitted  Showers are permitted if the patient's skin entry site is prevented from getting wet  Similarly, washcloth \"baths\" are acceptable  Contact Interventional Radiology at [phone] Peña PATIENTS: Contact Interventional Radiology at [phone]) Margret Kelly PATIENTS: Contact Interventional Radiology at [phone]) if:    1  Leakage or large amounts of liquid around the catheter  2  Fever of 101 degrees lasting several hours without other obvious cause (such as sore throat, flu, etc)  3  Persistent nausea or vomiting  4  Diminished drainage, which may be associated with pressure or pain  Or when the     drainage from your tube is less than 10mls for 48 hours  5  Catheter pulled back or falls out  The following pharmacies carry the flush syringes         [facility]                     [facility]                             [facility] [address]       " [address] [facility]  Phone [phone]            Phone [phone] [address] [facility]                                [phone] 23[address] [facility]                      [facility] [address] [facility]  Phone [phone]            Phone [phone]                      Liliam Rash                                                                                                          [phone]  [facility]  [facility] [address]    [address]  Phone [phone]

## 2023-06-06 NOTE — PLAN OF CARE
Problem: OCCUPATIONAL THERAPY ADULT  Goal: Performs self-care activities at highest level of function for planned discharge setting  See evaluation for individualized goals  Description: Treatment Interventions: ADL retraining, Functional transfer training, UE strengthening/ROM, Endurance training, Patient/family training, Activityengagement          See flowsheet documentation for full assessment, interventions and recommendations  Note: Limitation: Decreased ADL status, Decreased Safe judgement during ADL, Decreased UE strength, Decreased endurance, Decreased self-care trans, Decreased high-level ADLs     Assessment: Pt is a 61 y o  male seen for OT evaluation s/p admit to [facility] on 6/5/2023 w/ Abdominal wall abscess  Comorbidities affecting pt's functional performance at time of assessment include: kidney stones, anxiety, HTN, periorbital edema, skin tag of anus, drug dependence  Personal factors affecting pt at time of IE include:steps to enter environment, limited home support, behavioral pattern, difficulty performing ADLS, difficulty performing IADLS , limited insight into deficits, decreased initiation and engagement  and health management   Prior to admission, pt was (I) with ADLs and IADLs with use of no device during mobility  Upon evaluation: Pt requires (S) level with no device during mobility 2* the following deficits impacting occupational performance: weakness, decreased strength, decreased balance, decreased tolerance, impaired initiation, decreased safety awareness, increased pain and impaired interpersonal skills  Pt to benefit from continued skilled OT tx while in the hospital to address deficits as defined above and maximize level of functional independence w ADL's and functional mobility  Occupational Performance areas to address include: grooming, bathing/shower, toilet hygiene, dressing, functional mobility, community mobility and clothing management   The patient's raw score on the AM-PAC Daily Activity Inpatient Short Form is 21  A raw score of greater than or equal to 19 suggests the patient may benefit from discharge to home  Please refer to the recommendation of the Occupational Therapist for safe discharge planning  Pt benefited from co-evaluation of skilled OT and PT therapists in order to most appropriately address functional deficits d/t extensive assistance required for safe functional mobility, decreased activity tolerance, and regression from functioning level prior to admission and/or onset of present illness  OT/PT objectives were addressed separately; please see PT note for specific goal areas targeted       OT Discharge Recommendation: Home with outpatient rehabilitation

## 2023-06-06 NOTE — ASSESSMENT & PLAN NOTE
Patient presenting with pain at right lower quadrant abdominal incision for a few days though no purulence or bleeding at incision site  · CT abdomen pelvis shows postsurgical changes with 8 3 cm fluid collection in upper right ventral abdominal wall soft tissue with some mild adjacent stranding in the subcutaneous fat reflecting postoperative seroma versus developing abscess along with expected surrounding postoperative changes  · Elevated WBC 17 53  · S/p right radical nephrectomy on 5/22/2023 by urology for staghorn calculus and ureteral stricture  · ED provider contacted on-call urologist who recommended IR consult for drainage as patient is hemodynamically stable and does not require acute surgical intervention  · IR consulted for drainage  · Urology consulted for continued evaluation and management

## 2023-06-06 NOTE — ED PROVIDER NOTES
History  Chief Complaint   Patient presents with   • Post-op Problem     Patient had a right nephrectomy done on May 22nd and presents with a lot of pain      54-year-old male presents with his mother and family friend for evaluation  Patient had a radical nephrectomy performed on 5/22/2023 with Dr Solange Vizcarra; Patient previously suffered chronic right ureteral obstruction and had a nephrostomy tube placed, per review patient has grown tired frustrated of the nephrostomy tube  Patient was counseled and elected to undergo a nephrectomy  Over the last few days patient has had increased pain, erythema at the surgical incision site  Has not taken temperature at home although does admit to feeling warm and cold  He has had decreased oral intake over this timeframe due to not feeling well  He has had no nausea or vomiting episodes  Patient has generalized weakness  Surgical site is not open, family deny draining  Additionally patient has had no draining from his previous nephrostomy site  Prior to Admission Medications   Prescriptions Last Dose Informant Patient Reported? Taking? METHADONE HCL PO 6/5/2023  Yes Yes   Sig: Take 110 mg by mouth in the morning This dose was verified with Avelina Tidwell, the director of the [facility]  Pt usually receives liquid form  Pt was instructed to go to clinic am of surgery, as usual, to receive his am dose and proceed to the hospital with an arrival time of 1015  [facility] opens at 530am   amLODIPine (NORVASC) 5 mg tablet 6/5/2023  No Yes   Sig: Take 1 tablet (5 mg total) by mouth daily Do not start before May 30, 2023     methadone (DOLOPHINE) 10 MG/5ML solution   Yes No   Sig: Take 5 mg by mouth every 6 (six) hours as needed for moderate pain   Patient not taking: Reported on 5/30/2023   oxyCODONE (ROXICODONE) 10 MG TABS 6/5/2023  No Yes   Sig: Take 1 tablet (10 mg total) by mouth every 6 (six) hours as needed for severe pain for up to 10 days Max Daily Amount: 40 mg   potassium-sodium phosphates (PHOS-NAK) 280 mg (P)-160 mg (Na)-250 mg (K) packet   No No   Sig: Take 1 packet by mouth 2 (two) times a day with meals for 4 doses      Facility-Administered Medications: None       Past Medical History:   Diagnosis Date   • Anxiety    • Bright red rectal bleeding     Last Assessed: 9/9/2015    • Drug dependence ([facility] [address] ) 10/20/2021   • Hypertension     Last Assessed: 7/9/2014    • Kidney stone    • Kidney stones     Last Assessed: 11/17/2016    • Periorbital edema     Last Assessed: 9/4/2015   • Septic shock ([facility] [address] ) 08/20/2022   • Skin tag of anus     Last Assessed: 9/9/2015    • Trigger point of thoracic region     Last Assessed: 11/6/2015        Past Surgical History:   Procedure Laterality Date   • CYSTOSCOPY W/ URETERAL STENT PLACEMENT  03/11/2013   • CYSTOSCOPY W/ URETERAL STENT PLACEMENT  06/18/2014    EXTRACORPOREAL SHOCK WAVE LITHOTRIPSY    • CYSTOSCOPY W/ URETERAL STENT PLACEMENT  07/16/2014    EXTRACORPOREAL SHOCK WAVE LITHOTRIPSY    • CYSTOSCOPY W/ URETERAL STENT REMOVAL  04/11/2013   • CYSTOSCOPY W/ URETERAL STENT REMOVAL Right 08/26/2014   • CYSTOSCOPY W/ URETEROSCOPY W/ LITHOTRIPSY  02/26/2013    Percutaneous lithotomy With Uretal Stent Plcement    • CYSTOSCOPY W/ URETEROSCOPY W/ LITHOTRIPSY  03/11/2014   • CYSTOSCOPY W/ URETEROSCOPY W/ LITHOTRIPSY Right 08/04/2014    With Uretal Stent Placement    • CYSTOSCOPY W/ URETEROSCOPY W/ LITHOTRIPSY Right 05/09/2016   • HERNIA REPAIR  03/11/2013   • IR NEPHROSTOMY TUBE CHECK/CHANGE/REPOSITION/REINSERTION/UPSIZE  11/22/2022   • IR NEPHROSTOMY TUBE CHECK/CHANGE/REPOSITION/REINSERTION/UPSIZE  02/13/2023   • IR NEPHROSTOMY TUBE CHECK/CHANGE/REPOSITION/REINSERTION/UPSIZE  04/28/2023   • IR NEPHROSTOMY TUBE PLACEMENT  08/20/2022   • KIDNEY SURGERY     • LITHOTRIPSY     • IA CYSTO/URETERO W/LITHOTRIPSY &INDWELL STENT INSRT Right 07/13/2016    Procedure: CYSTOSCOPY; URETEROSCOPY WITH HOLMIUM LASER STONE EXTRACTION; RETROGRADE PYELOGRAM; URETERAL STENT INSERTION ;  Surgeon: Jony Moseley MD;  Location[facility];  Service: Urology   • MA CYSTO/URETERO W/LITHOTRIPSY &INDWELL STENT INSRT Right 05/09/2016    Procedure: CYSTOSCOPY,  URETEROSCOPY,  WITH LITHOTRIPSY HOLMIUM LASER, STONE EXTRACTION, AND INSERTION STENT URETERAL;  Surgeon: Jony Moseley MD;  Location: [facility];  Service: Urology   • MA CYSTO/URETERO W/LITHOTRIPSY &INDWELL STENT INSRT Right 11/10/2022    Procedure: CYSTOSCOPY URETEROSCOPY  right RETROGRADE PYELOGRAM;  Surgeon: Luis Gregory MD;  Location: [facility];  Service: Urology   • MA LAPAROSCOPY RADICAL NEPHRECTOMY Right 5/22/2023    Procedure: NEPHRECTOMY RADICAL LAPAROSCOPIC W/ ROBOTICS;  Surgeon: Deann Lara MD;  Location: [facility];  Service: Urology   • MA LITHOTRIPSY [address] Right 06/17/2016    Procedure: Naheed Lynnette SHOCKWAVE (ESWL); Surgeon: Jony Moseley MD;  Location: [facility];  Service: Urology   • TRANSURETHRAL RESECTION OF BLADDER     • URETERAL REIMPLANTION  03/11/2013       Family History   Problem Relation Age of Onset   • No Known Problems Mother    • Heart attack Father      I have reviewed and agree with the history as documented  E-Cigarette/Vaping   • E-Cigarette Use Never User      E-Cigarette/Vaping Substances     Social History     Tobacco Use   • Smoking status: Every Day     Packs/day: 2 00     Years: 35 00     Total pack years: 70 00     Types: Cigarettes   • Smokeless tobacco: Never   Vaping Use   • Vaping Use: Never used   Substance Use Topics   • Alcohol use: Not Currently   • Drug use: Not Currently       Review of Systems   Constitutional: Positive for activity change, appetite change, chills and fever  Gastrointestinal: Positive for abdominal pain  Negative for nausea and vomiting  All other systems reviewed and are negative  Physical Exam  Physical Exam  Vitals reviewed     Constitutional: Appearance: He is ill-appearing  He is not diaphoretic  HENT:      Head: Normocephalic and atraumatic  Right Ear: External ear normal       Left Ear: External ear normal       Mouth/Throat:      Mouth: Mucous membranes are dry  Eyes:      General:         Right eye: No discharge  Left eye: No discharge  Extraocular Movements: Extraocular movements intact  Cardiovascular:      Rate and Rhythm: Normal rate and regular rhythm  Pulmonary:      Effort: Pulmonary effort is normal  No respiratory distress  Abdominal:      General: There is distension (Fullness under surgical site)  Palpations: Abdomen is soft  Tenderness: There is abdominal tenderness  There is no right CVA tenderness, left CVA tenderness, guarding or rebound  Musculoskeletal:         General: No deformity or signs of injury  Skin:     General: Skin is warm  Findings: Erythema (Significant erythema over right abdominal surgical site) present  Comments: Surgical site with erythema, no crepitus, no drainage, surgical glue appears to be intact over site; previous right nephrostomy site appears well without surrounding erythema or tenderness   Neurological:      General: No focal deficit present  Mental Status: He is alert  Mental status is at baseline           Vital Signs  ED Triage Vitals [06/05/23 1912]   Temperature Pulse Respirations Blood Pressure SpO2   99 °F (37 2 °C) 78 18 101/53 95 %      Temp Source Heart Rate Source Patient Position - Orthostatic VS BP Location FiO2 (%)   Tympanic Monitor Sitting Right arm --      Pain Score       9           Vitals:    06/05/23 1912 06/05/23 2228   BP: 101/53 111/63   Pulse: 78    Patient Position - Orthostatic VS: Sitting          Visual Acuity      ED Medications  Medications   acetaminophen (TYLENOL) tablet 975 mg (975 mg Oral Not Given 6/5/23 2122)   sodium chloride 0 9 % infusion (150 mL/hr Intravenous New Bag 6/5/23 2311)   sodium chloride 0 9 % bolus 1,000 mL (0 mL Intravenous Stopped 6/5/23 2237)   cefepime (MAXIPIME) IVPB (premix in dextrose) 2,000 mg 50 mL (0 mg Intravenous Stopped 6/5/23 2316)   vancomycin (VANCOCIN) 1000 mg in sodium chloride 0 9% 250 mL IVPB (1,000 mg Intravenous Given 6/5/23 2313)       Diagnostic Studies  Results Reviewed     Procedure Component Value Units Date/Time    CBC and differential [[id number]]  (Abnormal) Collected: 06/05/23 2056    Lab Status: Final result Specimen: Blood from Arm, Left Updated: 06/05/23 2155     WBC 17 53 Thousand/uL      RBC 4 51 Million/uL      Hemoglobin 13 5 g/dL      Hematocrit 41 4 %      MCV 92 fL      MCH 29 9 pg      MCHC 32 6 g/dL      RDW 15 3 %      MPV 8 5 fL      Platelets 581 Thousands/uL     Narrative: This is an appended report  These results have been appended to a previously verified report  Manual Differential(PHLEBS Do Not Order) [[id number]]  (Abnormal) Collected: 06/05/23 2056    Lab Status: Final result Specimen: Blood from Arm, Left Updated: 06/05/23 2155     Segmented % 81 %      Bands % 3 %      Lymphocytes % 8 %      Monocytes % 7 %      Eosinophils, % 1 %      Basophils % 0 %      Absolute Neutrophils 14 73 Thousand/uL      Lymphocytes Absolute 1 40 Thousand/uL      Monocytes Absolute 1 23 Thousand/uL      Eosinophils Absolute 0 18 Thousand/uL      Basophils Absolute 0 00 Thousand/uL      Total Counted --     RBC Morphology Normal     Platelet Estimate Adequate    Lactic acid [[id number]]  (Normal) Collected: 06/05/23 2056    Lab Status: Final result Specimen: Blood from Arm, Left Updated: 06/05/23 2135     LACTIC ACID 1 0 mmol/L     Narrative:      Result may be elevated if tourniquet was used during collection      Procalcitonin [[id number]]  (Normal) Collected: 06/05/23 2056    Lab Status: Final result Specimen: Blood from Arm, Left Updated: 06/05/23 2131     Procalcitonin 0 17 ng/ml     Comprehensive metabolic panel [[id number]]  (Abnormal) Collected: 06/05/23 2056    Lab Status: Final result Specimen: Blood from Arm, Left Updated: 06/05/23 2125     Sodium 131 mmol/L      Potassium 4 6 mmol/L      Chloride 96 mmol/L      CO2 29 mmol/L      ANION GAP 6 mmol/L      BUN 15 mg/dL      Creatinine 1 26 mg/dL      Glucose 90 mg/dL      Calcium 9 2 mg/dL      Corrected Calcium 9 7 mg/dL      AST 15 U/L      ALT 17 U/L      Alkaline Phosphatase 72 U/L      Total Protein 6 8 g/dL      Albumin 3 4 g/dL      Total Bilirubin 0 53 mg/dL      eGFR 62 ml/min/1 73sq m     Narrative:      [place] guidelines for Chronic Kidney Disease (CKD):   •  Stage 1 with normal or high GFR (GFR > 90 mL/min/1 73 square meters)  •  Stage 2 Mild CKD (GFR = 60-89 mL/min/1 73 square meters)  •  Stage 3A Moderate CKD (GFR = 45-59 mL/min/1 73 square meters)  •  Stage 3B Moderate CKD (GFR = 30-44 mL/min/1 73 square meters)  •  Stage 4 Severe CKD (GFR = 15-29 mL/min/1 73 square meters)  •  Stage 5 End Stage CKD (GFR <15 mL/min/1 73 square meters)  Note: GFR calculation is accurate only with a steady state creatinine    Protime-INR [[id number]]  (Abnormal) Collected: 06/05/23 2056    Lab Status: Final result Specimen: Blood from Arm, Left Updated: 06/05/23 2122     Protime 15 0 seconds      INR 1 16    APTT [[id number]]  (Normal) Collected: 06/05/23 2056    Lab Status: Final result Specimen: Blood from Arm, Left Updated: 06/05/23 2122     PTT 32 seconds     Blood culture #2 [[id number]] Collected: 06/05/23 2056    Lab Status: In process Specimen: Blood from Arm, Right Updated: 06/05/23 2106    Blood culture #1 [[id number]] Collected: 06/05/23 2056    Lab Status:  In process Specimen: Blood from Arm, Left Updated: 06/05/23 2106    UA w Reflex to Microscopic w Reflex to Culture [[id number]]     Lab Status: No result Specimen: Urine                  CT abdomen pelvis wo contrast   Final Result by Anibal Da Silva MD (06/05 2234)      Limited evaluation without intravenous contrast       Postsurgical changes with 8 3 cm fluid collection in the upper right ventral abdominal wall soft tissues with some mild adjacent stranding in the subcutaneous fat  Findings may reflect postoperative seroma although superimposed infection i e  developing    abscess difficult to exclude  Clinical correlation and surgical consult advised  Status post right radical nephrectomy with small amount of loculated fluid and trace free intraperitoneal air within the surgical resection bed, within limits of expected postoperative appearance (POD 14)  Cholelithiasis  Redemonstrated cirrhotic morphology of the liver  Tiny focus of antidependent air within the urinary bladder likely on the basis of recent instrumentation  Correlate with urinalysis to exclude infectious etiology  Constipation  Punctate nonobstructive left lower pole intrarenal calculus  Above findings discussed with Dr Hilary Clifton at 10:26 p m  on 6/5/2023  Workstation performed: [id number]                    Procedures  Procedures         ED Course  ED Course as of 06/05/23 2346 Mon Jun 05, 2023 2112 CBC and differential(!)  Leukocytosis, hgb stable   2117 Procedure Note: EKG  Date/Time: 06/05/23 9:17 PM   Interpreted by: Korin Yadav  Indications / Diagnosis: abd pain  ECG reviewed by me, the ED Provider: yes   The EKG demonstrates:  Rhythm: normal sinus  Intervals:   Axis: left axis  QRS/Blocks: wide QRS, non-specific IV delay  ST Changes: No acute ST Changes, no STD/BINU        2238 TT to uro to discuss CT findings, if they want pt to be transferred  2252 Per uro: Dr Ankit Bravo reviewed, no need to transfer unless no IR capabilities to drain abdominal wall abscess tomorrow  Otherwise, can admit to [facility] for abx, drainage, uro and IR consults                                             Medical Decision Making  58-year-old male presents for evaluation of abdominal wall  Patient underwent a nephrectomy on 5/22/2023    Patient does appear ill, will assess for infection, post surgical injury  Will provide IV fluids, obtain a CT imaging  Will assess renal function given history of nephrectomy, decreased oral intake and dry mucosa  Patient may require transfer to urology capable site  Amount and/or Complexity of Data Reviewed  Labs: ordered  Decision-making details documented in ED Course  Radiology: ordered  Risk  OTC drugs  Prescription drug management  Decision regarding hospitalization  Disposition  Final diagnoses:   Abscess of postoperative wound of abdominal wall     Time reflects when diagnosis was documented in both MDM as applicable and the Disposition within this note     Time User Action Codes Description Comment    6/5/2023 11:18 PM Michael Casas Add [L02 211] Abdominal wall abscess     6/5/2023 11:18 PM Michael Casas Remove [L02 211] Abdominal wall abscess     6/5/2023 11:18 PM Michael Casas Add [T81 49XA] Abscess of postoperative wound of abdominal wall       ED Disposition     ED Disposition   Admit    Condition   Stable    Date/Time   Mon Jun 5, 2023 11:18 PM    Comment   Case was discussed with PHILIPPE and the patient's admission status was agreed to be Admission Status: observation status to the service of Dr Kailey Carlisle   Follow-up Information    None         Patient's Medications   Discharge Prescriptions    No medications on file       No discharge procedures on file      PDMP Review       Value Time User    PDMP Reviewed  Yes 5/26/2023  7:30 PM Joaquín Montgomery MD          ED Provider  Electronically Signed by           Flores Rdo DO  06/05/23 5552

## 2023-06-06 NOTE — PLAN OF CARE
Problem: PHYSICAL THERAPY ADULT  Goal: Performs mobility at highest level of function for planned discharge setting  See evaluation for individualized goals  Description: Treatment/Interventions: Functional transfer training, LE strengthening/ROM, Elevations, Therapeutic exercise, Endurance training, Bed mobility, Gait training          See flowsheet documentation for full assessment, interventions and recommendations  Note: Prognosis: Good  Problem List: Decreased strength, Impaired balance, Decreased endurance, Decreased mobility, Decreased safety awareness  Assessment: Patient is a 61 y o  male evaluated by Physical Therapy s/p admit to [address] [place] [address],[address] on 6/5/2023 with admitting diagnosis of: Back pain, Abdominal wall abscess, Abscess of postoperative wound of abdominal wall, and principal problem of: Abdominal wall abscess  PT was consulted to assess patient's functional mobility and discharge needs  Ordered are PT Evaluation and treatment with activity level of: up and OOB as tolerated  Comorbidities affecting patient's physical performance at time of assessment include: HTN, hx of drug use, cognitive decline  Personal factors affecting the patient at time of IE include: lives in 2 story home, step(s) to enter home and inability/difficulty performing IADLs  Please locate objective findings from PT assessment regarding body systems outlined above  Upon evaluation, pt able to perform all functional mobility with SUP, increased time, and moderate verbal cuing  Pt appears angry throughout session surrounding his current medical status, however in agreement with participating in PT intervention  Pt able to ambulate 200' before taking seated rest break  Moderate postural sway demonstrated however no true LOB experienced  HR and SpO2 remained WFL on RA throughout  The patient's AM-PAC Basic Mobility Inpatient Short Form Raw Score is 21   A Raw score of greater than 16 suggests the patient may benefit from discharge to home  Please also refer to the recommendation of the Physical Therapist for safe discharge planning  Co treatment with OT secondary to complex medical condition of pt, possible A of 2 required to achieve and maintain transitional movements, requiring the need of skilled therapeutic intervention of 2 therapists to achieve delivery of services  Pt will benefit from continued PT intervention during LOS to address current deficits, increase LOF, and facilitate safe d/c to next level of care when medically appropriate  D/c recommendation at this time is home with outpatient rehabilitation services  PT Discharge Recommendation: Home with outpatient rehabilitation    See flowsheet documentation for full assessment

## 2023-06-06 NOTE — H&P
[address]  H&P  Name: Robb Calvillo 61 y o  male I MRN: [id number]  Unit/Bed#: XO41 I Date of Admission: 6/5/2023   Date of Service: 6/6/2023 I Hospital Day: 0      Assessment/Plan   * Abscess of postoperative wound of abdominal wall  Assessment & Plan  Patient presenting with pain at right lower quadrant abdominal incision for a few days though no purulence or bleeding at incision site  · CT abdomen pelvis shows postsurgical changes with 8 3 cm fluid collection in upper right ventral abdominal wall soft tissue with some mild adjacent stranding in the subcutaneous fat reflecting postoperative seroma versus developing abscess along with expected surrounding postoperative changes  · Elevated WBC 17 53  · S/p right radical nephrectomy on 5/22/2023 by urology for staghorn calculus and ureteral stricture  · ED provider contacted on-call urologist who recommended IR consult for drainage as patient is hemodynamically stable and does not require acute surgical intervention  · IR consulted for drainage  · Urology consulted for continued evaluation and management  · Received IV cefepime and IV vancomycin in ED, continue same antibiotics, pharmacy consulted for vancomycin management  · N p o  for intervention tomorrow     Methadone dependence   Assessment & Plan  Per recent epic encounter, patient is on methadone 110 mg daily by [facility] treatment center in liquid form-unable to call to confirm overnight so order not placed-we will need to confirm dosage and order daily methadone dosage  Continue home methadone 5 mg every 6 hours as needed for moderate pain per discharge orders from recent admission    Prolonged QT interval  Assessment & Plan  Prolonged QTc prolongation during recent admission so ordered EKG to recheck    Hyponatremia  Assessment & Plan  Na 131 on admission  Per recent admission notes, patient was found to be hyponatremic likely secondary to SIADH so fluid restriction was recommended  Fluid restriction 1500 cc to be ordered once patient is started on diet    Essential hypertension  Assessment & Plan  /53 on arrival  Continue home amlodipine 5 mg daily       VTE Pharmacologic Prophylaxis: VTE Score: 2 Low Risk (Score 0-2) - Encourage Ambulation  Code Status: Level 1 - Full Code      Anticipated Length of Stay: Patient will be admitted on an observation basis with an anticipated length of stay of less than 2 midnights secondary to Postop abdominal wall abscess  Total Time Spent on Date of Encounter in care of patient: 75 minutes This time was spent on one or more of the following: performing physical exam; counseling and coordination of care; obtaining or reviewing history; documenting in the medical record; reviewing/ordering tests, medications or procedures; communicating with other healthcare professionals and discussing with patient's family/caregivers  Chief Complaint: Abdominal pain at incision site    History of Present Illness:  Kandace Bains is a 61 y o  male with a PMH of HTN, hyponatremia likely secondary to SIADH, methadone dependence  Patient is presenting with his mom with concerns of increasing pain and erythema at surgical incision site after having a right radical nephrectomy done on 5/22/2023 due to staghorn calculus that was not adequately managed with nephrostomy tube this requiring a nephrectomy  Patient does report subjective fevers but has not checked temperature at home  Does report poor p o  intake though no nausea, vomiting, constipation, diarrhea  Review of Systems:  Review of Systems   Constitutional: Negative for chills and fever  HENT: Negative for ear pain and sore throat  Eyes: Negative for pain and visual disturbance  Respiratory: Negative for cough and shortness of breath  Cardiovascular: Negative for chest pain and palpitations  Gastrointestinal: Positive for abdominal pain  Negative for vomiting  Genitourinary: Negative for dysuria and hematuria  Musculoskeletal: Negative for arthralgias and back pain  Skin: Negative for color change and rash  Neurological: Negative for seizures and syncope  All other systems reviewed and are negative        Past Medical and Surgical History:   Past Medical History:   Diagnosis Date   • Anxiety    • Bright red rectal bleeding     Last Assessed: 9/9/2015    • Drug dependence ([facility] [address] ) 10/20/2021   • Hypertension     Last Assessed: 7/9/2014    • Kidney stone    • Kidney stones     Last Assessed: 11/17/2016    • Periorbital edema     Last Assessed: 9/4/2015   • Septic shock ([facility] [address] ) 08/20/2022   • Skin tag of anus     Last Assessed: 9/9/2015    • Trigger point of thoracic region     Last Assessed: 11/6/2015        Past Surgical History:   Procedure Laterality Date   • CYSTOSCOPY W/ URETERAL STENT PLACEMENT  03/11/2013   • CYSTOSCOPY W/ URETERAL STENT PLACEMENT  06/18/2014    EXTRACORPOREAL SHOCK WAVE LITHOTRIPSY    • CYSTOSCOPY W/ URETERAL STENT PLACEMENT  07/16/2014    EXTRACORPOREAL SHOCK WAVE LITHOTRIPSY    • CYSTOSCOPY W/ URETERAL STENT REMOVAL  04/11/2013   • CYSTOSCOPY W/ URETERAL STENT REMOVAL Right 08/26/2014   • CYSTOSCOPY W/ URETEROSCOPY W/ LITHOTRIPSY  02/26/2013    Percutaneous lithotomy With Uretal Stent Plcement    • CYSTOSCOPY W/ URETEROSCOPY W/ LITHOTRIPSY  03/11/2014   • CYSTOSCOPY W/ URETEROSCOPY W/ LITHOTRIPSY Right 08/04/2014    With Uretal Stent Placement    • CYSTOSCOPY W/ URETEROSCOPY W/ LITHOTRIPSY Right 05/09/2016   • HERNIA REPAIR  03/11/2013   • IR NEPHROSTOMY TUBE CHECK/CHANGE/REPOSITION/REINSERTION/UPSIZE  11/22/2022   • IR NEPHROSTOMY TUBE CHECK/CHANGE/REPOSITION/REINSERTION/UPSIZE  02/13/2023   • IR NEPHROSTOMY TUBE CHECK/CHANGE/REPOSITION/REINSERTION/UPSIZE  04/28/2023   • IR NEPHROSTOMY TUBE PLACEMENT  08/20/2022   • KIDNEY SURGERY     • LITHOTRIPSY     • NE CYSTO/URETERO W/LITHOTRIPSY &INDWELL STENT INSRT Right 07/13/2016    Procedure: CYSTOSCOPY; URETEROSCOPY WITH HOLMIUM LASER STONE EXTRACTION; RETROGRADE PYELOGRAM; URETERAL STENT INSERTION ;  Surgeon: Sergio López MD;  [facility];  Service: Urology   • AK CYSTO/URETERO W/LITHOTRIPSY &INDWELL STENT INSRT Right 05/09/2016    Procedure: CYSTOSCOPY,  URETEROSCOPY,  WITH LITHOTRIPSY HOLMIUM LASER, STONE EXTRACTION, AND INSERTION STENT URETERAL;  Surgeon: Sergio López MD;  Location[facility];  Service: Urology   • AK CYSTO/URETERO W/LITHOTRIPSY &INDWELL STENT INSRT Right 11/10/2022    Procedure: CYSTOSCOPY URETEROSCOPY  right RETROGRADE PYELOGRAM;  Surgeon: Teo Mendez MD;  Location: [facility];  Service: Urology   • AK LAPAROSCOPY RADICAL NEPHRECTOMY Right 5/22/2023    Procedure: NEPHRECTOMY RADICAL LAPAROSCOPIC W/ ROBOTICS;  Surgeon: Glory Benavides MD;  Location: [facility];  Service: Urology   • AK LITHOTRIPSY [address] Right 06/17/2016    Procedure: Mariella Falcon SHOCKWAVE (ESWL); Surgeon: Sergio López MD;  Location[facility];  Service: Urology   • TRANSURETHRAL RESECTION OF BLADDER     • URETERAL REIMPLANTION  03/11/2013       Meds/Allergies:  Prior to Admission medications    Medication Sig Start Date End Date Taking? Authorizing Provider   amLODIPine (NORVASC) 5 mg tablet Take 1 tablet (5 mg total) by mouth daily Do not start before May 30, 2023  5/30/23  Yes Bev Murray PA-C   METHADONE HCL PO Take 110 mg by mouth in the morning This dose was verified with Cici Knight, the director of the [facility]  Pt usually receives liquid form  Pt was instructed to go to clinic am of surgery, as usual, to receive his am dose and proceed to the hospital with an arrival time of 1015   [facility] opens at 0am   Yes Historical Provider, MD   oxyCODONE (ROXICODONE) 10 MG TABS Take 1 tablet (10 mg total) by mouth every 6 (six) hours as needed for severe pain for up to 10 days Max Daily Amount: 40 mg 5/29/23 6/8/23 Yes Pawel Moralez PA-C   methadone (DOLOPHINE) 10 MG/5ML solution Take 5 mg by mouth every 6 (six) hours as needed for moderate pain  Patient not taking: Reported on 5/30/2023    Historical Provider, MD   potassium-sodium phosphates (PHOS-NAK) 280 mg (P)-160 mg (Na)-250 mg (K) packet Take 1 packet by mouth 2 (two) times a day with meals for 4 doses 5/29/23 5/31/23  Pawel Moralez PA-C     I have reviewed home medications using recent Epic encounter  Allergies: Allergies   Allergen Reactions   • Bee Venom Anaphylaxis   • Metronidazole Itching and Swelling   • Nsaids GI Intolerance     Stomach irritant   • Penicillin G    • Penicillins GI Intolerance     Sick to stomach   • Pollen Extract    • Sulfa Antibiotics        Social History:  Marital Status:      Substance Use History:   Social History     Substance and Sexual Activity   Alcohol Use Not Currently     Social History     Tobacco Use   Smoking Status Every Day   • Packs/day: 2 00   • Years: 35 00   • Total pack years: 70 00   • Types: Cigarettes   Smokeless Tobacco Never     Social History     Substance and Sexual Activity   Drug Use Not Currently       Family History:  Family History   Problem Relation Age of Onset   • No Known Problems Mother    • Heart attack Father        Physical Exam:     Vitals:   Blood Pressure: 111/63 (06/05/23 2228)  Pulse: 78 (06/05/23 1912)  Temperature: 99 °F (37 2 °C) (06/05/23 1912)  Temp Source: Tympanic (06/05/23 1912)  Respirations: 18 (06/05/23 1912)  SpO2: 95 % (06/05/23 1912)    Physical Exam  Constitutional:       Appearance: Normal appearance  Cardiovascular:      Rate and Rhythm: Normal rate and regular rhythm  Pulmonary:      Effort: Pulmonary effort is normal  No respiratory distress  Breath sounds: Normal breath sounds  No wheezing  Abdominal:      General: Bowel sounds are normal        Skin:     General: Skin is warm and dry  Neurological:      General: No focal deficit present  Mental Status: He is alert and oriented to person, place, and time  Psychiatric:         Mood and Affect: Mood normal          Behavior: Behavior normal        Additional Data:     Lab Results:  Results from last 7 days   Lab Units 06/05/23 2056   BANDS PCT % 3   EOS PCT % 1   HEMATOCRIT % 41 4   HEMOGLOBIN g/dL 13 5   LYMPHO PCT % 8*   MONO PCT % 7   PLATELETS Thousands/uL 356   WBC Thousand/uL 17 53*     Results from last 7 days   Lab Units 06/05/23 2056   ANION GAP mmol/L 6   ALBUMIN g/dL 3 4*   ALK PHOS U/L 72   ALT U/L 17   AST U/L 15   BUN mg/dL 15   CALCIUM mg/dL 9 2   CHLORIDE mmol/L 96   CO2 mmol/L 29   CREATININE mg/dL 1 26   GLUCOSE RANDOM mg/dL 90   POTASSIUM mmol/L 4 6   SODIUM mmol/L 131*   TOTAL BILIRUBIN mg/dL 0 53     Results from last 7 days   Lab Units 06/05/23 2056   INR  1 16             Results from last 7 days   Lab Units 06/05/23 2056   LACTIC ACID mmol/L 1 0   PROCALCITONIN ng/ml 0 17       Lines/Drains:  Invasive Devices     Peripheral Intravenous Line  Duration           Peripheral IV 06/05/23 Dorsal (posterior); Left Forearm <1 day                    Imaging: Reviewed radiology reports from this admission including: abdominal/pelvic CT  CT abdomen pelvis wo contrast   Final Result by Arvin Mena MD (06/05 2234)      Limited evaluation without intravenous contrast       Postsurgical changes with 8 3 cm fluid collection in the upper right ventral abdominal wall soft tissues with some mild adjacent stranding in the subcutaneous fat  Findings may reflect postoperative seroma although superimposed infection i e  developing    abscess difficult to exclude  Clinical correlation and surgical consult advised  Status post right radical nephrectomy with small amount of loculated fluid and trace free intraperitoneal air within the surgical resection bed, within limits of expected postoperative appearance (POD 14)  Cholelithiasis  Redemonstrated cirrhotic morphology of the liver  Tiny focus of antidependent air within the urinary bladder likely on the basis of recent instrumentation  Correlate with urinalysis to exclude infectious etiology  Constipation  Punctate nonobstructive left lower pole intrarenal calculus  Above findings discussed with Dr Hilary Clifton at 10:26 p m  on 6/5/2023  Workstation performed: [id number]             EKG and Other Studies Reviewed on Admission:   · EKG: No EKG obtained  ** Please Note: This note has been constructed using a voice recognition system   **

## 2023-06-06 NOTE — PLAN OF CARE
Problem: PAIN - ADULT  Goal: Verbalizes/displays adequate comfort level or baseline comfort level  Description: Interventions:  - Encourage patient to monitor pain and request assistance  - Assess pain using appropriate pain scale  - Administer analgesics based on type and severity of pain and evaluate response  - Implement non-pharmacological measures as appropriate and evaluate response  - Consider cultural and social influences on pain and pain management  - Notify physician/advanced practitioner if interventions unsuccessful or patient reports new pain  Outcome: Progressing     Problem: INFECTION - ADULT  Goal: Absence or prevention of progression during hospitalization  Description: INTERVENTIONS:  - Assess and monitor for signs and symptoms of infection  - Monitor lab/diagnostic results  - Monitor all insertion sites, i e  indwelling lines, tubes, and drains  - Monitor endotracheal if appropriate and nasal secretions for changes in amount and color  - [place] appropriate cooling/warming therapies per order  - Administer medications as ordered  - Instruct and encourage patient and family to use good hand hygiene technique  - Identify and instruct in appropriate isolation precautions for identified infection/condition  Outcome: Progressing     Problem: SAFETY ADULT  Goal: Patient will remain free of falls  Description: INTERVENTIONS:  - Educate patient/family on patient safety including physical limitations  - Instruct patient to call for assistance with activity   - Consult OT/PT to assist with strengthening/mobility   - Keep Call bell within reach  - Keep bed low and locked with side rails adjusted as appropriate  - Keep care items and personal belongings within reach  - Initiate and maintain comfort rounds  - Make Fall Risk Sign visible to staff  - Offer Toileting every 1-2 Hours, in advance of need  - Initiate/Maintain bed and chair alarm  - Obtain necessary fall risk management equipment: fall socks and call bell in place  - Apply yellow socks and bracelet for high fall risk patients  - Consider moving patient to room near nurses station  Outcome: Progressing  Goal: Maintain or return to baseline ADL function  Description: INTERVENTIONS:  -  Assess patient's ability to carry out ADLs; assess patient's baseline for ADL function and identify physical deficits which impact ability to perform ADLs (bathing, care of mouth/teeth, toileting, grooming, dressing, etc )  - Assess/evaluate cause of self-care deficits   - Assess range of motion  - Assess patient's mobility; develop plan if impaired  - Assess patient's need for assistive devices and provide as appropriate  - Encourage maximum independence but intervene and supervise when necessary  - Involve family in performance of ADLs  - Assess for home care needs following discharge   - Consider OT consult to assist with ADL evaluation and planning for discharge  - Provide patient education as appropriate  Outcome: Progressing  Goal: Maintains/Returns to pre admission functional level  Description: INTERVENTIONS:  - Perform BMAT or MOVE assessment daily    - Set and communicate daily mobility goal to care team and patient/family/caregiver  - Collaborate with rehabilitation services on mobility goals if consulted  - Perform Range of Motion 3-4 times a day  - Reposition patient every 1-2 hours    - Dangle patient 3-4 times a day  - Stand patient 3-4 times a day  - Ambulate patient 3-4 times a day  - Out of bed to chair 3-4 times a day   - Out of bed for meals 3-4 times a day  - Out of bed for toileting  - Record patient progress and toleration of activity level   Outcome: Progressing     Problem: DISCHARGE PLANNING  Goal: Discharge to home or other facility with appropriate resources  Description: INTERVENTIONS:  - Identify barriers to discharge w/patient and caregiver  - Arrange for needed discharge resources and transportation as appropriate  - Identify discharge learning needs (meds, wound care, etc )  - Arrange for interpretive services to assist at discharge as needed  - Refer to Case Management Department for coordinating discharge planning if the patient needs post-hospital services based on physician/advanced practitioner order or complex needs related to functional status, cognitive ability, or social support system  Outcome: Progressing     Problem: Knowledge Deficit  Goal: Patient/family/caregiver demonstrates understanding of disease process, treatment plan, medications, and discharge instructions  Description: Complete learning assessment and assess knowledge base    Interventions:  - Provide teaching at level of understanding  - Provide teaching via preferred learning methods  Outcome: Progressing     Problem: GASTROINTESTINAL - ADULT  Goal: Minimal or absence of nausea and/or vomiting  Description: INTERVENTIONS:  - Administer IV fluids if ordered to ensure adequate hydration  - Maintain NPO status until nausea and vomiting are resolved  - Nasogastric tube if ordered  - Administer ordered antiemetic medications as needed  - Provide nonpharmacologic comfort measures as appropriate  - Advance diet as tolerated, if ordered  - Consider nutrition services referral to assist patient with adequate nutrition and appropriate food choices  Outcome: Progressing  Goal: Maintains or returns to baseline bowel function  Description: INTERVENTIONS:  - Assess bowel function  - Encourage oral fluids to ensure adequate hydration  - Administer IV fluids if ordered to ensure adequate hydration  - Administer ordered medications as needed  - Encourage mobilization and activity  - Consider nutritional services referral to assist patient with adequate nutrition and appropriate food choices  Outcome: Progressing  Goal: Maintains adequate nutritional intake  Description: INTERVENTIONS:  - Monitor percentage of each meal consumed  - Identify factors contributing to decreased intake, treat as appropriate  - Assist with meals as needed  - Monitor I&O, weight, and lab values if indicated  - Obtain nutrition services referral as needed  Outcome: Progressing  Goal: Establish and maintain optimal ostomy function  Description: INTERVENTIONS:  - Assess bowel function  - Encourage oral fluids to ensure adequate hydration  - Administer IV fluids if ordered to ensure adequate hydration   - Administer ordered medications as needed  - Encourage mobilization and activity  - Nutrition services referral to assist patient with appropriate food choices  - Assess stoma site  - Consider wound care consult   Outcome: Progressing  Goal: Oral mucous membranes remain intact  Description: INTERVENTIONS  - Assess oral mucosa and hygiene practices  - Implement preventative oral hygiene regimen  - Implement oral medicated treatments as ordered  - Initiate Nutrition services referral as needed  Outcome: Progressing     Problem: METABOLIC, FLUID AND ELECTROLYTES - ADULT  Goal: Electrolytes maintained within normal limits  Description: INTERVENTIONS:  - Monitor labs and assess patient for signs and symptoms of electrolyte imbalances  - Administer electrolyte replacement as ordered  - Monitor response to electrolyte replacements, including repeat lab results as appropriate  - Instruct patient on fluid and nutrition as appropriate  Outcome: Progressing  Goal: Fluid balance maintained  Description: INTERVENTIONS:  - Monitor labs   - Monitor I/O and WT  - Instruct patient on fluid and nutrition as appropriate  - Assess for signs & symptoms of volume excess or deficit  Outcome: Progressing  Goal: Glucose maintained within target range  Description: INTERVENTIONS:  - Monitor Blood Glucose as ordered  - Assess for signs and symptoms of hyperglycemia and hypoglycemia  - Administer ordered medications to maintain glucose within target range  - Assess nutritional intake and initiate nutrition service referral as needed  Outcome: Progressing

## 2023-06-06 NOTE — ASSESSMENT & PLAN NOTE
Na 131 on admission  Per recent admission notes, patient was found to be hyponatremic likely secondary to SIADH so fluid restriction was recommended  Fluid restriction 1500 cc to be ordered once patient is started on diet

## 2023-06-06 NOTE — ASSESSMENT & PLAN NOTE
Per recent epic encounter, patient is on methadone 110 mg daily by [facility] treatment center in liquid form-unable to call to confirm overnight so order not placed-we will need to confirm dosage and order daily methadone dosage  Continue home methadone 5 mg every 6 hours as needed for moderate pain per discharge orders from recent admission

## 2023-06-06 NOTE — BRIEF OP NOTE (RAD/CATH)
INTERVENTIONAL RADIOLOGY PROCEDURE NOTE    Date: 6/6/2023    Procedure: IR DRAINAGE TUBE PLACEMENT     Preoperative diagnosis:   1  Abscess of postoperative wound of abdominal wall    2  Abdominal wall abscess       Postoperative diagnosis: Same  Surgeon: Jody Leslie MD     Assistant: None  No qualified resident was available  Blood loss: minimal    Specimens: 90cc of pus    Findings: Successful US guided 10 Kinyarwanda drain placement into the right abdominal wall incisional abscess with 90cc of pus removed  Plan:  Tube to KATHY bulb suction, flush with 10cc NSS q8 hours in the hospital and qd at home and return in 2 weeks for possible tube removal if the collection drains < 10cc for 3 consecutive days  Complications: None immediate      Anesthesia: conscious sedation

## 2023-06-06 NOTE — UTILIZATION REVIEW
Initial Clinical Review    Admission: Date/Time/Statement:   Admission Orders (From admission, onward)     Ordered        06/05/23 2322  Place in Observation  Once                      Orders Placed This Encounter   Procedures   • Place in Observation     Standing Status:   Standing     Number of Occurrences:   1     Order Specific Question:   Level of Care     Answer:   Med Surg [16]     ED Arrival Information     Expected   -    Arrival   6/5/2023 18:56    Acuity   Emergent            Means of arrival   Walk-In    Escorted by   Family Member    Service   Hospitalist    Admission type   Emergency            Arrival complaint   Post Op Problem           Chief Complaint   Patient presents with   • Post-op Problem     Patient had a right nephrectomy done on May 22nd and presents with a lot of pain        Initial Presentation: 61 y o  male presents to ed from home for evaluation and treatment of right nephrectomy pain  Nephrectomy done on 5-22  Clinical assessment significant for pain 9/10 and erythema at surgical incision site  WBC 17 53  Imaging shows post surgical 8 3 dm fluid collection in the right ventral abdominal wall and right radical nephrectomy with small located fluid collection  Initially treated with iv  9% ns bolus, po tylenol, iv cefepime, iv vancomycin, iv  9% 150 /hr  Admit to observation for abdominal wall abscess  Date: 6-6-23   Day 2: observaiton  Urology consulted  Hemodynamically stable  Continue medical management  Continue iv cefepime and iv vancomycin  IR drainage today with tube placement to KATHY bulb suction  Plan for removal in two weeks if drains < 10 cc for 3 consecutive days         ED Triage Vitals [06/05/23 1912]   99 °F (37 2 °C) 78 18 101/53 95 %      Tympanic Monitor         Pain Score       9          06/06/23 65 2 kg (143 lb 11 8 oz)     Additional Vital Signs:     Date/Time Temp Pulse Resp BP MAP  SpO2 Nasal Cannula O2 Flow Rate (L/min) O2 Device   06/06/23 1405 -- 63 15 139/71 -- 97 % -- None (Room air)   06/06/23 1400 -- 65 15 138/70 -- 99 % 3 L/min Nasal cannula   06/06/23 1355 -- 62 14 144/71 -- 100 % 3 L/min Nasal cannula   06/06/23 1350 -- 61 18 147/72 -- 100 % 3 L/min Nasal cannula   06/06/23 1031 98 5 °F   (36 9 °C) -- 20 124/58 84 -- -- --   06/06/23 0716 97 2 °F   (36 2 °C)   Abnormal  59 18 148/72 -- -- -- --   06/05/23 2228 -- -- -- 111/63 -- -- -- --   06/05/23 1912 99 °F  (37 2 °C) 78 18 101/53 -- 95 % -- None (Room air)           Pertinent Labs/Diagnostic Test Results:     IR drainage tube placement         Successful placement of a 10 Maori catheter into a right anterior abdominal incisional soft tissue abscess under ultrasound control, and 90 mL of tan purulent fluid was aspirated and a specimen sent to the lab as described above  PLAN: Tube to KATHY bulb suction  Flush with 10 cc every 8 hours in the hospital and q  day at home  Return in 2 weeks for possible tube removal             CT abdomen pelvis wo contrast   Final  (06/05 2234)      Limited evaluation without intravenous contrast       Postsurgical changes with 8 3 cm fluid collection in the upper right ventral abdominal wall soft tissues with some mild adjacent stranding in the subcutaneous fat  Findings may reflect postoperative seroma although superimposed infection i e  developing    abscess difficult to exclude  Clinical correlation and surgical consult advised  Status post right radical nephrectomy with small amount of loculated fluid and trace free intraperitoneal air within the surgical resection bed, within limits of expected postoperative appearance (POD 14)  Cholelithiasis  Redemonstrated cirrhotic morphology of the liver  Tiny focus of antidependent air within the urinary bladder likely on the basis of recent instrumentation  Correlate with urinalysis to exclude infectious etiology  Constipation  Punctate nonobstructive left lower pole intrarenal calculus  Results from last 7 days   Lab Units 06/06/23 0907 06/05/23 2056   BANDS PCT %  --  3   HEMATOCRIT % 34 6* 41 4   HEMOGLOBIN g/dL 11 3* 13 5   PLATELETS Thousands/uL 293 356   WBC Thousand/uL 13 26* 17 53*         Results from last 7 days   Lab Units 06/06/23 0907 06/05/23 2056   ANION GAP mmol/L 5 6   BUN mg/dL 13 15   CALCIUM mg/dL 6 9* 9 2   CHLORIDE mmol/L 107 96   CO2 mmol/L 25 29   CREATININE mg/dL 0 92 1 26   EGFR ml/min/1 73sq m 91 62   POTASSIUM mmol/L 3 8 4 6   SODIUM mmol/L 137 131*     Results from last 7 days   Lab Units 06/06/23 0907 06/05/23 2056   ALBUMIN g/dL 2 4* 3 4*   ALK PHOS U/L 52 72   ALT U/L 11 17   AST U/L 10* 15   TOTAL BILIRUBIN mg/dL 0 48 0 53   TOTAL PROTEIN g/dL 4 6* 6 8         Results from last 7 days   Lab Units 06/06/23 0907 06/05/23 2056   GLUCOSE RANDOM mg/dL 75 90       Results from last 7 days   Lab Units 06/05/23 2056   INR  1 16   PROTIME seconds 15 0*   PTT seconds 32         Results from last 7 days   Lab Units 06/05/23 2056   PROCALCITONIN ng/ml 0 17     Results from last 7 days   Lab Units 06/05/23 2056   LACTIC ACID mmol/L 1 0       Results from last 7 days   Lab Units 06/06/23  0241   BILIRUBIN UA  Negative   BLOOD UA  Negative   CLARITY UA  Clear   COLOR UA  Yellow   GLUCOSE UA mg/dl Negative   KETONES UA mg/dl Negative   LEUKOCYTES UA  Negative   NITRITE UA  Negative   PH UA  6 0   PROTEIN UA mg/dl Negative   SPEC GRAV UA  1 015   UROBILINOGEN UA E U /dl 0 2       Results from last 7 days   Lab Units 06/05/23 2056   BLOOD CULTURE  Received in Microbiology Lab  Culture in Progress  Received in Microbiology Lab  Culture in Progress         ED Treatment:   Medication Administration from 06/05/2023 1856 to 06/06/2023 1025       Date/Time Order Dose Route Action     06/05/2023 2120 EDT sodium chloride 0 9 % bolus 1,000 mL 1,000 mL Intravenous New Bag     06/05/2023 2236 EDT cefepime (MAXIPIME) IVPB (premix in dextrose) 2,000 mg 50 mL 2,000 mg Intravenous New Bag     06/05/2023 2313 EDT vancomycin (VANCOCIN) 1000 mg in sodium chloride 0 9% 250 mL IVPB 1,000 mg Intravenous Given     06/06/2023 0237 EDT sodium chloride 0 9 % infusion 100 mL/hr Intravenous Rate/Dose Change     06/05/2023 2311 EDT sodium chloride 0 9 % infusion 150 mL/hr Intravenous New Bag     06/06/2023 1017 EDT amLODIPine (NORVASC) tablet 5 mg 5 mg Oral Given     06/06/2023 0131 EDT methadone (DOLOPHINE) tablet 5 mg 5 mg Oral Given     06/06/2023 1017 EDT nicotine (NICODERM CQ) 21 mg/24 hr TD 24 hr patch 1 patch 1 patch Transdermal Medication Applied     06/06/2023 1017 EDT methadone (DOLOPHINE) tablet 100 mg 100 mg Oral Given        Past Medical History:   Diagnosis Date   • Anxiety    • Bright red rectal bleeding     Last Assessed: 9/9/2015    • Drug dependence ([facility] [address] ) 10/20/2021   • Hypertension     Last Assessed: 7/9/2014    • Kidney stone    • Kidney stones     Last Assessed: 11/17/2016    • Periorbital edema     Last Assessed: 9/4/2015   • Septic shock ([facility] [address] ) 08/20/2022   • Skin tag of anus     Last Assessed: 9/9/2015    • Trigger point of thoracic region     Last Assessed: 11/6/2015      Present on Admission:  • Essential hypertension  • Hyponatremia  • Methadone dependence   • Prolonged QT interval      Admitting Diagnosis:       Back pain [M54 9]  Abdominal wall abscess [L02 211]  Abscess of postoperative wound of abdominal wall [T81 49XA]    Age/Sex: 61 y o  male    Scheduled Medications:  acetaminophen, 975 mg, Oral, Once  amLODIPine, 5 mg, Oral, Daily  cefepime, 2,000 mg, Intravenous, Q24H  methadone, 100 mg, Oral, Daily  nicotine, 1 patch, Transdermal, Daily  vancomycin, 750 mg, Intravenous, Q12H      Continuous IV Infusions:     PRN Meds:  acetaminophen, 650 mg, Oral, Q6H PRN        INPATIENT CONSULT TO IR  IP CONSULT TO PHARMACY  IP CONSULT TO UROLOGY  IP CONSULT TO CASE MANAGEMENT    Network Utilization Review Department  ATTENTION: Please call with any questions or concerns to [phone] and carefully listen to the prompts so that you are directed to the right person  All voicemails are confidential   Rian Martinez all requests for admission clinical reviews, approved or denied determinations and any other requests to dedicated fax number below belonging to the campus where the patient is receiving treatment   List of dedicated fax numbers for the Facilities:  [address] DENIALS (Administrative/Medical Necessity) [phone]   [address] (Maternity/NICU/Pediatrics) [phone]   [address] [place] [phone]   [facility] [phone]   [address] [place] [address] [facility] [phone]   1557 [facility] Stephan 134 815 [facility] [phone]

## 2023-06-06 NOTE — ASSESSMENT & PLAN NOTE
· Continue current broad-spectrum antibiotics and follow-up culture  Patient does have some surrounding induration, erythema about the previous incision site  We will consult infectious disease

## 2023-06-06 NOTE — CONSULTS
e-Consult (IPC)  - Interventional Radiology  Anahi Urias 61 y o  male MRN: [id number]  Unit/Bed#: FD81 Encounter: [id number]    Interventional Radiology has been consulted to evaluate Anahi Urias    We were consulted by Dr Lizy Piper concerning this patient with an abscess  Inpatient Consult to IR  Consult performed by: Ree Smith MD  Consult ordered by: Eliseo Funk DO        06/06/23    Assessment/Recommendation:   Recent right nephrectomy on 5/22/23 now with a right abdominal wall 8 3cm soft tissue fluid collection suspected to be an abscess for IR drainage  21-30 minutes, >50% of the total time devoted to medical consultative verbal/EMR discussion between providers  Written report will be generated in the EMR  Thank you for allowing Interventional Radiology to participate in the care of Anahi Urias  Please don't hesitate to call or TigerText us with any questions       Ree Smith MD

## 2023-06-07 LAB
ALBUMIN SERPL BCP-MCNC: 3.2 G/DL (ref 3.5–5)
ALP SERPL-CCNC: 67 U/L (ref 34–104)
ALT SERPL W P-5'-P-CCNC: 14 U/L (ref 7–52)
ANION GAP SERPL CALCULATED.3IONS-SCNC: 8 MMOL/L (ref 4–13)
AST SERPL W P-5'-P-CCNC: 16 U/L (ref 13–39)
BILIRUB SERPL-MCNC: 0.41 MG/DL (ref 0.2–1)
BUN SERPL-MCNC: 14 MG/DL (ref 5–25)
CALCIUM ALBUM COR SERPL-MCNC: 9.2 MG/DL (ref 8.3–10.1)
CALCIUM SERPL-MCNC: 8.6 MG/DL (ref 8.4–10.2)
CHLORIDE SERPL-SCNC: 97 MMOL/L (ref 96–108)
CO2 SERPL-SCNC: 27 MMOL/L (ref 21–32)
CREAT SERPL-MCNC: 1.16 MG/DL (ref 0.6–1.3)
ERYTHROCYTE [DISTWIDTH] IN BLOOD BY AUTOMATED COUNT: 14.7 % (ref 11.6–15.1)
GFR SERPL CREATININE-BSD FRML MDRD: 69 ML/MIN/1.73SQ M
GLUCOSE SERPL-MCNC: 105 MG/DL (ref 65–140)
HCT VFR BLD AUTO: 36.6 % (ref 36.5–49.3)
HGB BLD-MCNC: 11.9 G/DL (ref 12–17)
MCH RBC QN AUTO: 29.3 PG (ref 26.8–34.3)
MCHC RBC AUTO-ENTMCNC: 32.5 G/DL (ref 31.4–37.4)
MCV RBC AUTO: 90 FL (ref 82–98)
PLATELET # BLD AUTO: 342 THOUSANDS/UL (ref 149–390)
PMV BLD AUTO: 8.5 FL (ref 8.9–12.7)
POTASSIUM SERPL-SCNC: 4.1 MMOL/L (ref 3.5–5.3)
PROT SERPL-MCNC: 6.3 G/DL (ref 6.4–8.4)
RBC # BLD AUTO: 4.06 MILLION/UL (ref 3.88–5.62)
SODIUM SERPL-SCNC: 132 MMOL/L (ref 135–147)
VANCOMYCIN SERPL-MCNC: 15.4 UG/ML (ref 10–20)
WBC # BLD AUTO: 13.01 THOUSAND/UL (ref 4.31–10.16)

## 2023-06-07 PROCEDURE — 80202 ASSAY OF VANCOMYCIN: CPT | Performed by: INTERNAL MEDICINE

## 2023-06-07 PROCEDURE — NC001 PR NO CHARGE

## 2023-06-07 PROCEDURE — NC001 PR NO CHARGE: Performed by: UROLOGY

## 2023-06-07 PROCEDURE — 85027 COMPLETE CBC AUTOMATED: CPT | Performed by: INTERNAL MEDICINE

## 2023-06-07 PROCEDURE — 80053 COMPREHEN METABOLIC PANEL: CPT | Performed by: INTERNAL MEDICINE

## 2023-06-07 PROCEDURE — 99232 SBSQ HOSP IP/OBS MODERATE 35: CPT | Performed by: INTERNAL MEDICINE

## 2023-06-07 RX ORDER — HYDROMORPHONE HYDROCHLORIDE 2 MG/1
4 TABLET ORAL EVERY 6 HOURS PRN
Status: DISCONTINUED | OUTPATIENT
Start: 2023-06-07 | End: 2023-06-09 | Stop reason: HOSPADM

## 2023-06-07 RX ORDER — AMLODIPINE BESYLATE 2.5 MG/1
2.5 TABLET ORAL DAILY
Status: DISCONTINUED | OUTPATIENT
Start: 2023-06-08 | End: 2023-06-09 | Stop reason: HOSPADM

## 2023-06-07 RX ORDER — HYDROMORPHONE HYDROCHLORIDE 2 MG/1
6 TABLET ORAL EVERY 6 HOURS PRN
Status: DISCONTINUED | OUTPATIENT
Start: 2023-06-07 | End: 2023-06-07

## 2023-06-07 RX ADMIN — CEFEPIME HYDROCHLORIDE 2000 MG: 2 INJECTION, SOLUTION INTRAVENOUS at 21:48

## 2023-06-07 RX ADMIN — HYDROMORPHONE HYDROCHLORIDE 6 MG: 2 TABLET ORAL at 00:22

## 2023-06-07 RX ADMIN — HYDROMORPHONE HYDROCHLORIDE 6 MG: 2 TABLET ORAL at 07:21

## 2023-06-07 RX ADMIN — AMLODIPINE BESYLATE 5 MG: 5 TABLET ORAL at 09:36

## 2023-06-07 RX ADMIN — HYDROMORPHONE HYDROCHLORIDE 6 MG: 2 TABLET ORAL at 15:48

## 2023-06-07 RX ADMIN — METHADONE HYDROCHLORIDE 110 MG: 10 TABLET ORAL at 09:36

## 2023-06-07 RX ADMIN — VANCOMYCIN HYDROCHLORIDE 750 MG: 1 INJECTION, POWDER, LYOPHILIZED, FOR SOLUTION INTRAVENOUS at 09:39

## 2023-06-07 RX ADMIN — HYDROMORPHONE HYDROCHLORIDE 4 MG: 2 TABLET ORAL at 21:48

## 2023-06-07 RX ADMIN — VANCOMYCIN HYDROCHLORIDE 750 MG: 1 INJECTION, POWDER, LYOPHILIZED, FOR SOLUTION INTRAVENOUS at 22:23

## 2023-06-07 NOTE — PROGRESS NOTES
[address] [facility] [address] [place]  Progress Note  Name: Trista Sanchez  MRN: [id number]  Unit/Bed#:  I Date of Admission: 2023   Date of Service: 2023 I Hospital Day: 0    Assessment/Plan   * Abscess of postoperative wound of abdominal wall  Assessment & Plan  · Continue current broad-spectrum antibiotics and follow-up culture  Patient does have some surrounding induration, erythema about the previous incision site  We will consult infectious disease  Methadone dependence   Assessment & Plan  Continue methadone  Prolonged QT interval  Assessment & Plan  Prolonged QTc prolongation during recent admission so ordered EKG to recheck    Hyponatremia  Assessment & Plan  Restart fluid restriction, follow-up a m  labs    Essential hypertension  Assessment & Plan  /53 on arrival  Monitor blood pressure and avoid hypotension  Urology note reviewed, case discussed with nursing team, follow-up cultures  Code Status: Level 1 - Full Code    Subjective:   Patient having mild to moderate pain today    Objective:     Vitals:   Temp (24hrs), Av 5 °F (36 4 °C), Min:96 4 °F (35 8 °C), Max:98 3 °F (36 8 °C)    Temp:  [96 4 °F (35 8 °C)-98 3 °F (36 8 °C)] 96 4 °F (35 8 °C)  HR:  [53] 53  Resp:  [18] 18  BP: (112-143)/(58-69) 112/58  SpO2:  [95 %-97 %] 97 %  Body mass index is 22 37 kg/m²  Input and Output Summary (last 24 hours): Intake/Output Summary (Last 24 hours) at 2023 1825  Last data filed at 2023 1817  Gross per 24 hour   Intake 1080 ml   Output 1515 ml   Net -435 ml       Physical Exam:   Physical Exam  Vitals and nursing note reviewed  HENT:      Head: Normocephalic  Cardiovascular:      Rate and Rhythm: Normal rate  Pulses: Normal pulses  Abdominal:      Comments: KATHY drain in place draining serosanguineous fluid    Healing abdominal wound, surrounding induration and erythema is present, mild tenderness to palpation     Neurological:      General: No focal deficit present  Mental Status: He is alert  Additional Data:     Labs:  Results from last 7 days   Lab Units 06/07/23  0448 06/06/23  0907 06/05/23 2056   BANDS PCT %  --   --  3   EOS PCT %  --  3 1   HEMATOCRIT % 36 6 34 6* 41 4   HEMOGLOBIN g/dL 11 9* 11 3* 13 5   LYMPHO PCT %  --  7* 8*   MONO PCT %  --  9 7   PLATELETS Thousands/uL 342 293 356   WBC Thousand/uL 13 01* 13 26* 17 53*     Results from last 7 days   Lab Units 06/07/23  0448   ANION GAP mmol/L 8   ALBUMIN g/dL 3 2*   ALK PHOS U/L 67   ALT U/L 14   AST U/L 16   BUN mg/dL 14   CALCIUM mg/dL 8 6   CHLORIDE mmol/L 97   CO2 mmol/L 27   CREATININE mg/dL 1 16   GLUCOSE RANDOM mg/dL 105   POTASSIUM mmol/L 4 1   SODIUM mmol/L 132*   TOTAL BILIRUBIN mg/dL 0 41     Results from last 7 days   Lab Units 06/05/23 2056   INR  1 16             Results from last 7 days   Lab Units 06/05/23 2056   LACTIC ACID mmol/L 1 0   PROCALCITONIN ng/ml 0 17       Lines/Drains:  Invasive Devices     Peripheral Intravenous Line  Duration           Peripheral IV 06/05/23 Dorsal (posterior); Left Forearm 1 day          Drain  Duration           Abscess Drain RLQ 1 day                      Imaging: No pertinent imaging reviewed  Recent Cultures (last 7 days):   Results from last 7 days   Lab Units 06/06/23  1408 06/05/23 2056   BLOOD CULTURE   --  No Growth at 24 hrs  No Growth at 24 hrs     BODY FLUID CULTURE, STERILE  Culture too young- will reincubate  --    GRAM STAIN RESULT  4+ Polys*  3+ Gram positive cocci in clusters*  --        Last 24 Hours Medication List:   Current Facility-Administered Medications   Medication Dose Route Frequency Provider Last Rate   • acetaminophen  650 mg Oral Q6H PRN Braulio Monaco DO     • amLODIPine  5 mg Oral Daily Marah Sidhu, DO     • cefepime  2,000 mg Intravenous Q24H Marah Sidhu, DO 2,000 mg (06/06/23 2132)   • HYDROmorphone  6 mg Oral Q6H PRN Fermín Barfield, DO     • methadone  110 mg Oral Daily Fermín DO Carolyne     • nicotine  1 patch Transdermal Daily Marah Sidhu DO     • vancomycin  750 mg Intravenous Q12H Marah Sidhu  mg (06/07/23 0193)        Today, Patient Was Seen By: Job Diaz DO    **Please Note: This note may have been constructed using a voice recognition system  **

## 2023-06-07 NOTE — PROGRESS NOTES
John Burr is a 61 y o  male who is currently ordered Vancomycin IV with management by the Pharmacy Consult service  Relevant clinical data and objective / subjective history reviewed  Vancomycin Assessment:  Indication and Goal AUC/Trough: Soft tissue (goal -600, trough >10), -600, trough >10  Clinical Status: stable  Micro:     Renal Function:  SCr: 1 16 mg/dL  CrCl: 63 6 mL/min  Renal replacement: Not on dialysis  Days of Therapy: 3  Current Dose: 750mg q12h  Vancomycin Plan:  New Dosing: same  Estimated AUC: 555 mcg*hr/mL  Estimated Trough: 18 5 mcg/mL  Next Level: 0600 on 6/12/23  Renal Function Monitoring: Daily BMP and [place] will continue to follow closely for s/sx of nephrotoxicity, infusion reactions and appropriateness of therapy  BMP and CBC will be ordered per protocol  We will continue to follow the patient’s culture results and clinical progress daily      Bandar Hess, Pharmacist

## 2023-06-07 NOTE — PROGRESS NOTES
Progress Note - UROLOGY   Jordan Bennett 61 y o  male MRN: [id number]  Unit/Bed#:  Encounter: [id number]    Assessment:  Postprocedure day #1 status post IR drainage of abdominal wall abscess, 90 mL of donna pus drained  Preliminary body fluid culture and Gram stain 4+ polys, 3+ gram-positive cocci in clusters  Blood cultures x2 obtained prior to initiation of antibiotics, no growth at 24 hours both  Status post robotic assisted laparoscopic right radical nephrectomy by Dr Roshan Ma 5/23/2023 at [place] [address] for nonfunctioning right kidney with staghorn calculus  Patient's postoperative course complicated with metabolic encephalopathy  He was discharged from [place] on May 29th      History of drug dependency on methadone treatment  Previous history of bright red rectal bleeding 2021  Essential hypertension  History of multiple kidney stones and as wall shockwave lithotripsy     WBC today 13 01, on admission 17 53     Vital signs reviewed, afebrile  Hemodynamically stable  Most recent temp 98 3    Abdominal wall drain output 155 mL lightly bloody purulent fluid since yesterday  Plan:  Maintain percutaneous drain to gravity and monitor output  IR instructions for drain to KATHY bulb suction, flush with 10 mL normal saline every 8 hours in the hospital and daily at home, return for tube check and possible removal in 2 weeks with IR if the fluid collection drains less than 10 mL daily for 3 consecutive days  Await body fluid culture and Gram stain report, drug sensitivities  Continue present IV antibiotics, cefepime and vancomycin  NicoDerm CQ nicotine patch replacement topically daily  Analgesics and antiemetics as ordered as needed  Regular house diet as tolerated  Medical management the direction the attending hospitalist physician  Resume home meds, patient is on methadone 110 mg daily which has been continued    Repeat a m  labs including CBC  The patient has an existing "follow-up appointment with Dr Chasity Levin on Annetta 15 at 11:15 AM at the [facility] for urology, [facility]  He needs to maintain follow-up with urologist after hospital discharge as scheduled  Subjective/Objective      Chief Complaint: No overnight events  No report of any fever  Subjective: 58-year-old male admitted to the hospital with abdominal wall abscess, he underwent IR drainage yesterday with 90 mL of donna pus drained, surgical drain in place  Scheduled Meds:  Current Facility-Administered Medications   Medication Dose Route Frequency Provider Last Rate   • acetaminophen  650 mg Oral Q6H PRN Braulio Monaco, DO     • amLODIPine  5 mg Oral Daily Marah Sidhu, DO     • cefepime  2,000 mg Intravenous Q24H Marah Sidhu, DO 2,000 mg (06/06/23 2132)   • HYDROmorphone  6 mg Oral Q6H PRN Fermín Banai, DO     • methadone  110 mg Oral Daily Fermín Banai, DO     • nicotine  1 patch Transdermal Daily Marah Sidhu, DO     • vancomycin  750 mg Intravenous Q12H Marah Sidhu,  mg (06/07/23 0939)     Continuous Infusions:   PRN Meds: •  acetaminophen  •  HYDROmorphone      Objective:     Blood pressure 120/61, pulse 62, temperature 98 3 °F (36 8 °C), temperature source Temporal, resp  rate 18, height 5' 7\" (1 702 m), weight 64 8 kg (142 lb 13 7 oz), SpO2 95 %  ,Body mass index is 22 37 kg/m²  I/O       06/05 0701  06/06 0700 06/06 0701  06/07 0700 06/07 0701  06/08 0700    P  O   540 360    IV Piggyback 1050      Total Intake(mL/kg) 1050 540 (8 3) 360 (5 6)    Urine (mL/kg/hr) 300 200 (0 1)     Drains  155     Total Output 300 355     Net +750 +185 +360           Unmeasured Urine Occurrence  1 x           Invasive Devices     Peripheral Intravenous Line  Duration           Peripheral IV 06/05/23 Dorsal (posterior); Left Forearm 1 day          Drain  Duration           Abscess Drain RLQ <1 day                Physical Exam:  General no acute distress  Skin warm dry touch  No rash or pallor    ENT " clear   Heart RRR  Lungs clear to auscultation  Abdomen positive bowel sounds  Erythema still noted around periwound right upper quadrant with tenderness to palpation  No wound disruption or drainage  Some fullness but no crepitus  Bulb grenade with about 40 mL of lightly bloody purulent pus in the right upper quadrant  Extremities no calf tenderness, no peripheral edema  Neurological exam fully oriented, mental status appropriate  The patient is ambulatory  Lab, Imaging and other studies:  I have personally reviewed pertinent lab results  , CBC:   Lab Results   Component Value Date    HCT 36 6 06/07/2023    HGB 11 9 (L) 06/07/2023    MCH 29 3 06/07/2023    MCHC 32 5 06/07/2023    MCV 90 06/07/2023    MPV 8 5 (L) 06/07/2023     06/07/2023    RBC 4 06 06/07/2023    RDW 14 7 06/07/2023    WBC 13 01 (H) 06/07/2023   , CMP:   Lab Results   Component Value Date    ALKPHOS 67 06/07/2023    ALT 14 06/07/2023    AST 16 06/07/2023    BUN 14 06/07/2023    CALCIUM 8 6 06/07/2023    CL 97 06/07/2023    CO2 27 06/07/2023    CREATININE 1 16 06/07/2023    EGFR 69 06/07/2023    K 4 1 06/07/2023    SODIUM 132 (L) 06/07/2023     Body fluid culture and Gram stain  Order: [id number]   Status: Preliminary result      Visible to patient: No (not released)      Next appt: 06/13/2023 at 02:30 PM in Nephrology [facility]     Specimen Information: Abscess; Body Fluid    0 Result Notes  GRAM STAIN RESULT   Abnormal   4+ Polys      3+ Gram positive cocci in clusters                    Specimen Collected: 06/06/23 14:08 Last Resulted: 06/07/23 01:35           VTE Pharmacologic Prophylaxis: The patient is ambulatory, not initiated by medicine service as the patient is considered low risk    VTE Mechanical Prophylaxis: sequential compression device     Roddy Rascon PA-C

## 2023-06-07 NOTE — DISCHARGE INSTR - AVS FIRST PAGE
The patient has an existing follow-up appointment with Dr Shannon Vázquez on Annetta 15 at 11:15 AM at the [facility] for urology, [place] End  He needs to maintain follow-up with urologist after hospital discharge as scheduled

## 2023-06-07 NOTE — PLAN OF CARE
Problem: PAIN - ADULT  Goal: Verbalizes/displays adequate comfort level or baseline comfort level  Description: Interventions:  - Encourage patient to monitor pain and request assistance  - Assess pain using appropriate pain scale  - Administer analgesics based on type and severity of pain and evaluate response  - Implement non-pharmacological measures as appropriate and evaluate response  - Consider cultural and social influences on pain and pain management  - Notify physician/advanced practitioner if interventions unsuccessful or patient reports new pain  Outcome: Progressing     Problem: INFECTION - ADULT  Goal: Absence or prevention of progression during hospitalization  Description: INTERVENTIONS:  - Assess and monitor for signs and symptoms of infection  - Monitor lab/diagnostic results  - Monitor all insertion sites, i e  indwelling lines, tubes, and drains  - Monitor endotracheal if appropriate and nasal secretions for changes in amount and color  - [place] appropriate cooling/warming therapies per order  - Administer medications as ordered  - Instruct and encourage patient and family to use good hand hygiene technique  - Identify and instruct in appropriate isolation precautions for identified infection/condition  Outcome: Progressing     Problem: SAFETY ADULT  Goal: Patient will remain free of falls  Description: INTERVENTIONS:  - Educate patient/family on patient safety including physical limitations  - Instruct patient to call for assistance with activity   - Consult OT/PT to assist with strengthening/mobility   - Keep Call bell within reach  - Keep bed low and locked with side rails adjusted as appropriate  - Keep care items and personal belongings within reach  - Initiate and maintain comfort rounds  - Make Fall Risk Sign visible to staff  - Offer Toileting every 1-2 Hours, in advance of need  - Initiate/Maintain bed and chair alarm  - Obtain necessary fall risk management equipment: fall socks and call bell in place  - Apply yellow socks and bracelet for high fall risk patients  - Consider moving patient to room near nurses station  Outcome: Progressing  Goal: Maintain or return to baseline ADL function  Description: INTERVENTIONS:  -  Assess patient's ability to carry out ADLs; assess patient's baseline for ADL function and identify physical deficits which impact ability to perform ADLs (bathing, care of mouth/teeth, toileting, grooming, dressing, etc )  - Assess/evaluate cause of self-care deficits   - Assess range of motion  - Assess patient's mobility; develop plan if impaired  - Assess patient's need for assistive devices and provide as appropriate  - Encourage maximum independence but intervene and supervise when necessary  - Involve family in performance of ADLs  - Assess for home care needs following discharge   - Consider OT consult to assist with ADL evaluation and planning for discharge  - Provide patient education as appropriate  Outcome: Progressing  Goal: Maintains/Returns to pre admission functional level  Description: INTERVENTIONS:  - Perform BMAT or MOVE assessment daily    - Set and communicate daily mobility goal to care team and patient/family/caregiver  - Collaborate with rehabilitation services on mobility goals if consulted  - Perform Range of Motion 3-4 times a day  - Reposition patient every 1-2 hours    - Dangle patient 3-4 times a day  - Stand patient 3-4 times a day  - Ambulate patient 3-4 times a day  - Out of bed to chair 3-4 times a day   - Out of bed for meals 3-4 times a day  - Out of bed for toileting  - Record patient progress and toleration of activity level   Outcome: Progressing     Problem: DISCHARGE PLANNING  Goal: Discharge to home or other facility with appropriate resources  Description: INTERVENTIONS:  - Identify barriers to discharge w/patient and caregiver  - Arrange for needed discharge resources and transportation as appropriate  - Identify discharge learning needs (meds, wound care, etc )  - Arrange for interpretive services to assist at discharge as needed  - Refer to Case Management Department for coordinating discharge planning if the patient needs post-hospital services based on physician/advanced practitioner order or complex needs related to functional status, cognitive ability, or social support system  Outcome: Progressing     Problem: Knowledge Deficit  Goal: Patient/family/caregiver demonstrates understanding of disease process, treatment plan, medications, and discharge instructions  Description: Complete learning assessment and assess knowledge base    Interventions:  - Provide teaching at level of understanding  - Provide teaching via preferred learning methods  Outcome: Progressing     Problem: GASTROINTESTINAL - ADULT  Goal: Minimal or absence of nausea and/or vomiting  Description: INTERVENTIONS:  - Administer IV fluids if ordered to ensure adequate hydration  - Maintain NPO status until nausea and vomiting are resolved  - Nasogastric tube if ordered  - Administer ordered antiemetic medications as needed  - Provide nonpharmacologic comfort measures as appropriate  - Advance diet as tolerated, if ordered  - Consider nutrition services referral to assist patient with adequate nutrition and appropriate food choices  Outcome: Progressing  Goal: Maintains or returns to baseline bowel function  Description: INTERVENTIONS:  - Assess bowel function  - Encourage oral fluids to ensure adequate hydration  - Administer IV fluids if ordered to ensure adequate hydration  - Administer ordered medications as needed  - Encourage mobilization and activity  - Consider nutritional services referral to assist patient with adequate nutrition and appropriate food choices  Outcome: Progressing  Goal: Maintains adequate nutritional intake  Description: INTERVENTIONS:  - Monitor percentage of each meal consumed  - Identify factors contributing to decreased intake, treat as appropriate  - Assist with meals as needed  - Monitor I&O, weight, and lab values if indicated  - Obtain nutrition services referral as needed  Outcome: Progressing  Goal: Establish and maintain optimal ostomy function  Description: INTERVENTIONS:  - Assess bowel function  - Encourage oral fluids to ensure adequate hydration  - Administer IV fluids if ordered to ensure adequate hydration   - Administer ordered medications as needed  - Encourage mobilization and activity  - Nutrition services referral to assist patient with appropriate food choices  - Assess stoma site  - Consider wound care consult   Outcome: Progressing  Goal: Oral mucous membranes remain intact  Description: INTERVENTIONS  - Assess oral mucosa and hygiene practices  - Implement preventative oral hygiene regimen  - Implement oral medicated treatments as ordered  - Initiate Nutrition services referral as needed  Outcome: Progressing     Problem: METABOLIC, FLUID AND ELECTROLYTES - ADULT  Goal: Electrolytes maintained within normal limits  Description: INTERVENTIONS:  - Monitor labs and assess patient for signs and symptoms of electrolyte imbalances  - Administer electrolyte replacement as ordered  - Monitor response to electrolyte replacements, including repeat lab results as appropriate  - Instruct patient on fluid and nutrition as appropriate  Outcome: Progressing  Goal: Fluid balance maintained  Description: INTERVENTIONS:  - Monitor labs   - Monitor I/O and WT  - Instruct patient on fluid and nutrition as appropriate  - Assess for signs & symptoms of volume excess or deficit  Outcome: Progressing  Goal: Glucose maintained within target range  Description: INTERVENTIONS:  - Monitor Blood Glucose as ordered  - Assess for signs and symptoms of hyperglycemia and hypoglycemia  - Administer ordered medications to maintain glucose within target range  - Assess nutritional intake and initiate nutrition service referral as needed  Outcome: Progressing

## 2023-06-07 NOTE — PLAN OF CARE
Problem: PAIN - ADULT  Goal: Verbalizes/displays adequate comfort level or baseline comfort level  Description: Interventions:  - Encourage patient to monitor pain and request assistance  - Assess pain using appropriate pain scale  - Administer analgesics based on type and severity of pain and evaluate response  - Implement non-pharmacological measures as appropriate and evaluate response  - Consider cultural and social influences on pain and pain management  - Notify physician/advanced practitioner if interventions unsuccessful or patient reports new pain  Outcome: Progressing     Problem: INFECTION - ADULT  Goal: Absence or prevention of progression during hospitalization  Description: INTERVENTIONS:  - Assess and monitor for signs and symptoms of infection  - Monitor lab/diagnostic results  - Monitor all insertion sites, i e  indwelling lines, tubes, and drains  - Monitor endotracheal if appropriate and nasal secretions for changes in amount and color  - [place] appropriate cooling/warming therapies per order  - Administer medications as ordered  - Instruct and encourage patient and family to use good hand hygiene technique  - Identify and instruct in appropriate isolation precautions for identified infection/condition  Outcome: Progressing     Problem: SAFETY ADULT  Goal: Patient will remain free of falls  Description: INTERVENTIONS:  - Educate patient/family on patient safety including physical limitations  - Instruct patient to call for assistance with activity   - Consult OT/PT to assist with strengthening/mobility   - Keep Call bell within reach  - Keep bed low and locked with side rails adjusted as appropriate  - Keep care items and personal belongings within reach  - Initiate and maintain comfort rounds  - Make Fall Risk Sign visible to staff  - Offer Toileting every 1-2 Hours, in advance of need  - Initiate/Maintain bed and chair alarm  - Obtain necessary fall risk management equipment: fall socks and call bell in place  - Apply yellow socks and bracelet for high fall risk patients  - Consider moving patient to room near nurses station  Outcome: Progressing  Goal: Maintain or return to baseline ADL function  Description: INTERVENTIONS:  -  Assess patient's ability to carry out ADLs; assess patient's baseline for ADL function and identify physical deficits which impact ability to perform ADLs (bathing, care of mouth/teeth, toileting, grooming, dressing, etc )  - Assess/evaluate cause of self-care deficits   - Assess range of motion  - Assess patient's mobility; develop plan if impaired  - Assess patient's need for assistive devices and provide as appropriate  - Encourage maximum independence but intervene and supervise when necessary  - Involve family in performance of ADLs  - Assess for home care needs following discharge   - Consider OT consult to assist with ADL evaluation and planning for discharge  - Provide patient education as appropriate  Outcome: Progressing  Goal: Maintains/Returns to pre admission functional level  Description: INTERVENTIONS:  - Perform BMAT or MOVE assessment daily    - Set and communicate daily mobility goal to care team and patient/family/caregiver  - Collaborate with rehabilitation services on mobility goals if consulted  - Perform Range of Motion 3-4 times a day  - Reposition patient every 1-2 hours    - Dangle patient 3-4 times a day  - Stand patient 3-4 times a day  - Ambulate patient 3-4 times a day  - Out of bed to chair 3-4 times a day   - Out of bed for meals 3-4 times a day  - Out of bed for toileting  - Record patient progress and toleration of activity level   Outcome: Progressing     Problem: DISCHARGE PLANNING  Goal: Discharge to home or other facility with appropriate resources  Description: INTERVENTIONS:  - Identify barriers to discharge w/patient and caregiver  - Arrange for needed discharge resources and transportation as appropriate  - Identify discharge learning needs (meds, wound care, etc )  - Arrange for interpretive services to assist at discharge as needed  - Refer to Case Management Department for coordinating discharge planning if the patient needs post-hospital services based on physician/advanced practitioner order or complex needs related to functional status, cognitive ability, or social support system  Outcome: Progressing     Problem: Knowledge Deficit  Goal: Patient/family/caregiver demonstrates understanding of disease process, treatment plan, medications, and discharge instructions  Description: Complete learning assessment and assess knowledge base    Interventions:  - Provide teaching at level of understanding  - Provide teaching via preferred learning methods  Outcome: Progressing     Problem: GASTROINTESTINAL - ADULT  Goal: Minimal or absence of nausea and/or vomiting  Description: INTERVENTIONS:  - Administer IV fluids if ordered to ensure adequate hydration  - Maintain NPO status until nausea and vomiting are resolved  - Nasogastric tube if ordered  - Administer ordered antiemetic medications as needed  - Provide nonpharmacologic comfort measures as appropriate  - Advance diet as tolerated, if ordered  - Consider nutrition services referral to assist patient with adequate nutrition and appropriate food choices  Outcome: Progressing  Goal: Maintains or returns to baseline bowel function  Description: INTERVENTIONS:  - Assess bowel function  - Encourage oral fluids to ensure adequate hydration  - Administer IV fluids if ordered to ensure adequate hydration  - Administer ordered medications as needed  - Encourage mobilization and activity  - Consider nutritional services referral to assist patient with adequate nutrition and appropriate food choices  Outcome: Progressing  Goal: Maintains adequate nutritional intake  Description: INTERVENTIONS:  - Monitor percentage of each meal consumed  - Identify factors contributing to decreased intake, treat as appropriate  - Assist with meals as needed  - Monitor I&O, weight, and lab values if indicated  - Obtain nutrition services referral as needed  Outcome: Progressing  Goal: Establish and maintain optimal ostomy function  Description: INTERVENTIONS:  - Assess bowel function  - Encourage oral fluids to ensure adequate hydration  - Administer IV fluids if ordered to ensure adequate hydration   - Administer ordered medications as needed  - Encourage mobilization and activity  - Nutrition services referral to assist patient with appropriate food choices  - Assess stoma site  - Consider wound care consult   Outcome: Progressing  Goal: Oral mucous membranes remain intact  Description: INTERVENTIONS  - Assess oral mucosa and hygiene practices  - Implement preventative oral hygiene regimen  - Implement oral medicated treatments as ordered  - Initiate Nutrition services referral as needed  Outcome: Progressing     Problem: METABOLIC, FLUID AND ELECTROLYTES - ADULT  Goal: Electrolytes maintained within normal limits  Description: INTERVENTIONS:  - Monitor labs and assess patient for signs and symptoms of electrolyte imbalances  - Administer electrolyte replacement as ordered  - Monitor response to electrolyte replacements, including repeat lab results as appropriate  - Instruct patient on fluid and nutrition as appropriate  Outcome: Progressing  Goal: Fluid balance maintained  Description: INTERVENTIONS:  - Monitor labs   - Monitor I/O and WT  - Instruct patient on fluid and nutrition as appropriate  - Assess for signs & symptoms of volume excess or deficit  Outcome: Progressing  Goal: Glucose maintained within target range  Description: INTERVENTIONS:  - Monitor Blood Glucose as ordered  - Assess for signs and symptoms of hyperglycemia and hypoglycemia  - Administer ordered medications to maintain glucose within target range  - Assess nutritional intake and initiate nutrition service referral as needed  Outcome: Progressing

## 2023-06-07 NOTE — PLAN OF CARE
Problem: PAIN - ADULT  Goal: Verbalizes/displays adequate comfort level or baseline comfort level  Description: Interventions:  - Encourage patient to monitor pain and request assistance  - Assess pain using appropriate pain scale  - Administer analgesics based on type and severity of pain and evaluate response  - Implement non-pharmacological measures as appropriate and evaluate response  - Consider cultural and social influences on pain and pain management  - Notify physician/advanced practitioner if interventions unsuccessful or patient reports new pain  6/7/2023 1938 by Tatyana Goode RN  Outcome: Progressing  6/7/2023 0739 by Tatyana Goode RN  Outcome: Progressing     Problem: INFECTION - ADULT  Goal: Absence or prevention of progression during hospitalization  Description: INTERVENTIONS:  - Assess and monitor for signs and symptoms of infection  - Monitor lab/diagnostic results  - Monitor all insertion sites, i e  indwelling lines, tubes, and drains  - Monitor endotracheal if appropriate and nasal secretions for changes in amount and color  - [place] appropriate cooling/warming therapies per order  - Administer medications as ordered  - Instruct and encourage patient and family to use good hand hygiene technique  - Identify and instruct in appropriate isolation precautions for identified infection/condition  6/7/2023 1938 by Tatyana Goode RN  Outcome: Progressing  6/7/2023 0739 by Tatyana Goode RN  Outcome: Progressing     Problem: SAFETY ADULT  Goal: Patient will remain free of falls  Description: INTERVENTIONS:  - Educate patient/family on patient safety including physical limitations  - Instruct patient to call for assistance with activity   - Consult OT/PT to assist with strengthening/mobility   - Keep Call bell within reach  - Keep bed low and locked with side rails adjusted as appropriate  - Keep care items and personal belongings within reach  - Initiate and maintain comfort rounds  - Make Fall Risk Sign visible to staff  - Offer Toileting every 1-2 Hours, in advance of need  - Initiate/Maintain bed and chair alarm  - Obtain necessary fall risk management equipment: fall socks and call bell in place  - Apply yellow socks and bracelet for high fall risk patients  - Consider moving patient to room near nurses station  6/7/2023 1938 by Chris Pascual RN  Outcome: Progressing  6/7/2023 0739 by Chris Pascual RN  Outcome: Progressing  Goal: Maintain or return to baseline ADL function  Description: INTERVENTIONS:  -  Assess patient's ability to carry out ADLs; assess patient's baseline for ADL function and identify physical deficits which impact ability to perform ADLs (bathing, care of mouth/teeth, toileting, grooming, dressing, etc )  - Assess/evaluate cause of self-care deficits   - Assess range of motion  - Assess patient's mobility; develop plan if impaired  - Assess patient's need for assistive devices and provide as appropriate  - Encourage maximum independence but intervene and supervise when necessary  - Involve family in performance of ADLs  - Assess for home care needs following discharge   - Consider OT consult to assist with ADL evaluation and planning for discharge  - Provide patient education as appropriate  6/7/2023 1938 by Chris Pascual RN  Outcome: Progressing  6/7/2023 0739 by Chris Pascual RN  Outcome: Progressing  Goal: Maintains/Returns to pre admission functional level  Description: INTERVENTIONS:  - Perform BMAT or MOVE assessment daily    - Set and communicate daily mobility goal to care team and patient/family/caregiver  - Collaborate with rehabilitation services on mobility goals if consulted  - Perform Range of Motion 3-4 times a day  - Reposition patient every 1-2 hours    - Dangle patient 3-4 times a day  - Stand patient 3-4 times a day  - Ambulate patient 3-4 times a day  - Out of bed to chair 3-4 times a day   - Out of bed for meals 3-4 times a day  - Out of bed for toileting  - Record patient progress and toleration of activity level   6/7/2023 1938 by Ti Nino RN  Outcome: Progressing  6/7/2023 0739 by Ti Nino RN  Outcome: Progressing     Problem: DISCHARGE PLANNING  Goal: Discharge to home or other facility with appropriate resources  Description: INTERVENTIONS:  - Identify barriers to discharge w/patient and caregiver  - Arrange for needed discharge resources and transportation as appropriate  - Identify discharge learning needs (meds, wound care, etc )  - Arrange for interpretive services to assist at discharge as needed  - Refer to Case Management Department for coordinating discharge planning if the patient needs post-hospital services based on physician/advanced practitioner order or complex needs related to functional status, cognitive ability, or social support system  6/7/2023 1938 by Ti Nino RN  Outcome: Progressing  6/7/2023 0739 by Ti Nino RN  Outcome: Progressing     Problem: Knowledge Deficit  Goal: Patient/family/caregiver demonstrates understanding of disease process, treatment plan, medications, and discharge instructions  Description: Complete learning assessment and assess knowledge base    Interventions:  - Provide teaching at level of understanding  - Provide teaching via preferred learning methods  6/7/2023 1938 by Ti Nino RN  Outcome: Progressing  6/7/2023 0739 by Ti Nino RN  Outcome: Progressing     Problem: GASTROINTESTINAL - ADULT  Goal: Minimal or absence of nausea and/or vomiting  Description: INTERVENTIONS:  - Administer IV fluids if ordered to ensure adequate hydration  - Maintain NPO status until nausea and vomiting are resolved  - Nasogastric tube if ordered  - Administer ordered antiemetic medications as needed  - Provide nonpharmacologic comfort measures as appropriate  - Advance diet as tolerated, if ordered  - Consider nutrition services referral to assist patient with adequate nutrition and appropriate food choices  6/7/2023 1938 by Raza Segovia RN  Outcome: Progressing  6/7/2023 0739 by Raza Segovia RN  Outcome: Progressing  Goal: Maintains or returns to baseline bowel function  Description: INTERVENTIONS:  - Assess bowel function  - Encourage oral fluids to ensure adequate hydration  - Administer IV fluids if ordered to ensure adequate hydration  - Administer ordered medications as needed  - Encourage mobilization and activity  - Consider nutritional services referral to assist patient with adequate nutrition and appropriate food choices  6/7/2023 1938 by Raza Segovia RN  Outcome: Progressing  6/7/2023 0739 by Raza Segovia RN  Outcome: Progressing  Goal: Maintains adequate nutritional intake  Description: INTERVENTIONS:  - Monitor percentage of each meal consumed  - Identify factors contributing to decreased intake, treat as appropriate  - Assist with meals as needed  - Monitor I&O, weight, and lab values if indicated  - Obtain nutrition services referral as needed  6/7/2023 1938 by Raza Segovia RN  Outcome: Progressing  6/7/2023 0739 by Raza Segovia RN  Outcome: Progressing  Goal: Establish and maintain optimal ostomy function  Description: INTERVENTIONS:  - Assess bowel function  - Encourage oral fluids to ensure adequate hydration  - Administer IV fluids if ordered to ensure adequate hydration   - Administer ordered medications as needed  - Encourage mobilization and activity  - Nutrition services referral to assist patient with appropriate food choices  - Assess stoma site  - Consider wound care consult   6/7/2023 1938 by Raza Segovia RN  Outcome: Progressing  6/7/2023 0739 by Raza Segovia RN  Outcome: Progressing  Goal: Oral mucous membranes remain intact  Description: INTERVENTIONS  - Assess oral mucosa and hygiene practices  - Implement preventative oral hygiene regimen  - Implement oral medicated treatments as ordered  - Initiate Nutrition services referral as needed  6/7/2023 1938 by Aiden Estrada RN  Outcome: Progressing  6/7/2023 0739 by Aiden Estrada RN  Outcome: Progressing     Problem: METABOLIC, FLUID AND ELECTROLYTES - ADULT  Goal: Electrolytes maintained within normal limits  Description: INTERVENTIONS:  - Monitor labs and assess patient for signs and symptoms of electrolyte imbalances  - Administer electrolyte replacement as ordered  - Monitor response to electrolyte replacements, including repeat lab results as appropriate  - Instruct patient on fluid and nutrition as appropriate  6/7/2023 1938 by Aiden Estrada RN  Outcome: Progressing  6/7/2023 0739 by Aiden Estrada RN  Outcome: Progressing  Goal: Fluid balance maintained  Description: INTERVENTIONS:  - Monitor labs   - Monitor I/O and WT  - Instruct patient on fluid and nutrition as appropriate  - Assess for signs & symptoms of volume excess or deficit  6/7/2023 1938 by Aiden Estrada RN  Outcome: Progressing  6/7/2023 0739 by Aiden Estrada RN  Outcome: Progressing  Goal: Glucose maintained within target range  Description: INTERVENTIONS:  - Monitor Blood Glucose as ordered  - Assess for signs and symptoms of hyperglycemia and hypoglycemia  - Administer ordered medications to maintain glucose within target range  - Assess nutritional intake and initiate nutrition service referral as needed  6/7/2023 1938 by Aiden Estrada RN  Outcome: Progressing  6/7/2023 0739 by Aiden Estrada RN  Outcome: Progressing

## 2023-06-07 NOTE — UTILIZATION REVIEW
Continued Stay Review    Date:  10- 7-23                    OBSERVATION 6-5-23 CHANGED TO INPATIENT 6-7-23 FOR CONTINUED IV ANTIBIOTIC TREATMENT OF POST OP  ABDOMINAL WALL ABSCESS      06/07/23 1256  Inpatient Admission  Once        Transfer Service: Hospitalist    Question Answer    Level of Care Med Surg    Estimated length of stay More than 2 Midnights          06/05/23 2323  Place in Observation  (ED Bridging Orders Panel)  Once        Transfer Service: Hospitalist    Question: Level of Care Answer: Med Surg            HPI:57 y o  male initially admitted on 6-5-23     Assessment/Plan:     Post IR procedure day 1:   IR drainage tube to KATHY bulb suction  155 ml light bloody purulent fluid, bloody serosanguinous  Wbc 13 01  Blood cultures x2 no growth  Plan to maintain drain  Follow up cultures  Continue iv cefepime and iv vancomycin          Vital Signs:     Date/  Time Temp Pulse Resp BP MAP  SpO2   06/07/23 0705 98 3 °F (36 8 °C) -- 18 120/61 84 --         Pertinent Labs/Diagnostic Results:       Results from last 7 days   Lab Units 06/07/23 0448 06/06/23  0907 06/05/23 2056   BANDS PCT %  --   --  3   HEMATOCRIT % 36 6 34 6* 41 4   HEMOGLOBIN g/dL 11 9* 11 3* 13 5   PLATELETS Thousands/uL 342 293 356   WBC Thousand/uL 13 01* 13 26* 17 53*         Results from last 7 days   Lab Units 06/07/23 0448 06/06/23 0907 06/05/23 2056   ANION GAP mmol/L 8 5 6   BUN mg/dL 14 13 15   CALCIUM mg/dL 8 6 6 9* 9 2   CHLORIDE mmol/L 97 107 96   CO2 mmol/L 27 25 29   CREATININE mg/dL 1 16 0 92 1 26   EGFR ml/min/1 73sq m 69 91 62   POTASSIUM mmol/L 4 1 3 8 4 6   SODIUM mmol/L 132* 137 131*     Results from last 7 days   Lab Units 06/07/23 0448 06/06/23  0907 06/05/23 2056   ALBUMIN g/dL 3 2* 2 4* 3 4*   ALK PHOS U/L 67 52 72   ALT U/L 14 11 17   AST U/L 16 10* 15   TOTAL BILIRUBIN mg/dL 0 41 0 48 0 53   TOTAL PROTEIN g/dL 6 3* 4 6* 6 8         Results from last 7 days   Lab Units 06/07/23 0448 06/06/23  9317 06/05/23 2056   GLUCOSE RANDOM mg/dL 105 75 90       Results from last 7 days   Lab Units 06/05/23 2056   INR  1 16   PROTIME seconds 15 0*   PTT seconds 32         Results from last 7 days   Lab Units 06/05/23 2056   PROCALCITONIN ng/ml 0 17     Results from last 7 days   Lab Units 06/05/23 2056   LACTIC ACID mmol/L 1 0       Results from last 7 days   Lab Units 06/06/23  0241   BILIRUBIN UA  Negative   BLOOD UA  Negative   CLARITY UA  Clear   COLOR UA  Yellow   GLUCOSE UA mg/dl Negative   KETONES UA mg/dl Negative   LEUKOCYTES UA  Negative   NITRITE UA  Negative   PH UA  6 0   PROTEIN UA mg/dl Negative   SPEC GRAV UA  1 015   UROBILINOGEN UA E U /dl 0 2       Results from last 7 days   Lab  06/06/23  1408 06/05/23 2056   BLOOD CULTURE   --  No Growth at 24 hrs  No Growth at 24 hrs  BODY FLUID CULTURE, STERILE  Culture too young- will reincubate  --    GRAM STAIN RESULT  body fluid abscess 4+ Polys*  3+ Gram positive cocci in clusters*  --              Results from last 7 days   Lab Units 06/07/23  0448   VANCOMYCIN RM ug/mL 15 4         Scheduled Medications:    amLODIPine, 5 mg, Oral, Daily  cefepime, 2,000 mg, Intravenous, Q24H  methadone, 110 mg, Oral, Daily  nicotine, 1 patch, Transdermal, Daily  vancomycin, 750 mg, Intravenous, Q12H      Continuous IV Infusions:     PRN Meds:  acetaminophen, 650 mg, Oral, Q6H PRN  HYDROmorphone, 6 mg, Oral, Q6H PRN        Discharge Plan: to be determined     Network Utilization Review Department  ATTENTION: Please call with any questions or concerns to [phone] and carefully listen to the prompts so that you are directed to the right person  All voicemails are confidential   Jose Roberto Trejo all requests for admission clinical reviews, approved or denied determinations and any other requests to dedicated fax number below belonging to the campus where the patient is receiving treatment   List of dedicated fax numbers for the Facilities:  [facility] ADMISSION DENIALS (Administrative/Medical Necessity) [phone]   [address] (Maternity/NICU/Pediatrics) Catherine Hutson [address] [place]  [phone]   [address] [address] Medical [place] [place] [address] [facility] [address] [facility] [place] [address] [facility] [phone]

## 2023-06-08 ENCOUNTER — HOME HEALTH ADMISSION (OUTPATIENT)
Dept: HOME HEALTH SERVICES | Facility: HOME HEALTHCARE | Age: 58
End: 2023-06-08
Payer: COMMERCIAL

## 2023-06-08 PROCEDURE — NC001 PR NO CHARGE: Performed by: UROLOGY

## 2023-06-08 PROCEDURE — 99232 SBSQ HOSP IP/OBS MODERATE 35: CPT | Performed by: INTERNAL MEDICINE

## 2023-06-08 PROCEDURE — 97110 THERAPEUTIC EXERCISES: CPT

## 2023-06-08 PROCEDURE — 99223 1ST HOSP IP/OBS HIGH 75: CPT | Performed by: INTERNAL MEDICINE

## 2023-06-08 PROCEDURE — 97116 GAIT TRAINING THERAPY: CPT

## 2023-06-08 RX ADMIN — METHADONE HYDROCHLORIDE 110 MG: 10 TABLET ORAL at 08:52

## 2023-06-08 RX ADMIN — VANCOMYCIN HYDROCHLORIDE 750 MG: 1 INJECTION, POWDER, LYOPHILIZED, FOR SOLUTION INTRAVENOUS at 23:18

## 2023-06-08 RX ADMIN — HYDROMORPHONE HYDROCHLORIDE 4 MG: 2 TABLET ORAL at 05:46

## 2023-06-08 RX ADMIN — HYDROMORPHONE HYDROCHLORIDE 4 MG: 2 TABLET ORAL at 23:18

## 2023-06-08 RX ADMIN — VANCOMYCIN HYDROCHLORIDE 750 MG: 1 INJECTION, POWDER, LYOPHILIZED, FOR SOLUTION INTRAVENOUS at 10:32

## 2023-06-08 RX ADMIN — HYDROMORPHONE HYDROCHLORIDE 4 MG: 2 TABLET ORAL at 17:18

## 2023-06-08 NOTE — CONSULTS
Consultation - Infectious Disease   Kandace Bains 61 y o  male MRN: [id number]  Unit/Bed#:  Encounter: [id number]      IMPRESSION & RECOMMENDATIONS:     1  Abdominal Wall Abscess/surgical site infection  - likely in setting of recent surgery  Blood cultures are negative  Now status post IR drain placement on 6/6, cultures growing Staph aureus thus far  No gram negatives on gram stain  Sensitivities pending  Patient is afebrile, WBC improving as is pain  - given no growth of gram negatives, stop Cefepime  - continue Vancomycin alone  - follow up sensitivities  - given not bacteremic and appears good source control achieved with drain, can transition to PO based on final culture  - would complete 14 days of antibiotic from drain placement for complicated skin/soft tissue infection, through 6/19/2023  - IR follow up for drain management    2  Leukocytosis:  - Likely due to #1  Improving status post drainage   - antibiotics as above  - repeat CBC + diff in AM    3  Hx of Staghorn Calculus Status Post Right Nephrectomy (5/22/2023):  -Noted  Now complicated by surgical site infection as above  \  - Urology follow up    4  Cirrhotic Appearance of Liver on CT:  - Noted on CT  - workup per primary    5  Hx of Prolonged QTc:  - >500 on 5/26/23 and 1/30/23    - repeat EKG  - avoid Flouroquinolones and other QTc prolong antimicrobials if able    6  Multiple Antibiotic Allergies (Penicillin, Sulfa, Metronidazole):  - Patient does not recall his allergies when questioned   Does not think he had any anaphylaxis or swelling    - tolerating Cefepime  - given Keflex 2/02/2023    I have discussed the above management plan in detail with the primary service    I have performed an extensive review of the medical records in Epic including review of the notes, radiographs, and laboratory results     HISTORY OF PRESENT ILLNESS:  Reason for Consult: abscess    HPI: Kandace Bains is a 61y o  year old male PMHx substance abuse on methadone, Hx of right staghorn calculus status post right nephrectomy (5/22/23) who presented with pain and erythema at recent surgical site  Patient has history of right staghorn calculus with previous right nephrostomy tube due to obstruction  He was recently admitted on 5/22-5/29 to [place] for a right radial nephrectomy, performed 5/22  Appears he had ongoing significant post operative pain even after discharge  Per chart review there was a phone call from patient where he noticed erythema and pain at his nephrectomy incision site  He came to ED on 6/5 and CT showed a 8 3 cm fluid collection in right upper abdominal wall soft tissue  He had a leukocytosis to 17,000 raising concern for abscess vs  Infected post-op seroma  IR aspirated 90 cc of purulent fluid on 6/6 and left drain in place  Culture thus far with GPCs in clusters on gram stain  Patient reports pain is much improved since drain placed  Does not recall having any fever or chills, just pain  He is unsure how quickly this developed at home, he does not recall many details prior to arrival to [facility]  He has no major complaints today, wants to go home  REVIEW OF SYSTEMS:  A complete review of systems is negative other than that noted in the HPI      PAST MEDICAL HISTORY:  Past Medical History:   Diagnosis Date   • Anxiety    • Bright red rectal bleeding     Last Assessed: 9/9/2015    • Drug dependence ([address] ) 10/20/2021   • Hypertension     Last Assessed: 7/9/2014    • Kidney stone    • Kidney stones     Last Assessed: 11/17/2016    • Periorbital edema     Last Assessed: 9/4/2015   • Septic shock ([address] ) 08/20/2022   • Skin tag of anus     Last Assessed: 9/9/2015    • Trigger point of thoracic region     Last Assessed: 11/6/2015      Past Surgical History:   Procedure Laterality Date   • CYSTOSCOPY W/ URETERAL STENT PLACEMENT  03/11/2013   • CYSTOSCOPY W/ URETERAL STENT PLACEMENT  06/18/2014    EXTRACORPOREAL SHOCK WAVE LITHOTRIPSY    • CYSTOSCOPY W/ URETERAL STENT PLACEMENT  07/16/2014    EXTRACORPOREAL SHOCK WAVE LITHOTRIPSY    • CYSTOSCOPY W/ URETERAL STENT REMOVAL  04/11/2013   • CYSTOSCOPY W/ URETERAL STENT REMOVAL Right 08/26/2014   • CYSTOSCOPY W/ URETEROSCOPY W/ LITHOTRIPSY  02/26/2013    Percutaneous lithotomy With Uretal Stent Plcement    • CYSTOSCOPY W/ URETEROSCOPY W/ LITHOTRIPSY  03/11/2014   • CYSTOSCOPY W/ URETEROSCOPY W/ LITHOTRIPSY Right 08/04/2014    With Uretal Stent Placement    • CYSTOSCOPY W/ URETEROSCOPY W/ LITHOTRIPSY Right 05/09/2016   • HERNIA REPAIR  03/11/2013   • IR DRAINAGE TUBE PLACEMENT  6/6/2023   • IR NEPHROSTOMY TUBE CHECK/CHANGE/REPOSITION/REINSERTION/UPSIZE  11/22/2022   • IR NEPHROSTOMY TUBE CHECK/CHANGE/REPOSITION/REINSERTION/UPSIZE  02/13/2023   • IR NEPHROSTOMY TUBE CHECK/CHANGE/REPOSITION/REINSERTION/UPSIZE  04/28/2023   • IR NEPHROSTOMY TUBE PLACEMENT  08/20/2022   • KIDNEY SURGERY     • LITHOTRIPSY     • DC CYSTO/URETERO W/LITHOTRIPSY &INDWELL STENT INSRT Right 07/13/2016    Procedure: CYSTOSCOPY; URETEROSCOPY WITH HOLMIUM LASER STONE EXTRACTION; RETROGRADE PYELOGRAM; URETERAL STENT INSERTION ;  Surgeon: Blane Keith MD;  Location: [facility];  Service: Urology   • DC CYSTO/URETERO W/LITHOTRIPSY &INDWELL STENT INSRT Right 05/09/2016    Procedure: CYSTOSCOPY,  URETEROSCOPY,  WITH LITHOTRIPSY HOLMIUM LASER, STONE EXTRACTION, AND INSERTION STENT URETERAL;  Surgeon: Blane Keith MD;  Location: [facility];  Service: Urology   • DC CYSTO/URETERO W/LITHOTRIPSY &INDWELL STENT INSRT Right 11/10/2022    Procedure: CYSTOSCOPY URETEROSCOPY  right RETROGRADE PYELOGRAM;  Surgeon: Dwana Phalen, MD;  Location: [facility];  Service: Urology   • DC LAPAROSCOPY RADICAL NEPHRECTOMY Right 5/22/2023    Procedure: NEPHRECTOMY RADICAL LAPAROSCOPIC W/ ROBOTICS;  Surgeon: Melody Bansal MD;  Location: [facility];  Service: Urology   • DC LITHOTRIPSY [address] Right 06/17/2016    Procedure: Bahman Veliz EXTRACORPORAL SHOCKWAVE (ESWL); Surgeon: Ramesh Pickens MD;  Location: [facility];  Service: Urology   • TRANSURETHRAL RESECTION OF BLADDER     • URETERAL REIMPLANTION  2013       FAMILY HISTORY:  Non-contributory    SOCIAL HISTORY:  Social History   Social History     Substance and Sexual Activity   Alcohol Use Not Currently     Social History     Substance and Sexual Activity   Drug Use Not Currently     Social History     Tobacco Use   Smoking Status Every Day   • Packs/day: 2 00   • Years: 35 00   • Total pack years: 70 00   • Types: Cigarettes   Smokeless Tobacco Never       ALLERGIES:  Allergies   Allergen Reactions   • Bee Venom Anaphylaxis   • Metronidazole Itching and Swelling   • Nsaids GI Intolerance     Stomach irritant   • Penicillin G    • Penicillins GI Intolerance     Sick to stomach   • Pollen Extract    • Sulfa Antibiotics        MEDICATIONS:  All current active medications have been reviewed      PHYSICAL EXAM:  Temp:  [96 4 °F (35 8 °C)-97 2 °F (36 2 °C)] 97 1 °F (36 2 °C)  HR:  [53-57] 53  Resp:  [18-19] 18  BP: (112-137)/(58-71) 137/71  SpO2:  [95 %-97 %] 95 %  Temp (24hrs), Av 9 °F (36 1 °C), Min:96 4 °F (35 8 °C), Max:97 2 °F (36 2 °C)  Current: Temperature: (!) 97 1 °F (36 2 °C)    Intake/Output Summary (Last 24 hours) at 2023 0820  Last data filed at 2023 3894  Gross per 24 hour   Intake 1380 ml   Output 1825 ml   Net -445 ml       General Appearance:  Appearing well, nontoxic, and in no distress   Head:  Normocephalic, without obvious abnormality, atraumatic   Eyes:  Conjunctiva pink and sclera anicteric, both eyes   Nose: Nares normal, mucosa normal, no drainage   Throat: Oropharynx moist without lesions   Neck: Supple,   Back:   Symmetric   Lungs:   Clear to auscultation bilaterally, respirations unlabored   Chest Wall:  No tenderness or deformity   Heart:  RRR;   Abdomen:   Soft, non-tender; RUQ KATHY drain site with some surrounding erythema, no active drainage Extremities: No cyanosis, clubbing or edema   Skin: No rashes or lesions  No draining wounds noted  Lymph nodes: Cervical, supraclavicular nodes normal   Neurologic: Alert and oriented        LABS, IMAGING, & OTHER STUDIES:  Lab Results:  I have personally reviewed pertinent labs  Results from last 7 days   Lab Units 06/07/23  0448 06/06/23  0907 06/05/23 2056   HEMOGLOBIN g/dL 11 9* 11 3* 13 5   PLATELETS Thousands/uL 342 293 356   WBC Thousand/uL 13 01* 13 26* 17 53*     Results from last 7 days   Lab Units 06/07/23  0448 06/06/23  0907 06/05/23 2056   ALK PHOS U/L 67 52 72   ALT U/L 14 11 17   AST U/L 16 10* 15   BUN mg/dL 14 13 15   CALCIUM mg/dL 8 6 6 9* 9 2   CHLORIDE mmol/L 97 107 96   CO2 mmol/L 27 25 29   CREATININE mg/dL 1 16 0 92 1 26   EGFR ml/min/1 73sq m 69 91 62   POTASSIUM mmol/L 4 1 3 8 4 6   SODIUM mmol/L 132* 137 131*     Results from last 7 days   Lab Units 06/06/23  1408 06/05/23 2056   BLOOD CULTURE   --  No Growth at 24 hrs  No Growth at 24 hrs  BODY FLUID CULTURE, STERILE  Culture too young- will reincubate  --    GRAM STAIN RESULT  4+ Polys*  3+ Gram positive cocci in clusters*  --      Results from last 7 days   Lab Units 06/05/23 2056   PROCALCITONIN ng/ml 0 17       Imaging Studies:   I have personally reviewed pertinent imaging study reports and images in PACS  Other Studies:   I have personally reviewed pertinent reports        Cortez Garrett MD  [facility]

## 2023-06-08 NOTE — ASSESSMENT & PLAN NOTE
-s/p Abdominal Drain per IR 6-6-2023   -Bcx: 4+ Growth of Staphylococcus aureus Abnormal  P         · Continue current broad-spectrum antibiotics and follow-up culture  · Cefepime/Vancomycin D 4 #   · Urology following   · ID team following; pending culture sensitivity     · IR aware ; will arrange drain removal in 2 wks   · Wound care per nursing

## 2023-06-08 NOTE — PLAN OF CARE
Problem: PAIN - ADULT  Goal: Verbalizes/displays adequate comfort level or baseline comfort level  Description: Interventions:  - Encourage patient to monitor pain and request assistance  - Assess pain using appropriate pain scale  - Administer analgesics based on type and severity of pain and evaluate response  - Implement non-pharmacological measures as appropriate and evaluate response  - Consider cultural and social influences on pain and pain management  - Notify physician/advanced practitioner if interventions unsuccessful or patient reports new pain  Outcome: Progressing     Problem: INFECTION - ADULT  Goal: Absence or prevention of progression during hospitalization  Description: INTERVENTIONS:  - Assess and monitor for signs and symptoms of infection  - Monitor lab/diagnostic results  - Monitor all insertion sites, i e  indwelling lines, tubes, and drains  - Monitor endotracheal if appropriate and nasal secretions for changes in amount and color  - [place] appropriate cooling/warming therapies per order  - Administer medications as ordered  - Instruct and encourage patient and family to use good hand hygiene technique  - Identify and instruct in appropriate isolation precautions for identified infection/condition  Outcome: Progressing     Problem: SAFETY ADULT  Goal: Patient will remain free of falls  Description: INTERVENTIONS:  - Educate patient/family on patient safety including physical limitations  - Instruct patient to call for assistance with activity   - Consult OT/PT to assist with strengthening/mobility   - Keep Call bell within reach  - Keep bed low and locked with side rails adjusted as appropriate  - Keep care items and personal belongings within reach  - Initiate and maintain comfort rounds  - Make Fall Risk Sign visible to staff  - Offer Toileting every 1-2 Hours, in advance of need  - Initiate/Maintain bed and chair alarm  - Obtain necessary fall risk management equipment: fall socks and call bell in place  - Apply yellow socks and bracelet for high fall risk patients  - Consider moving patient to room near nurses station  Outcome: Progressing  Goal: Maintain or return to baseline ADL function  Description: INTERVENTIONS:  -  Assess patient's ability to carry out ADLs; assess patient's baseline for ADL function and identify physical deficits which impact ability to perform ADLs (bathing, care of mouth/teeth, toileting, grooming, dressing, etc )  - Assess/evaluate cause of self-care deficits   - Assess range of motion  - Assess patient's mobility; develop plan if impaired  - Assess patient's need for assistive devices and provide as appropriate  - Encourage maximum independence but intervene and supervise when necessary  - Involve family in performance of ADLs  - Assess for home care needs following discharge   - Consider OT consult to assist with ADL evaluation and planning for discharge  - Provide patient education as appropriate  Outcome: Progressing  Goal: Maintains/Returns to pre admission functional level  Description: INTERVENTIONS:  - Perform BMAT or MOVE assessment daily    - Set and communicate daily mobility goal to care team and patient/family/caregiver  - Collaborate with rehabilitation services on mobility goals if consulted  - Perform Range of Motion 3-4 times a day  - Reposition patient every 1-2 hours    - Dangle patient 3-4 times a day  - Stand patient 3-4 times a day  - Ambulate patient 3-4 times a day  - Out of bed to chair 3-4 times a day   - Out of bed for meals 3-4 times a day  - Out of bed for toileting  - Record patient progress and toleration of activity level   Outcome: Progressing     Problem: DISCHARGE PLANNING  Goal: Discharge to home or other facility with appropriate resources  Description: INTERVENTIONS:  - Identify barriers to discharge w/patient and caregiver  - Arrange for needed discharge resources and transportation as appropriate  - Identify discharge learning needs (meds, wound care, etc )  - Arrange for interpretive services to assist at discharge as needed  - Refer to Case Management Department for coordinating discharge planning if the patient needs post-hospital services based on physician/advanced practitioner order or complex needs related to functional status, cognitive ability, or social support system  Outcome: Progressing     Problem: Knowledge Deficit  Goal: Patient/family/caregiver demonstrates understanding of disease process, treatment plan, medications, and discharge instructions  Description: Complete learning assessment and assess knowledge base    Interventions:  - Provide teaching at level of understanding  - Provide teaching via preferred learning methods  Outcome: Progressing     Problem: GASTROINTESTINAL - ADULT  Goal: Minimal or absence of nausea and/or vomiting  Description: INTERVENTIONS:  - Administer IV fluids if ordered to ensure adequate hydration  - Maintain NPO status until nausea and vomiting are resolved  - Nasogastric tube if ordered  - Administer ordered antiemetic medications as needed  - Provide nonpharmacologic comfort measures as appropriate  - Advance diet as tolerated, if ordered  - Consider nutrition services referral to assist patient with adequate nutrition and appropriate food choices  Outcome: Progressing  Goal: Maintains or returns to baseline bowel function  Description: INTERVENTIONS:  - Assess bowel function  - Encourage oral fluids to ensure adequate hydration  - Administer IV fluids if ordered to ensure adequate hydration  - Administer ordered medications as needed  - Encourage mobilization and activity  - Consider nutritional services referral to assist patient with adequate nutrition and appropriate food choices  Outcome: Progressing  Goal: Maintains adequate nutritional intake  Description: INTERVENTIONS:  - Monitor percentage of each meal consumed  - Identify factors contributing to decreased intake, treat as appropriate  - Assist with meals as needed  - Monitor I&O, weight, and lab values if indicated  - Obtain nutrition services referral as needed  Outcome: Progressing  Goal: Establish and maintain optimal ostomy function  Description: INTERVENTIONS:  - Assess bowel function  - Encourage oral fluids to ensure adequate hydration  - Administer IV fluids if ordered to ensure adequate hydration   - Administer ordered medications as needed  - Encourage mobilization and activity  - Nutrition services referral to assist patient with appropriate food choices  - Assess stoma site  - Consider wound care consult   Outcome: Progressing  Goal: Oral mucous membranes remain intact  Description: INTERVENTIONS  - Assess oral mucosa and hygiene practices  - Implement preventative oral hygiene regimen  - Implement oral medicated treatments as ordered  - Initiate Nutrition services referral as needed  Outcome: Progressing     Problem: METABOLIC, FLUID AND ELECTROLYTES - ADULT  Goal: Electrolytes maintained within normal limits  Description: INTERVENTIONS:  - Monitor labs and assess patient for signs and symptoms of electrolyte imbalances  - Administer electrolyte replacement as ordered  - Monitor response to electrolyte replacements, including repeat lab results as appropriate  - Instruct patient on fluid and nutrition as appropriate  Outcome: Progressing  Goal: Fluid balance maintained  Description: INTERVENTIONS:  - Monitor labs   - Monitor I/O and WT  - Instruct patient on fluid and nutrition as appropriate  - Assess for signs & symptoms of volume excess or deficit  Outcome: Progressing  Goal: Glucose maintained within target range  Description: INTERVENTIONS:  - Monitor Blood Glucose as ordered  - Assess for signs and symptoms of hyperglycemia and hypoglycemia  - Administer ordered medications to maintain glucose within target range  - Assess nutritional intake and initiate nutrition service referral as needed  Outcome: Progressing

## 2023-06-08 NOTE — UTILIZATION REVIEW
Continued Stay Review    Date: 6/8                          Current Patient Class: IP   Current Level of Care: MS    HPI:57 y o  male initially admitted on 6/7 as IP      Assessment/Plan:     6/8 IM Note   Bcx: 4+ Growth of Staphylococcus aureus Abnormal    Cont IV abx   Afebrile x24 hrs   tolerating po diet , less abd p ain   6/8 Urology Note   s/p robotic assisted laparoscopic right nephrectomy (5/23/2023) with abdominal wall abscess  Drain output -75 cc serous with some clots present  body fluid culture growing 4+ polyps, 3+ gram-negative cocci  Plan IR drain to gravity , drain care , tube check , f/u fld cultures , cont IV abx        Vital Signs:   06/08/23 0825 -- -- -- 94/54 -- -- -- -- -- --   06/08/23 0732 97 1 °F (36 2 °C) Abnormal  53 Abnormal  18 137/71 97 95 % --            Pertinent Labs/Diagnostic Results:       Results from last 7 days   Lab Units 06/07/23 0448 06/06/23 0907 06/05/23 2056   BANDS PCT %  --   --  3   HEMATOCRIT % 36 6 34 6* 41 4   HEMOGLOBIN g/dL 11 9* 11 3* 13 5   PLATELETS Thousands/uL 342 293 356   WBC Thousand/uL 13 01* 13 26* 17 53*         Results from last 7 days   Lab Units 06/07/23 0448 06/06/23 0907 06/05/23 2056   ANION GAP mmol/L 8 5 6   BUN mg/dL 14 13 15   CALCIUM mg/dL 8 6 6 9* 9 2   CHLORIDE mmol/L 97 107 96   CO2 mmol/L 27 25 29   CREATININE mg/dL 1 16 0 92 1 26   EGFR ml/min/1 73sq m 69 91 62   POTASSIUM mmol/L 4 1 3 8 4 6   SODIUM mmol/L 132* 137 131*     Results from last 7 days   Lab Units 06/07/23 0448 06/06/23 0907 06/05/23 2056   ALBUMIN g/dL 3 2* 2 4* 3 4*   ALK PHOS U/L 67 52 72   ALT U/L 14 11 17   AST U/L 16 10* 15   TOTAL BILIRUBIN mg/dL 0 41 0 48 0 53   TOTAL PROTEIN g/dL 6 3* 4 6* 6 8         Results from last 7 days   Lab Units 06/07/23 0448 06/06/23 0907 06/05/23 2056   GLUCOSE RANDOM mg/dL 105 75 90         Results from last 7 days   Lab Units 06/05/23 2056   INR  1 16   PROTIME seconds 15 0*   PTT seconds 32         Results from last 7 days   Lab Units 06/05/23 2056   PROCALCITONIN ng/ml 0 17     Results from last 7 days   Lab Units 06/05/23 2056   LACTIC ACID mmol/L 1 0     Results from last 7 days   Lab Units 06/06/23  0241   BILIRUBIN UA  Negative   BLOOD UA  Negative   CLARITY UA  Clear   COLOR UA  Yellow   GLUCOSE UA mg/dl Negative   KETONES UA mg/dl Negative   LEUKOCYTES UA  Negative   NITRITE UA  Negative   PH UA  6 0   PROTEIN UA mg/dl Negative   SPEC GRAV UA  1 015   UROBILINOGEN UA E U /dl 0 2         Results from last 7 days   Lab Units 06/06/23  1408 06/05/23 2056   BLOOD CULTURE   --  No Growth at 24 hrs  No Growth at 24 hrs  BODY FLUID CULTURE, STERILE  Culture too young- will reincubate  --    GRAM STAIN RESULT  4+ Polys*  3+ Gram positive cocci in clusters*  --        Results from last 7 days   Lab Units 06/07/23  0448   VANCOMYCIN RM ug/mL 15 4       Medications:   Scheduled Medications:  amLODIPine, 2 5 mg, Oral, Daily  cefepime, 2,000 mg, Intravenous, Q24H  methadone, 110 mg, Oral, Daily  nicotine, 1 patch, Transdermal, Daily  vancomycin, 750 mg, Intravenous, Q12H      Continuous IV Infusions:     PRN Meds:  acetaminophen, 650 mg, Oral, Q6H PRN  HYDROmorphone, 4 mg, Oral, Q6H PRN        Discharge Plan: D     Network Utilization Review Department  ATTENTION: Please call with any questions or concerns to [phone] and carefully listen to the prompts so that you are directed to the right person  All voicemails are confidential   Micaela Duval all requests for admission clinical reviews, approved or denied determinations and any other requests to dedicated fax number below belonging to the campus where the patient is receiving treatment   List of dedicated fax numbers for the Facilities:  [address] DENIALS (Administrative/Medical Necessity) [phone]   [address] (Maternity/NICU/Pediatrics) Catherine Hutson [address] [place] [address] [place] [phone] [address] [place] [address] [facility] [address] [facility] [place] [address] [phone]

## 2023-06-08 NOTE — PROGRESS NOTES
Frankie Palacios is a 61 y o  male who is currently ordered Vancomycin IV with management by the Pharmacy Consult service  Relevant clinical data and objective / subjective history reviewed  Vancomycin Assessment:  Indication and Goal AUC/Trough: Soft tissue (goal -600, trough >10), -600, trough >10  Clinical Status: stable  Micro:     Renal Function:  SCr: 1 16 mg/dL  CrCl: 62 9 mL/min  Renal replacement: Not on dialysis  Days of Therapy: 4  Current Dose: 750mg q12h  Vancomycin Plan:  New Dosing: same  Estimated AUC: 567 mcg*hr/mL  Estimated Trough: 18 9 mcg/mL  Next Level: 0600 on 6/12/23  Renal Function Monitoring: Daily BMP and [place] will continue to follow closely for s/sx of nephrotoxicity, infusion reactions and appropriateness of therapy  BMP and CBC will be ordered per protocol  We will continue to follow the patient’s culture results and clinical progress daily      Josue Monterroso, Pharmacist

## 2023-06-08 NOTE — PROGRESS NOTES
[facility] [address]  Progress Note  Name: John Burr  MRN: [id number]  Unit/Bed#:  I Date of Admission: 6/5/2023   Date of Service: 6/8/2023 I Hospital Day: 1    Assessment/Plan   * Abscess of postoperative wound of abdominal wall  Assessment & Plan  -s/p Abdominal Drain per IR 6-6-2023   -Bcx: 4+ Growth of Staphylococcus aureus Abnormal  P         · Continue current broad-spectrum antibiotics and follow-up culture  · Cefepime/Vancomycin D 4 #   · Urology following   · ID team following; pending culture sensitivity  · IR aware ; will arrange drain removal in 2 wks   · Wound care per nursing       Methadone dependence   Assessment & Plan  Continue methadone  Hyponatremia  Assessment & Plan  Restart fluid restriction    -labs pending    -follow up accordingly    Essential hypertension  Assessment & Plan  /53 on arrival  Monitor blood pressure and avoid hypotension  VTE Pharmacologic Prophylaxis:   Pharmacologic: Does not qualify  Mechanical VTE Prophylaxis in Place: Yes    Patient Centered Rounds: I have performed bedside rounds with nursing staff today  Discussions with Specialists or Other Care Team Provider: Urology, infectious disease, interventional radiology, case management    Education and Discussions with Family / Patient: Patient  Time Spent for Care: 30 minutes  More than 50% of total time spent on counseling and coordination of care as described above  Current Length of Stay: 1 day(s)    Current Patient Status: Inpatient   Certification Statement: The patient will continue to require additional inpatient hospital stay due to Management for abscess  Discharge Plan: When stable  Refer to home VNA  Code Status: Level 1 - Full Code      Subjective:   Patient was seen today at bedside  Reports marked improvement in abdominal pain  No associated nausea or vomiting, diarrhea or constipation  No active urinary symptoms      -Afebrile at 24 hours  -Vitally stable   -No concerns per nursing note  -Tolerating oral diet  Objective:     Vitals:   Temp (24hrs), Av 9 °F (36 1 °C), Min:96 4 °F (35 8 °C), Max:97 2 °F (36 2 °C)    Temp:  [96 4 °F (35 8 °C)-97 2 °F (36 2 °C)] 97 1 °F (36 2 °C)  HR:  [53-57] 53  Resp:  [18-19] 18  BP: ()/(54-71) 94/54  SpO2:  [95 %-97 %] 95 %  Body mass index is 22 13 kg/m²  Input and Output Summary (last 24 hours): Intake/Output Summary (Last 24 hours) at 2023 1054  Last data filed at 2023 0644  Gross per 24 hour   Intake 780 ml   Output 1125 ml   Net -345 ml       Physical Exam:     Physical Exam  Vitals and nursing note reviewed  Constitutional:       Appearance: Normal appearance  HENT:      Head: Normocephalic and atraumatic  Cardiovascular:      Rate and Rhythm: Normal rate and regular rhythm  Pulses: Normal pulses  Heart sounds: Normal heart sounds  No murmur heard  Pulmonary:      Effort: Pulmonary effort is normal  No respiratory distress  Breath sounds: Normal breath sounds  No wheezing or rales  Abdominal:      General: Abdomen is flat  Bowel sounds are normal  There is no distension  Palpations: Abdomen is soft  Tenderness: There is abdominal tenderness (mild) in the right upper quadrant  There is no right CVA tenderness or left CVA tenderness  Comments: Drain site  Musculoskeletal:      Cervical back: Normal range of motion  Skin:     General: Skin is warm  Capillary Refill: Capillary refill takes less than 2 seconds  Neurological:      General: No focal deficit present  Mental Status: He is alert  Mental status is at baseline     Psychiatric:         Mood and Affect: Mood normal          Behavior: Behavior normal          Additional Data:     Labs:    Results from last 7 days   Lab Units 23  0448 23  0907 23   BANDS PCT %  --   --  3   EOS PCT %  --  3 1   HEMATOCRIT % 36 6 34 6* 41 4   HEMOGLOBIN g/dL 11 9* 11 3* 13 5   LYMPHO PCT %  --  7* 8*   MONO PCT %  --  9 7   PLATELETS Thousands/uL 342 293 356   WBC Thousand/uL 13 01* 13 26* 17 53*     Results from last 7 days   Lab Units 06/07/23  0448   ANION GAP mmol/L 8   ALBUMIN g/dL 3 2*   ALK PHOS U/L 67   ALT U/L 14   AST U/L 16   BUN mg/dL 14   CALCIUM mg/dL 8 6   CHLORIDE mmol/L 97   CO2 mmol/L 27   CREATININE mg/dL 1 16   GLUCOSE RANDOM mg/dL 105   POTASSIUM mmol/L 4 1   SODIUM mmol/L 132*   TOTAL BILIRUBIN mg/dL 0 41     Results from last 7 days   Lab Units 06/05/23  2056   INR  1 16             Results from last 7 days   Lab Units 06/05/23 2056   LACTIC ACID mmol/L 1 0   PROCALCITONIN ng/ml 0 17           * I Have Reviewed All Lab Data Listed Above  * Additional Pertinent Lab Tests Reviewed: Zac 66 Admission Reviewed    Imaging:    Imaging Reports Reviewed Today Include:   IR drainage tube placement   Final Result   Impression:   Successful placement of a 10 [place] catheter into a right anterior abdominal incisional soft tissue abscess under ultrasound control, and 90 mL of tan purulent fluid was aspirated and a specimen sent to the lab as described above  PLAN: Tube to KATHY bulb suction  Flush with 10 cc every 8 hours in the hospital and q  day at home  Return in 2 weeks for possible tube removal             Workstation performed: [id number]         CT abdomen pelvis wo contrast   Final Result      Limited evaluation without intravenous contrast       Postsurgical changes with 8 3 cm fluid collection in the upper right ventral abdominal wall soft tissues with some mild adjacent stranding in the subcutaneous fat  Findings may reflect postoperative seroma although superimposed infection i e  developing    abscess difficult to exclude  Clinical correlation and surgical consult advised        Status post right radical nephrectomy with small amount of loculated fluid and trace free intraperitoneal air within the surgical resection bed, within limits of expected postoperative appearance (POD 14)  Cholelithiasis  Redemonstrated cirrhotic morphology of the liver  Tiny focus of antidependent air within the urinary bladder likely on the basis of recent instrumentation  Correlate with urinalysis to exclude infectious etiology  Constipation  Punctate nonobstructive left lower pole intrarenal calculus  Above findings discussed with Dr Tan Clifford at 10:26 p m  on 6/5/2023  Workstation performed: [id number]         IR drainage tube check and/or removal    (Results Pending)       Imaging Personally Reviewed by Myself Includes:    IR drainage tube placement   Final Result   Impression:   Successful placement of a 10 [place] catheter into a right anterior abdominal incisional soft tissue abscess under ultrasound control, and 90 mL of tan purulent fluid was aspirated and a specimen sent to the lab as described above  PLAN: Tube to KATHY bulb suction  Flush with 10 cc every 8 hours in the hospital and q  day at home  Return in 2 weeks for possible tube removal             Workstation performed: [id number]         CT abdomen pelvis wo contrast   Final Result      Limited evaluation without intravenous contrast       Postsurgical changes with 8 3 cm fluid collection in the upper right ventral abdominal wall soft tissues with some mild adjacent stranding in the subcutaneous fat  Findings may reflect postoperative seroma although superimposed infection i e  developing    abscess difficult to exclude  Clinical correlation and surgical consult advised  Status post right radical nephrectomy with small amount of loculated fluid and trace free intraperitoneal air within the surgical resection bed, within limits of expected postoperative appearance (POD 14)  Cholelithiasis  Redemonstrated cirrhotic morphology of the liver        Tiny focus of antidependent air within the urinary bladder likely on the basis of recent instrumentation  Correlate with urinalysis to exclude infectious etiology  Constipation  Punctate nonobstructive left lower pole intrarenal calculus  Above findings discussed with Dr Haresh Wiseman at 10:26 p m  on 6/5/2023  Workstation performed: [id number]         IR drainage tube check and/or removal    (Results Pending)         Recent Cultures (last 7 days):     Results from last 7 days   Lab Units 06/06/23  1408 06/05/23 2056   BLOOD CULTURE   --  No Growth at 48 hrs  No Growth at 48 hrs  BODY FLUID CULTURE, STERILE  4+ Growth of Staphylococcus aureus*  --    GRAM STAIN RESULT  4+ Polys*  3+ Gram positive cocci in clusters*  --        Last 24 Hours Medication List:   Current Facility-Administered Medications   Medication Dose Route Frequency Provider Last Rate   • acetaminophen  650 mg Oral Q6H PRN Tram Edwards, DO     • amLODIPine  2 5 mg Oral Daily Sally Bolaños, DO     • cefepime  2,000 mg Intravenous Q24H Marah Thea, DO 2,000 mg (06/07/23 2148)   • HYDROmorphone  4 mg Oral Q6H PRN Taryn Goodson DO     • methadone  110 mg Oral Daily Fermín Carolyne, DO     • nicotine  1 patch Transdermal Daily Marah Thea, DO     • vancomycin  750 mg Intravenous Q12H Tramangelica Waterst,  mg (06/08/23 1032)        Today, Patient Was Seen By: Jonah Robles MD    ** Please Note: Dictation voice to text software may have been used in the creation of this document   **

## 2023-06-08 NOTE — PROGRESS NOTES
"Progress Note - Urology   Ju Fitzgerald 61 y o  male MRN: [id number]  Unit/Bed#:  Encounter: [id number]    Assessment:  50-year-old male s/p robotic assisted laparoscopic right nephrectomy (5/23/2023) with abdominal wall abscess  -post-procedure day 2 s/p IR drainage of abdominal wall abscess  -Afebrile, vital signs stable  -Drain output -75 cc serous with some clots present  -Today's labs need to be collected, wbc yesterday 13 01  -Preliminary body fluid culture growing 4+ polyps, 3+ gram-negative cocci  -RLQ abdominal incision with contained erythema, drain located at the lateral edge of that incision with mild surrounding erythema, mild TTP surrounding drain and incision    PMH - methadone dependence, hypertension    Plan:  -Maintain IR drain to gravity, record output, drain care with flushing  -Will maintain drain upon discharge  -Return for tube check and possible removal in 2 weeks with IR  -Continue to follow-up body fluid culture results; await sensitivities  -Continue IV antibiotics  -Analgesia and antiemetics prn   -Follow-up with Dr Aretha Valentin as scheduled next week (6/15/23)  -Remainder of management per primary team  -Discussed with urology AP on call    Subjective/Objective     Subjective: Patient reports mild pain surrounding right lower quadrant drain site  Patient reports he is a lot better since having the drain placed and being on antibiotics  Reports he is urinating without difficulty  Denies dysuria, urinary frequency, fevers, chills, chest pain, shortness of breath, nausea, vomiting, constipation  Patient reports he is ambulating  Objective:     Blood pressure 137/71, pulse (!) 53, temperature (!) 97 1 °F (36 2 °C), temperature source Temporal, resp  rate 18, height 5' 7\" (1 702 m), weight 64 1 kg (141 lb 5 oz), SpO2 95 %  ,Body mass index is 22 13 kg/m²        Intake/Output Summary (Last 24 hours) at 6/8/2023 0750  Last data filed at 6/8/2023 0644  Gross per 24 hour   Intake " 1380 ml   Output 1825 ml   Net -445 ml       Invasive Devices     Peripheral Intravenous Line  Duration           Peripheral IV 06/05/23 Dorsal (posterior); Left Forearm 2 days          Drain  Duration           Abscess Drain RLQ 1 day                Physical Exam:  General -no acute distress  Heart - RRR  Lungs -clear to auscultation bilaterally; auscultated anteriorly and laterally  Abdomen -bowel sounds present  Port site incisions present, clean, dry, and intact  Right lower quadrant abdominal incision present with mild surrounding erythema, drain is positioned at the lateral aspect of the incision with mild surrounding erythema, drain output is serous with some clot noted in drainage bulb  Mild tenderness to palpation along right lower quadrant abdominal incision and around drain site  No guarding or rebound  Extremities -multiple sites of skin picking and small healed areas on b/l upper extremities  No lower extremity pitting edema b/l    Lab, Imaging and other studies:I have personally reviewed pertinent lab results        [place], [place]  6/8/2023

## 2023-06-08 NOTE — PHYSICAL THERAPY NOTE
PHYSICAL THERAPY NOTE          Patient Name: Lebron De Luna  [id number] Date: 6/8/2023 06/08/23 0820   PT Last Visit   PT Visit Date 06/08/23   Note Type   Note Type Treatment   Pain Assessment   Pain Assessment Tool 0-10   Pain Score 9   Pain Location/Orientation Location: Abdomen   Restrictions/Precautions   Weight Bearing Precautions Per Order No   Other Precautions   (Fall Risk; Chair Alarm)   General   Family/Caregiver Present No   Cognition   Overall Cognitive Status WFL   Arousal/Participation Alert   Following Commands Follows one step commands without difficulty   Subjective   Subjective c/o abdominal pain  requesting methadone and pain medication  (nurse notified of request )   Bed Mobility   Supine to Sit 6  Modified independent   Additional items HOB elevated; Bedrails; Increased time required   Additional Comments Sat EOB with good sitting balance   Transfers   Sit to Stand 5  Supervision   Stand to Sit 5  Supervision   Toilet transfer   (stood at toilet to void with fair+ balance)   Additional Comments no device   Ambulation/Elevation   Gait pattern   (Excessively slow; Short stride; Foward flexed; Inconsistent jose; Decreased foot clearance; Improper Weight shift; Narrow JAYLA)   Gait Assistance 5  Supervision   Assistive Device None   Distance 15' x 1, 5' x 1  (declined further due to pain)   Balance   Static Sitting Good   Dynamic Sitting Good   Static Standing Fair +   Dynamic Standing Fair   Ambulatory Fair   Endurance Deficit   Endurance Deficit Yes   Activity Tolerance   Activity Tolerance Patient limited by pain   Exercises   Hip Flexion 10 reps   Hip Abduction 10 reps   Hip Adduction 10 reps   Knee AROM Long Arc Quad 10 reps   Ankle Pumps 10 reps   Assessment   Prognosis Good   Problem List   (Decreased strength; Impaired balance; Decreased endurance; Decreased mobility;  Decreased safety awareness) Assessment Pt  seen for PT treatment session this date with interventions consisting of  therapeutic exercises, bed mobility, transfers and  gait training w/ emphasis on improving pt's ability to ambulate  In comparison to previous session, Pt  With improvements in activity tolerance  Declined ambulation beyond toilet and  Chair  Good tolerance to therapeutic exercises  Pt is in need of continued activity in PT to improve strength balance endurance mobility transfers and ambulation with return to maximize LOF  From PT/mobility standpoint, recommendation at time of d/c would be OP PT in order to promote return to PLOF and independence  The patient's AM-[facility] Basic Mobility Inpatient Short Form Raw Score is 22  A Raw score of greater than 16 suggests the patient may benefit from discharge to home  Please also refer to physical therapy recommendation for safe DC planning  Goals   LTG Expiration Date 06/20/23   PT Treatment Day 1   Plan   Treatment/Interventions   (Functional transfer training; LE strengthening/ROM; Elevations; Therapeutic exercise; Endurance training; Bed mobility; Gait training)   Progress Slow progress, decreased activity tolerance   PT Frequency 3-5x/wk   Recommendation   PT Discharge Recommendation Home with outpatient rehabilitation   AM-[facility] Basic Mobility Inpatient   Turning in Flat Bed Without Bedrails 4   Lying on Back to Sitting on Edge of Flat Bed Without Bedrails 3   Moving Bed to Chair 4   Standing Up From Chair Using Arms 4   Walk in Room 4   Climb 3-5 Stairs With Railing 3   Basic Mobility Inpatient Raw Score 22   Basic Mobility Standardized Score 47 4   Highest Level Of Mobility   JH-HLM Goal 7: Walk 25 feet or more   JH-HLM Achieved 6: Walk 10 steps or more   Education   Education Provided Mobility training;Home exercise program   Patient Demonstrates verbal understanding;Reinforcement needed   End of Consult   Patient Position at End of Consult Bedside chair; All needs within reach   End of Consult Comments discussed POC with PT

## 2023-06-08 NOTE — CASE MANAGEMENT
Case Management Discharge Planning Note    Patient name Praveena Farnsworth  Location / MRN [id number]  : 1965 Date 2023       Current Admission Date: 2023  Current Admission Diagnosis:Abscess of postoperative wound of abdominal wall   Patient Active Problem List    Diagnosis Date Noted   • Abscess of postoperative wound of abdominal wall 2023   • Methadone dependence  2023   • Leukocytosis 2023   • Hyponatremia 2023   • Prolonged QT interval 2023   • Staghorn calculus 2023   • Recent UTI 2023   • Abnormal CT scan, liver 2023   • AMS (altered mental status) 2023   • Tobacco abuse 11/10/2022   • Drug abuse, episodic use ([facility] [address] ) 11/10/2022   • Anxiety 2022   • Cognitive decline 2022   • Delusional disorder  2022   • Multiple electrolyte abnormalities 2022   • Acute metabolic encephalopathy 4655   • Acute respiratory failure with hypoxia ([address] ) 2022   • Elevated troponin 2022   • Elevated brain natriuretic peptide (BNP) level 2022   • Essential hypertension 2022   • Transaminitis 2022   • Elevated lipase 2022   • BRAULIO (acute kidney injury) ([facility] ) 2022   • Hypernatremia 2022   • Increased anion gap metabolic acidosis    • Rhabdomyolysis 2022   • Elevated d-dimer 2022   • Kidney stones    • Compulsive skin picking 10/20/2021      LOS (days): 1  Geometric Mean LOS (GMLOS) (days): 3 40  Days to GMLOS:2 5     OBJECTIVE:  Risk of Unplanned Readmission Score: 19 91         Current admission status: Inpatient   Preferred Pharmacy:   [facility] [place] [address] [facility]  [address] [place] [address]  Phone: [phone] Fax: [address] [place] [address] - celestino De La Gildardo [address] [place] [address]  Phone: [phone] Fax: [phone], [place] - [address] [facility] [place] [address]  Phone: [phone] Fax: [phone]    Primary Care Provider: Kai Maya DO    Primary Insurance: Leslie Apodaca MA [facility]  Secondary Insurance:     [id number]          Is the patient interested in [facility] at discharge?: Yes  [address] requested[de-identified] [address] Name[de-identified] [facility] Provider[de-identified] PCP  Home Health Services Needed[de-identified] Other (comment) (Check drain )  Homebound Criteria Met[de-identified] Uses an Assist Device (i e  cane, walker, etc)  Supporting Clincal Findings[de-identified] Limited Endurance    DME Referral Provided  Referral made for DME?: No     Pt would like to have a nurse to come to his house for a few visits to check his drain  Referral made to [facility]

## 2023-06-08 NOTE — PLAN OF CARE
Problem: PHYSICAL THERAPY ADULT  Goal: Performs mobility at highest level of function for planned discharge setting  See evaluation for individualized goals  Description: Treatment/Interventions: Functional transfer training, LE strengthening/ROM, Elevations, Therapeutic exercise, Endurance training, Bed mobility, Gait training          See flowsheet documentation for full assessment, interventions and recommendations  Outcome: Progressing  Note: Prognosis: Good  Problem List:  (Decreased strength; Impaired balance; Decreased endurance; Decreased mobility; Decreased safety awareness)  Assessment: Pt  seen for PT treatment session this date with interventions consisting of  therapeutic exercises, bed mobility, transfers and  gait training w/ emphasis on improving pt's ability to ambulate  In comparison to previous session, Pt  With improvements in activity tolerance  Declined ambulation beyond toilet and  Chair  Good tolerance to therapeutic exercises  Pt is in need of continued activity in PT to improve strength balance endurance mobility transfers and ambulation with return to maximize LOF  From PT/mobility standpoint, recommendation at time of d/c would be OP PT in order to promote return to PLOF and independence  The patient's AM-[facility] Basic Mobility Inpatient Short Form Raw Score is 22  A Raw score of greater than 16 suggests the patient may benefit from discharge to home  Please also refer to physical therapy recommendation for safe DC planning  PT Discharge Recommendation: Home with outpatient rehabilitation    See flowsheet documentation for full assessment

## 2023-06-09 ENCOUNTER — TRANSITIONAL CARE MANAGEMENT (OUTPATIENT)
Dept: FAMILY MEDICINE CLINIC | Facility: CLINIC | Age: 58
End: 2023-06-09

## 2023-06-09 VITALS
WEIGHT: 140.21 LBS | SYSTOLIC BLOOD PRESSURE: 133 MMHG | BODY MASS INDEX: 22.01 KG/M2 | TEMPERATURE: 97 F | HEIGHT: 67 IN | HEART RATE: 51 BPM | DIASTOLIC BLOOD PRESSURE: 62 MMHG | OXYGEN SATURATION: 95 % | RESPIRATION RATE: 18 BRPM

## 2023-06-09 PROBLEM — E87.1 HYPONATREMIA: Status: RESOLVED | Noted: 2023-05-26 | Resolved: 2023-06-09

## 2023-06-09 LAB
BACTERIA SPEC BFLD CULT: ABNORMAL
BACTERIA SPEC BFLD CULT: ABNORMAL
GRAM STN SPEC: ABNORMAL
GRAM STN SPEC: ABNORMAL

## 2023-06-09 PROCEDURE — 99239 HOSP IP/OBS DSCHRG MGMT >30: CPT | Performed by: INTERNAL MEDICINE

## 2023-06-09 PROCEDURE — 97530 THERAPEUTIC ACTIVITIES: CPT

## 2023-06-09 RX ORDER — OXYCODONE HYDROCHLORIDE 10 MG/1
10 TABLET ORAL EVERY 6 HOURS PRN
Qty: 12 TABLET | Refills: 0 | Status: CANCELLED | OUTPATIENT
Start: 2023-06-09 | End: 2023-06-19

## 2023-06-09 RX ORDER — DOXYCYCLINE 100 MG/1
100 TABLET ORAL 2 TIMES DAILY
Qty: 20 TABLET | Refills: 0 | Status: SHIPPED | OUTPATIENT
Start: 2023-06-09 | End: 2023-06-19

## 2023-06-09 RX ORDER — SENNOSIDES 8.6 MG
650 CAPSULE ORAL EVERY 8 HOURS PRN
Qty: 30 TABLET | Refills: 0 | Status: SHIPPED | OUTPATIENT
Start: 2023-06-09 | End: 2023-06-13

## 2023-06-09 RX ORDER — DOXYCYCLINE 100 MG/1
100 TABLET ORAL 2 TIMES DAILY
Qty: 20 TABLET | Refills: 0 | Status: SHIPPED | OUTPATIENT
Start: 2023-06-09 | End: 2023-06-09

## 2023-06-09 RX ORDER — AMLODIPINE BESYLATE 2.5 MG/1
2.5 TABLET ORAL DAILY
Qty: 90 TABLET | Refills: 0 | Status: SHIPPED | OUTPATIENT
Start: 2023-06-09 | End: 2023-06-13

## 2023-06-09 RX ADMIN — AMLODIPINE BESYLATE 2.5 MG: 2.5 TABLET ORAL at 08:19

## 2023-06-09 RX ADMIN — HYDROMORPHONE HYDROCHLORIDE 4 MG: 2 TABLET ORAL at 06:34

## 2023-06-09 RX ADMIN — METHADONE HYDROCHLORIDE 110 MG: 10 TABLET ORAL at 08:19

## 2023-06-09 NOTE — NURSING NOTE
AVS given to patient and explained  Pt voiced understanding and had no questions at time of discharge  Pt left floor via wheelchair accompanied by PCA and mother in stable condition

## 2023-06-09 NOTE — ASSESSMENT & PLAN NOTE
This is a chronic condition     -Following up with [facility]   -Management per clinic

## 2023-06-09 NOTE — PLAN OF CARE
Problem: PAIN - ADULT  Goal: Verbalizes/displays adequate comfort level or baseline comfort level  Description: Interventions:  - Encourage patient to monitor pain and request assistance  - Assess pain using appropriate pain scale  - Administer analgesics based on type and severity of pain and evaluate response  - Implement non-pharmacological measures as appropriate and evaluate response  - Consider cultural and social influences on pain and pain management  - Notify physician/advanced practitioner if interventions unsuccessful or patient reports new pain  6/9/2023 0940 by Dina Johnston RN  Outcome: Adequate for Discharge  6/9/2023 0859 by Dina Johnston RN  Outcome: Progressing     Problem: INFECTION - ADULT  Goal: Absence or prevention of progression during hospitalization  Description: INTERVENTIONS:  - Assess and monitor for signs and symptoms of infection  - Monitor lab/diagnostic results  - Monitor all insertion sites, i e  indwelling lines, tubes, and drains  - Monitor endotracheal if appropriate and nasal secretions for changes in amount and color  - [place] appropriate cooling/warming therapies per order  - Administer medications as ordered  - Instruct and encourage patient and family to use good hand hygiene technique  - Identify and instruct in appropriate isolation precautions for identified infection/condition  6/9/2023 0940 by Dina Johnston RN  Outcome: Adequate for Discharge  6/9/2023 0859 by Dina Johnston RN  Outcome: Progressing     Problem: SAFETY ADULT  Goal: Patient will remain free of falls  Description: INTERVENTIONS:  - Educate patient/family on patient safety including physical limitations  - Instruct patient to call for assistance with activity   - Consult OT/PT to assist with strengthening/mobility   - Keep Call bell within reach  - Keep bed low and locked with side rails adjusted as appropriate  - Keep care items and personal belongings within reach  - Initiate and maintain comfort rounds  - Make Fall Risk Sign visible to staff  - Offer Toileting every 1-2 Hours, in advance of need  - Initiate/Maintain bed and chair alarm  - Obtain necessary fall risk management equipment: fall socks and call bell in place  - Apply yellow socks and bracelet for high fall risk patients  - Consider moving patient to room near nurses station  6/9/2023 0940 by Jose Luis Collins RN  Outcome: Adequate for Discharge  6/9/2023 0859 by Jose Luis Collins RN  Outcome: Progressing  Goal: Maintain or return to baseline ADL function  Description: INTERVENTIONS:  -  Assess patient's ability to carry out ADLs; assess patient's baseline for ADL function and identify physical deficits which impact ability to perform ADLs (bathing, care of mouth/teeth, toileting, grooming, dressing, etc )  - Assess/evaluate cause of self-care deficits   - Assess range of motion  - Assess patient's mobility; develop plan if impaired  - Assess patient's need for assistive devices and provide as appropriate  - Encourage maximum independence but intervene and supervise when necessary  - Involve family in performance of ADLs  - Assess for home care needs following discharge   - Consider OT consult to assist with ADL evaluation and planning for discharge  - Provide patient education as appropriate  6/9/2023 0940 by Jose Luis Collins RN  Outcome: Adequate for Discharge  6/9/2023 0859 by Jose Luis Collins RN  Outcome: Progressing  Goal: Maintains/Returns to pre admission functional level  Description: INTERVENTIONS:  - Perform BMAT or MOVE assessment daily    - Set and communicate daily mobility goal to care team and patient/family/caregiver  - Collaborate with rehabilitation services on mobility goals if consulted  - Perform Range of Motion 3-4 times a day  - Reposition patient every 1-2 hours    - Dangle patient 3-4 times a day  - Stand patient 3-4 times a day  - Ambulate patient 3-4 times a day  - Out of bed to chair 3-4 times a day   - Out of bed for meals 3-4 times a day  - Out of bed for toileting  - Record patient progress and toleration of activity level   6/9/2023 0940 by Cale Last RN  Outcome: Adequate for Discharge  6/9/2023 0859 by Cale Last RN  Outcome: Progressing     Problem: DISCHARGE PLANNING  Goal: Discharge to home or other facility with appropriate resources  Description: INTERVENTIONS:  - Identify barriers to discharge w/patient and caregiver  - Arrange for needed discharge resources and transportation as appropriate  - Identify discharge learning needs (meds, wound care, etc )  - Arrange for interpretive services to assist at discharge as needed  - Refer to Case Management Department for coordinating discharge planning if the patient needs post-hospital services based on physician/advanced practitioner order or complex needs related to functional status, cognitive ability, or social support system  6/9/2023 0940 by Cale Last RN  Outcome: Adequate for Discharge  6/9/2023 0859 by Cale Last RN  Outcome: Progressing     Problem: Knowledge Deficit  Goal: Patient/family/caregiver demonstrates understanding of disease process, treatment plan, medications, and discharge instructions  Description: Complete learning assessment and assess knowledge base    Interventions:  - Provide teaching at level of understanding  - Provide teaching via preferred learning methods  6/9/2023 0940 by Cale Last RN  Outcome: Adequate for Discharge  6/9/2023 0859 by Cale Last RN  Outcome: Progressing     Problem: GASTROINTESTINAL - ADULT  Goal: Minimal or absence of nausea and/or vomiting  Description: INTERVENTIONS:  - Administer IV fluids if ordered to ensure adequate hydration  - Maintain NPO status until nausea and vomiting are resolved  - Nasogastric tube if ordered  - Administer ordered antiemetic medications as needed  - Provide nonpharmacologic comfort measures as appropriate  - Advance diet as tolerated, if ordered  - Consider nutrition services referral to assist patient with adequate nutrition and appropriate food choices  6/9/2023 0940 by Esthela Newton RN  Outcome: Adequate for Discharge  6/9/2023 0859 by Esthela Newton RN  Outcome: Progressing  Goal: Maintains or returns to baseline bowel function  Description: INTERVENTIONS:  - Assess bowel function  - Encourage oral fluids to ensure adequate hydration  - Administer IV fluids if ordered to ensure adequate hydration  - Administer ordered medications as needed  - Encourage mobilization and activity  - Consider nutritional services referral to assist patient with adequate nutrition and appropriate food choices  6/9/2023 0940 by Esthela Newton RN  Outcome: Adequate for Discharge  6/9/2023 0859 by Esthela Newton RN  Outcome: Progressing  Goal: Maintains adequate nutritional intake  Description: INTERVENTIONS:  - Monitor percentage of each meal consumed  - Identify factors contributing to decreased intake, treat as appropriate  - Assist with meals as needed  - Monitor I&O, weight, and lab values if indicated  - Obtain nutrition services referral as needed  6/9/2023 0940 by Esthela Newton RN  Outcome: Adequate for Discharge  6/9/2023 0859 by Esthela Newton RN  Outcome: Progressing  Goal: Establish and maintain optimal ostomy function  Description: INTERVENTIONS:  - Assess bowel function  - Encourage oral fluids to ensure adequate hydration  - Administer IV fluids if ordered to ensure adequate hydration   - Administer ordered medications as needed  - Encourage mobilization and activity  - Nutrition services referral to assist patient with appropriate food choices  - Assess stoma site  - Consider wound care consult   6/9/2023 0940 by Esthela Newton RN  Outcome: Adequate for Discharge  6/9/2023 0859 by Esthela Newton RN  Outcome: Progressing  Goal: Oral mucous membranes remain intact  Description: INTERVENTIONS  - Assess oral mucosa and hygiene practices  - Implement preventative oral hygiene regimen  - Implement oral medicated treatments as ordered  - Initiate Nutrition services referral as needed  6/9/2023 0940 by Jose Luis Collins RN  Outcome: Adequate for Discharge  6/9/2023 0859 by Jose Luis Collins RN  Outcome: Progressing     Problem: METABOLIC, FLUID AND ELECTROLYTES - ADULT  Goal: Electrolytes maintained within normal limits  Description: INTERVENTIONS:  - Monitor labs and assess patient for signs and symptoms of electrolyte imbalances  - Administer electrolyte replacement as ordered  - Monitor response to electrolyte replacements, including repeat lab results as appropriate  - Instruct patient on fluid and nutrition as appropriate  6/9/2023 0940 by Jose Luis Collins RN  Outcome: Adequate for Discharge  6/9/2023 0859 by Jose Luis Collins RN  Outcome: Progressing  Goal: Fluid balance maintained  Description: INTERVENTIONS:  - Monitor labs   - Monitor I/O and WT  - Instruct patient on fluid and nutrition as appropriate  - Assess for signs & symptoms of volume excess or deficit  6/9/2023 0940 by Jose Luis Collins RN  Outcome: Adequate for Discharge  6/9/2023 0859 by Jose Luis Collins RN  Outcome: Progressing  Goal: Glucose maintained within target range  Description: INTERVENTIONS:  - Monitor Blood Glucose as ordered  - Assess for signs and symptoms of hyperglycemia and hypoglycemia  - Administer ordered medications to maintain glucose within target range  - Assess nutritional intake and initiate nutrition service referral as needed  6/9/2023 0940 by Jose Luis Collins RN  Outcome: Adequate for Discharge  6/9/2023 0859 by Jose Luis Collins RN  Outcome: Progressing

## 2023-06-09 NOTE — CASE MANAGEMENT
Case Management Discharge Planning Note    Patient name Estefany Marie  Location / MRN [id number]  : 1965 Date 2023       Current Admission Date: 2023  Current Admission Diagnosis:Abscess of postoperative wound of abdominal wall   Patient Active Problem List    Diagnosis Date Noted   • Abscess of postoperative wound of abdominal wall 2023   • Methadone dependence  2023   • Leukocytosis 2023   • Hyponatremia 2023   • Prolonged QT interval 2023   • Staghorn calculus 2023   • Recent UTI 2023   • Abnormal CT scan, liver 2023   • AMS (altered mental status) 2023   • Tobacco abuse 11/10/2022   • Drug abuse, episodic use ([facility] [address] ) 11/10/2022   • Anxiety 2022   • Cognitive decline 2022   • Delusional disorder  2022   • Multiple electrolyte abnormalities 2022   • Acute metabolic encephalopathy    • Acute respiratory failure with hypoxia ([address] ) 2022   • Elevated troponin 2022   • Elevated brain natriuretic peptide (BNP) level 2022   • Essential hypertension 2022   • Transaminitis 2022   • Elevated lipase 2022   • BRAULIO (acute kidney injury) ([facility] ) 2022   • Hypernatremia 2022   • Increased anion gap metabolic acidosis    • Rhabdomyolysis 2022   • Elevated d-dimer 2022   • Kidney stones    • Compulsive skin picking 10/20/2021      LOS (days): 2  Geometric Mean LOS (GMLOS) (days): 3 40  Days to GMLOS:1 5     OBJECTIVE:  Risk of Unplanned Readmission Score: 19 99         Current admission status: Inpatient   Preferred Pharmacy:   [facility] [address] [address]  Phone: [phone] Fax: [address] [place] [address] [place] [address] [place] [address]  Phone: [phone] Fax: [address], [place] [address] [place] [address]  Phone: [phone] Fax: [phone]    Primary Care Provider: Andrea Zurita DO    Primary Insurance: Chito Tsang MA [facility]  Secondary Insurance:     DISCHARGE DETAILS:    Discharge planning discussed with[de-identified] Pt and mother  Freedom of Choice: Yes     CM contacted family/caregiver?: Yes  Were Treatment Team discharge recommendations reviewed with patient/caregiver?: Yes  Did patient/caregiver verbalize understanding of patient care needs?: Yes  Were patient/caregiver advised of the risks associated with not following Treatment Team discharge recommendations?: Yes         [address]         Is the patient interested in [address] at discharge?: Yes  [facility] [place] [place] requested[de-identified] [address] Name[de-identified] [facility] Provider[de-identified] PCP  Homebound Criteria Met[de-identified] Uses an Assist Device (i e  cane, walker, etc)  Supporting Clincal Findings[de-identified] Limited Endurance    DME Referral Provided  Referral made for DME?: No         Would you like to participate in our [address] service program?  : No - Declined    Treatment Team Recommendation: Home with [address]  Discharge Destination Plan[de-identified] Home with Leeann at Discharge : Automobile, Family (Mother is going to give patient an appointment)         I notified Nik RAE that patient is being discharged today  I in basket messaged patient's PCP to call patient with a follow up appt  Discussed with patient preferences on discharge : Understanding how to manage health at home , purpose of taking meds, importance of follow up appointments and symptoms to watch out for  Nurse to review AVS with patient  All follow up providers listed   Mother To transport the patient home

## 2023-06-09 NOTE — PLAN OF CARE
Problem: PHYSICAL THERAPY ADULT  Goal: Performs mobility at highest level of function for planned discharge setting  See evaluation for individualized goals  Description: Treatment/Interventions: Functional transfer training, LE strengthening/ROM, Elevations, Therapeutic exercise, Endurance training, Bed mobility, Gait training          See flowsheet documentation for full assessment, interventions and recommendations  Outcome: Progressing  Note: Prognosis: Good  Problem List:  (Decreased strength; Impaired balance; Decreased endurance; Decreased mobility; Decreased safety awareness)  Assessment: Pt  seen for PT session this date with interventions consisting of review of HEP and bed mobility  Declines ambulation at this time  In comparison to previous session, Pt  With no change  in activity tolerance  Pt is in need of continued activity in PT to improve strength balance endurance mobility transfers and ambulation with return to maximize LOF  From PT/mobility standpoint, recommendation at time of d/c would be OP PT in order to promote return to PLOF and independence  The patient's AM-PAC Basic Mobility Inpatient Short Form Raw Score is 22  A Raw score of greater than 16 suggests the patient may benefit from discharge to home  Please also refer to physical therapy recommendation for safe DC planning  PT Discharge Recommendation: Home with outpatient rehabilitation    See flowsheet documentation for full assessment

## 2023-06-09 NOTE — PROGRESS NOTES
Lilian Brian is a 61 y o  male who is currently ordered Vancomycin IV with management by the Pharmacy Consult service  Relevant clinical data and objective / subjective history reviewed  Vancomycin Assessment:  Indication and Goal AUC/Trough: Soft tissue (goal -600, trough >10), -600, trough >10  Clinical Status: stable  Micro:     Renal Function:  SCr: 1 16 mg/dL (drawn 06/07/2023)  CrCl: 62 4 mL/min  Renal replacement: Not on dialysis  Days of Therapy: 5  Current Dose: 750mg Q12H  Vancomycin Plan:  New Dosing: same  Estimated AUC: 574 mcg*hr/mL  Estimated Trough: 19 1 mcg/mL  Next Level: 06/12/2023  Renal Function Monitoring: Daily BMP and UOP  Pharmacy will continue to follow closely for s/sx of nephrotoxicity, infusion reactions and appropriateness of therapy  BMP and CBC will be ordered per protocol  We will continue to follow the patient’s culture results and clinical progress daily      Kimi Calvo, Pharmacist

## 2023-06-09 NOTE — ASSESSMENT & PLAN NOTE
-s/p Abdominal Drain per IR 6-6-2023   -Bcx: 4+ Growth of Staphylococcus aureus Abnormal  P   -Culture sensitivity; preliminary resulted  · Cefepime/Vancomycin D 5 #   · Case was discussed with infectious disease team and we agreed on the following; transition to oral doxycycline 100 mg twice daily anticipated end date 6/19/2023  Prescription sent  · Urology following; follow-up in outpatient offices  · IR aware ; will arrange drain removal in 2 wks outpatient

## 2023-06-09 NOTE — PLAN OF CARE
Problem: PAIN - ADULT  Goal: Verbalizes/displays adequate comfort level or baseline comfort level  Description: Interventions:  - Encourage patient to monitor pain and request assistance  - Assess pain using appropriate pain scale  - Administer analgesics based on type and severity of pain and evaluate response  - Implement non-pharmacological measures as appropriate and evaluate response  - Consider cultural and social influences on pain and pain management  - Notify physician/advanced practitioner if interventions unsuccessful or patient reports new pain  Outcome: Progressing     Problem: INFECTION - ADULT  Goal: Absence or prevention of progression during hospitalization  Description: INTERVENTIONS:  - Assess and monitor for signs and symptoms of infection  - Monitor lab/diagnostic results  - Monitor all insertion sites, i e  indwelling lines, tubes, and drains  - Monitor endotracheal if appropriate and nasal secretions for changes in amount and color  - [place] appropriate cooling/warming therapies per order  - Administer medications as ordered  - Instruct and encourage patient and family to use good hand hygiene technique  - Identify and instruct in appropriate isolation precautions for identified infection/condition  Outcome: Progressing     Problem: SAFETY ADULT  Goal: Patient will remain free of falls  Description: INTERVENTIONS:  - Educate patient/family on patient safety including physical limitations  - Instruct patient to call for assistance with activity   - Consult OT/PT to assist with strengthening/mobility   - Keep Call bell within reach  - Keep bed low and locked with side rails adjusted as appropriate  - Keep care items and personal belongings within reach  - Initiate and maintain comfort rounds  - Make Fall Risk Sign visible to staff  - Offer Toileting every 1-2 Hours, in advance of need  - Initiate/Maintain bed and chair alarm  - Obtain necessary fall risk management equipment: fall socks and call bell in place  - Apply yellow socks and bracelet for high fall risk patients  - Consider moving patient to room near nurses station  Outcome: Progressing  Goal: Maintain or return to baseline ADL function  Description: INTERVENTIONS:  -  Assess patient's ability to carry out ADLs; assess patient's baseline for ADL function and identify physical deficits which impact ability to perform ADLs (bathing, care of mouth/teeth, toileting, grooming, dressing, etc )  - Assess/evaluate cause of self-care deficits   - Assess range of motion  - Assess patient's mobility; develop plan if impaired  - Assess patient's need for assistive devices and provide as appropriate  - Encourage maximum independence but intervene and supervise when necessary  - Involve family in performance of ADLs  - Assess for home care needs following discharge   - Consider OT consult to assist with ADL evaluation and planning for discharge  - Provide patient education as appropriate  Outcome: Progressing  Goal: Maintains/Returns to pre admission functional level  Description: INTERVENTIONS:  - Perform BMAT or MOVE assessment daily    - Set and communicate daily mobility goal to care team and patient/family/caregiver  - Collaborate with rehabilitation services on mobility goals if consulted  - Perform Range of Motion 3-4 times a day  - Reposition patient every 1-2 hours    - Dangle patient 3-4 times a day  - Stand patient 3-4 times a day  - Ambulate patient 3-4 times a day  - Out of bed to chair 3-4 times a day   - Out of bed for meals 3-4 times a day  - Out of bed for toileting  - Record patient progress and toleration of activity level   Outcome: Progressing     Problem: DISCHARGE PLANNING  Goal: Discharge to home or other facility with appropriate resources  Description: INTERVENTIONS:  - Identify barriers to discharge w/patient and caregiver  - Arrange for needed discharge resources and transportation as appropriate  - Identify discharge learning needs (meds, wound care, etc )  - Arrange for interpretive services to assist at discharge as needed  - Refer to Case Management Department for coordinating discharge planning if the patient needs post-hospital services based on physician/advanced practitioner order or complex needs related to functional status, cognitive ability, or social support system  Outcome: Progressing     Problem: Knowledge Deficit  Goal: Patient/family/caregiver demonstrates understanding of disease process, treatment plan, medications, and discharge instructions  Description: Complete learning assessment and assess knowledge base    Interventions:  - Provide teaching at level of understanding  - Provide teaching via preferred learning methods  Outcome: Progressing     Problem: GASTROINTESTINAL - ADULT  Goal: Minimal or absence of nausea and/or vomiting  Description: INTERVENTIONS:  - Administer IV fluids if ordered to ensure adequate hydration  - Maintain NPO status until nausea and vomiting are resolved  - Nasogastric tube if ordered  - Administer ordered antiemetic medications as needed  - Provide nonpharmacologic comfort measures as appropriate  - Advance diet as tolerated, if ordered  - Consider nutrition services referral to assist patient with adequate nutrition and appropriate food choices  Outcome: Progressing  Goal: Maintains or returns to baseline bowel function  Description: INTERVENTIONS:  - Assess bowel function  - Encourage oral fluids to ensure adequate hydration  - Administer IV fluids if ordered to ensure adequate hydration  - Administer ordered medications as needed  - Encourage mobilization and activity  - Consider nutritional services referral to assist patient with adequate nutrition and appropriate food choices  Outcome: Progressing  Goal: Maintains adequate nutritional intake  Description: INTERVENTIONS:  - Monitor percentage of each meal consumed  - Identify factors contributing to decreased intake, treat as appropriate  - Assist with meals as needed  - Monitor I&O, weight, and lab values if indicated  - Obtain nutrition services referral as needed  Outcome: Progressing  Goal: Establish and maintain optimal ostomy function  Description: INTERVENTIONS:  - Assess bowel function  - Encourage oral fluids to ensure adequate hydration  - Administer IV fluids if ordered to ensure adequate hydration   - Administer ordered medications as needed  - Encourage mobilization and activity  - Nutrition services referral to assist patient with appropriate food choices  - Assess stoma site  - Consider wound care consult   Outcome: Progressing  Goal: Oral mucous membranes remain intact  Description: INTERVENTIONS  - Assess oral mucosa and hygiene practices  - Implement preventative oral hygiene regimen  - Implement oral medicated treatments as ordered  - Initiate Nutrition services referral as needed  Outcome: Progressing     Problem: METABOLIC, FLUID AND ELECTROLYTES - ADULT  Goal: Electrolytes maintained within normal limits  Description: INTERVENTIONS:  - Monitor labs and assess patient for signs and symptoms of electrolyte imbalances  - Administer electrolyte replacement as ordered  - Monitor response to electrolyte replacements, including repeat lab results as appropriate  - Instruct patient on fluid and nutrition as appropriate  Outcome: Progressing  Goal: Fluid balance maintained  Description: INTERVENTIONS:  - Monitor labs   - Monitor I/O and WT  - Instruct patient on fluid and nutrition as appropriate  - Assess for signs & symptoms of volume excess or deficit  Outcome: Progressing  Goal: Glucose maintained within target range  Description: INTERVENTIONS:  - Monitor Blood Glucose as ordered  - Assess for signs and symptoms of hyperglycemia and hypoglycemia  - Administer ordered medications to maintain glucose within target range  - Assess nutritional intake and initiate nutrition service referral as needed  Outcome: Progressing

## 2023-06-09 NOTE — DISCHARGE SUMMARY
[facility] [address]  Discharge- Trista Sanchez 1965, 61 y o  male MRN: [id number]  Unit/Bed#:  Encounter: [id number]  Primary Care Provider: Lurdes Jimenez DO   Date and time admitted to hospital: 6/5/2023  7:16 PM    * Abscess of postoperative wound of abdominal wall  Assessment & Plan  -s/p Abdominal Drain per IR 6-6-2023   -Bcx: 4+ Growth of Staphylococcus aureus Abnormal  P   -Culture sensitivity; preliminary resulted  · Cefepime/Vancomycin D 5 #   · Case was discussed with infectious disease team and we agreed on the following; transition to oral doxycycline 100 mg twice daily anticipated end date 6/19/2023  Prescription sent  · Urology following; follow-up in outpatient offices  · IR aware ; will arrange drain removal in 2 wks outpatient        Methadone dependence   Assessment & Plan  This is a chronic condition     -Following up with [facility]   -Management per clinic    Essential hypertension  Assessment & Plan  /52 on arrival    -Updated management as following;  -Amlodipine 2 5 mg once daily   -Follow-up with PCP accordingly    Hyponatremia-resolved as of 6/9/2023  Assessment & Plan  Restart fluid restriction    -labs pending    -follow up accordingly        Discharging Physician / Practitioner: Dean Serrano MD  PCP: Lurdes Jimenez DO  Admission Date:   Admission Orders (From admission, onward)     Ordered        06/07/23 1255  Inpatient Admission  Once            06/05/23 2322  Place in Observation  Once                      Discharge Date: 06/09/23    Medical Problems     Resolved Problems  Date Reviewed: 6/9/2023          Resolved    Hyponatremia 6/9/2023     Resolved by  Dean Serrano MD          Consultations During Hospital Stay:  · Interventional radiology, urology, infectious disease, case management    Procedures Performed:   IR drainage tube placement   Final Result   Impression:   Successful placement of a 10 [place] catheter into a right anterior abdominal incisional soft tissue abscess under ultrasound control, and 90 mL of tan purulent fluid was aspirated and a specimen sent to the lab as described above  PLAN: Tube to KATHY bulb suction  Flush with 10 cc every 8 hours in the hospital and q  day at home  Return in 2 weeks for possible tube removal             Workstation performed: [id number]         CT abdomen pelvis wo contrast   Final Result      Limited evaluation without intravenous contrast       Postsurgical changes with 8 3 cm fluid collection in the upper right ventral abdominal wall soft tissues with some mild adjacent stranding in the subcutaneous fat  Findings may reflect postoperative seroma although superimposed infection i e  developing    abscess difficult to exclude  Clinical correlation and surgical consult advised  Status post right radical nephrectomy with small amount of loculated fluid and trace free intraperitoneal air within the surgical resection bed, within limits of expected postoperative appearance (POD 14)  Cholelithiasis  Redemonstrated cirrhotic morphology of the liver  Tiny focus of antidependent air within the urinary bladder likely on the basis of recent instrumentation  Correlate with urinalysis to exclude infectious etiology  Constipation  Punctate nonobstructive left lower pole intrarenal calculus  Above findings discussed with Dr Sr at 10:26 p m  on 6/5/2023  Workstation performed: [id number]         IR drainage tube check and/or removal    (Results Pending)   ·   ·     Significant Findings / Test Results:   Results for orders placed or performed during the hospital encounter of 06/05/23   Blood culture #1    Specimen: Arm, Left; Blood   Result Value Ref Range    Blood Culture No Growth at 72 hrs  Blood culture #2    Specimen: Arm, Right; Blood   Result Value Ref Range    Blood Culture No Growth at 72 hrs      Body fluid culture and Gram stain Specimen: Abscess;  Body Fluid   Result Value Ref Range    Body Fluid Culture, Sterile 4+ Growth of Staphylococcus aureus (A)     Body Fluid Culture, Sterile 4+ Growth of Staphylococcus aureus (A)     Gram Stain Result 4+ Polys (A)     Gram Stain Result 3+ Gram positive cocci in clusters (A)        Susceptibility    Staphylococcus aureus - TO     Ampicillin ($$) >8 00 Resistant ug/ml     Cefazolin ($) <=4 00 Susceptible ug/ml     Clindamycin ($) 0 50 Resistant ug/ml     Erythromycin ($$$$) >4 00 Resistant ug/ml     Gentamicin ($$) <=1 Susceptible ug/ml     Oxacillin <=0 25 Susceptible ug/ml     Tetracycline <=2 Susceptible ug/ml     Trimethoprim + Sulfamethoxazole ($$$) <=0 5/9 5 Susceptible ug/ml     Vancomycin ($) 1 00 Susceptible ug/ml   CBC and differential   Result Value Ref Range    WBC 17 53 (H) 4 31 - 10 16 Thousand/uL    RBC 4 51 3 88 - 5 62 Million/uL    Hemoglobin 13 5 12 0 - 17 0 g/dL    Hematocrit 41 4 36 5 - 49 3 %    MCV 92 82 - 98 fL    MCH 29 9 26 8 - 34 3 pg    MCHC 32 6 31 4 - 37 4 g/dL    RDW 15 3 (H) 11 6 - 15 1 %    MPV 8 5 (L) 8 9 - 12 7 fL    Platelets 886 590 - 332 Thousands/uL   Comprehensive metabolic panel   Result Value Ref Range    Sodium 131 (L) 135 - 147 mmol/L    Potassium 4 6 3 5 - 5 3 mmol/L    Chloride 96 96 - 108 mmol/L    CO2 29 21 - 32 mmol/L    ANION GAP 6 4 - 13 mmol/L    BUN 15 5 - 25 mg/dL    Creatinine 1 26 0 60 - 1 30 mg/dL    Glucose 90 65 - 140 mg/dL    Calcium 9 2 8 4 - 10 2 mg/dL    Corrected Calcium 9 7 8 3 - 10 1 mg/dL    AST 15 13 - 39 U/L    ALT 17 7 - 52 U/L    Alkaline Phosphatase 72 34 - 104 U/L    Total Protein 6 8 6 4 - 8 4 g/dL    Albumin 3 4 (L) 3 5 - 5 0 g/dL    Total Bilirubin 0 53 0 20 - 1 00 mg/dL    eGFR 62 ml/min/1 73sq m   Lactic acid   Result Value Ref Range    LACTIC ACID 1 0 0 5 - 2 0 mmol/L   Procalcitonin   Result Value Ref Range    Procalcitonin 0 17 <=0 25 ng/ml   Protime-INR   Result Value Ref Range    Protime 15 0 (H) 11 6 - 14 5 seconds INR 1 16 0 84 - 1 19   APTT   Result Value Ref Range    PTT 32 23 - 37 seconds   UA w Reflex to Microscopic w Reflex to Culture    Specimen: Urine, Clean Catch   Result Value Ref Range    Color, UA Yellow     Clarity, UA Clear     Specific [place], UA 1 015 1 003 - 1 030    pH, UA 6 0 4 5, 5 0, 5 5, 6 0, 6 5, 7 0, 7 5, 8 0    Leukocytes, UA Negative Negative    Nitrite, UA Negative Negative    Protein, UA Negative Negative mg/dl    Glucose, UA Negative Negative mg/dl    Ketones, UA Negative Negative mg/dl    Urobilinogen, UA 0 2 0 2, 1 0 E U /dl E U /dl    Bilirubin, UA Negative Negative    Occult Blood, UA Negative Negative   Comprehensive metabolic panel   Result Value Ref Range    Sodium 137 135 - 147 mmol/L    Potassium 3 8 3 5 - 5 3 mmol/L    Chloride 107 96 - 108 mmol/L    CO2 25 21 - 32 mmol/L    ANION GAP 5 4 - 13 mmol/L    BUN 13 5 - 25 mg/dL    Creatinine 0 92 0 60 - 1 30 mg/dL    Glucose 75 65 - 140 mg/dL    Glucose, Fasting 75 65 - 99 mg/dL    Calcium 6 9 (L) 8 4 - 10 2 mg/dL    Corrected Calcium 8 2 (L) 8 3 - 10 1 mg/dL    AST 10 (L) 13 - 39 U/L    ALT 11 7 - 52 U/L    Alkaline Phosphatase 52 34 - 104 U/L    Total Protein 4 6 (L) 6 4 - 8 4 g/dL    Albumin 2 4 (L) 3 5 - 5 0 g/dL    Total Bilirubin 0 48 0 20 - 1 00 mg/dL    eGFR 91 ml/min/1 73sq m   CBC and differential   Result Value Ref Range    WBC 13 26 (H) 4 31 - 10 16 Thousand/uL    RBC 3 76 (L) 3 88 - 5 62 Million/uL    Hemoglobin 11 3 (L) 12 0 - 17 0 g/dL    Hematocrit 34 6 (L) 36 5 - 49 3 %    MCV 92 82 - 98 fL    MCH 30 1 26 8 - 34 3 pg    MCHC 32 7 31 4 - 37 4 g/dL    RDW 15 1 11 6 - 15 1 %    MPV 8 4 (L) 8 9 - 12 7 fL    Platelets 441 088 - 215 Thousands/uL   Vancomycin, random   Result Value Ref Range    Vancomycin Rm 15 4 10 0 - 20 0 ug/mL   CBC and differential   Result Value Ref Range    WBC 13 01 (H) 4 31 - 10 16 Thousand/uL    RBC 4 06 3 88 - 5 62 Million/uL    Hemoglobin 11 9 (L) 12 0 - 17 0 g/dL    Hematocrit 36 6 36 5 - 49 3 %    MCV 90 82 - 98 fL    MCH 29 3 26 8 - 34 3 pg    MCHC 32 5 31 4 - 37 4 g/dL    RDW 14 7 11 6 - 15 1 %    MPV 8 5 (L) 8 9 - 12 7 fL    Platelets 345 186 - 683 Thousands/uL   Comprehensive metabolic panel   Result Value Ref Range    Sodium 132 (L) 135 - 147 mmol/L    Potassium 4 1 3 5 - 5 3 mmol/L    Chloride 97 96 - 108 mmol/L    CO2 27 21 - 32 mmol/L    ANION GAP 8 4 - 13 mmol/L    BUN 14 5 - 25 mg/dL    Creatinine 1 16 0 60 - 1 30 mg/dL    Glucose 105 65 - 140 mg/dL    Calcium 8 6 8 4 - 10 2 mg/dL    Corrected Calcium 9 2 8 3 - 10 1 mg/dL    AST 16 13 - 39 U/L    ALT 14 7 - 52 U/L    Alkaline Phosphatase 67 34 - 104 U/L    Total Protein 6 3 (L) 6 4 - 8 4 g/dL    Albumin 3 2 (L) 3 5 - 5 0 g/dL    Total Bilirubin 0 41 0 20 - 1 00 mg/dL    eGFR 69 ml/min/1 73sq m   Manual Differential(PHLEBS Do Not Order)   Result Value Ref Range    Segmented % 81 (H) 43 - 75 %    Bands % 3 0 - 8 %    Lymphocytes % 8 (L) 14 - 44 %    Monocytes % 7 4 - 12 %    Eosinophils, % 1 0 - 6 %    Basophils % 0 0 - 1 %    Absolute Neutrophils 14 73 (H) 1 85 - 7 62 Thousand/uL    Lymphocytes Absolute 1 40 0 60 - 4 47 Thousand/uL    Monocytes Absolute 1 23 (H) 0 00 - 1 22 Thousand/uL    Eosinophils Absolute 0 18 0 00 - 0 40 Thousand/uL    Basophils Absolute 0 00 0 00 - 0 10 Thousand/uL    Total Counted      RBC Morphology Normal     Platelet Estimate Adequate Adequate   Manual Differential(PHLEBS Do Not Order)   Result Value Ref Range    Segmented % 81 (H) 43 - 75 %    Lymphocytes % 7 (L) 14 - 44 %    Monocytes % 9 4 - 12 %    Eosinophils, % 3 0 - 6 %    Basophils % 0 0 - 1 %    Absolute Neutrophils 10 74 (H) 1 85 - 7 62 Thousand/uL    Lymphocytes Absolute 0 93 0 60 - 4 47 Thousand/uL    Monocytes Absolute 1 19 0 00 - 1 22 Thousand/uL    Eosinophils Absolute 0 40 0 00 - 0 40 Thousand/uL    Basophils Absolute 0 00 0 00 - 0 10 Thousand/uL    Total Counted      RBC Morphology Normal     Platelet Estimate Adequate Adequate   ·   ·     Incidental Findings: · none     Test Results Pending at Discharge (will require follow up):   · none     Outpatient Tests Requested:  · none    Complications:  none    Reason for Admission: Abdominal abscess  Hospital Course:     Sarah Tong is a 61 y o  male patient who originally presented to the hospital on 6/5/2023 due to abdominal wall abscess during the hospitalization patient continued to be vitally stable  Blood and wound cultures were obtained with wound cultures growing Staphylococcus aureus  Infectious disease team/urology were consulted and patient completed 5 days of IV antibiotics cefepime/vancomycin  Currently transition to oral antibiotics and will be following up with urology/PCP and interventional radiology in 2 weeks to remove the drain  Patient is being discharged in stable state  · HPI per admission:     61 y o  male with a PMH of HTN, hyponatremia likely secondary to SIADH, methadone dependence  patient is presenting with his mom with concerns of increasing pain and erythema at surgical incision site after having a right radical nephrectomy done on 5/22/2023 due to staghorn calculus that was not adequately managed with nephrostomy tube this requiring a nephrectomy  Patient does report subjective fevers but has not checked temperature at home  Does report poor p o  intake though no nausea, vomiting, constipation, diarrhea  Please see above list of diagnoses and related plan for additional information  Condition at Discharge: good     Discharge Day Visit / Exam:     Subjective: Patient was seen today at bedside  Does not have any active complaints  No chest pain or tightness, nausea or vomiting, diarrhea or constipation  Tolerating oral diet  Stooling and voiding accordingly  Percutaneous drain noted at bedside empty       Vitals: Blood Pressure: 133/62 (06/09/23 0730)  Pulse: (!) 51 (06/09/23 0730)  Temperature: (!) 97 °F (36 1 °C) (06/09/23 0730)  Temp Source: Temporal (06/09/23 "0730)  Respirations: 18 (06/09/23 0730)  Height: 5' 7\" (170 2 cm) (06/06/23 1026)  Weight - Scale: 63 6 kg (140 lb 3 4 oz) (06/09/23 0600)  SpO2: 95 % (06/09/23 0730)  Exam:   Physical Exam  Vitals reviewed  Constitutional:       General: He is not in acute distress  Appearance: Normal appearance  HENT:      Head: Normocephalic and atraumatic  Mouth/Throat:      Mouth: Mucous membranes are moist    Eyes:      Conjunctiva/sclera: Conjunctivae normal       Pupils: Pupils are equal, round, and reactive to light  Cardiovascular:      Rate and Rhythm: Normal rate and regular rhythm  Pulses: Normal pulses  Carotid pulses are 2+ on the right side and 2+ on the left side  Radial pulses are 2+ on the right side and 2+ on the left side  Dorsalis pedis pulses are 2+ on the right side and 2+ on the left side  Heart sounds: Normal heart sounds, S1 normal and S2 normal  No murmur heard  Pulmonary:      Effort: No tachypnea, bradypnea or accessory muscle usage  Breath sounds: Normal breath sounds and air entry  No decreased breath sounds, wheezing, rhonchi or rales  Abdominal:      General: Abdomen is flat  Bowel sounds are normal       Palpations: Abdomen is soft  Tenderness: There is no abdominal tenderness  Comments: Drain noted at bedside  Clean/dry dressing in the marked area  No tenderness to palpation  Drain bag empty  At time of assessment    Musculoskeletal:      Right lower leg: No edema  Left lower leg: No edema  Skin:     Capillary Refill: Capillary refill takes less than 2 seconds  Neurological:      Mental Status: He is alert and oriented to person, place, and time  Mental status is at baseline  Discussion with Family: mother at bedside    Discharge instructions/Information to patient and family:   See after visit summary for information provided to patient and family        Provisions for Follow-Up Care:  See after visit " summary for information related to follow-up care and any pertinent home health orders  Disposition:     Home with [facility] Services (Reminder: Complete face to face encounter)    For Discharges to [facility]:   · Not Applicable to this Patient - Not Applicable to this Patient    Planned Readmission: none     Discharge Statement:  I spent 60  minutes discharging the patient  This time was spent on the day of discharge  I had direct contact with the patient on the day of discharge  Greater than 50% of the total time was spent examining patient, answering all patient questions, arranging and discussing plan of care with patient as well as directly providing post-discharge instructions  Additional time then spent on discharge activities  Discharge Medications:  See after visit summary for reconciled discharge medications provided to patient and family        ** Please Note: This note has been constructed using a voice recognition system **

## 2023-06-09 NOTE — ASSESSMENT & PLAN NOTE
/52 on arrival    -Updated management as following;  -Amlodipine 2 5 mg once daily   -Follow-up with PCP accordingly

## 2023-06-09 NOTE — QUICK NOTE
Patient called stated doxycycline script wasn't received by [facility] pharmacy  [facility]  I checked my orders  Medication was placed appropriately  Mostly network issue from the receiving pharmacy  I canceled and re ordered the medication     Sent to same pharmacy

## 2023-06-09 NOTE — PHYSICAL THERAPY NOTE
PHYSICAL THERAPY NOTE          Patient Name: Dotty Chase  FRSAV'Y Date: 6/9/2023 06/09/23 0819   PT Last Visit   PT Visit Date 06/09/23   Note Type   Note Type Treatment   Pain Assessment   Pain Score 8   Restrictions/Precautions   Weight Bearing Precautions Per Order No   General   Family/Caregiver Present No   Cognition   Overall Cognitive Status WFL   Arousal/Participation Alert   Following Commands Follows one step commands without difficulty   Subjective   Subjective c/o abdominal discomfort   Bed Mobility   Supine to Sit 6  Modified independent   Additional items Verbal cues   Additional Comments Reviewed bed mobility/transfer techniques/abdominal bracing to avoid discomfort when transitioning from supine to sit  Transfers   Additional Comments declined OOB at this time   Ambulation/Elevation   Gait pattern Not tested   Exercises   Heelslides 20 reps  (reviewed HEP)   Ankle Pumps 20 reps   Assessment   Prognosis Good   Problem List   (Decreased strength; Impaired balance; Decreased endurance; Decreased mobility; Decreased safety awareness)   Assessment Pt  seen for PT session this date with interventions consisting of review of HEP and bed mobility  Declines ambulation at this time  In comparison to previous session, Pt  With no change  in activity tolerance  Pt is in need of continued activity in PT to improve strength balance endurance mobility transfers and ambulation with return to maximize LOF  From PT/mobility standpoint, recommendation at time of d/c would be OP PT in order to promote return to PLOF and independence  The patient's AM-PAC Basic Mobility Inpatient Short Form Raw Score is 22  A Raw score of greater than 16 suggests the patient may benefit from discharge to home  Please also refer to physical therapy recommendation for safe DC planning     Goals   LTG Expiration Date 06/20/23   PT Treatment Day 2   Plan   Treatment/Interventions   (Functional transfer training; LE strengthening/ROM; Elevations; Therapeutic exercise; Endurance training; Bed mobility; Gait training)   Progress Slow progress, decreased activity tolerance   PT Frequency 3-5x/wk   Recommendation   PT Discharge Recommendation Home with outpatient rehabilitation   AM-PAC Basic Mobility Inpatient   Turning in Flat Bed Without Bedrails 4   Lying on Back to Sitting on Edge of Flat Bed Without Bedrails 3   Moving Bed to Chair 4   Standing Up From Chair Using Arms 4   Walk in Room 4   Climb 3-5 Stairs With Railing 3   Basic Mobility Inpatient Raw Score 22   Basic Mobility Standardized Score 47 4   Highest Level Of Mobility   JH-HLM Goal 7: Walk 25 feet or more   JH-HLM Achieved 3: Sit at edge of bed   Education   Education Provided Mobility training   Patient Demonstrates verbal understanding;Reinforcement needed   End of Consult   Patient Position at End of Consult Supine; All needs within reach   End of Consult Comments discussed POC with PT

## 2023-06-09 NOTE — PLAN OF CARE
Problem: PAIN - ADULT  Goal: Verbalizes/displays adequate comfort level or baseline comfort level  Description: Interventions:  - Encourage patient to monitor pain and request assistance  - Assess pain using appropriate pain scale  - Administer analgesics based on type and severity of pain and evaluate response  - Implement non-pharmacological measures as appropriate and evaluate response  - Consider cultural and social influences on pain and pain management  - Notify physician/advanced practitioner if interventions unsuccessful or patient reports new pain  6/9/2023 0940 by Yina Bearden RN  Outcome: Completed  6/9/2023 0940 by Yina Bearden RN  Outcome: Adequate for Discharge  6/9/2023 0859 by Yina Bearden RN  Outcome: Progressing     Problem: INFECTION - ADULT  Goal: Absence or prevention of progression during hospitalization  Description: INTERVENTIONS:  - Assess and monitor for signs and symptoms of infection  - Monitor lab/diagnostic results  - Monitor all insertion sites, i e  indwelling lines, tubes, and drains  - Monitor endotracheal if appropriate and nasal secretions for changes in amount and color  - [place] appropriate cooling/warming therapies per order  - Administer medications as ordered  - Instruct and encourage patient and family to use good hand hygiene technique  - Identify and instruct in appropriate isolation precautions for identified infection/condition  6/9/2023 0940 by Yina Bearden RN  Outcome: Completed  6/9/2023 0940 by Yina Bearden RN  Outcome: Adequate for Discharge  6/9/2023 0859 by Yina Bearden RN  Outcome: Progressing     Problem: SAFETY ADULT  Goal: Patient will remain free of falls  Description: INTERVENTIONS:  - Educate patient/family on patient safety including physical limitations  - Instruct patient to call for assistance with activity   - Consult OT/PT to assist with strengthening/mobility   - Keep Call bell within reach  - Keep bed low and locked with side rails adjusted as appropriate  - Keep care items and personal belongings within reach  - Initiate and maintain comfort rounds  - Make Fall Risk Sign visible to staff  - Offer Toileting every 1-2 Hours, in advance of need  - Initiate/Maintain bed and chair alarm  - Obtain necessary fall risk management equipment: fall socks and call bell in place  - Apply yellow socks and bracelet for high fall risk patients  - Consider moving patient to room near nurses station  6/9/2023 0940 by Carlos Rios RN  Outcome: Completed  6/9/2023 0940 by Carlos Rios RN  Outcome: Adequate for Discharge  6/9/2023 0859 by Carlos Rios RN  Outcome: Progressing  Goal: Maintain or return to baseline ADL function  Description: INTERVENTIONS:  -  Assess patient's ability to carry out ADLs; assess patient's baseline for ADL function and identify physical deficits which impact ability to perform ADLs (bathing, care of mouth/teeth, toileting, grooming, dressing, etc )  - Assess/evaluate cause of self-care deficits   - Assess range of motion  - Assess patient's mobility; develop plan if impaired  - Assess patient's need for assistive devices and provide as appropriate  - Encourage maximum independence but intervene and supervise when necessary  - Involve family in performance of ADLs  - Assess for home care needs following discharge   - Consider OT consult to assist with ADL evaluation and planning for discharge  - Provide patient education as appropriate  6/9/2023 0940 by Carlos Rios RN  Outcome: Completed  6/9/2023 0940 by Carlos Rios RN  Outcome: Adequate for Discharge  6/9/2023 0859 by Carlos Rios RN  Outcome: Progressing  Goal: Maintains/Returns to pre admission functional level  Description: INTERVENTIONS:  - Perform BMAT or MOVE assessment daily    - Set and communicate daily mobility goal to care team and patient/family/caregiver     - Collaborate with rehabilitation services on mobility goals if consulted  - Perform Range of Motion 3-4 times a day  - Reposition patient every 1-2 hours  - Dangle patient 3-4 times a day  - Stand patient 3-4 times a day  - Ambulate patient 3-4 times a day  - Out of bed to chair 3-4 times a day   - Out of bed for meals 3-4 times a day  - Out of bed for toileting  - Record patient progress and toleration of activity level   6/9/2023 0940 by Maurice Pastrana RN  Outcome: Completed  6/9/2023 0940 by Maurice Pastrana RN  Outcome: Adequate for Discharge  6/9/2023 0859 by Maurice Pastrana RN  Outcome: Progressing     Problem: DISCHARGE PLANNING  Goal: Discharge to home or other facility with appropriate resources  Description: INTERVENTIONS:  - Identify barriers to discharge w/patient and caregiver  - Arrange for needed discharge resources and transportation as appropriate  - Identify discharge learning needs (meds, wound care, etc )  - Arrange for interpretive services to assist at discharge as needed  - Refer to Case Management Department for coordinating discharge planning if the patient needs post-hospital services based on physician/advanced practitioner order or complex needs related to functional status, cognitive ability, or social support system  6/9/2023 0940 by Maurice Pastrana RN  Outcome: Completed  6/9/2023 0940 by Maurice Pastrana RN  Outcome: Adequate for Discharge  6/9/2023 0859 by Maurice Pastrana RN  Outcome: Progressing     Problem: Knowledge Deficit  Goal: Patient/family/caregiver demonstrates understanding of disease process, treatment plan, medications, and discharge instructions  Description: Complete learning assessment and assess knowledge base    Interventions:  - Provide teaching at level of understanding  - Provide teaching via preferred learning methods  6/9/2023 0940 by Maurice Pastrana RN  Outcome: Completed  6/9/2023 0940 by Maurice Pastrana RN  Outcome: Adequate for Discharge  6/9/2023 0859 by Maurice Pastrana RN  Outcome: Progressing     Problem: GASTROINTESTINAL - ADULT  Goal: Minimal or absence of nausea and/or vomiting  Description: INTERVENTIONS:  - Administer IV fluids if ordered to ensure adequate hydration  - Maintain NPO status until nausea and vomiting are resolved  - Nasogastric tube if ordered  - Administer ordered antiemetic medications as needed  - Provide nonpharmacologic comfort measures as appropriate  - Advance diet as tolerated, if ordered  - Consider nutrition services referral to assist patient with adequate nutrition and appropriate food choices  6/9/2023 0940 by Sherryle Finical, RN  Outcome: Completed  6/9/2023 0940 by Sherryle Finical, RN  Outcome: Adequate for Discharge  6/9/2023 0859 by Sherryle Finical, RN  Outcome: Progressing  Goal: Maintains or returns to baseline bowel function  Description: INTERVENTIONS:  - Assess bowel function  - Encourage oral fluids to ensure adequate hydration  - Administer IV fluids if ordered to ensure adequate hydration  - Administer ordered medications as needed  - Encourage mobilization and activity  - Consider nutritional services referral to assist patient with adequate nutrition and appropriate food choices  6/9/2023 0940 by Sherryle Finical, RN  Outcome: Completed  6/9/2023 0940 by Sherryle Finical, RN  Outcome: Adequate for Discharge  6/9/2023 0859 by Sherryle Finical, RN  Outcome: Progressing  Goal: Maintains adequate nutritional intake  Description: INTERVENTIONS:  - Monitor percentage of each meal consumed  - Identify factors contributing to decreased intake, treat as appropriate  - Assist with meals as needed  - Monitor I&O, weight, and lab values if indicated  - Obtain nutrition services referral as needed  6/9/2023 0940 by Sherryle Finical, RN  Outcome: Completed  6/9/2023 0940 by Sherryle Finical, RN  Outcome: Adequate for Discharge  6/9/2023 0859 by Sherryle Finical, RN  Outcome: Progressing  Goal: Establish and maintain optimal ostomy function  Description: INTERVENTIONS:  - Assess bowel function  - Encourage oral fluids to ensure adequate hydration  - Administer IV fluids if ordered to ensure adequate hydration   - Administer ordered medications as needed  - Encourage mobilization and activity  - Nutrition services referral to assist patient with appropriate food choices  - Assess stoma site  - Consider wound care consult   6/9/2023 0940 by Tj Arthur RN  Outcome: Completed  6/9/2023 0940 by Tj Arthur RN  Outcome: Adequate for Discharge  6/9/2023 0859 by Tj Arthur RN  Outcome: Progressing  Goal: Oral mucous membranes remain intact  Description: INTERVENTIONS  - Assess oral mucosa and hygiene practices  - Implement preventative oral hygiene regimen  - Implement oral medicated treatments as ordered  - Initiate Nutrition services referral as needed  6/9/2023 0940 by Tj Arthur RN  Outcome: Completed  6/9/2023 0940 by Tj Arthur RN  Outcome: Adequate for Discharge  6/9/2023 0859 by Tj Arthur RN  Outcome: Progressing     Problem: METABOLIC, FLUID AND ELECTROLYTES - ADULT  Goal: Electrolytes maintained within normal limits  Description: INTERVENTIONS:  - Monitor labs and assess patient for signs and symptoms of electrolyte imbalances  - Administer electrolyte replacement as ordered  - Monitor response to electrolyte replacements, including repeat lab results as appropriate  - Instruct patient on fluid and nutrition as appropriate  6/9/2023 0940 by Tj Arthur RN  Outcome: Completed  6/9/2023 0940 by Tj Arthur RN  Outcome: Adequate for Discharge  6/9/2023 0859 by Tj Arthur RN  Outcome: Progressing  Goal: Fluid balance maintained  Description: INTERVENTIONS:  - Monitor labs   - Monitor I/O and WT  - Instruct patient on fluid and nutrition as appropriate  - Assess for signs & symptoms of volume excess or deficit  6/9/2023 0940 by Tj Arthur RN  Outcome: Completed  6/9/2023 0940 by Tj Arthur RN  Outcome: Adequate for Discharge  6/9/2023 0859 by Tj Arthur RN  Outcome: Progressing  Goal: Glucose maintained within target range  Description: INTERVENTIONS:  - Monitor Blood Glucose as ordered  - Assess for signs and symptoms of hyperglycemia and hypoglycemia  - Administer ordered medications to maintain glucose within target range  - Assess nutritional intake and initiate nutrition service referral as needed  6/9/2023 0940 by Esthela Newton RN  Outcome: Completed  6/9/2023 0940 by Esthela Newton RN  Outcome: Adequate for Discharge  6/9/2023 0859 by Esthela Newton RN  Outcome: Progressing

## 2023-06-10 ENCOUNTER — HOME CARE VISIT (OUTPATIENT)
Dept: HOME HEALTH SERVICES | Facility: HOME HEALTHCARE | Age: 58
End: 2023-06-10
Payer: COMMERCIAL

## 2023-06-10 PROCEDURE — 400013 VN SOC

## 2023-06-10 PROCEDURE — G0299 HHS/HOSPICE OF RN EA 15 MIN: HCPCS

## 2023-06-11 LAB
BACTERIA BLD CULT: NORMAL
BACTERIA BLD CULT: NORMAL

## 2023-06-12 ENCOUNTER — HOME CARE VISIT (OUTPATIENT)
Dept: HOME HEALTH SERVICES | Facility: HOME HEALTHCARE | Age: 58
End: 2023-06-12
Payer: COMMERCIAL

## 2023-06-12 VITALS
TEMPERATURE: 98.6 F | SYSTOLIC BLOOD PRESSURE: 120 MMHG | OXYGEN SATURATION: 97 % | HEART RATE: 80 BPM | RESPIRATION RATE: 18 BRPM | DIASTOLIC BLOOD PRESSURE: 60 MMHG

## 2023-06-12 PROCEDURE — G0299 HHS/HOSPICE OF RN EA 15 MIN: HCPCS

## 2023-06-13 ENCOUNTER — HOME CARE VISIT (OUTPATIENT)
Dept: HOME HEALTH SERVICES | Facility: HOME HEALTHCARE | Age: 58
End: 2023-06-13
Payer: COMMERCIAL

## 2023-06-13 ENCOUNTER — OFFICE VISIT (OUTPATIENT)
Dept: NEPHROLOGY | Facility: CLINIC | Age: 58
End: 2023-06-13
Payer: COMMERCIAL

## 2023-06-13 VITALS
DIASTOLIC BLOOD PRESSURE: 56 MMHG | OXYGEN SATURATION: 96 % | WEIGHT: 140.6 LBS | SYSTOLIC BLOOD PRESSURE: 110 MMHG | BODY MASS INDEX: 22.07 KG/M2 | HEIGHT: 67 IN | HEART RATE: 60 BPM

## 2023-06-13 DIAGNOSIS — N18.2 CKD (CHRONIC KIDNEY DISEASE) STAGE 2, GFR 60-89 ML/MIN: Primary | ICD-10-CM

## 2023-06-13 DIAGNOSIS — I10 ESSENTIAL HYPERTENSION: ICD-10-CM

## 2023-06-13 DIAGNOSIS — Z87.440 HISTORY OF UTI: ICD-10-CM

## 2023-06-13 DIAGNOSIS — L02.211 ABDOMINAL WALL ABSCESS: ICD-10-CM

## 2023-06-13 DIAGNOSIS — Z72.0 TOBACCO ABUSE: ICD-10-CM

## 2023-06-13 DIAGNOSIS — N20.0 KIDNEY STONES: ICD-10-CM

## 2023-06-13 DIAGNOSIS — T81.49XA ABSCESS OF POSTOPERATIVE WOUND OF ABDOMINAL WALL: ICD-10-CM

## 2023-06-13 PROBLEM — N17.9 AKI (ACUTE KIDNEY INJURY) (HCC): Status: RESOLVED | Noted: 2022-08-20 | Resolved: 2023-06-13

## 2023-06-13 PROCEDURE — 99214 OFFICE O/P EST MOD 30 MIN: CPT | Performed by: INTERNAL MEDICINE

## 2023-06-13 RX ORDER — SENNOSIDES 8.6 MG
650 CAPSULE ORAL EVERY 8 HOURS PRN
Qty: 30 TABLET | Refills: 0 | Status: ON HOLD
Start: 2023-06-13

## 2023-06-13 NOTE — ASSESSMENT & PLAN NOTE
Lab Results   Component Value Date    CREATININE 1 16 06/07/2023    CREATININE 0 92 06/06/2023    CREATININE 1 26 06/05/2023    EGFR 69 06/07/2023    EGFR 91 06/06/2023    EGFR 62 06/05/2023     Patient kidney function stable, baseline creatinine likely around 1 mg/dL  We will continue to monitor kidney function from time to time, next check will be in about 2 months  Avoid nephro toxins including NSAIDs

## 2023-06-13 NOTE — ASSESSMENT & PLAN NOTE
Patient will need to have repeat 24-hour urine collection for stone risk assessment  Previous urine study performed about 6-7 years ago showed citrate deficiency, high urine calcium output, appropriate volume, but no other significant characteristics able to derive stone formation propensity  That being said given that he had staghorn calculi, patient will benefit from being placed on potassiums citrate which was deferred at this time  Reassess to ensure proper management going forward

## 2023-06-13 NOTE — ASSESSMENT & PLAN NOTE
Recommended discontinue, patient is aware and is potentially motivating himself to stop use of tobacco   He has resources available if needed to assist with cessation

## 2023-06-13 NOTE — PROGRESS NOTES
[facility] [place], [place]    Name: Azalea David  YOB: 1965      Assessment/Plan:    CKD (chronic kidney disease) stage 2, GFR 60-89 ml/min  Lab Results   Component Value Date    CREATININE 1 16 06/07/2023    CREATININE 0 92 06/06/2023    CREATININE 1 26 06/05/2023    EGFR 69 06/07/2023    EGFR 91 06/06/2023    EGFR 62 06/05/2023     Patient kidney function stable, baseline creatinine likely around 1 mg/dL  We will continue to monitor kidney function from time to time, next check will be in about 2 months  Avoid nephro toxins including NSAIDs  Essential hypertension  Blood pressure well controlled at this time, in fact may be a little bit too low we will discontinue amlodipine  Monitor blood pressures in the outpatient setting and potentially reinitiate depending on results going forward  Kidney stones  Patient will need to have repeat 24-hour urine collection for stone risk assessment  Previous urine study performed about 6-7 years ago showed citrate deficiency, high urine calcium output, appropriate volume, but no other significant characteristics able to derive stone formation propensity  That being said given that he had staghorn calculi, patient will benefit from being placed on potassiums citrate which was deferred at this time  Reassess to ensure proper management going forward  Tobacco abuse  Recommended discontinue, patient is aware and is potentially motivating himself to stop use of tobacco   He has resources available if needed to assist with cessation  Problem List Items Addressed This Visit        Cardiovascular and Mediastinum    Essential hypertension     Blood pressure well controlled at this time, in fact may be a little bit too low we will discontinue amlodipine  Monitor blood pressures in the outpatient setting and potentially reinitiate depending on results going forward              Genitourinary Kidney stones     Patient will need to have repeat 24-hour urine collection for stone risk assessment  Previous urine study performed about 6-7 years ago showed citrate deficiency, high urine calcium output, appropriate volume, but no other significant characteristics able to derive stone formation propensity  That being said given that he had staghorn calculi, patient will benefit from being placed on potassiums citrate which was deferred at this time  Reassess to ensure proper management going forward  Relevant Orders    Comprehensive metabolic panel    CBC and differential    Albumin / creatinine urine ratio    Urinalysis with microscopic    Magnesium    Phosphorus    PTH, intact    Stone risk profile    Recent UTI    CKD (chronic kidney disease) stage 2, GFR 60-89 ml/min - Primary     Lab Results   Component Value Date    CREATININE 1 16 06/07/2023    CREATININE 0 92 06/06/2023    CREATININE 1 26 06/05/2023    EGFR 69 06/07/2023    EGFR 91 06/06/2023    EGFR 62 06/05/2023     Patient kidney function stable, baseline creatinine likely around 1 mg/dL  We will continue to monitor kidney function from time to time, next check will be in about 2 months  Avoid nephro toxins including NSAIDs  Relevant Orders    Comprehensive metabolic panel    CBC and differential    Albumin / creatinine urine ratio    Urinalysis with microscopic    Magnesium    Phosphorus    PTH, intact       Other    Tobacco abuse     Recommended discontinue, patient is aware and is potentially motivating himself to stop use of tobacco   He has resources available if needed to assist with cessation           Abscess of postoperative wound of abdominal wall    Relevant Medications    acetaminophen (TYLENOL) 650 mg CR tablet   Other Visit Diagnoses     Abdominal wall abscess        Relevant Medications    acetaminophen (TYLENOL) 650 mg CR tablet          Stable from the renal standpoint, we will see him back for regular appointment in about 4 months  Patient to have repeat blood and urine studies done in approximately 2 months, and then a 24-hour urine collection for stone risk analysis in September prior to his appointment with us in October  Subjective:      Patient ID: Sydnie Casanova is a 61 y o  male  Patient presents for follow up  We reviewed the patient's recent hospitalization in detail  He originally presented to the hospital on May 22 for a right radical nephrectomy due to staghorn calculi  Previously, the patient was obstructed and necessitated placement of nephrostomy  Patient's procedure went well and he was discharged  Unfortunately subsequent to the procedure the patient developed an abdominal wall infection which resulted in hospitalization on June 5  Patient's blood cultures were negative however wound grew out Staphylococcus aureus which was methicillin sensitive, but ultimately discharged on doxycycline 100 mg twice daily which finishes course on June 19  From the renal standpoint, thankfully the patient has been doing very well with most recent creatinine of 1 16 mg/dL  There were no significant electrolyte abnormalities noted  Patient is currently on amlodipine for blood pressure control and management  Current blood pressure here in the office is only 699 mmHg systolic  Patient currently denies symptoms consistent with with orthostatic hypotension  The following portions of the patient's history were reviewed and updated as appropriate: allergies, current medications, past family history, past medical history, past social history, past surgical history and problem list     Review of Systems   All other systems reviewed and are negative          Social History     Socioeconomic History   • Marital status:      Spouse name: None   • Number of children: None   • Years of education: None   • Highest education level: None   Occupational History   • None   Tobacco Use   • Smoking status: Every Day     Packs/day: 2 00     Years: 35 00     Total pack years: 70 00     Types: Cigarettes   • Smokeless tobacco: Never   Vaping Use   • Vaping Use: Never used   Substance and Sexual Activity   • Alcohol use: Not Currently   • Drug use: Not Currently   • Sexual activity: Not Currently   Other Topics Concern   • None   Social History Narrative    Caffeine Use     Uses safety Equipment- Seatbelts      Social Determinants of Health     Financial Resource Strain: Not on file   Food Insecurity: No Food Insecurity (6/6/2023)    Hunger Vital Sign    • Worried About Running Out of Food in the Last Year: Never true    • Ran Out of Food in the Last Year: Never true   Transportation Needs: No Transportation Needs (6/6/2023)    PRAPARE - Transportation    • Lack of Transportation (Medical): No    • Lack of Transportation (Non-Medical):  No   Physical Activity: Not on file   Stress: Not on file   Social Connections: Not on file   Intimate Partner Violence: Not on file   Housing Stability: High Risk (6/6/2023)    Housing Stability Vital Sign    • Unable to Pay for Housing in the Last Year: No    • Number of Places Lived in the Last Year: 11    • Unstable Housing in the Last Year: No     Past Medical History:   Diagnosis Date   • BRAULIO (acute kidney injury) ([facility] [address] ) 8/20/2022   • Anxiety    • Bright red rectal bleeding     Last Assessed: 9/9/2015    • Drug dependence ([facility] [address] ) 10/20/2021   • Hypertension     Last Assessed: 7/9/2014    • Hyponatremia 5/26/2023   • Kidney stone    • Kidney stones     Last Assessed: 11/17/2016    • Periorbital edema     Last Assessed: 9/4/2015   • Septic shock ([facility] [address] ) 08/20/2022   • Skin tag of anus     Last Assessed: 9/9/2015    • Trigger point of thoracic region     Last Assessed: 11/6/2015      Past Surgical History:   Procedure Laterality Date   • CYSTOSCOPY W/ URETERAL STENT PLACEMENT  03/11/2013   • CYSTOSCOPY W/ URETERAL STENT PLACEMENT  06/18/2014    EXTRACORPOREAL SHOCK WAVE LITHOTRIPSY • CYSTOSCOPY W/ URETERAL STENT PLACEMENT  07/16/2014    EXTRACORPOREAL SHOCK WAVE LITHOTRIPSY    • CYSTOSCOPY W/ URETERAL STENT REMOVAL  04/11/2013   • CYSTOSCOPY W/ URETERAL STENT REMOVAL Right 08/26/2014   • CYSTOSCOPY W/ URETEROSCOPY W/ LITHOTRIPSY  02/26/2013    Percutaneous lithotomy With Uretal Stent Plcement    • CYSTOSCOPY W/ URETEROSCOPY W/ LITHOTRIPSY  03/11/2014   • CYSTOSCOPY W/ URETEROSCOPY W/ LITHOTRIPSY Right 08/04/2014    With Uretal Stent Placement    • CYSTOSCOPY W/ URETEROSCOPY W/ LITHOTRIPSY Right 05/09/2016   • HERNIA REPAIR  03/11/2013   • IR DRAINAGE TUBE PLACEMENT  6/6/2023   • IR NEPHROSTOMY TUBE CHECK/CHANGE/REPOSITION/REINSERTION/UPSIZE  11/22/2022   • IR NEPHROSTOMY TUBE CHECK/CHANGE/REPOSITION/REINSERTION/UPSIZE  02/13/2023   • IR NEPHROSTOMY TUBE CHECK/CHANGE/REPOSITION/REINSERTION/UPSIZE  04/28/2023   • IR NEPHROSTOMY TUBE PLACEMENT  08/20/2022   • KIDNEY SURGERY     • LITHOTRIPSY     • RI CYSTO/URETERO W/LITHOTRIPSY &INDWELL STENT INSRT Right 07/13/2016    Procedure: CYSTOSCOPY; URETEROSCOPY WITH HOLMIUM LASER STONE EXTRACTION; RETROGRADE PYELOGRAM; URETERAL STENT INSERTION ;  Surgeon: Francisco J Hollingsworth MD;  Location: [facility];  Service: Urology   • RI CYSTO/URETERO W/LITHOTRIPSY &INDWELL STENT INSRT Right 05/09/2016    Procedure: CYSTOSCOPY,  URETEROSCOPY,  WITH LITHOTRIPSY HOLMIUM LASER, STONE EXTRACTION, AND INSERTION STENT URETERAL;  Surgeon: Francisco J Hollingsworth MD;  Location: [facility];  Service: Urology   • RI CYSTO/URETERO W/LITHOTRIPSY &INDWELL STENT INSRT Right 11/10/2022    Procedure: CYSTOSCOPY URETEROSCOPY  right RETROGRADE PYELOGRAM;  Surgeon: Ethan Abraham MD;  Location: [facility];  Service: Urology   • RI LAPAROSCOPY RADICAL NEPHRECTOMY Right 5/22/2023    Procedure: NEPHRECTOMY RADICAL LAPAROSCOPIC W/ ROBOTICS;  Surgeon: Alba Perez MD;  Location: [facility];  Service: Urology   • RI LITHOTRIPSY [address] Right 06/17/2016    Procedure: LITHROTRIPSY EXTRACORPORAL SHOCKWAVE (ESWL); Surgeon: Maria A Barragan MD;  Location: [facility];  Service: Urology   • TRANSURETHRAL RESECTION OF BLADDER     • URETERAL REIMPLANTION  03/11/2013       Current Outpatient Medications:   •  acetaminophen (TYLENOL) 650 mg CR tablet, Take 1 tablet (650 mg total) by mouth every 8 (eight) hours as needed for mild pain, Disp: 30 tablet, Rfl: 0  •  doxycycline (ADOXA) 100 MG tablet, Take 1 tablet (100 mg total) by mouth 2 (two) times a day for 10 days, Disp: 20 tablet, Rfl: 0  •  METHADONE HCL PO, Take 110 mg by mouth in the morning This dose was verified with Cierra Yao, the director of the [facility]  Pt usually receives liquid form  Pt was instructed to go to clinic am of surgery, as usual, to receive his am dose and proceed to the hospital with an arrival time of 1015  [facility] opens at 530am, Disp: , Rfl:   •  sodium chloride, PF, 0 9 %, 10 mL by Intracatheter route daily Intracatheter flushing daily  May substitute prefilled syringe with normal saline 10 mL vials, 10 mL syringes, and 18 g blunt needles (Patient taking differently: 10 mL by Intracatheter route daily Intracatheter flushing daily in KATHY drain   May substitute prefilled syringe with normal saline 10 mL vials, 10 mL syringes, and 18 g blunt needles), Disp: 300 mL, Rfl: 2  •  potassium-sodium phosphates (PHOS-NAK) 280 mg (P)-160 mg (Na)-250 mg (K) packet, Take 1 packet by mouth 2 (two) times a day with meals for 4 doses, Disp: 4 packet, Rfl: 0    Lab Results   Component Value Date    AGAP 8 06/07/2023    ALKPHOS 67 06/07/2023    ALT 14 06/07/2023    ANIONGAP 8 12/09/2015    AST 16 06/07/2023    BILITOT 0 39 12/09/2015    BUN 14 06/07/2023    CALCIUM 8 6 06/07/2023    CL 97 06/07/2023    CO2 27 06/07/2023    CREATININE 1 16 06/07/2023    EGFR 69 06/07/2023    GLUC 105 06/07/2023    GLUF 75 06/06/2023    K 4 1 06/07/2023     12/09/2015    PROT 7 5 12/09/2015    SODIUM 132 "(L) 06/07/2023    TBILI 0 41 06/07/2023    TP 6 3 (L) 06/07/2023     Lab Results   Component Value Date    HCT 36 6 06/07/2023    HGB 11 9 (L) 06/07/2023    MCV 90 06/07/2023     06/07/2023    WBC 13 01 (H) 06/07/2023     No results found for: \"CHOLESTEROL\"  Lab Results   Component Value Date    HDL 50 10/14/2015     Lab Results   Component Value Date    LDLCALC LDL- unable to calculate when Triglyceride > 400 10/14/2015     Lab Results   Component Value Date    TRIG 474 10/14/2015     No results found for: \"CHOLHDL\"  Lab Results   Component Value Date    INF6FUQDYFXC 0 268 (L) 01/28/2023     Lab Results   Component Value Date    CALCIUM 8 6 06/07/2023    PHOS 2 3 (L) 05/29/2023    PTH 36 2 10/20/2016     No results found for: \"SPEP\", \"UPEP\"  No results found for: \"TEOZ50MCZ\", \"MICROALBUR\"        Objective:      /56 (BP Location: Left arm, Patient Position: Sitting, Cuff Size: Standard)   Pulse 60   Ht 5' 7\" (1 702 m)   Wt 63 8 kg (140 lb 9 6 oz)   SpO2 96%   BMI 22 02 kg/m²          Physical Exam  Vitals reviewed  Constitutional:       General: He is not in acute distress  Appearance: He is well-developed  HENT:      Head: Normocephalic and atraumatic  Eyes:      Conjunctiva/sclera: Conjunctivae normal    Cardiovascular:      Rate and Rhythm: Normal rate and regular rhythm  Pulmonary:      Effort: Pulmonary effort is normal       Breath sounds: Normal breath sounds  Abdominal:      Palpations: Abdomen is soft  Musculoskeletal:      Cervical back: Neck supple  Skin:     General: Skin is warm  Findings: No rash  Neurological:      Mental Status: He is alert and oriented to person, place, and time  Cranial Nerves: No cranial nerve deficit     Psychiatric:         Behavior: Behavior normal          "

## 2023-06-13 NOTE — ASSESSMENT & PLAN NOTE
Blood pressure well controlled at this time, in fact may be a little bit too low we will discontinue amlodipine  Monitor blood pressures in the outpatient setting and potentially reinitiate depending on results going forward

## 2023-06-14 ENCOUNTER — HOME CARE VISIT (OUTPATIENT)
Dept: HOME HEALTH SERVICES | Facility: HOME HEALTHCARE | Age: 58
End: 2023-06-14
Payer: COMMERCIAL

## 2023-06-14 VITALS
SYSTOLIC BLOOD PRESSURE: 102 MMHG | OXYGEN SATURATION: 98 % | DIASTOLIC BLOOD PRESSURE: 60 MMHG | TEMPERATURE: 97.6 F | RESPIRATION RATE: 16 BRPM | HEART RATE: 56 BPM

## 2023-06-15 ENCOUNTER — OFFICE VISIT (OUTPATIENT)
Dept: UROLOGY | Facility: CLINIC | Age: 58
End: 2023-06-15

## 2023-06-15 VITALS
HEIGHT: 67 IN | BODY MASS INDEX: 21.97 KG/M2 | SYSTOLIC BLOOD PRESSURE: 136 MMHG | HEART RATE: 68 BPM | DIASTOLIC BLOOD PRESSURE: 64 MMHG | WEIGHT: 140 LBS | OXYGEN SATURATION: 97 %

## 2023-06-15 DIAGNOSIS — N20.0 STAGHORN CALCULUS: Primary | ICD-10-CM

## 2023-06-15 PROCEDURE — 99024 POSTOP FOLLOW-UP VISIT: CPT | Performed by: UROLOGY

## 2023-06-15 RX ORDER — DOXYCYCLINE HYCLATE 100 MG
TABLET ORAL
Status: ON HOLD | COMMUNITY
Start: 2023-06-09

## 2023-06-15 NOTE — LETTER
Annetta 15, 2023     DO Red [address]    Patient: Julita Lay   YOB: 1965   Date of Visit: 6/15/2023       Dear Dr Doc Sierra: Thank you for referring Julita Lay to me for evaluation  Below are my notes for this consultation  If you have questions, please do not hesitate to call me  I look forward to following your patient along with you  Sincerely,        Bright Pandya MD        CC: DO Bright Reyes MD  6/15/2023 11:37 AM  Sign when Signing Visit  Patient status post robotic right nephrectomy for chronically infected kidney with staghorn calculus on 5/22/2023  Postoperatively developed wound infection with subcutaneous abscess as a result of removal of the infected kidney  He was treated with interventional radiology placed KATHY drain subcutaneously  He has been doing well since that time and complaining of continued chronic pain  He is tolerating diet, with good urinary function and good bowel function  Creatinine is stable at 1 16  On examination abdomen is soft, nontender, nondistended  Incision is clean, there is no erythema, intact  Drain underneath the incision draining serous fluid  Plan  IR follow-up for drain evaluation and likely removal     Continue oral antibiotics as prescribed  Continue follow-up with his primary physician and nephrologist from medical stone work-up and treatment

## 2023-06-16 ENCOUNTER — TELEPHONE (OUTPATIENT)
Dept: HOME HEALTH SERVICES | Facility: HOME HEALTHCARE | Age: 58
End: 2023-06-16

## 2023-06-16 ENCOUNTER — HOME CARE VISIT (OUTPATIENT)
Dept: HOME HEALTH SERVICES | Facility: HOME HEALTHCARE | Age: 58
End: 2023-06-16
Payer: COMMERCIAL

## 2023-06-19 ENCOUNTER — HOME CARE VISIT (OUTPATIENT)
Dept: HOME HEALTH SERVICES | Facility: HOME HEALTHCARE | Age: 58
End: 2023-06-19
Payer: COMMERCIAL

## 2023-06-19 PROCEDURE — G0299 HHS/HOSPICE OF RN EA 15 MIN: HCPCS

## 2023-06-20 VITALS
HEART RATE: 88 BPM | RESPIRATION RATE: 18 BRPM | OXYGEN SATURATION: 98 % | SYSTOLIC BLOOD PRESSURE: 110 MMHG | DIASTOLIC BLOOD PRESSURE: 60 MMHG | TEMPERATURE: 98.5 F

## 2023-06-21 ENCOUNTER — APPOINTMENT (EMERGENCY)
Dept: RADIOLOGY | Facility: HOSPITAL | Age: 58
DRG: 721 | End: 2023-06-21
Payer: COMMERCIAL

## 2023-06-21 ENCOUNTER — HOSPITAL ENCOUNTER (INPATIENT)
Facility: HOSPITAL | Age: 58
LOS: 5 days | Discharge: HOME HEALTH | DRG: 721 | End: 2023-06-28
Attending: EMERGENCY MEDICINE | Admitting: INTERNAL MEDICINE
Payer: COMMERCIAL

## 2023-06-21 ENCOUNTER — APPOINTMENT (EMERGENCY)
Dept: CT IMAGING | Facility: HOSPITAL | Age: 58
DRG: 721 | End: 2023-06-21
Payer: COMMERCIAL

## 2023-06-21 DIAGNOSIS — R41.82 ALTERED MENTAL STATUS: ICD-10-CM

## 2023-06-21 DIAGNOSIS — R53.81 PHYSICAL DECONDITIONING: ICD-10-CM

## 2023-06-21 DIAGNOSIS — R76.8 HEPATITIS C ANTIBODY POSITIVE IN BLOOD: ICD-10-CM

## 2023-06-21 DIAGNOSIS — L02.91 ABSCESS: ICD-10-CM

## 2023-06-21 DIAGNOSIS — K68.19 RETROPERITONEAL ABSCESS (HCC): ICD-10-CM

## 2023-06-21 DIAGNOSIS — R41.89 COGNITIVE DECLINE: ICD-10-CM

## 2023-06-21 DIAGNOSIS — R18.8 RETROPERITONEAL FLUID COLLECTION: Primary | ICD-10-CM

## 2023-06-21 PROBLEM — G93.40 ACUTE ENCEPHALOPATHY: Status: ACTIVE | Noted: 2023-06-21

## 2023-06-21 LAB
2HR DELTA HS TROPONIN: -2 NG/L
ALBUMIN SERPL BCP-MCNC: 3.7 G/DL (ref 3.5–5)
ALP SERPL-CCNC: 85 U/L (ref 34–104)
ALT SERPL W P-5'-P-CCNC: 8 U/L (ref 7–52)
AMMONIA PLAS-SCNC: 27 UMOL/L (ref 18–72)
AMPHETAMINES SERPL QL SCN: NEGATIVE
ANION GAP SERPL CALCULATED.3IONS-SCNC: 10 MMOL/L
APAP SERPL-MCNC: <10 UG/ML (ref 10–20)
APTT PPP: 34 SECONDS (ref 23–37)
AST SERPL W P-5'-P-CCNC: 12 U/L (ref 13–39)
BARBITURATES UR QL: NEGATIVE
BASOPHILS # BLD AUTO: 0.04 THOUSANDS/ÂΜL (ref 0–0.1)
BASOPHILS NFR BLD AUTO: 0 % (ref 0–1)
BENZODIAZ UR QL: NEGATIVE
BILIRUB SERPL-MCNC: 0.47 MG/DL (ref 0.2–1)
BILIRUB UR QL STRIP: NEGATIVE
BUN SERPL-MCNC: 21 MG/DL (ref 5–25)
CALCIUM SERPL-MCNC: 9.6 MG/DL (ref 8.4–10.2)
CARDIAC TROPONIN I PNL SERPL HS: 11 NG/L
CARDIAC TROPONIN I PNL SERPL HS: 9 NG/L
CHLORIDE SERPL-SCNC: 101 MMOL/L (ref 96–108)
CK SERPL-CCNC: 32 U/L (ref 39–308)
CLARITY UR: CLEAR
CO2 SERPL-SCNC: 26 MMOL/L (ref 21–32)
COCAINE UR QL: NEGATIVE
COLOR UR: YELLOW
CREAT SERPL-MCNC: 1.31 MG/DL (ref 0.6–1.3)
EOSINOPHIL # BLD AUTO: 0.01 THOUSAND/ÂΜL (ref 0–0.61)
EOSINOPHIL NFR BLD AUTO: 0 % (ref 0–6)
ERYTHROCYTE [DISTWIDTH] IN BLOOD BY AUTOMATED COUNT: 14.6 % (ref 11.6–15.1)
ETHANOL SERPL-MCNC: <10 MG/DL
FLUAV RNA RESP QL NAA+PROBE: NEGATIVE
FLUBV RNA RESP QL NAA+PROBE: NEGATIVE
GFR SERPL CREATININE-BSD FRML MDRD: 59 ML/MIN/1.73SQ M
GLUCOSE SERPL-MCNC: 106 MG/DL (ref 65–140)
GLUCOSE UR STRIP-MCNC: NEGATIVE MG/DL
HCT VFR BLD AUTO: 38.6 % (ref 36.5–49.3)
HGB BLD-MCNC: 12.8 G/DL (ref 12–17)
HGB UR QL STRIP.AUTO: NEGATIVE
IMM GRANULOCYTES # BLD AUTO: 0.1 THOUSAND/UL (ref 0–0.2)
IMM GRANULOCYTES NFR BLD AUTO: 1 % (ref 0–2)
INR PPP: 1.11 (ref 0.84–1.19)
KETONES UR STRIP-MCNC: NEGATIVE MG/DL
LACTATE SERPL-SCNC: 1 MMOL/L (ref 0.5–2)
LEUKOCYTE ESTERASE UR QL STRIP: NEGATIVE
LIPASE SERPL-CCNC: 12 U/L (ref 11–82)
LYMPHOCYTES # BLD AUTO: 1.34 THOUSANDS/ÂΜL (ref 0.6–4.47)
LYMPHOCYTES NFR BLD AUTO: 11 % (ref 14–44)
MCH RBC QN AUTO: 28.4 PG (ref 26.8–34.3)
MCHC RBC AUTO-ENTMCNC: 33.2 G/DL (ref 31.4–37.4)
MCV RBC AUTO: 86 FL (ref 82–98)
METHADONE UR QL: POSITIVE
MONOCYTES # BLD AUTO: 0.74 THOUSAND/ÂΜL (ref 0.17–1.22)
MONOCYTES NFR BLD AUTO: 6 % (ref 4–12)
NEUTROPHILS # BLD AUTO: 9.83 THOUSANDS/ÂΜL (ref 1.85–7.62)
NEUTS SEG NFR BLD AUTO: 82 % (ref 43–75)
NITRITE UR QL STRIP: NEGATIVE
NRBC BLD AUTO-RTO: 0 /100 WBCS
OPIATES UR QL SCN: NEGATIVE
OXYCODONE+OXYMORPHONE UR QL SCN: NEGATIVE
PCP UR QL: NEGATIVE
PH UR STRIP.AUTO: 6.5 [PH]
PLATELET # BLD AUTO: 340 THOUSANDS/UL (ref 149–390)
PMV BLD AUTO: 9.1 FL (ref 8.9–12.7)
POTASSIUM SERPL-SCNC: 4.4 MMOL/L (ref 3.5–5.3)
PROCALCITONIN SERPL-MCNC: 0.07 NG/ML
PROT SERPL-MCNC: 7.4 G/DL (ref 6.4–8.4)
PROT UR STRIP-MCNC: NEGATIVE MG/DL
PROTHROMBIN TIME: 14.5 SECONDS (ref 11.6–14.5)
RBC # BLD AUTO: 4.5 MILLION/UL (ref 3.88–5.62)
RSV RNA RESP QL NAA+PROBE: NEGATIVE
SALICYLATES SERPL-MCNC: <5 MG/DL (ref 3–20)
SARS-COV-2 RNA RESP QL NAA+PROBE: NEGATIVE
SODIUM SERPL-SCNC: 137 MMOL/L (ref 135–147)
SP GR UR STRIP.AUTO: 1.01 (ref 1–1.03)
THC UR QL: NEGATIVE
UROBILINOGEN UR QL STRIP.AUTO: 0.2 E.U./DL
WBC # BLD AUTO: 12.06 THOUSAND/UL (ref 4.31–10.16)

## 2023-06-21 PROCEDURE — 99285 EMERGENCY DEPT VISIT HI MDM: CPT | Performed by: EMERGENCY MEDICINE

## 2023-06-21 PROCEDURE — 80307 DRUG TEST PRSMV CHEM ANLYZR: CPT

## 2023-06-21 PROCEDURE — 70450 CT HEAD/BRAIN W/O DYE: CPT

## 2023-06-21 PROCEDURE — 85730 THROMBOPLASTIN TIME PARTIAL: CPT

## 2023-06-21 PROCEDURE — 83605 ASSAY OF LACTIC ACID: CPT

## 2023-06-21 PROCEDURE — 74177 CT ABD & PELVIS W/CONTRAST: CPT

## 2023-06-21 PROCEDURE — 71260 CT THORAX DX C+: CPT

## 2023-06-21 PROCEDURE — 71045 X-RAY EXAM CHEST 1 VIEW: CPT

## 2023-06-21 PROCEDURE — 83690 ASSAY OF LIPASE: CPT

## 2023-06-21 PROCEDURE — 81003 URINALYSIS AUTO W/O SCOPE: CPT

## 2023-06-21 PROCEDURE — 82140 ASSAY OF AMMONIA: CPT

## 2023-06-21 PROCEDURE — 80179 DRUG ASSAY SALICYLATE: CPT

## 2023-06-21 PROCEDURE — 82550 ASSAY OF CK (CPK): CPT

## 2023-06-21 PROCEDURE — 36415 COLL VENOUS BLD VENIPUNCTURE: CPT

## 2023-06-21 PROCEDURE — G1004 CDSM NDSC: HCPCS

## 2023-06-21 PROCEDURE — 80143 DRUG ASSAY ACETAMINOPHEN: CPT

## 2023-06-21 PROCEDURE — 85025 COMPLETE CBC W/AUTO DIFF WBC: CPT

## 2023-06-21 PROCEDURE — 80053 COMPREHEN METABOLIC PANEL: CPT

## 2023-06-21 PROCEDURE — 85610 PROTHROMBIN TIME: CPT

## 2023-06-21 PROCEDURE — 93005 ELECTROCARDIOGRAM TRACING: CPT

## 2023-06-21 PROCEDURE — 99223 1ST HOSP IP/OBS HIGH 75: CPT | Performed by: INTERNAL MEDICINE

## 2023-06-21 PROCEDURE — 0241U HB NFCT DS VIR RESP RNA 4 TRGT: CPT

## 2023-06-21 PROCEDURE — 99285 EMERGENCY DEPT VISIT HI MDM: CPT

## 2023-06-21 PROCEDURE — 87040 BLOOD CULTURE FOR BACTERIA: CPT

## 2023-06-21 PROCEDURE — 84484 ASSAY OF TROPONIN QUANT: CPT

## 2023-06-21 PROCEDURE — 82077 ASSAY SPEC XCP UR&BREATH IA: CPT

## 2023-06-21 PROCEDURE — 84145 PROCALCITONIN (PCT): CPT

## 2023-06-21 RX ORDER — NICOTINE 21 MG/24HR
1 PATCH, TRANSDERMAL 24 HOURS TRANSDERMAL DAILY
Status: DISCONTINUED | OUTPATIENT
Start: 2023-06-22 | End: 2023-06-28 | Stop reason: HOSPADM

## 2023-06-21 RX ORDER — HEPARIN SODIUM 5000 [USP'U]/ML
5000 INJECTION, SOLUTION INTRAVENOUS; SUBCUTANEOUS EVERY 8 HOURS SCHEDULED
Status: DISCONTINUED | OUTPATIENT
Start: 2023-06-22 | End: 2023-06-22

## 2023-06-21 RX ORDER — ACETAMINOPHEN 325 MG/1
650 TABLET ORAL EVERY 6 HOURS PRN
Status: DISCONTINUED | OUTPATIENT
Start: 2023-06-21 | End: 2023-06-28 | Stop reason: HOSPADM

## 2023-06-21 RX ORDER — METHADONE HYDROCHLORIDE 10 MG/1
110 TABLET ORAL DAILY
Status: DISCONTINUED | OUTPATIENT
Start: 2023-06-21 | End: 2023-06-28 | Stop reason: HOSPADM

## 2023-06-21 RX ORDER — SODIUM CHLORIDE 9 MG/ML
125 INJECTION, SOLUTION INTRAVENOUS CONTINUOUS
Status: DISCONTINUED | OUTPATIENT
Start: 2023-06-21 | End: 2023-06-28 | Stop reason: HOSPADM

## 2023-06-21 RX ORDER — CEFTRIAXONE 1 G/50ML
1000 INJECTION, SOLUTION INTRAVENOUS EVERY 24 HOURS
Status: DISCONTINUED | OUTPATIENT
Start: 2023-06-21 | End: 2023-06-26

## 2023-06-21 RX ADMIN — SODIUM CHLORIDE 1000 ML: 0.9 INJECTION, SOLUTION INTRAVENOUS at 17:32

## 2023-06-21 RX ADMIN — IOHEXOL 100 ML: 350 INJECTION, SOLUTION INTRAVENOUS at 16:12

## 2023-06-21 RX ADMIN — CEFTRIAXONE 1000 MG: 1 INJECTION, SOLUTION INTRAVENOUS at 19:42

## 2023-06-21 RX ADMIN — METHADONE HYDROCHLORIDE 40 MG: 10 TABLET ORAL at 20:27

## 2023-06-21 RX ADMIN — SODIUM CHLORIDE 125 ML/HR: 0.9 INJECTION, SOLUTION INTRAVENOUS at 19:37

## 2023-06-21 NOTE — ASSESSMENT & PLAN NOTE
Unclear etiology  Possibly secondary to infection we will continue IV antibiotics   monitor mental status

## 2023-06-21 NOTE — ASSESSMENT & PLAN NOTE
Was recently discharged on Annetta 10 after having abdominal wall abscess that required antibiotics and IR drainage  Patient reports right flank pain and subjective fevers that began approximately 4 to 5 days ago  CT scan revealed stable right retroperitoneal 8 cm collection and possible abscess and complete drainage of right anterior abdominal wall fluid collection   Discussed case with IR who recommended admission with possible IR drainage which could be potentially performed on Friday  consult IR  ceftriaxone    Monitor WBC count

## 2023-06-21 NOTE — ED NOTES
CT tech reports they are unable to use existing IV sites for contrast study  Provider aware patient will potentially need ultrasound guided lines if placed proximal to hands        Kristy Rodrigues RN  06/21/23 Tonya Mustafa, RN  06/21/23 [id number]

## 2023-06-21 NOTE — H&P
[address]  H&P  Name: Candice Simpson 61 y o  male I MRN: [id number]  Unit/Bed#: MI10 I Date of Admission: 6/21/2023   Date of Service: 6/21/2023 I Hospital Day: 0      Assessment/Plan   * Abdominal fluid collection  Assessment & Plan  Was recently discharged on [address] after having abdominal wall abscess that required antibiotics and IR drainage  Patient reports right flank pain and subjective fevers that began approximately 4 to 5 days ago  CT scan revealed stable right retroperitoneal 8 cm collection and possible abscess and complete drainage of right anterior abdominal wall fluid collection   Discussed case with IR who recommended admission with possible IR drainage which could be potentially performed on Friday  We will consult IR  We will place on ceftriaxone metronidazole for now  Monitor WBC count         Acute encephalopathy  Assessment & Plan  Unclear etiology  Possibly secondary to infection we will continue IV antibiotics   monitor mental status    Abscess of postoperative wound of abdominal wall  Assessment & Plan  Recently discharged approximately couple weeks ago completed antibiotics and had IR drainage    Methadone dependence   Assessment & Plan  Continue methadone           VTE Prophylaxis: Pharmacologic VTE Prophylaxis contraindicated due to low risk  / sequential compression device   Code Status: full code  POLST: There is no POLST form on file for this patient (pre-hospital)  Discussion with family: pt    Anticipated Length of Stay:  Patient will be admitted on an Observation basis with an anticipated length of stay of  < 2 midnights  Justification for Hospital Stay: Abdominal fluid collection    Total Time for Visit, including Counseling / Coordination of Care: 60 minutes  Greater than 50% of this total time spent on direct patient counseling and coordination of care      Chief Complaint:   Flank pain    History of Present Illness:    Candice Simpson is a 61 y o  male with history of methadone dependence and recent admission for abdominal wall abscess initially presented right flank pain  Patient reports past 4 to 5 days has had worsening right flank pain with associated subjective fevers  Otherwise denies any acute complaints  Denies chest pain, shortness of breath, dysuria, headaches or any other complaints  Per mother he has been noted to be confused for last couple days as well  Review of Systems:    Review of Systems   Constitutional: Negative for appetite change, chills, diaphoresis, fatigue, fever and unexpected weight change  HENT: Negative for congestion, rhinorrhea and sore throat  Eyes: Negative for photophobia and visual disturbance  Respiratory: Negative for cough, shortness of breath and wheezing  Cardiovascular: Negative for chest pain, palpitations and leg swelling  Gastrointestinal: Positive for abdominal pain  Negative for anal bleeding, blood in stool, constipation, diarrhea, nausea and vomiting  Genitourinary: Negative for decreased urine volume, difficulty urinating, dysuria, flank pain, frequency, hematuria and urgency  Musculoskeletal: Negative for arthralgias, back pain, joint swelling and myalgias  Skin: Negative for color change and rash  Neurological: Negative for dizziness, seizures, facial asymmetry, speech difficulty, numbness and headaches  Psychiatric/Behavioral: Positive for confusion  Negative for agitation and decreased concentration  The patient is not nervous/anxious          Past Medical and Surgical History:     Past Medical History:   Diagnosis Date   • [facility] 8/20/2022   • Anxiety    • Bright red rectal bleeding     Last Assessed: 9/9/2015    • Drug dependence ([facility] ) 10/20/2021   • Hypertension     Last Assessed: 7/9/2014    • Hyponatremia 5/26/2023   • Kidney stone    • Kidney stones     Last Assessed: 11/17/2016    • Periorbital edema     Last Assessed: 9/4/2015   • Septic shock ([address] ) 08/20/2022   • Skin tag of anus     Last Assessed: 9/9/2015    • Trigger point of thoracic region     Last Assessed: 11/6/2015        Past Surgical History:   Procedure Laterality Date   • CYSTOSCOPY W/ URETERAL STENT PLACEMENT  03/11/2013   • CYSTOSCOPY W/ URETERAL STENT PLACEMENT  06/18/2014    EXTRACORPOREAL SHOCK WAVE LITHOTRIPSY    • CYSTOSCOPY W/ URETERAL STENT PLACEMENT  07/16/2014    EXTRACORPOREAL SHOCK WAVE LITHOTRIPSY    • CYSTOSCOPY W/ URETERAL STENT REMOVAL  04/11/2013   • CYSTOSCOPY W/ URETERAL STENT REMOVAL Right 08/26/2014   • CYSTOSCOPY W/ URETEROSCOPY W/ LITHOTRIPSY  02/26/2013    Percutaneous lithotomy With Uretal Stent Plcement    • CYSTOSCOPY W/ URETEROSCOPY W/ LITHOTRIPSY  03/11/2014   • CYSTOSCOPY W/ URETEROSCOPY W/ LITHOTRIPSY Right 08/04/2014    With Uretal Stent Placement    • CYSTOSCOPY W/ URETEROSCOPY W/ LITHOTRIPSY Right 05/09/2016   • HERNIA REPAIR  03/11/2013   • IR DRAINAGE TUBE PLACEMENT  6/6/2023   • IR NEPHROSTOMY TUBE CHECK/CHANGE/REPOSITION/REINSERTION/UPSIZE  11/22/2022   • IR NEPHROSTOMY TUBE CHECK/CHANGE/REPOSITION/REINSERTION/UPSIZE  02/13/2023   • IR NEPHROSTOMY TUBE CHECK/CHANGE/REPOSITION/REINSERTION/UPSIZE  04/28/2023   • IR NEPHROSTOMY TUBE PLACEMENT  08/20/2022   • KIDNEY SURGERY     • LITHOTRIPSY     • VT CYSTO/URETERO W/LITHOTRIPSY &INDWELL STENT INSRT Right 07/13/2016    Procedure: CYSTOSCOPY; URETEROSCOPY WITH HOLMIUM LASER STONE EXTRACTION; RETROGRADE PYELOGRAM; URETERAL STENT INSERTION ;  Surgeon: Beltran Romero MD;  Location: [facility];  Service: Urology   • VT CYSTO/URETERO W/LITHOTRIPSY &INDWELL STENT INSRT Right 05/09/2016    Procedure: CYSTOSCOPY,  URETEROSCOPY,  WITH LITHOTRIPSY HOLMIUM LASER, STONE EXTRACTION, AND INSERTION STENT URETERAL;  Surgeon: Beltran Romero MD;  Location: [facility];  Service: Urology   • VT CYSTO/URETERO W/LITHOTRIPSY &INDWELL STENT INSRT Right 11/10/2022    Procedure: CYSTOSCOPY URETEROSCOPY  right RETROGRADE PYELOGRAM;  Surgeon: Connor Crow MD;  Location: [facility];  Service: Urology   • W[place] LAPAROSCOPY RADICAL NEPHRECTOMY Right 5/22/2023    Procedure: NEPHRECTOMY RADICAL LAPAROSCOPIC W/ ROBOTICS;  Surgeon: Hari Lorenzo MD;  Location: [facility];  Service: Urology   • [place] LITHOTRIPSY [address] Right 06/17/2016    Procedure: Layo Hands SHOCKWAVE (ESWL); Surgeon: Jourdan David MD;  Location: [facility];  Service: Urology   • TRANSURETHRAL RESECTION OF BLADDER     • URETERAL REIMPLANTION  03/11/2013       Meds/Allergies:    Prior to Admission medications    Medication Sig Start Date End Date Taking? Authorizing Provider   acetaminophen (TYLENOL) 650 mg CR tablet Take 1 tablet (650 mg total) by mouth every 8 (eight) hours as needed for mild pain 6/13/23   Nemahamekhi Nieto,    doxycycline hyclate (VIBRA-TABS) 100 mg tablet  6/9/23   Historical Provider, MD   METHADONE HCL PO Take 110 mg by mouth in the morning This dose was verified with Violtete Ramos, the director of the [facility]  Pt usually receives liquid form  Pt was instructed to go to clinic am of surgery, as usual, to receive his am dose and proceed to the hospital with an arrival time of 1015  The [facility] opens at 530am    Historical Provider, MD   potassium-sodium phosphates (PHOS-NAK) 280 mg (P)-160 mg (Na)-250 mg (K) packet Take 1 packet by mouth 2 (two) times a day with meals for 4 doses 5/29/23 5/31/23  Rakesh Prieto PA-C   sodium chloride, PF, 0 9 % 10 mL by Intracatheter route daily Intracatheter flushing daily  May substitute prefilled syringe with normal saline 10 mL vials, 10 mL syringes, and 18 g blunt needles  Patient taking differently: 10 mL by Intracatheter route daily Intracatheter flushing daily in KATHY drain   May substitute prefilled syringe with normal saline 10 mL vials, 10 mL syringes, and 18 g blunt needles 6/6/23 9/4/23  Lawyer Faye MD     I have reviewed home medications with patient personally  Allergies: Allergies   Allergen Reactions   • Bee Venom Anaphylaxis   • Metronidazole Itching and Swelling   • Nsaids GI Intolerance     Stomach irritant   • Penicillin G    • Penicillins GI Intolerance     Sick to stomach   • Pollen Extract    • Sulfa Antibiotics        Social History:     Marital Status:      Patient Pre-hospital Living Situation: home  Patient Pre-hospital Level of Mobility: independent  Patient Pre-hospital Diet Restrictions: none  Substance Use History:   Social History     Substance and Sexual Activity   Alcohol Use Not Currently     Social History     Tobacco Use   Smoking Status Every Day   • Packs/day: 2 00   • Years: 35 00   • Total pack years: 70 00   • Types: Cigarettes   Smokeless Tobacco Never     Social History     Substance and Sexual Activity   Drug Use Not Currently       Family History:    Family History   Problem Relation Age of Onset   • No Known Problems Mother    • Heart attack Father        Physical Exam:     Vitals:   Blood Pressure: 117/68 (06/21/23 1730)  Pulse: 61 (06/21/23 1730)  Temperature: 97 7 °F (36 5 °C) (06/21/23 1325)  Temp Source: Temporal (06/21/23 1325)  Respirations: 15 (06/21/23 1730)  SpO2: 97 % (06/21/23 1430)    Physical Exam  Constitutional:       General: He is not in acute distress  Appearance: He is well-developed  He is not diaphoretic  HENT:      Head: Normocephalic and atraumatic  Nose: Nose normal       Mouth/Throat:      Pharynx: No oropharyngeal exudate  Eyes:      General: No scleral icterus  Conjunctiva/sclera: Conjunctivae normal    Cardiovascular:      Rate and Rhythm: Normal rate and regular rhythm  Heart sounds: Normal heart sounds  No murmur heard  No friction rub  No gallop  Pulmonary:      Effort: Pulmonary effort is normal  No respiratory distress  Breath sounds: Normal breath sounds  No wheezing or rales  Chest:      Chest wall: No tenderness  Abdominal:      General: Bowel sounds are normal  There is no distension  Palpations: Abdomen is soft  Tenderness: There is no abdominal tenderness  There is no guarding  Musculoskeletal:         General: No tenderness or deformity  Normal range of motion  Cervical back: Normal range of motion and neck supple  Skin:     General: Skin is warm and dry  Findings: No erythema  Neurological:      Mental Status: He is alert  Additional Data:     Lab Results: I have personally reviewed pertinent reports  Results from last 7 days   Lab Units 06/21/23  1351   WBC Thousand/uL 12 06*   HEMOGLOBIN g/dL 12 8   HEMATOCRIT % 38 6   PLATELETS Thousands/uL 340   NEUTROS PCT % 82*   LYMPHS PCT % 11*   MONOS PCT % 6   EOS PCT % 0     Results from last 7 days   Lab Units 06/21/23  1351   SODIUM mmol/L 137   POTASSIUM mmol/L 4 4   CHLORIDE mmol/L 101   CO2 mmol/L 26   BUN mg/dL 21   CREATININE mg/dL 1 31*   ANION GAP mmol/L 10   CALCIUM mg/dL 9 6   ALBUMIN g/dL 3 7   TOTAL BILIRUBIN mg/dL 0 47   ALK PHOS U/L 85   ALT U/L 8   AST U/L 12*   GLUCOSE RANDOM mg/dL 106     Results from last 7 days   Lab Units 06/21/23  1351   INR  1 11             Results from last 7 days   Lab Units 06/21/23  1351   LACTIC ACID mmol/L 1 0   PROCALCITONIN ng/ml 0 07       Imaging: I have personally reviewed pertinent reports  CT chest abdomen pelvis w contrast   Final Result by Hayley Travis MD (06/21 9670)         1  Stable right retroperitoneal 8 cm collection  Possible abscess  2  Postsurgical change from recent right nephrectomy  No evidence of left pyelonephritis or obstructive uropathy  3  Interval near complete drainage of right anterior abdominal wall fluid collection  Workstation performed: [id number]         XR chest 1 view portable   Final Result by Lina Pan MD (06/21 3842)      No acute cardiopulmonary disease                    Workstation performed: [id number] CT head without contrast    (Results Pending)       EKG, Pathology, and Other Studies Reviewed on Admission:   · EKG: reviewed    Allscripts / Epic Records Reviewed: Yes     ** Please Note: This note has been constructed using a voice recognition system   **

## 2023-06-21 NOTE — ED ATTENDING ATTESTATION
6/21/2023  Jimmy TAYLOR DO, saw and evaluated the patient  I have discussed the patient with the resident/non-physician practitioner and agree with the resident's/non-physician practitioner's findings, Plan of Care, and MDM as documented in the resident's/non-physician practitioner's note, except where noted  All available labs and Radiology studies were reviewed  I was present for key portions of any procedure(s) performed by the resident/non-physician practitioner and I was immediately available to provide assistance  At this point I agree with the current assessment done in the Emergency Department  I have conducted an independent evaluation of this patient a history and physical is as follows: Altered at home for few days but refused to come to ED, didn't take home methadone dose today  Hx of obstructive uropathy, nephrostomy tube, s/p radical nephrectomy 5/22/23 and post-op abdominal wall abscess on 6/5/23  Will assess for recurrent infection, withdrawal, metabolic disturbance  Physical Exam  Vitals reviewed  Constitutional:       Appearance: He is ill-appearing  He is not diaphoretic  HENT:      Head: Normocephalic and atraumatic  Right Ear: External ear normal       Left Ear: External ear normal       Nose: Nose normal       Mouth/Throat:      Mouth: Mucous membranes are dry  Eyes:      General:         Right eye: No discharge  Left eye: No discharge  Cardiovascular:      Rate and Rhythm: Normal rate  Pulmonary:      Effort: Pulmonary effort is normal  No respiratory distress  Abdominal:      General: There is no distension  Palpations: Abdomen is soft  Comments: Abdominal wall KATHY in place with yellow tinged fluid   Musculoskeletal:         General: No deformity or signs of injury  Skin:     General: Skin is warm  Coloration: Skin is not jaundiced or pale  Neurological:      General: No focal deficit present  Mental Status: He is alert  ED Course  ED Course as of 06/21/23 1823   Wed Jun 21, 2023   9956 USIV attempted by myself, placed in L AC 20G long, successfully placed in IV and migrated in vein however when attempting to flush would not  Line pulled, bandage applied  Paulla Serum PA-C placed 20G long in R AC successfully with flush  Sterile technique used           Critical Care Time  Procedures

## 2023-06-21 NOTE — ED PROVIDER NOTES
"History  Chief Complaint   Patient presents with   • Altered Mental Status     Pt brought in by EMS from home for altered mental status for a few days  Catherine Vilchis is a 18-year-old male with history of hypertension, substance use disorder, s/p right nephrectomy earlier this year, recently discharged after admission for abdominal wall abscess status post IR drainage, brought to the ED via EMS for altered mental status  Per his mother, he was  \"normal\" on discharge from the hospital on [address] 10, however last week started complaining of increasing right flank pain, however he was eating and drinking and acting appropriately to self at the time, then over the weekend became progressively more confused, no longer taking PO intake the past 2 days  She states that he does appear to be in pain at times, also had brief staring episode last night without abnormal movements, but denies any fever, seizures, syncope, trauma, apparent shortness of breath, vomiting, diarrhea, or access to alcohol or drugs  Is unable to provide further history  Patient is oriented to person and place only, endorses generalized headache with nausea, is unable to provide further review of systems or history  Prior to Admission Medications   Prescriptions Last Dose Informant Patient Reported? Taking? METHADONE HCL PO 6/20/2023 Self, Mother Yes Yes   Sig: Take 110 mg by mouth in the morning This dose was verified with Ja Frank, the director of the [facility]  Pt usually receives liquid form  Pt was instructed to go to clinic am of surgery, as usual, to receive his am dose and proceed to the hospital with an arrival time of 1015   The [facility] opens at 530am   acetaminophen (TYLENOL) 650 mg CR tablet Past Month  No Yes   Sig: Take 1 tablet (650 mg total) by mouth every 8 (eight) hours as needed for mild pain   doxycycline hyclate (VIBRA-TABS) 100 mg tablet Not Taking  Yes No   Patient not taking: Reported on " 6/21/2023   potassium-sodium phosphates (PHOS-NAK) 280 mg (P)-160 mg (Na)-250 mg (K) packet   No No   Sig: Take 1 packet by mouth 2 (two) times a day with meals for 4 doses   sodium chloride, PF, 0 9 % Past Week Self, Mother No Yes   Sig: 10 mL by Intracatheter route daily Intracatheter flushing daily  May substitute prefilled syringe with normal saline 10 mL vials, 10 mL syringes, and 18 g blunt needles   Patient taking differently: 10 mL by Intracatheter route daily Intracatheter flushing daily in KATHY drain   May substitute prefilled syringe with normal saline 10 mL vials, 10 mL syringes, and 18 g blunt needles      Facility-Administered Medications: None       Past Medical History:   Diagnosis Date   • BRAULIO (acute kidney injury) ([facility] [address] ) 8/20/2022   • Anxiety    • Bright red rectal bleeding     Last Assessed: 9/9/2015    • Drug dependence ([facility] [address] ) 10/20/2021   • Hypertension     Last Assessed: 7/9/2014    • Hyponatremia 5/26/2023   • Kidney stone    • Kidney stones     Last Assessed: 11/17/2016    • Periorbital edema     Last Assessed: 9/4/2015   • Septic shock ([facility] [address] ) 08/20/2022   • Skin tag of anus     Last Assessed: 9/9/2015    • Trigger point of thoracic region     Last Assessed: 11/6/2015        Past Surgical History:   Procedure Laterality Date   • CYSTOSCOPY W/ URETERAL STENT PLACEMENT  03/11/2013   • CYSTOSCOPY W/ URETERAL STENT PLACEMENT  06/18/2014    EXTRACORPOREAL SHOCK WAVE LITHOTRIPSY    • CYSTOSCOPY W/ URETERAL STENT PLACEMENT  07/16/2014    EXTRACORPOREAL SHOCK WAVE LITHOTRIPSY    • CYSTOSCOPY W/ URETERAL STENT REMOVAL  04/11/2013   • CYSTOSCOPY W/ URETERAL STENT REMOVAL Right 08/26/2014   • CYSTOSCOPY W/ URETEROSCOPY W/ LITHOTRIPSY  02/26/2013    Percutaneous lithotomy With Uretal Stent Plcement    • CYSTOSCOPY W/ URETEROSCOPY W/ LITHOTRIPSY  03/11/2014   • CYSTOSCOPY W/ URETEROSCOPY W/ LITHOTRIPSY Right 08/04/2014    With Uretal Stent Placement    • CYSTOSCOPY W/ URETEROSCOPY W/ LITHOTRIPSY Right 05/09/2016   • HERNIA REPAIR  03/11/2013   • IR DRAINAGE TUBE PLACEMENT  6/6/2023   • IR NEPHROSTOMY TUBE CHECK/CHANGE/REPOSITION/REINSERTION/UPSIZE  11/22/2022   • IR NEPHROSTOMY TUBE CHECK/CHANGE/REPOSITION/REINSERTION/UPSIZE  02/13/2023   • IR NEPHROSTOMY TUBE CHECK/CHANGE/REPOSITION/REINSERTION/UPSIZE  04/28/2023   • IR NEPHROSTOMY TUBE PLACEMENT  08/20/2022   • KIDNEY SURGERY     • LITHOTRIPSY     • NM CYSTO/URETERO W/LITHOTRIPSY &INDWELL STENT INSRT Right 07/13/2016    Procedure: CYSTOSCOPY; URETEROSCOPY WITH HOLMIUM LASER STONE EXTRACTION; RETROGRADE PYELOGRAM; URETERAL STENT INSERTION ;  Surgeon: Kenyatta Philippe MD;  Location: [facility];  Service: Urology   • NM CYSTO/URETERO W/LITHOTRIPSY &INDWELL STENT INSRT Right 05/09/2016    Procedure: CYSTOSCOPY,  URETEROSCOPY,  WITH LITHOTRIPSY HOLMIUM LASER, STONE EXTRACTION, AND INSERTION STENT URETERAL;  Surgeon: Kenyatta Philippe MD;  Location: [facility];  Service: Urology   • NM CYSTO/URETERO W/LITHOTRIPSY &INDWELL STENT INSRT Right 11/10/2022    Procedure: CYSTOSCOPY URETEROSCOPY  right RETROGRADE PYELOGRAM;  Surgeon: Regulo Douglas MD;  Location: [facility];  Service: Urology   • NM LAPAROSCOPY RADICAL NEPHRECTOMY Right 5/22/2023    Procedure: NEPHRECTOMY RADICAL LAPAROSCOPIC W/ ROBOTICS;  Surgeon: Lv Power MD;  Location: [facility];  Service: Urology   • NM LITHOTRIPSY [address] Right 06/17/2016    Procedure: Henri Gonsalesim SHOCKWAVE (ESWL); Surgeon: Kenyatta Philippe MD;  Location: [facility];  Service: Urology   • TRANSURETHRAL RESECTION OF BLADDER     • URETERAL REIMPLANTION  03/11/2013       Family History   Problem Relation Age of Onset   • No Known Problems Mother    • Heart attack Father      I have reviewed and agree with the history as documented      E-Cigarette/Vaping   • E-Cigarette Use Never User      E-Cigarette/Vaping Substances   • Nicotine No    • THC No    • CBD No    • Flavoring No    • Other No    • Unknown No      Social History     Tobacco Use   • Smoking status: Every Day     Packs/day: 2 00     Years: 35 00     Total pack years: 70 00     Types: Cigarettes   • Smokeless tobacco: Never   Vaping Use   • Vaping Use: Never used   Substance Use Topics   • Alcohol use: Not Currently   • Drug use: Not Currently        Review of Systems   Unable to perform ROS: Mental status change   All other systems reviewed and are negative  Physical Exam  ED Triage Vitals   Temperature Pulse Respirations Blood Pressure SpO2   06/21/23 1325 06/21/23 1325 06/21/23 1325 06/21/23 1325 06/21/23 1325   97 7 °F (36 5 °C) 59 16 150/65 98 %      Temp Source Heart Rate Source Patient Position - Orthostatic VS BP Location FiO2 (%)   06/21/23 1325 06/21/23 1325 06/21/23 1325 06/21/23 1325 --   Temporal Monitor Lying Right arm       Pain Score       06/21/23 1953       No Pain             Orthostatic Vital Signs  Vitals:    06/21/23 1430 06/21/23 1700 06/21/23 1730 06/21/23 1826   BP: 142/72 151/67 117/68 135/70   Pulse: 58 63 61 67   Patient Position - Orthostatic VS:           Physical Exam  Vitals and nursing note reviewed  Constitutional:       Appearance: He is ill-appearing  He is not diaphoretic  Comments: Fatigued appearing, falls asleep frequently during interview and exam, is protecting airway  HENT:      Head: Normocephalic and atraumatic  Right Ear: Tympanic membrane and external ear normal       Left Ear: Tympanic membrane and external ear normal       Nose: Nose normal  No congestion or rhinorrhea  Mouth/Throat:      Mouth: Mucous membranes are moist       Pharynx: Oropharynx is clear  No oropharyngeal exudate or posterior oropharyngeal erythema  Eyes:      General: No scleral icterus  Right eye: No discharge  Left eye: No discharge  Extraocular Movements: Extraocular movements intact        Conjunctiva/sclera: Conjunctivae normal       Pupils: Pupils are equal, round, and reactive to light  Comments: Pupils 3 mm and reactive bilaterally  Cardiovascular:      Rate and Rhythm: Normal rate and regular rhythm  Pulses: Normal pulses  Heart sounds: Normal heart sounds  No murmur heard  No friction rub  No gallop  Pulmonary:      Effort: Pulmonary effort is normal  No respiratory distress  Breath sounds: Normal breath sounds  Chest:      Chest wall: No tenderness  Abdominal:      General: Abdomen is flat  Bowel sounds are normal  There is no distension  Palpations: Abdomen is soft  Tenderness: There is no abdominal tenderness  There is right CVA tenderness  There is no left CVA tenderness, guarding or rebound  Musculoskeletal:         General: No swelling or tenderness  Normal range of motion  Cervical back: Normal range of motion and neck supple  No rigidity or tenderness  Right lower leg: No edema  Left lower leg: No edema  Comments: No calf swelling, erythema, or tenderness to palpation bilaterally  Lymphadenopathy:      Cervical: No cervical adenopathy  Skin:     General: Skin is warm and dry  Capillary Refill: Capillary refill takes less than 2 seconds  Coloration: Skin is pale  Skin is not jaundiced  Findings: No bruising, erythema or rash  Neurological:      Cranial Nerves: No cranial nerve deficit  Sensory: No sensory deficit  Motor: Weakness present  Coordination: Coordination normal       Comments: Oriented person and place only, follows commands, moves all extremities spontaneously  Generally weak without focal deficits           ED Medications  Medications   nicotine (NICODERM CQ) 14 mg/24hr TD 24 hr patch 1 patch (has no administration in time range)   heparin (porcine) subcutaneous injection 5,000 Units (has no administration in time range)   acetaminophen (TYLENOL) tablet 650 mg (has no administration in time range)   sodium chloride 0 9 % infusion (125 mL/hr Intravenous New Bag 6/21/23 1937)   cefTRIAXone (ROCEPHIN) IVPB (premix in dextrose) 1,000 mg 50 mL (1,000 mg Intravenous New Bag 6/21/23 1942)   methadone (DOLOPHINE) tablet 110 mg (has no administration in time range)   iohexol (OMNIPAQUE) 350 MG/ML injection (SINGLE-DOSE) 100 mL (100 mL Intravenous Given 6/21/23 1612)   sodium chloride 0 9 % bolus 1,000 mL (1,000 mL Intravenous New Bag 6/21/23 1732)       Diagnostic Studies  Results Reviewed     Procedure Component Value Units Date/Time    Rapid drug screen, urine [[id number]]  (Abnormal) Collected: 06/21/23 1858    Lab Status: Final result Specimen: Urine, Clean Catch Updated: 06/21/23 1921     Amph/Meth UR Negative     Barbiturate Ur Negative     Benzodiazepine Urine Negative     Cocaine Urine Negative     Methadone Urine Positive     Opiate Urine Negative     PCP Ur Negative     THC Urine Negative     Oxycodone Urine Negative    Narrative:      Presumptive report  If requested, specimen will be sent to reference lab for confirmation  FOR MEDICAL PURPOSES ONLY  IF CONFIRMATION NEEDED PLEASE CONTACT THE LAB WITHIN 5 DAYS      Drug Screen Cutoff Levels:  AMPHETAMINE/METHAMPHETAMINES  1000 ng/mL  BARBITURATES     200 ng/mL  BENZODIAZEPINES     200 ng/mL  COCAINE      300 ng/mL  METHADONE      300 ng/mL  OPIATES      300 ng/mL  PHENCYCLIDINE     25 ng/mL  THC       50 ng/mL  OXYCODONE      100 ng/mL    UA w Reflex to Microscopic w Reflex to Culture [[id number]] Collected: 06/21/23 1859    Lab Status: Final result Specimen: Urine, Clean Catch Updated: 06/21/23 1908     Color, UA Yellow     Clarity, UA Clear     Specific Gravity, UA 1 010     pH, UA 6 5     Leukocytes, UA Negative     Nitrite, UA Negative     Protein, UA Negative mg/dl      Glucose, UA Negative mg/dl      Ketones, UA Negative mg/dl      Urobilinogen, UA 0 2 E U /dl      Bilirubin, UA Negative     Occult Blood, UA Negative    HS Troponin I 2hr [[id number]]  (Normal) Collected: 06/21/23 1552    Lab Status: Final result Specimen: Blood from Hand, Left Updated: 06/21/23 1619     hs TnI 2hr 9 ng/L      Delta 2hr hsTnI -2 ng/L     Procalcitonin [[id number]]  (Normal) Collected: 06/21/23 1351    Lab Status: Final result Specimen: Blood from Arm, Left Updated: 06/21/23 1537     Procalcitonin 0 07 ng/ml     FLU/RSV/COVID - if FLU/RSV clinically relevant [[id number]]  (Normal) Collected: 06/21/23 1351    Lab Status: Final result Specimen: Nares from Nose Updated: 06/21/23 1440     SARS-CoV-2 Negative     INFLUENZA A PCR Negative     INFLUENZA B PCR Negative     RSV PCR Negative    Narrative:      FOR PEDIATRIC PATIENTS - copy/paste COVID Guidelines URL to browser: [email or web address]  [employer]    SARS-CoV-2 assay is a Nucleic Acid Amplification assay intended for the  qualitative detection of nucleic acid from SARS-CoV-2 in nasopharyngeal  swabs  Results are for the presumptive identification of SARS-CoV-2 RNA  Positive results are indicative of infection with SARS-CoV-2, the virus  causing COVID-19, but do not rule out bacterial infection or co-infection  with other viruses  Laboratories within the [place] and its  territories are required to report all positive results to the appropriate  public health authorities  Negative results do not preclude SARS-CoV-2  infection and should not be used as the sole basis for treatment or other  patient management decisions  Negative results must be combined with  clinical observations, patient history, and epidemiological information  This test has not been FDA cleared or approved  This test has been authorized by FDA under an Emergency Use Authorization  (EUA)   This test is only authorized for the duration of time the  declaration that circumstances exist justifying the authorization of the  emergency use of an in vitro diagnostic tests for detection of SARS-CoV-2  virus and/or diagnosis of COVID-19 infection under section 564(b)(1) of  the Act, [place]  360bbb-3(b)(1), unless the authorization is terminated  or revoked sooner  The test has been validated but independent review by FDA  and CLIA is pending  Test performed using [employer] GeneXpert: This RT-PCR assay targets N2,  a region unique to SARS-CoV-2  A conserved region in the E-gene was chosen  for pan-Sarbecovirus detection which includes SARS-CoV-2  According to CMS-2020-01-R, this platform meets the definition of high-throughput technology  HS Troponin 0hr (reflex protocol) [[id number]]  (Normal) Collected: 06/21/23 1351    Lab Status: Final result Specimen: Blood from Arm, Left Updated: 06/21/23 1437     hs TnI 0hr 11 ng/L     Ethanol [[id number]]  (Normal) Collected: 06/21/23 1351    Lab Status: Final result Specimen: Blood from Arm, Left Updated: 06/21/23 1426     Ethanol Lvl <10 mg/dL     Ammonia [[id number]]  (Normal) Collected: 06/21/23 1351    Lab Status: Final result Specimen: Blood from Arm, Left Updated: 06/21/23 1426     Ammonia 27 umol/L     Lactic acid, plasma (w/reflex if result > 2 0) [[id number]]  (Normal) Collected: 06/21/23 1351    Lab Status: Final result Specimen: Blood from Arm, Left Updated: 06/21/23 1426     LACTIC ACID 1 0 mmol/L     Narrative:      Result may be elevated if tourniquet was used during collection      Comprehensive metabolic panel [[id number]]  (Abnormal) Collected: 06/21/23 1351    Lab Status: Final result Specimen: Blood from Arm, Left Updated: 06/21/23 1423     Sodium 137 mmol/L      Potassium 4 4 mmol/L      Chloride 101 mmol/L      CO2 26 mmol/L      ANION GAP 10 mmol/L      BUN 21 mg/dL      Creatinine 1 31 mg/dL      Glucose 106 mg/dL      Calcium 9 6 mg/dL      AST 12 U/L      ALT 8 U/L      Alkaline Phosphatase 85 U/L      Total Protein 7 4 g/dL      Albumin 3 7 g/dL      Total Bilirubin 0 47 mg/dL      eGFR 59 ml/min/1 73sq m     Narrative:      [place] guidelines for Chronic Kidney Disease (CKD):   •  Stage 1 with normal or high GFR (GFR > 90 mL/min/1 73 square meters)  •  Stage 2 Mild CKD (GFR = 60-89 mL/min/1 73 square meters)  •  Stage 3A Moderate CKD (GFR = 45-59 mL/min/1 73 square meters)  •  Stage 3B Moderate CKD (GFR = 30-44 mL/min/1 73 square meters)  •  Stage 4 Severe CKD (GFR = 15-29 mL/min/1 73 square meters)  •  Stage 5 End Stage CKD (GFR <15 mL/min/1 73 square meters)  Note: GFR calculation is accurate only with a steady state creatinine    Lipase [[id number]]  (Normal) Collected: 06/21/23 1351    Lab Status: Final result Specimen: Blood from Arm, Left Updated: 06/21/23 1423     Lipase 12 u/L     CK [[id number]]  (Abnormal) Collected: 06/21/23 1351    Lab Status: Final result Specimen: Blood from Arm, Left Updated: 06/21/23 1423     Total CK 32 U/L     Salicylate level [[id number]]  (Normal) Collected: 06/21/23 1351    Lab Status: Final result Specimen: Blood from Arm, Left Updated: 37/28/56 5259     Salicylate Lvl <5 mg/dL     Acetaminophen level-If concentration is detectable, please discuss with medical  on call   [[id number]]  (Abnormal) Collected: 06/21/23 1351    Lab Status: Final result Specimen: Blood from Arm, Left Updated: 06/21/23 1423     Acetaminophen Level <10 ug/mL     Protime-INR [[id number]]  (Normal) Collected: 06/21/23 1351    Lab Status: Final result Specimen: Blood from Arm, Left Updated: 06/21/23 1410     Protime 14 5 seconds      INR 1 11    APTT [[id number]]  (Normal) Collected: 06/21/23 1351    Lab Status: Final result Specimen: Blood from Arm, Left Updated: 06/21/23 1410     PTT 34 seconds     CBC and differential [[id number]]  (Abnormal) Collected: 06/21/23 1351    Lab Status: Final result Specimen: Blood from Arm, Left Updated: 06/21/23 1401     WBC 12 06 Thousand/uL      RBC 4 50 Million/uL      Hemoglobin 12 8 g/dL      Hematocrit 38 6 %      MCV 86 fL      MCH 28 4 pg      MCHC 33 2 g/dL      RDW 14 6 %      MPV 9 1 fL      Platelets 342 Thousands/uL      nRBC 0 /100 WBCs Neutrophils Relative 82 %      Immat GRANS % 1 %      Lymphocytes Relative 11 %      Monocytes Relative 6 %      Eosinophils Relative 0 %      Basophils Relative 0 %      Neutrophils Absolute 9 83 Thousands/µL      Immature Grans Absolute 0 10 Thousand/uL      Lymphocytes Absolute 1 34 Thousands/µL      Monocytes Absolute 0 74 Thousand/µL      Eosinophils Absolute 0 01 Thousand/µL      Basophils Absolute 0 04 Thousands/µL     Blood culture #1 [[id number]] Collected: 06/21/23 1351    Lab Status: In process Specimen: Blood from Arm, Left Updated: 06/21/23 1357    Blood culture #2 [[id number]] Collected: 06/21/23 1351    Lab Status: In process Specimen: Blood from Arm, Left Updated: 06/21/23 1357                 CT head without contrast   Final Result by KAI Squires MD (06/21 1956)      No acute intracranial abnormality  Stable left middle cranial fossa arachnoid cyst                  Workstation performed: [id number]         CT chest abdomen pelvis w contrast   Final Result by Hilary Recio MD (06/21 1713)         1  Stable right retroperitoneal 8 cm collection  Possible abscess  2  Postsurgical change from recent right nephrectomy  No evidence of left pyelonephritis or obstructive uropathy  3  Interval near complete drainage of right anterior abdominal wall fluid collection  Workstation performed: [id number]         XR chest 1 view portable   Final Result by Miguelina Sosa MD (06/21 9468)      No acute cardiopulmonary disease                    Workstation performed: [id number]               Procedures  ECG 12 Lead Documentation Only    Date/Time: 6/21/2023 1:27 PM    Performed by: Tad Lake DO  Authorized by: Tad Lake DO    Indications / Diagnosis:  AMS  Patient location:  ED  Previous ECG:     Previous ECG:  Compared to current    Comparison ECG info:  06/05/2023    Similarity:  No change  Interpretation:     Interpretation: non-specific    Rate: ECG rate:  61    ECG rate assessment: normal    Rhythm:     Rhythm: sinus rhythm    Ectopy:     Ectopy: none    QRS:     QRS axis:  Left    QRS intervals: Wide  Conduction:     Conduction: abnormal      Abnormal conduction: non-specific intraventricular conduction delay    ST segments:     ST segments:  Normal  T waves:     T waves: inverted      Inverted:  V1, V2 and V3          ED Course  ED Course as of 06/21/23 2029 Wed Jun 21, 2023   1413 XR chest 1 view portable  IMPRESSION:     No acute cardiopulmonary disease  1417 WBC(!): 12 06  Mild leukocytosis  1444 Comprehensive metabolic panel(!)  Creatinine 1 31, mildly elevated from baseline  Otherwise no electrolyte abnormalities or evidence of endorgan dysfunction  Cooper Camacho 57 1444 Total CK(!): 32  Unremarkable  1444 Ammonia: 27  WNL  1444 Coma panel(!)  Unremarkable  1444 hs TnI 0hr: 11  Denies chest pain, however will obtain 2-hour repeat  1500 CT imaging delayed due to lack of proximal IV access  Personally attempted IV, unable to obtain  Dr Chandler Gómez will attempt IV US    97 [phone] 84 Reassessed patient, who remains unchanged since time of presentation  Vital signs stable  Per patient's mom, patient has been altered for the past several days, has not eaten or drink in at least 2 days  Did complain of severe right flank pain prior to becoming altered, also had three episodes of staring last night without abnormal movements  1539 Procalcitonin: 0 07   1620 Delta 2hr hsTnI: -2  Doubt ACS    1722 CT chest abdomen pelvis w contrast  IMPRESSION:        1  Stable right retroperitoneal 8 cm collection  Possible abscess  2  Postsurgical change from recent right nephrectomy  No evidence of left pyelonephritis or obstructive uropathy  3  Interval near complete drainage of right anterior abdominal wall fluid collection      1748 Per Dr John Almaraz, IR, patient appropriate for admission to [facility], IR to evaluate on Friday    Accepted for obs admission by Dr Jeanne Patel  SBIRT 20yo+    Flowsheet Row Most Recent Value   Initial Alcohol Screen: US AUDIT-C     1  How often do you have a drink containing alcohol? 0 Filed at: 06/21/2023 1325   Audit-C Score 0 Filed at: 06/21/2023 1325                Medical Decision Making  Patient with recent admission for abdominal wall abscess presents with altered mental status, onset 3-4 days ago after several days of increasing right flank pain  Afebrile  Physical exam with normal vital signs, generalized weakness, oriented to person and place only however remainder of neurologic exam nonfocal   No evidence of trauma, DVT, or fluid overload on exam  No nuchal rigidity  Concern exists for ICH, intraabdominal infection, pancreatitis, acute renal failure, hyperammonemia, UTI, pneumonia, ACS,  metabolic derangement, substance use, and other potential etiologies of his subacute altered mental status  Doubt meningitis or encephalitis based on exam   Doubt CVA given lack of focal neuro deficits, stroke alert also not indicated given onset of AMS 4 days ago  See ED course for remainder of MDM  Doubt acute infection based on laboratory workup and imaging  No indication for abx at this time given retroperitoneal fluid is chronic and stable in size  Amount and/or Complexity of Data Reviewed  Labs: ordered  Decision-making details documented in ED Course  Radiology: ordered  Decision-making details documented in ED Course  ECG/medicine tests: ordered and independent interpretation performed  Risk  Prescription drug management  Decision regarding hospitalization  Disposition  Final diagnoses:    Altered mental status   Retroperitoneal fluid collection     Time reflects when diagnosis was documented in both MDM as applicable and the Disposition within this note     Time User Action Codes Description Comment    6/21/2023  5:47 PM Bill Slade Add [R40 [id number]] Altered mental status 6/21/2023  5:48 PM Chris Briseno Add [R18 [id number]] Retroperitoneal fluid collection       ED Disposition     ED Disposition   Admit    Condition   Stable    Date/Time   Wed Jun 21, 2023  5:47 PM    Comment   Case was discussed with Dr Viri Edward and the patient's admission status was agreed to be Admission Status: observation status to the service of Dr Viri Edward   Follow-up Information    None         Current Discharge Medication List      CONTINUE these medications which have NOT CHANGED    Details   acetaminophen (TYLENOL) 650 mg CR tablet Take 1 tablet (650 mg total) by mouth every 8 (eight) hours as needed for mild pain  Qty: 30 tablet, Refills: 0    Associated Diagnoses: Abscess of postoperative wound of abdominal wall; Abdominal wall abscess      METHADONE HCL PO Take 110 mg by mouth in the morning This dose was verified with Neftaly Vega, the director of the [facility]  Pt usually receives liquid form  Pt was instructed to go to clinic am of surgery, as usual, to receive his am dose and proceed to the hospital with an arrival time of 1015  [facility] opens at 530am      sodium chloride, PF, 0 9 % 10 mL by Intracatheter route daily Intracatheter flushing daily  May substitute prefilled syringe with normal saline 10 mL vials, 10 mL syringes, and 18 g blunt needles  Qty: 300 mL, Refills: 2    Associated Diagnoses: Abscess of postoperative wound of abdominal wall      doxycycline hyclate (VIBRA-TABS) 100 mg tablet       potassium-sodium phosphates (PHOS-NAK) 280 mg (P)-160 mg (Na)-250 mg (K) packet Take 1 packet by mouth 2 (two) times a day with meals for 4 doses  Qty: 4 packet, Refills: 0    Associated Diagnoses: Staghorn calculus           No discharge procedures on file  PDMP Review       Value Time User    PDMP Reviewed  Yes 5/26/2023  7:30 PM Kendra Arreola MD           ED Provider  Attending physically available and evaluated Cloyce Loop   I managed the patient along with the ED Attending      Electronically Signed by         Rowena Rodriges DO  06/21/23 2029

## 2023-06-21 NOTE — ASSESSMENT & PLAN NOTE
Was recently discharged on Annetta 10 after having abdominal wall abscess that required antibiotics and IR drainage  Patient reports right flank pain and subjective fevers that began approximately 4 to 5 days ago  CT scan revealed stable right retroperitoneal 8 cm collection and possible abscess and complete drainage of right anterior abdominal wall fluid collection   Discussed case with IR who recommended admission with possible IR drainage which could be potentially performed on Friday  We will consult IR  We will place on ceftriaxone metronidazole for now  Monitor WBC count

## 2023-06-22 ENCOUNTER — HOME CARE VISIT (OUTPATIENT)
Dept: HOME HEALTH SERVICES | Facility: HOME HEALTHCARE | Age: 58
End: 2023-06-22
Payer: COMMERCIAL

## 2023-06-22 PROBLEM — E44.0 MODERATE PROTEIN-CALORIE MALNUTRITION (HCC): Status: ACTIVE | Noted: 2023-06-22

## 2023-06-22 LAB
MAGNESIUM SERPL-MCNC: 2.3 MG/DL (ref 1.9–2.7)
PHOSPHATE SERPL-MCNC: 3.4 MG/DL (ref 2.7–4.5)

## 2023-06-22 PROCEDURE — 97163 PT EVAL HIGH COMPLEX 45 MIN: CPT

## 2023-06-22 PROCEDURE — 99232 SBSQ HOSP IP/OBS MODERATE 35: CPT | Performed by: INTERNAL MEDICINE

## 2023-06-22 PROCEDURE — NC001 PR NO CHARGE: Performed by: RADIOLOGY

## 2023-06-22 PROCEDURE — 97167 OT EVAL HIGH COMPLEX 60 MIN: CPT

## 2023-06-22 PROCEDURE — 84100 ASSAY OF PHOSPHORUS: CPT

## 2023-06-22 PROCEDURE — 83735 ASSAY OF MAGNESIUM: CPT

## 2023-06-22 RX ORDER — HEPARIN SODIUM 5000 [USP'U]/ML
5000 INJECTION, SOLUTION INTRAVENOUS; SUBCUTANEOUS EVERY 8 HOURS SCHEDULED
Status: DISCONTINUED | OUTPATIENT
Start: 2023-06-23 | End: 2023-06-28 | Stop reason: HOSPADM

## 2023-06-22 RX ORDER — DIAZEPAM 5 MG/ML
5 INJECTION, SOLUTION INTRAMUSCULAR; INTRAVENOUS EVERY 6 HOURS PRN
Status: DISCONTINUED | OUTPATIENT
Start: 2023-06-22 | End: 2023-06-28 | Stop reason: HOSPADM

## 2023-06-22 RX ADMIN — CEFTRIAXONE 1000 MG: 1 INJECTION, SOLUTION INTRAVENOUS at 17:46

## 2023-06-22 RX ADMIN — NICOTINE 1 PATCH: 14 PATCH, EXTENDED RELEASE TRANSDERMAL at 09:37

## 2023-06-22 RX ADMIN — METHADONE HYDROCHLORIDE 110 MG: 10 TABLET ORAL at 09:37

## 2023-06-22 RX ADMIN — HEPARIN SODIUM 5000 UNITS: 5000 INJECTION INTRAVENOUS; SUBCUTANEOUS at 05:27

## 2023-06-22 RX ADMIN — SODIUM CHLORIDE 125 ML/HR: 0.9 INJECTION, SOLUTION INTRAVENOUS at 04:22

## 2023-06-22 RX ADMIN — SODIUM CHLORIDE 125 ML/HR: 0.9 INJECTION, SOLUTION INTRAVENOUS at 12:42

## 2023-06-22 NOTE — PLAN OF CARE
Problem: OCCUPATIONAL THERAPY ADULT  Goal: Performs self-care activities at highest level of function for planned discharge setting  See evaluation for individualized goals  Description: Treatment Interventions: ADL retraining, Functional transfer training, UE strengthening/ROM, Endurance training, Patient/family training, Cognitive reorientation, Equipment evaluation/education, Activityengagement          See flowsheet documentation for full assessment, interventions and recommendations  Note: Limitation: Decreased ADL status, Decreased UE strength, Decreased Safe judgement during ADL, Decreased cognition, Decreased endurance, Decreased self-care trans, Decreased high-level ADLs     Assessment: Pt is a 61 y o  male seen for OT evaluation s/p admit to [facility] on 6/21/2023 w/ Abdominal fluid collection  Comorbidities affecting pt's functional performance at time of assessment include: kidney stones, anxiety, red rectal bleeding, HTN, periorbital edema, septic shock, hyponatremia, BRAULIO  Personal factors affecting pt at time of IE include:behavioral pattern, difficulty performing ADLS, difficulty performing IADLS , limited insight into deficits, compliance, flat affect, decreased initiation and engagement , financial barriers and health management   Prior to admission, pt was (I) with ADLs and (A) with IADLs with use of no device during mobility  Upon evaluation: Pt requires min-max (A) x1-2 with use of handheld (A) x2 during mobility 2* the following deficits impacting occupational performance: weakness, decreased strength, decreased balance, decreased tolerance, impaired initiation, impaired memory, impaired sequencing, impaired problem solving, decreased safety awareness, increased pain, impaired interpersonal skills and decreased coping skills   Pt to benefit from continued skilled OT tx while in the hospital to address deficits as defined above and maximize level of functional independence w ADL's and functional mobility  Occupational Performance areas to address include: grooming, bathing/shower, toilet hygiene, dressing, functional mobility, community mobility and clothing management  The patient's raw score on the -PAC Daily Activity Inpatient Short Form is 19  A raw score of greater than or equal to 19 suggests the patient may benefit from discharge to home  Please refer to the recommendation of the Occupational Therapist for safe discharge planning  Pt benefited from co-evaluation of skilled OT and PT therapists in order to most appropriately address functional deficits d/t extensive assistance required for safe functional mobility, decreased activity tolerance, and regression from functioning level prior to admission and/or onset of present illness  OT/PT objectives were addressed separately; please see PT note for specific goal areas targeted       OT Discharge Recommendation: Home with home health rehabilitation

## 2023-06-22 NOTE — ASSESSMENT & PLAN NOTE
Unclear etiology  Patient is AOX3 today on re evaluation   Still not at baseline interms of alertness  Neuro exam negative including CN 2-12     Electrolytes; pending   Labs from AM pending   Possibly secondary to infection we will continue IV antibiotics   monitor mental status

## 2023-06-22 NOTE — PROGRESS NOTES
-- Patient:  -- MRN: [id number]  -- Aidin Request ID: [id number]  -- Level of care reserved: [facility]  -- Partner Reserved: [facility], [facility] [place], [address] [place] [address] [phone]  -- Clinical needs requested:  -- Geography searched: [phone]  -- Start of Service:  -- Request sent: 8:47am EDT on 6/22/2023 by Sagrario Lebron  -- Partner reserved: 10:50am EDT on 6/22/2023 by Sagrario Lebron  -- Choice list shared: 10:50am EDT on 6/22/2023 by Sagrario Lebron

## 2023-06-22 NOTE — PROGRESS NOTES
[id number]  Progress Note  Name: Anamaria Byers  MRN: [id number]  Unit/Bed#: 023-97 I Date of Admission: 6/21/2023   Date of Service: 6/22/2023 I Hospital Day: 0    Assessment/Plan   * Abdominal fluid collection  Assessment & Plan  Was recently discharged on Annetta 10 after having abdominal wall abscess that required antibiotics and IR drainage  Patient reports right flank pain and subjective fevers that began approximately 4 to 5 days ago  CT scan revealed stable right retroperitoneal 8 cm collection and possible abscess and complete drainage of right anterior abdominal wall fluid collection   Discussed case with IR who recommended admission with possible IR drainage which could be potentially performed on Friday  consult IR  ceftriaxone    Monitor WBC count         Acute encephalopathy  Assessment & Plan  Unclear etiology  Patient is AOX3 today on re evaluation  Still not at baseline interms of alertness  Neuro exam negative including CN 2-12     Electrolytes; pending   Labs from AM pending   Possibly secondary to infection we will continue IV antibiotics   monitor mental status    Abscess of postoperative wound of abdominal wall  Assessment & Plan  Recently discharged approximately couple weeks ago completed antibiotics and had IR drainage    Methadone dependence   Assessment & Plan  Continue methadone             VTE Pharmacologic Prophylaxis:   Pharmacologic: Heparin  Mechanical VTE Prophylaxis in Place: Yes    Patient Centered Rounds: I have performed bedside rounds with nursing staff today  Discussions with Specialists or Other Care Team Provider: IR    Education and Discussions with Family / Patient: patient     Time Spent for Care: 30 minutes  More than 50% of total time spent on counseling and coordination of care as described above      Current Length of Stay: 0 day(s)    Current Patient Status: Observation   Certification Statement: The patient will continue to require additional inpatient hospital stay due to abdominal wall abscess    Discharge Plan: when stable     Code Status: Level 1 - Full Code      Subjective:     Seen today at bedside  He continues to be confused however alert and oriented x3  I have seen the patient during his previous hospitalization and compared to that time he seems confused however cooperative/verbalizing/communicating accordingly  Otherwise, tolerating oral diet  Stooling and voiding accordingly with no concerns  No chest pain or tightness, shortness of breath or cough, nausea or vomiting, diarrhea or constipation  Objective:     Vitals:   Temp (24hrs), Av 1 °F (36 7 °C), Min:97 7 °F (36 5 °C), Max:98 3 °F (36 8 °C)    Temp:  [97 7 °F (36 5 °C)-98 3 °F (36 8 °C)] 98 2 °F (36 8 °C)  HR:  [50-67] 50  Resp:  [13-25] 18  BP: (117-160)/(57-82) 128/82  SpO2:  [92 %-98 %] 95 %  Body mass index is 20 96 kg/m²  Input and Output Summary (last 24 hours): Intake/Output Summary (Last 24 hours) at 2023 1202  Last data filed at 2023 0422  Gross per 24 hour   Intake --   Output 750 ml   Net -750 ml       Physical Exam:     Physical Exam  Vitals reviewed  Constitutional:       General: He is not in acute distress  Appearance: Normal appearance  HENT:      Head: Normocephalic and atraumatic  Mouth/Throat:      Mouth: Mucous membranes are moist    Eyes:      Conjunctiva/sclera: Conjunctivae normal       Pupils: Pupils are equal, round, and reactive to light  Cardiovascular:      Rate and Rhythm: Normal rate and regular rhythm  Pulses: Normal pulses  Carotid pulses are 2+ on the right side and 2+ on the left side  Radial pulses are 2+ on the right side and 2+ on the left side  Dorsalis pedis pulses are 2+ on the right side and 2+ on the left side  Heart sounds: Normal heart sounds, S1 normal and S2 normal  No murmur heard    Pulmonary:      Effort: No tachypnea, bradypnea or accessory muscle usage       Breath sounds: Normal breath sounds and air entry  No decreased breath sounds, wheezing, rhonchi or rales  Abdominal:      General: Abdomen is flat  Bowel sounds are normal       Palpations: Abdomen is soft  Tenderness: There is no abdominal tenderness  Comments: Drain noted at bedside  Empty container  Dressing in place clean and dry  Musculoskeletal:      Right lower leg: No edema  Left lower leg: No edema  Neurological:      Mental Status: He is alert and oriented to person, place, and time  Mental status is at baseline  Additional Data:     Labs:    Results from last 7 days   Lab Units 06/21/23  1351   WBC Thousand/uL 12 06*   HEMOGLOBIN g/dL 12 8   HEMATOCRIT % 38 6   PLATELETS Thousands/uL 340   NEUTROS PCT % 82*   LYMPHS PCT % 11*   MONOS PCT % 6   EOS PCT % 0     Results from last 7 days   Lab Units 06/21/23  1351   SODIUM mmol/L 137   POTASSIUM mmol/L 4 4   CHLORIDE mmol/L 101   CO2 mmol/L 26   BUN mg/dL 21   CREATININE mg/dL 1 31*   ANION GAP mmol/L 10   CALCIUM mg/dL 9 6   ALBUMIN g/dL 3 7   TOTAL BILIRUBIN mg/dL 0 47   ALK PHOS U/L 85   ALT U/L 8   AST U/L 12*   GLUCOSE RANDOM mg/dL 106     Results from last 7 days   Lab Units 06/21/23  1351   INR  1 11             Results from last 7 days   Lab Units 06/21/23  1351   LACTIC ACID mmol/L 1 0   PROCALCITONIN ng/ml 0 07           * I Have Reviewed All Lab Data Listed Above  * Additional Pertinent Lab Tests Reviewed: [facility] Admission Reviewed    Imaging:    Imaging Reports Reviewed Today Include:   CT head without contrast   Final Result      No acute intracranial abnormality  Stable left middle cranial fossa arachnoid cyst                  Workstation performed: [id number]         CT chest abdomen pelvis w contrast   Final Result         1  Stable right retroperitoneal 8 cm collection  Possible abscess  2  Postsurgical change from recent right nephrectomy   No evidence of left pyelonephritis or obstructive uropathy  3  Interval near complete drainage of right anterior abdominal wall fluid collection  Workstation performed: [id number]         XR chest 1 view portable   Final Result      No acute cardiopulmonary disease  Workstation performed: [id number]             Imaging Personally Reviewed by Myself Includes:    CT head without contrast   Final Result      No acute intracranial abnormality  Stable left middle cranial fossa arachnoid cyst                  Workstation performed: [id number]         CT chest abdomen pelvis w contrast   Final Result         1  Stable right retroperitoneal 8 cm collection  Possible abscess  2  Postsurgical change from recent right nephrectomy  No evidence of left pyelonephritis or obstructive uropathy  3  Interval near complete drainage of right anterior abdominal wall fluid collection  Workstation performed: [id number]         XR chest 1 view portable   Final Result      No acute cardiopulmonary disease  Workstation performed: [id number]               Recent Cultures (last 7 days):     Results from last 7 days   Lab Units 06/21/23  1351   BLOOD CULTURE  Received in Microbiology Lab  Culture in Progress  Received in Microbiology Lab  Culture in Progress         Last 24 Hours Medication List:   Current Facility-Administered Medications   Medication Dose Route Frequency Provider Last Rate   • acetaminophen  650 mg Oral Q6H PRN Kelechi Dominguez MD     • cefTRIAXone  1,000 mg Intravenous Q24H Kelechi Dominguez MD 1,000 mg (06/21/23 1942)   • heparin (porcine)  5,000 Units Subcutaneous Q8H Subhash Khan MD     • methadone  110 mg Oral Daily Kelechi Dominguez MD     • nicotine  1 patch Transdermal Daily Kelechi Dominguez MD     • sodium chloride  125 mL/hr Intravenous Continuous Kelechi Dominguez  mL/hr (06/22/23 0422)        Today, Patient Was Seen By: Amisha Davis MD    ** Please Note: Dictation voice to text software may have been used in the creation of this document   **

## 2023-06-22 NOTE — QUICK NOTE
I met patient's family at bedside  Family reports three incidents of abnormal upper and lower extremity spasms  Lasted 1-5 minutes   Last was yesterday  No history of similar symp  No history of seizures     Patient was noted to have fever   Per family       Plan:  -Observe while hospitalized   -seizure precautions   -diazepam PRN for seizure   -interval follow up

## 2023-06-22 NOTE — PLAN OF CARE
Problem: MOBILITY - ADULT  Goal: Maintain or return to baseline ADL function  Description: INTERVENTIONS:  -  Assess patient's ability to carry out ADLs; assess patient's baseline for ADL function and identify physical deficits which impact ability to perform ADLs (bathing, care of mouth/teeth, toileting, grooming, dressing, etc )  - Assess/evaluate cause of self-care deficits   - Assess range of motion  - Assess patient's mobility; develop plan if impaired  - Assess patient's need for assistive devices and provide as appropriate  - Encourage maximum independence but intervene and supervise when necessary  - Involve family in performance of ADLs  - Assess for home care needs following discharge   - Consider OT consult to assist with ADL evaluation and planning for discharge  - Provide patient education as appropriate  Outcome: Progressing  Goal: Maintains/Returns to pre admission functional level  Description: INTERVENTIONS:  - Perform BMAT or MOVE assessment daily    - Set and communicate daily mobility goal to care team and patient/family/caregiver  - Collaborate with rehabilitation services on mobility goals if consulted  - Perform Range of Motion 3 times a day  - Reposition patient every 2 hours    - Dangle patient 3 times a day  - Stand patient 3 times a day  - Ambulate patient 3 times a day  - Out of bed to chair 3 times a day   - Out of bed for meals 3 times a day  - Out of bed for toileting  - Record patient progress and toleration of activity level   Outcome: Progressing     Problem: Prexisting or High Potential for Compromised Skin Integrity  Goal: Skin integrity is maintained or improved  Description: INTERVENTIONS:  - Identify patients at risk for skin breakdown  - Assess and monitor skin integrity  - Assess and monitor nutrition and hydration status  - Monitor labs   - Assess for incontinence   - Turn and reposition patient  - Assist with mobility/ambulation  - Relieve pressure over bony prominences  - Avoid friction and shearing  - Provide appropriate hygiene as needed including keeping skin clean and dry  - Evaluate need for skin moisturizer/barrier cream  - Collaborate with interdisciplinary team   - Patient/family teaching  - Consider wound care consult   Outcome: Progressing     Problem: GASTROINTESTINAL - ADULT  Goal: Maintains or returns to baseline bowel function  Description: INTERVENTIONS:  - Assess bowel function  - Encourage oral fluids to ensure adequate hydration  - Administer IV fluids if ordered to ensure adequate hydration  - Administer ordered medications as needed  - Encourage mobilization and activity  - Consider nutritional services referral to assist patient with adequate nutrition and appropriate food choices  Outcome: Progressing     Problem: SKIN/TISSUE INTEGRITY - ADULT  Goal: Skin Integrity remains intact(Skin Breakdown Prevention)  Description: Assess:  -Perform Addison assessment every 3  -Clean and moisturize skin every 3  -Inspect skin when repositioning, toileting, and assisting with ADLS  -Assess under medical devices such as  every 3  -Assess extremities for adequate circulation and sensation     Bed Management:  -Have minimal linens on bed & keep smooth, unwrinkled  -Change linens as needed when moist or perspiring  -Avoid sitting or lying in one position for more than 3 hours while in bed  -Keep HOB at 30degrees     Toileting:  -Offer bedside commode  -Assess for incontinence every 2  -Use incontinent care products after each incontinent episode such as 3    Activity:  -Mobilize patient 3 times a day  -Encourage activity and walks on unit  -Encourage or provide ROM exercises   -Turn and reposition patient every 2 Hours  -Use appropriate equipment to lift or move patient in bed  -Instruct/ Assist with weight shifting every 2 when out of bed in chair  -Consider limitation of chair time 2 hour intervals    Skin Care:  -Avoid use of baby powder, tape, friction and shearing, hot water or constrictive clothing  -Relieve pressure over bony prominences using cushion  -Do not massage red bony areas    Next Steps:  -Teach patient strategies to minimize risks such as    -Consider consults to  interdisciplinary teams such as   Outcome: Progressing  Goal: Incision(s), wounds(s) or drain site(s) healing without S/S of infection  Description: INTERVENTIONS  - Assess and document dressing, incision, wound bed, drain sites and surrounding tissue  - Provide patient and family education  - Perform skin care/dressing changes every 2  Outcome: Progressing     Problem: PAIN - ADULT  Goal: Verbalizes/displays adequate comfort level or baseline comfort level  Description: Interventions:  - Encourage patient to monitor pain and request assistance  - Assess pain using appropriate pain scale  - Administer analgesics based on type and severity of pain and evaluate response  - Implement non-pharmacological measures as appropriate and evaluate response  - Consider cultural and social influences on pain and pain management  - Notify physician/advanced practitioner if interventions unsuccessful or patient reports new pain  Outcome: Progressing     Problem: INFECTION - ADULT  Goal: Absence or prevention of progression during hospitalization  Description: INTERVENTIONS:  - Assess and monitor for signs and symptoms of infection  - Monitor lab/diagnostic results  - Monitor all insertion sites, i e  indwelling lines, tubes, and drains  - Monitor endotracheal if appropriate and nasal secretions for changes in amount and color  - [place] appropriate cooling/warming therapies per order  - Administer medications as ordered  - Instruct and encourage patient and family to use good hand hygiene technique  - Identify and instruct in appropriate isolation precautions for identified infection/condition  Outcome: Progressing     Problem: SAFETY ADULT  Goal: Maintain or return to baseline ADL function  Description: INTERVENTIONS:  -  Assess patient's ability to carry out ADLs; assess patient's baseline for ADL function and identify physical deficits which impact ability to perform ADLs (bathing, care of mouth/teeth, toileting, grooming, dressing, etc )  - Assess/evaluate cause of self-care deficits   - Assess range of motion  - Assess patient's mobility; develop plan if impaired  - Assess patient's need for assistive devices and provide as appropriate  - Encourage maximum independence but intervene and supervise when necessary  - Involve family in performance of ADLs  - Assess for home care needs following discharge   - Consider OT consult to assist with ADL evaluation and planning for discharge  - Provide patient education as appropriate  Outcome: Progressing  Goal: Maintains/Returns to pre admission functional level  Description: INTERVENTIONS:  - Perform BMAT or MOVE assessment daily    - Set and communicate daily mobility goal to care team and patient/family/caregiver  - Collaborate with rehabilitation services on mobility goals if consulted  - Perform Range of Motion 3 times a day  - Reposition patient every 2 hours    - Dangle patient 3 times a day  - Stand patient 3 times a day  - Ambulate patient 3 times a day  - Out of bed to chair 3 times a day   - Out of bed for meals 3 times a day  - Out of bed for toileting  - Record patient progress and toleration of activity level   Outcome: Progressing  Goal: Patient will remain free of falls  Description: INTERVENTIONS:  - Educate patient/family on patient safety including physical limitations  - Instruct patient to call for assistance with activity   - Consult OT/PT to assist with strengthening/mobility   - Keep Call bell within reach  - Keep bed low and locked with side rails adjusted as appropriate  - Keep care items and personal belongings within reach  - Initiate and maintain comfort rounds  - Make Fall Risk Sign visible to staff  - Offer Toileting every 2 Hours, in advance of need  - Initiate/Maintain alarm  - Obtain necessary fall risk management equipment:   - Apply yellow socks and bracelet for high fall risk patients  - Consider moving patient to room near nurses station  Outcome: Progressing     Problem: METABOLIC, FLUID AND ELECTROLYTES - ADULT  Goal: Electrolytes maintained within normal limits  Description: INTERVENTIONS:  - Monitor labs and assess patient for signs and symptoms of electrolyte imbalances  - Administer electrolyte replacement as ordered  - Monitor response to electrolyte replacements, including repeat lab results as appropriate  - Instruct patient on fluid and nutrition as appropriate  Outcome: Progressing  Goal: Fluid balance maintained  Description: INTERVENTIONS:  - Monitor labs   - Monitor I/O and WT  - Instruct patient on fluid and nutrition as appropriate  - Assess for signs & symptoms of volume excess or deficit  Outcome: Progressing

## 2023-06-22 NOTE — OCCUPATIONAL THERAPY NOTE
Occupational Therapy Evaluation     Patient Name: Neo DUVALL Date: 6/22/2023  Problem List  Principal Problem:    Abdominal fluid collection  Active Problems:    Methadone dependence     Abscess of postoperative wound of abdominal wall    Acute encephalopathy    Past Medical History  Past Medical History:   Diagnosis Date    BRAULIO (acute kidney injury) ([facility] [address] ) 8/20/2022    Anxiety     Bright red rectal bleeding     Last Assessed: 9/9/2015     Drug dependence ([facility] ) 10/20/2021    Hypertension     Last Assessed: 7/9/2014     Hyponatremia 5/26/2023    Kidney stone     Kidney stones     Last Assessed: 11/17/2016     Periorbital edema     Last Assessed: 9/4/2015    Septic shock ([facility] [address] ) 08/20/2022    Skin tag of anus     Last Assessed: 9/9/2015     Trigger point of thoracic region     Last Assessed: 11/6/2015      Past Surgical History  Past Surgical History:   Procedure Laterality Date    CYSTOSCOPY W/ URETERAL STENT PLACEMENT  03/11/2013    CYSTOSCOPY W/ URETERAL STENT PLACEMENT  06/18/2014    EXTRACORPOREAL SHOCK WAVE LITHOTRIPSY     CYSTOSCOPY W/ URETERAL STENT PLACEMENT  07/16/2014    EXTRACORPOREAL SHOCK WAVE LITHOTRIPSY     CYSTOSCOPY W/ URETERAL STENT REMOVAL  04/11/2013    CYSTOSCOPY W/ URETERAL STENT REMOVAL Right 08/26/2014    CYSTOSCOPY W/ URETEROSCOPY W/ LITHOTRIPSY  02/26/2013    Percutaneous lithotomy With Uretal Stent Plcement     CYSTOSCOPY W/ URETEROSCOPY W/ LITHOTRIPSY  03/11/2014    CYSTOSCOPY W/ URETEROSCOPY W/ LITHOTRIPSY Right 08/04/2014    With Uretal Stent Placement     CYSTOSCOPY W/ URETEROSCOPY W/ LITHOTRIPSY Right 05/09/2016    HERNIA REPAIR  03/11/2013    IR DRAINAGE TUBE PLACEMENT  6/6/2023    IR NEPHROSTOMY TUBE CHECK/CHANGE/REPOSITION/REINSERTION/UPSIZE  11/22/2022    IR NEPHROSTOMY TUBE CHECK/CHANGE/REPOSITION/REINSERTION/UPSIZE  02/13/2023    IR NEPHROSTOMY TUBE CHECK/CHANGE/REPOSITION/REINSERTION/UPSIZE  04/28/2023    IR NEPHROSTOMY TUBE PLACEMENT  08/20/2022    KIDNEY SURGERY      LITHOTRIPSY      OR CYSTO/URETERO W/LITHOTRIPSY &INDWELL STENT INSRT Right 07/13/2016    Procedure: CYSTOSCOPY; URETEROSCOPY WITH HOLMIUM LASER STONE EXTRACTION; RETROGRADE PYELOGRAM; URETERAL STENT INSERTION ;  Surgeon: Ana Rosa Landaverde MD;  [facility];  Service: Urology    OR CYSTO/URETERO W/LITHOTRIPSY &INDWELL STENT INSRT Right 05/09/2016    Procedure: CYSTOSCOPY,  URETEROSCOPY,  WITH LITHOTRIPSY HOLMIUM LASER, STONE EXTRACTION, AND INSERTION STENT URETERAL;  Surgeon: Ana Rosa Landaverde MD;  Location[facility];  Service: Urology    OR CYSTO/URETERO W/LITHOTRIPSY &INDWELL STENT INSRT Right 11/10/2022    Procedure: CYSTOSCOPY URETEROSCOPY  right RETROGRADE PYELOGRAM;  Surgeon: Nikki Esparza MD;  Location: [facility];  Service: Urology    OR LAPAROSCOPY RADICAL NEPHRECTOMY Right 5/22/2023    Procedure: NEPHRECTOMY RADICAL LAPAROSCOPIC W/ ROBOTICS;  Surgeon: Cesar Reveles MD;  Location: [facility];  Service: Urology    OR LITHOTRIPSY [address] [place] [address] Right 06/17/2016    Procedure: LITHROTRIPSY EXTRACORPORAL SHOCKWAVE (ESWL); Surgeon: Ana Rosa Landaverde MD;  [facility];  Service: Urology    TRANSURETHRAL RESECTION OF BLADDER      URETERAL Oneil Ji  03/11/2013 06/22/23 0902   OT Last Visit   OT Visit Date 06/22/23   Note Type   Note type Evaluation   Pain Assessment   Pain Assessment Tool 0-10   Pain Score 7   Pain Location/Orientation Orientation: Lower; Location: Back   Restrictions/Precautions   Weight Bearing Precautions Per Order No   Other Precautions Fall Risk;Multiple lines; Bed Alarm;Pain   Home Living   Type of [address] Two level;Performs ADLs on one level; Able to live on main level with bedroom/bathroom;Stairs to enter without rails; Other (Comment)  (1 BINU no HR; 1st floor setup)   [address]; Wheelchair-manual   Additional Comments pt is poor historian and minimally conversational during "session   Prior Function   Level of [place] Independent with ADLs; Independent with functional mobility; Needs assistance with IADLS   Lives With Other (Comment)  (parents)   Receives Help From Family   IADLs Family/Friend/Other provides transportation   Falls in the last 6 months 1 to 4   Vocational On disability   Comments pt does not utilize device at baseline during functional moiblity   Subjective   Subjective \"my back hurts\"   ADL   Where Assessed Edge of bed   LB Dressing Assistance 2  Maximal Assistance   LB Dressing Deficit Don/doff R sock; Don/doff L sock   Additional Comments pt with poor motivation and attempt to demonstrate LB dressing this session   Bed Mobility   Rolling R 5  Supervision   Additional items Bedrails; Increased time required;Verbal cues   Supine to Sit 4  Minimal assistance   Additional items Assist x 2;Bedrails; Increased time required;Verbal cues   Sit to Supine 5  Supervision   Additional items Bedrails; Increased time required;Verbal cues;LE management   Additional Comments pt on RA during session and SpO2 WFL; pt with excessively slow mobility and motivation to perform OOB activity; requires significant encouragement to participate   Transfers   Sit to Stand 4  Minimal assistance   Additional items Increased time required;Verbal cues   Stand to Sit 4  Minimal assistance   Additional items Assist x 2; Increased time required;Verbal cues   Additional Comments pt utilized handheld (A) for functional transfers x2; no significant LOB, however mild instability   Functional Mobility   Functional Mobility 4  Minimal assistance   Additional Comments x12- and performs ~3ft to scale for weight; limited due to motivation   Additional items Hand hold assistance   Balance   Static Sitting Fair +   Dynamic Sitting Fair +   Static Standing Fair   Dynamic Standing Fair -   Ambulatory Fair -   Activity Tolerance   Activity Tolerance Patient limited by fatigue;Patient limited by pain   RUE Assessment " RUE Assessment WFL   LUE Assessment   LUE Assessment WFL   Hand Function   Gross Motor Coordination Functional   Fine Motor Coordination Functional   Sensation   Light Touch No apparent deficits   Sharp/Dull No apparent deficits   Psychosocial   Psychosocial (WDL) X   Patient Behaviors/Mood Not interactive; Uncooperative   Cognition   Overall Cognitive Status Impaired   Arousal/Participation Uncooperative;Lethargic   Attention Attends with cues to redirect   Orientation Level Oriented to person;Oriented to place; Disoriented to situation;Disoriented to time   Memory Decreased long term memory;Decreased short term memory;Decreased recall of recent events   Following Commands Follows one step commands with increased time or repetition   Assessment   Limitation Decreased ADL status; Decreased UE strength;Decreased Safe judgement during ADL;Decreased cognition;Decreased endurance;Decreased self-care trans;Decreased high-level ADLs   Assessment Pt is a 61 y o  male seen for OT evaluation s/p admit to [facility] on [facility]21[facility]2023 w/ Abdominal fluid collection  Comorbidities affecting pt's functional performance at time of assessment include: kidney stones, anxiety, red rectal bleeding, HTN, periorbital edema, septic shock, hyponatremia, BRAULIO  Personal factors affecting pt at time of IE include:behavioral pattern, difficulty performing ADLS, difficulty performing IADLS , limited insight into deficits, compliance, flat affect, decreased initiation and engagement , financial barriers and health management   Prior to admission, pt was (I) with ADLs and (A) with IADLs with use of no device during mobility   Upon evaluation: Pt requires min-max (A) x1-2 with use of handheld (A) x2 during mobility 2* the following deficits impacting occupational performance: weakness, decreased strength, decreased balance, decreased tolerance, impaired initiation, impaired memory, impaired sequencing, impaired problem solving, decreased safety awareness, increased pain, impaired interpersonal skills and decreased coping skills  Pt to benefit from continued skilled OT tx while in the hospital to address deficits as defined above and maximize level of functional independence w ADL's and functional mobility  Occupational Performance areas to address include: grooming, bathing/shower, toilet hygiene, dressing, functional mobility, community mobility and clothing management  The patient's raw score on the AM-PAC Daily Activity Inpatient Short Form is 19  A raw score of greater than or equal to 19 suggests the patient may benefit from discharge to home  Please refer to the recommendation of the Occupational Therapist for safe discharge planning  Pt benefited from co-evaluation of skilled OT and PT therapists in order to most appropriately address functional deficits d/t extensive assistance required for safe functional mobility, decreased activity tolerance, and regression from functioning level prior to admission and/or onset of present illness  OT/PT objectives were addressed separately; please see PT note for specific goal areas targeted  Goals   Patient Goals to go home   Short Term Goal  pt will perform UE strengthening exercises   Long Term Goal #1 pt will demonstrate toilet transfers and hygiene at (I) level   Long Term Goal #2 pt will demonstrate functional mobility with RW at mod (I) level   Long Term Goal pt will perform UB/LB bathing and grooming tasks at (I) level   Plan   Treatment Interventions ADL retraining;Functional transfer training;UE strengthening/ROM; Endurance training;Patient/family training;Cognitive reorientation;Equipment evaluation/education; Activityengagement   Goal Expiration Date 07/06/23   OT Frequency 3-5x/wk   Recommendation   OT Discharge Recommendation Home with home health rehabilitation   AM-PAC Daily Activity Inpatient   Lower Body Dressing 2   Bathing 2   Toileting 3   Upper Body Dressing 4   Grooming 4   Eating 4   Daily Activity Raw Score 19   Daily Activity Standardized Score (Calc for Raw Score >=11) 40 22   AM-PAC Applied Cognition Inpatient   Following a Speech/Presentation 3   Understanding Ordinary Conversation 3   Taking Medications 2   Remembering Where Things Are Placed or Put Away 2   Remembering List of 4-5 Errands 2   Taking Care of Complicated Tasks 2   Applied Cognition Raw Score 14   Applied Cognition Standardized Score 32 02

## 2023-06-22 NOTE — CASE MANAGEMENT
Case Management Assessment & Discharge Planning Note    Patient name Alberto Jensen  Location [address] [id number] MRN [id number]  : 1965 Date 2023       Current Admission Date: 2023  Current Admission Diagnosis:Abdominal fluid collection   Patient Active Problem List    Diagnosis Date Noted   • Abdominal fluid collection 2023   • Acute encephalopathy 2023   • CKD (chronic kidney disease) stage 2, GFR 60-89 ml/min 2023   • Abscess of postoperative wound of abdominal wall 2023   • Methadone dependence  2023   • Leukocytosis 2023   • Prolonged QT interval 2023   • Staghorn calculus 2023   • Recent UTI 2023   • Abnormal CT scan, liver 2023   • AMS (altered mental status) 2023   • Tobacco abuse 11/10/2022   • Drug abuse, episodic use ([facility] [address] ) 11/10/2022   • Anxiety 2022   • Cognitive decline 2022   • Delusional disorder  2022   • Multiple electrolyte abnormalities 2022   • Acute metabolic encephalopathy 65/15/9267   • Acute respiratory failure with hypoxia ([address] ) 2022   • Elevated troponin 2022   • Elevated brain natriuretic peptide (BNP) level 2022   • Essential hypertension 2022   • Transaminitis 2022   • Elevated lipase 2022   • Hypernatremia 2022   • Increased anion gap metabolic acidosis    • Rhabdomyolysis 2022   • Elevated d-dimer 2022   • Kidney stones    • Compulsive skin picking 10/20/2021      LOS (days): 0  Geometric Mean LOS (GMLOS) (days):   Days to GMLOS:     OBJECTIVE:              Current admission status: Observation       Preferred Pharmacy:   [place] [address]  Phone: [phone] Fax: [address] [place] [address]  Phone: [phone] Fax: [address] [employer] [facility] [address] [facility] [address]  Phone: [phone] Fax: [phone]    Primary Care Provider: No primary care provider on file  Primary Insurance: DIA GRAHAM  Secondary Insurance:     ASSESSMENT:  [address], [address] - Mother   Primary Phone: [phone] (Home)                         Readmission Root Cause  30 Day Readmission: Yes  Who directed you to return to the hospital?: Family  Did you understand whom to contact if you had questions or problems?: Yes  Did you get your prescriptions before you left the hospital?: Yes  Were you able to get your prescriptions filled when you left the hospital?: Yes  Did you take your medications as prescribed?: Yes  Were you able to get to your follow-up appointments?: Yes  During previous admission, was a post-acute recommendation made?: Yes  What post-acute resources were offered?: Mundo Rodríguez  Patient was readmitted due to: Abd fluid collection, Acute encephalopathy,  Action Plan: Pt would benefit with home care on discharge  Patient Information  Admitted from[de-identified] Home  Mental Status: Alert, Confused (Pt confused to time   Pt said I can not remember anything that happened in last hospitalization or past couple of days)  During Assessment patient was accompanied by: Not accompanied during assessment  Assessment information provided by[de-identified] Patient  Primary Caregiver: Self  Support Systems: Parent  [place] of Residence: [address] do you live in?: [address] entry access options   Select all that apply : No steps to enter home  Type of Current Residence: 2 story home  Upon entering residence, is there a bedroom on the main floor (no further steps)?: No  A bedroom is located on the following floor levels of residence (select all that apply):: 2nd Floor  Upon entering residence, is there a bathroom on the main floor (no further steps)?: No  Indicate which floors of current residence have a bathroom (select all the apply):: 2nd Floor  Number of steps to 2nd floor from main floor: One Flight  In the last 12 months, was there a time when you were not able to pay the mortgage or rent on time?: No  In the last 12 months, how many places have you lived?: 1  In the last 12 months, was there a time when you did not have a steady place to sleep or slept in a shelter (including now)?: No  Homeless/housing insecurity resource given?: N/A  Living Arrangements: Lives w/ Parent(s)  Is patient a ?: No    Activities of Daily Living Prior to Admission  Functional Status: Independent  Completes ADLs independently?: Yes  Ambulates independently?: Yes (pt uses no device to ambulate )  Does patient use assisted devices?: Yes  Assisted Devices (DME) used: Gaylyn Dasen, Wheelchair, Straight Cane  Does patient currently own DME?: Yes  What DME does the patient currently own?: Straight Caralee Codding, Wheelchair  Does patient have a history of Outpatient Therapy (PT/OT)?: No  Does the patient have a history of Short-Term Rehab?: No  Does patient have a history of HHC?: Yes  Does patient currently have [facility]?: Yes    Current Home Health Care  Type of Current Home Care Services: Home PT, Nurse visit  Current Home Health Agency[de-identified] [address] [place] [address] Provider[de-identified] PCP    Patient Information Continued  Income Source: Unemployed  Does patient have prescription coverage?: Yes  Within the past 12 months, you worried that your food would run out before you got the money to buy more : Never true  Within the past 12 months, the food you bought just didn't last and you didn't have money to get more : Never true  Food insecurity resource given?: N/A  Does patient have a history of substance abuse?: Yes (Pt is currently on Suboxone therapy )  History of Withdrawal Symptoms: Denies past symptoms  Is patient currently in treatment for substance abuse?: No  Patient declined treatment information  Does patient have a history of Mental Health Diagnosis?: Yes  Is patient receiving treatment for mental health?: No  Patient declined treatment information  (Pt has a history of Anxiety which he follows with his PCP)         Means of Transportation  [facility]ans [facility] to [facility][de-identified] Family transport (Pt 's mother drives patient to [facility] )  In the past 12 months, has lack of transportation kept you from medical appointments or from getting medications?: No  In the past 12 months, has lack of transportation kept you from meetings, work, or from getting things needed for daily living?: No  Was application for public transport provided?: N/A        DISCHARGE DETAILS:    Discharge planning discussed with[de-identified] Pt  Freedom of Choice: Yes     CM contacted family/caregiver?: No- see comments (I will look to talk to his mother this afternoon )                  [address]         Is the patient interested in [place] [address] at discharge?: Yes  [address] requested[de-identified] Nursing, Physical [address] Name[de-identified] [facility] Provider[de-identified] PCP  Home Health Services Needed[de-identified] Wound/Ostomy Care, Evaluate Functional Status and Safety, Strengthening/Theraputic Exercises to Improve Function  Homebound Criteria Met[de-identified] Uses an Assist Device (i e  cane, walker, etc)  Supporting Clincal Findings[de-identified] Limited Endurance        Pt is a readmission  Pt was discharged from [facility] on 6/9/23 after being treated for Abscess of Abd Wall   Pt did go to follow up [facility] with Nephro and Urology  Pt is active with [facility] , nursing and PT  I will continue to follow for any Case Management needs

## 2023-06-22 NOTE — PLAN OF CARE
Problem: MOBILITY - ADULT  Goal: Maintain or return to baseline ADL function  Description: INTERVENTIONS:  -  Assess patient's ability to carry out ADLs; assess patient's baseline for ADL function and identify physical deficits which impact ability to perform ADLs (bathing, care of mouth/teeth, toileting, grooming, dressing, etc )  - Assess/evaluate cause of self-care deficits   - Assess range of motion  - Assess patient's mobility; develop plan if impaired  - Assess patient's need for assistive devices and provide as appropriate  - Encourage maximum independence but intervene and supervise when necessary  - Involve family in performance of ADLs  - Assess for home care needs following discharge   - Consider OT consult to assist with ADL evaluation and planning for discharge  - Provide patient education as appropriate  Outcome: Progressing  Goal: Maintains/Returns to pre admission functional level  Description: INTERVENTIONS:  - Perform BMAT or MOVE assessment daily    - Set and communicate daily mobility goal to care team and patient/family/caregiver     - Collaborate with rehabilitation services on mobility goals if consulted  - Dangle patient 2 times a day  - Stand patient 2-3 times a day  - Ambulate patient 2-3 times a day  - Out of bed to chair 2-3 times a day   - Out of bed for meals 2-3 times a day  - Out of bed for toileting  - Record patient progress and toleration of activity level   Outcome: Progressing     Problem: Prexisting or High Potential for Compromised Skin Integrity  Goal: Skin integrity is maintained or improved  Description: INTERVENTIONS:  - Identify patients at risk for skin breakdown  - Assess and monitor skin integrity  - Assess and monitor nutrition and hydration status  - Monitor labs   - Assess for incontinence   - Turn and reposition patient  - Assist with mobility/ambulation  - Relieve pressure over bony prominences  - Avoid friction and shearing  - Provide appropriate hygiene as needed including keeping skin clean and dry  - Evaluate need for skin moisturizer/barrier cream  - Collaborate with interdisciplinary team   - Patient/family teaching  - Consider wound care consult   Outcome: Progressing     Problem: GASTROINTESTINAL - ADULT  Goal: Maintains or returns to baseline bowel function  Description: INTERVENTIONS:  - Assess bowel function  - Encourage oral fluids to ensure adequate hydration  - Administer IV fluids if ordered to ensure adequate hydration  - Administer ordered medications as needed  - Encourage mobilization and activity  - Consider nutritional services referral to assist patient with adequate nutrition and appropriate food choices  Outcome: Progressing     Problem: SKIN/TISSUE INTEGRITY - ADULT  Goal: Skin Integrity remains intact(Skin Breakdown Prevention)  Description: Assess:  -Perform Addison assessment every shift  -Clean and moisturize skin every shift  -Inspect skin when repositioning, toileting, and assisting with ADLS  -Assess under medical devices such as masimos every shift  -Assess extremities for adequate circulation and sensation     Bed Management:  -Have minimal linens on bed & keep smooth, unwrinkled  -Change linens as needed when moist or perspiring  -Avoid sitting or lying in one position for more than 2 hours while in bed  -Keep HOB at 30 degrees     Toileting:  -Offer bedside commode  -Assess for incontinence every 4 hours  -Use incontinent care products after each incontinent episode such as barrier cream    Activity:  -Mobilize patient 2-3 times a day  -Encourage activity and walks on unit  -Encourage or provide ROM exercises   -Turn and reposition patient every 2 Hours  -Use appropriate equipment to lift or move patient in bed  -Instruct/ Assist with weight shifting every 2 hours when out of bed in chair  -Consider limitation of chair time 5 hour intervals    Skin Care:  -Avoid use of baby powder, tape, friction and shearing, hot water or constrictive clothing  -Relieve pressure over bony prominences using pillows  -Do not massage red bony areas    Next Steps:  -Teach patient strategies to minimize risks such as weight shifting   -Consider consults to  interdisciplinary teams such as PT  Outcome: Progressing  Goal: Incision(s), wounds(s) or drain site(s) healing without S/S of infection  Description: INTERVENTIONS  - Assess and document dressing, incision, wound bed, drain sites and surrounding tissue  - Provide patient and family education  - Perform skin care/dressing changes every shift  Outcome: Progressing     Problem: PAIN - ADULT  Goal: Verbalizes/displays adequate comfort level or baseline comfort level  Description: Interventions:  - Encourage patient to monitor pain and request assistance  - Assess pain using appropriate pain scale  - Administer analgesics based on type and severity of pain and evaluate response  - Implement non-pharmacological measures as appropriate and evaluate response  - Consider cultural and social influences on pain and pain management  - Notify physician/advanced practitioner if interventions unsuccessful or patient reports new pain  Outcome: Progressing     Problem: INFECTION - ADULT  Goal: Absence or prevention of progression during hospitalization  Description: INTERVENTIONS:  - Assess and monitor for signs and symptoms of infection  - Monitor lab/diagnostic results  - Monitor all insertion sites, i e  indwelling lines, tubes, and drains  - Monitor endotracheal if appropriate and nasal secretions for changes in amount and color  - [place]comb appropriate cooling/warming therapies per order  - Administer medications as ordered  - Instruct and encourage patient and family to use good hand hygiene technique  - Identify and instruct in appropriate isolation precautions for identified infection/condition  Outcome: Progressing     Problem: SAFETY ADULT  Goal: Maintain or return to baseline ADL function  Description: INTERVENTIONS:  - Assess patient's ability to carry out ADLs; assess patient's baseline for ADL function and identify physical deficits which impact ability to perform ADLs (bathing, care of mouth/teeth, toileting, grooming, dressing, etc )  - Assess/evaluate cause of self-care deficits   - Assess range of motion  - Assess patient's mobility; develop plan if impaired  - Assess patient's need for assistive devices and provide as appropriate  - Encourage maximum independence but intervene and supervise when necessary  - Involve family in performance of ADLs  - Assess for home care needs following discharge   - Consider OT consult to assist with ADL evaluation and planning for discharge  - Provide patient education as appropriate  Outcome: Progressing  Goal: Maintains/Returns to pre admission functional level  Description: INTERVENTIONS:  - Perform BMAT or MOVE assessment daily    - Set and communicate daily mobility goal to care team and patient/family/caregiver  - Collaborate with rehabilitation services on mobility goals if consulted  - Reposition patient every 2 hours    - Dangle patient 2-3 times a day  - Stand patient 2-3 times a day  - Ambulate patient 2-3 times a day  - Out of bed to chair 2-3 times a day   - Out of bed for meals 2-3 times a day  - Out of bed for toileting  - Record patient progress and toleration of activity level   Outcome: Progressing  Goal: Patient will remain free of falls  Description: INTERVENTIONS:  - Educate patient/family on patient safety including physical limitations  - Instruct patient to call for assistance with activity   - Consult OT/PT to assist with strengthening/mobility   - Keep Call bell within reach  - Keep bed low and locked with side rails adjusted as appropriate  - Keep care items and personal belongings within reach  - Initiate and maintain comfort rounds  - Make Fall Risk Sign visible to staff  - Offer Toileting every 4 Hours, in advance of need  - Initiate/Maintain bed alarm  - Obtain necessary fall risk management equipment  - Apply yellow socks and bracelet for high fall risk patients  - Consider moving patient to room near nurses station  Outcome: Progressing

## 2023-06-22 NOTE — UTILIZATION REVIEW
Initial Clinical Review    Admission: Date/Time/Statement:   Admission Orders (From admission, onward)     Ordered        06/21/23 1749  Place in Observation  Once                      Orders Placed This Encounter   Procedures   • Place in Observation     Standing Status:   Standing     Number of Occurrences:   1     Order Specific Question:   Level of Care     Answer:   Med Surg [16]     ED Arrival Information     Expected   -    Arrival   6/21/2023 13:15    Acuity   Urgent            Means of arrival   Walk-In    Escorted by   [facility]  ([facility])    Service   Hospitalist    Admission type   Emergency            Arrival complaint   Altered Mental Status           Chief Complaint   Patient presents with   • Altered Mental Status     Pt brought in by EMS from home for altered mental status for a few days  Initial Presentation: 61 y o  male presents to ed from home via ems for evaluation and treatment of altered mental status  Family reports confusion and appears to be having right flank pain  Patient reports headache and nausea  PMHX: HTN, SUBSTANCE ABUSE, R NEPHRECTOMY 2023, ABDOMINAL WALL ABSCESS S/P IR DRAINAGE RECENTLY  Clinical assessment significant for somnolence  Oriented to person and place  Generalized weakness  UA : +methadone, acetaminophen <10, wbc 12 06  imaging shows 8 cm right retroperitoneal collection  Abdominal wall KATHY with yellow tinged fluid  Initially treated with iv  9% ns bolus  Admit to observation  Plan IR consult for drainage, start iv ceftriaxone and flagyl  Monitor wbc  Date: 6-22-23 Day 2: observation  Ir plan drainage of abdominal collection 6-23 , continue iv ceftriaxone  NPO mn  Alert and oriented today  Afebrile  Addendum: IR suspects collection is old blood  If no pus will aspirate and sent for culture  If pus will drain  Also evaluate for potential drainage tube removal on right        ED Triage Vitals   06/21/23 1325 06/21/23 1325 06/21/23 1325 06/21/23 1325 06/21/23 1325   97 7 °F (36 5 °C) 59 16 150/65 98 %      Temporal Monitor         No Pain          06/21/23 60 7 kg (133 lb 13 1 oz)     Additional Vital Signs:       Patient Vitals for the past 24 hrs:   BP Temp Pulse Resp SpO2   06/22/23 1300 -- -- (!) 52 -- --   06/22/23 0658 128/82 98 2 °F (36 8 °C) (!) 50 18 95 %   06/22/23 0041 126/57 98 3 °F (36 8 °C) (!) 50 16 96 %   06/21/23 1826 135/70 -- 67 18 98 %   06/21/23 1817 -- -- -- -- --   06/21/23 1730 117/68 -- 61 15 --   06/21/23 1700 151/67 -- 63 (!) 25 --     Pertinent Labs/Diagnostic Test Results:     CT head without contrast   Final  (06/21 1956)      No acute intracranial abnormality  Stable left middle cranial fossa arachnoid cyst         CT chest abdomen pelvis w contrast   Final  (06/21 1713)         1  Stable right retroperitoneal 8 cm collection  Possible abscess  2  Postsurgical change from recent right nephrectomy  No evidence of left pyelonephritis or obstructive uropathy  3  Interval near complete drainage of right anterior abdominal wall fluid collection  XR chest 1 view portable   Final (06/21 1404)      No acute cardiopulmonary disease             Results from last 7 days   Lab Units 06/21/23  1351   SARS-COV-2  Negative     Results from last 7 days   Lab Units 06/21/23  1351   WBC Thousand/uL 12 06*   HEMOGLOBIN g/dL 12 8   HEMATOCRIT % 38 6   PLATELETS Thousands/uL 340   NEUTROS ABS Thousands/µL 9 83*         Results from last 7 days   Lab Units 06/22/23  1123 06/21/23  1351   SODIUM mmol/L  --  137   POTASSIUM mmol/L  --  4 4   CHLORIDE mmol/L  --  101   CO2 mmol/L  --  26   ANION GAP mmol/L  --  10   BUN mg/dL  --  21   CREATININE mg/dL  --  1 31*   EGFR ml/min/1 73sq m  --  59   CALCIUM mg/dL  --  9 6   MAGNESIUM mg/dL 2 3  --    PHOSPHORUS mg/dL 3 4  --      Results from last 7 days   Lab Units 06/21/23  1351   AST U/L 12*   ALT U/L 8   ALK PHOS U/L 85   TOTAL PROTEIN g/dL 7 4   ALBUMIN g/dL 3 7   TOTAL BILIRUBIN mg/dL 0 47   AMMONIA umol/L 27         Results from last 7 days   Lab Units 06/21/23  1351   GLUCOSE RANDOM mg/dL 106       Results from last 7 days   Lab Units 06/21/23  1351   CK TOTAL U/L 32*     Results from last 7 days   Lab Units 06/21/23  1552 06/21/23  1351   HS TNI 0HR ng/L  --  11   HS TNI 2HR ng/L 9  --    HSTNI D2 ng/L -2  --          Results from last 7 days   Lab Units 06/21/23  1351   PROTIME seconds 14 5   INR  1 11   PTT seconds 34         Results from last 7 days   Lab Units 06/21/23  1351   PROCALCITONIN ng/ml 0 07     Results from last 7 days   Lab Units 06/21/23  1351   LACTIC ACID mmol/L 1 0       Results from last 7 days   Lab Units 06/21/23  1351   LIPASE u/L 12         Results from last 7 days   Lab Units 06/21/23  1859   CLARITY UA  Clear   COLOR UA  Yellow   SPEC GRAV UA  1 010   PH UA  6 5   GLUCOSE UA mg/dl Negative   KETONES UA mg/dl Negative   BLOOD UA  Negative   PROTEIN UA mg/dl Negative   NITRITE UA  Negative   BILIRUBIN UA  Negative   UROBILINOGEN UA E U /dl 0 2   LEUKOCYTES UA  Negative     Results from last 7 days   Lab Units 06/21/23  1351   INFLUENZA A PCR  Negative   INFLUENZA B PCR  Negative   RSV PCR  Negative         Results from last 7 days   Lab Units 06/21/23  1858   AMPH/METH  Negative   BARBITURATE UR  Negative   BENZODIAZEPINE UR  Negative   COCAINE UR  Negative   METHADONE URINE  Positive*   OPIATE UR  Negative   PCP UR  Negative   THC UR  Negative     Results from last 7 days   Lab Units 06/21/23  1351   ETHANOL LVL mg/dL <10   ACETAMINOPHEN LVL ug/mL <13*   SALICYLATE LVL mg/dL <5                 Results from last 7 days   Lab Units 06/21/23  1351   BLOOD CULTURE  Received in Microbiology Lab  Culture in Progress  Received in Microbiology Lab  Culture in Progress         ED Treatment:   Medication Administration from 06/21/2023 1314 to 06/21/2023 1807       Date/Time Order Dose Route Action     06/21/2023 1732 EDT sodium chloride 0 9 % bolus 1,000 mL 1,000 mL Intravenous New Bag        Past Medical History:   Diagnosis Date   • BRAULIO (acute kidney injury) ([facility] [address] ) 8/20/2022   • Anxiety    • Bright red rectal bleeding     Last Assessed: 9/9/2015    • Drug dependence ([facility] [address] ) 10/20/2021   • Hypertension     Last Assessed: 7/9/2014    • Hyponatremia 5/26/2023   • Kidney stone    • Kidney stones     Last Assessed: 11/17/2016    • Periorbital edema     Last Assessed: 9/4/2015   • Septic shock ([facility] [address] ) 08/20/2022   • Skin tag of anus     Last Assessed: 9/9/2015    • Trigger point of thoracic region     Last Assessed: 11/6/2015      Present on Admission:  • Abscess of postoperative wound of abdominal wall  • Methadone dependence       Admitting Diagnosis:     Altered mental status [R41 82]  Retroperitoneal fluid collection [R18 8]    Age/Sex: 61 y o  male    Scheduled Medications:    cefTRIAXone, 1,000 mg, Intravenous, Q24H  [START ON 6/23/2023] heparin (porcine), 5,000 Units, Subcutaneous, Q8H [address]  methadone, 110 mg, Oral, Daily  nicotine, 1 patch, Transdermal, Daily      Continuous IV Infusions:  sodium chloride, 125 mL/hr, Intravenous, Continuous      PRN Meds:  acetaminophen, 650 mg, Oral, Q6H PRN  diazepam, 5 mg, Intravenous, Q6H PRN        INPATIENT CONSULT TO IR    Network Utilization Review Department  ATTENTION: Please call with any questions or concerns to [phone] and carefully listen to the prompts so that you are directed to the right person  All voicemails are confidential   Lori Wise all requests for admission clinical reviews, approved or denied determinations and any other requests to dedicated fax number below belonging to the campus where the patient is receiving treatment   List of dedicated fax numbers for the Facilities:  [address] [place] [address] DENIALS (Administrative/Medical Necessity) [phone]   [address] [place] [address] (Maternity/NICU/Pediatrics) [address] [facility] [place] [phone]   [facility] [phone]   [address] [place] [address] [facility] [place] [address] [phone] [address] [facility] [phone] [facility] [phone]

## 2023-06-22 NOTE — PLAN OF CARE
Problem: MOBILITY - ADULT  Goal: Maintain or return to baseline ADL function  Description: INTERVENTIONS:  -  Assess patient's ability to carry out ADLs; assess patient's baseline for ADL function and identify physical deficits which impact ability to perform ADLs (bathing, care of mouth/teeth, toileting, grooming, dressing, etc )  - Assess/evaluate cause of self-care deficits   - Assess range of motion  - Assess patient's mobility; develop plan if impaired  - Assess patient's need for assistive devices and provide as appropriate  - Encourage maximum independence but intervene and supervise when necessary  - Involve family in performance of ADLs  - Assess for home care needs following discharge   - Consider OT consult to assist with ADL evaluation and planning for discharge  - Provide patient education as appropriate  Outcome: Progressing  Goal: Maintains/Returns to pre admission functional level  Description: INTERVENTIONS:  - Perform BMAT or MOVE assessment daily    - Set and communicate daily mobility goal to care team and patient/family/caregiver  - Collaborate with rehabilitation services on mobility goals if consulted  - Perform Range of Motion 3 times a day  - Reposition patient every 2 hours    - Dangle patient 3 times a day  - Stand patient 3 times a day  - Ambulate patient 3 times a day  - Out of bed to chair 3 times a day   - Out of bed for meals 3 times a day  - Out of bed for toileting  - Record patient progress and toleration of activity level   Outcome: Progressing     Problem: Prexisting or High Potential for Compromised Skin Integrity  Goal: Skin integrity is maintained or improved  Description: INTERVENTIONS:  - Identify patients at risk for skin breakdown  - Assess and monitor skin integrity  - Assess and monitor nutrition and hydration status  - Monitor labs   - Assess for incontinence   - Turn and reposition patient  - Assist with mobility/ambulation  - Relieve pressure over bony prominences  - Avoid friction and shearing  - Provide appropriate hygiene as needed including keeping skin clean and dry  - Evaluate need for skin moisturizer/barrier cream  - Collaborate with interdisciplinary team   - Patient/family teaching  - Consider wound care consult   Outcome: Progressing     Problem: GASTROINTESTINAL - ADULT  Goal: Maintains or returns to baseline bowel function  Description: INTERVENTIONS:  - Assess bowel function  - Encourage oral fluids to ensure adequate hydration  - Administer IV fluids if ordered to ensure adequate hydration  - Administer ordered medications as needed  - Encourage mobilization and activity  - Consider nutritional services referral to assist patient with adequate nutrition and appropriate food choices  Outcome: Progressing     Problem: SKIN/TISSUE INTEGRITY - ADULT  Goal: Skin Integrity remains intact(Skin Breakdown Prevention)  Description: Assess:  -Perform Addison assessment every shift  -Clean and moisturize skin every shift  -Inspect skin when repositioning, toileting, and assisting with ADLS  -Assess under medical devices such as masimo every shift  -Assess extremities for adequate circulation and sensation     Bed Management:  -Have minimal linens on bed & keep smooth, unwrinkled  -Change linens as needed when moist or perspiring  -Avoid sitting or lying in one position for more than 4 hours while in bed  -Keep HOB at 30 degrees     Toileting:  -Offer bedside commode  -Assess for incontinence every 2hrs  -Use incontinent care products after each incontinent episode such as wipes    Activity:  -Mobilize patient 3 times a day  -Encourage activity and walks on unit  -Encourage or provide ROM exercises   -Turn and reposition patient every 2 Hours  -Use appropriate equipment to lift or move patient in bed  -Instruct/ Assist with weight shifting every 2 hours when out of bed in chair  -Consider limitation of chair time 4 hour intervals    Skin Care:  -Avoid use of baby powder, tape, friction and shearing, hot water or constrictive clothing  -Relieve pressure over bony prominences using chair cushion  -Do not massage red bony areas    Next Steps:  -Teach patient strategies to minimize risks such as repositioning   -Consider consults to  interdisciplinary teams such as PT/OT  Outcome: Progressing  Goal: Incision(s), wounds(s) or drain site(s) healing without S/S of infection  Description: INTERVENTIONS  - Assess and document dressing, incision, wound bed, drain sites and surrounding tissue  - Provide patient and family education  - Perform skin care/dressing changes every shift  Outcome: Progressing     Problem: PAIN - ADULT  Goal: Verbalizes/displays adequate comfort level or baseline comfort level  Description: Interventions:  - Encourage patient to monitor pain and request assistance  - Assess pain using appropriate pain scale  - Administer analgesics based on type and severity of pain and evaluate response  - Implement non-pharmacological measures as appropriate and evaluate response  - Consider cultural and social influences on pain and pain management  - Notify physician/advanced practitioner if interventions unsuccessful or patient reports new pain  Outcome: Progressing     Problem: INFECTION - ADULT  Goal: Absence or prevention of progression during hospitalization  Description: INTERVENTIONS:  - Assess and monitor for signs and symptoms of infection  - Monitor lab/diagnostic results  - Monitor all insertion sites, i e  indwelling lines, tubes, and drains  - Monitor endotracheal if appropriate and nasal secretions for changes in amount and color  - [place] appropriate cooling/warming therapies per order  - Administer medications as ordered  - Instruct and encourage patient and family to use good hand hygiene technique  - Identify and instruct in appropriate isolation precautions for identified infection/condition  Outcome: Progressing     Problem: SAFETY ADULT  Goal: Maintain or return to baseline ADL function  Description: INTERVENTIONS:  -  Assess patient's ability to carry out ADLs; assess patient's baseline for ADL function and identify physical deficits which impact ability to perform ADLs (bathing, care of mouth/teeth, toileting, grooming, dressing, etc )  - Assess/evaluate cause of self-care deficits   - Assess range of motion  - Assess patient's mobility; develop plan if impaired  - Assess patient's need for assistive devices and provide as appropriate  - Encourage maximum independence but intervene and supervise when necessary  - Involve family in performance of ADLs  - Assess for home care needs following discharge   - Consider OT consult to assist with ADL evaluation and planning for discharge  - Provide patient education as appropriate  Outcome: Progressing  Goal: Maintains/Returns to pre admission functional level  Description: INTERVENTIONS:  - Perform BMAT or MOVE assessment daily    - Set and communicate daily mobility goal to care team and patient/family/caregiver  - Collaborate with rehabilitation services on mobility goals if consulted  - Perform Range of Motion 3 times a day  - Reposition patient every 2 hours    - Dangle patient 3 times a day  - Stand patient 3 times a day  - Ambulate patient 3 times a day  - Out of bed to chair 3 times a day   - Out of bed for meals 3 times a day  - Out of bed for toileting  - Record patient progress and toleration of activity level   Outcome: Progressing  Goal: Patient will remain free of falls  Description: INTERVENTIONS:  - Educate patient/family on patient safety including physical limitations  - Instruct patient to call for assistance with activity   - Consult OT/PT to assist with strengthening/mobility   - Keep Call bell within reach  - Keep bed low and locked with side rails adjusted as appropriate  - Keep care items and personal belongings within reach  - Initiate and maintain comfort rounds  - Make Fall Risk Sign visible to staff  - Offer Toileting every 2 Hours, in advance of need  - Initiate/Maintain bed alarm  - Obtain necessary fall risk management equipment: bed alarm  - Apply yellow socks and bracelet for high fall risk patients  - Consider moving patient to room near nurses station  Outcome: Progressing     Problem: METABOLIC, FLUID AND ELECTROLYTES - ADULT  Goal: Electrolytes maintained within normal limits  Description: INTERVENTIONS:  - Monitor labs and assess patient for signs and symptoms of electrolyte imbalances  - Administer electrolyte replacement as ordered  - Monitor response to electrolyte replacements, including repeat lab results as appropriate  - Instruct patient on fluid and nutrition as appropriate  Outcome: Progressing  Goal: Fluid balance maintained  Description: INTERVENTIONS:  - Monitor labs   - Monitor I/O and WT  - Instruct patient on fluid and nutrition as appropriate  - Assess for signs & symptoms of volume excess or deficit  Outcome: Progressing     Problem: Nutrition/Hydration-ADULT  Goal: Nutrient/Hydration intake appropriate for improving, restoring or maintaining nutritional needs  Description: Monitor and assess patient's nutrition/hydration status for malnutrition  Collaborate with interdisciplinary team and initiate plan and interventions as ordered  Monitor patient's weight and dietary intake as ordered or per policy  Utilize nutrition screening tool and intervene as necessary  Determine patient's food preferences and provide high-protein, high-caloric foods as appropriate       INTERVENTIONS:  - Monitor oral intake, urinary output, labs, and treatment plans  - Assess nutrition and hydration status and recommend course of action  - Evaluate amount of meals eaten  - Assist patient with eating if necessary   - Allow adequate time for meals  - Recommend/ encourage appropriate diets, oral nutritional supplements, and vitamin/mineral supplements  - Order, calculate, and assess calorie counts as needed  - Recommend, monitor, and adjust tube feedings and TPN/PPN based on assessed needs  - Assess need for intravenous fluids  - Provide specific nutrition/hydration education as appropriate  - Include patient/family/caregiver in decisions related to nutrition  Outcome: Progressing

## 2023-06-22 NOTE — PLAN OF CARE
Problem: PHYSICAL THERAPY ADULT  Goal: Performs mobility at highest level of function for planned discharge setting  See evaluation for individualized goals  Description: Treatment/Interventions: Functional transfer training, LE strengthening/ROM, Elevations, Therapeutic exercise, Endurance training, Gait training, Bed mobility          See flowsheet documentation for full assessment, interventions and recommendations  Note: Prognosis: Fair  Problem List: Decreased strength, Decreased endurance, Impaired balance, Decreased mobility, Decreased cognition, Impaired judgement, Decreased safety awareness, Pain  Assessment: Patient is a 61 y o  male evaluated by Physical Therapy s/p admit to [address] [place] [address],[address] on 6/21/2023 with admitting diagnosis of: Altered mental status, Retroperitoneal fluid collection, and principal problem of: Abdominal fluid collection  PT was consulted to assess patient's functional mobility and discharge needs  Ordered are PT Evaluation and treatment with activity level of: up and OOB as tolerated  Comorbidities affecting patient's physical performance at time of assessment include: methadone dependence, HTN, CKD  Personal factors affecting the patient at time of IE include: lives in 2 story home, step(s) to enter home, inability to navigate community distances, impaired cognition, impaired safety awareness, decreased initiation and engagement and limited insight into impairments  Please locate objective findings from PT assessment regarding body systems outlined above  Upon evaluation, pt requiring Kristyn x 2-SUP, increased time, and frequent verbal cuing to perform all functional mobility  Pt requiring extensive encouragement to participate in minimal mobility  Seated rest break taken following 6' of ambulation  Pt can likely ambulate greater distances but is not motivated to  Moderate postural sway demonstrated but no true LOB experienced   The patient's AM-PAC Basic Mobility Inpatient Short Form Raw Score is 17  A Raw score of greater than 16 suggests the patient may benefit from discharge to home  Please also refer to the recommendation of the Physical Therapist for safe discharge planning  Co treatment with OT secondary to complex medical condition of pt, possible A of 2 required to achieve and maintain transitional movements, requiring the need of skilled therapeutic intervention of 2 therapists to achieve delivery of services  Pt will benefit from continued PT intervention during LOS to address current deficits, increase LOF, and facilitate safe d/c to next level of care when medically appropriate  D/c recommendation at this time is home with home health rehabilitation  PT Discharge Recommendation: Home with home health rehabilitation    See flowsheet documentation for full assessment

## 2023-06-22 NOTE — CONSULTS
Interventional Radiology  Consultation 6/22/2023     Consults  Tana Jean   1965   [id number]      Assessment/Plan:  Patient with stone disease  Right staghorn  Developed obstruction and pyonephrosis  Nephrostomy drainage  Poor function of right kidney  Underwent nephrectomy  This was complicated by an access site abscess, anteriorly, on the right  This was drained  He now comes in with vague symptoms including flank pain  CT scan shows persistence of abnormality in the surgical bed on the right  This has not increased in size  It does not appear different  Lactate is negative  White count is 12, but he has had leukocytosis for the last year  Blood cultures are pending  Plan is to sample the right-sided collection  I suspect it will be old blood  If it is pus we will drain it  If it is not pus we will aspirate everything we can and send that for culture  We will also evaluate for potential drainage tube removal on the right      Medical Problems     Problem List     * (Principal) Abdominal fluid collection    Compulsive skin picking    Acute metabolic encephalopathy    Acute respiratory failure with hypoxia (HCC)    Elevated troponin    Elevated brain natriuretic peptide (BNP) level    Essential hypertension    Transaminitis    Elevated lipase    Hypernatremia    Increased anion gap metabolic acidosis    Rhabdomyolysis    Elevated d-dimer    Kidney stones    Overview Signed 8/20/2022  9:00 PM by Lydia Figueroa DO     Last Assessed: 11/17/2016          Delusional disorder     Multiple electrolyte abnormalities    Cognitive decline    Anxiety    Tobacco abuse    Drug abuse, episodic use (HCC)    Abnormal CT scan, liver    AMS (altered mental status)    Recent UTI    Staghorn calculus    Prolonged QT interval    Methadone dependence     Leukocytosis    Abscess of postoperative wound of abdominal wall    CKD (chronic kidney disease) stage 2, GFR 60-89 ml/min    Lab Results Component Value Date    EGFR 59 06/21/2023    EGFR 69 06/07/2023    EGFR 91 06/06/2023    CREATININE 1 31 (H) 06/21/2023    CREATININE 1 16 06/07/2023    CREATININE 0 92 06/06/2023         Acute encephalopathy    Moderate protein-calorie malnutrition (HCC)    Malnutrition Findings:   Adult Malnutrition type: Chronic illness  Adult Degree of Malnutrition: Malnutrition of moderate degree  Malnutrition Characteristics: Fat loss, Muscle loss, Inadequate energy                  360 Statement: Chronic, moderate protein calorie malnutrition related to chronic illness with decreased appetite as evidenced by mild muscle wasting to clavicle and temple regions and significant weight loss 14 7% x 6 months  Treating with PO diet  Patient is not interested in nutrition supplements at this time  Encourage adequate PO intakes  BMI Findings: Body mass index is 20 96 kg/m²  Subjective:     Patient ID: Rodrick Bundy is a 61 y o  male  History of Present Illness  Stone disease, obstruction, pyonephrosis, drainage, nephrectomy, surgical site abscess, now admitted with atypical staring episode  Laboratories include chronically elevated WBC count, negative lactate and pending blood cultures  CT scan shows persistent but stable collection right retroperitoneum      Review of Systems      Past Medical History:   Diagnosis Date   • BRAULIO (acute kidney injury) ([facility] [address] ) 8/20/2022   • Anxiety    • Bright red rectal bleeding     Last Assessed: 9/9/2015    • Drug dependence ([address] ) 10/20/2021   • Hypertension     Last Assessed: 7/9/2014    • Hyponatremia 5/26/2023   • Kidney stone    • Kidney stones     Last Assessed: 11/17/2016    • Periorbital edema     Last Assessed: 9/4/2015   • Septic shock ([address] ) 08/20/2022   • Skin tag of anus     Last Assessed: 9/9/2015    • Trigger point of thoracic region     Last Assessed: 11/6/2015         Past Surgical History:   Procedure Laterality Date   • CYSTOSCOPY W/ URETERAL STENT PLACEMENT  03/11/2013   • CYSTOSCOPY W/ URETERAL STENT PLACEMENT  06/18/2014    EXTRACORPOREAL SHOCK WAVE LITHOTRIPSY    • CYSTOSCOPY W/ URETERAL STENT PLACEMENT  07/16/2014    EXTRACORPOREAL SHOCK WAVE LITHOTRIPSY    • CYSTOSCOPY W/ URETERAL STENT REMOVAL  04/11/2013   • CYSTOSCOPY W/ URETERAL STENT REMOVAL Right 08/26/2014   • CYSTOSCOPY W/ URETEROSCOPY W/ LITHOTRIPSY  02/26/2013    Percutaneous lithotomy With Uretal Stent Plcement    • CYSTOSCOPY W/ URETEROSCOPY W/ LITHOTRIPSY  03/11/2014   • CYSTOSCOPY W/ URETEROSCOPY W/ LITHOTRIPSY Right 08/04/2014    With Uretal Stent Placement    • CYSTOSCOPY W/ URETEROSCOPY W/ LITHOTRIPSY Right 05/09/2016   • HERNIA REPAIR  03/11/2013   • IR DRAINAGE TUBE PLACEMENT  6/6/2023   • IR NEPHROSTOMY TUBE CHECK/CHANGE/REPOSITION/REINSERTION/UPSIZE  11/22/2022   • IR NEPHROSTOMY TUBE CHECK/CHANGE/REPOSITION/REINSERTION/UPSIZE  02/13/2023   • IR NEPHROSTOMY TUBE CHECK/CHANGE/REPOSITION/REINSERTION/UPSIZE  04/28/2023   • IR NEPHROSTOMY TUBE PLACEMENT  08/20/2022   • KIDNEY SURGERY     • LITHOTRIPSY     • WV CYSTO/URETERO W/LITHOTRIPSY &INDWELL STENT INSRT Right 07/13/2016    Procedure: CYSTOSCOPY; URETEROSCOPY WITH HOLMIUM LASER STONE EXTRACTION; RETROGRADE PYELOGRAM; URETERAL STENT INSERTION ;  Surgeon: Tomer Thomas MD;  Location: [facility];  Service: Urology   • WV CYSTO/URETERO W/LITHOTRIPSY &INDWELL STENT INSRT Right 05/09/2016    Procedure: CYSTOSCOPY,  URETEROSCOPY,  WITH LITHOTRIPSY HOLMIUM LASER, STONE EXTRACTION, AND INSERTION STENT URETERAL;  Surgeon: Tomer Thomas MD;  Location: [facility];  Service: Urology   • WV CYSTO/URETERO W/LITHOTRIPSY &INDWELL STENT INSRT Right 11/10/2022    Procedure: CYSTOSCOPY URETEROSCOPY  right RETROGRADE PYELOGRAM;  Surgeon: Chapo Delaney MD;  Location: [facility];  Service: Urology   • WV LAPAROSCOPY RADICAL NEPHRECTOMY Right 5/22/2023    Procedure: NEPHRECTOMY RADICAL LAPAROSCOPIC W/ ROBOTICS;  Surgeon: Namrata Osullivan MD;  Location: [facility];  Service: Urology   • RI LITHOTRIPSY XTRCORP SHOCK WAVE Right 06/17/2016    Procedure: Pattie Pew SHOCKWAVE (ESWL);   Surgeon: Joseph Marcus MD;  Location: [facility];  Service: Urology   • TRANSURETHRAL RESECTION OF BLADDER     • URETERAL REIMPLANTION  03/11/2013        Social History     Tobacco Use   Smoking Status Every Day   • Packs/day: 2 00   • Years: 35 00   • Total pack years: 70 00   • Types: Cigarettes   Smokeless Tobacco Never        Social History     Substance and Sexual Activity   Alcohol Use Not Currently        Social History     Substance and Sexual Activity   Drug Use Not Currently        Allergies   Allergen Reactions   • Bee Venom Anaphylaxis   • Metronidazole Itching and Swelling   • Nsaids GI Intolerance     Stomach irritant   • Penicillin G    • Penicillins GI Intolerance     Sick to stomach   • Pollen Extract    • Sulfa Antibiotics        Current Facility-Administered Medications   Medication Dose Route Frequency Provider Last Rate Last Admin   • acetaminophen (TYLENOL) tablet 650 mg  650 mg Oral Q6H PRN Kelechi Dominguez MD       • cefTRIAXone (ROCEPHIN) IVPB (premix in dextrose) 1,000 mg 50 mL  1,000 mg Intravenous Q24H Kelechi Dominguez  mL/hr at 06/21/23 1942 1,000 mg at 06/21/23 1942   • diazepam (VALIUM) injection 5 mg  5 mg Intravenous Q6H PRN Denisse Mari MD       • [START ON 6/23/2023] heparin (porcine) subcutaneous injection 5,000 Units  5,000 Units Subcutaneous Q8H [address] Denisse Mari MD       • methadone (DOLOPHINE) tablet 110 mg  110 mg Oral Daily Kelechi Dominguez MD   110 mg at 06/22/23 0937   • nicotine (NICODERM CQ) 14 mg/24hr TD 24 hr patch 1 patch  1 patch Transdermal Daily Kelechi Dominguez MD   1 patch at 06/22/23 0937   • sodium chloride 0 9 % infusion  125 mL/hr Intravenous Continuous Kelechi Dominguez  mL/hr at 06/22/23 1242 125 mL/hr at 06/22/23 1242          Objective:    Vitals:    06/21/23 1826 06/22/23 0041 06/22/23 2707 06/22/23 1300   BP: 135/70 126/57 128/82    BP Location:       Pulse: 67 (!) 50 (!) 50 (!) 52   Resp: 18 16 18    Temp:  98 3 °F (36 8 °C) 98 2 °F (36 8 °C)    TempSrc:       SpO2: 98% 96% 95%    Weight:       Height:            Physical Exam      Lab Results   Component Value Date     (H) 01/28/2023      Lab Results   Component Value Date    WBC 12 06 (H) 06/21/2023    HGB 12 8 06/21/2023    HCT 38 6 06/21/2023    MCV 86 06/21/2023     06/21/2023     Lab Results   Component Value Date    INR 1 11 06/21/2023    INR 1 16 06/05/2023    INR 1 00 05/13/2023    PROTIME 14 5 06/21/2023    PROTIME 15 0 (H) 06/05/2023    PROTIME 13 4 05/13/2023     Lab Results   Component Value Date    PTT 34 06/21/2023         I have personally reviewed pertinent imaging and laboratory results  Code Status: Level 1 - Full Code  Advance Directive and Living Will:      Power of :    POLST:      IR has been consulted to evaluate the patient, determine the appropriate procedure, and whether or not a procedure can and should be performed  Thank you for allowing me to participate in the care of Betty Mckay  Please don't hesitate to call, text, email, or TigerText with any questions  This text is generated with voice recognition software  There may be translation, syntax,  or grammatical errors  If you have any questions, please contact the dictating provider

## 2023-06-22 NOTE — MALNUTRITION/BMI
This medical record reflects one or more clinical indicators suggestive of malnutrition  Malnutrition Findings:   Adult Malnutrition type: Chronic illness  Adult Degree of Malnutrition: Malnutrition of moderate degree  Malnutrition Characteristics: Fat loss, Muscle loss, Inadequate energy                360 Statement: Chronic, moderate protein calorie malnutrition related to chronic illness with decreased appetite as evidenced by mild muscle wasting to clavicle and temple regions and significant weight loss 14 7% x 6 months  Treating with PO diet  Patient is not interested in nutrition supplements at this time  Encourage adequate PO intakes  BMI Findings: Body mass index is 20 96 kg/m²  See Nutrition note dated 6/22/2023 for additional details  Completed nutrition assessment is viewable in the nutrition documentation

## 2023-06-22 NOTE — PHYSICAL THERAPY NOTE
Physical Therapy Evaluation    Patient Name: Ronald Degroot    XFTBN'M Date: 6/22/2023     Problem List  Principal Problem:    Abdominal fluid collection  Active Problems:    Methadone dependence     Abscess of postoperative wound of abdominal wall    Acute encephalopathy       Past Medical History  Past Medical History:   Diagnosis Date    BRAULIO (acute kidney injury) ([facility] ) 8/20/2022    Anxiety     Bright red rectal bleeding     Last Assessed: 9/9/2015     Drug dependence ([facility] ) 10/20/2021    Hypertension     Last Assessed: 7/9/2014     Hyponatremia 5/26/2023    Kidney stone     Kidney stones     Last Assessed: 11/17/2016     Periorbital edema     Last Assessed: 9/4/2015    Septic shock ([facility] ) 08/20/2022    Skin tag of anus     Last Assessed: 9/9/2015     Trigger point of thoracic region     Last Assessed: 11/6/2015         Past Surgical History  Past Surgical History:   Procedure Laterality Date    CYSTOSCOPY W/ URETERAL STENT PLACEMENT  03/11/2013    CYSTOSCOPY W/ URETERAL STENT PLACEMENT  06/18/2014    EXTRACORPOREAL SHOCK WAVE LITHOTRIPSY     CYSTOSCOPY W/ URETERAL STENT PLACEMENT  07/16/2014    EXTRACORPOREAL SHOCK WAVE LITHOTRIPSY     CYSTOSCOPY W/ URETERAL STENT REMOVAL  04/11/2013    CYSTOSCOPY W/ URETERAL STENT REMOVAL Right 08/26/2014    CYSTOSCOPY W/ URETEROSCOPY W/ LITHOTRIPSY  02/26/2013    Percutaneous lithotomy With Uretal Stent Plcement     CYSTOSCOPY W/ URETEROSCOPY W/ LITHOTRIPSY  03/11/2014    CYSTOSCOPY W/ URETEROSCOPY W/ LITHOTRIPSY Right 08/04/2014    With Uretal Stent Placement     CYSTOSCOPY W/ URETEROSCOPY W/ LITHOTRIPSY Right 05/09/2016    HERNIA REPAIR  03/11/2013    IR DRAINAGE TUBE PLACEMENT  6/6/2023    IR NEPHROSTOMY TUBE CHECK/CHANGE/REPOSITION/REINSERTION/UPSIZE  11/22/2022    IR NEPHROSTOMY TUBE CHECK/CHANGE/REPOSITION/REINSERTION/UPSIZE  02/13/2023    IR NEPHROSTOMY TUBE CHECK/CHANGE/REPOSITION/REINSERTION/UPSIZE  04/28/2023 IR NEPHROSTOMY TUBE PLACEMENT  08/20/2022    KIDNEY SURGERY      LITHOTRIPSY      SD CYSTO/URETERO W/LITHOTRIPSY &INDWELL STENT INSRT Right 07/13/2016    Procedure: CYSTOSCOPY; URETEROSCOPY WITH HOLMIUM LASER STONE EXTRACTION; RETROGRADE PYELOGRAM; URETERAL STENT INSERTION ;  Surgeon: General Jose MD;  [facility];  Service: Urology    SD CYSTO/URETERO W/LITHOTRIPSY &INDWELL STENT INSRT Right 05/09/2016    Procedure: CYSTOSCOPY,  URETEROSCOPY,  WITH LITHOTRIPSY HOLMIUM LASER, STONE EXTRACTION, AND INSERTION STENT URETERAL;  Surgeon: General Jose MD;  Location[facility];  Service: Urology    SD CYSTO/URETERO W/LITHOTRIPSY &INDWELL STENT INSRT Right 11/10/2022    Procedure: CYSTOSCOPY URETEROSCOPY  right RETROGRADE PYELOGRAM;  Surgeon: Anderson Dudley MD;  Location: [facility];  Service: Urology    SD LAPAROSCOPY RADICAL NEPHRECTOMY Right 5/22/2023    Procedure: NEPHRECTOMY RADICAL LAPAROSCOPIC W/ ROBOTICS;  Surgeon: Jordyn Huff MD;  Location: [facility];  Service: Urology    SD LITHOTRIPSY [address] [facility] Right 06/17/2016    Procedure: Vincenzo Lex SHOCKWAVE (ESWL); Surgeon: General Jose MD;  [facility];  Service: Urology    TRANSURETHRAL RESECTION OF BLADDER      URETERAL Siria Meuse  03/11/2013 06/22/23 0903   PT Last Visit   PT Visit Date 06/22/23   Note Type   Note type Evaluation   Pain Assessment   Pain Assessment Tool 0-10   Pain Score 7   Pain Location/Orientation Location: Back   Restrictions/Precautions   Weight Bearing Precautions Per Order No   Other Precautions Pain; Fall Risk;Multiple lines; Bed Alarm   Home Living   Type of [address] [place] [address] Two level;Bed/bath upstairs;Stairs to enter without rails  (1 BINU)   Home Equipment [place]; Wheelchair-manual   Additional Comments pt denies use of DME at baseline   Prior Function   Level of [place] Independent with ADLs; Independent with functional mobility; Independent with IADLS "  Lives With [address] [place] [address] in the last 6 months 0   Comments all information taken from EMR, as pt minimally conversational during session   General   Family/Caregiver Present No   Cognition   Overall Cognitive Status Impaired   Arousal/Participation Lethargic   Orientation Level Oriented to person;Oriented to place; Disoriented to time;Disoriented to situation  (pt looked to [facility] when asked today's date)   Following Commands Follows one step commands inconsistently   Subjective   Subjective \"That'll work\"   RLE Assessment   RLE Assessment X  (4-/5 grossly)   LLE Assessment   LLE Assessment X  (4-/5 grossly)   Bed Mobility   Rolling R 5  Supervision   Additional items Increased time required;Verbal cues   Supine to Sit 4  Minimal assistance   Additional items Assist x 2; Increased time required;Verbal cues   Sit to Supine 5  Supervision   Additional items Increased time required;Verbal cues   Transfers   Sit to Stand 4  Minimal assistance   Additional items Assist x 2; Increased time required;Verbal cues   Stand to Sit 4  Minimal assistance   Additional items Assist x 2; Increased time required;Verbal cues   Additional Comments hand held assist used   Ambulation/Elevation   Gait pattern Excessively slow; Short stride;Decreased foot clearance;Narrow JAYLA; Improper Weight shift   Gait Assistance 4  Minimal assist   Additional items Assist x 2;Verbal cues   Assistive Device None   Distance 6'   Balance   Static Sitting Good   Dynamic Sitting Fair +   Static Standing Fair   Dynamic Standing Fair   Ambulatory Fair   Endurance Deficit   Endurance Deficit Yes   Endurance Deficit Description pt appears easily fatigued with minimal exertion   Activity Tolerance   Activity Tolerance Patient limited by fatigue;Patient limited by pain   Assessment   Prognosis Fair   Problem List Decreased strength;Decreased endurance; Impaired balance;Decreased mobility; Decreased cognition; Impaired judgement;Decreased " safety awareness;Pain   Assessment Patient is a 61 y o  male evaluated by Physical Therapy s/p admit to [facility] on 6/21/2023 with admitting diagnosis of: Altered mental status, Retroperitoneal fluid collection, and principal problem of: Abdominal fluid collection  PT was consulted to assess patient's functional mobility and discharge needs  Ordered are PT Evaluation and treatment with activity level of: up and OOB as tolerated  Comorbidities affecting patient's physical performance at time of assessment include: methadone dependence, HTN, CKD  Personal factors affecting the patient at time of IE include: lives in 2 story home, step(s) to enter home, inability to navigate community distances, impaired cognition, impaired safety awareness, decreased initiation and engagement and limited insight into impairments  Please locate objective findings from PT assessment regarding body systems outlined above  Upon evaluation, pt requiring Kristyn x 2-SUP, increased time, and frequent verbal cuing to perform all functional mobility  Pt requiring extensive encouragement to participate in minimal mobility  Seated rest break taken following 6' of ambulation  Pt can likely ambulate greater distances but is not motivated to  Moderate postural sway demonstrated but no true LOB experienced  The patient's AM-PAC Basic Mobility Inpatient Short Form Raw Score is 17  A Raw score of greater than 16 suggests the patient may benefit from discharge to home  Please also refer to the recommendation of the Physical Therapist for safe discharge planning  Co treatment with OT secondary to complex medical condition of pt, possible A of 2 required to achieve and maintain transitional movements, requiring the need of skilled therapeutic intervention of 2 therapists to achieve delivery of services   Pt will benefit from continued PT intervention during LOS to address current deficits, increase LOF, and facilitate safe d/c to next level of care when medically appropriate  D/c recommendation at this time is home with home health rehabilitation  Goals   Patient Goals to go home   LTG Expiration Date 07/06/23   Long Term Goal #1 Pt will participate in B LE strengthening exercises to facilitate improved functional activity tolerance  Pt will perform all functional transfers and bed mobility mod(I) with good safety awareness  Pt will ambulate 250' mod(I) with LRAD while maintaining good functional dynamic balance  Pt will ascend/descend a FFOS with HR and SUP to reflect the ability to safely navigate the home  Plan   Treatment/Interventions Functional transfer training;LE strengthening/ROM; Elevations; Therapeutic exercise; Endurance training;Gait training;Bed mobility   PT Frequency 3-5x/wk   Recommendation   PT Discharge Recommendation Home with home health rehabilitation   AM-PAC Basic Mobility Inpatient   Turning in Flat Bed Without Bedrails 3   Lying on Back to Sitting on Edge of Flat Bed Without Bedrails 3   Moving Bed to Chair 3   Standing Up From Chair Using Arms 3   Walk in Room 3   Climb 3-5 Stairs With Railing 2   Basic Mobility Inpatient Raw Score 17   Basic Mobility Standardized Score 39 67   Highest Level Of Mobility   -HLM Goal 5: Stand one or more mins   JH-HLM Achieved 6: Walk 10 steps or more   End of Consult   Patient Position at End of Consult Bed/Chair alarm activated; All needs within reach; Supine

## 2023-06-23 ENCOUNTER — HOME CARE VISIT (OUTPATIENT)
Dept: HOME HEALTH SERVICES | Facility: HOME HEALTHCARE | Age: 58
End: 2023-06-23
Payer: COMMERCIAL

## 2023-06-23 ENCOUNTER — APPOINTMENT (OUTPATIENT)
Dept: CT IMAGING | Facility: HOSPITAL | Age: 58
DRG: 721 | End: 2023-06-23
Payer: COMMERCIAL

## 2023-06-23 PROBLEM — R41.3 MEMORY LOSS: Status: ACTIVE | Noted: 2023-06-23

## 2023-06-23 PROBLEM — G93.40 ACUTE ENCEPHALOPATHY: Status: RESOLVED | Noted: 2023-06-21 | Resolved: 2023-06-23

## 2023-06-23 LAB
ALBUMIN SERPL BCP-MCNC: 3.4 G/DL (ref 3.5–5)
ALP SERPL-CCNC: 72 U/L (ref 34–104)
ALT SERPL W P-5'-P-CCNC: 3 U/L (ref 7–52)
ANION GAP SERPL CALCULATED.3IONS-SCNC: 11 MMOL/L
AST SERPL W P-5'-P-CCNC: 10 U/L (ref 13–39)
ATRIAL RATE: 61 BPM
BASOPHILS # BLD AUTO: 0.04 THOUSANDS/ÂΜL (ref 0–0.1)
BASOPHILS NFR BLD AUTO: 0 % (ref 0–1)
BILIRUB SERPL-MCNC: 0.44 MG/DL (ref 0.2–1)
BUN SERPL-MCNC: 11 MG/DL (ref 5–25)
CALCIUM ALBUM COR SERPL-MCNC: 9.4 MG/DL (ref 8.3–10.1)
CALCIUM SERPL-MCNC: 8.9 MG/DL (ref 8.4–10.2)
CHLORIDE SERPL-SCNC: 104 MMOL/L (ref 96–108)
CO2 SERPL-SCNC: 21 MMOL/L (ref 21–32)
CREAT SERPL-MCNC: 1.16 MG/DL (ref 0.6–1.3)
EOSINOPHIL # BLD AUTO: 0.01 THOUSAND/ÂΜL (ref 0–0.61)
EOSINOPHIL NFR BLD AUTO: 0 % (ref 0–6)
ERYTHROCYTE [DISTWIDTH] IN BLOOD BY AUTOMATED COUNT: 14.3 % (ref 11.6–15.1)
GFR SERPL CREATININE-BSD FRML MDRD: 69 ML/MIN/1.73SQ M
GLUCOSE SERPL-MCNC: 81 MG/DL (ref 65–140)
HCT VFR BLD AUTO: 37.4 % (ref 36.5–49.3)
HGB BLD-MCNC: 12.4 G/DL (ref 12–17)
IMM GRANULOCYTES # BLD AUTO: 0.06 THOUSAND/UL (ref 0–0.2)
IMM GRANULOCYTES NFR BLD AUTO: 1 % (ref 0–2)
LYMPHOCYTES # BLD AUTO: 1.14 THOUSANDS/ÂΜL (ref 0.6–4.47)
LYMPHOCYTES NFR BLD AUTO: 11 % (ref 14–44)
MAGNESIUM SERPL-MCNC: 2.2 MG/DL (ref 1.9–2.7)
MCH RBC QN AUTO: 28.9 PG (ref 26.8–34.3)
MCHC RBC AUTO-ENTMCNC: 33.2 G/DL (ref 31.4–37.4)
MCV RBC AUTO: 87 FL (ref 82–98)
MONOCYTES # BLD AUTO: 0.67 THOUSAND/ÂΜL (ref 0.17–1.22)
MONOCYTES NFR BLD AUTO: 7 % (ref 4–12)
NEUTROPHILS # BLD AUTO: 8.34 THOUSANDS/ÂΜL (ref 1.85–7.62)
NEUTS SEG NFR BLD AUTO: 81 % (ref 43–75)
NRBC BLD AUTO-RTO: 0 /100 WBCS
P AXIS: 31 DEGREES
PHOSPHATE SERPL-MCNC: 2.8 MG/DL (ref 2.7–4.5)
PLATELET # BLD AUTO: 256 THOUSANDS/UL (ref 149–390)
PMV BLD AUTO: 9 FL (ref 8.9–12.7)
POTASSIUM SERPL-SCNC: 3.9 MMOL/L (ref 3.5–5.3)
PR INTERVAL: 124 MS
PROT SERPL-MCNC: 7.1 G/DL (ref 6.4–8.4)
QRS AXIS: -2 DEGREES
QRSD INTERVAL: 136 MS
QT INTERVAL: 512 MS
QTC INTERVAL: 515 MS
RBC # BLD AUTO: 4.29 MILLION/UL (ref 3.88–5.62)
SODIUM SERPL-SCNC: 136 MMOL/L (ref 135–147)
T WAVE AXIS: 44 DEGREES
VENTRICULAR RATE: 61 BPM
WBC # BLD AUTO: 10.26 THOUSAND/UL (ref 4.31–10.16)

## 2023-06-23 PROCEDURE — 0W9H30Z DRAINAGE OF RETROPERITONEUM WITH DRAINAGE DEVICE, PERCUTANEOUS APPROACH: ICD-10-PCS | Performed by: RADIOLOGY

## 2023-06-23 PROCEDURE — 0W9F30Z DRAINAGE OF ABDOMINAL WALL WITH DRAINAGE DEVICE, PERCUTANEOUS APPROACH: ICD-10-PCS | Performed by: INTERNAL MEDICINE

## 2023-06-23 PROCEDURE — 85025 COMPLETE CBC W/AUTO DIFF WBC: CPT

## 2023-06-23 PROCEDURE — 49406 IMAGE CATH FLUID PERI/RETRO: CPT | Performed by: RADIOLOGY

## 2023-06-23 PROCEDURE — 83735 ASSAY OF MAGNESIUM: CPT

## 2023-06-23 PROCEDURE — 99232 SBSQ HOSP IP/OBS MODERATE 35: CPT | Performed by: INTERNAL MEDICINE

## 2023-06-23 PROCEDURE — 87186 SC STD MICRODIL/AGAR DIL: CPT | Performed by: RADIOLOGY

## 2023-06-23 PROCEDURE — 84100 ASSAY OF PHOSPHORUS: CPT

## 2023-06-23 PROCEDURE — 93010 ELECTROCARDIOGRAM REPORT: CPT | Performed by: INTERNAL MEDICINE

## 2023-06-23 PROCEDURE — 87070 CULTURE OTHR SPECIMN AEROBIC: CPT | Performed by: RADIOLOGY

## 2023-06-23 PROCEDURE — 99152 MOD SED SAME PHYS/QHP 5/>YRS: CPT

## 2023-06-23 PROCEDURE — C1769 GUIDE WIRE: HCPCS

## 2023-06-23 PROCEDURE — 87147 CULTURE TYPE IMMUNOLOGIC: CPT | Performed by: RADIOLOGY

## 2023-06-23 PROCEDURE — NC001 PR NO CHARGE: Performed by: RADIOLOGY

## 2023-06-23 PROCEDURE — 99153 MOD SED SAME PHYS/QHP EA: CPT

## 2023-06-23 PROCEDURE — 80053 COMPREHEN METABOLIC PANEL: CPT

## 2023-06-23 PROCEDURE — 87205 SMEAR GRAM STAIN: CPT | Performed by: RADIOLOGY

## 2023-06-23 PROCEDURE — 99152 MOD SED SAME PHYS/QHP 5/>YRS: CPT | Performed by: RADIOLOGY

## 2023-06-23 PROCEDURE — 10030 IMG GID FLU COLL DRG SFT TIS: CPT

## 2023-06-23 PROCEDURE — C1729 CATH, DRAINAGE: HCPCS

## 2023-06-23 RX ORDER — SODIUM CHLORIDE 9 MG/ML
10 INJECTION INTRAVENOUS DAILY
Qty: 300 ML | Refills: 5 | Status: SHIPPED | OUTPATIENT
Start: 2023-06-23 | End: 2023-12-20

## 2023-06-23 RX ORDER — MIDAZOLAM HYDROCHLORIDE 2 MG/2ML
INJECTION, SOLUTION INTRAMUSCULAR; INTRAVENOUS AS NEEDED
Status: COMPLETED | OUTPATIENT
Start: 2023-06-23 | End: 2023-06-23

## 2023-06-23 RX ORDER — KETOROLAC TROMETHAMINE 30 MG/ML
15 INJECTION, SOLUTION INTRAMUSCULAR; INTRAVENOUS ONCE
Status: COMPLETED | OUTPATIENT
Start: 2023-06-23 | End: 2023-06-23

## 2023-06-23 RX ORDER — FENTANYL CITRATE 50 UG/ML
INJECTION, SOLUTION INTRAMUSCULAR; INTRAVENOUS AS NEEDED
Status: COMPLETED | OUTPATIENT
Start: 2023-06-23 | End: 2023-06-23

## 2023-06-23 RX ORDER — LIDOCAINE HYDROCHLORIDE 10 MG/ML
INJECTION, SOLUTION EPIDURAL; INFILTRATION; INTRACAUDAL; PERINEURAL AS NEEDED
Status: COMPLETED | OUTPATIENT
Start: 2023-06-23 | End: 2023-06-23

## 2023-06-23 RX ADMIN — HEPARIN SODIUM 5000 UNITS: 5000 INJECTION INTRAVENOUS; SUBCUTANEOUS at 20:57

## 2023-06-23 RX ADMIN — KETOROLAC TROMETHAMINE 15 MG: 30 INJECTION, SOLUTION INTRAMUSCULAR at 18:32

## 2023-06-23 RX ADMIN — FENTANYL CITRATE 50 MCG: 50 INJECTION, SOLUTION INTRAMUSCULAR; INTRAVENOUS at 15:11

## 2023-06-23 RX ADMIN — THIAMINE HYDROCHLORIDE 100 MG: 100 INJECTION, SOLUTION INTRAMUSCULAR; INTRAVENOUS at 18:16

## 2023-06-23 RX ADMIN — NICOTINE 1 PATCH: 14 PATCH, EXTENDED RELEASE TRANSDERMAL at 08:09

## 2023-06-23 RX ADMIN — CEFTRIAXONE 1000 MG: 1 INJECTION, SOLUTION INTRAVENOUS at 18:16

## 2023-06-23 RX ADMIN — LIDOCAINE HYDROCHLORIDE 10 ML: 10 INJECTION, SOLUTION EPIDURAL; INFILTRATION; INTRACAUDAL; PERINEURAL at 15:02

## 2023-06-23 RX ADMIN — FENTANYL CITRATE 25 MCG: 50 INJECTION, SOLUTION INTRAMUSCULAR; INTRAVENOUS at 15:02

## 2023-06-23 RX ADMIN — MIDAZOLAM HYDROCHLORIDE 0.5 MG: 1 INJECTION, SOLUTION INTRAMUSCULAR; INTRAVENOUS at 15:17

## 2023-06-23 RX ADMIN — METHADONE HYDROCHLORIDE 110 MG: 10 TABLET ORAL at 08:08

## 2023-06-23 RX ADMIN — MIDAZOLAM HYDROCHLORIDE 0.5 MG: 1 INJECTION, SOLUTION INTRAMUSCULAR; INTRAVENOUS at 14:51

## 2023-06-23 RX ADMIN — MIDAZOLAM HYDROCHLORIDE 1 MG: 1 INJECTION, SOLUTION INTRAMUSCULAR; INTRAVENOUS at 15:11

## 2023-06-23 RX ADMIN — SODIUM CHLORIDE 125 ML/HR: 0.9 INJECTION, SOLUTION INTRAVENOUS at 07:07

## 2023-06-23 RX ADMIN — FENTANYL CITRATE 25 MCG: 50 INJECTION, SOLUTION INTRAMUSCULAR; INTRAVENOUS at 15:17

## 2023-06-23 NOTE — PROCEDURES
INTERVENTIONAL RADIOLOGY PROCEDURE NOTE    Date: 6/23/2023    Procedure: IR DRAINAGE TUBE PLACEMENT     Preoperative diagnosis:   1  Altered mental status    2  Retroperitoneal fluid collection         Postoperative diagnosis: Same  Surgeon: Colletta Mad, MD     Assistant: None  No qualified resident was available  Blood loss: Minimal    Specimens: Pus sent for culture    Findings: Needle placement returned about 50 cc of pus  A 10 [place] tube was placed  This was connected to a vacuum bulb  Postdrainage imaging showed that the cavity persisted but was air-filled  I am hoping this will collapse, and granulating in time  I will set him up for check of both tubes for after discharge  In the meantime, we will place tube management orders for both tubes  Complications: None immediate      Anesthesia: conscious sedation and local

## 2023-06-23 NOTE — ASSESSMENT & PLAN NOTE
Was recently discharged on [facility] [address] after having abdominal wall abscess that required antibiotics and IR drainage  Patient reports right flank pain and subjective fevers that began approximately 4 to 5 days ago  CT scan revealed stable right retroperitoneal 8 cm collection and possible abscess and complete drainage of right anterior abdominal wall fluid collection     consult IR; evaluation and management to be held today  VTE prophylaxis on hold since 6/22/23   Will restart post procedure   Continue ceftriaxone    Monitor WBC count

## 2023-06-23 NOTE — PLAN OF CARE
Problem: Prexisting or High Potential for Compromised Skin Integrity  Goal: Skin integrity is maintained or improved  Description: INTERVENTIONS:  - Identify patients at risk for skin breakdown  - Assess and monitor skin integrity  - Assess and monitor nutrition and hydration status  - Monitor labs   - Assess for incontinence   - Turn and reposition patient  - Assist with mobility/ambulation  - Relieve pressure over bony prominences  - Avoid friction and shearing  - Provide appropriate hygiene as needed including keeping skin clean and dry  - Evaluate need for skin moisturizer/barrier cream  - Collaborate with interdisciplinary team   - Patient/family teaching  - Consider wound care consult   Outcome: Progressing     Problem: GASTROINTESTINAL - ADULT  Goal: Maintains or returns to baseline bowel function  Description: INTERVENTIONS:  - Assess bowel function  - Encourage oral fluids to ensure adequate hydration  - Administer IV fluids if ordered to ensure adequate hydration  - Administer ordered medications as needed  - Encourage mobilization and activity  - Consider nutritional services referral to assist patient with adequate nutrition and appropriate food choices  Outcome: Progressing

## 2023-06-23 NOTE — PROGRESS NOTES
[facility]  Progress Note  Name: Haresh Vieira  MRN: [id number]  Unit/Bed#: 938-76 I Date of Admission: 6/21/2023   Date of Service: 6/23/2023 I Hospital Day: 0    Assessment/Plan   * Abdominal fluid collection  Assessment & Plan  Was recently discharged on [facility] 10 after having abdominal wall abscess that required antibiotics and IR drainage  Patient reports right flank pain and subjective fevers that began approximately 4 to 5 days ago  CT scan revealed stable right retroperitoneal 8 cm collection and possible abscess and complete drainage of right anterior abdominal wall fluid collection     consult IR; evaluation and management to be held today  VTE prophylaxis on hold since 6/22/23  Will restart post procedure   Continue ceftriaxone    Monitor WBC count         Abscess of postoperative wound of abdominal wall  Assessment & Plan  Recently discharged approximately couple weeks ago completed antibiotics and had IR drainage    Acute encephalopathy-resolved as of 6/23/2023  Assessment & Plan  Unclear etiology  Patient is AOX3 today on re evaluation  Still not at baseline interms of alertness  Neuro exam negative including CN 2-12     Resolved  Memory loss  Assessment & Plan  Intermediate memory loss  The patient and family for the past 6 months at least   Same since onset       -No progression or change since hospitalization   -At this time most likely it is due to methadone dependence  -Recommend follow-up with PCP     -We will continue to monitor during hospitalization with interval follow-up    Moderate protein-calorie malnutrition (HCC)  Assessment & Plan  Malnutrition Findings:   Adult Malnutrition type: Chronic illness  Adult Degree of Malnutrition: Malnutrition of moderate degree  Malnutrition Characteristics: Fat loss, Muscle loss, Inadequate energy                  360 Statement: Chronic, moderate protein calorie malnutrition related to chronic illness with decreased appetite as evidenced by mild muscle wasting to clavicle and temple regions and significant weight loss 14 7% x 6 months  Treating with PO diet  Patient is not interested in nutrition supplements at this time  Encourage adequate PO intakes  BMI Findings: Body mass index is 20 96 kg/m²  Methadone dependence   Assessment & Plan  Continue methadone             VTE Pharmacologic Prophylaxis:   Pharmacologic: restart pm given procedure today by IR  Mechanical VTE Prophylaxis in Place: Yes    Patient Centered Rounds: I have performed bedside rounds with nursing staff today  Discussions with Specialists or Other Care Team Provider: IR     Education and Discussions with Family / Patient: patient and mother    Time Spent for Care: 30 minutes  More than 50% of total time spent on counseling and coordination of care as described above  Current Length of Stay: 0 day(s)    Current Patient Status: Observation   Certification Statement: The patient will continue to require additional inpatient hospital stay due to abdominal abscess? ? Discharge Plan: when stable    Code Status: Level 1 - Full Code      Subjective:   Patient was seen today at bedside  Does not have any active complaints  No chest pain or tightness, SOB or cough, dizziness or light headedness, N/V, Diarrhea of constipation  No active urinary symptoms  Tolerating oral diet  Objective:     Vitals:   Temp (24hrs), Av 2 °F (37 3 °C), Min:99 2 °F (37 3 °C), Max:99 2 °F (37 3 °C)    Temp:  [99 2 °F (37 3 °C)] 99 2 °F (37 3 °C)  HR:  [52-55] 55  Resp:  [18-19] 18  BP: (131)/(63-64) 131/64  SpO2:  [97 %] 97 %  Body mass index is 20 96 kg/m²  Input and Output Summary (last 24 hours): Intake/Output Summary (Last 24 hours) at 2023 1224  Last data filed at 2023 1012  Gross per 24 hour   Intake 3615 42 ml   Output 1300 ml   Net 2315 42 ml       Physical Exam:     Physical Exam  Vitals reviewed     Constitutional: General: He is not in acute distress  Appearance: Normal appearance  HENT:      Head: Normocephalic and atraumatic  Mouth/Throat:      Mouth: Mucous membranes are moist    Eyes:      Conjunctiva/sclera: Conjunctivae normal       Pupils: Pupils are equal, round, and reactive to light  Cardiovascular:      Rate and Rhythm: Normal rate and regular rhythm  Pulses: Normal pulses  Carotid pulses are 2+ on the right side and 2+ on the left side  Radial pulses are 2+ on the right side and 2+ on the left side  Dorsalis pedis pulses are 2+ on the right side and 2+ on the left side  Heart sounds: Normal heart sounds, S1 normal and S2 normal  No murmur heard  Pulmonary:      Effort: No tachypnea, bradypnea or accessory muscle usage  Breath sounds: Normal breath sounds and air entry  No decreased breath sounds, wheezing, rhonchi or rales  Abdominal:      General: Abdomen is flat  Bowel sounds are normal       Palpations: Abdomen is soft  Tenderness: There is no abdominal tenderness  Comments: Drain noted at bedside  Empty container  Dressing in place clean and dry  Musculoskeletal:      Right lower leg: No edema  Left lower leg: No edema  Neurological:      Mental Status: He is alert and oriented to person, place, and time  Mental status is at baseline       Additional Data:     Labs:    Results from last 7 days   Lab Units 06/23/23  1012   WBC Thousand/uL 10 26*   HEMOGLOBIN g/dL 12 4   HEMATOCRIT % 37 4   PLATELETS Thousands/uL 256   NEUTROS PCT % 81*   LYMPHS PCT % 11*   MONOS PCT % 7   EOS PCT % 0     Results from last 7 days   Lab Units 06/23/23  1012   SODIUM mmol/L 136   POTASSIUM mmol/L 3 9   CHLORIDE mmol/L 104   CO2 mmol/L 21   BUN mg/dL 11   CREATININE mg/dL 1 16   ANION GAP mmol/L 11   CALCIUM mg/dL 8 9   ALBUMIN g/dL 3 4*   TOTAL BILIRUBIN mg/dL 0 44   ALK PHOS U/L 72   ALT U/L 3*   AST U/L 10*   GLUCOSE RANDOM mg/dL 81     Results from last 7 days   Lab Units 06/21/23  1351   INR  1 11             Results from last 7 days   Lab Units 06/21/23  1351   LACTIC ACID mmol/L 1 0   PROCALCITONIN ng/ml 0 07           * I Have Reviewed All Lab Data Listed Above  * Additional Pertinent Lab Tests Reviewed: Zac 66 Admission Reviewed    Imaging:    Imaging Reports Reviewed Today Include:   CT head without contrast   Final Result      No acute intracranial abnormality  Stable left middle cranial fossa arachnoid cyst                  Workstation performed: [id number]         CT chest abdomen pelvis w contrast   Final Result         1  Stable right retroperitoneal 8 cm collection  Possible abscess  2  Postsurgical change from recent right nephrectomy  No evidence of left pyelonephritis or obstructive uropathy  3  Interval near complete drainage of right anterior abdominal wall fluid collection  Workstation performed: [id number]         XR chest 1 view portable   Final Result      No acute cardiopulmonary disease  Workstation performed: [id number]         IR drainage tube placement    (Results Pending)       Imaging Personally Reviewed by Myself Includes:    CT head without contrast   Final Result      No acute intracranial abnormality  Stable left middle cranial fossa arachnoid cyst                  Workstation performed: [id number]         CT chest abdomen pelvis w contrast   Final Result         1  Stable right retroperitoneal 8 cm collection  Possible abscess  2  Postsurgical change from recent right nephrectomy  No evidence of left pyelonephritis or obstructive uropathy  3  Interval near complete drainage of right anterior abdominal wall fluid collection  Workstation performed: [id number]         XR chest 1 view portable   Final Result      No acute cardiopulmonary disease                    Workstation performed: [id number]         IR drainage tube placement    (Results Pending)         Recent Cultures (last 7 days):     Results from last 7 days   Lab Units 06/21/23  1351   BLOOD CULTURE  No Growth at 24 hrs  No Growth at 24 hrs  Last 24 Hours Medication List:   Current Facility-Administered Medications   Medication Dose Route Frequency Provider Last Rate   • acetaminophen  650 mg Oral Q6H PRN Kelechi Dominguez MD     • cefTRIAXone  1,000 mg Intravenous Q24H Kelechi Dominguez MD 1,000 mg (06/22/23 1746)   • diazepam  5 mg Intravenous Q6H PRN Erick Chaney MD     • heparin (porcine)  5,000 Units Subcutaneous Q8H [address] Erick Chaney MD     • methadone  110 mg Oral Daily Kelechi Dominguez MD     • nicotine  1 patch Transdermal Daily Kelechi Dominguez MD     • sodium chloride  125 mL/hr Intravenous Continuous Kelechi Dominguez  mL/hr (06/23/23 1018)        Today, Patient Was Seen By: Erick Chaney MD    ** Please Note: Dictation voice to text software may have been used in the creation of this document   **

## 2023-06-23 NOTE — ASSESSMENT & PLAN NOTE
Unclear etiology  Patient is AOX3 today on re evaluation  Still not at baseline interms of alertness  Neuro exam negative including CN 2-12     Resolved

## 2023-06-23 NOTE — ASSESSMENT & PLAN NOTE
Malnutrition Findings:   Adult Malnutrition type: Chronic illness  Adult Degree of Malnutrition: Malnutrition of moderate degree  Malnutrition Characteristics: Fat loss, Muscle loss, Inadequate energy                  360 Statement: Chronic, moderate protein calorie malnutrition related to chronic illness with decreased appetite as evidenced by mild muscle wasting to clavicle and temple regions and significant weight loss 14 7% x 6 months  Treating with PO diet  Patient is not interested in nutrition supplements at this time  Encourage adequate PO intakes  BMI Findings: Body mass index is 20 96 kg/m²

## 2023-06-23 NOTE — DISCHARGE INSTRUCTIONS
[address]  Interventional Radiology  [phone]                                                                                                                    Abscess/fluid collection Aspiration      WHAT YOU NEED TO KNOW:     Today you underwent a fluid collection/abscess aspiration  Usually the fluid is sent to the lab for culture  You may have some pain associated with the puncture site  The Procedure is performed with Local anesthesia (Lidocaine) and sometimes with local and IV Sedation  After you go Home:    Home care  These tips can help your wound heal:  The wound may drain for the first 2 days  Cover the wound with a clean dry dressing  Change the dressing if it becomes soaked with blood or pus  If a gauze packing was placed inside the abscess pocket, you may be told to remove it yourself  You may do this in the shower  Once the packing is removed, you should wash the area in the shower, or clean the area as directed by your provider  Continue to do this until the skin opening has closed  Make sure you wash your hands after changing the packing or cleaning the wound  If you were prescribed antibiotics, take them as directed until they are all gone  You may use acetaminophen or ibuprofen to control pain, unless another pain medicine was prescribed  If you have liver disease or ever had a stomach ulcer, talk with your doctor before using these medicines  Follow-up care  Follow up with your healthcare provider, or as advised  If a gauze packing was put in your wound, it should be removed in 1 to 2 days  Check your wound every day for any signs that the infection is getting worse  The signs are listed below  When to seek medical advice  Call your healthcare provider right away if any of these occur:   Increasing redness or swelling  Red streaks in the skin leading away from the wound  Increasing local pain or swelling  Continued pus "draining from the wound 2 days after treatment  Fever of 100 4ºF (38ºC) or higher, or as directed by your healthcare provider  Abscess  returns when you are at home              [address]  after your procedure:    Resume your normal diet  Small sips of flat soda will help with nausea  1  The properly functioning catheter should be forward flushed once (1x) daily with 10ml of normal saline using clean technique  You will be given a prescription for flushes  To flush the tube, clean both connections with alcohol swab  Twist off the drainage bag/ bulb  tubing and twist the saline syringe into the drainage tube and flush  Remove the syringe and twist the drainage bag / bulb tubing tubing back on     2  The drainage bag/bulb may be emptied as necessary  Keep a record of the amount of fluid you drain from your tube  This should be done with clean technique as well  3  A fresh dressing should be applied daily over the tube insertion site  4  As the tube is secured to the skin with only a suture,try not to pull on your tube  Tub baths are not permitted  Showers are permitted if the patient's skin entry site is prevented from getting wet  Similarly, washcloth \"baths\" are acceptable  Contact Interventional Radiology at [phone] Peña PATIENTS: Contact Interventional Radiology at [phone]) Christina Knight PATIENTS: Contact Interventional Radiology at [phone]) if:    1  Leakage or large amounts of liquid around the catheter  2  Fever of 101 degrees lasting several hours without other obvious cause (such as sore throat, flu, etc)  3  Persistent nausea or vomiting  4  Diminished drainage, which may be associated with pressure or pain  Or when the     drainage from your tube is less than 10mls for 48 hours  5  Catheter pulled back or falls out  The following pharmacies carry the flush syringes         [facility]                     [facility]                          " [address] [place] [address]                         [address] [place] [address]  Phone [phone]            Phone [phone] 111 [facility]                                [phone]  [facility] Sánchez VIDAL                      [address] [facility]  Phone [phone]            Phone [phone]                      Xavi Antonio                                                                                                          [phone]  [facility]  [facility] 46    [address]  Phone [phone]

## 2023-06-23 NOTE — UTILIZATION REVIEW
Continued Stay Review    WAS OBSERVATION 6/21/23 @ 1749 CONVERTED TO INPATIENT ADMISSION 6/23/23 @ 1528 DUE TO CONTINUED STAY REQUIRED TO CARE FOR PATIENT WITH DX: ABDOMINAL FLUID COLLECTION        Admission Orders (From admission, onward)     Ordered        06/23/23 1528  Inpatient Admission  Once            06/21/23 1749  Place in Observation  Once                        Date: 6/23/23 - CHANGED TO INPATIENT                          Current Patient Class: IP  Current Level of Care: MS     HPI:57 y o  male initially admitted on 6/23/23 - DX: Abdominal fluid collection     Assessment/Plan:   6/23/23: Lethargic; c/o pain 4/10;  geovanna; WBC 10 26; albumin 3 4  Plan for IR drainage of fluid collection unclear if abscess; cont iv abx; cont ivf; cont iv thiamine; cont po methadone; monitor labs; f/u IR culture results    Vital Signs:   Date/Time Temp Pulse Resp BP MAP (mmHg) SpO2 O2 Device Patient Position - Orthostatic VS   06/23/23 1515 -- 56 16 152/72 104 96 % None (Room air) --   06/23/23 1510 -- 53 Abnormal  17 169/74 106 98 % None (Room air) --   06/23/23 1505 -- 50 Abnormal  18 166/72 103 97 % None (Room air) --   06/23/23 1500 -- 55 16 168/109 Abnormal  135 97 % None (Room air) --   06/23/23 1455 -- 51 Abnormal  18 148/66 95 98 % None (Room air) --   06/23/23 1450 -- 51 Abnormal  18 167/73 105 98 % None (Room air) --   06/23/23 14:12:22 98 3 °F (36 8 °C) 54 Abnormal  19 129/66 87 95 % -- --         Pertinent Labs/Diagnostic Results:   Results from last 7 days   Lab Units 06/21/23  1351   SARS-COV-2  Negative     Results from last 7 days   Lab Units 06/23/23  1012 06/21/23  1351   WBC Thousand/uL 10 26* 12 06*   HEMOGLOBIN g/dL 12 4 12 8   HEMATOCRIT % 37 4 38 6   PLATELETS Thousands/uL 256 340   NEUTROS ABS Thousands/µL 8 34* 9 83*         Results from last 7 days   Lab Units 06/23/23  1012 06/22/23  1123 06/21/23  1351   SODIUM mmol/L 136  --  137   POTASSIUM mmol/L 3 9  --  4 4   CHLORIDE mmol/L 104  --  101   CO2 mmol/L 21  --  26   ANION GAP mmol/L 11  --  10   BUN mg/dL 11  --  21   CREATININE mg/dL 1 16  --  1 31*   EGFR ml/min/1 73sq m 69  --  59   CALCIUM mg/dL 8 9  --  9 6   MAGNESIUM mg/dL 2 2 2 3  --    PHOSPHORUS mg/dL 2 8 3 4  --      Results from last 7 days   Lab Units 06/23/23  1012 06/21/23  1351   AST U/L 10* 12*   ALT U/L 3* 8   ALK PHOS U/L 72 85   TOTAL PROTEIN g/dL 7 1 7 4   ALBUMIN g/dL 3 4* 3 7   TOTAL BILIRUBIN mg/dL 0 44 0 47   AMMONIA umol/L  --  27         Results from last 7 days   Lab Units 06/23/23  1012 06/21/23  1351   GLUCOSE RANDOM mg/dL 81 106       Results from last 7 days   Lab Units 06/21/23  1351   CK TOTAL U/L 32*     Results from last 7 days   Lab Units 06/21/23  1552 06/21/23  1351   HS TNI 0HR ng/L  --  11   HS TNI 2HR ng/L 9  --    HSTNI D2 ng/L -2  --          Results from last 7 days   Lab Units 06/21/23  1351   PROTIME seconds 14 5   INR  1 11   PTT seconds 34         Results from last 7 days   Lab Units 06/21/23  1351   PROCALCITONIN ng/ml 0 07     Results from last 7 days   Lab Units 06/21/23  1351   LACTIC ACID mmol/L 1 0       Results from last 7 days   Lab Units 06/21/23  1351   LIPASE u/L 12       Results from last 7 days   Lab Units 06/21/23  1859   CLARITY UA  Clear   COLOR UA  Yellow   SPEC GRAV UA  1 010   PH UA  6 5   GLUCOSE UA mg/dl Negative   KETONES UA mg/dl Negative   BLOOD UA  Negative   PROTEIN UA mg/dl Negative   NITRITE UA  Negative   BILIRUBIN UA  Negative   UROBILINOGEN UA E U /dl 0 2   LEUKOCYTES UA  Negative     Results from last 7 days   Lab Units 06/21/23  1351   INFLUENZA A PCR  Negative   INFLUENZA B PCR  Negative   RSV PCR  Negative         Results from last 7 days   Lab Units 06/21/23  1858   AMPH/METH  Negative   BARBITURATE UR  Negative   BENZODIAZEPINE UR  Negative   COCAINE UR  Negative   METHADONE URINE  Positive*   OPIATE UR  Negative   PCP UR  Negative   THC UR  Negative     Results from last 7 days   Lab Units 06/21/23  1351   ETHANOL LVL mg/dL <10   ACETAMINOPHEN LVL ug/mL <24*   SALICYLATE LVL mg/dL <5       Results from last 7 days   Lab Units 06/21/23  1351   BLOOD CULTURE  No Growth at 24 hrs  No Growth at 24 hrs  Medications:   Scheduled Medications:  cefTRIAXone, 1,000 mg, Intravenous, Q24H  heparin (porcine), 5,000 Units, Subcutaneous, Q8H [address]  methadone, 110 mg, Oral, Daily  nicotine, 1 patch, Transdermal, Daily  thiamine, 100 mg, Intravenous, Daily      Continuous IV Infusions:  sodium chloride, 125 mL/hr, Intravenous, Continuous      PRN Meds:  acetaminophen, 650 mg, Oral, Q6H PRN  diazepam, 5 mg, Intravenous, Q6H PRN      Discharge Plan: TBD    Network Utilization Review Department  ATTENTION: Please call with any questions or concerns to [phone] and carefully listen to the prompts so that you are directed to the right person  All voicemails are confidential   Delbert Side all requests for admission clinical reviews, approved or denied determinations and any other requests to dedicated fax number below belonging to the campus where the patient is receiving treatment   List of dedicated fax numbers for the Facilities:  [address] DENIALS (Administrative/Medical Necessity) [phone]   [address] (Maternity/NICU/Pediatrics) [phone]   1 Coleen [place] [phone]   George Raymond 77 [phone]   [address] [place] [address] Margarita Villavicencio 28 [phone]   [address] [place] Sánchez Amaro [facility] 134 815 [facility] [phone]

## 2023-06-23 NOTE — ASSESSMENT & PLAN NOTE
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

## 2023-06-24 LAB
ANION GAP SERPL CALCULATED.3IONS-SCNC: 8 MMOL/L
BASOPHILS # BLD AUTO: 0.03 THOUSANDS/ÂΜL (ref 0–0.1)
BASOPHILS NFR BLD AUTO: 0 % (ref 0–1)
BUN SERPL-MCNC: 12 MG/DL (ref 5–25)
CALCIUM SERPL-MCNC: 8.7 MG/DL (ref 8.4–10.2)
CHLORIDE SERPL-SCNC: 103 MMOL/L (ref 96–108)
CO2 SERPL-SCNC: 25 MMOL/L (ref 21–32)
CREAT SERPL-MCNC: 1.16 MG/DL (ref 0.6–1.3)
EOSINOPHIL # BLD AUTO: 0.02 THOUSAND/ÂΜL (ref 0–0.61)
EOSINOPHIL NFR BLD AUTO: 0 % (ref 0–6)
ERYTHROCYTE [DISTWIDTH] IN BLOOD BY AUTOMATED COUNT: 14 % (ref 11.6–15.1)
FOLATE SERPL-MCNC: 10.4 NG/ML
GFR SERPL CREATININE-BSD FRML MDRD: 69 ML/MIN/1.73SQ M
GLUCOSE SERPL-MCNC: 144 MG/DL (ref 65–140)
HCT VFR BLD AUTO: 34.9 % (ref 36.5–49.3)
HGB BLD-MCNC: 11.9 G/DL (ref 12–17)
HIV 1+2 AB+HIV1 P24 AG SERPL QL IA: NORMAL
HIV1 P24 AG SER QL: NORMAL
IMM GRANULOCYTES # BLD AUTO: 0.04 THOUSAND/UL (ref 0–0.2)
IMM GRANULOCYTES NFR BLD AUTO: 1 % (ref 0–2)
LYMPHOCYTES # BLD AUTO: 0.57 THOUSANDS/ÂΜL (ref 0.6–4.47)
LYMPHOCYTES NFR BLD AUTO: 8 % (ref 14–44)
MAGNESIUM SERPL-MCNC: 2.2 MG/DL (ref 1.9–2.7)
MCH RBC QN AUTO: 29.1 PG (ref 26.8–34.3)
MCHC RBC AUTO-ENTMCNC: 34.1 G/DL (ref 31.4–37.4)
MCV RBC AUTO: 85 FL (ref 82–98)
MONOCYTES # BLD AUTO: 0.5 THOUSAND/ÂΜL (ref 0.17–1.22)
MONOCYTES NFR BLD AUTO: 7 % (ref 4–12)
NEUTROPHILS # BLD AUTO: 5.89 THOUSANDS/ÂΜL (ref 1.85–7.62)
NEUTS SEG NFR BLD AUTO: 84 % (ref 43–75)
NRBC BLD AUTO-RTO: 0 /100 WBCS
PHOSPHATE SERPL-MCNC: 3.2 MG/DL (ref 2.7–4.5)
PLATELET # BLD AUTO: 239 THOUSANDS/UL (ref 149–390)
PMV BLD AUTO: 9 FL (ref 8.9–12.7)
POTASSIUM SERPL-SCNC: 4.1 MMOL/L (ref 3.5–5.3)
RBC # BLD AUTO: 4.09 MILLION/UL (ref 3.88–5.62)
SODIUM SERPL-SCNC: 136 MMOL/L (ref 135–147)
TSH SERPL DL<=0.05 MIU/L-ACNC: 2.79 UIU/ML (ref 0.45–4.5)
VIT B12 SERPL-MCNC: 306 PG/ML (ref 180–914)
WBC # BLD AUTO: 7.05 THOUSAND/UL (ref 4.31–10.16)

## 2023-06-24 PROCEDURE — 86780 TREPONEMA PALLIDUM: CPT

## 2023-06-24 PROCEDURE — 84443 ASSAY THYROID STIM HORMONE: CPT

## 2023-06-24 PROCEDURE — 82746 ASSAY OF FOLIC ACID SERUM: CPT

## 2023-06-24 PROCEDURE — 80048 BASIC METABOLIC PNL TOTAL CA: CPT

## 2023-06-24 PROCEDURE — 84100 ASSAY OF PHOSPHORUS: CPT

## 2023-06-24 PROCEDURE — 87806 HIV AG W/HIV1&2 ANTB W/OPTIC: CPT

## 2023-06-24 PROCEDURE — 82607 VITAMIN B-12: CPT

## 2023-06-24 PROCEDURE — 83735 ASSAY OF MAGNESIUM: CPT

## 2023-06-24 PROCEDURE — 85025 COMPLETE CBC W/AUTO DIFF WBC: CPT

## 2023-06-24 PROCEDURE — 99232 SBSQ HOSP IP/OBS MODERATE 35: CPT | Performed by: INTERNAL MEDICINE

## 2023-06-24 RX ADMIN — SODIUM CHLORIDE 125 ML/HR: 0.9 INJECTION, SOLUTION INTRAVENOUS at 19:33

## 2023-06-24 RX ADMIN — CEFTRIAXONE 1000 MG: 1 INJECTION, SOLUTION INTRAVENOUS at 18:27

## 2023-06-24 RX ADMIN — HEPARIN SODIUM 5000 UNITS: 5000 INJECTION INTRAVENOUS; SUBCUTANEOUS at 14:29

## 2023-06-24 RX ADMIN — HEPARIN SODIUM 5000 UNITS: 5000 INJECTION INTRAVENOUS; SUBCUTANEOUS at 21:26

## 2023-06-24 RX ADMIN — METHADONE HYDROCHLORIDE 110 MG: 10 TABLET ORAL at 10:32

## 2023-06-24 RX ADMIN — HEPARIN SODIUM 5000 UNITS: 5000 INJECTION INTRAVENOUS; SUBCUTANEOUS at 06:21

## 2023-06-24 RX ADMIN — SODIUM CHLORIDE 125 ML/HR: 0.9 INJECTION, SOLUTION INTRAVENOUS at 10:38

## 2023-06-24 RX ADMIN — THIAMINE HYDROCHLORIDE 100 MG: 100 INJECTION, SOLUTION INTRAMUSCULAR; INTRAVENOUS at 10:35

## 2023-06-24 RX ADMIN — NICOTINE 1 PATCH: 14 PATCH, EXTENDED RELEASE TRANSDERMAL at 10:35

## 2023-06-24 NOTE — UTILIZATION REVIEW
Continued Stay Review    Date: 6/24                          Current Patient Class: INpatient   Current Level of Care: Med/surg    HPI:57 y o  male initially admitted on 6/23     Assessment/Plan:   S/P Abdominal fluid collection drainage by IR with drain placement 6/23/23   No iv access currently  Pending  Re-access  Pending fluid cultures, gram stain  Continue iv abx when access obtained  Remains confused       IR procedure note  Date: 6/23/2023  Procedure: IR DRAINAGE TUBE PLACEMENT   Findings: Needle placement returned about 50 cc of pus  A 10 Romansh tube was placed    This was connected to a vacuum bulb      Vital Signs:   /Time Temp Pulse Resp BP MAP (mmHg) SpO2 O2 Device Patient Position - Orthostatic VS   06/24/23 07:01:30 98 1 °F (36 7 °C) 53 Abnormal  17 139/69 92 94 % None (Room air) --   06/23/23 2300 98 °F (36 7 °C) -- -- -- -- -- -- --         Pertinent Labs/Diagnostic Results:   Results from last 7 days   Lab Units 06/21/23  1351   SARS-COV-2  Negative     Results from last 7 days   Lab Units 06/24/23  0941 06/23/23  1012 06/21/23  1351   WBC Thousand/uL 7 05 10 26* 12 06*   HEMOGLOBIN g/dL 11 9* 12 4 12 8   HEMATOCRIT % 34 9* 37 4 38 6   PLATELETS Thousands/uL 239 256 340   NEUTROS ABS Thousands/µL 5 89 8 34* 9 83*         Results from last 7 days   Lab Units 06/23/23  1012 06/22/23  1123 06/21/23  1351   SODIUM mmol/L 136  --  137   POTASSIUM mmol/L 3 9  --  4 4   CHLORIDE mmol/L 104  --  101   CO2 mmol/L 21  --  26   ANION GAP mmol/L 11  --  10   BUN mg/dL 11  --  21   CREATININE mg/dL 1 16  --  1 31*   EGFR ml/min/1 73sq m 69  --  59   CALCIUM mg/dL 8 9  --  9 6   MAGNESIUM mg/dL 2 2 2 3  --    PHOSPHORUS mg/dL 2 8 3 4  --      Results from last 7 days   Lab Units 06/23/23  1012 06/21/23  1351   AST U/L 10* 12*   ALT U/L 3* 8   ALK PHOS U/L 72 85   TOTAL PROTEIN g/dL 7 1 7 4   ALBUMIN g/dL 3 4* 3 7   TOTAL BILIRUBIN mg/dL 0 44 0 47   AMMONIA umol/L  --  27         Results from last 7 days   Lab Units 06/23/23  1012 06/21/23  1351   GLUCOSE RANDOM mg/dL 81 106         Results from last 7 days   Lab Units 06/21/23  1351   CK TOTAL U/L 32*     Results from last 7 days   Lab Units 06/21/23  1552 06/21/23  1351   HS TNI 0HR ng/L  --  11   HS TNI 2HR ng/L 9  --    HSTNI D2 ng/L -2  --          Results from last 7 days   Lab Units 06/21/23  1351   PROTIME seconds 14 5   INR  1 11   PTT seconds 34         Results from last 7 days   Lab Units 06/21/23  1351   PROCALCITONIN ng/ml 0 07     Results from last 7 days   Lab Units 06/21/23  1351   LACTIC ACID mmol/L 1 0       Results from last 7 days   Lab Units 06/21/23  1351   LIPASE u/L 12       Results from last 7 days   Lab Units 06/21/23  1859   CLARITY UA  Clear   COLOR UA  Yellow   SPEC GRAV UA  1 010   PH UA  6 5   GLUCOSE UA mg/dl Negative   KETONES UA mg/dl Negative   BLOOD UA  Negative   PROTEIN UA mg/dl Negative   NITRITE UA  Negative   BILIRUBIN UA  Negative   UROBILINOGEN UA E U /dl 0 2   LEUKOCYTES UA  Negative     Results from last 7 days   Lab Units 06/21/23  1351   INFLUENZA A PCR  Negative   INFLUENZA B PCR  Negative   RSV PCR  Negative         Results from last 7 days   Lab Units 06/21/23  1858   AMPH/METH  Negative   BARBITURATE UR  Negative   BENZODIAZEPINE UR  Negative   COCAINE UR  Negative   METHADONE URINE  Positive*   OPIATE UR  Negative   PCP UR  Negative   THC UR  Negative     Results from last 7 days   Lab Units 06/21/23  1351   ETHANOL LVL mg/dL <10   ACETAMINOPHEN LVL ug/mL <90*   SALICYLATE LVL mg/dL <5                 Results from last 7 days   Lab Units 06/21/23  1351   BLOOD CULTURE  No Growth at 48 hrs  No Growth at 48 hrs       Medications:   Scheduled Medications:  cefTRIAXone, 1,000 mg, Intravenous, Q24H  heparin (porcine), 5,000 Units, Subcutaneous, Q8H [address]  methadone, 110 mg, Oral, Daily  nicotine, 1 patch, Transdermal, Daily  thiamine, 100 mg, Intravenous, Daily      Continuous IV Infusions:  sodium chloride, 125 mL/hr, Intravenous, Continuous      PRN Meds:  acetaminophen, 650 mg, Oral, Q6H PRN  diazepam, 5 mg, Intravenous, Q6H PRN        Discharge Plan: TBD    Network Utilization Review Department  ATTENTION: Please call with any questions or concerns to [phone] and carefully listen to the prompts so that you are directed to the right person  All voicemails are confidential   Durga Wells all requests for admission clinical reviews, approved or denied determinations and any other requests to dedicated fax number below belonging to the campus where the patient is receiving treatment   List of dedicated fax numbers for the Facilities:  [address] DENIALS (Administrative/Medical Necessity) [phone]   [address] (Maternity/NICU/Pediatrics) [phone] 916 [place] [phone]   [facility]  [phone]   [address] [address] Margarita [facility]zanGood [facility] Hospital[facility] 28 [phone]   1557 [facility] 134 815 [facility] [phone]

## 2023-06-24 NOTE — PLAN OF CARE
Problem: MOBILITY - ADULT  Goal: Maintain or return to baseline ADL function  Description: INTERVENTIONS:  -  Assess patient's ability to carry out ADLs; assess patient's baseline for ADL function and identify physical deficits which impact ability to perform ADLs (bathing, care of mouth/teeth, toileting, grooming, dressing, etc )  - Assess/evaluate cause of self-care deficits   - Assess range of motion  - Assess patient's mobility; develop plan if impaired  - Assess patient's need for assistive devices and provide as appropriate  - Encourage maximum independence but intervene and supervise when necessary  - Involve family in performance of ADLs  - Assess for home care needs following discharge   - Consider OT consult to assist with ADL evaluation and planning for discharge  - Provide patient education as appropriate  Outcome: Progressing  Goal: Maintains/Returns to pre admission functional level  Description: INTERVENTIONS:  - Perform BMAT or MOVE assessment daily    - Set and communicate daily mobility goal to care team and patient/family/caregiver     - Collaborate with rehabilitation services on mobility goals if consulted  - Out of bed for toileting  - Record patient progress and toleration of activity level   Outcome: Progressing     Problem: Prexisting or High Potential for Compromised Skin Integrity  Goal: Skin integrity is maintained or improved  Description: INTERVENTIONS:  - Identify patients at risk for skin breakdown  - Assess and monitor skin integrity  - Assess and monitor nutrition and hydration status  - Monitor labs   - Assess for incontinence   - Turn and reposition patient  - Assist with mobility/ambulation  - Relieve pressure over bony prominences  - Avoid friction and shearing  - Provide appropriate hygiene as needed including keeping skin clean and dry  - Evaluate need for skin moisturizer/barrier cream  - Collaborate with interdisciplinary team   - Patient/family teaching  - Consider wound care consult   Outcome: Progressing     Problem: GASTROINTESTINAL - ADULT  Goal: Maintains or returns to baseline bowel function  Description: INTERVENTIONS:  - Assess bowel function  - Encourage oral fluids to ensure adequate hydration  - Administer IV fluids if ordered to ensure adequate hydration  - Administer ordered medications as needed  - Encourage mobilization and activity  - Consider nutritional services referral to assist patient with adequate nutrition and appropriate food choices  Outcome: Progressing     Bed Management:  -Have minimal linens on bed & keep smooth, unwrinkled  -Change linens as needed when moist or perspiring    Toileting:  -Offer bedside commode  Activity:  -Encourage activity and walks on unit  -Encourage or provide ROM exercises   -Use appropriate equipment to lift or move patient in bed  Skin Care:  -Avoid use of baby powder, tape, friction and shearing, hot water or constrictive clothing  Do not massage red bony areas   Outcome: Progressing  Goal: Incision(s), wounds(s) or drain site(s) healing without S/S of infection  Description: INTERVENTIONS  - Assess and document dressing, incision, wound bed, drain sites and surrounding tissue  - Provide patient and family education  Outcome: Progressing     Problem: PAIN - ADULT  Goal: Verbalizes/displays adequate comfort level or baseline comfort level  Description: Interventions:  - Encourage patient to monitor pain and request assistance  - Assess pain using appropriate pain scale  - Administer analgesics based on type and severity of pain and evaluate response  - Implement non-pharmacological measures as appropriate and evaluate response  - Consider cultural and social influences on pain and pain management  - Notify physician/advanced practitioner if interventions unsuccessful or patient reports new pain  Outcome: Progressing     Problem: INFECTION - ADULT  Goal: Absence or prevention of progression during hospitalization  Description: INTERVENTIONS:  - Assess and monitor for signs and symptoms of infection  - Monitor lab/diagnostic results  - Monitor all insertion sites, i e  indwelling lines, tubes, and drains  - Monitor endotracheal if appropriate and nasal secretions for changes in amount and color  - [place] appropriate cooling/warming therapies per order  - Administer medications as ordered  - Instruct and encourage patient and family to use good hand hygiene technique  - Identify and instruct in appropriate isolation precautions for identified infection/condition  Outcome: Progressing     Problem: SAFETY ADULT  Goal: Maintain or return to baseline ADL function  Description: INTERVENTIONS:  -  Assess patient's ability to carry out ADLs; assess patient's baseline for ADL function and identify physical deficits which impact ability to perform ADLs (bathing, care of mouth/teeth, toileting, grooming, dressing, etc )  - Assess/evaluate cause of self-care deficits   - Assess range of motion  - Assess patient's mobility; develop plan if impaired  - Assess patient's need for assistive devices and provide as appropriate  - Encourage maximum independence but intervene and supervise when necessary  - Involve family in performance of ADLs  - Assess for home care needs following discharge   - Consider OT consult to assist with ADL evaluation and planning for discharge  - Provide patient education as appropriate  Outcome: Progressing  Goal: Maintains/Returns to pre admission functional level  Description: INTERVENTIONS:  - Perform BMAT or MOVE assessment daily    - Set and communicate daily mobility goal to care team and patient/family/caregiver     - Collaborate with rehabilitation services on mobility goals if consulted  - Out of bed for toileting  - Record patient progress and toleration of activity level   Outcome: Progressing  Goal: Patient will remain free of falls  Description: INTERVENTIONS:  - Educate patient/family on patient safety including physical limitations  - Instruct patient to call for assistance with activity   - Consult OT/PT to assist with strengthening/mobility   - Keep Call bell within reach  - Keep bed low and locked with side rails adjusted as appropriate  - Keep care items and personal belongings within reach  - Initiate and maintain comfort rounds  - Make Fall Risk Sign visible to staff  - Apply yellow socks and bracelet for high fall risk patients  - Consider moving patient to room near nurses station  Outcome: Progressing     Problem: METABOLIC, FLUID AND ELECTROLYTES - ADULT  Goal: Electrolytes maintained within normal limits  Description: INTERVENTIONS:  - Monitor labs and assess patient for signs and symptoms of electrolyte imbalances  - Administer electrolyte replacement as ordered  - Monitor response to electrolyte replacements, including repeat lab results as appropriate  - Instruct patient on fluid and nutrition as appropriate  Outcome: Progressing  Goal: Fluid balance maintained  Description: INTERVENTIONS:  - Monitor labs   - Monitor I/O and WT  - Instruct patient on fluid and nutrition as appropriate  - Assess for signs & symptoms of volume excess or deficit  Outcome: Progressing     Problem: Nutrition/Hydration-ADULT  Goal: Nutrient/Hydration intake appropriate for improving, restoring or maintaining nutritional needs  Description: Monitor and assess patient's nutrition/hydration status for malnutrition  Collaborate with interdisciplinary team and initiate plan and interventions as ordered  Monitor patient's weight and dietary intake as ordered or per policy  Utilize nutrition screening tool and intervene as necessary  Determine patient's food preferences and provide high-protein, high-caloric foods as appropriate       INTERVENTIONS:  - Monitor oral intake, urinary output, labs, and treatment plans  - Assess nutrition and hydration status and recommend course of action  - Evaluate amount of meals eaten  - Assist patient with eating if necessary   - Allow adequate time for meals  - Recommend/ encourage appropriate diets, oral nutritional supplements, and vitamin/mineral supplements  - Order, calculate, and assess calorie counts as needed  - Recommend, monitor, and adjust tube feedings and TPN/PPN based on assessed needs  - Assess need for intravenous fluids  - Provide specific nutrition/hydration education as appropriate  - Include patient/family/caregiver in decisions related to nutrition  Outcome: Progressing

## 2023-06-24 NOTE — ASSESSMENT & PLAN NOTE
Was recently discharged on Annetta 10 after having abdominal wall abscess that required antibiotics and IR drainage  Patient reports right flank pain and subjective fevers that began approximately 4 to 5 days ago  CT scan revealed stable right retroperitoneal 8 cm collection and possible abscess and complete drainage of right anterior abdominal wall fluid collection     S/P Abdominal fluid collection drainage by IR with drain placement 6/23/23     Pending body fluid culture/gram stain  Pending labs from today; not done  Patient lost IV overnight; pending     Continue ceftriaxone    Monitor WBC count  Follow cultures

## 2023-06-24 NOTE — ASSESSMENT & PLAN NOTE
Intermediate memory loss  The patient and family for the past 6 months at least   Same since onset       -No progression or change since hospitalization   -At this time most likely it is due to methadone dependence and abdominal abscess   -Recommend follow-up with PCP     -We will continue to monitor during hospitalization with interval follow-up  -check RPR, B12, B9 , TSH, HIV

## 2023-06-24 NOTE — PLAN OF CARE
Problem: MOBILITY - ADULT  Goal: Maintain or return to baseline ADL function  Description: INTERVENTIONS:  -  Assess patient's ability to carry out ADLs; assess patient's baseline for ADL function and identify physical deficits which impact ability to perform ADLs (bathing, care of mouth/teeth, toileting, grooming, dressing, etc )  - Assess/evaluate cause of self-care deficits   - Assess range of motion  - Assess patient's mobility; develop plan if impaired  - Assess patient's need for assistive devices and provide as appropriate  - Encourage maximum independence but intervene and supervise when necessary  - Involve family in performance of ADLs  - Assess for home care needs following discharge   - Consider OT consult to assist with ADL evaluation and planning for discharge  - Provide patient education as appropriate  Outcome: Progressing  Goal: Maintains/Returns to pre admission functional level  Description: INTERVENTIONS:  - Perform BMAT or MOVE assessment daily    - Set and communicate daily mobility goal to care team and patient/family/caregiver     - Collaborate with rehabilitation services on mobility goals if consulted  - Dangle patient 2-3 times a day  - Stand patient 2-3 times a day  - Ambulate patient 2-3 times a day  - Out of bed to chair 2-3 times a day   - Out of bed for meals 2-3 times a day  - Out of bed for toileting  - Record patient progress and toleration of activity level   Outcome: Progressing     Problem: Prexisting or High Potential for Compromised Skin Integrity  Goal: Skin integrity is maintained or improved  Description: INTERVENTIONS:  - Identify patients at risk for skin breakdown  - Assess and monitor skin integrity  - Assess and monitor nutrition and hydration status  - Monitor labs   - Assess for incontinence   - Turn and reposition patient  - Assist with mobility/ambulation  - Relieve pressure over bony prominences  - Avoid friction and shearing  - Provide appropriate hygiene as needed including keeping skin clean and dry  - Evaluate need for skin moisturizer/barrier cream  - Collaborate with interdisciplinary team   - Patient/family teaching  - Consider wound care consult   Outcome: Progressing     Problem: GASTROINTESTINAL - ADULT  Goal: Maintains or returns to baseline bowel function  Description: INTERVENTIONS:  - Assess bowel function  - Encourage oral fluids to ensure adequate hydration  - Administer IV fluids if ordered to ensure adequate hydration  - Administer ordered medications as needed  - Encourage mobilization and activity  - Consider nutritional services referral to assist patient with adequate nutrition and appropriate food choices  Outcome: Progressing     Problem: SKIN/TISSUE INTEGRITY - ADULT  Goal: Skin Integrity remains intact(Skin Breakdown Prevention)  Description: Assess:  -Perform [place] assessment every shfit  -Clean and moisturize skin every shift  -Inspect skin when repositioning, toileting, and assisting with ADLS  -Assess under medical devices such as masimos every shift  -Assess extremities for adequate circulation and sensation     Bed Management:  -Have minimal linens on bed & keep smooth, unwrinkled  -Change linens as needed when moist or perspiring  -Avoid sitting or lying in one position for more than 2 hours while in bed  -Keep HOB at 30 degrees     Toileting:  -Offer bedside commode    Activity:  -Mobilize patient 2-3 times a day  -Encourage activity and walks on unit  -Encourage or provide ROM exercises   -Turn and reposition patient every 2 Hours  -Use appropriate equipment to lift or move patient in bed  -Instruct/ Assist with weight shifting every 2 hours when out of bed in chair  -Consider limitation of chair time 5 hour intervals    Skin Care:  -Avoid use of baby powder, tape, friction and shearing, hot water or constrictive clothing  -Relieve pressure over bony prominences using pillowls  -Do not massage red bony areas    Next Steps:  -Teach patient strategies to minimize risks such as weight shifting   -Consider consults to  interdisciplinary teams such as PT  Outcome: Progressing  Goal: Incision(s), wounds(s) or drain site(s) healing without S/S of infection  Description: INTERVENTIONS  - Assess and document dressing, incision, wound bed, drain sites and surrounding tissue  - Provide patient and family education  - Perform skin care/dressing changes as ordered  Outcome: Progressing     Problem: METABOLIC, FLUID AND ELECTROLYTES - ADULT  Goal: Electrolytes maintained within normal limits  Description: INTERVENTIONS:  - Monitor labs and assess patient for signs and symptoms of electrolyte imbalances  - Administer electrolyte replacement as ordered  - Monitor response to electrolyte replacements, including repeat lab results as appropriate  - Instruct patient on fluid and nutrition as appropriate  Outcome: Progressing  Goal: Fluid balance maintained  Description: INTERVENTIONS:  - Monitor labs   - Monitor I/O and WT  - Instruct patient on fluid and nutrition as appropriate  - Assess for signs & symptoms of volume excess or deficit  Outcome: Progressing     Problem: PAIN - ADULT  Goal: Verbalizes/displays adequate comfort level or baseline comfort level  Description: Interventions:  - Encourage patient to monitor pain and request assistance  - Assess pain using appropriate pain scale  - Administer analgesics based on type and severity of pain and evaluate response  - Implement non-pharmacological measures as appropriate and evaluate response  - Consider cultural and social influences on pain and pain management  - Notify physician/advanced practitioner if interventions unsuccessful or patient reports new pain  Outcome: Progressing     Problem: INFECTION - ADULT  Goal: Absence or prevention of progression during hospitalization  Description: INTERVENTIONS:  - Assess and monitor for signs and symptoms of infection  - Monitor lab/diagnostic results  - Monitor all insertion sites, i e  indwelling lines, tubes, and drains  - Monitor endotracheal if appropriate and nasal secretions for changes in amount and color  - [place] appropriate cooling/warming therapies per order  - Administer medications as ordered  - Instruct and encourage patient and family to use good hand hygiene technique  - Identify and instruct in appropriate isolation precautions for identified infection/condition  Outcome: Progressing     Problem: SAFETY ADULT  Goal: Maintain or return to baseline ADL function  Description: INTERVENTIONS:  -  Assess patient's ability to carry out ADLs; assess patient's baseline for ADL function and identify physical deficits which impact ability to perform ADLs (bathing, care of mouth/teeth, toileting, grooming, dressing, etc )  - Assess/evaluate cause of self-care deficits   - Assess range of motion  - Assess patient's mobility; develop plan if impaired  - Assess patient's need for assistive devices and provide as appropriate  - Encourage maximum independence but intervene and supervise when necessary  - Involve family in performance of ADLs  - Assess for home care needs following discharge   - Consider OT consult to assist with ADL evaluation and planning for discharge  - Provide patient education as appropriate  Outcome: Progressing  Goal: Maintains/Returns to pre admission functional level  Description: INTERVENTIONS:  - Perform BMAT or MOVE assessment daily    - Set and communicate daily mobility goal to care team and patient/family/caregiver     - Collaborate with rehabilitation services on mobility goals if consulted  - Dangle patient 2-3 times a day  - Stand patient 2-3 times a day  - Ambulate patient 2-3 times a day  - Out of bed to chair 2-3 times a day   - Out of bed for meals 2-3 times a day  - Out of bed for toileting  - Record patient progress and toleration of activity level   Outcome: Progressing  Goal: Patient will remain free of falls  Description: INTERVENTIONS:  - Educate patient/family on patient safety including physical limitations  - Instruct patient to call for assistance with activity   - Consult OT/PT to assist with strengthening/mobility   - Keep Call bell within reach  - Keep bed low and locked with side rails adjusted as appropriate  - Keep care items and personal belongings within reach  - Initiate and maintain comfort rounds  - Make Fall Risk Sign visible to staff  - Offer Toileting every 4 Hours, in advance of need  - Initiate/Maintain bed alarm  - Obtain necessary fall risk management equipment  - Apply yellow socks and bracelet for high fall risk patients  - Consider moving patient to room near nurses station  Outcome: Progressing     Problem: Nutrition/Hydration-ADULT  Goal: Nutrient/Hydration intake appropriate for improving, restoring or maintaining nutritional needs  Description: Monitor and assess patient's nutrition/hydration status for malnutrition  Collaborate with interdisciplinary team and initiate plan and interventions as ordered  Monitor patient's weight and dietary intake as ordered or per policy  Utilize nutrition screening tool and intervene as necessary  Determine patient's food preferences and provide high-protein, high-caloric foods as appropriate       INTERVENTIONS:  - Monitor oral intake, urinary output, labs, and treatment plans  - Assess nutrition and hydration status and recommend course of action  - Evaluate amount of meals eaten  - Assist patient with eating if necessary   - Allow adequate time for meals  - Recommend/ encourage appropriate diets, oral nutritional supplements, and vitamin/mineral supplements  - Order, calculate, and assess calorie counts as needed  - Recommend, monitor, and adjust tube feedings and TPN/PPN based on assessed needs  - Assess need for intravenous fluids  - Provide specific nutrition/hydration education as appropriate  - Include patient/family/caregiver in decisions related to nutrition  Outcome: Progressing

## 2023-06-24 NOTE — PROGRESS NOTES
[address]  Progress Note  Name: Blanche Birch  MRN: [id number]  Unit/Bed#: 172-27 I Date of Admission: 6/21/2023   Date of Service: 6/24/2023 I Hospital Day: 1    Assessment/Plan   * Abdominal fluid collection  Assessment & Plan  Was recently discharged on Annetta 10 after having abdominal wall abscess that required antibiotics and IR drainage  Patient reports right flank pain and subjective fevers that began approximately 4 to 5 days ago  CT scan revealed stable right retroperitoneal 8 cm collection and possible abscess and complete drainage of right anterior abdominal wall fluid collection     S/P Abdominal fluid collection drainage by IR with drain placement 6/23/23     Pending body fluid culture/gram stain  Pending labs from today; not done  Patient lost IV overnight; pending  Continue ceftriaxone    Monitor WBC count  Follow cultures       Abscess of postoperative wound of abdominal wall  Assessment & Plan  Refer to A and P for abdominal fluid collection     Memory loss  Assessment & Plan  Intermediate memory loss  The patient and family for the past 6 months at least   Same since onset       -No progression or change since hospitalization   -At this time most likely it is due to methadone dependence and abdominal abscess   -Recommend follow-up with PCP   -We will continue to monitor during hospitalization with interval follow-up  -check RPR, B12, B9 , TSH, HIV     Moderate protein-calorie malnutrition (HCC)  Assessment & Plan  Malnutrition Findings:   Adult Malnutrition type: Chronic illness  Adult Degree of Malnutrition: Malnutrition of moderate degree  Malnutrition Characteristics: Fat loss, Muscle loss, Inadequate energy                  360 Statement: Chronic, moderate protein calorie malnutrition related to chronic illness with decreased appetite as evidenced by mild muscle wasting to clavicle and temple regions and significant weight loss 14 7% x 6 months   Treating with PO diet  Patient is not interested in nutrition supplements at this time  Encourage adequate PO intakes  BMI Findings: Body mass index is 20 96 kg/m²  Methadone dependence   Assessment & Plan  Continue methadone        [facility]; will need appointment on discharge time   VTE Pharmacologic Prophylaxis:   Pharmacologic: Heparin  Mechanical VTE Prophylaxis in Place: Yes    Patient Centered Rounds: I have performed bedside rounds with nursing staff today  Discussions with Specialists or Other Care Team Provider: IR    Education and Discussions with Family / Patient: patient and mother    Time Spent for Care: 30 minutes  More than 50% of total time spent on counseling and coordination of care as described above  Current Length of Stay: 1 day(s)    Current Patient Status: Inpatient   Certification Statement: The patient will continue to require additional inpatient hospital stay due to management for abscess    Discharge Plan: when stable    Code Status: Level 1 - Full Code      Subjective:   Patient was seen today at bedside     No chest pain or tightness, SOB or cough, dizziness or light headedness, N/V, Diarrhea of constipation  No active urinary symptoms  Tolerating oral diet  Nursing concerns: patient lost IV access overnight  I was made aware 8:00 am 23  We discussed the option of ultrasound guidance IV access at bedside  pending    Objective:     Vitals:   Temp (24hrs), Av 2 °F (36 8 °C), Min:98 °F (36 7 °C), Max:98 3 °F (36 8 °C)    Temp:  [98 °F (36 7 °C)-98 3 °F (36 8 °C)] 98 1 °F (36 7 °C)  HR:  [50-56] 53  Resp:  [16-19] 17  BP: (129-169)/() 139/69  SpO2:  [94 %-98 %] 94 %  Body mass index is 20 96 kg/m²  Input and Output Summary (last 24 hours):        Intake/Output Summary (Last 24 hours) at 2023 0845  Last data filed at 2023 0841  Gross per 24 hour   Intake 180 ml   Output 1915 ml   Net -1735 ml       Physical Exam: Physical Exam  Vitals and nursing note reviewed  Constitutional:       General: He is not in acute distress  Appearance: Normal appearance  HENT:      Head: Normocephalic and atraumatic  Mouth/Throat:      Mouth: Mucous membranes are moist    Eyes:      Conjunctiva/sclera: Conjunctivae normal       Pupils: Pupils are equal, round, and reactive to light  Cardiovascular:      Rate and Rhythm: Normal rate and regular rhythm  Pulses: Normal pulses  Carotid pulses are 2+ on the right side and 2+ on the left side  Radial pulses are 2+ on the right side and 2+ on the left side  Dorsalis pedis pulses are 2+ on the right side and 2+ on the left side  Heart sounds: Normal heart sounds, S1 normal and S2 normal  No murmur heard  Pulmonary:      Effort: No tachypnea, bradypnea or accessory muscle usage  Breath sounds: Normal breath sounds and air entry  No decreased breath sounds, wheezing, rhonchi or rales  Abdominal:      General: Abdomen is flat  Bowel sounds are normal       Palpations: Abdomen is soft  Tenderness: There is no abdominal tenderness  Musculoskeletal:      Right lower leg: No edema  Left lower leg: No edema  Neurological:      Mental Status: He is alert and oriented to person, place, and time  Mental status is at baseline             Additional Data:     Labs:    Results from last 7 days   Lab Units 06/23/23  1012   WBC Thousand/uL 10 26*   HEMOGLOBIN g/dL 12 4   HEMATOCRIT % 37 4   PLATELETS Thousands/uL 256   NEUTROS PCT % 81*   LYMPHS PCT % 11*   MONOS PCT % 7   EOS PCT % 0     Results from last 7 days   Lab Units 06/23/23  1012   SODIUM mmol/L 136   POTASSIUM mmol/L 3 9   CHLORIDE mmol/L 104   CO2 mmol/L 21   BUN mg/dL 11   CREATININE mg/dL 1 16   ANION GAP mmol/L 11   CALCIUM mg/dL 8 9   ALBUMIN g/dL 3 4*   TOTAL BILIRUBIN mg/dL 0 44   ALK PHOS U/L 72   ALT U/L 3*   AST U/L 10*   GLUCOSE RANDOM mg/dL 81     Results from last 7 days Lab Units 06/21/23  1351   INR  1 11             Results from last 7 days   Lab Units 06/21/23  1351   LACTIC ACID mmol/L 1 0   PROCALCITONIN ng/ml 0 07           * I Have Reviewed All Lab Data Listed Above  * Additional Pertinent Lab Tests Reviewed: Zac 66 Admission Reviewed    Imaging:    Imaging Reports Reviewed Today Include:   CT head without contrast   Final Result      No acute intracranial abnormality  Stable left middle cranial fossa arachnoid cyst                  Workstation performed: [id number]         CT chest abdomen pelvis w contrast   Final Result         1  Stable right retroperitoneal 8 cm collection  Possible abscess  2  Postsurgical change from recent right nephrectomy  No evidence of left pyelonephritis or obstructive uropathy  3  Interval near complete drainage of right anterior abdominal wall fluid collection  Workstation performed: [id number]         XR chest 1 view portable   Final Result      No acute cardiopulmonary disease  Workstation performed: [id number]         IR drainage tube placement    (Results Pending)   IR drainage tube check/change/reposition/reinsertion/upsize    (Results Pending)       Imaging Personally Reviewed by Myself Includes:    CT head without contrast   Final Result      No acute intracranial abnormality  Stable left middle cranial fossa arachnoid cyst                  Workstation performed: [id number]         CT chest abdomen pelvis w contrast   Final Result         1  Stable right retroperitoneal 8 cm collection  Possible abscess  2  Postsurgical change from recent right nephrectomy  No evidence of left pyelonephritis or obstructive uropathy  3  Interval near complete drainage of right anterior abdominal wall fluid collection  Workstation performed: [id number]         XR chest 1 view portable   Final Result      No acute cardiopulmonary disease                    Workstation performed: [id number]         IR drainage tube placement    (Results Pending)   IR drainage tube check/change/reposition/reinsertion/upsize    (Results Pending)         Recent Cultures (last 7 days):     Results from last 7 days   Lab Units 06/21/23  1351   BLOOD CULTURE  No Growth at 48 hrs  No Growth at 48 hrs  Last 24 Hours Medication List:   Current Facility-Administered Medications   Medication Dose Route Frequency Provider Last Rate   • acetaminophen  650 mg Oral Q6H PRN Kelechi Dominguez MD     • cefTRIAXone  1,000 mg Intravenous Q24H Kelechi Dominguez MD 1,000 mg (06/23/23 1816)   • diazepam  5 mg Intravenous Q6H PRN Willa Recio MD     • heparin (porcine)  5,000 Units Subcutaneous Q8H [address] Willa Recio MD     • methadone  110 mg Oral Daily Kelechi Dominguez MD     • nicotine  1 patch Transdermal Daily Kelechi Dominguez MD     • sodium chloride  125 mL/hr Intravenous Continuous Kelechi Dominguez MD Stopped (06/24/23 0157)   • thiamine  100 mg Intravenous Daily Willa Recio  mg (06/23/23 1816)        Today, Patient Was Seen By: Willa Recio MD    ** Please Note: Dictation voice to text software may have been used in the creation of this document   **

## 2023-06-25 LAB — TREPONEMA PALLIDUM IGG+IGM AB [PRESENCE] IN SERUM OR PLASMA BY IMMUNOASSAY: NORMAL

## 2023-06-25 PROCEDURE — 99232 SBSQ HOSP IP/OBS MODERATE 35: CPT | Performed by: INTERNAL MEDICINE

## 2023-06-25 RX ADMIN — METHADONE HYDROCHLORIDE 110 MG: 10 TABLET ORAL at 07:48

## 2023-06-25 RX ADMIN — HEPARIN SODIUM 5000 UNITS: 5000 INJECTION INTRAVENOUS; SUBCUTANEOUS at 05:18

## 2023-06-25 RX ADMIN — CEFTRIAXONE 1000 MG: 1 INJECTION, SOLUTION INTRAVENOUS at 17:33

## 2023-06-25 RX ADMIN — SODIUM CHLORIDE 125 ML/HR: 0.9 INJECTION, SOLUTION INTRAVENOUS at 03:31

## 2023-06-25 RX ADMIN — HEPARIN SODIUM 5000 UNITS: 5000 INJECTION INTRAVENOUS; SUBCUTANEOUS at 14:10

## 2023-06-25 RX ADMIN — HEPARIN SODIUM 5000 UNITS: 5000 INJECTION INTRAVENOUS; SUBCUTANEOUS at 21:34

## 2023-06-25 RX ADMIN — SODIUM CHLORIDE 125 ML/HR: 0.9 INJECTION, SOLUTION INTRAVENOUS at 11:09

## 2023-06-25 RX ADMIN — SODIUM CHLORIDE 125 ML/HR: 0.9 INJECTION, SOLUTION INTRAVENOUS at 18:40

## 2023-06-25 RX ADMIN — THIAMINE HYDROCHLORIDE 100 MG: 100 INJECTION, SOLUTION INTRAMUSCULAR; INTRAVENOUS at 07:45

## 2023-06-25 RX ADMIN — NICOTINE 1 PATCH: 14 PATCH, EXTENDED RELEASE TRANSDERMAL at 07:49

## 2023-06-25 NOTE — ASSESSMENT & PLAN NOTE
Was recently discharged on Annetta 10 after having abdominal wall abscess that required antibiotics and IR drainage  Patient reports right flank pain and subjective fevers that began approximately 4 to 5 days ago  CT scan revealed stable right retroperitoneal 8 cm collection and possible abscess and complete drainage of right anterior abdominal wall fluid collection     S/P Abdominal fluid collection drainage by IR with drain placement 6/23/23     Pending body fluid culture/gram stain  Continue IV ceftriaxone  Follow-up culture

## 2023-06-25 NOTE — PROGRESS NOTES
[address] [facility] [address]  Progress Note  Name: Adelita Pisano  MRN: [id number]  Unit/Bed#: 359-27 I Date of Admission: 6/21/2023   Date of Service: 6/25/2023 I Hospital Day: 2    Assessment/Plan   * Abdominal fluid collection  Assessment & Plan  Was recently discharged on [address] after having abdominal wall abscess that required antibiotics and IR drainage  Patient reports right flank pain and subjective fevers that began approximately 4 to 5 days ago  CT scan revealed stable right retroperitoneal 8 cm collection and possible abscess and complete drainage of right anterior abdominal wall fluid collection     S/P Abdominal fluid collection drainage by IR with drain placement 6/23/23     Pending body fluid culture/gram stain  Continue IV ceftriaxone  Follow-up culture      Memory loss  Assessment & Plan  Intermediate memory loss  The patient and family for the past 6 months at least   Same since onset       -No progression or change since hospitalization  Currently alert and oriented    Moderate protein-calorie malnutrition ([facility] [address] )  Assessment & Plan  Malnutrition Findings:   Adult Malnutrition type: Chronic illness  Adult Degree of Malnutrition: Malnutrition of moderate degree  Malnutrition Characteristics: Fat loss, Muscle loss, Inadequate energy                  360 Statement: Chronic, moderate protein calorie malnutrition related to chronic illness with decreased appetite as evidenced by mild muscle wasting to clavicle and temple regions and significant weight loss 14 7% x 6 months  Treating with PO diet  Patient is not interested in nutrition supplements at this time  Encourage adequate PO intakes  BMI Findings: Body mass index is 20 96 kg/m²         Abscess of postoperative wound of abdominal wall  Assessment & Plan  Refer to A and P for abdominal fluid collection     Methadone dependence   Assessment & Plan  Continue methadone           VTE Pharmacologic Prophylaxis: Pharmacologic: Heparin  Mechanical VTE Prophylaxis in Place: Yes    Patient Centered Rounds: I have performed bedside rounds with nursing staff today  Discussions with Specialists or Other Care Team Provider: cm, nursing vacation      Education and Discussions with Family / Patient: pt    Time Spent for Care: 30 minutes  More than 50% of total time spent on counseling and coordination of care as described above  Current Length of Stay: 2 day(s)    Current Patient Status: Inpatient   Certification Statement: The patient will continue to require additional inpatient hospital stay due to see below    Discharge Plan: still continuing with iv abx    Code Status: Level 1 - Full Code      Subjective:   Denies cp, sob, cough, fevers    Objective:     Vitals:   Temp (24hrs), Av 2 °F (36 8 °C), Min:97 7 °F (36 5 °C), Max:98 6 °F (37 °C)    Temp:  [97 7 °F (36 5 °C)-98 6 °F (37 °C)] 97 7 °F (36 5 °C)  HR:  [49-55] 49  Resp:  [16-18] 16  BP: (138-159)/(71-81) 140/71  SpO2:  [90 %-96 %] 93 %  Body mass index is 20 96 kg/m²  Input and Output Summary (last 24 hours): Intake/Output Summary (Last 24 hours) at 2023 4562  Last data filed at 2023 0800  Gross per 24 hour   Intake 2360 ml   Output 1600 ml   Net 760 ml       Physical Exam:     Physical Exam  Constitutional:       General: He is not in acute distress  Appearance: He is well-developed  He is not diaphoretic  HENT:      Head: Normocephalic and atraumatic  Nose: Nose normal       Mouth/Throat:      Pharynx: No oropharyngeal exudate  Eyes:      General: No scleral icterus  Conjunctiva/sclera: Conjunctivae normal    Cardiovascular:      Rate and Rhythm: Normal rate and regular rhythm  Heart sounds: Normal heart sounds  No murmur heard  No friction rub  No gallop  Pulmonary:      Effort: Pulmonary effort is normal  No respiratory distress  Breath sounds: Normal breath sounds  No wheezing or rales     Chest: Chest wall: No tenderness  Abdominal:      General: Bowel sounds are normal  There is no distension  Palpations: Abdomen is soft  Tenderness: There is no abdominal tenderness  There is no guarding  Musculoskeletal:         General: No tenderness or deformity  Normal range of motion  Cervical back: Normal range of motion and neck supple  Skin:     General: Skin is warm and dry  Findings: No erythema  Neurological:      Mental Status: He is alert  Mental status is at baseline  (  Additional Data:     Labs:    Results from last 7 days   Lab Units 06/24/23  0941   WBC Thousand/uL 7 05   HEMOGLOBIN g/dL 11 9*   HEMATOCRIT % 34 9*   PLATELETS Thousands/uL 239   NEUTROS PCT % 84*   LYMPHS PCT % 8*   MONOS PCT % 7   EOS PCT % 0     Results from last 7 days   Lab Units 06/24/23  0941 06/23/23  1012   SODIUM mmol/L 136 136   POTASSIUM mmol/L 4 1 3 9   CHLORIDE mmol/L 103 104   CO2 mmol/L 25 21   BUN mg/dL 12 11   CREATININE mg/dL 1 16 1 16   ANION GAP mmol/L 8 11   CALCIUM mg/dL 8 7 8 9   ALBUMIN g/dL  --  3 4*   TOTAL BILIRUBIN mg/dL  --  0 44   ALK PHOS U/L  --  72   ALT U/L  --  3*   AST U/L  --  10*   GLUCOSE RANDOM mg/dL 144* 81     Results from last 7 days   Lab Units 06/21/23  1351   INR  1 11             Results from last 7 days   Lab Units 06/21/23  1351   LACTIC ACID mmol/L 1 0   PROCALCITONIN ng/ml 0 07           * I Have Reviewed All Lab Data Listed Above  * Additional Pertinent Lab Tests Reviewed: All Labs Within Last 24 Hours Reviewed    Imaging:    Imaging Reports Reviewed Today Include: na  Imaging Personally Reviewed by Myself Includes:  na    Recent Cultures (last 7 days):     Results from last 7 days   Lab Units 06/23/23  1518 06/21/23  1351   BLOOD CULTURE   --  No Growth at 72 hrs  No Growth at 72 hrs     GRAM STAIN RESULT  1+ Polys  No bacteria seen  --    BODY FLUID CULTURE, STERILE  Culture too young- will reincubate  --        Last 24 Hours Medication List: Current Facility-Administered Medications   Medication Dose Route Frequency Provider Last Rate   • acetaminophen  650 mg Oral Q6H PRN Kelechi Dominguez MD     • cefTRIAXone  1,000 mg Intravenous Q24H Kelechi Dominguez MD 1,000 mg (06/24/23 6457)   • diazepam  5 mg Intravenous Q6H PRN Shiraz Starr MD     • heparin (porcine)  5,000 Units Subcutaneous Q8H [address] Shiraz Starr MD     • methadone  110 mg Oral Daily Kelechi Domignuez MD     • nicotine  1 patch Transdermal Daily Kelechi Dominguez MD     • sodium chloride  125 mL/hr Intravenous Continuous Kelechi Dominguez  mL/hr (06/25/23 0331)        Today, Patient Was Seen By: Meche Aceves MD    ** Please Note: Dictation voice to text software may have been used in the creation of this document   **

## 2023-06-25 NOTE — UTILIZATION REVIEW
Continued Stay Review    WAS OBSERVATION 6/21/23 @ 1749 CONVERTED TO INPATIENT ADMISSION 6/23/23 @ 1528 DUE TO CONTINUED STAY REQUIRED TO CARE FOR PATIENT WITH DX: ABDOMINAL FLUID COLLECTION        Admission Orders (From admission, onward)     Ordered        06/23/23 1528  Inpatient Admission  Once            06/21/23 1749  Place in Observation  Once                        Date: 6/23/23 - CHANGED TO INPATIENT                          Current Patient Class: IP  Current Level of Care: MS     HPI:57 y o  male initially admitted on 6/23/23 - DX: Abdominal fluid collection     Assessment/Plan:   6/23/23: Lethargic; c/o pain 4/10;  geovanna; WBC 10 26; albumin 3 4  Plan for IR drainage of fluid collection unclear if abscess; cont iv abx; cont ivf; cont iv thiamine; cont po methadone; monitor labs; f/u IR culture results    Vital Signs:   Date/Time Temp Pulse Resp BP MAP (mmHg) SpO2 O2 Device Patient Position - Orthostatic VS   06/23/23 1515 -- 56 16 152/72 104 96 % None (Room air) --   06/23/23 1510 -- 53 Abnormal  17 169/74 106 98 % None (Room air) --   06/23/23 1505 -- 50 Abnormal  18 166/72 103 97 % None (Room air) --   06/23/23 1500 -- 55 16 168/109 Abnormal  135 97 % None (Room air) --   06/23/23 1455 -- 51 Abnormal  18 148/66 95 98 % None (Room air) --   06/23/23 1450 -- 51 Abnormal  18 167/73 105 98 % None (Room air) --   06/23/23 14:12:22 98 3 °F (36 8 °C) 54 Abnormal  19 129/66 87 95 % -- --         Pertinent Labs/Diagnostic Results:   Results from last 7 days   Lab Units 06/21/23  1351   SARS-COV-2  Negative     Results from last 7 days   Lab Units 06/24/23  0941 06/23/23  1012 06/21/23  1351   WBC Thousand/uL 7 05 10 26* 12 06*   HEMOGLOBIN g/dL 11 9* 12 4 12 8   HEMATOCRIT % 34 9* 37 4 38 6   PLATELETS Thousands/uL 239 256 340   NEUTROS ABS Thousands/µL 5 89 8 34* 9 83*         Results from last 7 days   Lab Units 06/24/23  0941 06/23/23  1012 06/22/23  1123 06/21/23  1351   SODIUM mmol/L 136 136  --  137 POTASSIUM mmol/L 4 1 3 9  --  4 4   CHLORIDE mmol/L 103 104  --  101   CO2 mmol/L 25 21  --  26   ANION GAP mmol/L 8 11  --  10   BUN mg/dL 12 11  --  21   CREATININE mg/dL 1 16 1 16  --  1 31*   EGFR ml/min/1 73sq m 69 69  --  59   CALCIUM mg/dL 8 7 8 9  --  9 6   MAGNESIUM mg/dL 2 2 2 2 2 3  --    PHOSPHORUS mg/dL 3 2 2 8 3 4  --      Results from last 7 days   Lab Units 06/23/23  1012 06/21/23  1351   AST U/L 10* 12*   ALT U/L 3* 8   ALK PHOS U/L 72 85   TOTAL PROTEIN g/dL 7 1 7 4   ALBUMIN g/dL 3 4* 3 7   TOTAL BILIRUBIN mg/dL 0 44 0 47   AMMONIA umol/L  --  27         Results from last 7 days   Lab Units 06/24/23  0941 06/23/23  1012 06/21/23  1351   GLUCOSE RANDOM mg/dL 144* 81 106       Results from last 7 days   Lab Units 06/21/23  1351   CK TOTAL U/L 32*     Results from last 7 days   Lab Units 06/21/23  1552 06/21/23  1351   HS TNI 0HR ng/L  --  11   HS TNI 2HR ng/L 9  --    HSTNI D2 ng/L -2  --          Results from last 7 days   Lab Units 06/21/23  1351   PROTIME seconds 14 5   INR  1 11   PTT seconds 34     Results from last 7 days   Lab Units 06/24/23  0941   TSH 3RD GENERATON uIU/mL 2 789     Results from last 7 days   Lab Units 06/21/23  1351   PROCALCITONIN ng/ml 0 07     Results from last 7 days   Lab Units 06/21/23  1351   LACTIC ACID mmol/L 1 0       Results from last 7 days   Lab Units 06/21/23  1351   LIPASE u/L 12       Results from last 7 days   Lab Units 06/21/23  1859   CLARITY UA  Clear   COLOR UA  Yellow   SPEC GRAV UA  1 010   PH UA  6 5   GLUCOSE UA mg/dl Negative   KETONES UA mg/dl Negative   BLOOD UA  Negative   PROTEIN UA mg/dl Negative   NITRITE UA  Negative   BILIRUBIN UA  Negative   UROBILINOGEN UA E U /dl 0 2   LEUKOCYTES UA  Negative     Results from last 7 days   Lab Units 06/21/23  1351   INFLUENZA A PCR  Negative   INFLUENZA B PCR  Negative   RSV PCR  Negative         Results from last 7 days   Lab Units 06/21/23  1858   AMPH/METH  Negative   BARBITURATE UR  Negative BENZODIAZEPINE UR  Negative   COCAINE UR  Negative   METHADONE URINE  Positive*   OPIATE UR  Negative   PCP UR  Negative   THC UR  Negative     Results from last 7 days   Lab Units 06/21/23  1351   ETHANOL LVL mg/dL <10   ACETAMINOPHEN LVL ug/mL <59*   SALICYLATE LVL mg/dL <5       Results from last 7 days   Lab Units 06/23/23  1518 06/21/23  1351   BLOOD CULTURE   --  No Growth at 72 hrs  No Growth at 72 hrs  GRAM STAIN RESULT  1+ Polys  No bacteria seen  --    BODY FLUID CULTURE, STERILE  3+ Growth of Staphylococcus aureus*  --        Medications:   Scheduled Medications:  cefTRIAXone, 1,000 mg, Intravenous, Q24H  heparin (porcine), 5,000 Units, Subcutaneous, Q8H [address]  methadone, 110 mg, Oral, Daily  nicotine, 1 patch, Transdermal, Daily  thiamine, 100 mg, Intravenous, Daily      Continuous IV Infusions:  sodium chloride, 125 mL/hr, Intravenous, Continuous      PRN Meds:  acetaminophen, 650 mg, Oral, Q6H PRN  diazepam, 5 mg, Intravenous, Q6H PRN      Discharge Plan: D    Network Utilization Review Department  ATTENTION: Please call with any questions or concerns to [phone] and carefully listen to the prompts so that you are directed to the right person  All voicemails are confidential   Claudine Spoon all requests for admission clinical reviews, approved or denied determinations and any other requests to dedicated fax number below belonging to the campus where the patient is receiving treatment   List of dedicated fax numbers for the Facilities:  [address] DENIALS (Administrative/Medical Necessity) [phone]   [address] (Maternity/NICU/Pediatrics) [phone]   [address] [place] [address]  [phone]   [facility] [place] [address] [address] [place] [phone] [address] [facility] [place] [address] [phone]

## 2023-06-25 NOTE — PLAN OF CARE
Problem: MOBILITY - ADULT  Goal: Maintain or return to baseline ADL function  Description: INTERVENTIONS:  -  Assess patient's ability to carry out ADLs; assess patient's baseline for ADL function and identify physical deficits which impact ability to perform ADLs (bathing, care of mouth/teeth, toileting, grooming, dressing, etc )  - Assess/evaluate cause of self-care deficits   - Assess range of motion  - Assess patient's mobility; develop plan if impaired  - Assess patient's need for assistive devices and provide as appropriate  - Encourage maximum independence but intervene and supervise when necessary  - Involve family in performance of ADLs  - Assess for home care needs following discharge   - Consider OT consult to assist with ADL evaluation and planning for discharge  - Provide patient education as appropriate  Outcome: Progressing  Goal: Maintains/Returns to pre admission functional level  Description: INTERVENTIONS:  - Perform BMAT or MOVE assessment daily    - Set and communicate daily mobility goal to care team and patient/family/caregiver  - Collaborate with rehabilitation services on mobility goals if consulted  - Perform Range of Motion 3 times a day  - Reposition patient every 2 hours    - Dangle patient 3 times a day  - Stand patient 3 times a day  - Ambulate patient 3 times a day  - Out of bed to chair 3 times a day   - Out of bed for meals 3 times a day  - Out of bed for toileting  - Record patient progress and toleration of activity level   Outcome: Progressing     Problem: Prexisting or High Potential for Compromised Skin Integrity  Goal: Skin integrity is maintained or improved  Description: INTERVENTIONS:  - Identify patients at risk for skin breakdown  - Assess and monitor skin integrity  - Assess and monitor nutrition and hydration status  - Monitor labs   - Assess for incontinence   - Turn and reposition patient  - Assist with mobility/ambulation  - Relieve pressure over bony prominences  - Avoid friction and shearing  - Provide appropriate hygiene as needed including keeping skin clean and dry  - Evaluate need for skin moisturizer/barrier cream  - Collaborate with interdisciplinary team   - Patient/family teaching  - Consider wound care consult   Outcome: Progressing     Problem: GASTROINTESTINAL - ADULT  Goal: Maintains or returns to baseline bowel function  Description: INTERVENTIONS:  - Assess bowel function  - Encourage oral fluids to ensure adequate hydration  - Administer IV fluids if ordered to ensure adequate hydration  - Administer ordered medications as needed  - Encourage mobilization and activity  - Consider nutritional services referral to assist patient with adequate nutrition and appropriate food choices  Outcome: Progressing     Problem: SKIN/TISSUE INTEGRITY - ADULT  Goal: Skin Integrity remains intact(Skin Breakdown Prevention)  Description: Assess:  -Perform Addison assessment every shift  -Clean and moisturize skin every shift  -Inspect skin when repositioning, toileting, and assisting with ADLS  -Assess extremities for adequate circulation and sensation     Bed Management:  -Have minimal linens on bed & keep smooth, unwrinkled  -Change linens as needed when moist or perspiring  -Avoid sitting or lying in one position for more than 2 hours while in bed  -Keep HOB at 30 degrees     Toileting:  -Offer bedside commode  -Assess for incontinence every 2 hours  -Use incontinent care products after each incontinent episode    Activity:  -Mobilize patient 3 times a day  -Encourage activity and walks on unit  -Encourage or provide ROM exercises   -Turn and reposition patient every 2 Hours  -Use appropriate equipment to lift or move patient in bed  -Instruct/ Assist with weight shifting every hpur when out of bed in chair  -Consider limitation of chair time 1 hour intervals    Skin Care:  -Avoid use of baby powder, tape, friction and shearing, hot water or constrictive clothing  -Relieve pressure over bony prominences  -Do not massage red bony areas    Next Steps:  -Teach patient strategies to minimize risks   -Consider consults to  interdisciplinary teams  Outcome: Progressing  Goal: Incision(s), wounds(s) or drain site(s) healing without S/S of infection  Description: INTERVENTIONS  - Assess and document dressing, incision, wound bed, drain sites and surrounding tissue  - Provide patient and family education  - Perform skin care/dressing changes every shift  Outcome: Progressing     Problem: PAIN - ADULT  Goal: Verbalizes/displays adequate comfort level or baseline comfort level  Description: Interventions:  - Encourage patient to monitor pain and request assistance  - Assess pain using appropriate pain scale  - Administer analgesics based on type and severity of pain and evaluate response  - Implement non-pharmacological measures as appropriate and evaluate response  - Consider cultural and social influences on pain and pain management  - Notify physician/advanced practitioner if interventions unsuccessful or patient reports new pain  Outcome: Progressing     Problem: INFECTION - ADULT  Goal: Absence or prevention of progression during hospitalization  Description: INTERVENTIONS:  - Assess and monitor for signs and symptoms of infection  - Monitor lab/diagnostic results  - Monitor all insertion sites, i e  indwelling lines, tubes, and drains  - Monitor endotracheal if appropriate and nasal secretions for changes in amount and color  - [place] appropriate cooling/warming therapies per order  - Administer medications as ordered  - Instruct and encourage patient and family to use good hand hygiene technique  - Identify and instruct in appropriate isolation precautions for identified infection/condition  Outcome: Progressing     Problem: SAFETY ADULT  Goal: Maintain or return to baseline ADL function  Description: INTERVENTIONS:  -  Assess patient's ability to carry out ADLs; assess patient's baseline for ADL function and identify physical deficits which impact ability to perform ADLs (bathing, care of mouth/teeth, toileting, grooming, dressing, etc )  - Assess/evaluate cause of self-care deficits   - Assess range of motion  - Assess patient's mobility; develop plan if impaired  - Assess patient's need for assistive devices and provide as appropriate  - Encourage maximum independence but intervene and supervise when necessary  - Involve family in performance of ADLs  - Assess for home care needs following discharge   - Consider OT consult to assist with ADL evaluation and planning for discharge  - Provide patient education as appropriate  Outcome: Progressing  Goal: Maintains/Returns to pre admission functional level  Description: INTERVENTIONS:  - Perform BMAT or MOVE assessment daily    - Set and communicate daily mobility goal to care team and patient/family/caregiver  - Collaborate with rehabilitation services on mobility goals if consulted  - Perform Range of Motion 3 times a day  - Reposition patient every 2 hours    - Dangle patient 3 times a day  - Stand patient 3 times a day  - Ambulate patient 3 times a day  - Out of bed to chair 3 times a day   - Out of bed for meals 3 times a day  - Out of bed for toileting  - Record patient progress and toleration of activity level   Outcome: Progressing  Goal: Patient will remain free of falls  Description: INTERVENTIONS:  - Educate patient/family on patient safety including physical limitations  - Instruct patient to call for assistance with activity   - Consult OT/PT to assist with strengthening/mobility   - Keep Call bell within reach  - Keep bed low and locked with side rails adjusted as appropriate  - Keep care items and personal belongings within reach  - Initiate and maintain comfort rounds  - Make Fall Risk Sign visible to staff  - Offer Toileting every 2 Hours, in advance of need  - Initiate/Maintain bed alarm  - Obtain necessary fall risk management equipment:   - Apply yellow socks and bracelet for high fall risk patients  - Consider moving patient to room near nurses station  Outcome: Progressing     Problem: METABOLIC, FLUID AND ELECTROLYTES - ADULT  Goal: Electrolytes maintained within normal limits  Description: INTERVENTIONS:  - Monitor labs and assess patient for signs and symptoms of electrolyte imbalances  - Administer electrolyte replacement as ordered  - Monitor response to electrolyte replacements, including repeat lab results as appropriate  - Instruct patient on fluid and nutrition as appropriate  Outcome: Progressing  Goal: Fluid balance maintained  Description: INTERVENTIONS:  - Monitor labs   - Monitor I/O and WT  - Instruct patient on fluid and nutrition as appropriate  - Assess for signs & symptoms of volume excess or deficit  Outcome: Progressing     Problem: Nutrition/Hydration-ADULT  Goal: Nutrient/Hydration intake appropriate for improving, restoring or maintaining nutritional needs  Description: Monitor and assess patient's nutrition/hydration status for malnutrition  Collaborate with interdisciplinary team and initiate plan and interventions as ordered  Monitor patient's weight and dietary intake as ordered or per policy  Utilize nutrition screening tool and intervene as necessary  Determine patient's food preferences and provide high-protein, high-caloric foods as appropriate       INTERVENTIONS:  - Monitor oral intake, urinary output, labs, and treatment plans  - Assess nutrition and hydration status and recommend course of action  - Evaluate amount of meals eaten  - Assist patient with eating if necessary   - Allow adequate time for meals  - Recommend/ encourage appropriate diets, oral nutritional supplements, and vitamin/mineral supplements  - Order, calculate, and assess calorie counts as needed  - Recommend, monitor, and adjust tube feedings and TPN/PPN based on assessed needs  - Assess need for intravenous fluids  - Provide specific nutrition/hydration education as appropriate  - Include patient/family/caregiver in decisions related to nutrition  Outcome: Progressing

## 2023-06-25 NOTE — PLAN OF CARE
Problem: MOBILITY - ADULT  Goal: Maintain or return to baseline ADL function  Description: INTERVENTIONS:  -  Assess patient's ability to carry out ADLs; assess patient's baseline for ADL function and identify physical deficits which impact ability to perform ADLs (bathing, care of mouth/teeth, toileting, grooming, dressing, etc )  - Assess/evaluate cause of self-care deficits   - Assess range of motion  - Assess patient's mobility; develop plan if impaired  - Assess patient's need for assistive devices and provide as appropriate  - Encourage maximum independence but intervene and supervise when necessary  - Involve family in performance of ADLs  - Assess for home care needs following discharge   - Consider OT consult to assist with ADL evaluation and planning for discharge  - Provide patient education as appropriate  Outcome: Progressing  Goal: Maintains/Returns to pre admission functional level  Description: INTERVENTIONS:  - Perform BMAT or MOVE assessment daily    - Set and communicate daily mobility goal to care team and patient/family/caregiver  - Collaborate with rehabilitation services on mobility goals if consulted  - Perform Range of Motion 3-4 times a day  - Reposition patient every 1-2 hours    - Dangle patient 3-4 times a day  - Stand patient 3-4 times a day  - Ambulate patient 3-4 times a day  - Out of bed to chair 3-4 times a day   - Out of bed for meals 3-4 times a day  - Out of bed for toileting  - Record patient progress and toleration of activity level   Outcome: Progressing     Problem: Prexisting or High Potential for Compromised Skin Integrity  Goal: Skin integrity is maintained or improved  Description: INTERVENTIONS:  - Identify patients at risk for skin breakdown  - Assess and monitor skin integrity  - Assess and monitor nutrition and hydration status  - Monitor labs   - Assess for incontinence   - Turn and reposition patient  - Assist with mobility/ambulation  - Relieve pressure over bony prominences  - Avoid friction and shearing  - Provide appropriate hygiene as needed including keeping skin clean and dry  - Evaluate need for skin moisturizer/barrier cream  - Collaborate with interdisciplinary team   - Patient/family teaching  - Consider wound care consult   Outcome: Progressing     Problem: GASTROINTESTINAL - ADULT  Goal: Maintains or returns to baseline bowel function  Description: INTERVENTIONS:  - Assess bowel function  - Encourage oral fluids to ensure adequate hydration  - Administer IV fluids if ordered to ensure adequate hydration  - Administer ordered medications as needed  - Encourage mobilization and activity  - Consider nutritional services referral to assist patient with adequate nutrition and appropriate food choices  Outcome: Progressing     Problem: SKIN/TISSUE INTEGRITY - ADULT  Goal: Incision(s), wounds(s) or drain site(s) healing without S/S of infection  Description: INTERVENTIONS  - Assess and document dressing, incision, wound bed, drain sites and surrounding tissue  - Provide patient and family education  - Perform skin care/dressing changes every 24 hrs  Outcome: Progressing     Problem: METABOLIC, FLUID AND ELECTROLYTES - ADULT  Goal: Electrolytes maintained within normal limits  Description: INTERVENTIONS:  - Monitor labs and assess patient for signs and symptoms of electrolyte imbalances  - Administer electrolyte replacement as ordered  - Monitor response to electrolyte replacements, including repeat lab results as appropriate  - Instruct patient on fluid and nutrition as appropriate  Outcome: Progressing  Goal: Fluid balance maintained  Description: INTERVENTIONS:  - Monitor labs   - Monitor I/O and WT  - Instruct patient on fluid and nutrition as appropriate  - Assess for signs & symptoms of volume excess or deficit  Outcome: Progressing     Problem: INFECTION - ADULT  Goal: Absence or prevention of progression during hospitalization  Description: INTERVENTIONS:  - Assess and monitor for signs and symptoms of infection  - Monitor lab/diagnostic results  - Monitor all insertion sites, i e  indwelling lines, tubes, and drains  - Monitor endotracheal if appropriate and nasal secretions for changes in amount and color  - [place] appropriate cooling/warming therapies per order  - Administer medications as ordered  - Instruct and encourage patient and family to use good hand hygiene technique  - Identify and instruct in appropriate isolation precautions for identified infection/condition  Outcome: Progressing     Problem: PAIN - ADULT  Goal: Verbalizes/displays adequate comfort level or baseline comfort level  Description: Interventions:  - Encourage patient to monitor pain and request assistance  - Assess pain using appropriate pain scale  - Administer analgesics based on type and severity of pain and evaluate response  - Implement non-pharmacological measures as appropriate and evaluate response  - Consider cultural and social influences on pain and pain management  - Notify physician/advanced practitioner if interventions unsuccessful or patient reports new pain  Outcome: Progressing     Problem: SAFETY ADULT  Goal: Maintain or return to baseline ADL function  Description: INTERVENTIONS:  -  Assess patient's ability to carry out ADLs; assess patient's baseline for ADL function and identify physical deficits which impact ability to perform ADLs (bathing, care of mouth/teeth, toileting, grooming, dressing, etc )  - Assess/evaluate cause of self-care deficits   - Assess range of motion  - Assess patient's mobility; develop plan if impaired  - Assess patient's need for assistive devices and provide as appropriate  - Encourage maximum independence but intervene and supervise when necessary  - Involve family in performance of ADLs  - Assess for home care needs following discharge   - Consider OT consult to assist with ADL evaluation and planning for discharge  - Provide patient education as appropriate  Outcome: Progressing  Goal: Maintains/Returns to pre admission functional level  Description: INTERVENTIONS:  - Perform BMAT or MOVE assessment daily    - Set and communicate daily mobility goal to care team and patient/family/caregiver  - Collaborate with rehabilitation services on mobility goals if consulted  - Perform Range of Motion 3-4 times a day  - Reposition patient every 1-2 hours  - Dangle patient 3-4 times a day  - Stand patient 3-4 times a day  - Ambulate patient 3-4 times a day  - Out of bed to chair 3-4 times a day   - Out of bed for meals 3-4 times a day  - Out of bed for toileting  - Record patient progress and toleration of activity level   Outcome: Progressing  Goal: Patient will remain free of falls  Description: INTERVENTIONS:  - Educate patient/family on patient safety including physical limitations  - Instruct patient to call for assistance with activity   - Consult OT/PT to assist with strengthening/mobility   - Keep Call bell within reach  - Keep bed low and locked with side rails adjusted as appropriate  - Keep care items and personal belongings within reach  - Initiate and maintain comfort rounds  - Make Fall Risk Sign visible to staff  - Apply yellow socks and bracelet for high fall risk patients  - Consider moving patient to room near nurses station  Outcome: Progressing     Problem: Nutrition/Hydration-ADULT  Goal: Nutrient/Hydration intake appropriate for improving, restoring or maintaining nutritional needs  Description: Monitor and assess patient's nutrition/hydration status for malnutrition  Collaborate with interdisciplinary team and initiate plan and interventions as ordered  Monitor patient's weight and dietary intake as ordered or per policy  Utilize nutrition screening tool and intervene as necessary  Determine patient's food preferences and provide high-protein, high-caloric foods as appropriate       INTERVENTIONS:  - Monitor oral intake, urinary output, labs, and treatment plans  - Assess nutrition and hydration status and recommend course of action  - Evaluate amount of meals eaten  - Assist patient with eating if necessary   - Allow adequate time for meals  - Recommend/ encourage appropriate diets, oral nutritional supplements, and vitamin/mineral supplements  - Order, calculate, and assess calorie counts as needed  - Recommend, monitor, and adjust tube feedings and TPN/PPN based on assessed needs  - Assess need for intravenous fluids  - Provide specific nutrition/hydration education as appropriate  - Include patient/family/caregiver in decisions related to nutrition  Outcome: Progressing

## 2023-06-26 LAB
ANION GAP SERPL CALCULATED.3IONS-SCNC: 9 MMOL/L
BASOPHILS # BLD AUTO: 0.04 THOUSANDS/ÂΜL (ref 0–0.1)
BASOPHILS NFR BLD AUTO: 1 % (ref 0–1)
BUN SERPL-MCNC: 13 MG/DL (ref 5–25)
CALCIUM SERPL-MCNC: 8.6 MG/DL (ref 8.4–10.2)
CHLORIDE SERPL-SCNC: 107 MMOL/L (ref 96–108)
CO2 SERPL-SCNC: 22 MMOL/L (ref 21–32)
CREAT SERPL-MCNC: 1.26 MG/DL (ref 0.6–1.3)
EOSINOPHIL # BLD AUTO: 0.13 THOUSAND/ÂΜL (ref 0–0.61)
EOSINOPHIL NFR BLD AUTO: 2 % (ref 0–6)
ERYTHROCYTE [DISTWIDTH] IN BLOOD BY AUTOMATED COUNT: 14.6 % (ref 11.6–15.1)
GFR SERPL CREATININE-BSD FRML MDRD: 62 ML/MIN/1.73SQ M
GLUCOSE SERPL-MCNC: 97 MG/DL (ref 65–140)
HAV IGM SER QL: ABNORMAL
HBV CORE IGM SER QL: ABNORMAL
HBV SURFACE AG SER QL: ABNORMAL
HCT VFR BLD AUTO: 36.4 % (ref 36.5–49.3)
HCV AB SER QL: REACTIVE
HGB BLD-MCNC: 11.8 G/DL (ref 12–17)
IMM GRANULOCYTES # BLD AUTO: 0.04 THOUSAND/UL (ref 0–0.2)
IMM GRANULOCYTES NFR BLD AUTO: 1 % (ref 0–2)
LYMPHOCYTES # BLD AUTO: 1.25 THOUSANDS/ÂΜL (ref 0.6–4.47)
LYMPHOCYTES NFR BLD AUTO: 21 % (ref 14–44)
MCH RBC QN AUTO: 28 PG (ref 26.8–34.3)
MCHC RBC AUTO-ENTMCNC: 32.4 G/DL (ref 31.4–37.4)
MCV RBC AUTO: 86 FL (ref 82–98)
MONOCYTES # BLD AUTO: 0.39 THOUSAND/ÂΜL (ref 0.17–1.22)
MONOCYTES NFR BLD AUTO: 7 % (ref 4–12)
NEUTROPHILS # BLD AUTO: 4.17 THOUSANDS/ÂΜL (ref 1.85–7.62)
NEUTS SEG NFR BLD AUTO: 68 % (ref 43–75)
NRBC BLD AUTO-RTO: 0 /100 WBCS
PLATELET # BLD AUTO: 226 THOUSANDS/UL (ref 149–390)
PMV BLD AUTO: 9.5 FL (ref 8.9–12.7)
POTASSIUM SERPL-SCNC: 3.7 MMOL/L (ref 3.5–5.3)
RBC # BLD AUTO: 4.22 MILLION/UL (ref 3.88–5.62)
SODIUM SERPL-SCNC: 138 MMOL/L (ref 135–147)
WBC # BLD AUTO: 6.02 THOUSAND/UL (ref 4.31–10.16)

## 2023-06-26 PROCEDURE — 97110 THERAPEUTIC EXERCISES: CPT

## 2023-06-26 PROCEDURE — 85025 COMPLETE CBC W/AUTO DIFF WBC: CPT | Performed by: INTERNAL MEDICINE

## 2023-06-26 PROCEDURE — 99222 1ST HOSP IP/OBS MODERATE 55: CPT | Performed by: INTERNAL MEDICINE

## 2023-06-26 PROCEDURE — 99233 SBSQ HOSP IP/OBS HIGH 50: CPT | Performed by: NURSE PRACTITIONER

## 2023-06-26 PROCEDURE — 80048 BASIC METABOLIC PNL TOTAL CA: CPT | Performed by: INTERNAL MEDICINE

## 2023-06-26 PROCEDURE — 97116 GAIT TRAINING THERAPY: CPT

## 2023-06-26 PROCEDURE — 80074 ACUTE HEPATITIS PANEL: CPT | Performed by: INTERNAL MEDICINE

## 2023-06-26 PROCEDURE — 97530 THERAPEUTIC ACTIVITIES: CPT

## 2023-06-26 RX ORDER — CEFAZOLIN SODIUM 2 G/50ML
2000 SOLUTION INTRAVENOUS EVERY 8 HOURS
Status: DISCONTINUED | OUTPATIENT
Start: 2023-06-26 | End: 2023-06-27

## 2023-06-26 RX ADMIN — CEFAZOLIN SODIUM 2000 MG: 2 SOLUTION INTRAVENOUS at 15:45

## 2023-06-26 RX ADMIN — SODIUM CHLORIDE 125 ML/HR: 0.9 INJECTION, SOLUTION INTRAVENOUS at 05:49

## 2023-06-26 RX ADMIN — METHADONE HYDROCHLORIDE 100 MG: 10 TABLET ORAL at 09:03

## 2023-06-26 RX ADMIN — HEPARIN SODIUM 5000 UNITS: 5000 INJECTION INTRAVENOUS; SUBCUTANEOUS at 22:05

## 2023-06-26 RX ADMIN — HEPARIN SODIUM 5000 UNITS: 5000 INJECTION INTRAVENOUS; SUBCUTANEOUS at 14:10

## 2023-06-26 RX ADMIN — SODIUM CHLORIDE 125 ML/HR: 0.9 INJECTION, SOLUTION INTRAVENOUS at 15:49

## 2023-06-26 RX ADMIN — CEFAZOLIN SODIUM 2000 MG: 2 SOLUTION INTRAVENOUS at 22:30

## 2023-06-26 RX ADMIN — HEPARIN SODIUM 5000 UNITS: 5000 INJECTION INTRAVENOUS; SUBCUTANEOUS at 05:49

## 2023-06-26 RX ADMIN — VANCOMYCIN HYDROCHLORIDE 1000 MG: 5 INJECTION, POWDER, LYOPHILIZED, FOR SOLUTION INTRAVENOUS at 12:54

## 2023-06-26 NOTE — PROGRESS NOTES
Vancomycin has been discontinued  Pharmacy will sign off  Please contact or re-consult with questions      Katerina Regalado, Pharmacist

## 2023-06-26 NOTE — PLAN OF CARE
Problem: MOBILITY - ADULT  Goal: Maintain or return to baseline ADL function  Description: INTERVENTIONS:  -  Assess patient's ability to carry out ADLs; assess patient's baseline for ADL function and identify physical deficits which impact ability to perform ADLs (bathing, care of mouth/teeth, toileting, grooming, dressing, etc )  - Assess/evaluate cause of self-care deficits   - Assess range of motion  - Assess patient's mobility; develop plan if impaired  - Assess patient's need for assistive devices and provide as appropriate  - Encourage maximum independence but intervene and supervise when necessary  - Involve family in performance of ADLs  - Assess for home care needs following discharge   - Consider OT consult to assist with ADL evaluation and planning for discharge  - Provide patient education as appropriate  Outcome: Progressing  Goal: Maintains/Returns to pre admission functional level  Description: INTERVENTIONS:  - Perform BMAT or MOVE assessment daily    - Set and communicate daily mobility goal to care team and patient/family/caregiver  - Collaborate with rehabilitation services on mobility goals if consulted  - Perform Range of Motion 3-4 times a day  - Reposition patient every 1-2 hours    - Dangle patient 3-4 times a day  - Stand patient 3-4 times a day  - Ambulate patient 3-4 times a day  - Out of bed to chair 3-4 times a day   - Out of bed for meals 3-4 times a day  - Out of bed for toileting  - Record patient progress and toleration of activity level   Outcome: Progressing     Problem: Prexisting or High Potential for Compromised Skin Integrity  Goal: Skin integrity is maintained or improved  Description: INTERVENTIONS:  - Identify patients at risk for skin breakdown  - Assess and monitor skin integrity  - Assess and monitor nutrition and hydration status  - Monitor labs   - Assess for incontinence   - Turn and reposition patient  - Assist with mobility/ambulation  - Relieve pressure over bony prominences  - Avoid friction and shearing  - Provide appropriate hygiene as needed including keeping skin clean and dry  - Evaluate need for skin moisturizer/barrier cream  - Collaborate with interdisciplinary team   - Patient/family teaching  - Consider wound care consult   Outcome: Progressing     Problem: GASTROINTESTINAL - ADULT  Goal: Maintains or returns to baseline bowel function  Description: INTERVENTIONS:  - Assess bowel function  - Encourage oral fluids to ensure adequate hydration  - Administer IV fluids if ordered to ensure adequate hydration  - Administer ordered medications as needed  - Encourage mobilization and activity  - Consider nutritional services referral to assist patient with adequate nutrition and appropriate food choices  Outcome: Progressing     Problem: SKIN/TISSUE INTEGRITY - ADULT  Goal: Incision(s), wounds(s) or drain site(s) healing without S/S of infection  Description: INTERVENTIONS  - Assess and document dressing, incision, wound bed, drain sites and surrounding tissue  - Provide patient and family education  - Perform skin care/dressing changes every 24 hrs  Outcome: Progressing     Problem: METABOLIC, FLUID AND ELECTROLYTES - ADULT  Goal: Electrolytes maintained within normal limits  Description: INTERVENTIONS:  - Monitor labs and assess patient for signs and symptoms of electrolyte imbalances  - Administer electrolyte replacement as ordered  - Monitor response to electrolyte replacements, including repeat lab results as appropriate  - Instruct patient on fluid and nutrition as appropriate  Outcome: Progressing  Goal: Fluid balance maintained  Description: INTERVENTIONS:  - Monitor labs   - Monitor I/O and WT  - Instruct patient on fluid and nutrition as appropriate  - Assess for signs & symptoms of volume excess or deficit  Outcome: Progressing     Problem: PAIN - ADULT  Goal: Verbalizes/displays adequate comfort level or baseline comfort level  Description: Interventions:  - Encourage patient to monitor pain and request assistance  - Assess pain using appropriate pain scale  - Administer analgesics based on type and severity of pain and evaluate response  - Implement non-pharmacological measures as appropriate and evaluate response  - Consider cultural and social influences on pain and pain management  - Notify physician/advanced practitioner if interventions unsuccessful or patient reports new pain  Outcome: Progressing     Problem: INFECTION - ADULT  Goal: Absence or prevention of progression during hospitalization  Description: INTERVENTIONS:  - Assess and monitor for signs and symptoms of infection  - Monitor lab/diagnostic results  - Monitor all insertion sites, i e  indwelling lines, tubes, and drains  - Monitor endotracheal if appropriate and nasal secretions for changes in amount and color  - [place] appropriate cooling/warming therapies per order  - Administer medications as ordered  - Instruct and encourage patient and family to use good hand hygiene technique  - Identify and instruct in appropriate isolation precautions for identified infection/condition  Outcome: Progressing     Problem: SAFETY ADULT  Goal: Maintain or return to baseline ADL function  Description: INTERVENTIONS:  -  Assess patient's ability to carry out ADLs; assess patient's baseline for ADL function and identify physical deficits which impact ability to perform ADLs (bathing, care of mouth/teeth, toileting, grooming, dressing, etc )  - Assess/evaluate cause of self-care deficits   - Assess range of motion  - Assess patient's mobility; develop plan if impaired  - Assess patient's need for assistive devices and provide as appropriate  - Encourage maximum independence but intervene and supervise when necessary  - Involve family in performance of ADLs  - Assess for home care needs following discharge   - Consider OT consult to assist with ADL evaluation and planning for discharge  - Provide patient education as appropriate  Outcome: Progressing  Goal: Maintains/Returns to pre admission functional level  Description: INTERVENTIONS:  - Perform BMAT or MOVE assessment daily    - Set and communicate daily mobility goal to care team and patient/family/caregiver  - Collaborate with rehabilitation services on mobility goals if consulted  - Perform Range of Motion 3-4 times a day  - Reposition patient every 1-2 hours  - Dangle patient 3-4 times a day  - Stand patient 3-4 times a day  - Ambulate patient 3-4 times a day  - Out of bed to chair 3-4 times a day   - Out of bed for meals 3-4 times a day  - Out of bed for toileting  - Record patient progress and toleration of activity level   Outcome: Progressing  Goal: Patient will remain free of falls  Description: INTERVENTIONS:  - Educate patient/family on patient safety including physical limitations  - Instruct patient to call for assistance with activity   - Consult OT/PT to assist with strengthening/mobility   - Keep Call bell within reach  - Keep bed low and locked with side rails adjusted as appropriate  - Keep care items and personal belongings within reach  - Initiate and maintain comfort rounds  - Make Fall Risk Sign visible to staff  - Offer Toileting every 4 Hours, in advance of need  - Initiate/Maintain bed alarm  - Obtain necessary fall risk management equipment  - Apply yellow socks and bracelet for high fall risk patients  - Consider moving patient to room near nurses station  Outcome: Progressing     Problem: Nutrition/Hydration-ADULT  Goal: Nutrient/Hydration intake appropriate for improving, restoring or maintaining nutritional needs  Description: Monitor and assess patient's nutrition/hydration status for malnutrition  Collaborate with interdisciplinary team and initiate plan and interventions as ordered  Monitor patient's weight and dietary intake as ordered or per policy  Utilize nutrition screening tool and intervene as necessary  Determine patient's food preferences and provide high-protein, high-caloric foods as appropriate       INTERVENTIONS:  - Monitor oral intake, urinary output, labs, and treatment plans  - Assess nutrition and hydration status and recommend course of action  - Evaluate amount of meals eaten  - Assist patient with eating if necessary   - Allow adequate time for meals  - Recommend/ encourage appropriate diets, oral nutritional supplements, and vitamin/mineral supplements  - Order, calculate, and assess calorie counts as needed  - Recommend, monitor, and adjust tube feedings and TPN/PPN based on assessed needs  - Assess need for intravenous fluids  - Provide specific nutrition/hydration education as appropriate  - Include patient/family/caregiver in decisions related to nutrition  Outcome: Progressing

## 2023-06-26 NOTE — TREATMENT PLAN
Infectious Diseases Chart Note:  - Culture is MSSA; will stop Vancomycin and narrow to IV Cefazolin 2g every 8 hours  - patient has penicillin allergy listed, however he has tolerated Cephalexin in past  Plus Cefazolin has different side chain form penicillins and thus low chance of cross reaction  - once stable for discharge, likely can switch to PO Cephalexin to complete course    Plan and recommendations were discussed with primary team     Nasreen Conner MD

## 2023-06-26 NOTE — PLAN OF CARE
Problem: PHYSICAL THERAPY ADULT  Goal: Performs mobility at highest level of function for planned discharge setting  See evaluation for individualized goals  Description: Treatment/Interventions: Functional transfer training, LE strengthening/ROM, Elevations, Therapeutic exercise, Endurance training, Gait training, Bed mobility          See flowsheet documentation for full assessment, interventions and recommendations  Outcome: Progressing  Note: Prognosis: Fair  Problem List:  (Decreased strength; Decreased endurance; Impaired balance; Decreased mobility; Decreased cognition; Impaired judgement; Decreased safety awareness; Pain)  Assessment: Pt  seen for PT treatment session this date with interventions consisting of  therapeutic exercises,  transfers and  gait training w/ emphasis on improving pt's ability to ambulate  In comparison to previous session, Pt  With improvements in activity tolerance  Ambulation limited by pain  Pt is in need of continued activity in PT to improve strength balance endurance mobility transfers and ambulation with return to maximize LOF  From PT/mobility standpoint, recommendation at time of d/c would be HHPT in order to promote return to PLOF and independence  The patient's AM-PAC Basic Mobility Inpatient Short Form Raw Score is 20  A Raw score of greater than 16 suggests the patient may benefit from discharge to home  Please also refer to physical therapy recommendation for safe DC planning  PT Discharge Recommendation: Home with home health rehabilitation    See flowsheet documentation for full assessment

## 2023-06-26 NOTE — PLAN OF CARE
Problem: MOBILITY - ADULT  Goal: Maintain or return to baseline ADL function  Description: INTERVENTIONS:  -  Assess patient's ability to carry out ADLs; assess patient's baseline for ADL function and identify physical deficits which impact ability to perform ADLs (bathing, care of mouth/teeth, toileting, grooming, dressing, etc )  - Assess/evaluate cause of self-care deficits   - Assess range of motion  - Assess patient's mobility; develop plan if impaired  - Assess patient's need for assistive devices and provide as appropriate  - Encourage maximum independence but intervene and supervise when necessary  - Involve family in performance of ADLs  - Assess for home care needs following discharge   - Consider OT consult to assist with ADL evaluation and planning for discharge  - Provide patient education as appropriate  Outcome: Progressing  Goal: Maintains/Returns to pre admission functional level  Description: INTERVENTIONS:  - Perform BMAT or MOVE assessment daily    - Set and communicate daily mobility goal to care team and patient/family/caregiver  - Collaborate with rehabilitation services on mobility goals if consulted  - Perform Range of Motion 3-4 times a day  - Reposition patient every 1-2 hours    - Dangle patient 3-4 times a day  - Stand patient 3-4 times a day  - Ambulate patient 3-4 times a day  - Out of bed to chair 3-4 times a day   - Out of bed for meals 3-4 times a day  - Out of bed for toileting  - Record patient progress and toleration of activity level   Outcome: Progressing     Problem: Prexisting or High Potential for Compromised Skin Integrity  Goal: Skin integrity is maintained or improved  Description: INTERVENTIONS:  - Identify patients at risk for skin breakdown  - Assess and monitor skin integrity  - Assess and monitor nutrition and hydration status  - Monitor labs   - Assess for incontinence   - Turn and reposition patient  - Assist with mobility/ambulation  - Relieve pressure over bony prominences  - Avoid friction and shearing  - Provide appropriate hygiene as needed including keeping skin clean and dry  - Evaluate need for skin moisturizer/barrier cream  - Collaborate with interdisciplinary team   - Patient/family teaching  - Consider wound care consult   Outcome: Progressing     Problem: GASTROINTESTINAL - ADULT  Goal: Maintains or returns to baseline bowel function  Description: INTERVENTIONS:  - Assess bowel function  - Encourage oral fluids to ensure adequate hydration  - Administer IV fluids if ordered to ensure adequate hydration  - Administer ordered medications as needed  - Encourage mobilization and activity  - Consider nutritional services referral to assist patient with adequate nutrition and appropriate food choices  Outcome: Progressing     Problem: SKIN/TISSUE INTEGRITY - ADULT  Goal: Incision(s), wounds(s) or drain site(s) healing without S/S of infection  Description: INTERVENTIONS  - Assess and document dressing, incision, wound bed, drain sites and surrounding tissue  - Provide patient and family education  - Perform skin care/dressing changes every 24 hrs  Outcome: Progressing     Problem: PAIN - ADULT  Goal: Verbalizes/displays adequate comfort level or baseline comfort level  Description: Interventions:  - Encourage patient to monitor pain and request assistance  - Assess pain using appropriate pain scale  - Administer analgesics based on type and severity of pain and evaluate response  - Implement non-pharmacological measures as appropriate and evaluate response  - Consider cultural and social influences on pain and pain management  - Notify physician/advanced practitioner if interventions unsuccessful or patient reports new pain  Outcome: Progressing     Problem: INFECTION - ADULT  Goal: Absence or prevention of progression during hospitalization  Description: INTERVENTIONS:  - Assess and monitor for signs and symptoms of infection  - Monitor lab/diagnostic results  - Monitor all insertion sites, i e  indwelling lines, tubes, and drains  - Monitor endotracheal if appropriate and nasal secretions for changes in amount and color  - [place] appropriate cooling/warming therapies per order  - Administer medications as ordered  - Instruct and encourage patient and family to use good hand hygiene technique  - Identify and instruct in appropriate isolation precautions for identified infection/condition  Outcome: Progressing     Problem: SAFETY ADULT  Goal: Maintain or return to baseline ADL function  Description: INTERVENTIONS:  -  Assess patient's ability to carry out ADLs; assess patient's baseline for ADL function and identify physical deficits which impact ability to perform ADLs (bathing, care of mouth/teeth, toileting, grooming, dressing, etc )  - Assess/evaluate cause of self-care deficits   - Assess range of motion  - Assess patient's mobility; develop plan if impaired  - Assess patient's need for assistive devices and provide as appropriate  - Encourage maximum independence but intervene and supervise when necessary  - Involve family in performance of ADLs  - Assess for home care needs following discharge   - Consider OT consult to assist with ADL evaluation and planning for discharge  - Provide patient education as appropriate  Outcome: Progressing  Goal: Maintains/Returns to pre admission functional level  Description: INTERVENTIONS:  - Perform BMAT or MOVE assessment daily    - Set and communicate daily mobility goal to care team and patient/family/caregiver  - Collaborate with rehabilitation services on mobility goals if consulted  - Perform Range of Motion 3-4 times a day  - Reposition patient every 1-2 hours    - Dangle patient 3-4 times a day  - Stand patient 3-4 times a day  - Ambulate patient 3-4 times a day  - Out of bed to chair 3-4 times a day   - Out of bed for meals 3-4 times a day  - Out of bed for toileting  - Record patient progress and toleration of activity level   Outcome: Progressing  Goal: Patient will remain free of falls  Description: INTERVENTIONS:  - Educate patient/family on patient safety including physical limitations  - Instruct patient to call for assistance with activity   - Consult OT/PT to assist with strengthening/mobility   - Keep Call bell within reach  - Keep bed low and locked with side rails adjusted as appropriate  - Keep care items and personal belongings within reach  - Initiate and maintain comfort rounds  - Make Fall Risk Sign visible to staff  - Offer Toileting every 2 Hours, in advance of need  - Initiate/Maintain bed alarm  - Obtain necessary fall risk management equipment:   - Apply yellow socks and bracelet for high fall risk patients  - Consider moving patient to room near nurses station  Outcome: Progressing     Problem: METABOLIC, FLUID AND ELECTROLYTES - ADULT  Goal: Electrolytes maintained within normal limits  Description: INTERVENTIONS:  - Monitor labs and assess patient for signs and symptoms of electrolyte imbalances  - Administer electrolyte replacement as ordered  - Monitor response to electrolyte replacements, including repeat lab results as appropriate  - Instruct patient on fluid and nutrition as appropriate  Outcome: Progressing  Goal: Fluid balance maintained  Description: INTERVENTIONS:  - Monitor labs   - Monitor I/O and WT  - Instruct patient on fluid and nutrition as appropriate  - Assess for signs & symptoms of volume excess or deficit  Outcome: Progressing     Problem: Nutrition/Hydration-ADULT  Goal: Nutrient/Hydration intake appropriate for improving, restoring or maintaining nutritional needs  Description: Monitor and assess patient's nutrition/hydration status for malnutrition  Collaborate with interdisciplinary team and initiate plan and interventions as ordered  Monitor patient's weight and dietary intake as ordered or per policy  Utilize nutrition screening tool and intervene as necessary   Determine patient's food preferences and provide high-protein, high-caloric foods as appropriate       INTERVENTIONS:  - Monitor oral intake, urinary output, labs, and treatment plans  - Assess nutrition and hydration status and recommend course of action  - Evaluate amount of meals eaten  - Assist patient with eating if necessary   - Allow adequate time for meals  - Recommend/ encourage appropriate diets, oral nutritional supplements, and vitamin/mineral supplements  - Order, calculate, and assess calorie counts as needed  - Recommend, monitor, and adjust tube feedings and TPN/PPN based on assessed needs  - Assess need for intravenous fluids  - Provide specific nutrition/hydration education as appropriate  - Include patient/family/caregiver in decisions related to nutrition  Outcome: Progressing

## 2023-06-26 NOTE — ASSESSMENT & PLAN NOTE
Was recently discharged on [address] after having abdominal wall abscess that required antibiotics and IR drainage  Patient reports right flank pain and subjective fevers that began approximately 4 to 5 days ago  CT scan revealed stable right retroperitoneal 8 cm collection and possible abscess and complete drainage of right anterior abdominal wall fluid collection     S/P Abdominal fluid collection drainage by IR with drain placement 6/23/23   · body fluid culture/gram stain prelim showing 3+ Staph aureus   · ID consulted  · Discontinued IV Ceftriaxone started IV vanco  · Trend culture for final growth and sensitivity; will tailor antibiotics based on those findings   · Likely will need 10 days course of PO antibiotics

## 2023-06-26 NOTE — CONSULTS
Consultation - Infectious Disease   Rodrick Bundy 61 y o  male MRN: [id number]  Unit/Bed#: 411-01 Encounter: [id number]      IMPRESSION & RECOMMENDATIONS:     1  Right Retroperitoneal Abscess 2/2 Staph aureus:  - This was seen on CT 6/21  Suspect related to recent right nephrectomy surgical site infection and abscess as below  Status post IR drainage catheter placed on 6/23  I did discuss case with IR, they feel source control has been adequately achieved with drain placement  Patient is afebrile, blood cultures negative  Culture growing Staph aureus, as did prior abscess cultures  - stop Ceftriaxone, start Vancomycin  - follow up sensitivities to adjust antibiotic  - based on sensitivities, can likely transition to PO  - given adequate source control with drain placement, would recommend 10 day course, through 7/05/2023  - will need outpatient IR follow up for drain management    2  Recent Right Sided Abdominal Wall Abscess:  - This was noted on prior CT 6/5, felt to be related to surgical site infection of right nephrectomy which was done on 5/22  IR placed drain on 6/6 and culture grew two different strains of MSSA  He was discharged on 14 day course of PO Doxycycline  CT now showing near completer resolution of this collection, drain remains in place   - based on updated CT, appears this abscess is resolved  - antibiotic plan for RP abscess, as above  - will need outpatient IR follow up for drain management    3  History of Staghorn Calculus status post Right Nephrectomy (5/22/23):    4  Multiple Antibiotic Allergies (Penicillin, Sulfa, Metronidazole):  - Patient does not recall his allergies   He has tolerated Cefepime and Keflex on 2/02/2023    - antibiotics as above    I have discussed the above management plan in detail with the primary service    I have performed an extensive review of the medical records in Epic including review of the notes, radiographs, and laboratory results     HISTORY OF PRESENT ILLNESS:  Reason for Consult: retroperitoneal fluid collection    HPI: Lesia Cartwright is a 61y o  year old male PMHx substance use disorder on methadone, Hx of right staghorn calculus status post right nephrectomy (5/22/23)  He was just recently admitted to [facility] with pain and erythema at his recent surgical site for his right nephrectomy  CT on 6/5 showed a 8 3 cm fluid collection in the right upper abdominal wall soft tissue  IR aspirated purulent fluid and left a drain in place on 6/6  Cultures ended up growing two separate strains of MSSA  Patient was ultimately discharged on PO Doxycycline X 14 days from the drain placement, through 6/19  He then presented to ED on 6/21 with several days of right flank pain  CT scan noted a right retroperitoneal collection measuring 5 X 3 4 X 8 cm  The prior right anterior abdominal wall fluid collection was noted to have near complete drainage  IR placed a drain into the RP collection on 6/23 with purulent fluid returned  The first drain was left in place  The culture is growing 3+ Staph aureus  Patient has been afebrile since arrival      Today he denies fever or chills, nausea, emesis  He has some pain at his RP drain site, but otherwise feels better  He tells me he does not remember much regarding the last few days  He does endorse memory issues  He is oriented currently to self, place, year, date of birth  REVIEW OF SYSTEMS:  A complete review of systems is negative other than that noted in the HPI      PAST MEDICAL HISTORY:  Past Medical History:   Diagnosis Date   • BRAULIO (acute kidney injury) ([facility] [address] ) 8/20/2022   • Anxiety    • Bright red rectal bleeding     Last Assessed: 9/9/2015    • Drug dependence ([address] ) 10/20/2021   • Hypertension     Last Assessed: 7/9/2014    • Hyponatremia 5/26/2023   • Kidney stone    • Kidney stones     Last Assessed: 11/17/2016    • Periorbital edema     Last Assessed: 9/4/2015   • Septic shock ([address] ) 08/20/2022   • Skin tag of anus     Last Assessed: 9/9/2015    • Trigger point of thoracic region     Last Assessed: 11/6/2015      Past Surgical History:   Procedure Laterality Date   • CYSTOSCOPY W/ URETERAL STENT PLACEMENT  03/11/2013   • CYSTOSCOPY W/ URETERAL STENT PLACEMENT  06/18/2014    EXTRACORPOREAL SHOCK WAVE LITHOTRIPSY    • CYSTOSCOPY W/ URETERAL STENT PLACEMENT  07/16/2014    EXTRACORPOREAL SHOCK WAVE LITHOTRIPSY    • CYSTOSCOPY W/ URETERAL STENT REMOVAL  04/11/2013   • CYSTOSCOPY W/ URETERAL STENT REMOVAL Right 08/26/2014   • CYSTOSCOPY W/ URETEROSCOPY W/ LITHOTRIPSY  02/26/2013    Percutaneous lithotomy With Uretal Stent Plcement    • CYSTOSCOPY W/ URETEROSCOPY W/ LITHOTRIPSY  03/11/2014   • CYSTOSCOPY W/ URETEROSCOPY W/ LITHOTRIPSY Right 08/04/2014    With Uretal Stent Placement    • CYSTOSCOPY W/ URETEROSCOPY W/ LITHOTRIPSY Right 05/09/2016   • HERNIA REPAIR  03/11/2013   • IR DRAINAGE TUBE PLACEMENT  6/6/2023   • IR NEPHROSTOMY TUBE CHECK/CHANGE/REPOSITION/REINSERTION/UPSIZE  11/22/2022   • IR NEPHROSTOMY TUBE CHECK/CHANGE/REPOSITION/REINSERTION/UPSIZE  02/13/2023   • IR NEPHROSTOMY TUBE CHECK/CHANGE/REPOSITION/REINSERTION/UPSIZE  04/28/2023   • IR NEPHROSTOMY TUBE PLACEMENT  08/20/2022   • KIDNEY SURGERY     • LITHOTRIPSY     • IN CYSTO/URETERO W/LITHOTRIPSY &INDWELL STENT INSRT Right 07/13/2016    Procedure: CYSTOSCOPY; URETEROSCOPY WITH HOLMIUM LASER STONE EXTRACTION; RETROGRADE PYELOGRAM; URETERAL STENT INSERTION ;  Surgeon: Tracy Pereyra MD;  Location: [facility];  Service: Urology   • IN CYSTO/URETERO W/LITHOTRIPSY &INDWELL STENT INSRT Right 05/09/2016    Procedure: CYSTOSCOPY,  URETEROSCOPY,  WITH LITHOTRIPSY HOLMIUM LASER, STONE EXTRACTION, AND INSERTION STENT URETERAL;  Surgeon: Trcay Pereyra MD;  Location: [facility];  Service: Urology   • IN CYSTO/URETERO W/LITHOTRIPSY &INDWELL STENT INSRT Right 11/10/2022    Procedure: CYSTOSCOPY URETEROSCOPY  right RETROGRADE PYELOGRAM;  Surgeon: Cydney Pisano MD; Location: [facility];  Service: Urology   • FL LAPAROSCOPY RADICAL NEPHRECTOMY Right 2023    Procedure: NEPHRECTOMY RADICAL LAPAROSCOPIC W/ ROBOTICS;  Surgeon: Yaneth Barba MD;  Location:  MAIN OR;  Service: Urology   • [place] LITHOTRIPSY [address] Right 2016    Procedure: Maryoziel Adrien SHOCKWAVE (ESWL); Surgeon: Pilar John MD;  Location: [facility];  Service: Urology   • TRANSURETHRAL RESECTION OF BLADDER     • URETERAL REIMPLANTION  2013       FAMILY HISTORY:  Non-contributory    SOCIAL HISTORY:  Social History   Social History     Substance and Sexual Activity   Alcohol Use Not Currently     Social History     Substance and Sexual Activity   Drug Use Not Currently     Social History     Tobacco Use   Smoking Status Every Day   • Packs/day: 2 00   • Years: 35 00   • Total pack years: 70 00   • Types: Cigarettes   Smokeless Tobacco Never       ALLERGIES:  Allergies   Allergen Reactions   • Bee Venom Anaphylaxis   • Metronidazole Itching and Swelling   • Nsaids GI Intolerance     Stomach irritant   • Penicillin G    • Penicillins GI Intolerance     Sick to stomach   • Pollen Extract    • Sulfa Antibiotics        MEDICATIONS:  All current active medications have been reviewed      PHYSICAL EXAM:  Temp:  [97 3 °F (36 3 °C)-98 2 °F (36 8 °C)] 98 2 °F (36 8 °C)  HR:  [50-54] 53  Resp:  [16-19] 17  BP: (142-166)/() 151/76  SpO2:  [94 %-97 %] 97 %  Temp (24hrs), Av 7 °F (36 5 °C), Min:97 3 °F (36 3 °C), Max:98 2 °F (36 8 °C)  Current: Temperature: 98 2 °F (36 8 °C)    Intake/Output Summary (Last 24 hours) at 2023 0825  Last data filed at 2023 9288  Gross per 24 hour   Intake 3155 42 ml   Output 2125 ml   Net 1030 42 ml       General Appearance:  Appearing well, nontoxic, and in no distress   Head:  Normocephalic, without obvious abnormality, atraumatic   Eyes:  Conjunctiva pink and sclera anicteric, both eyes   Nose: Nares normal, mucosa normal, no drainage   Throat: Oropharynx moist without lesions   Neck: Supple, symmetrical, no adenopathy, no tenderness/mass/nodules   Back:   Symmetric   Lungs:   Clear to auscultation bilaterally, respirations unlabored   Chest Wall:  No tenderness or deformity   Heart:  RRR   Abdomen:   Soft, non-tender    Extremities: No cyanosis, clubbing or edema   Skin: No rashes or lesions  No draining wounds noted  Lymph nodes: Cervical, supraclavicular nodes normal   Neurologic: Alert, oriented to self/place/year       LABS, IMAGING, & OTHER STUDIES:  Lab Results:  I have personally reviewed pertinent labs  Results from last 7 days   Lab Units 06/26/23  0457 06/24/23  0941 06/23/23  1012   WBC Thousand/uL 6 02 7 05 10 26*   HEMOGLOBIN g/dL 11 8* 11 9* 12 4   PLATELETS Thousands/uL 226 239 256     Results from last 7 days   Lab Units 06/26/23  0457 06/24/23  0941 06/23/23  1012 06/21/23  1351   SODIUM mmol/L 138 136 136 137   POTASSIUM mmol/L 3 7 4 1 3 9 4 4   CHLORIDE mmol/L 107 103 104 101   CO2 mmol/L 22 25 21 26   BUN mg/dL 13 12 11 21   CREATININE mg/dL 1 26 1 16 1 16 1 31*   EGFR ml/min/1 73sq m 62 69 69 59   CALCIUM mg/dL 8 6 8 7 8 9 9 6   AST U/L  --   --  10* 12*   ALT U/L  --   --  3* 8   ALK PHOS U/L  --   --  72 85     Results from last 7 days   Lab Units 06/23/23  1518 06/21/23  1351   BLOOD CULTURE   --  No Growth After 4 Days  No Growth After 4 Days  GRAM STAIN RESULT  1+ Polys  No bacteria seen  --    BODY FLUID CULTURE, STERILE  3+ Growth of Staphylococcus aureus*  --      Results from last 7 days   Lab Units 06/21/23  1351   PROCALCITONIN ng/ml 0 07       Imaging Studies:   I have personally reviewed pertinent imaging study reports and images in PACS  Other Studies:   I have personally reviewed pertinent reports        Rosaline Pierre MD  [facility]

## 2023-06-26 NOTE — PROGRESS NOTES
[address] [facility]  Progress Note  Name: Nova Loco  MRN: [id number]  Unit/Bed#: 153-29 I Date of Admission: 6/21/2023   Date of Service: 6/26/2023 I Hospital Day: 3    Assessment/Plan   * Abdominal fluid collection  Assessment & Plan  Was recently discharged on [address] after having abdominal wall abscess that required antibiotics and IR drainage  Patient reports right flank pain and subjective fevers that began approximately 4 to 5 days ago  CT scan revealed stable right retroperitoneal 8 cm collection and possible abscess and complete drainage of right anterior abdominal wall fluid collection     S/P Abdominal fluid collection drainage by IR with drain placement 6/23/23   · body fluid culture/gram stain prelim showing 3+ Staph aureus   · ID consulted  · Discontinued IV Ceftriaxone started IV vanco  · Trend culture for final growth and sensitivity; will tailor antibiotics based on those findings   · Likely will need 10 days course of PO antibiotics       Memory loss  Assessment & Plan  Intermediate memory loss  The patient and family for the past 6 months at least   Same since onset       -No progression or change since hospitalization  Folate, B12, and TSH normal  Encourage follow up with PCP   Currently alert and oriented    Moderate protein-calorie malnutrition ([address] )  Assessment & Plan  Malnutrition Findings:   Adult Malnutrition type: Chronic illness  Adult Degree of Malnutrition: Malnutrition of moderate degree  Malnutrition Characteristics: Fat loss, Muscle loss, Inadequate energy                  360 Statement: Chronic, moderate protein calorie malnutrition related to chronic illness with decreased appetite as evidenced by mild muscle wasting to clavicle and temple regions and significant weight loss 14 7% x 6 months  Treating with PO diet  Patient is not interested in nutrition supplements at this time  Encourage adequate PO intakes  BMI Findings:            Body mass index is 20 96 kg/m²  Abscess of postoperative wound of abdominal wall  Assessment & Plan  · See plan as mentioned above     Methadone dependence   Assessment & Plan  · Continue methadone               VTE Pharmacologic Prophylaxis: VTE Score: 4 Moderate Risk (Score 3-4) - Pharmacological DVT Prophylaxis Ordered: heparin  Patient Centered Rounds: I performed bedside rounds with nursing staff today  Discussions with Specialists or Other Care Team Provider: ID note reviewed    Education and Discussions with Family / Patient: Updated  (mother) via phone  Total Time Spent on Date of Encounter in care of patient: 35 minutes This time was spent on one or more of the following: performing physical exam; counseling and coordination of care; obtaining or reviewing history; documenting in the medical record; reviewing/ordering tests, medications or procedures; communicating with other healthcare professionals and discussing with patient's family/caregivers  Current Length of Stay: 3 day(s)  Current Patient Status: Inpatient   Certification Statement: The patient will continue to require additional inpatient hospital stay due to abscess with need for further IV antibiotics and lab monitoring  Discharge Plan: Anticipate discharge in 24-48 hrs to home with home services  Code Status: Level 1 - Full Code    Subjective:   Patient denies pain fever or chills  States understand need for ID recs in treating current abscess given this is his second abscess this month  Objective:     Vitals:   Temp (24hrs), Av 7 °F (36 5 °C), Min:97 3 °F (36 3 °C), Max:98 2 °F (36 8 °C)    Temp:  [97 3 °F (36 3 °C)-98 2 °F (36 8 °C)] 98 2 °F (36 8 °C)  HR:  [50-54] 53  Resp:  [16-19] 17  BP: (142-166)/() 151/76  SpO2:  [94 %-97 %] 97 %  Body mass index is 20 96 kg/m²  Input and Output Summary (last 24 hours):      Intake/Output Summary (Last 24 hours) at 2023 8796  Last data filed at 2023 9388  Gross per 24 hour   Intake 1037 5 ml   Output 1850 ml   Net -812 5 ml       Physical Exam:   Physical Exam  Vitals and nursing note reviewed  Constitutional:       General: He is not in acute distress  Appearance: He is well-developed  HENT:      Head: Normocephalic and atraumatic  Eyes:      Conjunctiva/sclera: Conjunctivae normal    Cardiovascular:      Rate and Rhythm: Normal rate and regular rhythm  Heart sounds: No murmur heard  Pulmonary:      Effort: Pulmonary effort is normal  No respiratory distress  Breath sounds: Normal breath sounds  Abdominal:      Palpations: Abdomen is soft  Tenderness: There is no abdominal tenderness  Musculoskeletal:         General: No swelling  Cervical back: Neck supple  Comments: KATHY drains intact  Noted serous to bloody drainage moderate amount   Skin:     General: Skin is warm and dry  Capillary Refill: Capillary refill takes less than 2 seconds  Neurological:      Mental Status: He is alert and oriented to person, place, and time  Psychiatric:         Mood and Affect: Mood normal           Additional Data:     Labs:  Results from last 7 days   Lab Units 06/26/23  0457   WBC Thousand/uL 6 02   HEMOGLOBIN g/dL 11 8*   HEMATOCRIT % 36 4*   PLATELETS Thousands/uL 226   NEUTROS PCT % 68   LYMPHS PCT % 21   MONOS PCT % 7   EOS PCT % 2     Results from last 7 days   Lab Units 06/26/23  0457 06/24/23  0941 06/23/23  1012   SODIUM mmol/L 138   < > 136   POTASSIUM mmol/L 3 7   < > 3 9   CHLORIDE mmol/L 107   < > 104   CO2 mmol/L 22   < > 21   BUN mg/dL 13   < > 11   CREATININE mg/dL 1 26   < > 1 16   ANION GAP mmol/L 9   < > 11   CALCIUM mg/dL 8 6   < > 8 9   ALBUMIN g/dL  --   --  3 4*   TOTAL BILIRUBIN mg/dL  --   --  0 44   ALK PHOS U/L  --   --  72   ALT U/L  --   --  3*   AST U/L  --   --  10*   GLUCOSE RANDOM mg/dL 97   < > 81    < > = values in this interval not displayed       Results from last 7 days   Lab Units 06/21/23  1351   INR 1 11             Results from last 7 days   Lab Units 06/21/23  1351   LACTIC ACID mmol/L 1 0   PROCALCITONIN ng/ml 0 07       Lines/Drains:  Invasive Devices     Peripheral Intravenous Line  Duration           Peripheral IV 06/24/23 Left;Ventral (anterior) Forearm 2 days          Drain  Duration           Abscess Drain RLQ 19 days    Abscess Drain Back 2 days                      Imaging: Reviewed radiology reports from this admission including: abdominal/pelvic CT    Recent Cultures (last 7 days):   Results from last 7 days   Lab Units 06/23/23  1518 06/21/23  1351   BLOOD CULTURE   --  No Growth After 4 Days  No Growth After 4 Days  GRAM STAIN RESULT  1+ Polys  No bacteria seen  --    BODY FLUID CULTURE, STERILE  3+ Growth of Staphylococcus aureus*  --        Last 24 Hours Medication List:   Current Facility-Administered Medications   Medication Dose Route Frequency Provider Last Rate   • acetaminophen  650 mg Oral Q6H PRN Kelechi Dominguez MD     • diazepam  5 mg Intravenous Q6H PRN Kelly Power MD     • heparin (porcine)  5,000 Units Subcutaneous Q8H [address] Kelly Power MD     • methadone  110 mg Oral Daily Kelechi Dominguez MD     • nicotine  1 patch Transdermal Daily Kelechi Dominguez MD     • sodium chloride  125 mL/hr Intravenous Continuous Kelechi Dominguez  mL/hr (06/26/23 0549)   • vancomycin  15 mg/kg Intravenous Once Anuj Mercedes MD     • vancomycin  1,250 mg Intravenous Q24H Anuj Mercedes MD          Today, Patient Was Seen By: BASIL Lutz    **Please Note: This note may have been constructed using a voice recognition system  **

## 2023-06-26 NOTE — ASSESSMENT & PLAN NOTE
Intermediate memory loss  The patient and family for the past 6 months at least   Same since onset       -No progression or change since hospitalization    Folate, B12, and TSH normal  Encourage follow up with PCP   Currently alert and oriented

## 2023-06-26 NOTE — PHYSICAL THERAPY NOTE
PHYSICAL THERAPY NOTE          Patient Name: Enriqueta Barragan  OXIKG'C Date: 6/26/2023 06/26/23 0934   PT Last Visit   PT Visit Date 06/26/23   Note Type   Note Type Treatment   Pain Assessment   Pain Assessment Tool 0-10   Pain Score 5   Pain Location/Orientation Location: Back   Restrictions/Precautions   Weight Bearing Precautions Per Order No   Other Precautions   (Fall Risk; Multiple lines; Bed Alarm; Pain)   General   Family/Caregiver Present No   Cognition   Overall Cognitive Status Impaired   Following Commands Follows one step commands without difficulty   Subjective   Subjective Agreeable to therapy  Bed Mobility   Additional Comments OOB in chair start/end PT session   Transfers   Sit to Stand 5  Supervision   Additional items Armrests; Increased time required;Verbal cues   Stand to Sit 5  Supervision   Additional items Armrests; Increased time required;Verbal cues   Additional Comments RW used   Ambulation/Elevation   Gait pattern   (Excessively slow; Short stride; Decreased foot clearance; Narrow JAYLA; Improper Weight shift)   Gait Assistance 5  Supervision   Additional items Verbal cues   Assistive Device Rolling walker   Distance 65'   Balance   Static Sitting Good   Dynamic Sitting Fair +   Static Standing Fair   Dynamic Standing Fair   Ambulatory Fair  (RW)   Endurance Deficit   Endurance Deficit Yes   Activity Tolerance   Activity Tolerance Patient limited by fatigue;Patient limited by pain   Exercises   Hip Flexion Sitting;10 reps;AROM; Bilateral   Hip Abduction Sitting;10 reps;AROM; Bilateral   Hip Adduction Sitting;10 reps;AROM; Bilateral   Knee AROM Long Arc Quad Sitting;15 reps;AROM; Bilateral   Ankle Pumps Sitting;15 reps;AROM; Bilateral   Balance training  Patient performed dynamic standing balance activities while completing clothing management and hygiene needs unsupported with close S, including multidirectional weight shifting, reaching across midline and out of JAYLA  Assessment   Prognosis Fair   Problem List   (Decreased strength; Decreased endurance; Impaired balance; Decreased mobility; Decreased cognition; Impaired judgement; Decreased safety awareness; Pain)   Assessment Pt  seen for PT treatment session this date with interventions consisting of  therapeutic exercises,  transfers and  gait training w/ emphasis on improving pt's ability to ambulate  In comparison to previous session, Pt  With improvements in activity tolerance  Ambulation limited by pain  Pt is in need of continued activity in PT to improve strength balance endurance mobility transfers and ambulation with return to maximize LOF  From PT/mobility standpoint, recommendation at time of d/c would be HHPT in order to promote return to PLOF and independence  The patient's AM-PAC Basic Mobility Inpatient Short Form Raw Score is 20  A Raw score of greater than 16 suggests the patient may benefit from discharge to home  Please also refer to physical therapy recommendation for safe DC planning  Goals   LTG Expiration Date 07/06/23   PT Treatment Day 1   Plan   Treatment/Interventions   (Functional transfer training; LE strengthening/ROM; Elevations; Therapeutic exercise;  Endurance training; Gait training; Bed mobility)   Progress Progressing toward goals   PT Frequency 3-5x/wk   Recommendation   PT Discharge Recommendation Home with home health rehabilitation   Alyssia 8 in Flat Bed Without Bedrails 4   Lying on Back to Sitting on Edge of Flat Bed Without Bedrails 3   Moving Bed to Chair 3   Standing Up From Chair Using Arms 4   Walk in Room 3   Climb 3-5 Stairs With Railing 3   Basic Mobility Inpatient Raw Score 20   Basic Mobility Standardized Score 43 99   Highest Level Of Mobility   JH-HLM Goal 6: Walk 10 steps or more   JH-HLM Achieved 7: Walk 25 feet or more   Education   Education Provided Mobility training   Patient Demonstrates verbal understanding   End of Consult   Patient Position at End of Consult Bedside chair; All needs within reach

## 2023-06-26 NOTE — PROGRESS NOTES
Norma Quinteros is a 61 y o  male who is currently ordered Vancomycin IV with management by the Pharmacy Consult service  Relevant clinical data and objective / subjective history reviewed  Vancomycin Assessment:  Indication and Goal AUC/Trough: Soft tissue (goal -600, trough >10), -600, trough >10  Clinical Status: stable  Micro:   *  Renal Function:  SCr: 1 26 mg/dL  CrCl: 54 9 mL/min  Renal replacement: Not on dialysis  Days of Therapy: 1  Current Dose: 1000mg once  Vancomycin Plan:  New Dosinmg q24h  Estimated AUC: 479 mcg*hr/mL  Estimated Trough: 13 7 mcg/mL  Next Level: 23 0600  Renal Function Monitoring: Daily BMP and [place] will continue to follow closely for s/sx of nephrotoxicity, infusion reactions and appropriateness of therapy  BMP and CBC will be ordered per protocol  We will continue to follow the patient’s culture results and clinical progress daily      Katerina Regalado, Pharmacist

## 2023-06-27 ENCOUNTER — APPOINTMENT (INPATIENT)
Dept: ULTRASOUND IMAGING | Facility: HOSPITAL | Age: 58
DRG: 721 | End: 2023-06-27
Payer: COMMERCIAL

## 2023-06-27 PROBLEM — R76.8 HEPATITIS C ANTIBODY POSITIVE IN BLOOD: Status: ACTIVE | Noted: 2023-06-27

## 2023-06-27 LAB
ANION GAP SERPL CALCULATED.3IONS-SCNC: 7 MMOL/L
BACTERIA BLD CULT: NORMAL
BACTERIA BLD CULT: NORMAL
BACTERIA SPEC BFLD CULT: ABNORMAL
BASOPHILS # BLD AUTO: 0.06 THOUSANDS/ÂΜL (ref 0–0.1)
BASOPHILS NFR BLD AUTO: 1 % (ref 0–1)
BUN SERPL-MCNC: 12 MG/DL (ref 5–25)
CALCIUM SERPL-MCNC: 8.8 MG/DL (ref 8.4–10.2)
CHLORIDE SERPL-SCNC: 107 MMOL/L (ref 96–108)
CO2 SERPL-SCNC: 23 MMOL/L (ref 21–32)
CREAT SERPL-MCNC: 1.19 MG/DL (ref 0.6–1.3)
EOSINOPHIL # BLD AUTO: 0.15 THOUSAND/ÂΜL (ref 0–0.61)
EOSINOPHIL NFR BLD AUTO: 2 % (ref 0–6)
ERYTHROCYTE [DISTWIDTH] IN BLOOD BY AUTOMATED COUNT: 14.8 % (ref 11.6–15.1)
GFR SERPL CREATININE-BSD FRML MDRD: 66 ML/MIN/1.73SQ M
GLUCOSE SERPL-MCNC: 78 MG/DL (ref 65–140)
GRAM STN SPEC: ABNORMAL
GRAM STN SPEC: ABNORMAL
HCT VFR BLD AUTO: 36.2 % (ref 36.5–49.3)
HGB BLD-MCNC: 12.1 G/DL (ref 12–17)
IMM GRANULOCYTES # BLD AUTO: 0.05 THOUSAND/UL (ref 0–0.2)
IMM GRANULOCYTES NFR BLD AUTO: 1 % (ref 0–2)
LYMPHOCYTES # BLD AUTO: 1.55 THOUSANDS/ÂΜL (ref 0.6–4.47)
LYMPHOCYTES NFR BLD AUTO: 25 % (ref 14–44)
MCH RBC QN AUTO: 29.1 PG (ref 26.8–34.3)
MCHC RBC AUTO-ENTMCNC: 33.4 G/DL (ref 31.4–37.4)
MCV RBC AUTO: 87 FL (ref 82–98)
MONOCYTES # BLD AUTO: 0.53 THOUSAND/ÂΜL (ref 0.17–1.22)
MONOCYTES NFR BLD AUTO: 8 % (ref 4–12)
NEUTROPHILS # BLD AUTO: 3.99 THOUSANDS/ÂΜL (ref 1.85–7.62)
NEUTS SEG NFR BLD AUTO: 63 % (ref 43–75)
NRBC BLD AUTO-RTO: 0 /100 WBCS
PLATELET # BLD AUTO: 221 THOUSANDS/UL (ref 149–390)
PMV BLD AUTO: 9.4 FL (ref 8.9–12.7)
POTASSIUM SERPL-SCNC: 3.5 MMOL/L (ref 3.5–5.3)
RBC # BLD AUTO: 4.16 MILLION/UL (ref 3.88–5.62)
SODIUM SERPL-SCNC: 137 MMOL/L (ref 135–147)
WBC # BLD AUTO: 6.33 THOUSAND/UL (ref 4.31–10.16)

## 2023-06-27 PROCEDURE — 85025 COMPLETE CBC W/AUTO DIFF WBC: CPT | Performed by: INTERNAL MEDICINE

## 2023-06-27 PROCEDURE — 97530 THERAPEUTIC ACTIVITIES: CPT

## 2023-06-27 PROCEDURE — 99232 SBSQ HOSP IP/OBS MODERATE 35: CPT

## 2023-06-27 PROCEDURE — G0408 INPT/TELE FOLLOW UP 35: HCPCS | Performed by: INTERNAL MEDICINE

## 2023-06-27 PROCEDURE — 87902 NFCT AGT GNTYP ALYS HEP C: CPT | Performed by: INTERNAL MEDICINE

## 2023-06-27 PROCEDURE — 97116 GAIT TRAINING THERAPY: CPT

## 2023-06-27 PROCEDURE — 80048 BASIC METABOLIC PNL TOTAL CA: CPT | Performed by: NURSE PRACTITIONER

## 2023-06-27 PROCEDURE — 99222 1ST HOSP IP/OBS MODERATE 55: CPT | Performed by: INTERNAL MEDICINE

## 2023-06-27 PROCEDURE — 97110 THERAPEUTIC EXERCISES: CPT

## 2023-06-27 PROCEDURE — 76705 ECHO EXAM OF ABDOMEN: CPT

## 2023-06-27 RX ORDER — CEPHALEXIN 250 MG/1
500 CAPSULE ORAL EVERY 6 HOURS SCHEDULED
Status: DISCONTINUED | OUTPATIENT
Start: 2023-06-27 | End: 2023-06-28 | Stop reason: HOSPADM

## 2023-06-27 RX ORDER — CEPHALEXIN 250 MG/1
500 CAPSULE ORAL EVERY 6 HOURS SCHEDULED
Status: DISCONTINUED | OUTPATIENT
Start: 2023-06-27 | End: 2023-06-27

## 2023-06-27 RX ADMIN — CEPHALEXIN 500 MG: 250 CAPSULE ORAL at 13:53

## 2023-06-27 RX ADMIN — SODIUM CHLORIDE 125 ML/HR: 0.9 INJECTION, SOLUTION INTRAVENOUS at 16:07

## 2023-06-27 RX ADMIN — SODIUM CHLORIDE 125 ML/HR: 0.9 INJECTION, SOLUTION INTRAVENOUS at 07:54

## 2023-06-27 RX ADMIN — HEPARIN SODIUM 5000 UNITS: 5000 INJECTION INTRAVENOUS; SUBCUTANEOUS at 13:53

## 2023-06-27 RX ADMIN — HEPARIN SODIUM 5000 UNITS: 5000 INJECTION INTRAVENOUS; SUBCUTANEOUS at 21:22

## 2023-06-27 RX ADMIN — HEPARIN SODIUM 5000 UNITS: 5000 INJECTION INTRAVENOUS; SUBCUTANEOUS at 05:02

## 2023-06-27 RX ADMIN — CEPHALEXIN 500 MG: 250 CAPSULE ORAL at 17:08

## 2023-06-27 RX ADMIN — METHADONE HYDROCHLORIDE 110 MG: 10 TABLET ORAL at 08:16

## 2023-06-27 RX ADMIN — CEFAZOLIN SODIUM 2000 MG: 2 SOLUTION INTRAVENOUS at 08:16

## 2023-06-27 RX ADMIN — SODIUM CHLORIDE 125 ML/HR: 0.9 INJECTION, SOLUTION INTRAVENOUS at 00:11

## 2023-06-27 RX ADMIN — CEPHALEXIN 500 MG: 250 CAPSULE ORAL at 23:24

## 2023-06-27 NOTE — CONSULTS
Consultation - [facility]  Ryan Go 61 y o  male MRN: [id number]  Unit/Bed#: 340-36 Encounter: [id number]        Inpatient consult to gastroenterology  Consult performed by: BASIL Morrison  Consult ordered by: Aiden Mario DO          Reason for Consult / Principal Problem:     Hepatitis C Antibody Positive      ASSESSMENT AND PLAN:     The patient is a 61 y o  male who is being seen for a consultation today for his reactive hepatitis C screening upon admission  The patient does have a history of drug use with heroin being his drug of choice and the patient reporting that he has actively used heroin in the past several weeks to months, however, was unable to give an exact timeframe  The patient is also on chronic methadone because of his drug abuse history  The patient does report that he has been treated for hepatitis C in the past  We are still awaiting the results of the Hep C Quantitative/Genotype blood work at this time  Exam:  Oral mucosa normal upon visual inspection, without any sores, lesions, or ulcerations  Sclera without icterus and benign  Lung sounds CTA b/l  Normal S1 & S2 upon exam  Abdomen is soft, flat, non-distended with mild tenderness upon exam of the right lower quadrant around the drain site, without any rebound tenderness or guarding with Normal BS x 4  No edema noted of the b/l lower extremities noted upon exam today  Skin is non-icteric  1  Hepatitis C Antibody Positive  2   History of drug Abuse with Methadone Dependence  I discussed with the patient that at this point in time from a GI standpoint we will await the results of the hep C quantitative genotype blood work and that in general we treat hepatitis C on an outpatient basis and can follow-up with him outpatient for further evaluation and treatment plan options     -The patient should continue his methadone as prescribed and follow up with his out patient methadone clinic   -Patient "should continue to try to abstain from heroin and any other illicit drug   -The patient can follow up out patient regarding the Hepatitis C and any further evaluation or treatment plan options  3  Abdominal Pain  4  Abdominal Fluid Collection s/p Right Nephrectomy  At this time as the patient is having minimal to no pain and is without any red flag symptoms, for now, we will hold off on any type of endoscopic procedures as long as he continues to remain stable and improved  The patient was agreeable and verbalized an understanding     -Continue supportive care  -Continue treatment plan with regards to the right lower quadrant drain as per IR and surgery  GI will sign off   ______________________________________________________________________    HPI: Patient is a 61 y o  male who is currently being seen for consultation for his reactive hepatitis C screening upon admission  The patient does have a history of drug use with heroin being his drug of choice and the patient reporting that he has actively used heroin in the past several weeks to months, however, was unable to give an exact timeframe  The patient is also on chronic methadone because of his drug abuse history  The patient does report that he has been treated for hepatitis C in the past     The patient denies any reflux, chest pain, nausea, dysphagia, shortness of breath, vomiting, early satiety, decreased appetite, unplanned weight loss, or abdominal pain  The patient reports that they have consistent, relieving, and regular bowel movements on a daily basis without any bloating, consistent diarrhea, nocturnal BMs, constipation, straining, melena or bloody stools  PMH/PSH: Anxiety, hypertension, kidney stones, diverticulosis, methadone dependence, multiple cystoscopies, lithotripsy, hernia repair, right nephrectomy on May 22, 2023, and transurethral resection of the bladder  Tobacco/Vaping: At least 2 PPD or more   \"As much as I can smoke " "or get  \"   ETOH: Denied  Marijuana: Denied   Illicit Drug Use: Reports using Heroin with in the last several weeks to months  Serology: (6/27/23): HCT 36 2, CKTotal 32, AST 10, ASL 3, ALB 3 4, otherwise the rest of the blood work was WNL  Imaging: (6/23/23): CT Chest, Abd, Pelvis w/contrast:1  Stable right retroperitoneal 8 cm collection  Possible abscess  2  Postsurgical change from recent right nephrectomy  No evidence of left pyelonephritis or obstructive uropathy  3  Interval near complete drainage of right anterior abdominal wall fluid collection  Endoscopy History: EGD: (None):     COLONOSCOPY: (7/28/15): Ext skin tags, Hemorrhoids, Moderately Severe diverticulosis of the sig colon & descending colon  DUE: 7/28/20 Pt denied at last OV with PCP  REVIEW OF SYSTEMS:    CONSTITUTIONAL: Denies any fever, chills, rigors, and weight loss  HEENT: No earache or tinnitus  Denies hearing loss or visual disturbances  CARDIOVASCULAR: No chest pain or palpitations  RESPIRATORY: Denies any cough, hemoptysis, shortness of breath or dyspnea on exertion  GASTROINTESTINAL: As noted in the History of Present Illness  GENITOURINARY: No problems with urination  Denies any hematuria or dysuria  NEUROLOGIC: No dizziness or vertigo, denies headaches  MUSCULOSKELETAL: Denies any muscle or joint pain  SKIN: Denies skin rashes or itching  ENDOCRINE: Denies excessive thirst  Denies intolerance to heat or cold  PSYCHOSOCIAL: Denies depression or anxiety  Denies any recent memory loss         Historical Information   Past Medical History:   Diagnosis Date   • BRAULIO (acute kidney injury) ([facility] ) 8/20/2022   • Anxiety    • Bright red rectal bleeding     Last Assessed: 9/9/2015    • Drug dependence ([facility] [address] ) 10/20/2021   • Hypertension     Last Assessed: 7/9/2014    • Hyponatremia 5/26/2023   • Kidney stone    • Kidney stones     Last Assessed: 11/17/2016    • Periorbital edema     Last Assessed: 9/4/2015   • Septic " shock ([facility] [address] ) 08/20/2022   • Skin tag of anus     Last Assessed: 9/9/2015    • Trigger point of thoracic region     Last Assessed: 11/6/2015      Past Surgical History:   Procedure Laterality Date   • CYSTOSCOPY W/ URETERAL STENT PLACEMENT  03/11/2013   • CYSTOSCOPY W/ URETERAL STENT PLACEMENT  06/18/2014    EXTRACORPOREAL SHOCK WAVE LITHOTRIPSY    • CYSTOSCOPY W/ URETERAL STENT PLACEMENT  07/16/2014    EXTRACORPOREAL SHOCK WAVE LITHOTRIPSY    • CYSTOSCOPY W/ URETERAL STENT REMOVAL  04/11/2013   • CYSTOSCOPY W/ URETERAL STENT REMOVAL Right 08/26/2014   • CYSTOSCOPY W/ URETEROSCOPY W/ LITHOTRIPSY  02/26/2013    Percutaneous lithotomy With Uretal Stent Plcement    • CYSTOSCOPY W/ URETEROSCOPY W/ LITHOTRIPSY  03/11/2014   • CYSTOSCOPY W/ URETEROSCOPY W/ LITHOTRIPSY Right 08/04/2014    With Uretal Stent Placement    • CYSTOSCOPY W/ URETEROSCOPY W/ LITHOTRIPSY Right 05/09/2016   • HERNIA REPAIR  03/11/2013   • IR DRAINAGE TUBE PLACEMENT  6/6/2023   • IR DRAINAGE TUBE PLACEMENT  6/23/2023   • IR NEPHROSTOMY TUBE CHECK/CHANGE/REPOSITION/REINSERTION/UPSIZE  11/22/2022   • IR NEPHROSTOMY TUBE CHECK/CHANGE/REPOSITION/REINSERTION/UPSIZE  02/13/2023   • IR NEPHROSTOMY TUBE CHECK/CHANGE/REPOSITION/REINSERTION/UPSIZE  04/28/2023   • IR NEPHROSTOMY TUBE PLACEMENT  08/20/2022   • KIDNEY SURGERY     • LITHOTRIPSY     • [place] CYSTO/URETERO W/LITHOTRIPSY &INDWELL STENT INSRT Right 07/13/2016    Procedure: CYSTOSCOPY; URETEROSCOPY WITH HOLMIUM LASER STONE EXTRACTION; RETROGRADE PYELOGRAM; URETERAL STENT INSERTION ;  Surgeon: Tip Singh MD;  Location: [facility];  Service: Urology   • IL CYSTO/URETERO W/LITHOTRIPSY &INDWELL STENT INSRT Right 05/09/2016    Procedure: CYSTOSCOPY,  URETEROSCOPY,  WITH LITHOTRIPSY HOLMIUM LASER, STONE EXTRACTION, AND INSERTION STENT URETERAL;  Surgeon: Tip Singh MD;  Location: [facility];  Service: Urology   • IL CYSTO/URETERO W/LITHOTRIPSY &INDWELL STENT INSRT Right 11/10/2022    Procedure: CYSTOSCOPY URETEROSCOPY  right RETROGRADE PYELOGRAM;  Surgeon: Carlitos Morales MD;  Location: [facility];  Service: Urology   • MI LAPAROSCOPY RADICAL NEPHRECTOMY Right 5/22/2023    Procedure: NEPHRECTOMY RADICAL LAPAROSCOPIC W/ ROBOTICS;  Surgeon: Mavis Lyles MD;  Location:  MAIN OR;  Service: Urology   • [place] LITHOTRIPSY [address] Right 06/17/2016    Procedure: Jh Gobble SHOCKWAVE (ESWL);   Surgeon: Shayan Figueroa MD;  Location: [facility];  Service: Urology   • TRANSURETHRAL RESECTION OF BLADDER     • URETERAL REIMPLANTION  03/11/2013     Social History   Social History     Substance and Sexual Activity   Alcohol Use Not Currently     Social History     Substance and Sexual Activity   Drug Use Not Currently     Social History     Tobacco Use   Smoking Status Every Day   • Packs/day: 2 00   • Years: 35 00   • Total pack years: 70 00   • Types: Cigarettes   Smokeless Tobacco Never     Family History   Problem Relation Age of Onset   • No Known Problems Mother    • Heart attack Father        Meds/Allergies     Medications Prior to Admission   Medication   • acetaminophen (TYLENOL) 650 mg CR tablet   • METHADONE HCL PO   • sodium chloride, PF, 0 9 %   • doxycycline hyclate (VIBRA-TABS) 100 mg tablet   • potassium-sodium phosphates (PHOS-NAK) 280 mg (P)-160 mg (Na)-250 mg (K) packet     Current Facility-Administered Medications   Medication Dose Route Frequency   • acetaminophen (TYLENOL) tablet 650 mg  650 mg Oral Q6H PRN   • cephalexin (KEFLEX) capsule 500 mg  500 mg Oral Q6H [facility]   • diazepam (VALIUM) injection 5 mg  5 mg Intravenous Q6H PRN   • heparin (porcine) subcutaneous injection 5,000 Units  5,000 Units Subcutaneous Q8H [facility]   • methadone (DOLOPHINE) tablet 110 mg  110 mg Oral Daily   • nicotine (NICODERM CQ) 14 mg/24hr TD 24 hr patch 1 patch  1 patch Transdermal Daily   • sodium chloride 0 9 % infusion  125 mL/hr Intravenous Continuous       Allergies   Allergen Reactions "  • Bee Venom Anaphylaxis   • Metronidazole Itching and Swelling   • Nsaids GI Intolerance     Stomach irritant   • Penicillin G    • Penicillins GI Intolerance     Sick to stomach   • Pollen Extract    • Sulfa Antibiotics            Objective     Blood pressure 154/66, pulse (!) 48, temperature 98 6 °F (37 °C), resp  rate 15, height 5' 7\" (1 702 m), weight 60 7 kg (133 lb 13 1 oz), SpO2 98 %  Body mass index is 20 96 kg/m²  Intake/Output Summary (Last 24 hours) at 6/27/2023 1334  Last data filed at 6/27/2023 1101  Gross per 24 hour   Intake 4654 17 ml   Output 1770 ml   Net 2884 17 ml         PHYSICAL EXAM:      General Appearance:   Alert, cooperative, no distress   HEENT:   Normocephalic, atraumatic, anicteric  Neck:  Supple, symmetrical, trachea midline   Lungs:   Clear to auscultation bilaterally; no rales, rhonchi or wheezing; respirations unlabored    Heart[de-identified]   Regular rate and rhythm; no murmur, rub, or gallop  Abdomen:   Soft, non-tender, non-distended; normal bowel sounds; no masses, no organomegaly    Genitalia:   Deferred    Rectal:   Deferred    Extremities:  No cyanosis, clubbing or edema    Pulses:  2+ and symmetric all extremities    Skin:  No jaundice, rashes, or lesions    Lymph nodes:  No palpable cervical lymphadenopathy        Lab Results:   No results displayed because visit has over 200 results  Imaging Studies: I have personally reviewed pertinent imaging studies    "

## 2023-06-27 NOTE — PROGRESS NOTES
REQUIRED DOCUMENTATION:      1  This service was provided via Telemedicine  2  Provider located at [address]  3  TeleMed provider: Mary Anne Arteaga MD  4  Identify all parties in room with patient during tele consult: patient    5  After connecting through [employer], patient was identified by name and date of birth and assistant checked wristband  Patient was then informed that this was a Telemedicine visit and that the exam was being conducted confidentially over secure lines  My office door was closed  No one else was in the room  Patient acknowledged consent and understanding of privacy and security of the Telemedicine visit, and gave us permission to have the assistant stay in the room in order to assist with the history and to conduct the exam   I informed the patient that I have reviewed their record in Epic and presented the opportunity for them to ask any questions regarding the visit today  The patient agreed to participate  Progress Note - Infectious Disease   Jose Figueroa 61 y o  male MRN: [id number]  Unit/Bed#: 411-01 Encounter: [id number]      Impression/Plan:    1  Right Retroperitoneal Abscess 2/2 MSSA  - This was seen on CT 6/21  Suspect related to recent right nephrectomy surgical site infection and abscess as below  Status post IR drainage catheter placed on 6/23  I did discuss case with IR, they feel source control has been adequately achieved with drain placement  Patient is afebrile, blood cultures negative  Culture growing MSSA, as did prior abscess cultures  - as patient clinically stable, switch today to PO Cephalexin 500 mg every 6 hours  - given adequate source control with drain placement, would recommend 10 day course, through 7/05/2023  - as below, prior abdomina wall abscess appears resolved  - will need outpatient IR follow up for drain management   - outpatient Urology follow up; consider repeat imaging in several weeks to assess for complete resolution    2  Recent Right Sided Abdominal Wall Abscess 2/ MSSA:  - This was noted on prior CT , felt to be related to surgical site infection of right nephrectomy which was done on   IR placed drain on  and culture grew two different strains of MSSA  He was discharged on 14 day course of PO Doxycycline  CT now showing near completer resolution of this collection, drain remains in place   - based on updated CT, appears this abscess is resolved  - antibiotic plan for RP abscess, as above  - will need outpatient IR follow up for drain management     3  History of Stag[place] Calculus status post Right Nephrectomy (23):     4  Multiple Antibiotic Allergies (Penicillin, Sulfa, Metronidazole):  - Patient does not recall his allergies  He has tolerated Cefepime and Keflex on 2023    - antibiotics as above    5  Positive Hepatitis C Antibody:  - Patient has prior Hx of Hep C and treated in past  No recent viral load  - follow up HCV viral load  - if positive, outpatient follow up for treatment if patient interested    Above management plan discussed in detail with the primary team   ID will follow  I spent 50 minutes in evaluation of the patient of which 20 minutes was in counseling/coordination of care    Antibiotics:  Cefazolin    Subjective:  Patient has no fever, chills, sweats; no nausea, vomiting, diarrhea; no cough, shortness of breath; no pain  No new symptoms  Objective:  Vitals:  Temp:  [97 1 °F (36 2 °C)-98 6 °F (37 °C)] 98 6 °F (37 °C)  HR:  [48-50] 48  Resp:  [15] 15  BP: (151-170)/(66-83) 154/66  SpO2:  [96 %-98 %] 98 %  Temp (24hrs), Av 9 °F (36 6 °C), Min:97 1 °F (36 2 °C), Max:98 6 °F (37 °C)  Current: Temperature: 98 6 °F (37 °C)    Physical Exam:     Documented physical exam has been primarily done by the patient's nurse and/or the primary service due to limited examination abilities on telemedicine     General Appearance:  Alert, interactive, nontoxic, no acute distress     Throat: Oropharynx moist without lesions  Lungs:   Clear to auscultation bilaterally; no wheezes, rhonchi or rales; respirations unlabored   Heart:  RRR; no murmur, rub or gallop   Abdomen:   Soft, non-tender, non-distended, positive bowel sounds  Extremities: No clubbing, cyanosis or edema   Skin: No new rashes or lesions  No draining wounds noted  Labs: All pertinent labs and imaging studies were personally reviewed  Results from last 7 days   Lab Units 06/27/23  0501 06/26/23  0457 06/24/23  0941   WBC Thousand/uL 6 33 6 02 7 05   HEMOGLOBIN g/dL 12 1 11 8* 11 9*   PLATELETS Thousands/uL 221 226 239     Results from last 7 days   Lab Units 06/27/23  0501 06/26/23  0457 06/24/23  0941 06/23/23  1012 06/21/23  1351   SODIUM mmol/L 137 138 136 136 137   POTASSIUM mmol/L 3 5 3 7 4 1 3 9 4 4   CHLORIDE mmol/L 107 107 103 104 101   CO2 mmol/L 23 22 25 21 26   BUN mg/dL 12 13 12 11 21   CREATININE mg/dL 1 19 1 26 1 16 1 16 1 31*   EGFR ml/min/1 73sq m 66 62 69 69 59   CALCIUM mg/dL 8 8 8 6 8 7 8 9 9 6   AST U/L  --   --   --  10* 12*   ALT U/L  --   --   --  3* 8   ALK PHOS U/L  --   --   --  72 85     Results from last 7 days   Lab Units 06/21/23  1351   PROCALCITONIN ng/ml 0 07       Micro:  Results from last 7 days   Lab Units 06/23/23  1518 06/21/23  1351   BLOOD CULTURE   --  No Growth After 5 Days  No Growth After 5 Days     GRAM STAIN RESULT  1+ Polys  No bacteria seen  --    BODY FLUID CULTURE, STERILE  3+ Growth of Staphylococcus aureus*  --        Imaging:      Farrah Gibson MD  [facility]

## 2023-06-27 NOTE — PROGRESS NOTES
[address] [facility]  Progress Note  Name: Sita Toledo  MRN: [id number]  Unit/Bed#: 752-63 I Date of Admission: 6/21/2023   Date of Service: 6/27/2023 I Hospital Day: 4    Assessment/Plan   * Abdominal fluid collection  Assessment & Plan  Was recently discharged on [address] after having abdominal wall abscess that required antibiotics and IR drainage  Patient reports right flank pain and subjective fevers that began approximately 4 to 5 days ago  CT scan revealed stable right retroperitoneal 8 cm collection and possible abscess and complete drainage of right anterior abdominal wall fluid collection     S/P Abdominal fluid collection drainage by IR with drain placement 6/23/23   · body fluid culture/gram stain prelim showing 3+ Staph aureus   · ID consulted  · Discontinued IV Ceftriaxone and vanco and switched to cefazolin 2g q8h  · Trend culture for final growth and sensitivity; will tailor antibiotics based on those findings   · Likely will need 10 days course of antibiotics - keflex on dc through 7/5    Hepatitis C antibody positive in blood  Assessment & Plan  · Hep C antibody positive on hepatitis panel  · Hep C genotype and RNA PCR pending  · RUQ US pending  · GI consulted  · Patient admits to history of hep C and states he was treated for it in the past but that it keeps recurring  Patient admits to using IV drugs but states that he doesn't remember when the last time he used IV drugs was    Memory loss  Assessment & Plan  Intermediate memory loss  The patient and family for the past 6 months at least   Same since onset  · No progression or change since hospitalization    · Folate, B12, and TSH normal  · Encourage follow up with PCP   · Currently alert and oriented    Moderate protein-calorie malnutrition ([facility] [address] )  Assessment & Plan  Malnutrition Findings:   Adult Malnutrition type: Chronic illness  Adult Degree of Malnutrition: Malnutrition of moderate degree  Malnutrition Characteristics: Fat loss, Muscle loss, Inadequate energy                  360 Statement: Chronic, moderate protein calorie malnutrition related to chronic illness with decreased appetite as evidenced by mild muscle wasting to clavicle and temple regions and significant weight loss 14 7% x 6 months  Treating with PO diet  Patient is not interested in nutrition supplements at this time  Encourage adequate PO intakes  BMI Findings: Body mass index is 20 96 kg/m²  Abscess of postoperative wound of abdominal wall  Assessment & Plan  · See plan as mentioned above     Methadone dependence   Assessment & Plan  · Continue methadone         VTE Pharmacologic Prophylaxis: VTE Score: 4 Moderate Risk (Score 3-4) - Pharmacological DVT Prophylaxis Ordered: heparin  Patient Centered Rounds: I performed bedside rounds with nursing staff today  Discussions with Specialists or Other Care Team Provider: None    Education and Discussions with Family / Patient: Patient declined call to   Total Time Spent on Date of Encounter in care of patient: 35 minutes This time was spent on one or more of the following: performing physical exam; counseling and coordination of care; obtaining or reviewing history; documenting in the medical record; reviewing/ordering tests, medications or procedures; communicating with other healthcare professionals and discussing with patient's family/caregivers  Current Length of Stay: 4 day(s)  Current Patient Status: Inpatient   Certification Statement: The patient will continue to require additional inpatient hospital stay due to abdominal wall abscess on IV antibiotics  Discharge Plan: Anticipate discharge in 24-48 hrs to home with home services  Code Status: Level 1 - Full Code    Subjective:   Patient is sitting comfortably in bed at time of exam  Does not offer any complaints  Denies chest pain or shortness of breath   States that he does still use IV drugs but that he does not remember the exact time he did it last  States that he has a history of hepatitis C that was treated in the past, but that it continues to come back  Had discussion with patent about healthy habits and refraining from IV drug use  Objective:     Vitals:   Temp (24hrs), Av 9 °F (36 6 °C), Min:97 1 °F (36 2 °C), Max:98 6 °F (37 °C)    Temp:  [97 1 °F (36 2 °C)-98 6 °F (37 °C)] 98 6 °F (37 °C)  HR:  [48-50] 48  Resp:  [15] 15  BP: (151-170)/(66-83) 154/66  SpO2:  [96 %-98 %] 98 %  Body mass index is 20 96 kg/m²  Input and Output Summary (last 24 hours): Intake/Output Summary (Last 24 hours) at 2023 1049  Last data filed at 2023 0819  Gross per 24 hour   Intake 4654 17 ml   Output 1470 ml   Net 3184 17 ml       Physical Exam:   Physical Exam  Vitals reviewed  Constitutional:       General: He is not in acute distress  Appearance: Normal appearance  He is normal weight  He is ill-appearing  HENT:      Head: Normocephalic and atraumatic  Nose: Nose normal       Mouth/Throat:      Mouth: Mucous membranes are moist       Pharynx: Oropharynx is clear  Eyes:      Extraocular Movements: Extraocular movements intact  Conjunctiva/sclera: Conjunctivae normal    Cardiovascular:      Rate and Rhythm: Normal rate and regular rhythm  Pulses: Normal pulses  Heart sounds: Normal heart sounds  No murmur heard  Pulmonary:      Effort: Pulmonary effort is normal  No respiratory distress  Breath sounds: Normal breath sounds  No wheezing  Abdominal:      General: Abdomen is flat  Bowel sounds are normal  There is no distension  Palpations: Abdomen is soft  Tenderness: There is no abdominal tenderness  There is no guarding  Comments: KATHY drain in place   Musculoskeletal:         General: Normal range of motion  Cervical back: Normal range of motion  Right lower leg: No edema  Left lower leg: No edema     Skin:     General: Skin is warm    Neurological:      General: No focal deficit present  Mental Status: He is alert and oriented to person, place, and time  Mental status is at baseline  Motor: No weakness  Psychiatric:         Mood and Affect: Mood normal          Thought Content: Thought content normal       Comments: Flat affect          Additional Data:     Labs:  Results from last 7 days   Lab Units 06/27/23  0501   WBC Thousand/uL 6 33   HEMOGLOBIN g/dL 12 1   HEMATOCRIT % 36 2*   PLATELETS Thousands/uL 221   NEUTROS PCT % 63   LYMPHS PCT % 25   MONOS PCT % 8   EOS PCT % 2     Results from last 7 days   Lab Units 06/27/23  0501 06/24/23  0941 06/23/23  1012   SODIUM mmol/L 137   < > 136   POTASSIUM mmol/L 3 5   < > 3 9   CHLORIDE mmol/L 107   < > 104   CO2 mmol/L 23   < > 21   BUN mg/dL 12   < > 11   CREATININE mg/dL 1 19   < > 1 16   ANION GAP mmol/L 7   < > 11   CALCIUM mg/dL 8 8   < > 8 9   ALBUMIN g/dL  --   --  3 4*   TOTAL BILIRUBIN mg/dL  --   --  0 44   ALK PHOS U/L  --   --  72   ALT U/L  --   --  3*   AST U/L  --   --  10*   GLUCOSE RANDOM mg/dL 78   < > 81    < > = values in this interval not displayed  Results from last 7 days   Lab Units 06/21/23  1351   INR  1 11             Results from last 7 days   Lab Units 06/21/23  1351   LACTIC ACID mmol/L 1 0   PROCALCITONIN ng/ml 0 07       Lines/Drains:  Invasive Devices     Peripheral Intravenous Line  Duration           Peripheral IV 06/24/23 Left;Ventral (anterior) Forearm 3 days          Drain  Duration           Abscess Drain RLQ 20 days    Abscess Drain Back 3 days                      Imaging: Reviewed radiology reports from this admission including: abdominal/pelvic CT    Recent Cultures (last 7 days):   Results from last 7 days   Lab Units 06/23/23  1518 06/21/23  1351   BLOOD CULTURE   --  No Growth After 5 Days  No Growth After 5 Days     GRAM STAIN RESULT  1+ Polys  No bacteria seen  --    BODY FLUID CULTURE, STERILE  3+ Growth of Staphylococcus aureus*  --        Last 24 Hours Medication List:   Current Facility-Administered Medications   Medication Dose Route Frequency Provider Last Rate   • acetaminophen  650 mg Oral Q6H PRN Kelechi Dominguez MD     • cefazolin  2,000 mg Intravenous Will Jones MD 2,000 mg (06/27/23 0816)   • diazepam  5 mg Intravenous Q6H PRN Kimberlee Cunha MD     • heparin (porcine)  5,000 Units Subcutaneous Q8H [address] Kimberlee Cunha MD     • methadone  110 mg Oral Daily Kelechi Dominguez MD     • nicotine  1 patch Transdermal Daily Kelechi Dominguez MD     • sodium chloride  125 mL/hr Intravenous Continuous Kelechi Dominguez  mL/hr (06/27/23 0754)        Today, Patient Was Seen By: Carole Limon PA-C    **Please Note: This note may have been constructed using a voice recognition system  **

## 2023-06-27 NOTE — ASSESSMENT & PLAN NOTE
Intermediate memory loss  The patient and family for the past 6 months at least   Same since onset  · No progression or change since hospitalization    · Folate, B12, and TSH normal  · Encourage follow up with PCP   · Currently alert and oriented Controlled with current regime

## 2023-06-27 NOTE — PHYSICAL THERAPY NOTE
PHYSICAL THERAPY NOTE          Patient Name: Jailene Gonzalez  [id number]'F Date: 6/27/2023 06/27/23 1126   PT Last Visit   PT Visit Date 06/27/23   Note Type   Note Type Treatment   Pain Assessment   Pain Assessment Tool 0-10   Pain Score 4   Pain Location/Orientation Location: Back   Restrictions/Precautions   Weight Bearing Precautions Per Order No   General   Response to Previous Treatment Patient reporting fatigue but able to participate  Family/Caregiver Present No   Cognition   Overall Cognitive Status Impaired   Following Commands Follows one step commands with increased time or repetition   Subjective   Subjective States he doesn't really want to get OOB but will  Bed Mobility   Supine to Sit 6  Modified independent   Additional items HOB elevated; Bedrails; Increased time required;Verbal cues   Additional items Increased time required;Verbal cues   Additional Comments Increased time to transition to EOB due to pain  Transfers   Sit to Stand 5  Supervision   Additional items Increased time required;Verbal cues   Stand to Sit 5  Supervision   Additional items Armrests; Increased time required;Verbal cues   Stand pivot 5  Supervision   Additional Comments RW used   Ambulation/Elevation   Gait pattern   (Excessively slow; Short stride; Decreased foot clearance; Narrow JAYLA; Improper Weight shift)   Gait Assistance 5  Supervision   Additional items Verbal cues   Assistive Device Rolling walker  (none)   Distance 20' with RW, stood at window ~ 10 min then 20' no device to recliner with supervision,   Balance   Static Sitting Good   Dynamic Sitting Fair +   Static Standing Fair   Dynamic Standing Fair   Ambulatory Fair   Endurance Deficit   Endurance Deficit Yes   Activity Tolerance   Activity Tolerance Patient limited by fatigue;Patient limited by pain   Exercises   Heelslides Supine;10 reps;AROM; Bilateral   Knee AROM Long Arc Quad Sitting;10 reps;AROM; Bilateral   Ankle Pumps Supine;10 reps;AROM; Bilateral   Assessment   Prognosis Good   Problem List   (Decreased strength; Decreased endurance; Impaired balance; Decreased mobility; Impaired judgement;  Pain)   Assessment Pt  seen for PT treatment session this date with interventions consisting of  therapeutic exercises, bed mobility, transfers and  gait training w/ emphasis on improving pt's ability to ambulate  Pt  Requiring occasional cues for sequence and safety  In comparison to previous session, Pt  With improvements in activity tolerance  Ambulation limited by pain, needs encouragement to participate  Pt is in need of continued activity in PT to improve strength balance endurance mobility transfers and ambulation with return to maximize LOF  From PT/mobility standpoint, recommendation at time of d/c would be HHPT in order to promote return to PLOF and independence  The patient's AM-PAC Basic Mobility Inpatient Short Form Raw Score is 20  A Raw score of greater than 16 suggests the patient may benefit from discharge to home  Please also refer to physical therapy recommendation for safe DC planning  Goals   LTG Expiration Date 07/06/23   PT Treatment Day 2   Plan   Treatment/Interventions   (Functional transfer training; LE strengthening/ROM; Elevations; Therapeutic exercise;  Endurance training; Gait training; Bed mobility)   Progress Slow progress, decreased activity tolerance   PT Frequency 3-5x/wk   Recommendation   PT Discharge Recommendation Home with home health rehabilitation   [address]   Turning in Flat Bed Without Bedrails 4   Lying on Back to Sitting on Edge of Flat Bed Without Bedrails 3   Moving Bed to Chair 3   Standing Up From Chair Using Arms 4   Walk in Room 3   Climb 3-5 Stairs With Railing 3   Basic Mobility Inpatient Raw Score 20   Basic Mobility Standardized Score 43 99   Highest Level Of Mobility   [facility] Goal 6: Walk 10 steps or more LUZ MARIA-KURTIS Achieved 7: Walk 25 feet or more   Education   Education Provided Mobility training   Patient Demonstrates verbal understanding   End of Consult   Patient Position at End of Consult Bedside chair;Bed/Chair alarm activated; All needs within reach   End of Consult Comments discussed POC with PT

## 2023-06-27 NOTE — PLAN OF CARE
Problem: MOBILITY - ADULT  Goal: Maintain or return to baseline ADL function  Description: INTERVENTIONS:  -  Assess patient's ability to carry out ADLs; assess patient's baseline for ADL function and identify physical deficits which impact ability to perform ADLs (bathing, care of mouth/teeth, toileting, grooming, dressing, etc )  - Assess/evaluate cause of self-care deficits   - Assess range of motion  - Assess patient's mobility; develop plan if impaired  - Assess patient's need for assistive devices and provide as appropriate  - Encourage maximum independence but intervene and supervise when necessary  - Involve family in performance of ADLs  - Assess for home care needs following discharge   - Consider OT consult to assist with ADL evaluation and planning for discharge  - Provide patient education as appropriate  Outcome: Progressing  Goal: Maintains/Returns to pre admission functional level  Description: INTERVENTIONS:  - Perform BMAT or MOVE assessment daily    - Set and communicate daily mobility goal to care team and patient/family/caregiver  - Collaborate with rehabilitation services on mobility goals if consulted  - Perform Range of Motion 3-4 times a day  - Reposition patient every 1-2 hours    - Dangle patient 3-4 times a day  - Stand patient 3-4 times a day  - Ambulate patient 3-4 times a day  - Out of bed to chair 3-4 times a day   - Out of bed for meals 3-4 times a day  - Out of bed for toileting  - Record patient progress and toleration of activity level   Outcome: Progressing     Problem: Prexisting or High Potential for Compromised Skin Integrity  Goal: Skin integrity is maintained or improved  Description: INTERVENTIONS:  - Identify patients at risk for skin breakdown  - Assess and monitor skin integrity  - Assess and monitor nutrition and hydration status  - Monitor labs   - Assess for incontinence   - Turn and reposition patient  - Assist with mobility/ambulation  - Relieve pressure over bony prominences  - Avoid friction and shearing  - Provide appropriate hygiene as needed including keeping skin clean and dry  - Evaluate need for skin moisturizer/barrier cream  - Collaborate with interdisciplinary team   - Patient/family teaching  - Consider wound care consult   Outcome: Progressing     Problem: GASTROINTESTINAL - ADULT  Goal: Maintains or returns to baseline bowel function  Description: INTERVENTIONS:  - Assess bowel function  - Encourage oral fluids to ensure adequate hydration  - Administer IV fluids if ordered to ensure adequate hydration  - Administer ordered medications as needed  - Encourage mobilization and activity  - Consider nutritional services referral to assist patient with adequate nutrition and appropriate food choices  Outcome: Progressing     Problem: SKIN/TISSUE INTEGRITY - ADULT  Goal: Incision(s), wounds(s) or drain site(s) healing without S/S of infection  Description: INTERVENTIONS  - Assess and document dressing, incision, wound bed, drain sites and surrounding tissue  - Provide patient and family education  - Perform skin care/dressing changes every 24 hrs  Outcome: Progressing     Problem: PAIN - ADULT  Goal: Verbalizes/displays adequate comfort level or baseline comfort level  Description: Interventions:  - Encourage patient to monitor pain and request assistance  - Assess pain using appropriate pain scale  - Administer analgesics based on type and severity of pain and evaluate response  - Implement non-pharmacological measures as appropriate and evaluate response  - Consider cultural and social influences on pain and pain management  - Notify physician/advanced practitioner if interventions unsuccessful or patient reports new pain  Outcome: Progressing     Problem: INFECTION - ADULT  Goal: Absence or prevention of progression during hospitalization  Description: INTERVENTIONS:  - Assess and monitor for signs and symptoms of infection  - Monitor lab/diagnostic results  - Monitor all insertion sites, i e  indwelling lines, tubes, and drains  - Monitor endotracheal if appropriate and nasal secretions for changes in amount and color  - [place] appropriate cooling/warming therapies per order  - Administer medications as ordered  - Instruct and encourage patient and family to use good hand hygiene technique  - Identify and instruct in appropriate isolation precautions for identified infection/condition  Outcome: Progressing     Problem: SAFETY ADULT  Goal: Maintain or return to baseline ADL function  Description: INTERVENTIONS:  -  Assess patient's ability to carry out ADLs; assess patient's baseline for ADL function and identify physical deficits which impact ability to perform ADLs (bathing, care of mouth/teeth, toileting, grooming, dressing, etc )  - Assess/evaluate cause of self-care deficits   - Assess range of motion  - Assess patient's mobility; develop plan if impaired  - Assess patient's need for assistive devices and provide as appropriate  - Encourage maximum independence but intervene and supervise when necessary  - Involve family in performance of ADLs  - Assess for home care needs following discharge   - Consider OT consult to assist with ADL evaluation and planning for discharge  - Provide patient education as appropriate  Outcome: Progressing  Goal: Maintains/Returns to pre admission functional level  Description: INTERVENTIONS:  - Perform BMAT or MOVE assessment daily    - Set and communicate daily mobility goal to care team and patient/family/caregiver  - Collaborate with rehabilitation services on mobility goals if consulted  - Perform Range of Motion 3-4 times a day  - Reposition patient every 1-2 hours    - Dangle patient 3-4 times a day  - Stand patient 3-4 times a day  - Ambulate patient 3-4 times a day  - Out of bed to chair 3-4 times a day   - Out of bed for meals 3-4 times a day  - Out of bed for toileting  - Record patient progress and toleration of activity level   Outcome: Progressing  Goal: Patient will remain free of falls  Description: INTERVENTIONS:  - Educate patient/family on patient safety including physical limitations  - Instruct patient to call for assistance with activity   - Consult OT/PT to assist with strengthening/mobility   - Keep Call bell within reach  - Keep bed low and locked with side rails adjusted as appropriate  - Keep care items and personal belongings within reach  - Initiate and maintain comfort rounds  - Make Fall Risk Sign visible to staff  - Offer Toileting every 2 Hours, in advance of need  - Initiate/Maintain bed alarm  - Obtain necessary fall risk management equipment: bed alarm  - Apply yellow socks and bracelet for high fall risk patients  - Consider moving patient to room near nurses station  Outcome: Progressing     Problem: METABOLIC, FLUID AND ELECTROLYTES - ADULT  Goal: Electrolytes maintained within normal limits  Description: INTERVENTIONS:  - Monitor labs and assess patient for signs and symptoms of electrolyte imbalances  - Administer electrolyte replacement as ordered  - Monitor response to electrolyte replacements, including repeat lab results as appropriate  - Instruct patient on fluid and nutrition as appropriate  Outcome: Progressing  Goal: Fluid balance maintained  Description: INTERVENTIONS:  - Monitor labs   - Monitor I/O and WT  - Instruct patient on fluid and nutrition as appropriate  - Assess for signs & symptoms of volume excess or deficit  Outcome: Progressing     Problem: Nutrition/Hydration-ADULT  Goal: Nutrient/Hydration intake appropriate for improving, restoring or maintaining nutritional needs  Description: Monitor and assess patient's nutrition/hydration status for malnutrition  Collaborate with interdisciplinary team and initiate plan and interventions as ordered  Monitor patient's weight and dietary intake as ordered or per policy  Utilize nutrition screening tool and intervene as necessary  Determine patient's food preferences and provide high-protein, high-caloric foods as appropriate       INTERVENTIONS:  - Monitor oral intake, urinary output, labs, and treatment plans  - Assess nutrition and hydration status and recommend course of action  - Evaluate amount of meals eaten  - Assist patient with eating if necessary   - Allow adequate time for meals  - Recommend/ encourage appropriate diets, oral nutritional supplements, and vitamin/mineral supplements  - Order, calculate, and assess calorie counts as needed  - Recommend, monitor, and adjust tube feedings and TPN/PPN based on assessed needs  - Assess need for intravenous fluids  - Provide specific nutrition/hydration education as appropriate  - Include patient/family/caregiver in decisions related to nutrition  Outcome: Progressing

## 2023-06-27 NOTE — ASSESSMENT & PLAN NOTE
Was recently discharged on [address] after having abdominal wall abscess that required antibiotics and IR drainage  Patient reports right flank pain and subjective fevers that began approximately 4 to 5 days ago  CT scan revealed stable right retroperitoneal 8 cm collection and possible abscess and complete drainage of right anterior abdominal wall fluid collection     S/P Abdominal fluid collection drainage by IR with drain placement 6/23/23   · body fluid culture/gram stain prelim showing 3+ Staph aureus   · ID consulted  · Discontinued IV Ceftriaxone and vanco and switched to cefazolin 2g q8h  · Trend culture for final growth and sensitivity; will tailor antibiotics based on those findings   · Likely will need 10 days course of antibiotics - keflex on dc through 7/5

## 2023-06-27 NOTE — PLAN OF CARE
Problem: MOBILITY - ADULT  Goal: Maintain or return to baseline ADL function  Description: INTERVENTIONS:  -  Assess patient's ability to carry out ADLs; assess patient's baseline for ADL function and identify physical deficits which impact ability to perform ADLs (bathing, care of mouth/teeth, toileting, grooming, dressing, etc )  - Assess/evaluate cause of self-care deficits   - Assess range of motion  - Assess patient's mobility; develop plan if impaired  - Assess patient's need for assistive devices and provide as appropriate  - Encourage maximum independence but intervene and supervise when necessary  - Involve family in performance of ADLs  - Assess for home care needs following discharge   - Consider OT consult to assist with ADL evaluation and planning for discharge  - Provide patient education as appropriate  Outcome: Progressing  Goal: Maintains/Returns to pre admission functional level  Description: INTERVENTIONS:  - Perform BMAT or MOVE assessment daily    - Set and communicate daily mobility goal to care team and patient/family/caregiver  - Collaborate with rehabilitation services on mobility goals if consulted  - Perform Range of Motion 3-4 times a day  - Reposition patient every 1-2 hours    - Dangle patient 3-4 times a day  - Stand patient 3-4 times a day  - Ambulate patient 3-4 times a day  - Out of bed to chair 3-4 times a day   - Out of bed for meals 3-4 times a day  - Out of bed for toileting  - Record patient progress and toleration of activity level   Outcome: Progressing     Problem: Prexisting or High Potential for Compromised Skin Integrity  Goal: Skin integrity is maintained or improved  Description: INTERVENTIONS:  - Identify patients at risk for skin breakdown  - Assess and monitor skin integrity  - Assess and monitor nutrition and hydration status  - Monitor labs   - Assess for incontinence   - Turn and reposition patient  - Assist with mobility/ambulation  - Relieve pressure over bony prominences  - Avoid friction and shearing  - Provide appropriate hygiene as needed including keeping skin clean and dry  - Evaluate need for skin moisturizer/barrier cream  - Collaborate with interdisciplinary team   - Patient/family teaching  - Consider wound care consult   Outcome: Progressing     Problem: GASTROINTESTINAL - ADULT  Goal: Maintains or returns to baseline bowel function  Description: INTERVENTIONS:  - Assess bowel function  - Encourage oral fluids to ensure adequate hydration  - Administer IV fluids if ordered to ensure adequate hydration  - Administer ordered medications as needed  - Encourage mobilization and activity  - Consider nutritional services referral to assist patient with adequate nutrition and appropriate food choices  Outcome: Progressing     Problem: SKIN/TISSUE INTEGRITY - ADULT  Goal: Incision(s), wounds(s) or drain site(s) healing without S/S of infection  Description: INTERVENTIONS  - Assess and document dressing, incision, wound bed, drain sites and surrounding tissue  - Provide patient and family education  - Perform skin care/dressing changes every 24 hrs  Outcome: Progressing     Problem: PAIN - ADULT  Goal: Verbalizes/displays adequate comfort level or baseline comfort level  Description: Interventions:  - Encourage patient to monitor pain and request assistance  - Assess pain using appropriate pain scale  - Administer analgesics based on type and severity of pain and evaluate response  - Implement non-pharmacological measures as appropriate and evaluate response  - Consider cultural and social influences on pain and pain management  - Notify physician/advanced practitioner if interventions unsuccessful or patient reports new pain  Outcome: Progressing     Problem: INFECTION - ADULT  Goal: Absence or prevention of progression during hospitalization  Description: INTERVENTIONS:  - Assess and monitor for signs and symptoms of infection  - Monitor lab/diagnostic results  - Monitor all insertion sites, i e  indwelling lines, tubes, and drains  - Monitor endotracheal if appropriate and nasal secretions for changes in amount and color  - [place] appropriate cooling/warming therapies per order  - Administer medications as ordered  - Instruct and encourage patient and family to use good hand hygiene technique  - Identify and instruct in appropriate isolation precautions for identified infection/condition  Outcome: Progressing     Problem: SAFETY ADULT  Goal: Maintain or return to baseline ADL function  Description: INTERVENTIONS:  -  Assess patient's ability to carry out ADLs; assess patient's baseline for ADL function and identify physical deficits which impact ability to perform ADLs (bathing, care of mouth/teeth, toileting, grooming, dressing, etc )  - Assess/evaluate cause of self-care deficits   - Assess range of motion  - Assess patient's mobility; develop plan if impaired  - Assess patient's need for assistive devices and provide as appropriate  - Encourage maximum independence but intervene and supervise when necessary  - Involve family in performance of ADLs  - Assess for home care needs following discharge   - Consider OT consult to assist with ADL evaluation and planning for discharge  - Provide patient education as appropriate  Outcome: Progressing  Goal: Maintains/Returns to pre admission functional level  Description: INTERVENTIONS:  - Perform BMAT or MOVE assessment daily    - Set and communicate daily mobility goal to care team and patient/family/caregiver  - Collaborate with rehabilitation services on mobility goals if consulted  - Perform Range of Motion 3-4 times a day  - Reposition patient every 1-2 hours    - Dangle patient 3-4 times a day  - Stand patient 3-4 times a day  - Ambulate patient 3-4 times a day  - Out of bed to chair 3-4 times a day   - Out of bed for meals 3-4 times a day  - Out of bed for toileting  - Record patient progress and toleration of activity level   Outcome: Progressing  Goal: Patient will remain free of falls  Description: INTERVENTIONS:  - Educate patient/family on patient safety including physical limitations  - Instruct patient to call for assistance with activity   - Consult OT/PT to assist with strengthening/mobility   - Keep Call bell within reach  - Keep bed low and locked with side rails adjusted as appropriate  - Keep care items and personal belongings within reach  - Initiate and maintain comfort rounds  - Make Fall Risk Sign visible to staff  - Offer Toileting every 2 Hours, in advance of need  - Initiate/Maintain fall alarm  - Obtain necessary fall risk management equipment: non-skid footwear  - Apply yellow socks and bracelet for high fall risk patients  - Consider moving patient to room near nurses station  Outcome: Progressing     Problem: METABOLIC, FLUID AND ELECTROLYTES - ADULT  Goal: Electrolytes maintained within normal limits  Description: INTERVENTIONS:  - Monitor labs and assess patient for signs and symptoms of electrolyte imbalances  - Administer electrolyte replacement as ordered  - Monitor response to electrolyte replacements, including repeat lab results as appropriate  - Instruct patient on fluid and nutrition as appropriate  Outcome: Progressing  Goal: Fluid balance maintained  Description: INTERVENTIONS:  - Monitor labs   - Monitor I/O and WT  - Instruct patient on fluid and nutrition as appropriate  - Assess for signs & symptoms of volume excess or deficit  Outcome: Progressing     Problem: Nutrition/Hydration-ADULT  Goal: Nutrient/Hydration intake appropriate for improving, restoring or maintaining nutritional needs  Description: Monitor and assess patient's nutrition/hydration status for malnutrition  Collaborate with interdisciplinary team and initiate plan and interventions as ordered  Monitor patient's weight and dietary intake as ordered or per policy   Utilize nutrition screening tool and intervene as necessary  Determine patient's food preferences and provide high-protein, high-caloric foods as appropriate       INTERVENTIONS:  - Monitor oral intake, urinary output, labs, and treatment plans  - Assess nutrition and hydration status and recommend course of action  - Evaluate amount of meals eaten  - Assist patient with eating if necessary   - Allow adequate time for meals  - Recommend/ encourage appropriate diets, oral nutritional supplements, and vitamin/mineral supplements  - Order, calculate, and assess calorie counts as needed  - Recommend, monitor, and adjust tube feedings and TPN/PPN based on assessed needs  - Assess need for intravenous fluids  - Provide specific nutrition/hydration education as appropriate  - Include patient/family/caregiver in decisions related to nutrition  Outcome: Progressing

## 2023-06-27 NOTE — PLAN OF CARE
Problem: PHYSICAL THERAPY ADULT  Goal: Performs mobility at highest level of function for planned discharge setting  See evaluation for individualized goals  Description: Treatment/Interventions: Functional transfer training, LE strengthening/ROM, Elevations, Therapeutic exercise, Endurance training, Gait training, Bed mobility          See flowsheet documentation for full assessment, interventions and recommendations  Outcome: Progressing  Note: Prognosis: Good  Problem List:  (Decreased strength; Decreased endurance; Impaired balance; Decreased mobility; Impaired judgement;  Pain)  Assessment: Pt  seen for PT treatment session this date with interventions consisting of  therapeutic exercises, bed mobility, transfers and  gait training w/ emphasis on improving pt's ability to ambulate  Pt  Requiring occasional cues for sequence and safety  In comparison to previous session, Pt  With improvements in activity tolerance  Ambulation limited by pain, needs encouragement to participate  Pt is in need of continued activity in PT to improve strength balance endurance mobility transfers and ambulation with return to maximize LOF  From PT/mobility standpoint, recommendation at time of d/c would be HHPT in order to promote return to PLOF and independence  The patient's AM-PAC Basic Mobility Inpatient Short Form Raw Score is 20  A Raw score of greater than 16 suggests the patient may benefit from discharge to home  Please also refer to physical therapy recommendation for safe DC planning  PT Discharge Recommendation: Home with home health rehabilitation    See flowsheet documentation for full assessment

## 2023-06-28 VITALS
TEMPERATURE: 98 F | DIASTOLIC BLOOD PRESSURE: 71 MMHG | HEART RATE: 55 BPM | BODY MASS INDEX: 21 KG/M2 | HEIGHT: 67 IN | RESPIRATION RATE: 18 BRPM | OXYGEN SATURATION: 96 % | SYSTOLIC BLOOD PRESSURE: 151 MMHG | WEIGHT: 133.82 LBS

## 2023-06-28 LAB
ALBUMIN SERPL BCP-MCNC: 3.4 G/DL (ref 3.5–5)
ALP SERPL-CCNC: 66 U/L (ref 34–104)
ALT SERPL W P-5'-P-CCNC: 4 U/L (ref 7–52)
ANION GAP SERPL CALCULATED.3IONS-SCNC: 9 MMOL/L
AST SERPL W P-5'-P-CCNC: 12 U/L (ref 13–39)
BASOPHILS # BLD AUTO: 0.04 THOUSANDS/ÂΜL (ref 0–0.1)
BASOPHILS NFR BLD AUTO: 1 % (ref 0–1)
BILIRUB SERPL-MCNC: 0.48 MG/DL (ref 0.2–1)
BUN SERPL-MCNC: 10 MG/DL (ref 5–25)
CALCIUM ALBUM COR SERPL-MCNC: 9.3 MG/DL (ref 8.3–10.1)
CALCIUM SERPL-MCNC: 8.8 MG/DL (ref 8.4–10.2)
CHLORIDE SERPL-SCNC: 105 MMOL/L (ref 96–108)
CO2 SERPL-SCNC: 25 MMOL/L (ref 21–32)
CREAT SERPL-MCNC: 1.14 MG/DL (ref 0.6–1.3)
EOSINOPHIL # BLD AUTO: 0.12 THOUSAND/ÂΜL (ref 0–0.61)
EOSINOPHIL NFR BLD AUTO: 2 % (ref 0–6)
ERYTHROCYTE [DISTWIDTH] IN BLOOD BY AUTOMATED COUNT: 14.8 % (ref 11.6–15.1)
GFR SERPL CREATININE-BSD FRML MDRD: 70 ML/MIN/1.73SQ M
GLUCOSE SERPL-MCNC: 77 MG/DL (ref 65–140)
HCT VFR BLD AUTO: 36.5 % (ref 36.5–49.3)
HGB BLD-MCNC: 12.3 G/DL (ref 12–17)
IMM GRANULOCYTES # BLD AUTO: 0.06 THOUSAND/UL (ref 0–0.2)
IMM GRANULOCYTES NFR BLD AUTO: 1 % (ref 0–2)
LYMPHOCYTES # BLD AUTO: 1.4 THOUSANDS/ÂΜL (ref 0.6–4.47)
LYMPHOCYTES NFR BLD AUTO: 22 % (ref 14–44)
MAGNESIUM SERPL-MCNC: 2.1 MG/DL (ref 1.9–2.7)
MCH RBC QN AUTO: 29.1 PG (ref 26.8–34.3)
MCHC RBC AUTO-ENTMCNC: 33.7 G/DL (ref 31.4–37.4)
MCV RBC AUTO: 87 FL (ref 82–98)
MONOCYTES # BLD AUTO: 0.44 THOUSAND/ÂΜL (ref 0.17–1.22)
MONOCYTES NFR BLD AUTO: 7 % (ref 4–12)
NEUTROPHILS # BLD AUTO: 4.27 THOUSANDS/ÂΜL (ref 1.85–7.62)
NEUTS SEG NFR BLD AUTO: 67 % (ref 43–75)
NRBC BLD AUTO-RTO: 0 /100 WBCS
PHOSPHATE SERPL-MCNC: 2.6 MG/DL (ref 2.7–4.5)
PLATELET # BLD AUTO: 225 THOUSANDS/UL (ref 149–390)
PMV BLD AUTO: 9.5 FL (ref 8.9–12.7)
POTASSIUM SERPL-SCNC: 3.5 MMOL/L (ref 3.5–5.3)
PROCALCITONIN SERPL-MCNC: 0.07 NG/ML
PROT SERPL-MCNC: 6.5 G/DL (ref 6.4–8.4)
RBC # BLD AUTO: 4.22 MILLION/UL (ref 3.88–5.62)
SODIUM SERPL-SCNC: 139 MMOL/L (ref 135–147)
WBC # BLD AUTO: 6.33 THOUSAND/UL (ref 4.31–10.16)

## 2023-06-28 PROCEDURE — 85025 COMPLETE CBC W/AUTO DIFF WBC: CPT | Performed by: INTERNAL MEDICINE

## 2023-06-28 PROCEDURE — 83735 ASSAY OF MAGNESIUM: CPT | Performed by: INTERNAL MEDICINE

## 2023-06-28 PROCEDURE — 97116 GAIT TRAINING THERAPY: CPT

## 2023-06-28 PROCEDURE — 87522 HEPATITIS C REVRS TRNSCRPJ: CPT | Performed by: INTERNAL MEDICINE

## 2023-06-28 PROCEDURE — 97530 THERAPEUTIC ACTIVITIES: CPT

## 2023-06-28 PROCEDURE — 84100 ASSAY OF PHOSPHORUS: CPT | Performed by: INTERNAL MEDICINE

## 2023-06-28 PROCEDURE — 99239 HOSP IP/OBS DSCHRG MGMT >30: CPT | Performed by: INTERNAL MEDICINE

## 2023-06-28 PROCEDURE — 80053 COMPREHEN METABOLIC PANEL: CPT

## 2023-06-28 PROCEDURE — 87521 HEPATITIS C PROBE&RVRS TRNSC: CPT | Performed by: INTERNAL MEDICINE

## 2023-06-28 PROCEDURE — 84145 PROCALCITONIN (PCT): CPT | Performed by: INTERNAL MEDICINE

## 2023-06-28 RX ORDER — CEPHALEXIN 500 MG/1
500 CAPSULE ORAL EVERY 6 HOURS SCHEDULED
Qty: 29 CAPSULE | Refills: 0 | Status: SHIPPED | OUTPATIENT
Start: 2023-06-28 | End: 2023-07-06

## 2023-06-28 RX ORDER — METHOCARBAMOL 500 MG/1
500 TABLET, FILM COATED ORAL 4 TIMES DAILY PRN
Qty: 40 TABLET | Refills: 0 | Status: SHIPPED | OUTPATIENT
Start: 2023-06-28

## 2023-06-28 RX ADMIN — METHADONE HYDROCHLORIDE 110 MG: 10 TABLET ORAL at 08:35

## 2023-06-28 RX ADMIN — SODIUM CHLORIDE 125 ML/HR: 0.9 INJECTION, SOLUTION INTRAVENOUS at 00:22

## 2023-06-28 RX ADMIN — HEPARIN SODIUM 5000 UNITS: 5000 INJECTION INTRAVENOUS; SUBCUTANEOUS at 05:42

## 2023-06-28 RX ADMIN — CEPHALEXIN 500 MG: 250 CAPSULE ORAL at 11:31

## 2023-06-28 RX ADMIN — CEPHALEXIN 500 MG: 250 CAPSULE ORAL at 05:42

## 2023-06-28 NOTE — PLAN OF CARE
Problem: MOBILITY - ADULT  Goal: Maintain or return to baseline ADL function  Description: INTERVENTIONS:  -  Assess patient's ability to carry out ADLs; assess patient's baseline for ADL function and identify physical deficits which impact ability to perform ADLs (bathing, care of mouth/teeth, toileting, grooming, dressing, etc )  - Assess/evaluate cause of self-care deficits   - Assess range of motion  - Assess patient's mobility; develop plan if impaired  - Assess patient's need for assistive devices and provide as appropriate  - Encourage maximum independence but intervene and supervise when necessary  - Involve family in performance of ADLs  - Assess for home care needs following discharge   - Consider OT consult to assist with ADL evaluation and planning for discharge  - Provide patient education as appropriate  6/28/2023 1404 by Kristen Cheek RN  Outcome: Adequate for Discharge  6/28/2023 1040 by Kristen Cheek RN  Outcome: Progressing  Goal: Maintains/Returns to pre admission functional level  Description: INTERVENTIONS:  - Perform BMAT or MOVE assessment daily    - Set and communicate daily mobility goal to care team and patient/family/caregiver  - Collaborate with rehabilitation services on mobility goals if consulted  - Perform Range of Motion 3-4 times a day  - Reposition patient every 1-2 hours    - Dangle patient 3-4 times a day  - Stand patient 3-4 times a day  - Ambulate patient 3-4 times a day  - Out of bed to chair 3-4 times a day   - Out of bed for meals 3-4 times a day  - Out of bed for toileting  - Record patient progress and toleration of activity level   6/28/2023 1404 by Kristen Cheek RN  Outcome: Adequate for Discharge  6/28/2023 1040 by Kristen Cheek RN  Outcome: Progressing     Problem: Prexisting or High Potential for Compromised Skin Integrity  Goal: Skin integrity is maintained or improved  Description: INTERVENTIONS:  - Identify patients at risk for skin breakdown  - Assess and monitor skin integrity  - Assess and monitor nutrition and hydration status  - Monitor labs   - Assess for incontinence   - Turn and reposition patient  - Assist with mobility/ambulation  - Relieve pressure over bony prominences  - Avoid friction and shearing  - Provide appropriate hygiene as needed including keeping skin clean and dry  - Evaluate need for skin moisturizer/barrier cream  - Collaborate with interdisciplinary team   - Patient/family teaching  - Consider wound care consult   6/28/2023 1404 by Stormy Zendejas RN  Outcome: Adequate for Discharge  6/28/2023 1040 by Stormy Zendejas RN  Outcome: Progressing     Problem: GASTROINTESTINAL - ADULT  Goal: Maintains or returns to baseline bowel function  Description: INTERVENTIONS:  - Assess bowel function  - Encourage oral fluids to ensure adequate hydration  - Administer IV fluids if ordered to ensure adequate hydration  - Administer ordered medications as needed  - Encourage mobilization and activity  - Consider nutritional services referral to assist patient with adequate nutrition and appropriate food choices  6/28/2023 1404 by Stormy Zendejas RN  Outcome: Adequate for Discharge  6/28/2023 1040 by Stormy Zendejas RN  Outcome: Progressing     Problem: SKIN/TISSUE INTEGRITY - ADULT  Goal: Incision(s), wounds(s) or drain site(s) healing without S/S of infection  Description: INTERVENTIONS  - Assess and document dressing, incision, wound bed, drain sites and surrounding tissue  - Provide patient and family education  - Perform skin care/dressing changes every 24 hrs  6/28/2023 1404 by Sotrmy Zendejas RN  Outcome: Adequate for Discharge  6/28/2023 1040 by Stormy Zendejas RN  Outcome: Progressing     Problem: PAIN - ADULT  Goal: Verbalizes/displays adequate comfort level or baseline comfort level  Description: Interventions:  - Encourage patient to monitor pain and request assistance  - Assess pain using appropriate pain scale  - Administer analgesics based on type and severity of pain and evaluate response  - Implement non-pharmacological measures as appropriate and evaluate response  - Consider cultural and social influences on pain and pain management  - Notify physician/advanced practitioner if interventions unsuccessful or patient reports new pain  6/28/2023 1404 by Sheryl Perez RN  Outcome: Adequate for Discharge  6/28/2023 1040 by Sheryl Perez RN  Outcome: Progressing     Problem: INFECTION - ADULT  Goal: Absence or prevention of progression during hospitalization  Description: INTERVENTIONS:  - Assess and monitor for signs and symptoms of infection  - Monitor lab/diagnostic results  - Monitor all insertion sites, i e  indwelling lines, tubes, and drains  - Monitor endotracheal if appropriate and nasal secretions for changes in amount and color  - [place] appropriate cooling/warming therapies per order  - Administer medications as ordered  - Instruct and encourage patient and family to use good hand hygiene technique  - Identify and instruct in appropriate isolation precautions for identified infection/condition  6/28/2023 1404 by Sheryl Perez RN  Outcome: Adequate for Discharge  6/28/2023 1040 by Sheryl Perez RN  Outcome: Progressing     Problem: SAFETY ADULT  Goal: Maintain or return to baseline ADL function  Description: INTERVENTIONS:  -  Assess patient's ability to carry out ADLs; assess patient's baseline for ADL function and identify physical deficits which impact ability to perform ADLs (bathing, care of mouth/teeth, toileting, grooming, dressing, etc )  - Assess/evaluate cause of self-care deficits   - Assess range of motion  - Assess patient's mobility; develop plan if impaired  - Assess patient's need for assistive devices and provide as appropriate  - Encourage maximum independence but intervene and supervise when necessary  - Involve family in performance of ADLs  - Assess for home care needs following discharge   - Consider OT consult to assist with ADL evaluation and planning for discharge  - Provide patient education as appropriate  6/28/2023 1404 by Ricardo Beckford RN  Outcome: Adequate for Discharge  6/28/2023 1040 by Ricardo Beckford RN  Outcome: Progressing  Goal: Maintains/Returns to pre admission functional level  Description: INTERVENTIONS:  - Perform BMAT or MOVE assessment daily    - Set and communicate daily mobility goal to care team and patient/family/caregiver  - Collaborate with rehabilitation services on mobility goals if consulted  - Perform Range of Motion 3-4 times a day  - Reposition patient every 1-2 hours    - Dangle patient 3-4 times a day  - Stand patient 3-4 times a day  - Ambulate patient 3-4 times a day  - Out of bed to chair 3-4 times a day   - Out of bed for meals 3-4 times a day  - Out of bed for toileting  - Record patient progress and toleration of activity level   6/28/2023 1404 by Ricardo Beckford RN  Outcome: Adequate for Discharge  6/28/2023 1040 by Ricardo Beckford RN  Outcome: Progressing  Goal: Patient will remain free of falls  Description: INTERVENTIONS:  - Educate patient/family on patient safety including physical limitations  - Instruct patient to call for assistance with activity   - Consult OT/PT to assist with strengthening/mobility   - Keep Call bell within reach  - Keep bed low and locked with side rails adjusted as appropriate  - Keep care items and personal belongings within reach  - Initiate and maintain comfort rounds  - Make Fall Risk Sign visible to staff  - Offer Toileting every 2 Hours, in advance of need  - Initiate/Maintain bed alarm  - Obtain necessary fall risk management equipment: bed alarm  - Apply yellow socks and bracelet for high fall risk patients  - Consider moving patient to room near nurses station  6/28/2023 1404 by Ricardo Beckford RN  Outcome: Adequate for Discharge  6/28/2023 1040 by Ricardo Beckford RN  Outcome: Progressing     Problem: METABOLIC, FLUID AND ELECTROLYTES - ADULT  Goal: Electrolytes maintained within normal limits  Description: INTERVENTIONS:  - Monitor labs and assess patient for signs and symptoms of electrolyte imbalances  - Administer electrolyte replacement as ordered  - Monitor response to electrolyte replacements, including repeat lab results as appropriate  - Instruct patient on fluid and nutrition as appropriate  6/28/2023 1404 by Laurence Andres RN  Outcome: Adequate for Discharge  6/28/2023 1040 by Laurence Andres RN  Outcome: Progressing  Goal: Fluid balance maintained  Description: INTERVENTIONS:  - Monitor labs   - Monitor I/O and WT  - Instruct patient on fluid and nutrition as appropriate  - Assess for signs & symptoms of volume excess or deficit  6/28/2023 1404 by Laurence Andres RN  Outcome: Adequate for Discharge  6/28/2023 1040 by Laurence Andres RN  Outcome: Progressing     Problem: Nutrition/Hydration-ADULT  Goal: Nutrient/Hydration intake appropriate for improving, restoring or maintaining nutritional needs  Description: Monitor and assess patient's nutrition/hydration status for malnutrition  Collaborate with interdisciplinary team and initiate plan and interventions as ordered  Monitor patient's weight and dietary intake as ordered or per policy  Utilize nutrition screening tool and intervene as necessary  Determine patient's food preferences and provide high-protein, high-caloric foods as appropriate       INTERVENTIONS:  - Monitor oral intake, urinary output, labs, and treatment plans  - Assess nutrition and hydration status and recommend course of action  - Evaluate amount of meals eaten  - Assist patient with eating if necessary   - Allow adequate time for meals  - Recommend/ encourage appropriate diets, oral nutritional supplements, and vitamin/mineral supplements  - Order, calculate, and assess calorie counts as needed  - Recommend, monitor, and adjust tube feedings and TPN/PPN based on assessed needs  - Assess need for intravenous fluids  - Provide specific nutrition/hydration education as appropriate  - Include patient/family/caregiver in decisions related to nutrition  6/28/2023 1404 by Thuy Epstein, RN  Outcome: Adequate for Discharge  6/28/2023 1040 by Thuy Epstein, RN  Outcome: Progressing

## 2023-06-28 NOTE — PLAN OF CARE
Problem: PHYSICAL THERAPY ADULT  Goal: Performs mobility at highest level of function for planned discharge setting  See evaluation for individualized goals  Description: Treatment/Interventions: Functional transfer training, LE strengthening/ROM, Elevations, Therapeutic exercise, Endurance training, Gait training, Bed mobility          See flowsheet documentation for full assessment, interventions and recommendations  Outcome: Progressing  Note: Prognosis: Good  Problem List:  (Decreased strength; Decreased endurance; Impaired balance; Decreased mobility)  Assessment: Pt  seen for PT treatment session this date with interventions consisting of  bed mobility, transfers and  gait training w/ emphasis on improving pt's ability to ambulate  In comparison to previous session, Pt  With improvements in activity tolerance  No c/o pain with tx/ambulation this session  Pt is in need of continued activity in PT to improve strength balance endurance mobility transfers and ambulation with return to maximize LOF  From PT/mobility standpoint, recommendation at time of d/c would be HHPT in order to promote return to PLOF and independence  The patient's AM-PAC Basic Mobility Inpatient Short Form Raw Score is 20  A Raw score of greater than 16 suggests the patient may benefit from discharge to home  Please also refer to physical therapy recommendation for safe DC planning  PT Discharge Recommendation: Home with home health rehabilitation    See flowsheet documentation for full assessment

## 2023-06-28 NOTE — PHYSICAL THERAPY NOTE
PHYSICAL THERAPY NOTE          Patient Name: Reinaldo Justin  PCPUP'C Date: 6/28/2023 06/28/23 0804   PT Last Visit   PT Visit Date 06/28/23   Note Type   Note Type Treatment   Pain Assessment   Pain Assessment Tool 0-10   Pain Score No Pain   Restrictions/Precautions   Weight Bearing Precautions Per Order No   Other Precautions Bed Alarm; Chair Alarm; Fall Risk   General   Response to Previous Treatment Patient with no complaints from previous session  Family/Caregiver Present No   Cognition   Overall Cognitive Status WFL   Following Commands Follows one step commands without difficulty   Subjective   Subjective Agreeable to therapy  No pain  Bed Mobility   Supine to Sit 6  Modified independent   Additional items HOB elevated; Bedrails; Increased time required   Additional Comments Sat EOB with good sitting balance  Transfers   Sit to Stand 5  Supervision   Stand to Sit 5  Supervision   Stand pivot 5  Supervision   Additional Comments no device   Ambulation/Elevation   Gait pattern   (Excessively slow; Short stride; Decreased foot clearance; Narrow JAYLA; Improper Weight shift)   Gait Assistance 5  Supervision   Additional items Verbal cues   Assistive Device None   Distance ~ 70' in room   Balance   Static Sitting Good   Dynamic Sitting Fair +   Static Standing Fair   Dynamic Standing Fair   Ambulatory Fair   Endurance Deficit   Endurance Deficit Yes   Activity Tolerance   Activity Tolerance Patient limited by fatigue   Assessment   Prognosis Good   Problem List   (Decreased strength; Decreased endurance; Impaired balance; Decreased mobility)   Assessment Pt  seen for PT treatment session this date with interventions consisting of  bed mobility, transfers and  gait training w/ emphasis on improving pt's ability to ambulate  In comparison to previous session, Pt  With improvements in activity tolerance   No c/o pain with tx/ambulation this session  Pt is in need of continued activity in PT to improve strength balance endurance mobility transfers and ambulation with return to maximize LOF  From PT/mobility standpoint, recommendation at time of d/c would be HHPT in order to promote return to PLOF and independence  The patient's AM-PAC Basic Mobility Inpatient Short Form Raw Score is 20  A Raw score of greater than 16 suggests the patient may benefit from discharge to home  Please also refer to physical therapy recommendation for safe DC planning  Goals   LTG Expiration Date 07/06/23   PT Treatment Day 3   Plan   Progress Progressing toward goals   PT Frequency 3-5x/wk   Recommendation   PT Discharge Recommendation Home with home health rehabilitation   AM-PAC Basic Mobility Inpatient   Turning in Flat Bed Without Bedrails 4   Lying on Back to Sitting on Edge of Flat Bed Without Bedrails 3   Moving Bed to Chair 3   Standing Up From Chair Using Arms 4   Walk in Room 3   Climb 3-5 Stairs With Railing 3   Basic Mobility Inpatient Raw Score 20   Basic Mobility Standardized Score 43 99   Highest Level Of Mobility   JH-HLM Goal 6: Walk 10 steps or more   JH-HLM Achieved 7: Walk 25 feet or more   Education   Education Provided Mobility training   Patient Demonstrates verbal understanding   End of Consult   Patient Position at End of Consult Bedside chair;Bed/Chair alarm activated; All needs within reach   End of Consult Comments discussed POC with PT

## 2023-06-28 NOTE — PLAN OF CARE
Problem: MOBILITY - ADULT  Goal: Maintain or return to baseline ADL function  Description: INTERVENTIONS:  -  Assess patient's ability to carry out ADLs; assess patient's baseline for ADL function and identify physical deficits which impact ability to perform ADLs (bathing, care of mouth/teeth, toileting, grooming, dressing, etc )  - Assess/evaluate cause of self-care deficits   - Assess range of motion  - Assess patient's mobility; develop plan if impaired  - Assess patient's need for assistive devices and provide as appropriate  - Encourage maximum independence but intervene and supervise when necessary  - Involve family in performance of ADLs  - Assess for home care needs following discharge   - Consider OT consult to assist with ADL evaluation and planning for discharge  - Provide patient education as appropriate  Outcome: Progressing  Goal: Maintains/Returns to pre admission functional level  Description: INTERVENTIONS:  - Perform BMAT or MOVE assessment daily    - Set and communicate daily mobility goal to care team and patient/family/caregiver  - Collaborate with rehabilitation services on mobility goals if consulted  - Perform Range of Motion 3-4 times a day  - Reposition patient every 1-2 hours    - Dangle patient 3-4 times a day  - Stand patient 3-4 times a day  - Ambulate patient 3-4 times a day  - Out of bed to chair 3-4 times a day   - Out of bed for meals 3-4 times a day  - Out of bed for toileting  - Record patient progress and toleration of activity level   Outcome: Progressing     Problem: Prexisting or High Potential for Compromised Skin Integrity  Goal: Skin integrity is maintained or improved  Description: INTERVENTIONS:  - Identify patients at risk for skin breakdown  - Assess and monitor skin integrity  - Assess and monitor nutrition and hydration status  - Monitor labs   - Assess for incontinence   - Turn and reposition patient  - Assist with mobility/ambulation  - Relieve pressure over bony prominences  - Avoid friction and shearing  - Provide appropriate hygiene as needed including keeping skin clean and dry  - Evaluate need for skin moisturizer/barrier cream  - Collaborate with interdisciplinary team   - Patient/family teaching  - Consider wound care consult   Outcome: Progressing     Problem: GASTROINTESTINAL - ADULT  Goal: Maintains or returns to baseline bowel function  Description: INTERVENTIONS:  - Assess bowel function  - Encourage oral fluids to ensure adequate hydration  - Administer IV fluids if ordered to ensure adequate hydration  - Administer ordered medications as needed  - Encourage mobilization and activity  - Consider nutritional services referral to assist patient with adequate nutrition and appropriate food choices  Outcome: Progressing     Problem: SKIN/TISSUE INTEGRITY - ADULT  Goal: Incision(s), wounds(s) or drain site(s) healing without S/S of infection  Description: INTERVENTIONS  - Assess and document dressing, incision, wound bed, drain sites and surrounding tissue  - Provide patient and family education  - Perform skin care/dressing changes every 24 hrs  Outcome: Progressing     Problem: METABOLIC, FLUID AND ELECTROLYTES - ADULT  Goal: Electrolytes maintained within normal limits  Description: INTERVENTIONS:  - Monitor labs and assess patient for signs and symptoms of electrolyte imbalances  - Administer electrolyte replacement as ordered  - Monitor response to electrolyte replacements, including repeat lab results as appropriate  - Instruct patient on fluid and nutrition as appropriate  Outcome: Progressing  Goal: Fluid balance maintained  Description: INTERVENTIONS:  - Monitor labs   - Monitor I/O and WT  - Instruct patient on fluid and nutrition as appropriate  - Assess for signs & symptoms of volume excess or deficit  Outcome: Progressing     Problem: PAIN - ADULT  Goal: Verbalizes/displays adequate comfort level or baseline comfort level  Description: Interventions:  - Encourage patient to monitor pain and request assistance  - Assess pain using appropriate pain scale  - Administer analgesics based on type and severity of pain and evaluate response  - Implement non-pharmacological measures as appropriate and evaluate response  - Consider cultural and social influences on pain and pain management  - Notify physician/advanced practitioner if interventions unsuccessful or patient reports new pain  Outcome: Progressing     Problem: INFECTION - ADULT  Goal: Absence or prevention of progression during hospitalization  Description: INTERVENTIONS:  - Assess and monitor for signs and symptoms of infection  - Monitor lab/diagnostic results  - Monitor all insertion sites, i e  indwelling lines, tubes, and drains  - Monitor endotracheal if appropriate and nasal secretions for changes in amount and color  - [place] appropriate cooling/warming therapies per order  - Administer medications as ordered  - Instruct and encourage patient and family to use good hand hygiene technique  - Identify and instruct in appropriate isolation precautions for identified infection/condition  Outcome: Progressing     Problem: SAFETY ADULT  Goal: Maintain or return to baseline ADL function  Description: INTERVENTIONS:  -  Assess patient's ability to carry out ADLs; assess patient's baseline for ADL function and identify physical deficits which impact ability to perform ADLs (bathing, care of mouth/teeth, toileting, grooming, dressing, etc )  - Assess/evaluate cause of self-care deficits   - Assess range of motion  - Assess patient's mobility; develop plan if impaired  - Assess patient's need for assistive devices and provide as appropriate  - Encourage maximum independence but intervene and supervise when necessary  - Involve family in performance of ADLs  - Assess for home care needs following discharge   - Consider OT consult to assist with ADL evaluation and planning for discharge  - Provide patient education as appropriate  Outcome: Progressing  Goal: Maintains/Returns to pre admission functional level  Description: INTERVENTIONS:  - Perform BMAT or MOVE assessment daily    - Set and communicate daily mobility goal to care team and patient/family/caregiver  - Collaborate with rehabilitation services on mobility goals if consulted  - Perform Range of Motion 3-4 times a day  - Reposition patient every 1-2 hours  - Dangle patient 3-4 times a day  - Stand patient 3-4 times a day  - Ambulate patient 3-4 times a day  - Out of bed to chair 3-4 times a day   - Out of bed for meals 3-4 times a day  - Out of bed for toileting  - Record patient progress and toleration of activity level   Outcome: Progressing  Goal: Patient will remain free of falls  Description: INTERVENTIONS:  - Educate patient/family on patient safety including physical limitations  - Instruct patient to call for assistance with activity   - Consult OT/PT to assist with strengthening/mobility   - Keep Call bell within reach  - Keep bed low and locked with side rails adjusted as appropriate  - Keep care items and personal belongings within reach  - Initiate and maintain comfort rounds  - Make Fall Risk Sign visible to staff  - Offer Toileting every 4 Hours, in advance of need  - Initiate/Maintain bed alarm  - Obtain necessary fall risk management equipment  - Apply yellow socks and bracelet for high fall risk patients  - Consider moving patient to room near nurses station  Outcome: Progressing     Problem: Nutrition/Hydration-ADULT  Goal: Nutrient/Hydration intake appropriate for improving, restoring or maintaining nutritional needs  Description: Monitor and assess patient's nutrition/hydration status for malnutrition  Collaborate with interdisciplinary team and initiate plan and interventions as ordered  Monitor patient's weight and dietary intake as ordered or per policy  Utilize nutrition screening tool and intervene as necessary  Determine patient's food preferences and provide high-protein, high-caloric foods as appropriate       INTERVENTIONS:  - Monitor oral intake, urinary output, labs, and treatment plans  - Assess nutrition and hydration status and recommend course of action  - Evaluate amount of meals eaten  - Assist patient with eating if necessary   - Allow adequate time for meals  - Recommend/ encourage appropriate diets, oral nutritional supplements, and vitamin/mineral supplements  - Order, calculate, and assess calorie counts as needed  - Recommend, monitor, and adjust tube feedings and TPN/PPN based on assessed needs  - Assess need for intravenous fluids  - Provide specific nutrition/hydration education as appropriate  - Include patient/family/caregiver in decisions related to nutrition  Outcome: Progressing

## 2023-06-28 NOTE — NURSING NOTE
Discharge instructions reviewed with pt and mother at bedside  All questions answered  IV site removed   Pt escorted off floor via wheelchair

## 2023-06-28 NOTE — ASSESSMENT & PLAN NOTE
Intermediate memory loss  The patient and family for the past 6 months at least   Same since onset  · No progression or change since hospitalization    · Folate, B12, and TSH normal  · Encourage follow up with PCP   · Currently alert and oriented

## 2023-06-28 NOTE — DISCHARGE SUMMARY
[facility] [address]  Discharge- Sherie Meyers 1965, 61 y o  male MRN: [id number]  Unit/Bed#: 411-01 Encounter: [id number]  Primary Care Provider: No primary care provider on file  Date and time admitted to hospital: 6/21/2023  1:15 PM    * Abdominal fluid collection  Assessment & Plan  Was recently discharged on Annetta 10 after having abdominal wall abscess that required antibiotics and IR drainage  Patient reports right flank pain and subjective fevers that began approximately 4 to 5 days ago  CT scan revealed stable right retroperitoneal 8 cm collection and possible abscess and complete drainage of right anterior abdominal wall fluid collection     S/P Abdominal fluid collection drainage by IR with drain placement 6/23/23   · body fluid culture/gram stain prelim showing 3+ Staph aureus   · ID consulted  · Discontinued IV Ceftriaxone and vanco and switched to cefazolin 2g q8h  · Trend culture for final growth and sensitivity; will tailor antibiotics based on those findings   · will need 10 days course of antibiotics - keflex on dc through 7/5  · Follow up imaging to be performed with urology follow up    Hepatitis C antibody positive in blood  Assessment & Plan  · Hep C antibody positive on hepatitis panel  · Hep C genotype and RNA PCR pending  · RUQ US pending - shows hepatomegaly with possible early cirrhosis  · GI consulted, should follow outpatient  · Patient admits to history of hep C and states he was treated for it in the past but that it keeps recurring  Patient admits to using IV drugs but states that he doesn't remember when the last time he used IV drugs was    Memory loss  Assessment & Plan  Intermediate memory loss  The patient and family for the past 6 months at least   Same since onset  · No progression or change since hospitalization    · Folate, B12, and TSH normal  · Encourage follow up with PCP   · Currently alert and oriented    Moderate protein-calorie malnutrition [facility])  Assessment & Plan  Malnutrition Findings:   Adult Malnutrition type: Chronic illness  Adult Degree of Malnutrition: Malnutrition of moderate degree  Malnutrition Characteristics: Fat loss, Muscle loss, Inadequate energy                  360 Statement: Chronic, moderate protein calorie malnutrition related to chronic illness with decreased appetite as evidenced by mild muscle wasting to clavicle and temple regions and significant weight loss 14 7% x 6 months  Treating with PO diet  Patient is not interested in nutrition supplements at this time  Encourage adequate PO intakes  BMI Findings: Body mass index is 20 96 kg/m²  Abscess of postoperative wound of abdominal wall  Assessment & Plan  · See plan as mentioned above     Methadone dependence   Assessment & Plan  · Continue methadone      Medical Problems     Resolved Problems  Date Reviewed: 6/27/2023          Resolved    Acute encephalopathy 6/23/2023     Resolved by  Denisse Mari MD        Discharging Physician / Practitioner: Louie Parra PA-C  PCP: No primary care provider on file  Admission Date:   Admission Orders (From admission, onward)     Ordered        06/23/23 1528  Inpatient Admission  Once            06/21/23 1749  Place in Observation  Once                      Discharge Date: 06/28/23    Consultations During Hospital Stay:  · Infectious disease  · IR  · GI    Procedures Performed:   · Drain placement by IR    Significant Findings / Test Results:     US right upper quadrant   Final Result      Limited study  1  Mild hepatomegaly  Slightly lobulated hepatic contour may be due to cirrhosis  Consider nonemergent ultrasound elastography  2  Echogenic foci noted in the gallbladder possibly due to stones or sludge, however evaluation is limited due to contracted gallbladder state        Workstation performed: [id number]         IR drainage tube placement   Final Result   Technically successful percutaneous drainage "of abscess  This is the end of the clinically relevant portion of this report  Subsequent information is for compliance, documentation, and coding purposes  Automated exposure control was utilized, and was minimize, in accordance with the application of the ALARA technique  Chlorhexidine and alcohol was used for cleansing and sterile preparation of the skin  This was allowed to dry prior to the application of sterile draping  Timeout was performed, with all team members present, and in agreement  Confirmation of patient, procedure, laterally, allergies, and all needed equipment was performed  During the course of informed consent, information was communicated, verbally, to the patient regarding the procedure  The patient was informed of; the name of the procedure, nature of the procedure, nature of the condition, risks, benefits,    alternatives, and potential complications, as well as the consequences of not having the procedure  All the patient's questions were answered  Informed consent was signed  The patient was examined, and is in satisfactory condition for planned moderate sedation  Mallampati score: 1   Intravenous conscious sedation was provided by an independent  There was continuous monitoring of blood pressure, heart rate, respiratory rate, and oxygen saturation, by an independent trained observer  Conscious sedation time: 30 minutes   Versed dose: 2 milligrams   Fentanyl dose: 100 micrograms   After the procedure, the patient was recovered, and return to their baseline status, and was deemed fit for discharge from         Radiation dose: 384 mGy-cm   Fluoroscopy time: 0 seconds      PROCEDURE: Image guided drainage   Preprocedure diagnosis: Please see \"history \", above   Postprocedure diagnosis: Same   Antibiotics: None   Estimated blood loss: Less than 5 mL   Specimen: Fluid sent for culture   Anesthesia: Local and monitored intravenous conscious " sedation      When ultrasound is use to evaluate a potential access site, static and real-time images of needle entry are obtained, and archived  Workstation performed: [id number]         CT head without contrast   Final Result      No acute intracranial abnormality  Stable left middle cranial fossa arachnoid cyst                  Workstation performed: [id number]         CT chest abdomen pelvis w contrast   Final Result         1  Stable right retroperitoneal 8 cm collection  Possible abscess  2  Postsurgical change from recent right nephrectomy  No evidence of left pyelonephritis or obstructive uropathy  3  Interval near complete drainage of right anterior abdominal wall fluid collection  Workstation performed: [id number]         XR chest 1 view portable   Final Result      No acute cardiopulmonary disease  Workstation performed: [id number]         IR drainage tube check/change/reposition/reinsertion/upsize    (Results Pending)   CT abdomen pelvis w contrast    (Results Pending)         Incidental Findings:   · As above    Test Results Pending at Discharge (will require follow up):   · none     Outpatient Tests Requested:  · CBC, BMP    Complications:  none    Reason for Admission: abdominal abscess    Hospital Course:   Norma Quinteros is a 61 y o  male patient who originally presented to the hospital on 6/21/2023 due to right flank pain  Found to have retroperitoneal collection - possible abscess  Drain placed by IR  IV abx given x 7 days and recommended to continue abx through 7/5 by IR with PO Keflex  Patient's mental status improved throughout hospital stay with treatments  Pain improved in abdominal area as well  ID recommending repeat imaging outpatient to ensure resolution with follow up from urology and IR  Please see above list of diagnoses and related plan for additional information       Condition at Discharge: stable    Discharge Day Visit / Exam: "  Subjective:  Patient denies any complaints on day of discharge  Vitals: Blood Pressure: 151/71 (06/28/23 0824)  Pulse: 55 (06/28/23 0824)  Temperature: 98 °F (36 7 °C) (06/28/23 0824)  Temp Source: Oral (06/27/23 2342)  Respirations: 18 (06/28/23 0824)  Height: 5' 7\" (170 2 cm) (06/21/23 1817)  Weight - Scale: 60 7 kg (133 lb 13 1 oz) (06/21/23 1817)  SpO2: 96 % (06/28/23 0824)  Exam:   Physical Exam  Vitals and nursing note reviewed  Constitutional:       Appearance: He is ill-appearing  Cardiovascular:      Rate and Rhythm: Normal rate and regular rhythm  Pulses: Normal pulses  Heart sounds: Normal heart sounds  Pulmonary:      Effort: Pulmonary effort is normal       Breath sounds: Normal breath sounds  Abdominal:      General: Bowel sounds are normal       Tenderness: There is no abdominal tenderness  There is no guarding or rebound  Comments: Drains in place   Musculoskeletal:         General: Normal range of motion  Skin:     General: Skin is warm and dry  Neurological:      Mental Status: He is alert  Mental status is at baseline  Discussion with Family: Updated  (mother) at bedside  Discharge instructions/Information to patient and family:   See after visit summary for information provided to patient and family  Provisions for Follow-Up Care:  See after visit summary for information related to follow-up care and any pertinent home health orders  Disposition:   Home with [facility]NA Services (Reminder: Complete face to face encounter)    Planned Readmission: none     Discharge Statement:  I spent 60 minutes discharging the patient  This time was spent on the day of discharge  I had direct contact with the patient on the day of discharge  Greater than 50% of the total time was spent examining patient, answering all patient questions, arranging and discussing plan of care with patient as well as directly providing post-discharge instructions    Additional " time then spent on discharge activities  Discharge Medications:  See after visit summary for reconciled discharge medications provided to patient and/or family        **Please Note: This note may have been constructed using a voice recognition system**

## 2023-06-28 NOTE — PLAN OF CARE
Problem: MOBILITY - ADULT  Goal: Maintain or return to baseline ADL function  Description: INTERVENTIONS:  -  Assess patient's ability to carry out ADLs; assess patient's baseline for ADL function and identify physical deficits which impact ability to perform ADLs (bathing, care of mouth/teeth, toileting, grooming, dressing, etc )  - Assess/evaluate cause of self-care deficits   - Assess range of motion  - Assess patient's mobility; develop plan if impaired  - Assess patient's need for assistive devices and provide as appropriate  - Encourage maximum independence but intervene and supervise when necessary  - Involve family in performance of ADLs  - Assess for home care needs following discharge   - Consider OT consult to assist with ADL evaluation and planning for discharge  - Provide patient education as appropriate  Outcome: Progressing  Goal: Maintains/Returns to pre admission functional level  Description: INTERVENTIONS:  - Perform BMAT or MOVE assessment daily    - Set and communicate daily mobility goal to care team and patient/family/caregiver  - Collaborate with rehabilitation services on mobility goals if consulted  - Perform Range of Motion 3-4 times a day  - Reposition patient every 1-2 hours    - Dangle patient 3-4 times a day  - Stand patient 3-4 times a day  - Ambulate patient 3-4 times a day  - Out of bed to chair 3-4 times a day   - Out of bed for meals 3-4 times a day  - Out of bed for toileting  - Record patient progress and toleration of activity level   Outcome: Progressing     Problem: Prexisting or High Potential for Compromised Skin Integrity  Goal: Skin integrity is maintained or improved  Description: INTERVENTIONS:  - Identify patients at risk for skin breakdown  - Assess and monitor skin integrity  - Assess and monitor nutrition and hydration status  - Monitor labs   - Assess for incontinence   - Turn and reposition patient  - Assist with mobility/ambulation  - Relieve pressure over bony prominences  - Avoid friction and shearing  - Provide appropriate hygiene as needed including keeping skin clean and dry  - Evaluate need for skin moisturizer/barrier cream  - Collaborate with interdisciplinary team   - Patient/family teaching  - Consider wound care consult   Outcome: Progressing     Problem: GASTROINTESTINAL - ADULT  Goal: Maintains or returns to baseline bowel function  Description: INTERVENTIONS:  - Assess bowel function  - Encourage oral fluids to ensure adequate hydration  - Administer IV fluids if ordered to ensure adequate hydration  - Administer ordered medications as needed  - Encourage mobilization and activity  - Consider nutritional services referral to assist patient with adequate nutrition and appropriate food choices  Outcome: Progressing     Problem: SKIN/TISSUE INTEGRITY - ADULT  Goal: Incision(s), wounds(s) or drain site(s) healing without S/S of infection  Description: INTERVENTIONS  - Assess and document dressing, incision, wound bed, drain sites and surrounding tissue  - Provide patient and family education  - Perform skin care/dressing changes every 24 hrs  Outcome: Progressing     Problem: PAIN - ADULT  Goal: Verbalizes/displays adequate comfort level or baseline comfort level  Description: Interventions:  - Encourage patient to monitor pain and request assistance  - Assess pain using appropriate pain scale  - Administer analgesics based on type and severity of pain and evaluate response  - Implement non-pharmacological measures as appropriate and evaluate response  - Consider cultural and social influences on pain and pain management  - Notify physician/advanced practitioner if interventions unsuccessful or patient reports new pain  Outcome: Progressing     Problem: INFECTION - ADULT  Goal: Absence or prevention of progression during hospitalization  Description: INTERVENTIONS:  - Assess and monitor for signs and symptoms of infection  - Monitor lab/diagnostic results  - Monitor all insertion sites, i e  indwelling lines, tubes, and drains  - Monitor endotracheal if appropriate and nasal secretions for changes in amount and color  - Weott appropriate cooling/warming therapies per order  - Administer medications as ordered  - Instruct and encourage patient and family to use good hand hygiene technique  - Identify and instruct in appropriate isolation precautions for identified infection/condition  Outcome: Progressing     Problem: SAFETY ADULT  Goal: Maintain or return to baseline ADL function  Description: INTERVENTIONS:  -  Assess patient's ability to carry out ADLs; assess patient's baseline for ADL function and identify physical deficits which impact ability to perform ADLs (bathing, care of mouth/teeth, toileting, grooming, dressing, etc )  - Assess/evaluate cause of self-care deficits   - Assess range of motion  - Assess patient's mobility; develop plan if impaired  - Assess patient's need for assistive devices and provide as appropriate  - Encourage maximum independence but intervene and supervise when necessary  - Involve family in performance of ADLs  - Assess for home care needs following discharge   - Consider OT consult to assist with ADL evaluation and planning for discharge  - Provide patient education as appropriate  Outcome: Progressing  Goal: Maintains/Returns to pre admission functional level  Description: INTERVENTIONS:  - Perform BMAT or MOVE assessment daily    - Set and communicate daily mobility goal to care team and patient/family/caregiver  - Collaborate with rehabilitation services on mobility goals if consulted  - Perform Range of Motion 3-4 times a day  - Reposition patient every 1-2 hours    - Dangle patient 3-4 times a day  - Stand patient 3-4 times a day  - Ambulate patient 3-4 times a day  - Out of bed to chair 3-4 times a day   - Out of bed for meals 3-4 times a day  - Out of bed for toileting  - Record patient progress and toleration of activity level   Outcome: Progressing  Goal: Patient will remain free of falls  Description: INTERVENTIONS:  - Educate patient/family on patient safety including physical limitations  - Instruct patient to call for assistance with activity   - Consult OT/PT to assist with strengthening/mobility   - Keep Call bell within reach  - Keep bed low and locked with side rails adjusted as appropriate  - Keep care items and personal belongings within reach  - Initiate and maintain comfort rounds  - Make Fall Risk Sign visible to staff  - Offer Toileting every 2 Hours, in advance of need  - Initiate/Maintain bed alarm  - Obtain necessary fall risk management equipment: bed alarm  - Apply yellow socks and bracelet for high fall risk patients  - Consider moving patient to room near nurses station  Outcome: Progressing     Problem: METABOLIC, FLUID AND ELECTROLYTES - ADULT  Goal: Electrolytes maintained within normal limits  Description: INTERVENTIONS:  - Monitor labs and assess patient for signs and symptoms of electrolyte imbalances  - Administer electrolyte replacement as ordered  - Monitor response to electrolyte replacements, including repeat lab results as appropriate  - Instruct patient on fluid and nutrition as appropriate  Outcome: Progressing  Goal: Fluid balance maintained  Description: INTERVENTIONS:  - Monitor labs   - Monitor I/O and WT  - Instruct patient on fluid and nutrition as appropriate  - Assess for signs & symptoms of volume excess or deficit  Outcome: Progressing     Problem: Nutrition/Hydration-ADULT  Goal: Nutrient/Hydration intake appropriate for improving, restoring or maintaining nutritional needs  Description: Monitor and assess patient's nutrition/hydration status for malnutrition  Collaborate with interdisciplinary team and initiate plan and interventions as ordered  Monitor patient's weight and dietary intake as ordered or per policy  Utilize nutrition screening tool and intervene as necessary  Determine patient's food preferences and provide high-protein, high-caloric foods as appropriate       INTERVENTIONS:  - Monitor oral intake, urinary output, labs, and treatment plans  - Assess nutrition and hydration status and recommend course of action  - Evaluate amount of meals eaten  - Assist patient with eating if necessary   - Allow adequate time for meals  - Recommend/ encourage appropriate diets, oral nutritional supplements, and vitamin/mineral supplements  - Order, calculate, and assess calorie counts as needed  - Recommend, monitor, and adjust tube feedings and TPN/PPN based on assessed needs  - Assess need for intravenous fluids  - Provide specific nutrition/hydration education as appropriate  - Include patient/family/caregiver in decisions related to nutrition  Outcome: Progressing

## 2023-06-28 NOTE — CASE MANAGEMENT
Case Management Discharge Planning Note    Patient name Betty Mckay  Location [address] [id number] MRN [id number]  : 1965 Date 2023       Current Admission Date: 2023  Current Admission Diagnosis:Abdominal fluid collection   Patient Active Problem List    Diagnosis Date Noted   • Hepatitis C antibody positive in blood 2023   • Memory loss 2023   • Moderate protein-calorie malnutrition ([facility] [address] ) 2023   • Abdominal fluid collection 2023   • CKD (chronic kidney disease) stage 2, GFR 60-89 ml/min 2023   • Abscess of postoperative wound of abdominal wall 2023   • Methadone dependence  2023   • Leukocytosis 2023   • Prolonged QT interval 2023   • Staghorn calculus 2023   • Recent UTI 2023   • Abnormal CT scan, liver 2023   • AMS (altered mental status) 2023   • Tobacco abuse 11/10/2022   • Drug abuse, episodic use ([address] ) 11/10/2022   • Anxiety 2022   • Cognitive decline 2022   • Delusional disorder  2022   • Multiple electrolyte abnormalities 2022   • Acute metabolic encephalopathy    • Acute respiratory failure with hypoxia ([facility] [address] ) 2022   • Elevated troponin 2022   • Elevated brain natriuretic peptide (BNP) level 2022   • Essential hypertension 2022   • Transaminitis 2022   • Elevated lipase 2022   • Hypernatremia 2022   • Increased anion gap metabolic acidosis    • Rhabdomyolysis 2022   • Elevated d-dimer 2022   • Kidney stones    • Compulsive skin picking 10/20/2021      LOS (days): 5  Geometric Mean LOS (GMLOS) (days):   Days to GMLOS:     OBJECTIVE:  Risk of Unplanned Readmission Score: 26 14         Current admission status: Inpatient   Preferred Pharmacy:   [facility] [place] [address]  Phone: [phone] Fax: [phone]    [address], [address] [facility] 94 [facility] 38 210 [facility]  Phone: [phone] Fax: [phone]    [facility] [place], [address] [place] [address] [address] [address]  Phone: [phone] Fax: [phone]    Primary Care Provider: No primary care provider on file  Primary Insurance: [employer] [place]  Secondary Insurance:     DISCHARGE DETAILS:     Patient discussed in huddle stable for discharge today home with [facility] updated in heidi Carr reviewed AVS and follow up providers listed on avs for patient      Family to transport patient home

## 2023-06-28 NOTE — ASSESSMENT & PLAN NOTE
· Hep C antibody positive on hepatitis panel  · Hep C genotype and RNA PCR pending  · RUQ US pending - shows hepatomegaly with possible early cirrhosis  · GI consulted, should follow outpatient  · Patient admits to history of hep C and states he was treated for it in the past but that it keeps recurring   Patient admits to using IV drugs but states that he doesn't remember when the last time he used IV drugs was

## 2023-06-28 NOTE — ASSESSMENT & PLAN NOTE
Was recently discharged on Annetta 10 after having abdominal wall abscess that required antibiotics and IR drainage  Patient reports right flank pain and subjective fevers that began approximately 4 to 5 days ago  CT scan revealed stable right retroperitoneal 8 cm collection and possible abscess and complete drainage of right anterior abdominal wall fluid collection     S/P Abdominal fluid collection drainage by IR with drain placement 6/23/23   · body fluid culture/gram stain prelim showing 3+ Staph aureus   · ID consulted  · Discontinued IV Ceftriaxone and vanco and switched to cefazolin 2g q8h  · Trend culture for final growth and sensitivity; will tailor antibiotics based on those findings   · will need 10 days course of antibiotics - keflex on dc through 7/5  · Follow up imaging to be performed with urology follow up

## 2023-06-29 ENCOUNTER — HOME CARE VISIT (OUTPATIENT)
Dept: HOME HEALTH SERVICES | Facility: HOME HEALTHCARE | Age: 58
End: 2023-06-29
Payer: COMMERCIAL

## 2023-06-29 LAB — HCV RNA SERPL NAA+PROBE-ACNC: NOT DETECTED K[IU]/ML

## 2023-06-29 NOTE — UTILIZATION REVIEW
NOTIFICATION OF ADMISSION DISCHARGE   This is a Notification of Discharge from [address]  Please be advised that this patient has been discharge from our facility  Below you will find the admission and discharge date and time including the patient’s disposition  UTILIZATION REVIEW CONTACT:  Bertha Stallings  Utilization   Network Utilization Review Department  Phone: [phone] x carefully listen to the prompts  All voicemails are confidential   Email: [email or web address]     ADMISSION INFORMATION  PRESENTATION DATE: 6/21/2023  1:15 PM  OBERVATION ADMISSION DATE:  INPATIENT ADMISSION DATE: 6/23/23  3:28 PM   DISCHARGE DATE: 6/28/2023  2:33 PM   DISPOSITION:Home with [address] INFORMATION:  Send all requests for admission clinical reviews, approved or denied determinations and any other requests to dedicated fax number below belonging to the campus where the patient is receiving treatment   List of dedicated fax numbers:  [address] DENIALS (Administrative/Medical Necessity) [phone]   [address] (Maternity/NICU/Pediatrics) [phone]   [facility] [phone]   [facility] [phone]   [address] [phone]   220 [facility] [phone]   [address] [facility] [phone]   [address] [place] [address] 119 [phone]   [facility]  [phone]   [facility] [phone]   412 [facility] [address] [facility] [phone]

## 2023-06-30 ENCOUNTER — HOME CARE VISIT (OUTPATIENT)
Dept: HOME HEALTH SERVICES | Facility: HOME HEALTHCARE | Age: 58
End: 2023-06-30
Payer: COMMERCIAL

## 2023-07-03 ENCOUNTER — HOME CARE VISIT (OUTPATIENT)
Dept: HOME HEALTH SERVICES | Facility: HOME HEALTHCARE | Age: 58
End: 2023-07-03
Payer: COMMERCIAL

## 2023-07-03 LAB
HCV GENTYP SERPL NAA+PROBE: NORMAL
HCV PLEASE NOTE: NORMAL

## 2023-07-03 PROCEDURE — G0299 HHS/HOSPICE OF RN EA 15 MIN: HCPCS

## 2023-07-05 ENCOUNTER — HOME CARE VISIT (OUTPATIENT)
Dept: HOME HEALTH SERVICES | Facility: HOME HEALTHCARE | Age: 58
End: 2023-07-05
Payer: COMMERCIAL

## 2023-07-05 VITALS
TEMPERATURE: 98.3 F | OXYGEN SATURATION: 98 % | DIASTOLIC BLOOD PRESSURE: 60 MMHG | SYSTOLIC BLOOD PRESSURE: 120 MMHG | HEART RATE: 80 BPM | RESPIRATION RATE: 16 BRPM

## 2023-07-06 ENCOUNTER — APPOINTMENT (EMERGENCY)
Dept: CT IMAGING | Facility: HOSPITAL | Age: 58
End: 2023-07-06
Payer: COMMERCIAL

## 2023-07-06 ENCOUNTER — APPOINTMENT (EMERGENCY)
Dept: INTERVENTIONAL RADIOLOGY/VASCULAR | Facility: HOSPITAL | Age: 58
End: 2023-07-06
Payer: COMMERCIAL

## 2023-07-06 ENCOUNTER — TELEPHONE (OUTPATIENT)
Dept: HOME HEALTH SERVICES | Facility: HOME HEALTHCARE | Age: 58
End: 2023-07-06

## 2023-07-06 ENCOUNTER — TELEPHONE (OUTPATIENT)
Dept: INTERVENTIONAL RADIOLOGY/VASCULAR | Facility: HOSPITAL | Age: 58
End: 2023-07-06

## 2023-07-06 ENCOUNTER — HOME CARE VISIT (OUTPATIENT)
Dept: HOME HEALTH SERVICES | Facility: HOME HEALTHCARE | Age: 58
End: 2023-07-06
Payer: COMMERCIAL

## 2023-07-06 ENCOUNTER — HOSPITAL ENCOUNTER (EMERGENCY)
Facility: HOSPITAL | Age: 58
Discharge: HOME | End: 2023-07-06
Attending: EMERGENCY MEDICINE
Payer: COMMERCIAL

## 2023-07-06 VITALS
OXYGEN SATURATION: 100 % | BODY MASS INDEX: 21.97 KG/M2 | RESPIRATION RATE: 17 BRPM | WEIGHT: 140 LBS | HEIGHT: 67 IN | SYSTOLIC BLOOD PRESSURE: 122 MMHG | HEART RATE: 65 BPM | DIASTOLIC BLOOD PRESSURE: 65 MMHG | TEMPERATURE: 97.3 F

## 2023-07-06 DIAGNOSIS — R10.9 ABDOMINAL PAIN: Primary | ICD-10-CM

## 2023-07-06 DIAGNOSIS — K56.41 FECAL IMPACTION (HCC): ICD-10-CM

## 2023-07-06 LAB
ALBUMIN SERPL BCP-MCNC: 4.7 G/DL (ref 3.5–5)
ALP SERPL-CCNC: 172 U/L (ref 34–104)
ALT SERPL W P-5'-P-CCNC: 8 U/L (ref 7–52)
ANION GAP SERPL CALCULATED.3IONS-SCNC: 14 MMOL/L
APTT PPP: 34 SECONDS (ref 23–37)
AST SERPL W P-5'-P-CCNC: 15 U/L (ref 13–39)
ATRIAL RATE: 60 BPM
BASOPHILS # BLD AUTO: 0.06 THOUSANDS/ÂΜL (ref 0–0.1)
BASOPHILS NFR BLD AUTO: 1 % (ref 0–1)
BILIRUB SERPL-MCNC: 0.76 MG/DL (ref 0.2–1)
BUN SERPL-MCNC: 16 MG/DL (ref 5–25)
CALCIUM SERPL-MCNC: 10.3 MG/DL (ref 8.4–10.2)
CHLORIDE SERPL-SCNC: 100 MMOL/L (ref 96–108)
CO2 SERPL-SCNC: 23 MMOL/L (ref 21–32)
CREAT SERPL-MCNC: 1.42 MG/DL (ref 0.6–1.3)
EOSINOPHIL # BLD AUTO: 0.13 THOUSAND/ÂΜL (ref 0–0.61)
EOSINOPHIL NFR BLD AUTO: 1 % (ref 0–6)
ERYTHROCYTE [DISTWIDTH] IN BLOOD BY AUTOMATED COUNT: 14.8 % (ref 11.6–15.1)
GFR SERPL CREATININE-BSD FRML MDRD: 54 ML/MIN/1.73SQ M
GLUCOSE SERPL-MCNC: 85 MG/DL (ref 65–140)
HCT VFR BLD AUTO: 47.6 % (ref 36.5–49.3)
HGB BLD-MCNC: 15.4 G/DL (ref 12–17)
IMM GRANULOCYTES # BLD AUTO: 0.07 THOUSAND/UL (ref 0–0.2)
IMM GRANULOCYTES NFR BLD AUTO: 1 % (ref 0–2)
INR PPP: 0.99 (ref 0.84–1.19)
LACTATE SERPL-SCNC: 1.7 MMOL/L (ref 0.5–2)
LACTATE SERPL-SCNC: 2.3 MMOL/L (ref 0.5–2)
LYMPHOCYTES # BLD AUTO: 1.46 THOUSANDS/ÂΜL (ref 0.6–4.47)
LYMPHOCYTES NFR BLD AUTO: 12 % (ref 14–44)
MCH RBC QN AUTO: 28.8 PG (ref 26.8–34.3)
MCHC RBC AUTO-ENTMCNC: 32.4 G/DL (ref 31.4–37.4)
MCV RBC AUTO: 89 FL (ref 82–98)
MONOCYTES # BLD AUTO: 0.29 THOUSAND/ÂΜL (ref 0.17–1.22)
MONOCYTES NFR BLD AUTO: 2 % (ref 4–12)
NEUTROPHILS # BLD AUTO: 10.26 THOUSANDS/ÂΜL (ref 1.85–7.62)
NEUTS SEG NFR BLD AUTO: 83 % (ref 43–75)
NRBC BLD AUTO-RTO: 0 /100 WBCS
P AXIS: 39 DEGREES
PLATELET # BLD AUTO: 206 THOUSANDS/UL (ref 149–390)
PMV BLD AUTO: 9.5 FL (ref 8.9–12.7)
POTASSIUM SERPL-SCNC: 3.9 MMOL/L (ref 3.5–5.3)
PR INTERVAL: 124 MS
PROCALCITONIN SERPL-MCNC: 0.05 NG/ML
PROT SERPL-MCNC: 9.2 G/DL (ref 6.4–8.4)
PROTHROMBIN TIME: 13.1 SECONDS (ref 11.6–14.5)
QRS AXIS: 32 DEGREES
QRSD INTERVAL: 146 MS
QT INTERVAL: 536 MS
QTC INTERVAL: 536 MS
RBC # BLD AUTO: 5.34 MILLION/UL (ref 3.88–5.62)
SODIUM SERPL-SCNC: 137 MMOL/L (ref 135–147)
T WAVE AXIS: 31 DEGREES
VENTRICULAR RATE: 60 BPM
WBC # BLD AUTO: 12.27 THOUSAND/UL (ref 4.31–10.16)

## 2023-07-06 PROCEDURE — 93005 ELECTROCARDIOGRAM TRACING: CPT

## 2023-07-06 PROCEDURE — 96366 THER/PROPH/DIAG IV INF ADDON: CPT

## 2023-07-06 PROCEDURE — 96365 THER/PROPH/DIAG IV INF INIT: CPT

## 2023-07-06 PROCEDURE — 93010 ELECTROCARDIOGRAM REPORT: CPT | Performed by: INTERNAL MEDICINE

## 2023-07-06 PROCEDURE — 49424 ASSESS CYST CONTRAST INJECT: CPT

## 2023-07-06 PROCEDURE — 84145 PROCALCITONIN (PCT): CPT

## 2023-07-06 PROCEDURE — 96372 THER/PROPH/DIAG INJ SC/IM: CPT

## 2023-07-06 PROCEDURE — 74177 CT ABD & PELVIS W/CONTRAST: CPT

## 2023-07-06 PROCEDURE — NC001 PR NO CHARGE: Performed by: RADIOLOGY

## 2023-07-06 PROCEDURE — 99285 EMERGENCY DEPT VISIT HI MDM: CPT

## 2023-07-06 PROCEDURE — 83605 ASSAY OF LACTIC ACID: CPT

## 2023-07-06 PROCEDURE — 96375 TX/PRO/DX INJ NEW DRUG ADDON: CPT

## 2023-07-06 PROCEDURE — 76080 X-RAY EXAM OF FISTULA: CPT | Performed by: RADIOLOGY

## 2023-07-06 PROCEDURE — 85610 PROTHROMBIN TIME: CPT

## 2023-07-06 PROCEDURE — 36415 COLL VENOUS BLD VENIPUNCTURE: CPT

## 2023-07-06 PROCEDURE — 76080 X-RAY EXAM OF FISTULA: CPT

## 2023-07-06 PROCEDURE — 49424 ASSESS CYST CONTRAST INJECT: CPT | Performed by: RADIOLOGY

## 2023-07-06 PROCEDURE — 85025 COMPLETE CBC W/AUTO DIFF WBC: CPT

## 2023-07-06 PROCEDURE — 99024 POSTOP FOLLOW-UP VISIT: CPT | Performed by: PHYSICIAN ASSISTANT

## 2023-07-06 PROCEDURE — 80053 COMPREHEN METABOLIC PANEL: CPT

## 2023-07-06 PROCEDURE — 87040 BLOOD CULTURE FOR BACTERIA: CPT

## 2023-07-06 PROCEDURE — 85730 THROMBOPLASTIN TIME PARTIAL: CPT

## 2023-07-06 RX ORDER — ONDANSETRON 2 MG/ML
4 INJECTION INTRAMUSCULAR; INTRAVENOUS ONCE
Status: COMPLETED | OUTPATIENT
Start: 2023-07-06 | End: 2023-07-06

## 2023-07-06 RX ORDER — CEFAZOLIN SODIUM 2 G/50ML
2000 SOLUTION INTRAVENOUS ONCE
Status: COMPLETED | OUTPATIENT
Start: 2023-07-06 | End: 2023-07-06

## 2023-07-06 RX ORDER — HYDROCORTISONE 25 MG/G
CREAM TOPICAL ONCE
Status: DISCONTINUED | OUTPATIENT
Start: 2023-07-06 | End: 2023-07-06 | Stop reason: HOSPADM

## 2023-07-06 RX ORDER — NICOTINE 21 MG/24HR
14 PATCH, TRANSDERMAL 24 HOURS TRANSDERMAL ONCE
Status: DISCONTINUED | OUTPATIENT
Start: 2023-07-06 | End: 2023-07-06 | Stop reason: HOSPADM

## 2023-07-06 RX ADMIN — ONDANSETRON 4 MG: 2 INJECTION INTRAMUSCULAR; INTRAVENOUS at 12:46

## 2023-07-06 RX ADMIN — MORPHINE SULFATE 2 MG: 2 INJECTION, SOLUTION INTRAMUSCULAR; INTRAVENOUS at 12:46

## 2023-07-06 RX ADMIN — IOHEXOL 100 ML: 350 INJECTION, SOLUTION INTRAVENOUS at 14:02

## 2023-07-06 RX ADMIN — SODIUM CHLORIDE 1000 ML: 0.9 INJECTION, SOLUTION INTRAVENOUS at 12:36

## 2023-07-06 RX ADMIN — MORPHINE SULFATE 2 MG: 2 INJECTION, SOLUTION INTRAMUSCULAR; INTRAVENOUS at 15:06

## 2023-07-06 RX ADMIN — CEFAZOLIN SODIUM 2000 MG: 2 SOLUTION INTRAVENOUS at 12:36

## 2023-07-06 NOTE — PROCEDURES
Interventional Radiology Procedure Note    PATIENT NAME: Alex Morrow  : 1965  MRN: [id number]     Pre-op Diagnosis: No diagnosis found. 2.    Abdominal abscesses. Admitted with clammyness    Post-op Diagnosis: No diagnosis found. 2.   Same    Procedure: Tube pull x 2    Surgeon:   Sneha Roth MD  Assistants:     No qualified resident was available, Resident is only observing    Estimated Blood Loss: Minimal     Findings: Peritoneal fluid collection had resolved. Cavity was significantly smaller. Contrast injection, it became apparent that the tube was out. One of the sideholes was outside the skin. The tube was transected and removed. The anterior tube, has not had any output for a while. This was also removed. Patient with a large bowel movement while in IR. He reports feeling much better.     Specimens: None     Complications:  None     Anesthesia: none    Sneha Roth MD     Date: 2023  Time: 5:14 PM

## 2023-07-06 NOTE — CONSULTS
Interventional Radiology  Consultation 7/6/2023     Inpatient Consult to IR  Consult performed by: Tiffanie Figueredo PA-C  Consult ordered by: KINGSLEY Adam   0/03/8167   [id number]      Assessment/Plan:    1. Drain Tube Problem  - 2 drains present: 1. RLQ abd, 2: R flank  - Drain 1: Neither flushes nor aspirates - reported low outputs  - Drain 2: flushes however unable to aspirate flush - inconsistent output  - plan for drain checks today  - reviewed with Dr. Zachariah Nguyen Problems     Problem List     Compulsive skin picking    Acute metabolic encephalopathy    Acute respiratory failure with hypoxia (HCC)    Elevated troponin    Elevated brain natriuretic peptide (BNP) level    Essential hypertension    Transaminitis    Elevated lipase    Hypernatremia    Increased anion gap metabolic acidosis    Rhabdomyolysis    Elevated d-dimer    Kidney stones    Overview Signed 8/20/2022  9:00 PM by Ronald Parish DO     Last Assessed: 11/17/2016          Delusional disorder     Multiple electrolyte abnormalities    Cognitive decline    Anxiety    Tobacco abuse    Drug abuse, episodic use (HCC)    Abnormal CT scan, liver    AMS (altered mental status)    Recent UTI    Staghorn calculus    Prolonged QT interval    Methadone dependence     Leukocytosis    Abscess of postoperative wound of abdominal wall    CKD (chronic kidney disease) stage 2, GFR 60-89 ml/min    Lab Results   Component Value Date    EGFR 54 07/06/2023    EGFR 70 06/28/2023    EGFR 66 06/27/2023    CREATININE 1.42 (H) 07/06/2023    CREATININE 1.14 06/28/2023    CREATININE 1.19 06/27/2023         Abdominal fluid collection    Moderate protein-calorie malnutrition ([address])    Malnutrition Findings:                                 BMI Findings: Body mass index is 21.93 kg/m².          Memory loss    Hepatitis C antibody positive in blood            Subjective:     Patient ID: Ashly Ascenciokarri is a 61 y.o. male. History of Present Illness  RLQ abd wall abscess and R flank fluid collection post IR drain placement. Intermittent output from R flank, no output from RLQ drain. Patient presents to ed with abd pain and diaphoresis    Review of Systems   Constitutional: Positive for diaphoresis. Negative for fatigue and fever. Respiratory: Negative for chest tightness and shortness of breath. Cardiovascular: Negative for chest pain and leg swelling. Gastrointestinal: Positive for abdominal pain. Genitourinary: Negative for flank pain. Neurological: Negative for weakness. All other systems reviewed and are negative.         Past Medical History:   Diagnosis Date   • BRAULIO (acute kidney injury) ([address]) 8/20/2022   • Anxiety    • Bright red rectal bleeding     Last Assessed: 9/9/2015    • Drug dependence ([address]) 10/20/2021   • Hypertension     Last Assessed: 7/9/2014    • Hyponatremia 5/26/2023   • Kidney stone    • Kidney stones     Last Assessed: 11/17/2016    • Periorbital edema     Last Assessed: 9/4/2015   • Septic shock ([address]) 08/20/2022   • Skin tag of anus     Last Assessed: 9/9/2015    • Trigger point of thoracic region     Last Assessed: 11/6/2015         Past Surgical History:   Procedure Laterality Date   • CYSTOSCOPY W/ URETERAL STENT PLACEMENT  03/11/2013   • CYSTOSCOPY W/ URETERAL STENT PLACEMENT  06/18/2014    EXTRACORPOREAL SHOCK WAVE LITHOTRIPSY    • CYSTOSCOPY W/ URETERAL STENT PLACEMENT  07/16/2014    EXTRACORPOREAL SHOCK WAVE LITHOTRIPSY    • CYSTOSCOPY W/ URETERAL STENT REMOVAL  04/11/2013   • CYSTOSCOPY W/ URETERAL STENT REMOVAL Right 08/26/2014   • CYSTOSCOPY W/ URETEROSCOPY W/ LITHOTRIPSY  02/26/2013    Percutaneous lithotomy With Uretal Stent Plcement    • CYSTOSCOPY W/ URETEROSCOPY W/ LITHOTRIPSY  03/11/2014   • CYSTOSCOPY W/ URETEROSCOPY W/ LITHOTRIPSY Right 08/04/2014    With Uretal Stent Placement    • CYSTOSCOPY W/ URETEROSCOPY W/ LITHOTRIPSY Right 05/09/2016   • HERNIA REPAIR 03/11/2013   • IR DRAINAGE TUBE PLACEMENT  6/6/2023   • IR DRAINAGE TUBE PLACEMENT  6/23/2023   • IR NEPHROSTOMY TUBE CHECK/CHANGE/REPOSITION/REINSERTION/UPSIZE  11/22/2022   • IR NEPHROSTOMY TUBE CHECK/CHANGE/REPOSITION/REINSERTION/UPSIZE  02/13/2023   • IR NEPHROSTOMY TUBE CHECK/CHANGE/REPOSITION/REINSERTION/UPSIZE  04/28/2023   • IR NEPHROSTOMY TUBE PLACEMENT  08/20/2022   • KIDNEY SURGERY     • LITHOTRIPSY     • MI CYSTO/URETERO W/LITHOTRIPSY &INDWELL STENT INSRT Right 07/13/2016    Procedure: CYSTOSCOPY; URETEROSCOPY WITH HOLMIUM LASER STONE EXTRACTION; RETROGRADE PYELOGRAM; URETERAL STENT INSERTION ;  Surgeon: Mamadou Munoz MD;  [facility];  Service: Urology   • MI CYSTO/URETERO W/LITHOTRIPSY &INDWELL STENT INSRT Right 05/09/2016    Procedure: CYSTOSCOPY,  URETEROSCOPY,  WITH LITHOTRIPSY HOLMIUM LASER, STONE EXTRACTION, AND INSERTION STENT URETERAL;  Surgeon: Mamadou Munoz MD;  Location[facility];  Service: Urology   • MI CYSTO/URETERO W/LITHOTRIPSY &INDWELL STENT INSRT Right 11/10/2022    Procedure: CYSTOSCOPY URETEROSCOPY  right RETROGRADE PYELOGRAM;  Surgeon: Salma Ribeiro MD;  [facility];  Service: Urology   • MI LAPAROSCOPY RADICAL NEPHRECTOMY Right 5/22/2023    Procedure: NEPHRECTOMY RADICAL LAPAROSCOPIC W/ ROBOTICS;  Surgeon: Natalie Allen MD;  [facility];  Service: Urology   • MI LITHOTRIPSY [address] Right 06/17/2016    Procedure: Aleena Branch SHOCKWAVE (ESWL);   Surgeon: Mamadou Munoz MD;  [facility];  Service: Urology   • TRANSURETHRAL RESECTION OF BLADDER     • URETERAL REIMPLANTION  03/11/2013        Social History     Tobacco Use   Smoking Status Every Day   • Packs/day: 3.00   • Years: 35.00   • Total pack years: 105.00   • Types: Cigarettes   Smokeless Tobacco Never        Social History     Substance and Sexual Activity   Alcohol Use Not Currently        Social History     Substance and Sexual Activity   Drug Use Not Currently        Allergies   Allergen Reactions   • Bee Venom Anaphylaxis   • Metronidazole Itching and Swelling   • Nsaids GI Intolerance     Stomach irritant   • Penicillin G    • Penicillins GI Intolerance     Sick to stomach   • Pollen Extract    • Sulfa Antibiotics        Current Facility-Administered Medications   Medication Dose Route Frequency Provider Last Rate Last Admin   • hydrocortisone (ANUSOL-HC) 2.5 % rectal cream   Topical Once RepunchKINGSLEY       • nicotine (NICODERM CQ) 14 mg/24hr TD 24 hr patch 14 mg  14 mg Transdermal Once RepunchKINGSLEY         Current Outpatient Medications   Medication Sig Dispense Refill   • cephalexin (KEFLEX) 500 mg capsule Take 1 capsule (500 mg total) by mouth every 6 (six) hours for 29 doses 29 capsule 0   • METHADONE HCL PO Take 110 mg by mouth in the morning This dose was verified with Carmelo Chiu, the director of the [facility] center. Pt usually receives liquid form. Pt was instructed to go to clinic am of surgery, as usual, to receive his am dose and proceed to the hospital with an arrival time of 1015. The [facility] center opens at 530am     • methocarbamol (ROBAXIN) 500 mg tablet Take 1 tablet (500 mg total) by mouth 4 (four) times a day as needed for muscle spasms (pain around drain site) 40 tablet 0   • acetaminophen (TYLENOL) 650 mg CR tablet Take 1 tablet (650 mg total) by mouth every 8 (eight) hours as needed for mild pain 30 tablet 0   • potassium-sodium phosphates (PHOS-NAK) 280 mg (P)-160 mg (Na)-250 mg (K) packet Take 1 packet by mouth 2 (two) times a day with meals for 4 doses 4 packet 0   • sodium chloride, PF, 0.9 % 10 mL by Intracatheter route daily Intracatheter flushing daily. May substitute prefilled syringe with normal saline 10 mL vials, 10 mL syringes, and 18 g blunt needles (Patient taking differently: 10 mL by Intracatheter route daily Intracatheter flushing daily in KATHY drain.  May substitute prefilled syringe with normal saline 10 mL vials, 10 mL syringes, and 18 g blunt needles) 300 mL 2   • sodium chloride, PF, 0.9 % 10 mL by Intracatheter route daily Intracatheter flushing daily. May substitute prefilled syringe with normal saline 10 mL vials, 10 mL syringes, and 18 g blunt needles 300 mL 5          Objective:    Vitals:    07/06/23 1130 07/06/23 1310 07/06/23 1400 07/06/23 1500   BP: 154/74 (!) 182/84 168/78 (!) 181/89   BP Location:  Left arm     Pulse: 69 56 65 60   Resp: 20 20 20 19   Temp:       TempSrc:       SpO2: 99% 97% 100% 97%   Weight:       Height:            Physical Exam  Vitals and nursing note reviewed. Constitutional:       General: He is awake. He is not in acute distress. Appearance: Normal appearance. He is well-developed and well-groomed. He is ill-appearing and diaphoretic (chronic). He is not toxic-appearing. HENT:      Head: Normocephalic and atraumatic. Right Ear: External ear normal.      Left Ear: External ear normal.      Nose: Nose normal.      Mouth/Throat:      Lips: Pink. Mouth: Mucous membranes are moist.   Eyes:      Conjunctiva/sclera: Conjunctivae normal.   Cardiovascular:      Rate and Rhythm: Normal rate. Pulses: Normal pulses. Pulmonary:      Effort: Pulmonary effort is normal. No respiratory distress or retractions. Abdominal:      Palpations: Abdomen is soft. Tenderness: There is abdominal tenderness. There is no guarding. Musculoskeletal:      Right lower leg: No edema. Left lower leg: No edema. Skin:     General: Skin is warm. Capillary Refill: Capillary refill takes less than 2 seconds. Neurological:      General: No focal deficit present. Mental Status: He is alert. Psychiatric:         Behavior: Behavior is cooperative.            Lab Results   Component Value Date     (H) 01/28/2023      Lab Results   Component Value Date    WBC 12.27 (H) 07/06/2023    HGB 15.4 07/06/2023    HCT 47.6 07/06/2023    MCV 89 07/06/2023     07/06/2023     Lab Results   Component Value Date    INR 0.99 07/06/2023    INR 1.11 06/21/2023    INR 1.16 06/05/2023    PROTIME 13.1 07/06/2023    PROTIME 14.5 06/21/2023    PROTIME 15.0 (H) 06/05/2023     Lab Results   Component Value Date    PTT 34 07/06/2023         I have personally reviewed pertinent imaging and laboratory results. Code Status: Prior  Advance Directive and Living Will:      Power of :    POLST:      Thank you for allowing me to participate in the care of BROOKECODIE Michael. Please don't hesitate to call, text, email, or TigerText with any questions.

## 2023-07-06 NOTE — DISCHARGE INSTRUCTIONS
[address] [facility]  Interventional Radiology  [phone] after your procedure:    Resume your normal diet. Small sips of flat soda will help with nausea. 1. The properly functioning catheter should be forward flushed once (1x) daily with 10ml of normal saline using clean technique. You will be given a prescription for flushes. To flush the tube, clean both connections with alcohol swab. Twist off the drainage bag/ bulb  tubing and twist the saline syringe into the drainage tube and flush. Remove the syringe and twist the drainage bag / bulb tubing tubing back on.    2. The drainage bag/bulb may be emptied as necessary. Keep a record of the amount of fluid you drain from your tube. This should be done with clean technique as well. 3. A fresh dressing should be applied daily over the tube insertion site. 4. As the tube is secured to the skin with only a suture,try not to pull on your tube. Tub baths are not permitted. Showers are permitted if the patient's skin entry site is prevented from getting wet. Similarly, washcloth "baths" are acceptable. Contact Interventional Radiology at [phone] Peña PATIENTS: Contact Interventional Radiology at [phone]) Laurita Scott PATIENTS: Contact Interventional Radiology at [phone]) if:    1. Leakage or large amounts of liquid around the catheter. 2. Fever of 101 degrees lasting several hours without other obvious cause (such as sore throat, flu, etc). 3. Persistent nausea or vomiting. 4. Diminished drainage, which may be associated with pressure or pain. Or when the     drainage from your tube is less than 10mls for 48 hours. 5. Catheter pulled back or falls out. The following pharmacies carry the flush syringes.        [facility]  [address] [place] [address] [place] [address] [address]  Phone [phone]            Phone [phone] [facility]                                [phone]  [address] [place] Gabrielle [place]                      [address]  Phone [phone]            Phone [phone]                      Connie Gonzales                                                                                                          [phone]  [facility]  [address] [facility]                                                                               [facility]  Phone [phone]                 Extravasation   WHAT YOU NEED TO KNOW:   What is extravasation? Extravasation is when fluid leaks out of your vein and into the soft tissue around an IV. The fluid is a vesicant medicine. This means that it can cause tissue damage, blisters, or severe tissue loss. Some examples of vesicant medicines include chemo medicines, contrast liquid, certain antibiotics, and seizure medicine. What are the signs and symptoms of extravasation? Pain around the IV site    Inflammation and tightness of your skin    Trouble flushing the IV catheter    Pale, cool skin    Blisters    What causes extravasation? The IV catheter may push through the side of your vein. The IV catheter may move from where it is inserted. Fluid may leak through the area where the catheter enters the vein. The vein may be fragile and may tear with the IV fluid. Fluid may leak through the side of your vein. A blockage may cause the medicine or fluid to build up. What increases my risk for extravasation?    Fragile veins or skin    Not being able to see the IV site    IV treatments over a long period of time    IV treatments that are given quickly or that give a large amount of fluid    Type of medicine being given, such as a chemo medicine    How is extravasation treated? Your healthcare provider may do any or all of the following:  Stop the IV infusion    Remove the IV    Outline the area with a marker    Take pictures of the area    Raise and rest your limb on pillows    Apply a hot or cold compress on the area    Inject medicine into the tissue    How can I manage my symptoms? Continue to apply ice on the area  as directed or for 15 to 20 minutes every hour. Use an ice pack, or put crushed ice in a plastic bag. Cover it with a towel. Ice helps prevent tissue damage and decreases swelling and pain. Continue to apply heat on the area  as directed or for 20 to 30 minutes every 2 hours. Heat helps decrease pain and muscle spasms. Continue to elevate the area  above the level of your heart as often as you can. This will help decrease swelling and pain. Prop your limb on pillows or blankets to keep it elevated comfortably. Ask how to clean the area. Your healthcare provider may bandage the area. He or she may tell you which products you can apply on the area, such as a mild soap. Medicine  may help relieve pain or inflammation:    Acetaminophen  decreases pain and fever. It is available without a doctor's order. Ask how much to take and how often to take it. Follow directions. Read the labels of all other medicines you are using to see if they also contain acetaminophen, or ask your doctor or pharmacist. Acetaminophen can cause liver damage if not taken correctly. Do not use more than 4 grams (4,000 milligrams) total of acetaminophen in one day. NSAIDs , such as ibuprofen, help decrease swelling, pain, and fever. This medicine is available with or without a doctor's order.  NSAIDs can cause stomach bleeding or kidney problems in certain people. If you take blood thinner medicine, always ask your healthcare provider if NSAIDs are safe for you. Always read the medicine label and follow directions. When should I seek immediate care? You have severe pain. When should I call my doctor? You have a fever. Your pain does not get better, or gets worse. The area becomes more red and swollen. You see red streaks coming from the area. You have questions or concerns about your condition or care. CARE AGREEMENT:   You have the right to help plan your care. Learn about your health condition and how it may be treated. Discuss treatment options with your healthcare providers to decide what care you want to receive. You always have the right to refuse treatment. The above information is an  only. It is not intended as medical advice for individual conditions or treatments. Talk to your doctor, nurse or pharmacist before following any medical regimen to see if it is safe and effective for you. © Copyright [employer] L[employer] 2022 Information is for End User's use only and may not be sold, redistributed or otherwise used for commercial purposes.  All illustrations and images included in CareNotes® are the copyrighted property of [employer].D.A[employer] Inc[employer] or [address] [place] [address]

## 2023-07-06 NOTE — TELEPHONE ENCOUNTER
Returned call about the Mr. Renetta Gale being in severe pain. His mother reports that his drain stopped ouputing and he now has severe pain and is "clammy" and looks unwell. I recommended she proceed to the ED for further evaluation with Mr. Renetta Gale.

## 2023-07-06 NOTE — ED NOTES
Patient returned to room at this time from CT. Per CT tech IV infiltrated, shown on imaging. IV site cold packed at this time and compressed with Ace wrap. Provider notified of need for repeat blood work for lactic acid measurement.      Nasreen Beltrán RN  07/06/23 0738

## 2023-07-07 ENCOUNTER — TELEPHONE (OUTPATIENT)
Dept: NON INVASIVE DIAGNOSTICS | Facility: HOSPITAL | Age: 58
End: 2023-07-07

## 2023-07-07 NOTE — ED PROVIDER NOTES
History  Chief Complaint   Patient presents with   • Abdominal Pain     Patient reports abdominal pain with rectal pressure     62 yo M w hx of nephrectomy complicated by abscess formation in the retroperitoneum s/p IR KATHY drain placement who presents to the ED with mother for evaluation of generalized abdominal pain and cramping with rectal pressure. Mother providing majority of history due to patient distress. Mother states that for the past several days he has had little to no drainage from his KATHY drain which is unusual. He was recently admitted to [facility] with sepsis from abscess in retroperitoneum. Mother states that pain began last night with generalized abdominal pain and this morning, patient woke up with extreme pain and rectal pain. Patient unable to describe the type of pain. Mother states that he became diaphoretic this morning as well. Patient reports no chest pain, sob, constipation, diarrhea, fevers, chills, headache, lightheadedness. Last BM was yesterday. No urinary symptoms, hematuria or blood in stool. History provided by:  Patient   used: No        Prior to Admission Medications   Prescriptions Last Dose Informant Patient Reported? Taking? METHADONE HCL PO 7/5/2023 Self, Mother Yes Yes   Sig: Take 110 mg by mouth in the morning This dose was verified with Myah Bunch, the director of the [facility]. Pt usually receives liquid form. Pt was instructed to go to clinic am of surgery, as usual, to receive his am dose and proceed to the hospital with an arrival time of 1015.  The [facility] opens at 530am   acetaminophen (TYLENOL) 650 mg CR tablet   No No   Sig: Take 1 tablet (650 mg total) by mouth every 8 (eight) hours as needed for mild pain   cephalexin (KEFLEX) 500 mg capsule 7/6/2023  No Yes   Sig: Take 1 capsule (500 mg total) by mouth every 6 (six) hours for 29 doses   methocarbamol (ROBAXIN) 500 mg tablet 7/6/2023  No Yes   Sig: Take 1 tablet (500 mg total) by mouth 4 (four) times a day as needed for muscle spasms (pain around drain site)   potassium-sodium phosphates (PHOS-NAK) 280 mg (P)-160 mg (Na)-250 mg (K) packet   No No   Sig: Take 1 packet by mouth 2 (two) times a day with meals for 4 doses   sodium chloride, PF, 0.9 %  Self, Mother No No   Sig: 10 mL by Intracatheter route daily Intracatheter flushing daily. May substitute prefilled syringe with normal saline 10 mL vials, 10 mL syringes, and 18 g blunt needles   Patient taking differently: 10 mL by Intracatheter route daily Intracatheter flushing daily in KATHY drain. May substitute prefilled syringe with normal saline 10 mL vials, 10 mL syringes, and 18 g blunt needles   sodium chloride, PF, 0.9 %   No No   Sig: 10 mL by Intracatheter route daily Intracatheter flushing daily.  May substitute prefilled syringe with normal saline 10 mL vials, 10 mL syringes, and 18 g blunt needles      Facility-Administered Medications: None       Past Medical History:   Diagnosis Date   • BRAULIO (acute kidney injury) ([address]) 8/20/2022   • Anxiety    • Bright red rectal bleeding     Last Assessed: 9/9/2015    • Drug dependence ([address]) 10/20/2021   • Hypertension     Last Assessed: 7/9/2014    • Hyponatremia 5/26/2023   • Kidney stone    • Kidney stones     Last Assessed: 11/17/2016    • Periorbital edema     Last Assessed: 9/4/2015   • Septic shock ([address]) 08/20/2022   • Skin tag of anus     Last Assessed: 9/9/2015    • Trigger point of thoracic region     Last Assessed: 11/6/2015        Past Surgical History:   Procedure Laterality Date   • CYSTOSCOPY W/ URETERAL STENT PLACEMENT  03/11/2013   • CYSTOSCOPY W/ URETERAL STENT PLACEMENT  06/18/2014    EXTRACORPOREAL SHOCK WAVE LITHOTRIPSY    • CYSTOSCOPY W/ URETERAL STENT PLACEMENT  07/16/2014    EXTRACORPOREAL SHOCK WAVE LITHOTRIPSY    • CYSTOSCOPY W/ URETERAL STENT REMOVAL  04/11/2013   • CYSTOSCOPY W/ URETERAL STENT REMOVAL Right 08/26/2014   • CYSTOSCOPY W/ URETEROSCOPY W/ LITHOTRIPSY  02/26/2013    Percutaneous lithotomy With Uretal Stent Plcement    • CYSTOSCOPY W/ URETEROSCOPY W/ LITHOTRIPSY  03/11/2014   • CYSTOSCOPY W/ URETEROSCOPY W/ LITHOTRIPSY Right 08/04/2014    With Uretal Stent Placement    • CYSTOSCOPY W/ URETEROSCOPY W/ LITHOTRIPSY Right 05/09/2016   • HERNIA REPAIR  03/11/2013   • IR DRAINAGE TUBE CHECK/CHANGE/REPOSITION/REINSERTION/UPSIZE  7/6/2023   • IR DRAINAGE TUBE PLACEMENT  6/6/2023   • IR DRAINAGE TUBE PLACEMENT  6/23/2023   • IR NEPHROSTOMY TUBE CHECK/CHANGE/REPOSITION/REINSERTION/UPSIZE  11/22/2022   • IR NEPHROSTOMY TUBE CHECK/CHANGE/REPOSITION/REINSERTION/UPSIZE  02/13/2023   • IR NEPHROSTOMY TUBE CHECK/CHANGE/REPOSITION/REINSERTION/UPSIZE  04/28/2023   • IR NEPHROSTOMY TUBE PLACEMENT  08/20/2022   • KIDNEY SURGERY     • LITHOTRIPSY     • [place] CYSTO/URETERO W/LITHOTRIPSY &INDWELL STENT INSRT Right 07/13/2016    Procedure: CYSTOSCOPY; URETEROSCOPY WITH HOLMIUM LASER STONE EXTRACTION; RETROGRADE PYELOGRAM; URETERAL STENT INSERTION ;  Surgeon: Andrea Levi MD;  [facility];  Service: Urology   • NJ CYSTO/URETERO W/LITHOTRIPSY &INDWELL STENT INSRT Right 05/09/2016    Procedure: CYSTOSCOPY,  URETEROSCOPY,  WITH LITHOTRIPSY HOLMIUM LASER, STONE EXTRACTION, AND INSERTION STENT URETERAL;  Surgeon: Andrea Levi MD;  Location[facility];  Service: Urology   • NJ CYSTO/URETERO W/LITHOTRIPSY &INDWELL STENT INSRT Right 11/10/2022    Procedure: CYSTOSCOPY URETEROSCOPY  right RETROGRADE PYELOGRAM;  Surgeon: Kimberli Joy MD;  Location: [facility];  Service: Urology   • NJ LAPAROSCOPY RADICAL NEPHRECTOMY Right 5/22/2023    Procedure: NEPHRECTOMY RADICAL LAPAROSCOPIC W/ ROBOTICS;  Surgeon: Wenceslao Rodrigues MD;  Location: [facility];  Service: Urology   • NJ LITHOTRIPSY [address] Right 06/17/2016    Procedure: LITHROTRIPSY EXTRACORPORAL SHOCKWAVE (ESWL);   Surgeon: Andrea Levi MD;  [facility];  Service: Urology   • TRANSURETHRAL RESECTION OF BLADDER     • URETERAL REIMPLANTION  03/11/2013       Family History   Problem Relation Age of Onset   • No Known Problems Mother    • Heart attack Father      I have reviewed and agree with the history as documented. E-Cigarette/Vaping   • E-Cigarette Use Never User      E-Cigarette/Vaping Substances   • Nicotine No    • THC No    • CBD No    • Flavoring No    • Other No    • Unknown No      Social History     Tobacco Use   • Smoking status: Every Day     Packs/day: 3.00     Years: 35.00     Total pack years: 105.00     Types: Cigarettes   • Smokeless tobacco: Never   Vaping Use   • Vaping Use: Never used   Substance Use Topics   • Alcohol use: Not Currently   • Drug use: Not Currently       Review of Systems   Constitutional: Positive for diaphoresis. Negative for chills and fever. HENT: Negative for ear pain and sore throat. Eyes: Negative for pain and visual disturbance. Respiratory: Negative for cough and shortness of breath. Cardiovascular: Negative for chest pain and palpitations. Gastrointestinal: Positive for abdominal pain and rectal pain. Negative for blood in stool, constipation, diarrhea, nausea and vomiting. Genitourinary: Negative for dysuria and hematuria. Musculoskeletal: Negative for arthralgias, back pain and myalgias. Skin: Negative for color change and rash. Neurological: Negative for seizures, syncope and headaches. All other systems reviewed and are negative. Physical Exam  Physical Exam  Vitals and nursing note reviewed. Constitutional:       General: He is in acute distress (d/t pain). Appearance: He is well-developed. He is diaphoretic. HENT:      Head: Normocephalic and atraumatic. Right Ear: External ear normal.      Left Ear: External ear normal.      Mouth/Throat:      Mouth: Mucous membranes are moist.      Pharynx: Oropharynx is clear. Eyes:      General: No scleral icterus. Right eye: No discharge.          Left eye: No discharge. Conjunctiva/sclera: Conjunctivae normal.      Pupils: Pupils are equal, round, and reactive to light. Cardiovascular:      Rate and Rhythm: Normal rate. Pulses: Normal pulses. Pulmonary:      Effort: Pulmonary effort is normal. No respiratory distress. Abdominal:      General: A surgical scar is present. Bowel sounds are normal.      Tenderness: There is generalized abdominal tenderness. Genitourinary:     Comments: Hemorrhoids present. Musculoskeletal:         General: No swelling, tenderness or signs of injury. Normal range of motion. Cervical back: Normal range of motion and neck supple. No rigidity. Skin:     General: Skin is warm. Capillary Refill: Capillary refill takes less than 2 seconds. Findings: No bruising, erythema or rash. Neurological:      General: No focal deficit present. Mental Status: He is alert and oriented to person, place, and time. Mental status is at baseline. Psychiatric:         Mood and Affect: Mood normal.         Behavior: Behavior normal.         Thought Content:  Thought content normal.         Vital Signs  ED Triage Vitals   Temperature Pulse Respirations Blood Pressure SpO2   07/06/23 1104 07/06/23 1104 07/06/23 1104 07/06/23 1104 07/06/23 1104   (!) 97.3 °F (36.3 °C) (!) 52 17 132/66 99 %      Temp Source Heart Rate Source Patient Position - Orthostatic VS BP Location FiO2 (%)   07/06/23 1104 07/06/23 1104 07/06/23 1104 07/06/23 1310 --   Temporal Monitor Lying Left arm       Pain Score       07/06/23 1104       10 - Worst Possible Pain           Vitals:    07/06/23 1400 07/06/23 1500 07/06/23 1715 07/06/23 1800   BP: 168/78 (!) 181/89 144/75 122/65   Pulse: 65 60 64 65   Patient Position - Orthostatic VS:  Lying Lying Lying         Visual Acuity      ED Medications  Medications   ceFAZolin (ANCEF) IVPB (premix in dextrose) 2,000 mg 50 mL (0 mg Intravenous Hold 7/6/23 1418)   sodium chloride 0.9 % bolus 1,000 mL (0 mL Intravenous Hold 7/6/23 1418)   morphine injection 2 mg (2 mg Intravenous Given 7/6/23 1246)   ondansetron (ZOFRAN) injection 4 mg (4 mg Intravenous Given 7/6/23 1246)   iohexol (OMNIPAQUE) 350 MG/ML injection (SINGLE-DOSE) 100 mL (100 mL Intravenous Given 7/6/23 1402)   morphine injection 2 mg (2 mg Intramuscular Given 7/6/23 1506)   iohexol (OMNIPAQUE) 350 MG/ML injection (MULTI-DOSE) 50 mL (10 mL Other Given 7/6/23 1647)       Diagnostic Studies  Results Reviewed     Procedure Component Value Units Date/Time    Blood culture #1 [[id number]] Collected: 07/06/23 1220    Lab Status: Preliminary result Specimen: Blood from Arm, Left Updated: 07/07/23 2301     Blood Culture No Growth at 24 hrs. Blood culture #2 [[id number]] Collected: 07/06/23 1220    Lab Status: Preliminary result Specimen: Blood from Arm, Left Updated: 07/07/23 2301     Blood Culture No Growth at 24 hrs. Lactic acid 2 Hours [[id number]]  (Normal) Collected: 07/06/23 1716    Lab Status: Final result Specimen: Blood from Hand, Left Updated: 07/06/23 1758     LACTIC ACID 1.7 mmol/L     Narrative:      Result may be elevated if tourniquet was used during collection. Procalcitonin [[id number]]  (Normal) Collected: 07/06/23 1220    Lab Status: Final result Specimen: Blood from Arm, Left Updated: 07/06/23 1319     Procalcitonin 0.05 ng/ml     Lactic acid [[id number]]  (Abnormal) Collected: 07/06/23 1220    Lab Status: Final result Specimen: Blood from Arm, Left Updated: 07/06/23 1309     LACTIC ACID 2.3 mmol/L     Narrative:      Result may be elevated if tourniquet was used during collection.     Comprehensive metabolic panel [[id number]]  (Abnormal) Collected: 07/06/23 1220    Lab Status: Final result Specimen: Blood from Arm, Left Updated: 07/06/23 1256     Sodium 137 mmol/L      Potassium 3.9 mmol/L      Chloride 100 mmol/L      CO2 23 mmol/L      ANION GAP 14 mmol/L      BUN 16 mg/dL      Creatinine 1.42 mg/dL      Glucose 85 mg/dL      Calcium 10.3 mg/dL      AST 15 U/L      ALT 8 U/L      Alkaline Phosphatase 172 U/L      Total Protein 9.2 g/dL      Albumin 4.7 g/dL      Total Bilirubin 0.76 mg/dL      eGFR 54 ml/min/1.73sq m     Narrative:      [facility] guidelines for Chronic Kidney Disease (CKD):   •  Stage 1 with normal or high GFR (GFR > 90 mL/min/1.73 square meters)  •  Stage 2 Mild CKD (GFR = 60-89 mL/min/1.73 square meters)  •  Stage 3A Moderate CKD (GFR = 45-59 mL/min/1.73 square meters)  •  Stage 3B Moderate CKD (GFR = 30-44 mL/min/1.73 square meters)  •  Stage 4 Severe CKD (GFR = 15-29 mL/min/1.73 square meters)  •  Stage 5 End Stage CKD (GFR <15 mL/min/1.73 square meters)  Note: GFR calculation is accurate only with a steady state creatinine    Protime-INR [[id number]]  (Normal) Collected: 07/06/23 1220    Lab Status: Final result Specimen: Blood from Arm, Left Updated: 07/06/23 1246     Protime 13.1 seconds      INR 0.99    APTT [[id number]]  (Normal) Collected: 07/06/23 1220    Lab Status: Final result Specimen: Blood from Arm, Left Updated: 07/06/23 1246     PTT 34 seconds     CBC and differential [[id number]]  (Abnormal) Collected: 07/06/23 1220    Lab Status: Final result Specimen: Blood from Arm, Left Updated: 07/06/23 1234     WBC 12.27 Thousand/uL      RBC 5.34 Million/uL      Hemoglobin 15.4 g/dL      Hematocrit 47.6 %      MCV 89 fL      MCH 28.8 pg      MCHC 32.4 g/dL      RDW 14.8 %      MPV 9.5 fL      Platelets 291 Thousands/uL      nRBC 0 /100 WBCs      Neutrophils Relative 83 %      Immat GRANS % 1 %      Lymphocytes Relative 12 %      Monocytes Relative 2 %      Eosinophils Relative 1 %      Basophils Relative 1 %      Neutrophils Absolute 10.26 Thousands/µL      Immature Grans Absolute 0.07 Thousand/uL      Lymphocytes Absolute 1.46 Thousands/µL      Monocytes Absolute 0.29 Thousand/µL      Eosinophils Absolute 0.13 Thousand/µL      Basophils Absolute 0.06 Thousands/µL                  IR drainage tube check/change/reposition/reinsertion/upsize   Final Result by Thai Munoz MD (07/07 3343)      Technically successful tube check      Both tubes were removed                  This is the end of the clinically relevant portion of this report. Subsequent information is for compliance, documentation, and coding purposes. In the process of informed consent, information was communicated, verbally, to the patient regarding the procedure. The patient was informed of; the name of the procedure, nature of the procedure, nature of the condition, risks, benefits, alternatives,    and potential complications, as well as the consequences of not having the procedure. All the patient's questions were answered. Informed consent was signed. Quoted risks include tube failure, leak, or dislodgment. Chlorhexidine and alcohol was used for cleansing and sterile preparation of the skin. This was allowed to dry prior to the application of sterile draping. Timeout was performed, with all team members present, and in agreement. Confirmation of patient, procedure, laterally, allergies, and all needed equipment was performed. PROCEDURE: Tube change   PREPROCEDURE DIAGNOSIS: Please see "history ", above   POSTPROCEDURE DIAGNOSIS: Same   ANTIBIOTICS: None   SPECIMEN: None   ESTIMATED BLOOD LOSS: None   ANESTHESIA: None   FLUOROSCOPY TIME: 1 Minutes   RADIATION DOSE: 18 mGy   IMAGE COUNT: 4   CONTRAST DOSE:10 cc Omnipaque 350      Workstation performed: [id number]         CT abdomen pelvis with contrast   Final Result by Gurinder Gilbert MD (07/06 8937)      1) Small collection of fluid and air in the right nephrectomy bed, inseparable from the right psoas and quadratus lumborum muscles, measuring approximately 3.1 x 1.7 cm, decreased from 6/21/2023. Foci of air seen tracking more laterally in the right    posterior abdominal wall, just below the right 12th rib.       2) Right-sided percutaneous drain seen within the right quadratus lumborum muscle, near the aforementioned collection, however more posteriorly located than on images from its placement on 6/23/2023 likely displaced. 3) No measurable resection around the percutaneous drain in the right anterior abdominal wall subcutaneous tissues. 4) Rectum distended with dense stool up to 6.8 cm in diameter which may indicate fecal impaction. 5) Slight nodularity of the liver contour suggestive of cirrhosis. 6) Trace pericardial effusion, new from 6/21/2023. 7) Additional findings as above. Workstation performed: [id number]                    Procedures  ECG 12 Lead Documentation Only    Date/Time: 7/6/2023 9:49 PM    Performed by: Emily Wray PA-C  Authorized by: Emily Wray PA-C    Indications / Diagnosis:  Metabolic derangement  ECG reviewed by me, the ED Provider: yes    Patient location:  ED  Interpretation:     Interpretation: abnormal    Rate:     ECG rate:  60    ECG rate assessment: normal    Rhythm:     Rhythm: sinus rhythm    Ectopy:     Ectopy: none    QRS:     QRS axis:  Normal    QRS intervals:  Normal  Conduction:     Conduction: abnormal      Abnormal conduction: complete RBBB    ST segments:     ST segments:  Normal  T waves:     T waves: normal               ED Course  ED Course as of 07/08/23 0947   u Jul 06, 2023   1216 Difficulty obtaining IV access. US guided peripheral IV placed by me in left upper arm   1307 Ancef ordered as this is abx that was given while in hospital last week for abscess. 1319 LACTIC ACID(!!): 2.3   1343 L arm IV unable to be used in CT. Second US guided peripheral IV performed to R arm. Flushes well and no signs of infiltration. 1350 Patient requesting more pain meds. States that the morphine only helped slightly   1351 IR consult appreciated. Blocked drainage tube. Will need repositioning/change. 1406 IV contrast infiltrated in patient's arm.  They were able to scan without contrast so they will send study to radiology. Will discuss with surgery regarding need for contrasted study. 1442 Discussed with surgery. Okay for dry scan for now. 1444 IV abx and fluids stopped due to no vascular access. Attending will try peripheral vascular access   1622 IR able to place peripheral IV in left arm. Overdue on lactic due to difficult IV access. 1702 CT appreciated. Patient had large BM with IR after they pulled his drainage tube. Patient reports significant improvement. Will straight stick for lactic and if still elevated, will place central line or EJ and admit for possible intra-abdominal infection. If normalized, will discharge. Likely elevated lactic due to patient's "spasms" and constipation. 1805 Lactic decreased without intervention after patient had bowel movement. Patient is requesting to go home. Will discharge and instructed to follow up with IR and surgeon for follow up as drainage tube was removed. SBIRT 22yo+    Flowsheet Row Most Recent Value   Initial Alcohol Screen: US AUDIT-C     1. How often do you have a drink containing alcohol? 0 Filed at: 07/06/2023 1107   2. How many drinks containing alcohol do you have on a typical day you are drinking? 0 Filed at: 07/06/2023 1107   3a. Male UNDER 65: How often do you have five or more drinks on one occasion? 0 Filed at: 07/06/2023 1107   3b. FEMALE Any Age, or MALE 65+: How often do you have 4 or more drinks on one occassion? 0 Filed at: 07/06/2023 1107   Audit-C Score 0 Filed at: 07/06/2023 1107   AMANDA: How many times in the past year have you. .. Used an illegal drug or used a prescription medication for non-medical reasons? Never Filed at: 07/06/2023 1107                    Medical Decision Making  60 yo M presents to the ED for evaluation with mother for generalized abdominal pain since this morning. ROS limited d/t patient distress.      Differential: SBO, sepsis from abscess, obstruction of KATHY drain, bowel perforation, diverticulitis, pancreatitis, acute cholecystitis. Workup initiated. Considered sepsis given patient's distress and recent diagnosis with clammy skin. Blood cultures ordered. Difficulty obtaining IV access. Multiple provider attempts deemed unsuccessful. Discussed w surgery who states that dry CT scan should suffice. IR consult placed to evaluate concerns about KATHY drain. Provider notes that drain is obstructed. Will be taking to IR suite. CT resulted after patient had large spontaneous fecal disimpaction in IR suite. KATHY drain was removed by IR. Patient reporting resolution of his symptoms after bowel movement. No significant acute findings on CT. Patient requesting to go home. Repeat lactic acid showing improvement without any fluids or antibiotics. Likely just reactive given the situation. Patient reassessed and is no longer in distress and no longer has abdominal pain. Will discharge with f/u with surgery for further evaluation. Advised him to continue with his antibiotics as prescribed. Strict return precautions discussed. Considered hospitalization however patient is stable for outpatient management. Patient stable at time of discharge. All questions answered. Abdominal pain: acute illness or injury  Fecal impaction [facility]): acute illness or injury  Amount and/or Complexity of Data Reviewed  Independent Historian:      Details: Mother providing majority of history due to patient distress. Labs: ordered. Decision-making details documented in ED Course. Details: Elevated lactic resolved after disimpaction without antibiotics and fluids. Likely reactive. Mild leukocytosis liekly reactive, not true infection. No electrolyte abnormality or renal dysfunction. Radiology: ordered.      Details: CT: 1) Small collection of fluid and air in the right nephrectomy bed, inseparable from the right psoas and quadratus lumborum muscles, measuring approximately 3.1 x 1.7 cm, decreased from 6/21/2023. Foci of air seen tracking more laterally in the right   posterior abdominal wall, just below the right 12th rib.    2) Right-sided percutaneous drain seen within the right quadratus lumborum muscle, near the aforementioned collection, however more posteriorly located than on images from its placement on 6/23/2023 likely displaced.    3) No measurable resection around the percutaneous drain in the right anterior abdominal wall subcutaneous tissues.    4) Rectum distended with dense stool up to 6.8 cm in diameter which may indicate fecal impaction.    5) Slight nodularity of the liver contour suggestive of cirrhosis.    6) Trace pericardial effusion, new from 6/21/2023. No significantly acute findings on noncon CT, patient improved after disimpaction. No surgical abdomen. ECG/medicine tests: ordered. Details: normal ecg  Discussion of management or test interpretation with external provider(s): Jaylan Mckoy (gen surg), Clint Connelly (IR)    Risk  Prescription drug management. Disposition  Final diagnoses:   Abdominal pain   Fecal impaction ([address])     Time reflects when diagnosis was documented in both MDM as applicable and the Disposition within this note     Time User Action Codes Description Comment    7/6/2023  6:06 PM Estephania Poisson Add [R10.9] Abdominal pain     7/6/2023  6:06 PM Estephania Poisson Add [K56.41] Fecal impaction [facility])       ED Disposition     ED Disposition   Discharge    Condition   Stable    Date/Time   Thu Jul 6, 2023  6:07 PM    Comment   Racquel Rodrigues discharge to home/self care.                Follow-up Information     Follow up With Specialties Details Why [facility] Emergency Department Emergency Medicine Go to  If symptoms worsen [address] [place] [address] Dr Abner Starks [id number]  [address] [place] [address] Emergency Department, [address] [place] [address] [address] [place] [address] Rd, [address], [address]    Your urologist  Schedule an appointment as soon as possible for a visit  follow up for further evaluation of symptoms            Discharge Medication List as of 7/6/2023  6:18 PM      CONTINUE these medications which have NOT CHANGED    Details   cephalexin (KEFLEX) 500 mg capsule Take 1 capsule (500 mg total) by mouth every 6 (six) hours for 29 doses, Starting Wed 6/28/2023, Until Thu 7/6/2023, Normal      METHADONE HCL PO Take 110 mg by mouth in the morning This dose was verified with Quang Pearl, the director of the [facility] center. Pt usually receives liquid form. Pt was instructed to go to clinic am of surgery, as usual, to receive his am dose and proceed to the [facility] with an arrival time of 1015. [facility] center opens at 530am, Historical Med      methocarbamol (ROBAXIN) 500 mg tablet Take 1 tablet (500 mg total) by mouth 4 (four) times a day as needed for muscle spasms (pain around drain site), Starting Wed 6/28/2023, Normal      acetaminophen (TYLENOL) 650 mg CR tablet Take 1 tablet (650 mg total) by mouth every 8 (eight) hours as needed for mild pain, Starting Tue 6/13/2023, No Print      potassium-sodium phosphates (PHOS-NAK) 280 mg (P)-160 mg (Na)-250 mg (K) packet Take 1 packet by mouth 2 (two) times a day with meals for 4 doses, Starting Mon 5/29/2023, Until Wed 5/31/2023, Normal      !! sodium chloride, PF, 0.9 % 10 mL by Intracatheter route daily Intracatheter flushing daily. May substitute prefilled syringe with normal saline 10 mL vials, 10 mL syringes, and 18 g blunt needles, Starting Tue 6/6/2023, Until Mon 9/4/2023, Normal      !! sodium chloride, PF, 0.9 % 10 mL by Intracatheter route daily Intracatheter flushing daily. May substitute prefilled syringe with normal saline 10 mL vials, 10 mL syringes, and 18 g blunt needles, Starting Fri 6/23/2023, Until Wed 12/20/2023, Normal       !! - Potential duplicate medications found. Please discuss with provider. No discharge procedures on file.     PDMP Review       Value Time User    PDMP Reviewed  Yes 5/26/2023  7:30 PM Hipolito Snow MD          ED Provider  Electronically Signed by           Jeremiah Arnold PA-C  07/08/23 [id number]

## 2023-07-09 LAB
BACTERIA BLD CULT: NORMAL
BACTERIA BLD CULT: NORMAL

## 2023-07-10 ENCOUNTER — HOME CARE VISIT (OUTPATIENT)
Dept: HOME HEALTH SERVICES | Facility: HOME HEALTHCARE | Age: 58
End: 2023-07-10
Payer: COMMERCIAL

## 2023-07-10 VITALS
TEMPERATURE: 98 F | HEART RATE: 80 BPM | SYSTOLIC BLOOD PRESSURE: 120 MMHG | OXYGEN SATURATION: 99 % | DIASTOLIC BLOOD PRESSURE: 60 MMHG | RESPIRATION RATE: 16 BRPM

## 2023-07-10 PROCEDURE — G0299 HHS/HOSPICE OF RN EA 15 MIN: HCPCS

## 2023-07-11 LAB
BACTERIA BLD CULT: NORMAL
BACTERIA BLD CULT: NORMAL

## 2023-07-27 PROBLEM — Z87.440 HISTORY OF UTI: Status: RESOLVED | Noted: 2023-05-16 | Resolved: 2023-07-27

## 2023-09-07 ENCOUNTER — TELEPHONE (OUTPATIENT)
Dept: GASTROENTEROLOGY | Facility: CLINIC | Age: 58
End: 2023-09-07

## 2023-09-07 NOTE — TELEPHONE ENCOUNTER
I called the patient and spoke with the mom in regards to scheduling an appointment with hepatology.

## 2023-09-13 ENCOUNTER — HOSPITAL ENCOUNTER (EMERGENCY)
Facility: HOSPITAL | Age: 58
Discharge: HOME | End: 2023-09-13
Attending: EMERGENCY MEDICINE | Admitting: EMERGENCY MEDICINE
Payer: COMMERCIAL

## 2023-09-13 VITALS
WEIGHT: 140 LBS | HEART RATE: 75 BPM | SYSTOLIC BLOOD PRESSURE: 181 MMHG | TEMPERATURE: 97.2 F | BODY MASS INDEX: 21.97 KG/M2 | OXYGEN SATURATION: 99 % | DIASTOLIC BLOOD PRESSURE: 95 MMHG | RESPIRATION RATE: 16 BRPM | HEIGHT: 67 IN

## 2023-09-13 DIAGNOSIS — F11.93 OPIATE WITHDRAWAL (HCC): Primary | ICD-10-CM

## 2023-09-13 LAB
ALBUMIN SERPL BCP-MCNC: 4.2 G/DL (ref 3.5–5)
ALP SERPL-CCNC: 76 U/L (ref 34–104)
ALT SERPL W P-5'-P-CCNC: 6 U/L (ref 7–52)
ANION GAP SERPL CALCULATED.3IONS-SCNC: 8 MMOL/L
APAP SERPL-MCNC: <10 UG/ML (ref 10–20)
AST SERPL W P-5'-P-CCNC: 11 U/L (ref 13–39)
BASOPHILS # BLD AUTO: 0.07 THOUSANDS/ÂΜL (ref 0–0.1)
BASOPHILS NFR BLD AUTO: 1 % (ref 0–1)
BILIRUB SERPL-MCNC: 0.32 MG/DL (ref 0.2–1)
BUN SERPL-MCNC: 27 MG/DL (ref 5–25)
CALCIUM SERPL-MCNC: 9.7 MG/DL (ref 8.4–10.2)
CHLORIDE SERPL-SCNC: 104 MMOL/L (ref 96–108)
CO2 SERPL-SCNC: 25 MMOL/L (ref 21–32)
CREAT SERPL-MCNC: 1.32 MG/DL (ref 0.6–1.3)
EOSINOPHIL # BLD AUTO: 0.17 THOUSAND/ÂΜL (ref 0–0.61)
EOSINOPHIL NFR BLD AUTO: 2 % (ref 0–6)
ERYTHROCYTE [DISTWIDTH] IN BLOOD BY AUTOMATED COUNT: 14.8 % (ref 11.6–15.1)
ETHANOL SERPL-MCNC: <10 MG/DL
GFR SERPL CREATININE-BSD FRML MDRD: 58 ML/MIN/1.73SQ M
GLUCOSE SERPL-MCNC: 98 MG/DL (ref 65–140)
HCT VFR BLD AUTO: 47.4 % (ref 36.5–49.3)
HGB BLD-MCNC: 16.1 G/DL (ref 12–17)
IMM GRANULOCYTES # BLD AUTO: 0.12 THOUSAND/UL (ref 0–0.2)
IMM GRANULOCYTES NFR BLD AUTO: 1 % (ref 0–2)
LIPASE SERPL-CCNC: 113 U/L (ref 11–82)
LYMPHOCYTES # BLD AUTO: 2.35 THOUSANDS/ÂΜL (ref 0.6–4.47)
LYMPHOCYTES NFR BLD AUTO: 22 % (ref 14–44)
MAGNESIUM SERPL-MCNC: 2.3 MG/DL (ref 1.9–2.7)
MCH RBC QN AUTO: 28.8 PG (ref 26.8–34.3)
MCHC RBC AUTO-ENTMCNC: 34 G/DL (ref 31.4–37.4)
MCV RBC AUTO: 85 FL (ref 82–98)
MONOCYTES # BLD AUTO: 0.87 THOUSAND/ÂΜL (ref 0.17–1.22)
MONOCYTES NFR BLD AUTO: 8 % (ref 4–12)
NEUTROPHILS # BLD AUTO: 7.12 THOUSANDS/ÂΜL (ref 1.85–7.62)
NEUTS SEG NFR BLD AUTO: 66 % (ref 43–75)
NRBC BLD AUTO-RTO: 0 /100 WBCS
PLATELET # BLD AUTO: 217 THOUSANDS/UL (ref 149–390)
PMV BLD AUTO: 10 FL (ref 8.9–12.7)
POTASSIUM SERPL-SCNC: 4 MMOL/L (ref 3.5–5.3)
PROT SERPL-MCNC: 6.6 G/DL (ref 6.4–8.4)
RBC # BLD AUTO: 5.6 MILLION/UL (ref 3.88–5.62)
SALICYLATES SERPL-MCNC: <5 MG/DL (ref 3–20)
SODIUM SERPL-SCNC: 137 MMOL/L (ref 135–147)
WBC # BLD AUTO: 10.7 THOUSAND/UL (ref 4.31–10.16)

## 2023-09-13 PROCEDURE — 80143 DRUG ASSAY ACETAMINOPHEN: CPT | Performed by: EMERGENCY MEDICINE

## 2023-09-13 PROCEDURE — 83690 ASSAY OF LIPASE: CPT | Performed by: EMERGENCY MEDICINE

## 2023-09-13 PROCEDURE — 82077 ASSAY SPEC XCP UR&BREATH IA: CPT | Performed by: EMERGENCY MEDICINE

## 2023-09-13 PROCEDURE — 83735 ASSAY OF MAGNESIUM: CPT | Performed by: EMERGENCY MEDICINE

## 2023-09-13 PROCEDURE — 80179 DRUG ASSAY SALICYLATE: CPT | Performed by: EMERGENCY MEDICINE

## 2023-09-13 PROCEDURE — 85025 COMPLETE CBC W/AUTO DIFF WBC: CPT | Performed by: EMERGENCY MEDICINE

## 2023-09-13 PROCEDURE — 80053 COMPREHEN METABOLIC PANEL: CPT | Performed by: EMERGENCY MEDICINE

## 2023-09-13 PROCEDURE — 99284 EMERGENCY DEPT VISIT MOD MDM: CPT | Performed by: EMERGENCY MEDICINE

## 2023-09-13 PROCEDURE — 36415 COLL VENOUS BLD VENIPUNCTURE: CPT | Performed by: EMERGENCY MEDICINE

## 2023-09-13 PROCEDURE — 99285 EMERGENCY DEPT VISIT HI MDM: CPT

## 2023-09-13 RX ORDER — ONDANSETRON 2 MG/ML
4 INJECTION INTRAMUSCULAR; INTRAVENOUS ONCE
Status: DISCONTINUED | OUTPATIENT
Start: 2023-09-13 | End: 2023-09-13

## 2023-09-13 RX ORDER — CLONIDINE HYDROCHLORIDE 0.2 MG/1
0.2 TABLET ORAL 2 TIMES DAILY
Qty: 14 TABLET | Refills: 0 | Status: SHIPPED | OUTPATIENT
Start: 2023-09-13 | End: 2023-09-18 | Stop reason: SDUPTHER

## 2023-09-13 RX ORDER — ONDANSETRON 4 MG/1
4 TABLET, ORALLY DISINTEGRATING ORAL EVERY 6 HOURS PRN
Qty: 20 TABLET | Refills: 0 | Status: SHIPPED | OUTPATIENT
Start: 2023-09-13

## 2023-09-13 RX ORDER — SODIUM CHLORIDE, SODIUM GLUCONATE, SODIUM ACETATE, POTASSIUM CHLORIDE, MAGNESIUM CHLORIDE, SODIUM PHOSPHATE, DIBASIC, AND POTASSIUM PHOSPHATE .53; .5; .37; .037; .03; .012; .00082 G/100ML; G/100ML; G/100ML; G/100ML; G/100ML; G/100ML; G/100ML
2000 INJECTION, SOLUTION INTRAVENOUS ONCE
Status: DISCONTINUED | OUTPATIENT
Start: 2023-09-13 | End: 2023-09-13

## 2023-09-13 RX ORDER — ONDANSETRON 4 MG/1
4 TABLET, ORALLY DISINTEGRATING ORAL ONCE
Status: COMPLETED | OUTPATIENT
Start: 2023-09-13 | End: 2023-09-13

## 2023-09-13 RX ORDER — CLONIDINE HYDROCHLORIDE 0.1 MG/1
0.2 TABLET ORAL ONCE
Status: COMPLETED | OUTPATIENT
Start: 2023-09-13 | End: 2023-09-13

## 2023-09-13 RX ADMIN — CLONIDINE HYDROCHLORIDE 0.2 MG: 0.1 TABLET ORAL at 14:49

## 2023-09-13 RX ADMIN — ONDANSETRON 4 MG: 4 TABLET, ORALLY DISINTEGRATING ORAL at 14:50

## 2023-09-13 NOTE — ED TRIAGE NOTES
Patient reports to this ED from home accompanied by his friend c/o  quit methadone seven days ago after using it for 6-8 months cold turkey. Patient reports continued withdrawal symptoms NVD. Patient denies chest pain, headache, SOB.

## 2023-09-13 NOTE — ED NOTES
Patient reports using methadone last week in order after missing several appts and they dropped his dose to 40mg causing him to go into withdrawals causing him to not want to continue taking methadone so he stopped all drug use on his own. Patient reports smoking 1-2 packs of cigarettes/day, denies drinking.      Rlaph Mercer, RN  09/13/23 0759

## 2023-09-13 NOTE — ED PROVIDER NOTES
History  Chief Complaint   Patient presents with   • Opiate Withdrawal     Patient quit methadone seven days ago after using it for 6-8 months cold turkey. Patient is a 58-year-old male complaining of opiate withdrawal syndrome. States last use of methadone was 7 days ago. He had his dose cut from 110 mg down to 40 mg because he went several days without having an appointment. He states 7 days ago was his last usage and that was of the 40 mg tablet. He has had nausea and vomiting since that event. Has had previous withdrawal symptoms. Suspect longer withdrawal symptoms due to the longer acting methadone. Denies abdominal pain. Denies any fevers or chills. Denies SI or HI. Prior to Admission Medications   Prescriptions Last Dose Informant Patient Reported? Taking? METHADONE HCL PO  Self, Mother Yes No   Sig: Take 110 mg by mouth in the morning This dose was verified with Susan Mccollum, the director of the [facility]. Pt usually receives liquid form. Pt was instructed to go to clinic am of surgery, as usual, to receive his am dose and proceed to the hospital with an arrival time of 1015. The [facility] center opens at 530am   acetaminophen (TYLENOL) 650 mg CR tablet   No No   Sig: Take 1 tablet (650 mg total) by mouth every 8 (eight) hours as needed for mild pain   methocarbamol (ROBAXIN) 500 mg tablet   No No   Sig: Take 1 tablet (500 mg total) by mouth 4 (four) times a day as needed for muscle spasms (pain around drain site)   potassium-sodium phosphates (PHOS-NAK) 280 mg (P)-160 mg (Na)-250 mg (K) packet   No No   Sig: Take 1 packet by mouth 2 (two) times a day with meals for 4 doses   sodium chloride, PF, 0.9 %  Self, Mother No No   Sig: 10 mL by Intracatheter route daily Intracatheter flushing daily.  May substitute prefilled syringe with normal saline 10 mL vials, 10 mL syringes, and 18 g blunt needles   Patient taking differently: 10 mL by Intracatheter route daily Intracatheter flushing daily in KATHY drain. May substitute prefilled syringe with normal saline 10 mL vials, 10 mL syringes, and 18 g blunt needles   sodium chloride, PF, 0.9 %   No No   Sig: 10 mL by Intracatheter route daily Intracatheter flushing daily.  May substitute prefilled syringe with normal saline 10 mL vials, 10 mL syringes, and 18 g blunt needles      Facility-Administered Medications: None       Past Medical History:   Diagnosis Date   • BRAULIO (acute kidney injury) ([address]) 8/20/2022   • Anxiety    • Bright red rectal bleeding     Last Assessed: 9/9/2015    • Drug dependence ([address]) 10/20/2021   • Hypertension     Last Assessed: 7/9/2014    • Hyponatremia 5/26/2023   • Kidney stone    • Kidney stones     Last Assessed: 11/17/2016    • Periorbital edema     Last Assessed: 9/4/2015   • Septic shock ([address]) 08/20/2022   • Skin tag of anus     Last Assessed: 9/9/2015    • Trigger point of thoracic region     Last Assessed: 11/6/2015        Past Surgical History:   Procedure Laterality Date   • CYSTOSCOPY W/ URETERAL STENT PLACEMENT  03/11/2013   • CYSTOSCOPY W/ URETERAL STENT PLACEMENT  06/18/2014    EXTRACORPOREAL SHOCK WAVE LITHOTRIPSY    • CYSTOSCOPY W/ URETERAL STENT PLACEMENT  07/16/2014    EXTRACORPOREAL SHOCK WAVE LITHOTRIPSY    • CYSTOSCOPY W/ URETERAL STENT REMOVAL  04/11/2013   • CYSTOSCOPY W/ URETERAL STENT REMOVAL Right 08/26/2014   • CYSTOSCOPY W/ URETEROSCOPY W/ LITHOTRIPSY  02/26/2013    Percutaneous lithotomy With Uretal Stent Plcement    • CYSTOSCOPY W/ URETEROSCOPY W/ LITHOTRIPSY  03/11/2014   • CYSTOSCOPY W/ URETEROSCOPY W/ LITHOTRIPSY Right 08/04/2014    With Uretal Stent Placement    • CYSTOSCOPY W/ URETEROSCOPY W/ LITHOTRIPSY Right 05/09/2016   • HERNIA REPAIR  03/11/2013   • IR DRAINAGE TUBE CHECK/CHANGE/REPOSITION/REINSERTION/UPSIZE  7/6/2023   • IR DRAINAGE TUBE PLACEMENT  6/6/2023   • IR DRAINAGE TUBE PLACEMENT  6/23/2023   • IR NEPHROSTOMY TUBE CHECK/CHANGE/REPOSITION/REINSERTION/UPSIZE 11/22/2022   • IR NEPHROSTOMY TUBE CHECK/CHANGE/REPOSITION/REINSERTION/UPSIZE  02/13/2023   • IR NEPHROSTOMY TUBE CHECK/CHANGE/REPOSITION/REINSERTION/UPSIZE  04/28/2023   • IR NEPHROSTOMY TUBE PLACEMENT  08/20/2022   • KIDNEY SURGERY     • LITHOTRIPSY     • KS CYSTO/URETERO W/LITHOTRIPSY &INDWELL STENT INSRT Right 07/13/2016    Procedure: CYSTOSCOPY; URETEROSCOPY WITH HOLMIUM LASER STONE EXTRACTION; RETROGRADE PYELOGRAM; URETERAL STENT INSERTION ;  Surgeon: Werner Conte MD;  Location[facility];  Service: Urology   • KS CYSTO/URETERO W/LITHOTRIPSY &INDWELL STENT INSRT Right 05/09/2016    Procedure: CYSTOSCOPY,  URETEROSCOPY,  WITH LITHOTRIPSY HOLMIUM LASER, STONE EXTRACTION, AND INSERTION STENT URETERAL;  Surgeon: Werner Conte MD;  Location: [facility];  Service: Urology   • KS CYSTO/URETERO W/LITHOTRIPSY &INDWELL STENT INSRT Right 11/10/2022    Procedure: CYSTOSCOPY URETEROSCOPY  right RETROGRADE PYELOGRAM;  Surgeon: Selina Joshi MD;  Location: [facility];  Service: Urology   • KS LAPAROSCOPY RADICAL NEPHRECTOMY Right 5/22/2023    Procedure: NEPHRECTOMY RADICAL LAPAROSCOPIC W/ ROBOTICS;  Surgeon: Martinez Wagoner MD;  Location: [facility];  Service: Urology   • KS LITHOTRIPSY [address] Right 06/17/2016    Procedure: Noreen Hem SHOCKWAVE (ESWL); Surgeon: Werner Conte MD;  Location: [facility];  Service: Urology   • TRANSURETHRAL RESECTION OF BLADDER     • URETERAL REIMPLANTION  03/11/2013       Family History   Problem Relation Age of Onset   • No Known Problems Mother    • Heart attack Father      I have reviewed and agree with the history as documented.     E-Cigarette/Vaping   • E-Cigarette Use Never User      E-Cigarette/Vaping Substances   • Nicotine No    • THC No    • CBD No    • Flavoring No    • Other No    • Unknown No      Social History     Tobacco Use   • Smoking status: Every Day     Packs/day: 3.00     Years: 35.00     Total pack years: 105.00     Types: Cigarettes   • Smokeless tobacco: Never   Vaping Use   • Vaping Use: Never used   Substance Use Topics   • Alcohol use: Not Currently   • Drug use: Not Currently       Review of Systems   Constitutional: Positive for fatigue. Negative for activity change, appetite change, chills and fever. HENT: Negative for ear pain, hearing loss, rhinorrhea, sneezing, sore throat and trouble swallowing. Eyes: Negative for pain and visual disturbance. Respiratory: Negative for cough, choking, chest tightness, shortness of breath, wheezing and stridor. Cardiovascular: Negative for chest pain, palpitations and leg swelling. Gastrointestinal: Positive for nausea and vomiting. Negative for abdominal pain, constipation and diarrhea. Genitourinary: Negative for difficulty urinating, dysuria, frequency, hematuria and testicular pain. Musculoskeletal: Negative for arthralgias, back pain, gait problem and neck pain. Skin: Negative for color change and rash. Allergic/Immunologic: Negative for immunocompromised state. Neurological: Negative for dizziness, seizures, syncope, speech difficulty, weakness, light-headedness, numbness and headaches. Psychiatric/Behavioral: Negative for confusion. The patient is not nervous/anxious. All other systems reviewed and are negative. Physical Exam  Physical Exam  Vitals and nursing note reviewed. Constitutional:       General: He is not in acute distress. Appearance: Normal appearance. He is well-developed and normal weight. He is not ill-appearing, toxic-appearing or diaphoretic. HENT:      Head: Normocephalic and atraumatic. Nose: Nose normal.      Mouth/Throat:      Mouth: Mucous membranes are moist.      Pharynx: Oropharynx is clear. Comments: Edentulous  Eyes:      General: No scleral icterus. Conjunctiva/sclera: Conjunctivae normal.   Cardiovascular:      Rate and Rhythm: Normal rate and regular rhythm. Pulses: Normal pulses.       Heart sounds: Normal heart sounds. No murmur heard. Pulmonary:      Effort: Pulmonary effort is normal. No respiratory distress. Breath sounds: Normal breath sounds. No wheezing or rales. Chest:      Chest wall: No tenderness. Abdominal:      General: Abdomen is flat. Bowel sounds are normal. There is no distension. Palpations: Abdomen is soft. There is no mass. Tenderness: There is no abdominal tenderness. There is no guarding or rebound. Musculoskeletal:         General: No swelling, tenderness or deformity. Normal range of motion. Cervical back: Normal range of motion and neck supple. Right lower leg: No edema. Left lower leg: No edema. Lymphadenopathy:      Cervical: No cervical adenopathy. Skin:     General: Skin is warm and dry. Capillary Refill: Capillary refill takes less than 2 seconds. Findings: No bruising, erythema, lesion or rash. Neurological:      General: No focal deficit present. Mental Status: He is alert and oriented to person, place, and time. Motor: No abnormal muscle tone.    Psychiatric:         Mood and Affect: Mood normal.         Behavior: Behavior normal.         Vital Signs  ED Triage Vitals   Temperature Pulse Respirations Blood Pressure SpO2   09/13/23 1327 09/13/23 1324 09/13/23 1324 09/13/23 1324 09/13/23 1324   (!) 97.2 °F (36.2 °C) 75 16 170/97 99 %      Temp Source Heart Rate Source Patient Position - Orthostatic VS BP Location FiO2 (%)   09/13/23 1324 -- 09/13/23 1324 09/13/23 1324 --   Temporal  Sitting Right arm       Pain Score       --                  Vitals:    09/13/23 1324 09/13/23 1449   BP: 170/97 (!) 181/95   Pulse: 75    Patient Position - Orthostatic VS: Sitting          Visual Acuity      ED Medications  Medications   cloNIDine (CATAPRES) tablet 0.2 mg (0.2 mg Oral Given 9/13/23 1449)   ondansetron (ZOFRAN-ODT) dispersible tablet 4 mg (4 mg Oral Given 9/13/23 1450)       Diagnostic Studies  Results Reviewed Procedure Component Value Units Date/Time    Comprehensive metabolic panel [[id number]]  (Abnormal) Collected: 09/13/23 1427    Lab Status: Final result Specimen: Blood from Hand, Right Updated: 09/13/23 1452     Sodium 137 mmol/L      Potassium 4.0 mmol/L      Chloride 104 mmol/L      CO2 25 mmol/L      ANION GAP 8 mmol/L      BUN 27 mg/dL      Creatinine 1.32 mg/dL      Glucose 98 mg/dL      Calcium 9.7 mg/dL      AST 11 U/L      ALT 6 U/L      Alkaline Phosphatase 76 U/L      Total Protein 6.6 g/dL      Albumin 4.2 g/dL      Total Bilirubin 0.32 mg/dL      eGFR 58 ml/min/1.73sq m     Narrative:      [place] guidelines for Chronic Kidney Disease (CKD):   •  Stage 1 with normal or high GFR (GFR > 90 mL/min/1.73 square meters)  •  Stage 2 Mild CKD (GFR = 60-89 mL/min/1.73 square meters)  •  Stage 3A Moderate CKD (GFR = 45-59 mL/min/1.73 square meters)  •  Stage 3B Moderate CKD (GFR = 30-44 mL/min/1.73 square meters)  •  Stage 4 Severe CKD (GFR = 15-29 mL/min/1.73 square meters)  •  Stage 5 End Stage CKD (GFR <15 mL/min/1.73 square meters)  Note: GFR calculation is accurate only with a steady state creatinine    Lipase [[id number]]  (Abnormal) Collected: 09/13/23 1427    Lab Status: Final result Specimen: Blood from Hand, Right Updated: 09/13/23 1452     Lipase 038 u/L     Salicylate level [[id number]]  (Normal) Collected: 09/13/23 1427    Lab Status: Final result Specimen: Blood from Hand, Right Updated: 60/84/64 1430     Salicylate Lvl <5 mg/dL     Acetaminophen level-If concentration is detectable, please discuss with medical  on call.  [[id number]]  (Abnormal) Collected: 09/13/23 1427    Lab Status: Final result Specimen: Blood from Hand, Right Updated: 09/13/23 1452     Acetaminophen Level <10 ug/mL     Magnesium [[id number]]  (Normal) Collected: 09/13/23 1427    Lab Status: Final result Specimen: Blood from Hand, Right Updated: 09/13/23 1452     Magnesium 2.3 mg/dL     Ethanol [[id number]]  (Normal) Collected: 09/13/23 1427    Lab Status: Final result Specimen: Blood from Arm, Right Updated: 09/13/23 1449     Ethanol Lvl <10 mg/dL     CBC and differential [[id number]]  (Abnormal) Collected: 09/13/23 1427    Lab Status: Final result Specimen: Blood from Hand, Right Updated: 09/13/23 1433     WBC 10.70 Thousand/uL      RBC 5.60 Million/uL      Hemoglobin 16.1 g/dL      Hematocrit 47.4 %      MCV 85 fL      MCH 28.8 pg      MCHC 34.0 g/dL      RDW 14.8 %      MPV 10.0 fL      Platelets 563 Thousands/uL      nRBC 0 /100 WBCs      Neutrophils Relative 66 %      Immat GRANS % 1 %      Lymphocytes Relative 22 %      Monocytes Relative 8 %      Eosinophils Relative 2 %      Basophils Relative 1 %      Neutrophils Absolute 7.12 Thousands/µL      Immature Grans Absolute 0.12 Thousand/uL      Lymphocytes Absolute 2.35 Thousands/µL      Monocytes Absolute 0.87 Thousand/µL      Eosinophils Absolute 0.17 Thousand/µL      Basophils Absolute 0.07 Thousands/µL     Rapid drug screen, urine [[id number]]     Lab Status: No result Specimen: Urine                  No orders to display              Procedures  Procedures         ED Course           Does not want to be prescribed Suboxone                    SBIRT 20yo+    Flowsheet Row Most Recent Value   Initial Alcohol Screen: US AUDIT-C     1. How often do you have a drink containing alcohol? 0 Filed at: 09/13/2023 1316   2. How many drinks containing alcohol do you have on a typical day you are drinking? 0 Filed at: 09/13/2023 1316   3a. Male UNDER 65: How often do you have five or more drinks on one occasion? 0 Filed at: 09/13/2023 1316   Audit-C Score 0 Filed at: 09/13/2023 1316   AMANDA: How many times in the past year have you. .. Used an illegal drug or used a prescription medication for non-medical reasons? Daily or Almost Daily Filed at: 09/13/2023 1316   DAST-10: In the past 12 months. ..    1. Have you used drugs other than those required for medical reasons? 1 Filed at: 09/13/2023 1316   2. Do you use more than one drug at a time? 0 Filed at: 09/13/2023 1316   3. Have you had medical problems as a result of your drug use (e.g., memory loss, hepatitis, convulsions, bleeding, etc.)? 1 Filed at: 09/13/2023 1316   4. Have you had "blackouts" or "flashbacks" as a result of drug use? YesNo 1 Filed at: 09/13/2023 1316   5. Do you ever feel bad or guilty about your drug use? 1 Filed at: 09/13/2023 1316   6. Does your spouse (or parent) ever complain about your involvement with drugs? 1 Filed at: 09/13/2023 1316   7. Have you neglected your family because of your use of drugs? 0 Filed at: 09/13/2023 1316   8. Have you engaged in illegal activities in order to obtain drugs? 1 Filed at: 09/13/2023 1316   9. Have you ever experienced withdrawal symptoms (felt sick) when you stopped taking drugs? 1 Filed at: 09/13/2023 1316   10. Are you always able to stop using drugs when you want to? 1 Filed at: 09/13/2023 1316   DAST-10 Score 8 Filed at: 09/13/2023 1316                    Medical Decision Making  Patient is a 79-year-old male who states last use of methadone was 7 days ago. Has had withdrawal symptoms since then. Include nausea and vomiting. Occasional jittery with increased anxiety. Denies any SI or HI. Opiate withdrawal ([address]): acute illness or injury  Amount and/or Complexity of Data Reviewed  External Data Reviewed: notes. Labs: ordered. Risk  OTC drugs. Prescription drug management. Decision regarding hospitalization. Risk Details: Patient does not wish to have IV placed with IV fluids and labs obtained. He does request oral medications to assist in his detoxification from opiates. Suspect prolonged withdrawal course due to methadone being a longer acting opiate. He denies SI or HI. Will administer medications while in the emergency department and prescribe Zofran and clonidine.   Recommended return to the emergency department at any time if condition worsens or he wishes to seek additional treatment. Disposition  Final diagnoses:   Opiate withdrawal ([address])     Time reflects when diagnosis was documented in both MDM as applicable and the Disposition within this note     Time User Action Codes Description Comment    9/13/2023  2:40 PM Tonie Spruce T Add [F11.93] Opiate withdrawal [facility])       ED Disposition     ED Disposition   Discharge    Condition   Stable    Date/Time   Wed Sep 13, 2023  2:40 PM    Comment   Carolyn Stephen discharge to home/self care. Follow-up Information    None         Discharge Medication List as of 9/13/2023  2:46 PM      START taking these medications    Details   cloNIDine (CATAPRES) 0.2 mg tablet Take 1 tablet (0.2 mg total) by mouth 2 (two) times a day, Starting Wed 9/13/2023, Normal      ondansetron (Zofran ODT) 4 mg disintegrating tablet Take 1 tablet (4 mg total) by mouth every 6 (six) hours as needed for nausea or vomiting, Starting Wed 9/13/2023, Normal         CONTINUE these medications which have NOT CHANGED    Details   acetaminophen (TYLENOL) 650 mg CR tablet Take 1 tablet (650 mg total) by mouth every 8 (eight) hours as needed for mild pain, Starting Tue 6/13/2023, No Print      METHADONE HCL PO Take 110 mg by mouth in the morning This dose was verified with Susan Mccollum, the director of the [facility] center. Pt usually receives liquid form. Pt was instructed to go to clinic am of surgery, as usual, to receive his am dose and proceed to the [facility] [place] with an arrival time of 1015.  The [facility] center opens at 530am, Historical Med      methocarbamol (ROBAXIN) 500 mg tablet Take 1 tablet (500 mg total) by mouth 4 (four) times a day as needed for muscle spasms (pain around drain site), Starting Wed 6/28/2023, Normal      potassium-sodium phosphates (PHOS-NAK) 280 mg (P)-160 mg (Na)-250 mg (K) packet Take 1 packet by mouth 2 (two) times a day with meals for 4 doses, Starting Mon 5/29/2023, Until Wed 5/31/2023, Normal      sodium chloride, PF, 0.9 % 10 mL by Intracatheter route daily Intracatheter flushing daily. May substitute prefilled syringe with normal saline 10 mL vials, 10 mL syringes, and 18 g blunt needles, Starting Fri 6/23/2023, Until Wed 12/20/2023, Normal             No discharge procedures on file.     PDMP Review       Value Time User    PDMP Reviewed  Yes 5/26/2023  7:30 PM Iona San MD          ED Provider  Electronically Signed by           Saida Johnson DO  09/13/23 [id number]

## 2023-09-13 NOTE — DISCHARGE INSTRUCTIONS
Please take medications as directed. Please return to the emergency department if condition worsens.

## 2023-09-18 ENCOUNTER — HOSPITAL ENCOUNTER (EMERGENCY)
Facility: HOSPITAL | Age: 58
Discharge: HOME | End: 2023-09-18
Attending: EMERGENCY MEDICINE | Admitting: EMERGENCY MEDICINE
Payer: COMMERCIAL

## 2023-09-18 VITALS
HEIGHT: 67 IN | BODY MASS INDEX: 21.97 KG/M2 | HEART RATE: 92 BPM | DIASTOLIC BLOOD PRESSURE: 85 MMHG | TEMPERATURE: 97.2 F | SYSTOLIC BLOOD PRESSURE: 148 MMHG | WEIGHT: 140 LBS | RESPIRATION RATE: 18 BRPM | OXYGEN SATURATION: 97 %

## 2023-09-18 DIAGNOSIS — Z75.8 DOES NOT HAVE PRIMARY CARE PROVIDER: Primary | ICD-10-CM

## 2023-09-18 DIAGNOSIS — F11.93 OPIATE WITHDRAWAL (HCC): ICD-10-CM

## 2023-09-18 DIAGNOSIS — F11.93 METHADONE WITHDRAWAL (HCC): ICD-10-CM

## 2023-09-18 DIAGNOSIS — R11.0 NAUSEA: ICD-10-CM

## 2023-09-18 DIAGNOSIS — F41.9 ANXIETY: ICD-10-CM

## 2023-09-18 PROCEDURE — 99284 EMERGENCY DEPT VISIT MOD MDM: CPT | Performed by: EMERGENCY MEDICINE

## 2023-09-18 PROCEDURE — 99284 EMERGENCY DEPT VISIT MOD MDM: CPT

## 2023-09-18 RX ORDER — CLONIDINE HYDROCHLORIDE 0.2 MG/1
0.2 TABLET ORAL 2 TIMES DAILY
Qty: 14 TABLET | Refills: 0 | Status: SHIPPED | OUTPATIENT
Start: 2023-09-18

## 2023-09-18 RX ORDER — ONDANSETRON 4 MG/1
4 TABLET, FILM COATED ORAL EVERY 8 HOURS PRN
Qty: 15 TABLET | Refills: 0 | Status: SHIPPED | OUTPATIENT
Start: 2023-09-18 | End: 2023-09-25

## 2023-09-18 RX ORDER — HYDROXYZINE HYDROCHLORIDE 25 MG/1
25 TABLET, FILM COATED ORAL EVERY 6 HOURS
Qty: 40 TABLET | Refills: 0 | Status: SHIPPED | OUTPATIENT
Start: 2023-09-18 | End: 2023-09-28

## 2023-09-18 RX ORDER — LORAZEPAM 1 MG/1
1 TABLET ORAL ONCE
Status: COMPLETED | OUTPATIENT
Start: 2023-09-18 | End: 2023-09-18

## 2023-09-18 RX ORDER — ONDANSETRON 4 MG/1
4 TABLET, ORALLY DISINTEGRATING ORAL ONCE
Status: COMPLETED | OUTPATIENT
Start: 2023-09-18 | End: 2023-09-18

## 2023-09-18 RX ADMIN — ONDANSETRON 4 MG: 4 TABLET, ORALLY DISINTEGRATING ORAL at 16:22

## 2023-09-18 RX ADMIN — LORAZEPAM 1 MG: 1 TABLET ORAL at 16:22

## 2023-09-18 NOTE — ED ATTENDING ATTESTATION
9/18/2023    I, Angelina Kaufman DO, saw and evaluated the patient. I have discussed the patient with Dr Lottie Tejada and agree with his findings, Plan of Care, and MDM as documented in his note, except where noted. All available labs and Radiology studies were reviewed. I was present for key portions of any procedure(s) performed by Dr Lottie Tejada and I was immediately available to provide assistance. At this point I agree with the current assessment done in the Emergency Department. I have conducted an independent evaluation of this patient. The history and physical is as follows:    50-year-old male with medical history as noted presents to the emergency department stating that he has been having ongoing withdrawal symptoms after last dose of methadone 11 days ago. Patient had been taking prescription methadone for opioid use disorder for the past several months; he states that he had missed several doses intentionally and had his dose decreased by the clinic from 120 mg daily to 40 mg daily. He took this as a reason to completely discontinue use of the medication, and states that he took his last dose 11 days ago. He has not used any other opioid medications since that time nor any other recreational drugs. He does not drink alcohol. He has been experiencing withdrawal symptoms including nausea/vomiting/diarrhea/chills/rhinorrhea for the past 10d or so. Also states that he feels somewhat paranoid and more anxious than his baseline. He denies any thoughts of or desire for self-harm or wanting to harm anyone else. Was seen in this emergency department by my colleague on 13 September 2023 for the same symptoms. He did not wish to pursue buprenorphine treatment at that point; he does not wish to pursue it now either. He was prescribed clonidine which he states improved his symptoms transiently as well as ondansetron which he states had good effect in controlling nausea and vomiting.   He is essentially requesting additional medications to help with withdrawal symptoms as well as referral to primary physician as he does not have one currently. ROS as per HPI; otherwise negative. General: Awake/alert/no distress. Vital signs and nursing notes reviewed    HEENT:  Normocephalic/atraumatic  External ear/hearing wnl bilaterally. Neck:  Phonation normal with no stridor/dysphonia. Normal active ROM. No palpable masses or thyromegaly. No overlying skin changes. Cardiac:  Radial pulses 2+ bilaterally  DP/PT pulses 2+ bilaterally  RRR with s1/s2; no m/r/g. Pulmonary:   No respiratory distress. No accessory muscle use  Lungs CTA b/l with no w/c/r    Abdomen:  Flat. Nondistended. Nontender. No palpable masses. No guarding/rebound ttp. Skin:  Warm/dry. No diaphoresis. No visible rashes or skin changes. Neuro:  GCS 15. PERRLA; EOMI. Facial expressions symmetric. Tongue/uvula midline. Shoulder shrug equal bilaterally  Strength 5/5 in UE/LE bilaterally  Intact sensation in UE/LE bilaterally. A/P: Opiate withdrawal symptoms which are in fact somewhat improved since their initial onset in the setting of discontinuation of methadone use. Patient does not wish to continue any medication assisted therapy of any kind at this point and is requesting medications for symptom control. Agree with plan for prescription of hydroxyzine, clonidine, and ondansetron. Ambulatory referral to family practice placed at discharge. Reviewed that several useful screening examinations are indicated including CT of the chest given his longstanding smoking history; these can be pursued in consultation with his primary physician. All questions answered to his satisfaction prior to discharge.     ED Course         Critical Care Time  Procedures

## 2023-09-18 NOTE — DISCHARGE INSTRUCTIONS
You can contact any of the physicians listed below for an appointment further evaluation. You may also receive a call from one of the physician groups for an appointment. You have been prescribed medications for anxiety and nausea. Please take as needed. If you generally feel worse, or have other questions or concerns, please do not hesitate to return to the emergency department.

## 2023-09-18 NOTE — ED PROVIDER NOTES
History  Chief Complaint   Patient presents with   • Delirium Tremens (DTS)     Pt reports that he has been taking methadone for months. States that he feels nauseas, vomiting, anxiety, and paranoia. Last use was 11 days ago     HPI pt is a 59 y/o m presenting to the ED with about 10 days of withdrawal symptoms from methadone which he states he will no longer take. Symptoms include tremulousness, rhinorrhea, and anxiety. States he has a high level of anxiety at baseline which has been exacerbated. Denies any recent substance use - was seen here last week and prescribed zofran and clonidine which did help his symptoms. Declined any MAT at that time, would like to decrease the number of medications he takes. No PCP currently, had been on ativan in the past which controlled his symptoms. Prior to Admission Medications   Prescriptions Last Dose Informant Patient Reported? Taking? METHADONE HCL PO  Self, Mother Yes No   Sig: Take 110 mg by mouth in the morning This dose was verified with Miriam Tony, the director of the [facility]. Pt usually receives liquid form. Pt was instructed to go to clinic am of surgery, as usual, to receive his am dose and proceed to the hospital with an arrival time of 1015.  The [facility] opens at 530am   acetaminophen (TYLENOL) 650 mg CR tablet   No No   Sig: Take 1 tablet (650 mg total) by mouth every 8 (eight) hours as needed for mild pain   cloNIDine (CATAPRES) 0.2 mg tablet   No No   Sig: Take 1 tablet (0.2 mg total) by mouth 2 (two) times a day   cloNIDine (CATAPRES) 0.2 mg tablet   No Yes   Sig: Take 1 tablet (0.2 mg total) by mouth 2 (two) times a day   methocarbamol (ROBAXIN) 500 mg tablet   No No   Sig: Take 1 tablet (500 mg total) by mouth 4 (four) times a day as needed for muscle spasms (pain around drain site)   ondansetron (Zofran ODT) 4 mg disintegrating tablet   No No   Sig: Take 1 tablet (4 mg total) by mouth every 6 (six) hours as needed for nausea or vomiting   potassium-sodium phosphates (PHOS-NAK) 280 mg (P)-160 mg (Na)-250 mg (K) packet   No No   Sig: Take 1 packet by mouth 2 (two) times a day with meals for 4 doses   sodium chloride, PF, 0.9 %  Self, Mother No No   Sig: 10 mL by Intracatheter route daily Intracatheter flushing daily. May substitute prefilled syringe with normal saline 10 mL vials, 10 mL syringes, and 18 g blunt needles   Patient taking differently: 10 mL by Intracatheter route daily Intracatheter flushing daily in KATHY drain. May substitute prefilled syringe with normal saline 10 mL vials, 10 mL syringes, and 18 g blunt needles   sodium chloride, PF, 0.9 %   No No   Sig: 10 mL by Intracatheter route daily Intracatheter flushing daily.  May substitute prefilled syringe with normal saline 10 mL vials, 10 mL syringes, and 18 g blunt needles      Facility-Administered Medications: None       Past Medical History:   Diagnosis Date   • BRAULIO (acute kidney injury) ([address]) 8/20/2022   • Anxiety    • Bright red rectal bleeding     Last Assessed: 9/9/2015    • Drug dependence ([address]) 10/20/2021   • Hypertension     Last Assessed: 7/9/2014    • Hyponatremia 5/26/2023   • Kidney stone    • Kidney stones     Last Assessed: 11/17/2016    • Periorbital edema     Last Assessed: 9/4/2015   • Septic shock ([address]) 08/20/2022   • Skin tag of anus     Last Assessed: 9/9/2015    • Trigger point of thoracic region     Last Assessed: 11/6/2015        Past Surgical History:   Procedure Laterality Date   • CYSTOSCOPY W/ URETERAL STENT PLACEMENT  03/11/2013   • CYSTOSCOPY W/ URETERAL STENT PLACEMENT  06/18/2014    EXTRACORPOREAL SHOCK WAVE LITHOTRIPSY    • CYSTOSCOPY W/ URETERAL STENT PLACEMENT  07/16/2014    EXTRACORPOREAL SHOCK WAVE LITHOTRIPSY    • CYSTOSCOPY W/ URETERAL STENT REMOVAL  04/11/2013   • CYSTOSCOPY W/ URETERAL STENT REMOVAL Right 08/26/2014   • CYSTOSCOPY W/ URETEROSCOPY W/ LITHOTRIPSY  02/26/2013    Percutaneous lithotomy With Uretal Stent Plcement • CYSTOSCOPY W/ URETEROSCOPY W/ LITHOTRIPSY  03/11/2014   • CYSTOSCOPY W/ URETEROSCOPY W/ LITHOTRIPSY Right 08/04/2014    With Uretal Stent Placement    • CYSTOSCOPY W/ URETEROSCOPY W/ LITHOTRIPSY Right 05/09/2016   • HERNIA REPAIR  03/11/2013   • IR DRAINAGE TUBE CHECK/CHANGE/REPOSITION/REINSERTION/UPSIZE  7/6/2023   • IR DRAINAGE TUBE PLACEMENT  6/6/2023   • IR DRAINAGE TUBE PLACEMENT  6/23/2023   • IR NEPHROSTOMY TUBE CHECK/CHANGE/REPOSITION/REINSERTION/UPSIZE  11/22/2022   • IR NEPHROSTOMY TUBE CHECK/CHANGE/REPOSITION/REINSERTION/UPSIZE  02/13/2023   • IR NEPHROSTOMY TUBE CHECK/CHANGE/REPOSITION/REINSERTION/UPSIZE  04/28/2023   • IR NEPHROSTOMY TUBE PLACEMENT  08/20/2022   • KIDNEY SURGERY     • LITHOTRIPSY     • WY CYSTO/URETERO W/LITHOTRIPSY &INDWELL STENT INSRT Right 07/13/2016    Procedure: CYSTOSCOPY; URETEROSCOPY WITH HOLMIUM LASER STONE EXTRACTION; RETROGRADE PYELOGRAM; URETERAL STENT INSERTION ;  Surgeon: Chris Siegel MD;  [facility];  Service: Urology   • WY CYSTO/URETERO W/LITHOTRIPSY &INDWELL STENT INSRT Right 05/09/2016    Procedure: CYSTOSCOPY,  URETEROSCOPY,  WITH LITHOTRIPSY HOLMIUM LASER, STONE EXTRACTION, AND INSERTION STENT URETERAL;  Surgeon: Chris Siegel MD;  Location[facility];  Service: Urology   • WY CYSTO/URETERO W/LITHOTRIPSY &INDWELL STENT INSRT Right 11/10/2022    Procedure: CYSTOSCOPY URETEROSCOPY  right RETROGRADE PYELOGRAM;  Surgeon: Stacy Null MD;  Location: [facility];  Service: Urology   • WY LAPAROSCOPY RADICAL NEPHRECTOMY Right 5/22/2023    Procedure: NEPHRECTOMY RADICAL LAPAROSCOPIC W/ ROBOTICS;  Surgeon: Susanne Toth MD;  [facility];  Service: Urology   • WY LITHOTRIPSY [address] Right 06/17/2016    Procedure: LITHROTRIPSY EXTRACORPORAL SHOCKWAVE (ESWL);   Surgeon: Chris Siegel MD;  [facility];  Service: Urology   • TRANSURETHRAL RESECTION OF BLADDER     • URETERAL REIMPLANTION  03/11/2013       Family History   Problem Relation Age of Onset   • No Known Problems Mother    • Heart attack Father      I have reviewed and agree with the history as documented. E-Cigarette/Vaping   • E-Cigarette Use Never User      E-Cigarette/Vaping Substances   • Nicotine No    • THC No    • CBD No    • Flavoring No    • Other No    • Unknown No      Social History     Tobacco Use   • Smoking status: Every Day     Packs/day: 2.00     Years: 35.00     Total pack years: 70.00     Types: Cigarettes   • Smokeless tobacco: Never   Vaping Use   • Vaping Use: Never used   Substance Use Topics   • Alcohol use: Not Currently   • Drug use: Not Currently        Review of Systems   Constitutional: Positive for chills. HENT: Positive for rhinorrhea. Gastrointestinal: Positive for nausea. Psychiatric/Behavioral: The patient is nervous/anxious. All other systems reviewed and are negative. Physical Exam  ED Triage Vitals   Temperature Pulse Respirations Blood Pressure SpO2   09/18/23 1610 09/18/23 1555 09/18/23 1555 09/18/23 1555 09/18/23 1555   (!) 97.2 °F (36.2 °C) 92 18 148/85 97 %      Temp Source Heart Rate Source Patient Position - Orthostatic VS BP Location FiO2 (%)   09/18/23 1610 09/18/23 1555 09/18/23 1555 09/18/23 1555 --   Temporal Monitor Sitting Left arm       Pain Score       09/18/23 1555       No Pain             Orthostatic Vital Signs  Vitals:    09/18/23 1555   BP: 148/85   Pulse: 92   Patient Position - Orthostatic VS: Sitting       Physical Exam  Vitals and nursing note reviewed. Constitutional:       General: He is in acute distress (Mild due to anxiety, nausea). Appearance: He is not ill-appearing, toxic-appearing or diaphoretic. HENT:      Head: Normocephalic and atraumatic. Nose: Rhinorrhea present. Mouth/Throat:      Mouth: Mucous membranes are dry. Pharynx: Oropharynx is clear. Cardiovascular:      Rate and Rhythm: Normal rate and regular rhythm. Pulses: Normal pulses. Heart sounds: Normal heart sounds. Pulmonary:      Effort: Pulmonary effort is normal. No respiratory distress. Breath sounds: Normal breath sounds. Abdominal:      General: Abdomen is flat. There is no distension. Palpations: Abdomen is soft. Tenderness: There is no abdominal tenderness. There is no guarding or rebound. Musculoskeletal:         General: No swelling, tenderness, deformity or signs of injury. Normal range of motion. Cervical back: Normal range of motion and neck supple. No rigidity. Right lower leg: No edema. Left lower leg: No edema. Skin:     General: Skin is warm and dry. Coloration: Skin is not jaundiced or pale. Findings: No bruising, erythema or rash. Neurological:      General: No focal deficit present. Mental Status: He is alert and oriented to person, place, and time. Cranial Nerves: No cranial nerve deficit. Sensory: No sensory deficit. Motor: No weakness. Coordination: Coordination normal.      Gait: Gait normal.      Deep Tendon Reflexes: Reflexes normal.      Comments: Mildly tremulous with arm extension and tongue extension   Psychiatric:         Behavior: Behavior normal.      Comments: Anxious         ED Medications  Medications   LORazepam (ATIVAN) tablet 1 mg (1 mg Oral Given 9/18/23 1622)   ondansetron (ZOFRAN-ODT) dispersible tablet 4 mg (4 mg Oral Given 9/18/23 1622)       Diagnostic Studies  Results Reviewed     None                 No orders to display         Procedures  Procedures      ED Course       COWS score of 8 (mild). Pt does not want any MAT, states he has not had any substance use disorder relapse in several years, no EtOH. He would prefer to have ambulatory referral to establish care locally to have his anxiety managed and does not want to wait for blood work. Pt states ativan has worked for withdrawal symptoms in the past in conjunction with zofran.  Discussed return precautions and follow-up - he is agreeable to plan. Hemodynamically stable, mildly anxious but improved. SBIRT 20yo+    Flowsheet Row Most Recent Value   Initial Alcohol Screen: US AUDIT-C     1. How often do you have a drink containing alcohol? 0 Filed at: 09/18/2023 1555   2. How many drinks containing alcohol do you have on a typical day you are drinking? 0 Filed at: 09/18/2023 1555   3a. Male UNDER 65: How often do you have five or more drinks on one occasion? 0 Filed at: 09/18/2023 1555   3b. FEMALE Any Age, or MALE 65+: How often do you have 4 or more drinks on one occassion? 0 Filed at: 09/18/2023 1555   Audit-C Score 0 Filed at: 09/18/2023 1555   AMANDA: How many times in the past year have you. .. Used an illegal drug or used a prescription medication for non-medical reasons? Never Filed at: 09/18/2023 1558                Medical Decision Making  59 y/o m presenting to the ED with about 10 days of withdrawal symptoms from methadone which he states he will no longer take. Symptoms include tremulousness, rhinorrhea, and anxiety. States he has a high level of anxiety at baseline which has been exacerbated. Denies any recent substance use - was seen here last week and prescribed zofran and clonidine which did help his symptoms. Declined any MAT at that time, would like to decrease the number of medications he takes. No PCP currently, had been on ativan in the past which controlled his symptoms. COWS score of 8 (mild). Pt does not want any MAT, states he has not had any substance use disorder relapse in several years, no EtOH. He would prefer to have ambulatory referral to establish care locally to have his anxiety managed and does not want to wait for blood work. Pt states ativan has worked for withdrawal symptoms in the past in conjunction with zofran. Discussed return precautions and follow-up - he is agreeable to plan. Hemodynamically stable, mildly anxious but improved.      Risk  Prescription drug management. DDx including but not limited to: methadone withdrawal, anxiety, nausea, substance use, metabolic abnormality, psychiatric disorder. Disposition  Final diagnoses:   Does not have primary care provider   Anxiety   Nausea   Methadone withdrawal ([address])     Time reflects when diagnosis was documented in both MDM as applicable and the Disposition within this note     Time User Action Codes Description Comment    9/18/2023  4:17 PM Marguerite Akua Add [Z75.8] Does not have primary care provider     9/18/2023  4:17 PM Layne Hum [F41.9] Anxiety     9/18/2023  4:17 PM Marguerite Akua Add [R11.0] Nausea     9/18/2023  4:19 PM Reid Ear Add [F11.93] Methadone withdrawal ([address])     9/18/2023  4:22 PM Reid Ear Add [F11.93] Opiate withdrawal [facility])       ED Disposition     ED Disposition   Discharge    Condition   Stable    Date/Time   Mon Sep 18, 2023  4:19 PM    Comment   Moraima Ax discharge to home/self care.                Follow-up Information     Follow up With Specialties Details Why Contact Info Additional Information    [address]   [address]  [facility] [address] [phone] [facility], [address] [place] [address], [address] [phone]    [facility]   [facility] [address]  [phone] [facility],   [place], [place], [address], [address] [facility]   [address] [facility] [address]  [facility] [address] [facility], [address], [facility], [address] [facility]          Discharge Medication List as of 9/18/2023  4:25 PM      START taking these medications    Details   hydrOXYzine HCL (ATARAX) 25 mg tablet Take 1 tablet (25 mg total) by mouth every 6 (six) hours for 10 days, Starting Mon 9/18/2023, Until Thu 9/28/2023, Normal      ondansetron (Zofran) 4 mg tablet Take 1 tablet (4 mg total) by mouth every 8 (eight) hours as needed for nausea or vomiting for up to 7 days, Starting Mon 9/18/2023, Until Mon 9/25/2023 at 2359, Normal         CONTINUE these medications which have CHANGED    Details   cloNIDine (CATAPRES) 0.2 mg tablet Take 1 tablet (0.2 mg total) by mouth 2 (two) times a day, Starting Mon 9/18/2023, Normal         CONTINUE these medications which have NOT CHANGED    Details   acetaminophen (TYLENOL) 650 mg CR tablet Take 1 tablet (650 mg total) by mouth every 8 (eight) hours as needed for mild pain, Starting Tue 6/13/2023, No Print      ondansetron (Zofran ODT) 4 mg disintegrating tablet Take 1 tablet (4 mg total) by mouth every 6 (six) hours as needed for nausea or vomiting, Starting Wed 9/13/2023, Normal         STOP taking these medications       METHADONE HCL PO Comments:   Reason for Stopping:         methocarbamol (ROBAXIN) 500 mg tablet Comments:   Reason for Stopping:         potassium-sodium phosphates (PHOS-NAK) 280 mg (P)-160 mg (Na)-250 mg (K) packet Comments:   Reason for Stopping:         sodium chloride, PF, 0.9 % Comments:   Reason for Stopping:         sodium chloride, PF, 0.9 % Comments:   Reason for Stopping:                 PDMP Review       Value Time User    PDMP Reviewed  Yes 5/26/2023  7:30 PM Peri Wyman MD           ED Provider  Attending physically available and evaluated Edgard Dawn. I managed the patient along with the ED Attending.     Electronically Signed by         Mariella Travis DO  09/19/23 0115

## 2023-10-16 ENCOUNTER — OFFICE VISIT (OUTPATIENT)
Dept: NEPHROLOGY | Facility: CLINIC | Age: 58
End: 2023-10-16

## 2023-10-16 VITALS
HEART RATE: 52 BPM | WEIGHT: 149 LBS | OXYGEN SATURATION: 97 % | DIASTOLIC BLOOD PRESSURE: 52 MMHG | HEIGHT: 67 IN | BODY MASS INDEX: 23.39 KG/M2 | SYSTOLIC BLOOD PRESSURE: 112 MMHG

## 2023-10-16 DIAGNOSIS — R76.8 HEPATITIS C ANTIBODY POSITIVE IN BLOOD: ICD-10-CM

## 2023-10-16 DIAGNOSIS — N18.2 CKD (CHRONIC KIDNEY DISEASE) STAGE 2, GFR 60-89 ML/MIN: Primary | ICD-10-CM

## 2023-10-16 DIAGNOSIS — N20.0 KIDNEY STONES: ICD-10-CM

## 2023-10-16 PROBLEM — R79.89 ELEVATED TROPONIN: Status: RESOLVED | Noted: 2022-08-20 | Resolved: 2023-10-16

## 2023-10-16 PROBLEM — E87.0 HYPERNATREMIA: Status: RESOLVED | Noted: 2022-08-20 | Resolved: 2023-10-16

## 2023-10-16 PROBLEM — R79.89 ELEVATED D-DIMER: Status: RESOLVED | Noted: 2022-08-20 | Resolved: 2023-10-16

## 2023-10-16 PROBLEM — R79.89 ELEVATED BRAIN NATRIURETIC PEPTIDE (BNP) LEVEL: Status: RESOLVED | Noted: 2022-08-20 | Resolved: 2023-10-16

## 2023-10-16 PROBLEM — R74.8 ELEVATED LIPASE: Status: RESOLVED | Noted: 2022-08-20 | Resolved: 2023-10-16

## 2023-10-16 NOTE — ASSESSMENT & PLAN NOTE
Continue to encourage at least 2 L of fluid intake a day. Once the patient is stable from medical standpoint, will benefit from getting his 24-hour urine for stone risk performed. It is currently an active order in the chart which the patient can have done at his discretion.

## 2023-10-16 NOTE — ASSESSMENT & PLAN NOTE
Lab Results   Component Value Date    EGFR 58 09/13/2023    EGFR 54 07/06/2023    EGFR 70 06/28/2023    CREATININE 1.32 (H) 09/13/2023    CREATININE 1.42 (H) 07/06/2023    CREATININE 1.14 06/28/2023     Kidney function slightly lower than usual, would simply reassess with next set of labs in about 1 month. Continue encourage oral intake of food and fluids.

## 2023-10-16 NOTE — PROGRESS NOTES
[facility] [address] [place] [address]    Name: Colten Long  YOB: 1965      Assessment/Plan:    CKD (chronic kidney disease) stage 2, GFR 60-89 ml/min  Lab Results   Component Value Date    EGFR 58 09/13/2023    EGFR 54 07/06/2023    EGFR 70 06/28/2023    CREATININE 1.32 (H) 09/13/2023    CREATININE 1.42 (H) 07/06/2023    CREATININE 1.14 06/28/2023     Kidney function slightly lower than usual, would simply reassess with next set of labs in about 1 month. Continue encourage oral intake of food and fluids. Kidney stones  Continue to encourage at least 2 L of fluid intake a day. Once the patient is stable from medical standpoint, will benefit from getting his 24-hour urine for stone risk performed. It is currently an active order in the chart which the patient can have done at his discretion. Problem List Items Addressed This Visit          Genitourinary    Kidney stones     Continue to encourage at least 2 L of fluid intake a day. Once the patient is stable from medical standpoint, will benefit from getting his 24-hour urine for stone risk performed. It is currently an active order in the chart which the patient can have done at his discretion. Relevant Orders    Magnesium    CKD (chronic kidney disease) stage 2, GFR 60-89 ml/min - Primary     Lab Results   Component Value Date    EGFR 58 09/13/2023    EGFR 54 07/06/2023    EGFR 70 06/28/2023    CREATININE 1.32 (H) 09/13/2023    CREATININE 1.42 (H) 07/06/2023    CREATININE 1.14 06/28/2023   Kidney function slightly lower than usual, would simply reassess with next set of labs in about 1 month. Continue encourage oral intake of food and fluids.          Relevant Orders    Basic metabolic panel    Vitamin D 25 hydroxy       Other    Hepatitis C antibody positive in blood    Relevant Orders    Lipid panel       Patient stable from the renal standpoint, we will see him back for regular appointment in approximately 6 months. Will check labs in about 1 month to confirm kidney function is back to typical baseline. Additional labs may be indicated or further work-up depending progresses from clinical standpoint. Subjective:      Patient ID: Juanis Aguirre is a 60 y.o. male. Patient presents for follow-up appoint. Reviewed the patient's labs in detail, creatinine slightly above typical baseline at 1.3 mg/dL, places estimated GFR 58 mL/min. There were no significant electrolyte abnormalities noted. He denies us of NSAIDs    Since our last saw the patient, he has discontinued all opioid use, he was on methadone but no longer taking. The following portions of the patient's history were reviewed and updated as appropriate: allergies, current medications, past family history, past medical history, past social history, past surgical history and problem list.    Review of Systems   All other systems reviewed and are negative.         Social History     Socioeconomic History    Marital status:      Spouse name: None    Number of children: None    Years of education: None    Highest education level: None   Occupational History    None   Tobacco Use    Smoking status: Every Day     Packs/day: 2.00     Years: 35.00     Total pack years: 70.00     Types: Cigarettes    Smokeless tobacco: Never   Vaping Use    Vaping Use: Never used   Substance and Sexual Activity    Alcohol use: Not Currently    Drug use: Not Currently    Sexual activity: Not Currently   Other Topics Concern    None   Social History Narrative    Caffeine Use     Uses safety Equipment- Seatbelts      Social Determinants of Health     Financial Resource Strain: Not on file   Food Insecurity: No Food Insecurity (6/22/2023)    Hunger Vital Sign     Worried About Running Out of Food in the Last Year: Never true     Ran Out of Food in the Last Year: Never true   Transportation Needs: No Transportation Needs (6/22/2023) PRAPARE - Transportation     Lack of Transportation (Medical): No     Lack of Transportation (Non-Medical):  No   Physical Activity: Not on file   Stress: Not on file   Social Connections: Not on file   Intimate Partner Violence: Not on file   Housing Stability: Low Risk  (6/22/2023)    Housing Stability Vital Sign     Unable to Pay for Housing in the Last Year: No     Number of Places Lived in the Last Year: 1     Unstable Housing in the Last Year: No   Recent Concern: Housing Stability - High Risk (6/6/2023)    Housing Stability Vital Sign     Unable to Pay for Housing in the Last Year: No     Number of Places Lived in the Last Year: 11     Unstable Housing in the Last Year: No     Past Medical History:   Diagnosis Date    BRAULIO (acute kidney injury) ([address]) 8/20/2022    Anxiety     Bright red rectal bleeding     Last Assessed: 9/9/2015     Drug dependence ([address]) 10/20/2021    Hypertension     Last Assessed: 7/9/2014     Hyponatremia 5/26/2023    Kidney stone     Kidney stones     Last Assessed: 11/17/2016     Periorbital edema     Last Assessed: 9/4/2015    Septic shock ([address]) 08/20/2022    Skin tag of anus     Last Assessed: 9/9/2015     Trigger point of thoracic region     Last Assessed: 11/6/2015      Past Surgical History:   Procedure Laterality Date    CYSTOSCOPY W/ URETERAL STENT PLACEMENT  03/11/2013    CYSTOSCOPY W/ URETERAL STENT PLACEMENT  06/18/2014    EXTRACORPOREAL SHOCK WAVE LITHOTRIPSY     CYSTOSCOPY W/ URETERAL STENT PLACEMENT  07/16/2014    EXTRACORPOREAL SHOCK WAVE LITHOTRIPSY     CYSTOSCOPY W/ URETERAL STENT REMOVAL  04/11/2013    CYSTOSCOPY W/ URETERAL STENT REMOVAL Right 08/26/2014    CYSTOSCOPY W/ URETEROSCOPY W/ LITHOTRIPSY  02/26/2013    Percutaneous lithotomy With Uretal Stent Plcement     CYSTOSCOPY W/ URETEROSCOPY W/ LITHOTRIPSY  03/11/2014    CYSTOSCOPY W/ URETEROSCOPY W/ LITHOTRIPSY Right 08/04/2014    With Uretal Stent Placement     CYSTOSCOPY W/ URETEROSCOPY W/ LITHOTRIPSY Right 05/09/2016 HERNIA REPAIR  03/11/2013    IR DRAINAGE TUBE CHECK/CHANGE/REPOSITION/REINSERTION/UPSIZE  7/6/2023    IR DRAINAGE TUBE PLACEMENT  6/6/2023    IR DRAINAGE TUBE PLACEMENT  6/23/2023    IR NEPHROSTOMY TUBE CHECK/CHANGE/REPOSITION/REINSERTION/UPSIZE  11/22/2022    IR NEPHROSTOMY TUBE CHECK/CHANGE/REPOSITION/REINSERTION/UPSIZE  02/13/2023    IR NEPHROSTOMY TUBE CHECK/CHANGE/REPOSITION/REINSERTION/UPSIZE  04/28/2023    IR NEPHROSTOMY TUBE PLACEMENT  08/20/2022    KIDNEY SURGERY      LITHOTRIPSY      [facility] CYSTO/URETERO W/LITHOTRIPSY &INDWELL STENT INSRT Right 07/13/2016    Procedure: CYSTOSCOPY; URETEROSCOPY WITH HOLMIUM LASER STONE EXTRACTION; RETROGRADE PYELOGRAM; URETERAL STENT INSERTION ;  Surgeon: Sita Cruz MD;  Location: [facility];  Service: Urology    [facility] CYSTO/URETERO W/LITHOTRIPSY &INDWELL STENT INSRT Right 05/09/2016    Procedure: CYSTOSCOPY,  URETEROSCOPY,  WITH LITHOTRIPSY HOLMIUM LASER, STONE EXTRACTION, AND INSERTION STENT URETERAL;  Surgeon: Sita Cruz MD;  Location: [facility];  Service: Urology    [facility] CYSTO/URETERO W/LITHOTRIPSY &INDWELL STENT INSRT Right 11/10/2022    Procedure: CYSTOSCOPY URETEROSCOPY  right RETROGRADE PYELOGRAM;  Surgeon: Oscar Leong MD;  Location: [facility];  Service: Urology    [facility] LAPAROSCOPY RADICAL NEPHRECTOMY Right 5/22/2023    Procedure: NEPHRECTOMY RADICAL LAPAROSCOPIC W/ ROBOTICS;  Surgeon: Andrew Albright MD;  Location: [facility];  Service: Urology    [facility] LITHOTRIPSY [address] Right 06/17/2016    Procedure: LITHROTRIPSY EXTRACORPORAL SHOCKWAVE (ESWL); Surgeon: Sita Cruz MD;  Location: [facility];  Service: Urology    TRANSURETHRAL RESECTION OF BLADDER      URETERAL Carmen Favre  03/11/2013     No current outpatient medications on file.     Lab Results   Component Value Date     12/09/2015    SODIUM 137 09/13/2023    K 4.0 09/13/2023     09/13/2023    CO2 25 09/13/2023    ANIONGAP 8 12/09/2015    AGAP 8 09/13/2023    BUN 27 (H) 09/13/2023    CREATININE 1.32 (H) 09/13/2023    GLUC 98 09/13/2023    GLUF 75 06/06/2023    CALCIUM 9.7 09/13/2023    AST 11 (L) 09/13/2023    ALT 6 (L) 09/13/2023    ALKPHOS 76 09/13/2023    PROT 7.5 12/09/2015    TP 6.6 09/13/2023    BILITOT 0.39 12/09/2015    TBILI 0.32 09/13/2023    EGFR 58 09/13/2023     Lab Results   Component Value Date    WBC 10.70 (H) 09/13/2023    HGB 16.1 09/13/2023    HCT 47.4 09/13/2023    MCV 85 09/13/2023     09/13/2023     No results found for: "CHOLESTEROL"  Lab Results   Component Value Date    HDL 50 10/14/2015     Lab Results   Component Value Date    LDLCALC LDL- unable to calculate when Triglyceride > 400 10/14/2015     Lab Results   Component Value Date    TRIG 474 10/14/2015     No results found for: "CHOLHDL"  Lab Results   Component Value Date    HFG2RIXZQKEL 2.789 06/24/2023     Lab Results   Component Value Date    PTH 36.2 10/20/2016    CALCIUM 9.7 09/13/2023    PHOS 2.6 (L) 06/28/2023     No results found for: "SPEP", "UPEP"  No results found for: "Anastacio Harding", "[id number]"        Objective:      /52 (BP Location: Right arm, Patient Position: Sitting)   Pulse (!) 52   Ht 5' 7" (1.702 m)   Wt 67.6 kg (149 lb)   SpO2 97%   BMI 23.34 kg/m²          Physical Exam  Vitals reviewed. Constitutional:       General: He is not in acute distress. Appearance: He is well-developed. HENT:      Head: Normocephalic and atraumatic. Eyes:      Conjunctiva/sclera: Conjunctivae normal.   Cardiovascular:      Rate and Rhythm: Normal rate and regular rhythm. Pulmonary:      Effort: Pulmonary effort is normal.      Breath sounds: Normal breath sounds. Abdominal:      Palpations: Abdomen is soft. Musculoskeletal:      Cervical back: Neck supple. Skin:     General: Skin is warm. Findings: No rash. Neurological:      Mental Status: He is alert and oriented to person, place, and time. Cranial Nerves: No cranial nerve deficit.    Psychiatric: Behavior: Behavior normal.

## 2023-11-08 ENCOUNTER — HOSPITAL ENCOUNTER (EMERGENCY)
Facility: HOSPITAL | Age: 58
Discharge: HOME | End: 2023-11-08
Attending: EMERGENCY MEDICINE | Admitting: EMERGENCY MEDICINE
Payer: COMMERCIAL

## 2023-11-08 VITALS
RESPIRATION RATE: 22 BRPM | OXYGEN SATURATION: 94 % | SYSTOLIC BLOOD PRESSURE: 171 MMHG | HEART RATE: 87 BPM | TEMPERATURE: 97 F | DIASTOLIC BLOOD PRESSURE: 82 MMHG

## 2023-11-08 DIAGNOSIS — F11.93 WITHDRAWAL FROM OPIOIDS (HCC): ICD-10-CM

## 2023-11-08 PROCEDURE — 99285 EMERGENCY DEPT VISIT HI MDM: CPT | Performed by: EMERGENCY MEDICINE

## 2023-11-08 PROCEDURE — 93005 ELECTROCARDIOGRAM TRACING: CPT

## 2023-11-08 PROCEDURE — 99284 EMERGENCY DEPT VISIT MOD MDM: CPT

## 2023-11-08 RX ORDER — CLONIDINE HYDROCHLORIDE 0.1 MG/1
0.1 TABLET ORAL EVERY 6 HOURS PRN
Qty: 12 TABLET | Refills: 0 | Status: SHIPPED | OUTPATIENT
Start: 2023-11-08 | End: 2023-11-11

## 2023-11-08 RX ORDER — LOPERAMIDE HYDROCHLORIDE 2 MG/1
4 CAPSULE ORAL 4 TIMES DAILY PRN
Qty: 12 CAPSULE | Refills: 0 | Status: SHIPPED | OUTPATIENT
Start: 2023-11-08

## 2023-11-08 RX ORDER — GABAPENTIN 300 MG/1
300 CAPSULE ORAL EVERY 8 HOURS PRN
Qty: 6 CAPSULE | Refills: 0 | Status: SHIPPED | OUTPATIENT
Start: 2023-11-08 | End: 2023-11-11

## 2023-11-08 RX ORDER — CLONIDINE HYDROCHLORIDE 0.1 MG/1
0.2 TABLET ORAL ONCE
Status: COMPLETED | OUTPATIENT
Start: 2023-11-08 | End: 2023-11-08

## 2023-11-08 RX ORDER — ONDANSETRON 4 MG/1
4 TABLET, ORALLY DISINTEGRATING ORAL EVERY 6 HOURS PRN
Qty: 20 TABLET | Refills: 0 | Status: SHIPPED | OUTPATIENT
Start: 2023-11-08

## 2023-11-08 RX ORDER — TRAZODONE HYDROCHLORIDE 50 MG/1
50 TABLET ORAL
Qty: 7 TABLET | Refills: 0 | Status: SHIPPED | OUTPATIENT
Start: 2023-11-08 | End: 2023-11-15

## 2023-11-08 RX ORDER — LORAZEPAM 1 MG/1
1 TABLET ORAL ONCE
Status: COMPLETED | OUTPATIENT
Start: 2023-11-08 | End: 2023-11-08

## 2023-11-08 RX ORDER — ACETAMINOPHEN 325 MG/1
975 TABLET ORAL ONCE
Status: DISCONTINUED | OUTPATIENT
Start: 2023-11-08 | End: 2023-11-08 | Stop reason: HOSPADM

## 2023-11-08 RX ORDER — ONDANSETRON 4 MG/1
4 TABLET, ORALLY DISINTEGRATING ORAL ONCE
Status: COMPLETED | OUTPATIENT
Start: 2023-11-08 | End: 2023-11-08

## 2023-11-08 RX ADMIN — CLONIDINE HYDROCHLORIDE 0.2 MG: 0.1 TABLET ORAL at 12:14

## 2023-11-08 RX ADMIN — LORAZEPAM 1 MG: 1 TABLET ORAL at 12:15

## 2023-11-08 RX ADMIN — ONDANSETRON 4 MG: 4 TABLET, ORALLY DISINTEGRATING ORAL at 12:14

## 2023-11-08 NOTE — DISCHARGE INSTRUCTIONS
Thank you for visiting the Emergency Department today. You were evaluated for opioid withdrawal.  I spoke with the detox center and they would like you to go to the inpatient detox center for opioid detox. You declined. We are treating your symptoms at this time. Our network detox provider recommends against starting Suboxone or methadone from the ER in light of your upcoming appointment. Go to your appointment on Friday. I am providing medications for discharge to help with your symptoms for the next several days. For nausea/vomiting: Take Zofran  For pain: Take Tylenol or ibuprofen  For anxiety or palpitations:  Take clonidine  For anxiety: take gabapentin  For sleep: take trazodone  For diarrhea: take loperamide

## 2023-11-08 NOTE — ED PROVIDER NOTES
History  Chief Complaint   Patient presents with    Opiate Withdrawal     Withdrawing from heroin, last used 3 days ago. States he has nausea, vomiting, diarrhea. And the shakes      HPI  59-year-old male presenting to the emergency department for evaluation of opioid withdrawal.  Patient has a past medical history of kidney stones, hypertension, tobacco use, anxiety reports a history of opioid abuse. He reports he was on methadone but has not used it in about 30 days. He was seen here on the eighth and 13 October for withdrawal symptoms related to methadone withdrawal.  He refused detox, labs, MAT, received some symptomatic medications in the ER and was discharged. He presents here with concerns for opioid withdrawal but states that he has been using heroin illicitly daily for some time with last use Sunday and is now experiencing worsening withdrawal symptoms and is seeking relief. Patient reports generalized withdrawal symptoms including anxiety, nausea, vomiting, restlessness, tremor, runny nose, insomnia, myalgias. Denies chest pain, shortness of breath, syncope, numbness, tingling, weakness, headaches, visual disturbances, hallucinations. He is currently taking nothing for the symptoms. Reports he does have a follow-up appointment scheduled for Friday, 2 days from now. He again is not wishing to proceed with admission or detox. He intends when he follows up on Friday to likely restart methadone or potentially start Suboxone but he is not sure.       None       Past Medical History:   Diagnosis Date    BRAULIO (acute kidney injury) ([address]) 08/20/2022    Anxiety     Bright red rectal bleeding     Last Assessed: 9/9/2015     Drug dependence ([address]) 10/20/2021    Elevated brain natriuretic peptide (BNP) level 08/20/2022    Elevated d-dimer 08/20/2022    Elevated lipase 08/20/2022    Elevated troponin 08/20/2022    Hypernatremia 08/20/2022    Hypertension     Last Assessed: 7/9/2014     Hyponatremia 05/26/2023 Kidney stone     Kidney stones     Last Assessed: 11/17/2016     Periorbital edema     Last Assessed: 9/4/2015    Septic shock ([facility]CC) 08/20/2022    Skin tag of anus     Last Assessed: 9/9/2015     Trigger point of thoracic region     Last Assessed: 11/6/2015        Past Surgical History:   Procedure Laterality Date    CYSTOSCOPY W/ URETERAL STENT PLACEMENT  03/11/2013    CYSTOSCOPY W/ URETERAL STENT PLACEMENT  06/18/2014    EXTRACORPOREAL SHOCK WAVE LITHOTRIPSY     CYSTOSCOPY W/ URETERAL STENT PLACEMENT  07/16/2014    EXTRACORPOREAL SHOCK WAVE LITHOTRIPSY     CYSTOSCOPY W/ URETERAL STENT REMOVAL  04/11/2013    CYSTOSCOPY W/ URETERAL STENT REMOVAL Right 08/26/2014    CYSTOSCOPY W/ URETEROSCOPY W/ LITHOTRIPSY  02/26/2013    Percutaneous lithotomy With Uretal Stent Plcement     CYSTOSCOPY W/ URETEROSCOPY W/ LITHOTRIPSY  03/11/2014    CYSTOSCOPY W/ URETEROSCOPY W/ LITHOTRIPSY Right 08/04/2014    With Uretal Stent Placement     CYSTOSCOPY W/ URETEROSCOPY W/ LITHOTRIPSY Right 05/09/2016    HERNIA REPAIR  03/11/2013    IR DRAINAGE TUBE CHECK/CHANGE/REPOSITION/REINSERTION/UPSIZE  7/6/2023    IR DRAINAGE TUBE PLACEMENT  6/6/2023    IR DRAINAGE TUBE PLACEMENT  6/23/2023    IR NEPHROSTOMY TUBE CHECK/CHANGE/REPOSITION/REINSERTION/UPSIZE  11/22/2022    IR NEPHROSTOMY TUBE CHECK/CHANGE/REPOSITION/REINSERTION/UPSIZE  02/13/2023    IR NEPHROSTOMY TUBE CHECK/CHANGE/REPOSITION/REINSERTION/UPSIZE  04/28/2023    IR NEPHROSTOMY TUBE PLACEMENT  08/20/2022    KIDNEY SURGERY      LITHOTRIPSY      IN CYSTO/URETERO W/LITHOTRIPSY &INDWELL STENT INSRT Right 07/13/2016    Procedure: CYSTOSCOPY; URETEROSCOPY WITH HOLMIUM LASER STONE EXTRACTION; RETROGRADE PYELOGRAM; URETERAL STENT INSERTION ;  Surgeon: Rico Robert MD;  [facility];  Service: Urology    IN CYSTO/URETERO W/LITHOTRIPSY &INDWELL STENT INSRT Right 05/09/2016    Procedure: CYSTOSCOPY,  URETEROSCOPY,  WITH LITHOTRIPSY HOLMIUM LASER, STONE EXTRACTION, AND INSERTION STENT URETERAL;  Surgeon: Werner Conte MD;  Location: [facility];  Service: Urology    AL CYSTO/URETERO W/LITHOTRIPSY &INDWELL STENT INSRT Right 11/10/2022    Procedure: CYSTOSCOPY URETEROSCOPY  right RETROGRADE PYELOGRAM;  Surgeon: Selina Joshi MD;  Location: [facility];  Service: Urology    AL LAPAROSCOPY RADICAL NEPHRECTOMY Right 5/22/2023    Procedure: NEPHRECTOMY RADICAL LAPAROSCOPIC W/ ROBOTICS;  Surgeon: Martinez Wagoner MD;  [facility]: [facility];  Service: Urology    AL LITHOTRIPSY [address] Right 06/17/2016    Procedure: Sharmon Hem SHOCKWAVE (ESWL); Surgeon: Werner Conte MD;  [facility];  Service: Urology    TRANSURETHRAL RESECTION OF BLADDER      URETERAL Deangelo Skill  03/11/2013       Family History   Problem Relation Age of Onset    No Known Problems Mother     Heart attack Father      I have reviewed and agree with the history as documented. E-Cigarette/Vaping    E-Cigarette Use Never User      E-Cigarette/Vaping Substances    Nicotine No     THC No     CBD No     Flavoring No     Other No     Unknown No      Social History     Tobacco Use    Smoking status: Every Day     Packs/day: 2.00     Years: 35.00     Total pack years: 70.00     Types: Cigarettes    Smokeless tobacco: Never   Vaping Use    Vaping Use: Never used   Substance Use Topics    Alcohol use: Not Currently    Drug use: Yes     Types: Heroin       Review of Systems   Constitutional:  Positive for activity change and chills. Negative for fever. HENT:  Positive for rhinorrhea. Negative for ear pain and sore throat. Eyes:  Negative for photophobia, pain and visual disturbance. Respiratory:  Negative for cough and shortness of breath. Cardiovascular:  Negative for chest pain and palpitations. Gastrointestinal:  Positive for nausea and vomiting. Negative for abdominal pain. Genitourinary:  Negative for dysuria, flank pain and hematuria. Musculoskeletal:  Positive for myalgias. Negative for arthralgias and back pain. Skin:  Negative for color change and rash. Neurological:  Positive for tremors. Negative for seizures, syncope, speech difficulty, weakness, numbness and headaches. Psychiatric/Behavioral:  Positive for dysphoric mood and sleep disturbance. Negative for confusion, hallucinations and suicidal ideas. The patient is nervous/anxious. All other systems reviewed and are negative. Physical Exam  Physical Exam  Vitals and nursing note reviewed. Exam conducted with a chaperone present. Constitutional:       General: He is not in acute distress. Appearance: He is well-developed. He is ill-appearing. He is not toxic-appearing or diaphoretic. HENT:      Head: Normocephalic and atraumatic. Right Ear: External ear normal.      Left Ear: External ear normal.      Nose: Nose normal.      Mouth/Throat:      Mouth: Mucous membranes are moist.      Pharynx: Oropharynx is clear. Eyes:      General: No scleral icterus. Conjunctiva/sclera: Conjunctivae normal.   Cardiovascular:      Rate and Rhythm: Normal rate and regular rhythm. Heart sounds: No murmur heard. Pulmonary:      Effort: Pulmonary effort is normal. No respiratory distress. Breath sounds: Normal breath sounds. Abdominal:      Palpations: Abdomen is soft. Tenderness: There is no abdominal tenderness. There is no guarding or rebound. Musculoskeletal:         General: No swelling. Cervical back: Neck supple. Skin:     General: Skin is warm and dry. Capillary Refill: Capillary refill takes less than 2 seconds. Neurological:      General: No focal deficit present. Mental Status: He is alert. Mental status is at baseline. GCS: GCS eye subscore is 4. GCS verbal subscore is 5. GCS motor subscore is 6. Cranial Nerves: Cranial nerves 2-12 are intact. No dysarthria or facial asymmetry. Sensory: Sensation is intact. Motor: Tremor present.       Coordination: Coordination is intact. Gait: Gait is intact. Psychiatric:         Attention and Perception: Attention and perception normal.         Mood and Affect: Mood normal.         Speech: Speech normal.         Behavior: Behavior normal. Behavior is cooperative. Thought Content: Thought content normal.         Vital Signs  ED Triage Vitals [11/08/23 1128]   Temperature Pulse Respirations Blood Pressure SpO2   (!) 97 °F (36.1 °C) 105 22 (!) 196/102 98 %      Temp Source Heart Rate Source Patient Position - Orthostatic VS BP Location FiO2 (%)   Temporal Monitor Lying Right arm --      Pain Score       6           Vitals:    11/08/23 1130 11/08/23 1228 11/08/23 1230 11/08/23 1259   BP: (!) 185/100  (!) 171/82    Pulse: 95 83 87 87   Patient Position - Orthostatic VS:             Visual Acuity      ED Medications  Medications   acetaminophen (TYLENOL) tablet 975 mg (975 mg Oral Not Given 11/8/23 1241)   ondansetron (ZOFRAN-ODT) dispersible tablet 4 mg (4 mg Oral Given 11/8/23 1214)   cloNIDine (CATAPRES) tablet 0.2 mg (0.2 mg Oral Given 11/8/23 1214)   LORazepam (ATIVAN) tablet 1 mg (1 mg Oral Given 11/8/23 1215)       Diagnostic Studies  Results Reviewed       Procedure Component Value Units Date/Time    Rapid drug screen, urine [[id number]]     Lab Status: No result Specimen: Urine                    No orders to display              Procedures  Procedures         ED Course  ED Course as of 11/08/23 1350   Wed Nov 08, 2023   1227 COWS 18 on my assessment. 1227 Provided ativan, zofran, clonidine for now. D/w detox attending (toxicology) for recs regarding MAT. May be a candidate for ED suboxone admin. Hx of methadone use potentially complicates. 1235 Case discussed with toxicology on-call, they expressed reservations about starting Suboxone from the ER.   Concerns over patient's unclear intentions to continue MAT as an outpatient in light of the patient's lack of commitment at this time to continue as an outpatient. Also highlighting the patient's follow-up visit in 2 days choosing therapy with Suboxone or methadone can complicate this. Recommending symptomatic treatment alone. Did provide medication recommendations. I did update the patient on this plan he is agreeable. We will treat here in the ER observed briefly for improvement in symptoms if no worsening of symptoms or complications will discharge with similar medications in the outpatient setting with plan for him to follow-up with a scheduled appointment and return if symptoms worsen or other concerns. 1256 EKG interpreted by myself. EKG dated 11/8/2023 at 1252 demonstrates normal sinus rhythm at 90 bpm, normal VA interval, prolonged restoration with right bundle branch block pattern, normal QTc interval, no STEMI. 1258 Compared to prior EKG on file from July 6, 2023, August 6, 2021, right bundle branch block pattern is chronic.             Clinical Opiate Withdrawal Scale       Row Name 11/08/23 1228 11/08/23 1259             Pulse 83  -DT 87  -DT       Resting Pulse Rate: Measured After Patient is Sitting or Lying for One Minute 1  -DT 1  -DT       GI Upset: Over Last Half Hour 3  -DT 2  -DT       Sweating: Over Past Half Hour Not Accounted for by Room Temperature of Patient Activity 1  -DT 1  -DT       Tremor: Observation of Outstretched Hands 4  -DT 1  -DT       Restlessness: Observation During Assessment 3  -DT 0  -DT       Yawning: Observation During Assessment 0  -DT 0  -DT       Pupil Size 1  -DT 1  -DT       Anxiety and Irritability 1  -DT 1  -DT       Bone or Joint Aches: If Patient was Having Pain Previously, Only the Additional Component Attributed to Opiate Withdrawal is Scored 1  -DT 1  -DT       Gooseflesh Skin 0  -DT 0  -DT       Runny Nose or Tearing: Not Accounted for by Cold Symptoms or Allergies 4  -DT 1  -DT       Clinical Opiate Withdrawal Scale Total Score 19  -DT 9  -DT       Heart Rate Source -- --       Patient Position - Orthostatic VS -- --                 User Key  (r) = Recorded By, (t) = Taken By, (c) = Cosigned By      [address] [facility] Name    DT Almaz Muñoz, DO                                                  Medical Decision Making  This is a 53-year-old male presenting for opioid withdrawal.  His symptoms and presentation are certainly consistent with this. He has a history of this in the recent past.  Low suspicion for other etiologies which most likely would include viral causes, gastritis. He exhibits no significant chest pain, abdominal pain no evidence of arrhythmia no signs of trauma and no evidence of intoxication. He scores a moderate on the COWS scale for opioid withdrawal.  I discussed case with the detox provider (toxicology) via [facility] text with concerns about initiating Suboxone or MAT given the patient's reluctance to commit to detox, a long-term MAT strategy and with an upcoming appointment with addiction medicine in 2 days. Toxicology recommended symptomatic treatment alone and the patient was provided Ativan, clonidine, Zofran with significant improvement in his symptoms and patient is resting/sleeping on reassessment. He is not willing to proceed with detox. In light of his upcoming appointment I have provided prescriptions for symptomatic treatment of his symptoms advised him to return if he reconsiders plan for inpatient detox provided referral to addiction services and the location of our detox unit and advised him to return if any other issues and he is agreeable to that plan. COWS score did improve after symptomatic treatment. Problems Addressed:  Withdrawal from opioids [facility]): acute illness or injury    Amount and/or Complexity of Data Reviewed  Labs: ordered. ECG/medicine tests: ordered and independent interpretation performed. Decision-making details documented in ED Course. Risk  OTC drugs. Prescription drug management.   Diagnosis or treatment significantly limited by social determinants of health. Disposition  Final diagnoses:   Withdrawal from opioids [facility])     Time reflects when diagnosis was documented in both MDM as applicable and the Disposition within this note       Time User Action Codes Description Comment    11/8/2023 12:30 PM Sarah Fiore [F11.93] Withdrawal from opioids [facility])     11/8/2023 12:53 PM Melody Brenner [E89.09] Withdrawal from opioids [facility])           ED Disposition       ED Disposition   Discharge    Condition   Stable    Date/Time   Wed Nov 8, 2023 12:50 PM    Comment   Jeri Ormond discharge to home/self care.                    Follow-up Information       Follow up With Specialties Details Why Contact Info Additional Information    [facility] Inpatient Detox    [address] [facility]  [id number]  [address] [facility] [place] [facility]    [address] Emergency Department Emergency Medicine Go to  If symptoms worsen [address] [place] [id number]  [address] [place] [address] Emergency Department, [address] [place] [address], [facility]            Discharge Medication List as of 11/8/2023  1:02 PM        START taking these medications    Details   cloNIDine (CATAPRES) 0.1 mg tablet Take 1 tablet (0.1 mg total) by mouth every 6 (six) hours as needed for high blood pressure (anxiety, palpitations) for up to 3 days, Starting Wed 11/8/2023, Until Sat 11/11/2023 at 2359, Normal      gabapentin (Neurontin) 300 mg capsule Take 1 capsule (300 mg total) by mouth every 8 (eight) hours as needed (anxiety) for up to 3 days, Starting Wed 11/8/2023, Until Sat 11/11/2023 at 2359, Normal      loperamide (IMODIUM) 2 mg capsule Take 2 capsules (4 mg total) by mouth 4 (four) times a day as needed for diarrhea, Starting Wed 11/8/2023, Normal      ondansetron (Zofran ODT) 4 mg disintegrating tablet Take 1 tablet (4 mg total) by mouth every 6 (six) hours as needed for nausea or vomiting, Starting Wed 11/8/2023, Normal      traZODone (DESYREL) 50 mg tablet Take 1 tablet (50 mg total) by mouth daily at bedtime as needed for sleep for up to 7 days, Starting Wed 11/8/2023, Until Wed 11/15/2023 at 2359, Normal                 PDMP Review         Value Time User    PDMP Reviewed  Yes 5/26/2023  7:30 PM Bailee Gutierrez MD            ED Provider  Electronically Signed by             Khurram Andrews DO  11/08/23 0699

## 2023-11-09 LAB
ATRIAL RATE: 90 BPM
P AXIS: 62 DEGREES
PR INTERVAL: 132 MS
QRS AXIS: -8 DEGREES
QRSD INTERVAL: 134 MS
QT INTERVAL: 402 MS
QTC INTERVAL: 491 MS
T WAVE AXIS: 62 DEGREES
VENTRICULAR RATE: 90 BPM

## 2023-11-09 PROCEDURE — 93010 ELECTROCARDIOGRAM REPORT: CPT | Performed by: INTERNAL MEDICINE

## 2023-12-26 NOTE — H&P
H&P Exam - Eriberto 61 y o  male MRN: [id number]  Unit/Bed#: [facility] 12 Encounter: [id number]      -------------------------------------------------------------------------------------------------------------  Chief Complaint: Acute Renal Failure, Toxic metabolic Encephalopathy    History of Present Illness   HX and PE limited by: intubated/sedated  Lillian David is a 61 y o  male who presents with AMS  Patient has hx of kidney stones, polysubstance abuse, hypertension, psychiatric disorder, brought into minor see ED found unresponsive  Patient was found down by family today with no evidence of trauma or incontinence  In the ED, patient was hypoxic, altered mental status  Patient was placed initially on BiPAP  Patient had initial elevated troponin, procal, lactic acidosis, leukocytosis, and elevated creatinine of 6 02  Patient had central line placed for fluid resuscitation as patient had poor IV access  Patient was also subsequently intubated after speaking with nephrology  CT Head was negative  Ct C/A/P showed obscuration of left lower bronchus, aspiration, moderate right hydronephrosis with 1 3x 2 7 cm UPJ calculus, right mid to upper pole staghorn calculus measuring 3 8x1 7 cm  Patient will be transferred to [facility] MICU for PCN by IR and possible CRRT  History obtained from chart review    -------------------------------------------------------------------------------------------------------------  Assessment and Plan:    Neuro:    Diagnosis: Toxic metabolic encephalopathy  o Patient came to ED altered, unresponsive  o Likely secondary to acute renal failure  o Patient was found down for unknown periods of time  o Plan: sedation with propofol  o Neuro checks  o CAM-ICU  o Pain regimen  o Holding home resperidone   Diagnosis: hx of polysubstance abuse  o Plan: UDS negative  o Monitor       CV:    Diagnosis: No active issues  o Plan: Monitor hemodynamics  o MAP >65  o Continue fluid repletion   Diagnosis: Hx of HTN   o Plan: Holding home amlodpine      Pulm:   Diagnosis: Acute hypoxic respiratory failure  o Initially was on BIPAP  o Was intubated prior to transfer  o Plan: continue mechanical ventilation  o Wean as tolerated  o Mechanical ventilation protocol   Diagnosis: Aspiration   o Possible aspiration from CT  o Monitor respiratory status      GI:    Diagnosis: No active issues  o Had elevated lipase, likely secondary to ARF  o NPO for now      :    Diagnosis: Acute renal failure secondary to rhabdomyolysis and obstruction  o Na160, K 3 7, Bicarb 22, , Creat 7 02  CK 3236, lactic acid 4 8, 2 6  o CT shows moderate right hydronephrosis w/ 1 3x2 7 cm UPJ calculus, right mid to upper pole staghorn calculus measuring 3 8 x 1 7 cm  o Plan: IR PCN tonight  o Nephrology following, possible CRRT with repeat labs  o Trend BMP, VBG, lactic acid  o BUN/Creat      F/E/N:    F: continue isolyte maintenance fluid   E: Replete electrolytes as needed, possible CRRT pending labs and IR   N: NPO      Heme/Onc:    Diagnosis: Leukocytosis  o Trend CBC/fevers   DVT prophylaxis  o Plan: Holding s/p IR      Endo:    Diagnosis: No active issues      ID:    Diagnosis: sepsis  o Patient started on cefepime and vancomycin  o Plan: trend fevers WBC  o BC pending  o MRSA swab pending  o Continue cefepime and vancomycin      MSK/Skin:    Diagnosis: No active issues  o Plan: PT/OT when able  o Frequent repositioning      Disposition: Admit to Critical Care   Code Status: Level 1 - Full Code  --------------------------------------------------------------------------------------------------------------  Review of Systems   Unable to perform ROS: Intubated       Review of systems was unable to be performed secondary to intubated, sedated    Physical Exam  Vitals reviewed  Constitutional:       General: He is in acute distress  Appearance: He is ill-appearing        Comments: Intubated, sedated   HENT:      Head: Normocephalic and atraumatic  Nose: Nose normal       Mouth/Throat:      Comments: intubated  Eyes:      Conjunctiva/sclera: Conjunctivae normal       Pupils: Pupils are equal, round, and reactive to light  Cardiovascular:      Rate and Rhythm: Normal rate and regular rhythm  Pulses: Normal pulses  Heart sounds: Normal heart sounds  Pulmonary:      Effort: Respiratory distress present  Breath sounds: Normal breath sounds  Comments: intubated  Abdominal:      General: Bowel sounds are normal       Palpations: Abdomen is soft  Tenderness: There is no abdominal tenderness  Musculoskeletal:      Comments: sedated   Skin:     General: Skin is warm and dry  Findings: Lesion (multiple needle marks from IV use in all 4 extremities) present  Neurological:      Comments: Sedated, not answering commands       --------------------------------------------------------------------------------------------------------------  Vitals:   Vitals:    08/20/22 2044 08/20/22 2104   Temp:  99 4 °F (37 4 °C)   TempSrc:  Axillary   SpO2: 95%      Temp  Min: 97 4 °F (36 3 °C)  Max: 99 4 °F (37 4 °C)        There is no height or weight on file to calculate BMI    N/A    Laboratory and Diagnostics:  Results from last 7 days   Lab Units 08/20/22 2041 08/20/22  1210   WBC Thousand/uL 24 99* 28 29*   HEMOGLOBIN g/dL 16 2 20 3*   HEMATOCRIT % 50 0* 61 9*   PLATELETS Thousands/uL 166 225   NEUTROS PCT % 88* 84*   MONOS PCT % 6 10     Results from last 7 days   Lab Units 08/20/22 2041 08/20/22  1210   SODIUM mmol/L 156* 160*   POTASSIUM mmol/L 3 7 3 7   CHLORIDE mmol/L 127* 112*   CO2 mmol/L 22 22   ANION GAP mmol/L 7 26*   BUN mg/dL 119* 121*   CREATININE mg/dL 4 48* 6 02*   CALCIUM mg/dL 8 4 10 2*   GLUCOSE RANDOM mg/dL 137 139   ALT U/L 43 66   AST U/L 78* 132*   ALK PHOS U/L 60 76   ALBUMIN g/dL 2 7* 4 3   TOTAL BILIRUBIN mg/dL 0 83 1 93*     Results from last 7 days   Lab Units 08/20/22  2041 08/20/22  1210   MAGNESIUM mg/dL 2 8* 3 5*   PHOSPHORUS mg/dL 5 2*  --       Results from last 7 days   Lab Units 08/20/22  1210   INR  1 62*   PTT seconds 34          Results from last 7 days   Lab Units 08/20/22 2041 08/20/22  1436 08/20/22  1210   LACTIC ACID mmol/L 2 0 2 4* 4 8*     ABG:  Results from last 7 days   Lab Units 08/20/22  1812   PH ART  7 340*   PCO2 ART mm Hg 33 3*   PO2 ART mm Hg 65 3*   HCO3 ART mmol/L 17 6*   BASE EXC ART mmol/L -7 0   ABG SOURCE  Radial, Left     VBG:  Results from last 7 days   Lab Units 08/20/22 2052 08/20/22  1812   PH JOSH  7 293*  --    PCO2 JOSH mm Hg 45 7  --    PO2 JOSH mm Hg 33 4*  --    HCO3 JOSH mmol/L 21 6*  --    BASE EXC JOSH mmol/L -5 0  --    ABG SOURCE   --  Radial, Left     Results from last 7 days   Lab Units 08/20/22  1210   PROCALCITONIN ng/ml 5 49*       Micro:        EKG:   Imaging: I have personally reviewed pertinent reports        Historical Information   Past Medical History:   Diagnosis Date    Anxiety     Bright red rectal bleeding     Last Assessed: 9/9/2015     Hypertension     Last Assessed: 7/9/2014     Kidney stones     Last Assessed: 11/17/2016     Periorbital edema     Last Assessed: 9/4/2015    Skin tag of anus     Last Assessed: 9/9/2015     Trigger point of thoracic region     Last Assessed: 11/6/2015      Past Surgical History:   Procedure Laterality Date    CYSTOSCOPY W/ URETERAL STENT PLACEMENT  03/11/2013    CYSTOSCOPY W/ URETERAL STENT PLACEMENT  06/18/2014    EXTRACORPOREAL SHOCK WAVE LITHOTRIPSY     CYSTOSCOPY W/ URETERAL STENT PLACEMENT  07/16/2014    EXTRACORPOREAL SHOCK WAVE LITHOTRIPSY     CYSTOSCOPY W/ URETERAL STENT REMOVAL  04/11/2013    CYSTOSCOPY W/ URETERAL STENT REMOVAL Right 08/26/2014    CYSTOSCOPY W/ URETEROSCOPY W/ LITHOTRIPSY  02/26/2013    Percutaneous lithotomy With Uretal Stent Plcement     CYSTOSCOPY W/ URETEROSCOPY W/ LITHOTRIPSY  03/11/2014    CYSTOSCOPY W/ URETEROSCOPY W/ LITHOTRIPSY Right 08/04/2014    With Uretal Stent Placement     CYSTOSCOPY W/ URETEROSCOPY W/ LITHOTRIPSY Right 05/09/2016    HEMORRHOID SURGERY      HERNIA REPAIR  03/11/2013    KIDNEY SURGERY      LITHOTRIPSY      MA CYSTO/URETERO W/LITHOTRIPSY &INDWELL STENT INSRT Right 7/13/2016    Procedure: CYSTOSCOPY; URETEROSCOPY WITH HOLMIUM LASER STONE EXTRACTION; RETROGRADE PYELOGRAM; URETERAL STENT INSERTION ;  Surgeon: Cierra Jennings MD;  [facility];  Service: Urology    MA CYSTO/URETERO W/LITHOTRIPSY &INDWELL STENT INSRT Right 5/9/2016    Procedure: CYSTOSCOPY,  URETEROSCOPY,  WITH LITHOTRIPSY HOLMIUM LASER, STONE EXTRACTION, AND INSERTION STENT URETERAL;  Surgeon: Cierra Jennings MD;  [facility];  Service: Urology    [facility] ESWL Right 6/17/2016    Procedure: Justina Aceves SHOCKWAVE (ESWL);   Surgeon: Cierra Jennings MD;  [facility];  Service: Urology    TRANSURETHRAL RESECTION OF BLADDER      URETERAL [address]  03/11/2013     Social History   Social History     Substance and Sexual Activity   Alcohol Use No     Social History     Substance and Sexual Activity   Drug Use Yes    Types: Heroin, Marijuana, Methamphetamines, Fentanyl    Comment: 20-30 bags of heroin daily     Social History     Tobacco Use   Smoking Status Current Every Day Smoker    Packs/day: 2 00    Years: 35 00    Pack years: 70 00   Smokeless Tobacco Never Used     Family History:   Family History   Problem Relation Age of Onset    COPD Mother     Hypertension Mother     Heart attack Father      I have reviewed this patient's family history and commented on sigificant items within the HPI      Medications:  Current Facility-Administered Medications   Medication Dose Route Frequency    chlorhexidine (PERIDEX) 0 12 % oral rinse 15 mL  15 mL Mouth/Throat Q12H [address]    fentanyl citrate (PF) 100 MCG/2ML 50 mcg  50 mcg Intravenous Q2H PRN    multi-electrolyte (PLASMALYTE-A/ISOLYTE-S PH 7 4) IV solution  100 mL/hr Intravenous Continuous    propofol (DIPRIVAN) 1000 mg in 100 mL infusion (premix)  5-50 mcg/kg/min Intravenous Titrated     Facility-Administered Medications Ordered in Other Encounters   Medication Dose Route Frequency    ROCuronium (ZEMURON) injection   Intravenous PRN     Home medications:  Prior to Admission Medications   Prescriptions Last Dose Informant Patient Reported? Taking? amLODIPine (NORVASC) 10 mg tablet   No No   Sig: Take 1 tablet (10 mg total) by mouth daily   Patient not taking: Reported on 8/11/2022   amitriptyline (ELAVIL) 25 mg tablet   No No   Sig: Take 1 tablet (25 mg total) by mouth daily at bedtime   Patient not taking: Reported on 8/11/2022   methocarbamol (ROBAXIN) 750 mg tablet   No No   Sig: Take 1 tablet (750 mg total) by mouth every 8 (eight) hours   Patient not taking: Reported on 8/11/2022   risperiDONE (RisperDAL) 1 mg tablet   No No   Sig: Take 1 tablet (1 mg total) by mouth 2 (two) times a day   Patient not taking: Reported on 8/11/2022   sildenafil (REVATIO) 20 mg tablet   No No   Sig: Take 1 tablet (20 mg total) by mouth daily   Patient not taking: Reported on 8/11/2022      Facility-Administered Medications: None     Allergies: Allergies   Allergen Reactions    Metronidazole Itching and Swelling    Nsaids GI Intolerance     Stomach irritant    Penicillin G     Penicillins GI Intolerance     Sick to stomach    Pollen Extract     Sulfa Antibiotics      ------------------------------------------------------------------------------------------------------------  Advance Directive and Living Will:      Power of :    POLST:    ------------------------------------------------------------------------------------------------------------  Anticipated Length of Stay is > 2 Jerry Huitron MD        Portions of the record may have been created with voice recognition software    Occasional wrong word or "sound a like" substitutions may have occurred due to the inherent limitations of voice recognition software    Read the chart carefully and recognize, using context, where substitutions have occurred Able to read and write English

## 2024-04-04 ENCOUNTER — TELEPHONE (OUTPATIENT)
Dept: NEPHROLOGY | Facility: CLINIC | Age: 59
End: 2024-04-04

## 2024-04-04 NOTE — TELEPHONE ENCOUNTER
I called and left a VM to please complete labs placed on 10/16/23 prior to upcoming appt on 04/12/24.

## 2024-04-09 NOTE — TELEPHONE ENCOUNTER
Patients mom called back she said she will try and get faye to complete the labs, however he has a fear of needles so it may be difficult. Thanks

## 2024-08-14 ENCOUNTER — TELEPHONE (OUTPATIENT)
Dept: NEPHROLOGY | Facility: CLINIC | Age: 59
End: 2024-08-14

## 2024-08-14 NOTE — TELEPHONE ENCOUNTER
I called and spoke with Farrah regarding Jaime completing lab work prior to his upcoming appointment on 08/23/24.

## 2025-01-13 ENCOUNTER — OFFICE VISIT (OUTPATIENT)
Dept: URGENT CARE | Facility: MEDICAL CENTER | Age: 60
End: 2025-01-13
Payer: COMMERCIAL

## 2025-01-13 VITALS
TEMPERATURE: 97.7 F | WEIGHT: 184 LBS | BODY MASS INDEX: 28.88 KG/M2 | HEART RATE: 78 BPM | RESPIRATION RATE: 18 BRPM | HEIGHT: 67 IN | OXYGEN SATURATION: 96 %

## 2025-01-13 DIAGNOSIS — S61.412A LACERATION OF LEFT HAND WITHOUT FOREIGN BODY, INITIAL ENCOUNTER: ICD-10-CM

## 2025-01-13 DIAGNOSIS — B96.89 ACUTE BACTERIAL SINUSITIS: ICD-10-CM

## 2025-01-13 DIAGNOSIS — L03.213 PRESEPTAL CELLULITIS: Primary | ICD-10-CM

## 2025-01-13 DIAGNOSIS — J01.90 ACUTE BACTERIAL SINUSITIS: ICD-10-CM

## 2025-01-13 PROCEDURE — 99213 OFFICE O/P EST LOW 20 MIN: CPT | Performed by: STUDENT IN AN ORGANIZED HEALTH CARE EDUCATION/TRAINING PROGRAM

## 2025-01-13 PROCEDURE — S9088 SERVICES PROVIDED IN URGENT: HCPCS | Performed by: STUDENT IN AN ORGANIZED HEALTH CARE EDUCATION/TRAINING PROGRAM

## 2025-01-13 RX ORDER — PRAZOSIN HYDROCHLORIDE 1 MG/1
CAPSULE ORAL
COMMUNITY
Start: 2024-10-29

## 2025-01-13 NOTE — PROGRESS NOTES
[facility]        NAME: Mejia Canseco is a 59 y.o. male  : 1965    MRN: [id number]  DATE: 2025  TIME: 1:08 PM    Assessment and Orders   Preseptal cellulitis [L03.213]  1. Preseptal cellulitis  amoxicillin-clavulanate (AUGMENTIN) 875-125 mg per tablet      2. Acute bacterial sinusitis        3. Laceration of left hand without foreign body, initial encounter              Plan and Discussion      Patient complaining of pain and irritation of the skin surrounding his eyes bilaterally.  Does not seem to be erythematous but there is scaling from what appears to be eczema or psoriasis.  Patient states that he recently had a stye and now he feels a lot of sinus pressure and congestion.  Will cover for potential developing preseptal cellulitis as well as bacterial sinusitis with Augmentin.    Patient also complaining of left finger pain from accidentally hitting his hand with a drill bit.  Wound appears to be healing well with callus.  Has full range of motion and full strength.  There is no bruising or swelling to the area.  No visible signs of infection.     Risks and benefits discussed. Patient understands and agrees with the plan.     PATIENT INSTRUCTIONS      If tests have been performed at [facility], our office will contact you with results if changes need to be made to the care plan discussed with you at the visit.  You can review your full results on [facility] MyChart.    Follow up with PCP.     If any of the following occur, please report to your nearest ED for evaluation or call 911.   Difficultly breathing or shortness of breath  Chest pain  Acutely worsening symptoms.         Chief Complaint     Chief Complaint   Patient presents with   • Eye irritation     Pt complains of symptoms for 1wk, B/L eye irritation, sinus infection, Left hand infection, no meds taken          History of Present Illness       Patient is a 59-year-old male who complains of bilateral eye pain.  He  states that he initially had a stye in the left lower lid but this has popped.  Since then he has had pain and irritation in the skin surrounding both eyes.  He states just recently he developed nasal congestion and sinus pressure.  He has no pain of the eye itself or pain in the eye socket.  Patient states his vision is poor but this is chronic.  He has had no fevers that he is aware of.    Patient would also like his left hand checked.  Over a week ago he accidentally hit his left middle MCP with a drill bit.  He states it continues to be painful.  He has full range of motion and strength of the hand.      Review of Systems   Review of Systems  As stated above    Current Medications       Current Outpatient Medications:   •  amoxicillin-clavulanate (AUGMENTIN) 875-125 mg per tablet, Take 1 tablet by mouth every 12 (twelve) hours for 7 days, Disp: 14 tablet, Rfl: 0  •  prazosin (MINIPRESS) 1 mg capsule, Take 1 cap po qhs, then increase to 2 caps po qhs after 3 days (Patient not taking: Reported on 1/13/2025), Disp: , Rfl:   •  cloNIDine (CATAPRES) 0.1 mg tablet, Take 1 tablet (0.1 mg total) by mouth every 6 (six) hours as needed for high blood pressure (anxiety, palpitations) for up to 3 days, Disp: 12 tablet, Rfl: 0  •  loperamide (IMODIUM) 2 mg capsule, Take 2 capsules (4 mg total) by mouth 4 (four) times a day as needed for diarrhea, Disp: 12 capsule, Rfl: 0  •  ondansetron (Zofran ODT) 4 mg disintegrating tablet, Take 1 tablet (4 mg total) by mouth every 6 (six) hours as needed for nausea or vomiting, Disp: 20 tablet, Rfl: 0  •  traZODone (DESYREL) 50 mg tablet, Take 1 tablet (50 mg total) by mouth daily at bedtime as needed for sleep for up to 7 days, Disp: 7 tablet, Rfl: 0    Current Allergies     Allergies as of 01/13/2025 - Reviewed 01/13/2025   Allergen Reaction Noted   • Bee venom Anaphylaxis 05/22/2023   • Metronidazole Itching and Swelling 04/02/2016   • Nsaids GI Intolerance 04/02/2016   • Penicillin g   07/14/2020   • Penicillins GI Intolerance 04/02/2016   • Pollen extract  07/24/2013   • Sulfa antibiotics  07/14/2020            The following portions of the patient's history were reviewed and updated as appropriate: allergies, current medications, past family history, past medical history, past social history, past surgical history and problem list.     Past Medical History:   Diagnosis Date   • BRAULIO (acute kidney injury) ([facility]) 08/20/2022   • Anxiety    • Bright red rectal bleeding     Last Assessed: 9/9/2015    • Drug dependence ([facility]) 10/20/2021   • Elevated brain natriuretic peptide (BNP) level 08/20/2022   • Elevated d-dimer 08/20/2022   • Elevated lipase 08/20/2022   • Elevated troponin 08/20/2022   • Hypernatremia 08/20/2022   • Hypertension     Last Assessed: 7/9/2014    • Hyponatremia 05/26/2023   • Kidney stone    • Kidney stones     Last Assessed: 11/17/2016    • Periorbital edema     Last Assessed: 9/4/2015   • Septic shock ([facility]) 08/20/2022   • Skin tag of anus     Last Assessed: 9/9/2015    • Trigger point of thoracic region     Last Assessed: 11/6/2015        Past Surgical History:   Procedure Laterality Date   • CYSTOSCOPY W/ URETERAL STENT PLACEMENT  03/11/2013   • CYSTOSCOPY W/ URETERAL STENT PLACEMENT  06/18/2014    EXTRACORPOREAL SHOCK WAVE LITHOTRIPSY    • CYSTOSCOPY W/ URETERAL STENT PLACEMENT  07/16/2014    EXTRACORPOREAL SHOCK WAVE LITHOTRIPSY    • CYSTOSCOPY W/ URETERAL STENT REMOVAL  04/11/2013   • CYSTOSCOPY W/ URETERAL STENT REMOVAL Right 08/26/2014   • CYSTOSCOPY W/ URETEROSCOPY W/ LITHOTRIPSY  02/26/2013    Percutaneous lithotomy With Uretal Stent Plcement    • CYSTOSCOPY W/ URETEROSCOPY W/ LITHOTRIPSY  03/11/2014   • CYSTOSCOPY W/ URETEROSCOPY W/ LITHOTRIPSY Right 08/04/2014    With Uretal Stent Placement    • CYSTOSCOPY W/ URETEROSCOPY W/ LITHOTRIPSY Right 05/09/2016   • HERNIA REPAIR  03/11/2013   • IR DRAINAGE TUBE CHECK/CHANGE/REPOSITION/REINSERTION/UPSIZE  7/6/2023   • IR DRAINAGE  "TUBE PLACEMENT  6/6/2023   • IR DRAINAGE TUBE PLACEMENT  6/23/2023   • IR NEPHROSTOMY TUBE CHECK/CHANGE/REPOSITION/REINSERTION/UPSIZE  11/22/2022   • IR NEPHROSTOMY TUBE CHECK/CHANGE/REPOSITION/REINSERTION/UPSIZE  02/13/2023   • IR NEPHROSTOMY TUBE CHECK/CHANGE/REPOSITION/REINSERTION/UPSIZE  04/28/2023   • IR NEPHROSTOMY TUBE PLACEMENT  08/20/2022   • KIDNEY SURGERY     • LITHOTRIPSY     • CT CYSTO/URETERO W/LITHOTRIPSY &INDWELL STENT INSRT Right 07/13/2016    Procedure: CYSTOSCOPY; URETEROSCOPY WITH HOLMIUM LASER STONE EXTRACTION; RETROGRADE PYELOGRAM; URETERAL STENT INSERTION ;  Surgeon: Teto Ortiz MD;  [facility];  Service: Urology   • CT CYSTO/URETERO W/LITHOTRIPSY &INDWELL STENT INSRT Right 05/09/2016    Procedure: CYSTOSCOPY,  URETEROSCOPY,  WITH LITHOTRIPSY HOLMIUM LASER, STONE EXTRACTION, AND INSERTION STENT URETERAL;  Surgeon: Teto Ortiz MD;  [facility];  Service: Urology   • CT CYSTO/URETERO W/LITHOTRIPSY &INDWELL STENT INSRT Right 11/10/2022    Procedure: CYSTOSCOPY URETEROSCOPY  right RETROGRADE PYELOGRAM;  Surgeon: Jonny Biggs MD;  [facility];  Service: Urology   • CT LAPAROSCOPY RADICAL NEPHRECTOMY Right 5/22/2023    Procedure: NEPHRECTOMY RADICAL LAPAROSCOPIC W/ ROBOTICS;  Surgeon: Teto Ortiz MD;  [facility];  Service: Urology   • CT LITHOTRIPSY XTRCORP SHOCK WAVE Right 06/17/2016    Procedure: LITHROTRIPSY EXTRACORPORAL SHOCKWAVE (ESWL);  Surgeon: Teto Ortiz MD;  [facility];  Service: Urology   • TRANSURETHRAL RESECTION OF BLADDER     • URETERAL REIMPLANTION  03/11/2013       Family History   Problem Relation Age of Onset   • No Known Problems Mother    • Heart attack Father          Medications have been verified.        Objective   Pulse 78   Temp 97.7 °F (36.5 °C)   Resp 18   Ht 5' 7\" (1.702 m)   Wt 83.5 kg (184 lb)   SpO2 96%   BMI 28.82 kg/m²   No LMP for male patient.       Physical Exam     Physical " Exam  Cardiovascular:      Rate and Rhythm: Normal rate.   Pulmonary:      Effort: Pulmonary effort is normal. No respiratory distress.   Musculoskeletal:        Hands:    Skin:     Comments: Scaling and flaking of the skin on the face around the eyes.  Painful to touch.  No scleral involvement.  No styes.  No inner eye pain with extraocular movements.   Neurological:      Mental Status: He is alert and oriented to person, place, and time.   Psychiatric:         Speech: Speech is delayed.         Cognition and Memory: Cognition is impaired.             Evelina Lorenzo DO

## 2025-01-20 ENCOUNTER — OFFICE VISIT (OUTPATIENT)
Dept: URGENT CARE | Facility: MEDICAL CENTER | Age: 60
End: 2025-01-20
Payer: COMMERCIAL

## 2025-01-20 VITALS
TEMPERATURE: 98.3 F | WEIGHT: 185 LBS | RESPIRATION RATE: 19 BRPM | OXYGEN SATURATION: 97 % | BODY MASS INDEX: 29.03 KG/M2 | HEIGHT: 67 IN | HEART RATE: 82 BPM

## 2025-01-20 DIAGNOSIS — J01.91 ACUTE RECURRENT SINUSITIS, UNSPECIFIED LOCATION: Primary | ICD-10-CM

## 2025-01-20 DIAGNOSIS — S69.92XA INJURY OF LEFT HAND, INITIAL ENCOUNTER: ICD-10-CM

## 2025-01-20 PROCEDURE — S9088 SERVICES PROVIDED IN URGENT: HCPCS | Performed by: PHYSICIAN ASSISTANT

## 2025-01-20 PROCEDURE — 99213 OFFICE O/P EST LOW 20 MIN: CPT | Performed by: PHYSICIAN ASSISTANT

## 2025-01-20 RX ORDER — FLUTICASONE PROPIONATE 50 MCG
1 SPRAY, SUSPENSION (ML) NASAL DAILY
Qty: 16 G | Refills: 0 | Status: SHIPPED | OUTPATIENT
Start: 2025-01-20

## 2025-01-20 RX ORDER — DOXYCYCLINE 100 MG/1
100 CAPSULE ORAL 2 TIMES DAILY
Qty: 14 CAPSULE | Refills: 0 | Status: SHIPPED | OUTPATIENT
Start: 2025-01-20 | End: 2025-01-27

## 2025-01-20 NOTE — PATIENT INSTRUCTIONS
Doxycycline as prescribed  Flonase   Warm compresses over sinuses  Steam treatment (practice proper safety precautions when handing hot liquids/steam)  Over the counter saline nasal spray    Eat yogurt with live and active cultures and/or take a probiotic at least 3 hours before or after antibiotic dose. Monitor stool for diarrhea and/or blood. If this occurs, contact primary care doctor ASAP.     Doxycycline may cause your skin to be more sensitive to sunlight than it is normally. Exposure to sunlight, even for short periods of time, may cause skin rash, itching, redness or other discoloration of the skin, or a severe sunburn. Practice proper precautions including avoiding excessive sunlight exposure, wear skin protection including clothing and sunscreen to avoid reaction.         Follow up with ortho if hand injury isn't improving.  Follow up with PCP in 3-5 days.  Proceed to  ER if symptoms worsen.    If tests have been performed at [facility], our office will contact you with results if changes need to be made to the care plan discussed with you at the visit.  You can review your full results on [facility] MyChart.

## 2025-01-20 NOTE — PROGRESS NOTES
[facility]        NAME: Mejia Canseco is a 59 y.o. male  : 1965    MRN: [id number]  DATE: 2025  TIME: 3:50 PM    Assessment and Plan   Acute recurrent sinusitis, unspecified location [J01.91]  1. Acute recurrent sinusitis, unspecified location  doxycycline monohydrate (MONODOX) 100 mg capsule    Ambulatory Referral to Otolaryngology    fluticasone (FLONASE) 50 mcg/act nasal spray      2. Injury of left hand, initial encounter  Ambulatory referral to Orthopedic Surgery          Results  Patient declined XR of hand.       Patient Instructions     Assessment & Plan    Doxycycline as prescribed  Flonase   Warm compresses over sinuses  Steam treatment (practice proper safety precautions when handing hot liquids/steam)  Over the counter saline nasal spray    Eat yogurt with live and active cultures and/or take a probiotic at least 3 hours before or after antibiotic dose. Monitor stool for diarrhea and/or blood. If this occurs, contact primary care doctor ASAP.     Doxycycline may cause your skin to be more sensitive to sunlight than it is normally. Exposure to sunlight, even for short periods of time, may cause skin rash, itching, redness or other discoloration of the skin, or a severe sunburn. Practice proper precautions including avoiding excessive sunlight exposure, wear skin protection including clothing and sunscreen to avoid reaction.         Follow up with ortho if hand injury isn't improving.  Follow up with PCP in 3-5 days.  Proceed to  ER if symptoms worsen.    If tests have been performed at [facility], our office will contact you with results if changes need to be made to the care plan discussed with you at the visit.  You can review your full results on [facility] MyChart.    Chief Complaint     Chief Complaint   Patient presents with    Sinusitis     Pt complains of Sinus pressure for 2wks, burning sensation in nasal area, B/L eye irritation & dryness, fatigue, Left hand  "pain, pt was seen on 1/13/25 and completed amoxicillin tx           History of Present Illness     History of Present Illness      Patient additionally states he accidentally drilled into his L palm a week ago. Denies redness or drainage but reports continued pain. States \"it didn't go deep enough to hit bone\".     Sinusitis  This is a new problem. The current episode started 1 to 4 weeks ago. There has been no fever. Associated symptoms include congestion and sinus pressure. Pertinent negatives include no chills, coughing, ear pain, headaches, shortness of breath, sneezing or sore throat. Treatments tried: Augmentin x 1 week.       Review of Systems   Review of Systems   Constitutional:  Negative for chills and fever.   HENT:  Positive for congestion, postnasal drip, rhinorrhea, sinus pressure and sinus pain. Negative for dental problem, ear discharge, ear pain, facial swelling, sneezing, sore throat and trouble swallowing.    Eyes:  Positive for itching.   Respiratory:  Negative for cough, chest tightness, shortness of breath and wheezing.    Gastrointestinal:  Negative for diarrhea, nausea and vomiting.   Musculoskeletal:  Negative for myalgias.   Skin:  Negative for rash.   Neurological:  Negative for dizziness, weakness, light-headedness and headaches.         Current Medications       Current Outpatient Medications:     doxycycline monohydrate (MONODOX) 100 mg capsule, Take 1 capsule (100 mg total) by mouth 2 (two) times a day for 7 days, Disp: 14 capsule, Rfl: 0    fluticasone (FLONASE) 50 mcg/act nasal spray, 1 spray into each nostril daily, Disp: 16 g, Rfl: 0    amoxicillin-clavulanate (AUGMENTIN) 875-125 mg per tablet, Take 1 tablet by mouth every 12 (twelve) hours for 7 days (Patient not taking: Reported on 1/20/2025), Disp: 14 tablet, Rfl: 0    cloNIDine (CATAPRES) 0.1 mg tablet, Take 1 tablet (0.1 mg total) by mouth every 6 (six) hours as needed for high blood pressure (anxiety, palpitations) for up to " 3 days, Disp: 12 tablet, Rfl: 0    loperamide (IMODIUM) 2 mg capsule, Take 2 capsules (4 mg total) by mouth 4 (four) times a day as needed for diarrhea (Patient not taking: Reported on 1/20/2025), Disp: 12 capsule, Rfl: 0    ondansetron (Zofran ODT) 4 mg disintegrating tablet, Take 1 tablet (4 mg total) by mouth every 6 (six) hours as needed for nausea or vomiting (Patient not taking: Reported on 1/20/2025), Disp: 20 tablet, Rfl: 0    prazosin (MINIPRESS) 1 mg capsule, Take 1 cap po qhs, then increase to 2 caps po qhs after 3 days (Patient not taking: Reported on 1/20/2025), Disp: , Rfl:     traZODone (DESYREL) 50 mg tablet, Take 1 tablet (50 mg total) by mouth daily at bedtime as needed for sleep for up to 7 days, Disp: 7 tablet, Rfl: 0    Current Allergies     Allergies as of 01/20/2025 - Reviewed 01/20/2025   Allergen Reaction Noted    Bee venom Anaphylaxis 05/22/2023    Metronidazole Itching and Swelling 04/02/2016    Nsaids GI Intolerance 04/02/2016    Penicillin g  07/14/2020    Penicillins GI Intolerance 04/02/2016    Pollen extract  07/24/2013    Sulfa antibiotics  07/14/2020            The following portions of the patient's history were reviewed and updated as appropriate: allergies, current medications, past family history, past medical history, past social history, past surgical history and problem list.     Past Medical History:   Diagnosis Date    BRAULIO (acute kidney injury) ([facility]ly [facility]) 08/20/2022    Anxiety     Bright red rectal bleeding     Last Assessed: 9/9/2015     Drug dependence (Formerly [facility]) 10/20/2021    Elevated brain natriuretic peptide (BNP) level 08/20/2022    Elevated d-dimer 08/20/2022    Elevated lipase 08/20/2022    Elevated troponin 08/20/2022    Hypernatremia 08/20/2022    Hypertension     Last Assessed: 7/9/2014     Hyponatremia 05/26/2023    Kidney stone     Kidney stones     Last Assessed: 11/17/2016     Periorbital edema     Last Assessed: 9/4/2015    Septic shock ([facility]) 08/20/2022    Skin tag of  anus     Last Assessed: 9/9/2015     Trigger point of thoracic region     Last Assessed: 11/6/2015        Past Surgical History:   Procedure Laterality Date    CYSTOSCOPY W/ URETERAL STENT PLACEMENT  03/11/2013    CYSTOSCOPY W/ URETERAL STENT PLACEMENT  06/18/2014    EXTRACORPOREAL SHOCK WAVE LITHOTRIPSY     CYSTOSCOPY W/ URETERAL STENT PLACEMENT  07/16/2014    EXTRACORPOREAL SHOCK WAVE LITHOTRIPSY     CYSTOSCOPY W/ URETERAL STENT REMOVAL  04/11/2013    CYSTOSCOPY W/ URETERAL STENT REMOVAL Right 08/26/2014    CYSTOSCOPY W/ URETEROSCOPY W/ LITHOTRIPSY  02/26/2013    Percutaneous lithotomy With Uretal Stent Plcement     CYSTOSCOPY W/ URETEROSCOPY W/ LITHOTRIPSY  03/11/2014    CYSTOSCOPY W/ URETEROSCOPY W/ LITHOTRIPSY Right 08/04/2014    With Uretal Stent Placement     CYSTOSCOPY W/ URETEROSCOPY W/ LITHOTRIPSY Right 05/09/2016    HERNIA REPAIR  03/11/2013    IR DRAINAGE TUBE CHECK/CHANGE/REPOSITION/REINSERTION/UPSIZE  7/6/2023    IR DRAINAGE TUBE PLACEMENT  6/6/2023    IR DRAINAGE TUBE PLACEMENT  6/23/2023    IR NEPHROSTOMY TUBE CHECK/CHANGE/REPOSITION/REINSERTION/UPSIZE  11/22/2022    IR NEPHROSTOMY TUBE CHECK/CHANGE/REPOSITION/REINSERTION/UPSIZE  02/13/2023    IR NEPHROSTOMY TUBE CHECK/CHANGE/REPOSITION/REINSERTION/UPSIZE  04/28/2023    IR NEPHROSTOMY TUBE PLACEMENT  08/20/2022    KIDNEY SURGERY      LITHOTRIPSY      HI CYSTO/URETERO W/LITHOTRIPSY &INDWELL STENT INSRT Right 07/13/2016    Procedure: CYSTOSCOPY; URETEROSCOPY WITH HOLMIUM LASER STONE EXTRACTION; RETROGRADE PYELOGRAM; URETERAL STENT INSERTION ;  Surgeon: Teto Ortiz MD;  Location: [facility];  Service: Urology    HI CYSTO/URETERO W/LITHOTRIPSY &INDWELL STENT INSRT Right 05/09/2016    Procedure: CYSTOSCOPY,  URETEROSCOPY,  WITH LITHOTRIPSY HOLMIUM LASER, STONE EXTRACTION, AND INSERTION STENT URETERAL;  Surgeon: Teto Ortiz MD;  Location: [facility];  Service: Urology    HI CYSTO/URETERO W/LITHOTRIPSY &INDWELL STENT INSRT Right 11/10/2022     "Procedure: CYSTOSCOPY URETEROSCOPY  right RETROGRADE PYELOGRAM;  Surgeon: Jonny Biggs MD;  Location: [facility];  Service: Urology    NJ LAPAROSCOPY RADICAL NEPHRECTOMY Right 5/22/2023    Procedure: NEPHRECTOMY RADICAL LAPAROSCOPIC W/ ROBOTICS;  Surgeon: Teto Ortiz MD;  Location:  MAIN OR;  Service: Urology    NJ LITHOTRIPSY XTRCORP SHOCK WAVE Right 06/17/2016    Procedure: LITHROTRIPSY EXTRACORPORAL SHOCKWAVE (ESWL);  Surgeon: Teto Ortiz MD;  Location: [facility];  Service: Urology    TRANSURETHRAL RESECTION OF BLADDER      URETERAL REIMPLANTION  03/11/2013       Family History   Problem Relation Age of Onset    No Known Problems Mother     Heart attack Father          Medications have been verified.        Objective   Pulse 82   Temp 98.3 °F (36.8 °C)   Resp 19   Ht 5' 7\" (1.702 m)   Wt 83.9 kg (185 lb)   SpO2 97%   BMI 28.98 kg/m²   No LMP for male patient.       Physical Exam     Physical Exam  Vitals reviewed.   Constitutional:       General: He is not in acute distress.     Appearance: He is well-developed. He is not diaphoretic.   HENT:      Head: Normocephalic.      Comments: B/L frontal and maxillary sinus TTP     Right Ear: Tympanic membrane, ear canal and external ear normal.      Left Ear: Tympanic membrane, ear canal and external ear normal.      Nose: Nose normal.      Mouth/Throat:      Pharynx: No oropharyngeal exudate or posterior oropharyngeal erythema.   Eyes:      Conjunctiva/sclera: Conjunctivae normal.   Cardiovascular:      Rate and Rhythm: Normal rate and regular rhythm.      Heart sounds: Normal heart sounds. No murmur heard.     No friction rub. No gallop.   Pulmonary:      Effort: Pulmonary effort is normal. No respiratory distress.      Breath sounds: Normal breath sounds. No wheezing, rhonchi or rales.   Musculoskeletal:        Hands:    Lymphadenopathy:      Cervical: No cervical adenopathy.   Skin:     General: Skin is warm.   Neurological:      Mental " Status: He is alert and oriented to person, place, and time.   Psychiatric:         Behavior: Behavior normal.         Thought Content: Thought content normal.         Judgment: Judgment normal.

## (undated) DEVICE — INFUSER BAG 3000ML

## (undated) DEVICE — GAUZE SPONGES,16 PLY: Brand: CURITY

## (undated) DEVICE — CANNULA SEAL

## (undated) DEVICE — GUIDEWIRE STRGHT TIP 0.035 IN  SOLO PLUS

## (undated) DEVICE — SUT VLOC 90 3-0 V-20 9IN VLOCM0644

## (undated) DEVICE — MONOPOLAR CURVED SCISSORS: Brand: ENDOWRIST

## (undated) DEVICE — CLAMP TOWEL TUBING DISPOSABLE

## (undated) DEVICE — STAPLER CANNULA SEAL: Brand: ENDOWRIST

## (undated) DEVICE — Device: Brand: LEVEL 1

## (undated) DEVICE — 3M™ IOBAN™ 2 ANTIMICROBIAL INCISE DRAPE 6650EZ: Brand: IOBAN™ 2

## (undated) DEVICE — LUBRICANT SURGILUBE TUBE 4 OZ  FLIP TOP

## (undated) DEVICE — SCD SEQUENTIAL COMPRESSION COMFORT SLEEVE MEDIUM KNEE LENGTH: Brand: KENDALL SCD

## (undated) DEVICE — LARGE NEEDLE DRIVER: Brand: ENDOWRIST

## (undated) DEVICE — CHLORAPREP HI-LITE 26ML ORANGE

## (undated) DEVICE — GLOVE SRG BIOGEL ECLIPSE 7.5

## (undated) DEVICE — GAUZE SPONGES,USP TYPE VII GAUZE, 12 PLY: Brand: CURITY

## (undated) DEVICE — SPECIMEN CONTAINER STERILE PEEL PACK

## (undated) DEVICE — URETEROSCOPE DIGITAL FLEXIBLE SNGL USE WISCOPE

## (undated) DEVICE — GLOVE INDICATOR PI UNDERGLOVE SZ 8 BLUE

## (undated) DEVICE — DRAPE SHEET X-LG

## (undated) DEVICE — INSUFFLATION NEEDLE TO ESTABLISH PNEUMOPERITONEUM.: Brand: INSUFFLATION NEEDLE

## (undated) DEVICE — CATH URETERAL 5FR X 70 CM FLEX TIP POLYUR BARD

## (undated) DEVICE — MAT FLOOR STEP DRI 24 X 36 IN N-STRL

## (undated) DEVICE — GLOVE SRG BIOGEL 7

## (undated) DEVICE — SUREFORM 45 RELOAD WHITE: Brand: SUREFORM

## (undated) DEVICE — SUT MONOCRYL 4-0 PS-2 27 IN Y426H

## (undated) DEVICE — CHLORHEXIDINE 4PCT 4 OZ

## (undated) DEVICE — REDUCER: Brand: ENDOWRIST

## (undated) DEVICE — HEM-O-LOK CLIP CARTRIDGE LARGE DA VINCI SI/XI

## (undated) DEVICE — TUBING SUCTION 5MM X 12 FT

## (undated) DEVICE — ARM DRAPE

## (undated) DEVICE — VISUALIZATION SYSTEM: Brand: CLEARIFY

## (undated) DEVICE — TRAY FOLEY 16FR URIMETER SURESTEP

## (undated) DEVICE — SUT PDS II 0 CT-1 27 IN Z340H

## (undated) DEVICE — PROGRASP FORCEPS: Brand: ENDOWRIST

## (undated) DEVICE — URO CATCHER BAG STERILE 0-UC32

## (undated) DEVICE — DRAIN SPONGES,6 PLY: Brand: EXCILON

## (undated) DEVICE — BETHLEHEM MAJOR GENERAL PACK: Brand: CARDINAL HEALTH

## (undated) DEVICE — TROCAR PORT ACCESS 12 X120MM W/BLDLS OPTICAL TIP AIRSEAL

## (undated) DEVICE — FENESTRATED BIPOLAR FORCEPS: Brand: ENDOWRIST

## (undated) DEVICE — URETERAL DUAL LUMEN CATH

## (undated) DEVICE — TISSUE RETRIEVAL SYSTEM: Brand: INZII RETRIEVAL SYSTEM

## (undated) DEVICE — INTENDED FOR TISSUE SEPARATION, AND OTHER PROCEDURES THAT REQUIRE A SHARP SURGICAL BLADE TO PUNCTURE OR CUT.: Brand: BARD-PARKER SAFETY BLADES SIZE 15, STERILE

## (undated) DEVICE — HEMOSTATIC MATRIX SURGIFLO 8ML W/THROMBIN

## (undated) DEVICE — COLUMN DRAPE

## (undated) DEVICE — JP CHANNEL DRAIN, 19FR HUBLESS: Brand: CARDINAL HEALTH

## (undated) DEVICE — SUT VICRYL 0 UR-6 27 IN J603H

## (undated) DEVICE — SUT ETHILON 3-0 FS-1 18 IN 663G

## (undated) DEVICE — SUREFORM 45: Brand: SUREFORM

## (undated) DEVICE — PENCIL ELECTROSURG E-Z CLEAN -0035H

## (undated) DEVICE — HEAVY DUTY TABLE COVER: Brand: CONVERTORS

## (undated) DEVICE — 3M™ TEGADERM™ TRANSPARENT FILM DRESSING FRAME STYLE, 1624W, 2-3/8 IN X 2-3/4 IN (6 CM X 7 CM), 100/CT 4CT/CASE: Brand: 3M™ TEGADERM™

## (undated) DEVICE — AIRSEAL TUBE SMOKE EVAC LUMENX3 FILTERED

## (undated) DEVICE — SURGICEL 4 X 8

## (undated) DEVICE — ENDOSCOPIC VALVE WITH ADAPTER.: Brand: SURSEAL® II

## (undated) DEVICE — MEDI-VAC YANK SUCT HNDL W/TPRD BULBOUS TIP: Brand: CARDINAL HEALTH

## (undated) DEVICE — TROCAR: Brand: KII SLEEVE

## (undated) DEVICE — DRAPE EQUIPMENT RF WAND

## (undated) DEVICE — INTENDED FOR TISSUE SEPARATION, AND OTHER PROCEDURES THAT REQUIRE A SHARP SURGICAL BLADE TO PUNCTURE OR CUT.: Brand: BARD-PARKER SAFETY BLADES SIZE 11, STERILE

## (undated) DEVICE — ADHESIVE SKIN HIGH VISCOSITY EXOFIN 1ML

## (undated) DEVICE — PACK TUR

## (undated) DEVICE — SURGIFLO ENDOSCOPIC APPICATOR: Brand: ETHICON

## (undated) DEVICE — SUT PDS II 0 CT-2 27 IN Z334H

## (undated) DEVICE — SUT SILK 0 30 IN A306H

## (undated) DEVICE — SHEATH URETERAL ACCESS 10/12FR 35CM PROXIS

## (undated) DEVICE — TRANSPOSAL ULTRAFLEX DUO/QUAD ULTRA CART MANIFOLD

## (undated) DEVICE — TIP COVER ACCESSORY

## (undated) DEVICE — ELECTRO LUBE IS A SINGLE PATIENT USE DEVICE THAT IS INTENDED TO BE USED ON ELECTROSURGICAL ELECTRODES TO REDUCE STICKING.: Brand: KEY SURGICAL ELECTRO LUBE

## (undated) DEVICE — IRRIG ENDO FLO TUBING